# Patient Record
Sex: FEMALE | Race: ASIAN | Employment: OTHER | URBAN - METROPOLITAN AREA
[De-identification: names, ages, dates, MRNs, and addresses within clinical notes are randomized per-mention and may not be internally consistent; named-entity substitution may affect disease eponyms.]

---

## 2017-01-12 ENCOUNTER — TELEPHONE (OUTPATIENT)
Dept: INTERNAL MEDICINE CLINIC | Facility: CLINIC | Age: 72
End: 2017-01-12

## 2017-06-05 PROBLEM — J44.89 ASTHMA WITH COPD (CHRONIC OBSTRUCTIVE PULMONARY DISEASE) (HCC): Chronic | Status: ACTIVE | Noted: 2017-06-05

## 2017-06-05 PROBLEM — J44.9 ASTHMA WITH COPD (CHRONIC OBSTRUCTIVE PULMONARY DISEASE) (HCC): Chronic | Status: ACTIVE | Noted: 2017-06-05

## 2017-06-05 PROBLEM — J44.9 ASTHMA WITH COPD (CHRONIC OBSTRUCTIVE PULMONARY DISEASE): Chronic | Status: ACTIVE | Noted: 2017-06-05

## 2017-06-05 PROBLEM — J44.89 ASTHMA WITH COPD (CHRONIC OBSTRUCTIVE PULMONARY DISEASE): Chronic | Status: ACTIVE | Noted: 2017-06-05

## 2017-12-27 NOTE — TELEPHONE ENCOUNTER
Tried to call patient to see if she is truly Dr. Renan Vivas patient   This number is disconnected additional...

## 2018-05-10 ENCOUNTER — OFFICE VISIT (OUTPATIENT)
Dept: INTERNAL MEDICINE CLINIC | Facility: CLINIC | Age: 73
End: 2018-05-10

## 2018-05-10 ENCOUNTER — LAB ENCOUNTER (OUTPATIENT)
Dept: LAB | Age: 73
End: 2018-05-10
Attending: INTERNAL MEDICINE
Payer: MEDICARE

## 2018-05-10 VITALS
WEIGHT: 171.13 LBS | DIASTOLIC BLOOD PRESSURE: 80 MMHG | SYSTOLIC BLOOD PRESSURE: 138 MMHG | BODY MASS INDEX: 28.86 KG/M2 | OXYGEN SATURATION: 93 % | HEART RATE: 86 BPM | HEIGHT: 64.5 IN | TEMPERATURE: 99 F

## 2018-05-10 DIAGNOSIS — I51.89 DIASTOLIC DYSFUNCTION: ICD-10-CM

## 2018-05-10 DIAGNOSIS — I10 ESSENTIAL HYPERTENSION: ICD-10-CM

## 2018-05-10 DIAGNOSIS — I77.9 CAROTID ARTERY DISEASE, UNSPECIFIED LATERALITY (HCC): ICD-10-CM

## 2018-05-10 DIAGNOSIS — Z00.00 ANNUAL PHYSICAL EXAM: Primary | ICD-10-CM

## 2018-05-10 DIAGNOSIS — Z12.31 ENCOUNTER FOR SCREENING MAMMOGRAM FOR BREAST CANCER: ICD-10-CM

## 2018-05-10 DIAGNOSIS — N95.1 MENOPAUSAL STATE: ICD-10-CM

## 2018-05-10 DIAGNOSIS — Z23 NEED FOR VACCINATION: ICD-10-CM

## 2018-05-10 DIAGNOSIS — Z87.39 HISTORY OF GOUT: ICD-10-CM

## 2018-05-10 DIAGNOSIS — E78.00 PURE HYPERCHOLESTEROLEMIA: ICD-10-CM

## 2018-05-10 DIAGNOSIS — Z00.00 ANNUAL PHYSICAL EXAM: ICD-10-CM

## 2018-05-10 DIAGNOSIS — Z86.018 HISTORY OF PITUITARY ADENOMA: ICD-10-CM

## 2018-05-10 DIAGNOSIS — H25.9 SENILE CATARACT OF LEFT EYE, UNSPECIFIED AGE-RELATED CATARACT TYPE: ICD-10-CM

## 2018-05-10 DIAGNOSIS — Z12.11 COLON CANCER SCREENING: ICD-10-CM

## 2018-05-10 DIAGNOSIS — J45.30 MILD PERSISTENT ASTHMA WITHOUT COMPLICATION: ICD-10-CM

## 2018-05-10 DIAGNOSIS — K21.9 GASTROESOPHAGEAL REFLUX DISEASE, ESOPHAGITIS PRESENCE NOT SPECIFIED: ICD-10-CM

## 2018-05-10 PROCEDURE — 85025 COMPLETE CBC W/AUTO DIFF WBC: CPT

## 2018-05-10 PROCEDURE — 90670 PCV13 VACCINE IM: CPT | Performed by: INTERNAL MEDICINE

## 2018-05-10 PROCEDURE — G0009 ADMIN PNEUMOCOCCAL VACCINE: HCPCS | Performed by: INTERNAL MEDICINE

## 2018-05-10 PROCEDURE — 80061 LIPID PANEL: CPT

## 2018-05-10 PROCEDURE — 84443 ASSAY THYROID STIM HORMONE: CPT

## 2018-05-10 PROCEDURE — 84550 ASSAY OF BLOOD/URIC ACID: CPT

## 2018-05-10 PROCEDURE — 36415 COLL VENOUS BLD VENIPUNCTURE: CPT

## 2018-05-10 PROCEDURE — 96160 PT-FOCUSED HLTH RISK ASSMT: CPT | Performed by: INTERNAL MEDICINE

## 2018-05-10 PROCEDURE — 80053 COMPREHEN METABOLIC PANEL: CPT

## 2018-05-10 PROCEDURE — 82306 VITAMIN D 25 HYDROXY: CPT

## 2018-05-10 PROCEDURE — G0439 PPPS, SUBSEQ VISIT: HCPCS | Performed by: INTERNAL MEDICINE

## 2018-05-10 RX ORDER — MULTIVIT-MIN/IRON/FOLIC ACID/K 18-600-40
1 CAPSULE ORAL DAILY
COMMUNITY

## 2018-05-10 RX ORDER — MULTIVITAMIN
1 TABLET ORAL DAILY
COMMUNITY

## 2018-05-10 RX ORDER — LOSARTAN POTASSIUM 50 MG/1
1 TABLET ORAL
Status: ON HOLD | COMMUNITY
Start: 2018-03-16 | End: 2019-02-17

## 2018-05-10 RX ORDER — FLUTICASONE PROPIONATE AND SALMETEROL 50; 250 UG/1; UG/1
1 POWDER RESPIRATORY (INHALATION) 2 TIMES DAILY
COMMUNITY
Start: 2018-04-17 | End: 2019-03-15 | Stop reason: ALTCHOICE

## 2018-05-10 RX ORDER — CHLORAL HYDRATE 500 MG
1000 CAPSULE ORAL DAILY
Status: ON HOLD | COMMUNITY
End: 2018-10-19 | Stop reason: ALTCHOICE

## 2018-05-10 RX ORDER — AMLODIPINE BESYLATE 5 MG/1
1 TABLET ORAL
Status: ON HOLD | COMMUNITY
Start: 2018-04-12 | End: 2018-10-20

## 2018-05-10 NOTE — PROGRESS NOTES
HPI:   Fredy Obrien is a 68year old female who presents for a Medicare Subsequent Annual Wellness visit (Pt already had Initial Annual Wellness).       Her last annual assessment has been over 1 year: Annual Physical due on [birth year]14/1947         Fall/R Annual physical exam     Essential hypertension     Need for vaccination     Mild persistent asthma without complication     Colon cancer screening     Menopausal state     Age-related cataract of left eye     History of gout     Gastroesophageal reflux not drink alcohol or use drugs.      REVIEW OF SYSTEMS:   GENERAL: feels well otherwise  SKIN: denies any unusual skin lesions  EYES: denies blurred vision or double vision  HEENT: denies nasal congestion, sinus pain or ST  LUNGS: denies shortness of breath such as at a meeting or place of [employer]:  No   Many people I talk to seem to mumble (or don't speak clearly):  No People get annoyed because I misunderstand what they say:  No   I misunderstand what others are saying and make inappropriate responses:  No encounter diagnosis)  Plan: CBC WITH DIFFERENTIAL WITH PLATELET, COMP         METABOLIC PANEL (14), LIPID PANEL, ASSAY,         THYROID STIM HORMONE, VITAMIN D, 25-HYDROXY        Check labs. Pt needs to update her mammogram and her colonoscopy.  dexa scan a records and see what has been done before. She is not on urate lowering therapy either.  We will check her uric acid level.     (K21.9) Gastroesophageal reflux disease, esophagitis presence not specified  Plan: pt states has been treated with different PPIs Physical Exam only, or if medically necessary Electrocardiogram date       Colorectal Cancer Screening      Colonoscopy Screen every 10 years Colonoscopy,10 Years due on 02/14/1996 Update Health Maintenance if applicable    Flex Sigmoidoscopy Screen every history found This may be covered with your prescription benefits, but Medicare does not cover unless Medically needed    Zoster  Not covered by Medicare Part B No vaccine history found This may be covered with your pharmacy  prescription benefits      SPE

## 2018-05-14 ENCOUNTER — TELEPHONE (OUTPATIENT)
Dept: INTERNAL MEDICINE CLINIC | Facility: CLINIC | Age: 73
End: 2018-05-14

## 2018-05-14 DIAGNOSIS — R73.09 ELEVATED GLUCOSE: Primary | ICD-10-CM

## 2018-05-17 ENCOUNTER — TELEPHONE (OUTPATIENT)
Dept: INTERNAL MEDICINE CLINIC | Facility: CLINIC | Age: 73
End: 2018-05-17

## 2018-05-17 DIAGNOSIS — Z85.3 HISTORY OF CANCER OF LEFT BREAST: Primary | ICD-10-CM

## 2018-05-22 ENCOUNTER — HOSPITAL ENCOUNTER (OUTPATIENT)
Dept: GENERAL RADIOLOGY | Age: 73
Discharge: HOME | End: 2018-05-22
Attending: INTERNAL MEDICINE
Payer: MEDICARE

## 2018-05-22 ENCOUNTER — OFFICE VISIT (OUTPATIENT)
Dept: INTERNAL MEDICINE CLINIC | Facility: CLINIC | Age: 73
End: 2018-05-22

## 2018-05-22 VITALS
WEIGHT: 172 LBS | BODY MASS INDEX: 29.01 KG/M2 | HEIGHT: 64.5 IN | HEART RATE: 86 BPM | DIASTOLIC BLOOD PRESSURE: 73 MMHG | TEMPERATURE: 99 F | SYSTOLIC BLOOD PRESSURE: 147 MMHG | RESPIRATION RATE: 18 BRPM

## 2018-05-22 DIAGNOSIS — E78.00 HYPERCHOLESTEROLEMIA: ICD-10-CM

## 2018-05-22 DIAGNOSIS — R73.09 ELEVATED GLUCOSE: ICD-10-CM

## 2018-05-22 DIAGNOSIS — M1A.9XX1 TOPHI: Primary | ICD-10-CM

## 2018-05-22 DIAGNOSIS — M1A.9XX1 TOPHI: ICD-10-CM

## 2018-05-22 PROCEDURE — 73130 X-RAY EXAM OF HAND: CPT | Performed by: INTERNAL MEDICINE

## 2018-05-22 PROCEDURE — 99214 OFFICE O/P EST MOD 30 MIN: CPT | Performed by: INTERNAL MEDICINE

## 2018-05-22 PROCEDURE — 99212 OFFICE O/P EST SF 10 MIN: CPT | Performed by: INTERNAL MEDICINE

## 2018-05-23 ENCOUNTER — HOSPITAL ENCOUNTER (OUTPATIENT)
Dept: MAMMOGRAPHY | Facility: HOSPITAL | Age: 73
Discharge: HOME | End: 2018-05-23
Attending: INTERNAL MEDICINE
Payer: MEDICARE

## 2018-05-23 ENCOUNTER — TELEPHONE (OUTPATIENT)
Dept: INTERNAL MEDICINE CLINIC | Facility: CLINIC | Age: 73
End: 2018-05-23

## 2018-05-23 DIAGNOSIS — E78.00 HYPERCHOLESTEROLEMIA: Primary | ICD-10-CM

## 2018-05-23 DIAGNOSIS — Z12.31 ENCOUNTER FOR SCREENING MAMMOGRAM FOR BREAST CANCER: ICD-10-CM

## 2018-05-23 PROCEDURE — 77065 DX MAMMO INCL CAD UNI: CPT | Performed by: INTERNAL MEDICINE

## 2018-05-23 NOTE — TELEPHONE ENCOUNTER
Please note. Thank you.  Please respond to pool: LAURA IM ADO LPN/CMA    Pt calling back (Name and  verified) and states that she spoke with someone named [place]  at [facility] today and was told that the change of PCP name to Dr. Guicho Barraza will go i

## 2018-05-23 NOTE — TELEPHONE ENCOUNTER
pls call pt; she still under another PCP Dr Chrystal Schwartz so she needs to call her insurance to switch to me so we can do the referrals

## 2018-05-23 NOTE — PROGRESS NOTES
HPI:    Patient ID: Mike Mcdaniel is a 68year old female. Hyperlipidemia   This is a chronic problem. The current episode started more than 1 year ago. The problem is uncontrolled. Recent lipid tests were reviewed and are high.  Exacerbating disea Allergies:  Allopurinol             HIVES, SWELLING, SHORTNESS OF                            BREATH  Uloric [Febuxostat]     ITCHING  Statins                 MYALGIA    Comment:Pt had developed myalgia and myopathy  Ace Inhibitors          Coughing   P encounter.       Meds This Visit:  No prescriptions requested or ordered in this encounter    Imaging & Referrals:  CARD[facility] - INTERNAL       DY#[id number]

## 2018-05-31 ENCOUNTER — TELEPHONE (OUTPATIENT)
Dept: INTERNAL MEDICINE CLINIC | Facility: CLINIC | Age: 73
End: 2018-05-31

## 2018-05-31 DIAGNOSIS — R92.8 ABNORMAL MAMMOGRAM: Primary | ICD-10-CM

## 2018-05-31 DIAGNOSIS — M1A.9XX1 TOPHACEOUS GOUT: Primary | ICD-10-CM

## 2018-06-01 ENCOUNTER — NURSE TRIAGE (OUTPATIENT)
Dept: OTHER | Age: 73
End: 2018-06-01

## 2018-06-01 RX ORDER — METHYLPREDNISOLONE 4 MG/1
TABLET ORAL
Qty: 1 KIT | Refills: 0 | Status: SHIPPED | OUTPATIENT
Start: 2018-06-01 | End: 2018-09-05 | Stop reason: ALTCHOICE

## 2018-06-01 NOTE — TELEPHONE ENCOUNTER
Patient returned call and informed patient Rx was sent and to follow up with OV if symptoms persist or worsen. Verbalized understanding and agreement. No further questions or concerns at this time.

## 2018-06-01 NOTE — TELEPHONE ENCOUNTER
Action Requested: Summary for Provider     []  Critical Lab, Recommendations Needed  [x] Need Additional Advice  []   FYI    []   Need Orders  [] Need Medications Sent to Pharmacy  []  Other     SUMMARY: Patient is having pain in her arch on the right foot

## 2018-07-08 ENCOUNTER — TELEPHONE (OUTPATIENT)
Dept: INTERNAL MEDICINE CLINIC | Facility: CLINIC | Age: 73
End: 2018-07-08

## 2018-07-08 NOTE — TELEPHONE ENCOUNTER
pls call pt; I had advised her to see surgeon Dr Сергей Tran back in April after her mammogram was done and was recommended by radiologist to have surgical eval. I dont see any notes from Dr Сергей Tran so pt still has not seen him.  She needs to get this evaluat

## 2018-07-19 PROBLEM — Z92.3 HISTORY OF RADIATION THERAPY: Status: ACTIVE | Noted: 2018-07-19

## 2018-07-19 PROBLEM — R92.0 MICROCALCIFICATIONS OF THE BREAST: Status: ACTIVE | Noted: 2018-07-19

## 2018-07-25 ENCOUNTER — OFFICE VISIT (OUTPATIENT)
Dept: RHEUMATOLOGY | Facility: CLINIC | Age: 73
End: 2018-07-25
Payer: MEDICARE

## 2018-07-25 ENCOUNTER — NURSE TRIAGE (OUTPATIENT)
Dept: OTHER | Age: 73
End: 2018-07-25

## 2018-07-25 VITALS
HEIGHT: 64.5 IN | SYSTOLIC BLOOD PRESSURE: 138 MMHG | WEIGHT: 174 LBS | DIASTOLIC BLOOD PRESSURE: 76 MMHG | BODY MASS INDEX: 29.35 KG/M2 | HEART RATE: 89 BPM

## 2018-07-25 DIAGNOSIS — M1A.9XX1 CHRONIC TOPHACEOUS GOUT: Primary | ICD-10-CM

## 2018-07-25 PROCEDURE — 99212 OFFICE O/P EST SF 10 MIN: CPT | Performed by: INTERNAL MEDICINE

## 2018-07-25 PROCEDURE — 99204 OFFICE O/P NEW MOD 45 MIN: CPT | Performed by: INTERNAL MEDICINE

## 2018-07-25 RX ORDER — FEBUXOSTAT 40 MG/1
TABLET, FILM COATED ORAL
Qty: 30 TABLET | Refills: 2 | Status: SHIPPED | OUTPATIENT
Start: 2018-07-25 | End: 2018-08-01

## 2018-07-25 RX ORDER — COLCHICINE 0.6 MG/1
1 CAPSULE ORAL 2 TIMES DAILY
Qty: 60 CAPSULE | Refills: 5 | Status: SHIPPED | OUTPATIENT
Start: 2018-07-25 | End: 2018-08-24

## 2018-07-25 NOTE — TELEPHONE ENCOUNTER
Action Requested: Summary for Provider     []  Critical Lab, Recommendations Needed  [] Need Additional Advice  [x]   FYI    []   Need Orders  [] Need Medications Sent to Pharmacy  []  Other     SUMMARY: Pt with 2 weeks bilateral earache with intermittent

## 2018-07-25 NOTE — PROGRESS NOTES
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 Are not more swollen. They are not especially stiff. She can have fevers and chills with her gouty attacks. No anorexia or weight loss. No rash. Her hair is thinning.   She had a right catar along with it, so starting Uloric does not precipitate acute attacks. I will see her back in 1 month, after repeat uric acid, which needs to be less than 6.0. She was given Up-To-Date articles on gout and probenecid. 2.  Asthma, shortness of breath.

## 2018-07-26 ENCOUNTER — OFFICE VISIT (OUTPATIENT)
Dept: INTERNAL MEDICINE CLINIC | Facility: CLINIC | Age: 73
End: 2018-07-26
Payer: MEDICARE

## 2018-07-26 ENCOUNTER — NURSE TRIAGE (OUTPATIENT)
Dept: OTHER | Age: 73
End: 2018-07-26

## 2018-07-26 ENCOUNTER — TELEPHONE (OUTPATIENT)
Dept: RHEUMATOLOGY | Facility: CLINIC | Age: 73
End: 2018-07-26

## 2018-07-26 VITALS
SYSTOLIC BLOOD PRESSURE: 130 MMHG | HEIGHT: 64 IN | HEART RATE: 92 BPM | DIASTOLIC BLOOD PRESSURE: 76 MMHG | TEMPERATURE: 97 F | BODY MASS INDEX: 30.22 KG/M2 | WEIGHT: 177 LBS

## 2018-07-26 DIAGNOSIS — J45.30 MILD PERSISTENT ASTHMA WITHOUT COMPLICATION: ICD-10-CM

## 2018-07-26 DIAGNOSIS — H61.21 IMPACTED CERUMEN, RIGHT EAR: Primary | ICD-10-CM

## 2018-07-26 PROCEDURE — 99212 OFFICE O/P EST SF 10 MIN: CPT | Performed by: INTERNAL MEDICINE

## 2018-07-26 PROCEDURE — 99214 OFFICE O/P EST MOD 30 MIN: CPT | Performed by: INTERNAL MEDICINE

## 2018-07-26 RX ORDER — NEOMYCIN SULFATE, POLYMYXIN B SULFATE AND HYDROCORTISONE 10; 3.5; 1 MG/ML; MG/ML; [USP'U]/ML
4 SUSPENSION/ DROPS AURICULAR (OTIC) 4 TIMES DAILY
Qty: 1 BOTTLE | Refills: 0 | Status: ON HOLD | OUTPATIENT
Start: 2018-07-26 | End: 2018-10-19 | Stop reason: ALTCHOICE

## 2018-07-26 NOTE — TELEPHONE ENCOUNTER
Current Outpatient Prescriptions:   •  febuxostat (ULORIC) 40 MG Oral Tab, Take one daily. , Disp: 30 tablet, Rfl: 2  Drug change request:  \"Plan does not cover medication prescribed.  Per Rx benefit plan alternative medications include: ALLOPURINOL Pleas

## 2018-07-26 NOTE — TELEPHONE ENCOUNTER
Allopurinol is not acceptable. She had a severe diffuse rash from allopurinol. Please get prior-authorization for Uloric. Thanks.

## 2018-07-26 NOTE — TELEPHONE ENCOUNTER
Called and talked to pt; took uloric at 3pm and started having some reaction not feeling well, not able to relax and some difficulty of breathing. Also complained of pain on the right ear which we had flushed today due to impacted cerumen.    She states angela

## 2018-07-26 NOTE — PROGRESS NOTES
HPI:    Patient ID: Portillo Haley is a 68year old female. Ear Pain    There is pain in both ears. This is a new problem. The current episode started 1 to 4 weeks ago (2 weekse). The problem occurs constantly. The pain is mild.  Pertinent negatives Comment:Pt had developed myalgia and myopathy  Ace Inhibitors          Coughing  Latex                   ITCHING   PHYSICAL EXAM:   Physical Exam   Constitutional: She appears well-developed and well-nourished. No distress.    HENT:   Right Ear: External ea

## 2018-07-26 NOTE — TELEPHONE ENCOUNTER
Action Requested: Summary for Provider     []  Critical Lab, Recommendations Needed  [] Need Additional Advice  []   FYI    []   Need Orders  [] Need Medications Sent to Pharmacy  []  Other     SUMMARY: Pt requesting to speak with Dr Leela Hidalgo

## 2018-07-27 NOTE — TELEPHONE ENCOUNTER
I called  late this afternoon, but she did not answer. I advised her to not take the Uloric this weekend. I will give her a call back on Monday, July 30th. We will then decide what she will do.

## 2018-07-27 NOTE — TELEPHONE ENCOUNTER
PA requested from Luann Hope, authorization received confirmation #[id number], fax received and sent to scan. Spoke with pt and notified her. She informed me that she had samples of uloric at home and started taking them on Wednesday.   She has bee

## 2018-07-28 ENCOUNTER — HOSPITAL ENCOUNTER (EMERGENCY)
Facility: HOSPITAL | Age: 73
Discharge: HOME | End: 2018-07-28
Attending: EMERGENCY MEDICINE
Payer: MEDICARE

## 2018-07-28 ENCOUNTER — APPOINTMENT (OUTPATIENT)
Dept: GENERAL RADIOLOGY | Facility: HOSPITAL | Age: 73
End: 2018-07-28
Attending: EMERGENCY MEDICINE
Payer: MEDICARE

## 2018-07-28 VITALS
SYSTOLIC BLOOD PRESSURE: 156 MMHG | OXYGEN SATURATION: 97 % | RESPIRATION RATE: 20 BRPM | TEMPERATURE: 98 F | DIASTOLIC BLOOD PRESSURE: 80 MMHG | HEART RATE: 86 BPM

## 2018-07-28 DIAGNOSIS — T78.40XA ALLERGIC REACTION, INITIAL ENCOUNTER: Primary | ICD-10-CM

## 2018-07-28 PROCEDURE — 96374 THER/PROPH/DIAG INJ IV PUSH: CPT

## 2018-07-28 PROCEDURE — 70360 X-RAY EXAM OF NECK: CPT | Performed by: EMERGENCY MEDICINE

## 2018-07-28 PROCEDURE — 99284 EMERGENCY DEPT VISIT MOD MDM: CPT

## 2018-07-28 PROCEDURE — S0028 INJECTION, FAMOTIDINE, 20 MG: HCPCS | Performed by: EMERGENCY MEDICINE

## 2018-07-28 PROCEDURE — 93005 ELECTROCARDIOGRAM TRACING: CPT

## 2018-07-28 PROCEDURE — 93010 ELECTROCARDIOGRAM REPORT: CPT | Performed by: EMERGENCY MEDICINE

## 2018-07-28 PROCEDURE — 96375 TX/PRO/DX INJ NEW DRUG ADDON: CPT

## 2018-07-28 RX ORDER — METHYLPREDNISOLONE SODIUM SUCCINATE 40 MG/ML
40 INJECTION, POWDER, LYOPHILIZED, FOR SOLUTION INTRAMUSCULAR; INTRAVENOUS ONCE
Status: COMPLETED | OUTPATIENT
Start: 2018-07-28 | End: 2018-07-28

## 2018-07-28 RX ORDER — FAMOTIDINE 20 MG/1
20 TABLET ORAL 2 TIMES DAILY
Qty: 28 TABLET | Refills: 0 | Status: SHIPPED | OUTPATIENT
Start: 2018-07-28 | End: 2018-08-11

## 2018-07-28 RX ORDER — DIPHENHYDRAMINE HCL 25 MG
25 TABLET ORAL EVERY 6 HOURS PRN
Qty: 28 TABLET | Refills: 0 | Status: ON HOLD | OUTPATIENT
Start: 2018-07-28 | End: 2019-02-17

## 2018-07-28 RX ORDER — DIPHENHYDRAMINE HYDROCHLORIDE 50 MG/ML
25 INJECTION INTRAMUSCULAR; INTRAVENOUS ONCE
Status: COMPLETED | OUTPATIENT
Start: 2018-07-28 | End: 2018-07-28

## 2018-07-28 RX ORDER — FAMOTIDINE 10 MG/ML
20 INJECTION, SOLUTION INTRAVENOUS ONCE
Status: COMPLETED | OUTPATIENT
Start: 2018-07-28 | End: 2018-07-28

## 2018-07-28 RX ORDER — PREDNISONE 20 MG/1
40 TABLET ORAL DAILY
Qty: 8 TABLET | Refills: 0 | Status: SHIPPED | OUTPATIENT
Start: 2018-07-28 | End: 2018-08-01

## 2018-07-28 NOTE — ED PROVIDER NOTES
Patient Seen in: [facility] AND [facility] Emergency Department    History   Patient presents with:  Dyspnea BRUNO SOB (respiratory): +pain to below her throat    Stated Complaint: pain to throat     HPI    67 yo F with PMH HL, HTN, gout on losartan and with recent acids 1000 MG Oral Cap,  Take 1,000 mg by mouth daily.        Family History   Problem Relation Age of Onset   • Asthma Father    • Hypertension Father    • Heart Disorder Father    • Other Zollie Ports Mother      thyroid surgery   • Asthma Sister    • Asthma B Anxious. Nursing note and vitals reviewed. ED Course   Labs Reviewed - No data to display  EKG    Rate, intervals and axes as noted on EKG Report.   Rate: 86  Rhythm: Sinus Rhythm  Reading: NSR 86 without STT changes as interpreted by myself symptoms improved with meds. DC home with meds as noted and PCP followup.     Disposition and Plan     Clinical Impression:  Allergic reaction, initial encounter  (primary encounter diagnosis)    Disposition:  Discharge    Follow-up:  Keaton Stains,

## 2018-07-28 NOTE — ED INITIAL ASSESSMENT (HPI)
Pt c/o BRUNO+pain/tightness to below her throat, denies nausea/vomiting/fever, pt sts that she has allergic reaction to uloric and called her PMD and was told to stop medication and come to ER

## 2018-07-30 ENCOUNTER — TELEPHONE (OUTPATIENT)
Dept: OTHER | Age: 73
End: 2018-07-30

## 2018-07-30 RX ORDER — PROBENECID 500 MG/1
TABLET, FILM COATED ORAL
Qty: 60 TABLET | Refills: 2 | Status: ON HOLD | OUTPATIENT
Start: 2018-07-30 | End: 2018-10-19

## 2018-07-30 NOTE — TELEPHONE ENCOUNTER
Since she was having allergic reaction which was possibly affecting her airway, I would go along with ER doctor giving prednisone

## 2018-07-30 NOTE — TELEPHONE ENCOUNTER
I spoke with [facility]. She will be unable to tolerate Uloric. She took 2 doses and got very irritable, restless, could not breathe, and had discomfort in her throat. No itching. Will send a prescription in for probenecid 250 mg twice a day.   She will

## 2018-07-30 NOTE — TELEPHONE ENCOUNTER
Received call from patient's son who is on HIPAA, who reports patient was seen in the ER on 7/28/18 due to an allergic reaction and was prescribed prednisone.  Son reports patient is apprehensive in starting this medication and wants to check with Dr. Nancy Davenport

## 2018-08-01 ENCOUNTER — OFFICE VISIT (OUTPATIENT)
Dept: INTERNAL MEDICINE CLINIC | Facility: CLINIC | Age: 73
End: 2018-08-01
Payer: MEDICARE

## 2018-08-01 VITALS
WEIGHT: 175 LBS | HEIGHT: 64.5 IN | RESPIRATION RATE: 12 BRPM | BODY MASS INDEX: 29.51 KG/M2 | DIASTOLIC BLOOD PRESSURE: 80 MMHG | TEMPERATURE: 98 F | SYSTOLIC BLOOD PRESSURE: 134 MMHG | HEART RATE: 87 BPM

## 2018-08-01 DIAGNOSIS — T78.40XA ALLERGIC REACTION TO DRUG, INITIAL ENCOUNTER: Primary | ICD-10-CM

## 2018-08-01 PROBLEM — J44.9 ASTHMA WITH COPD (CHRONIC OBSTRUCTIVE PULMONARY DISEASE): Chronic | Status: ACTIVE | Noted: 2018-08-01

## 2018-08-01 PROBLEM — J44.9 ASTHMA WITH COPD (CHRONIC OBSTRUCTIVE PULMONARY DISEASE) (HCC): Chronic | Status: ACTIVE | Noted: 2018-08-01

## 2018-08-01 PROBLEM — J44.89 ASTHMA WITH COPD (CHRONIC OBSTRUCTIVE PULMONARY DISEASE): Chronic | Status: ACTIVE | Noted: 2018-08-01

## 2018-08-01 PROBLEM — J44.89 ASTHMA WITH COPD (CHRONIC OBSTRUCTIVE PULMONARY DISEASE) (HCC): Chronic | Status: ACTIVE | Noted: 2018-08-01

## 2018-08-01 PROCEDURE — 99212 OFFICE O/P EST SF 10 MIN: CPT | Performed by: INTERNAL MEDICINE

## 2018-08-01 PROCEDURE — 99213 OFFICE O/P EST LOW 20 MIN: CPT | Performed by: INTERNAL MEDICINE

## 2018-08-01 RX ORDER — COLCHICINE 0.6 MG/1
0.6 TABLET, FILM COATED ORAL DAILY PRN
Status: ON HOLD | COMMUNITY
Start: 2018-07-25 | End: 2019-02-17

## 2018-08-01 NOTE — PROGRESS NOTES
HPI:    Patient ID: Jose Eduardo Long is a 68year old female. Patient presents today for post ER ffup. I had sent pt to ER after she had an allergic reaction to uloric.  She has history of tophaceous gout and had to be started on urate lowering therap (MITIGARE) 0.6 MG Oral Cap Take 1 capsule by mouth 2 (two) times daily. Disp: 60 capsule Rfl: 5   methylPREDNISolone (MEDROL) 4 MG Oral Tablet Therapy Pack As directed. Disp: 1 kit Rfl: 0   omega-3 fatty acids 1000 MG Oral Cap Take 1,000 mg by mouth daily. ASSESSMENT/PLAN:   (T78.40XA) Allergic reaction to drug, initial encounter  (primary encounter diagnosis)  Plan: pt had been seen in ER and was started on prednisone but had been taking only 20mg daily instead of bid dose.  She is doing well so told to fi

## 2018-08-13 ENCOUNTER — HOSPITAL ENCOUNTER (OUTPATIENT)
Dept: RESPIRATORY THERAPY | Facility: HOSPITAL | Age: 73
Discharge: HOME | End: 2018-08-13
Attending: INTERNAL MEDICINE
Payer: MEDICARE

## 2018-08-13 DIAGNOSIS — J45.30 MILD PERSISTENT ASTHMA WITHOUT COMPLICATION: ICD-10-CM

## 2018-08-13 PROCEDURE — 94729 DIFFUSING CAPACITY: CPT | Performed by: INTERNAL MEDICINE

## 2018-08-13 PROCEDURE — 94726 PLETHYSMOGRAPHY LUNG VOLUMES: CPT | Performed by: INTERNAL MEDICINE

## 2018-08-13 PROCEDURE — 94060 EVALUATION OF WHEEZING: CPT | Performed by: INTERNAL MEDICINE

## 2018-08-21 NOTE — PROCEDURES
Pulmonary Function Test     Barney Osorio Patient Status:  Outpatient    1945 MRN [id number]   Date of Exam 18 PCP Eron Sheldon MD           Spirometry   FEV1:0.64 33%  FVC:1.32 55%  FEV1/FVC:0.48    Lung Volume   T.13 86%

## 2018-08-26 ENCOUNTER — TELEPHONE (OUTPATIENT)
Dept: INTERNAL MEDICINE CLINIC | Facility: CLINIC | Age: 73
End: 2018-08-26

## 2018-08-26 DIAGNOSIS — J45.50 SEVERE PERSISTENT ASTHMA WITHOUT COMPLICATION: Primary | ICD-10-CM

## 2018-09-05 ENCOUNTER — OFFICE VISIT (OUTPATIENT)
Dept: RHEUMATOLOGY | Facility: CLINIC | Age: 73
End: 2018-09-05

## 2018-09-05 VITALS
DIASTOLIC BLOOD PRESSURE: 73 MMHG | HEART RATE: 97 BPM | TEMPERATURE: 98 F | BODY MASS INDEX: 29.01 KG/M2 | WEIGHT: 172 LBS | RESPIRATION RATE: 19 BRPM | HEIGHT: 64.5 IN | SYSTOLIC BLOOD PRESSURE: 146 MMHG

## 2018-09-05 DIAGNOSIS — J44.9 ASTHMA WITH COPD (CHRONIC OBSTRUCTIVE PULMONARY DISEASE) (HCC): Chronic | ICD-10-CM

## 2018-09-05 DIAGNOSIS — M1A.9XX1 CHRONIC TOPHACEOUS GOUT: Primary | ICD-10-CM

## 2018-09-05 PROCEDURE — 99213 OFFICE O/P EST LOW 20 MIN: CPT | Performed by: INTERNAL MEDICINE

## 2018-09-05 NOTE — PROGRESS NOTES
HPI:    Patient ID: Jose Perez is a 68year old female. Danna Glover is a 81-year-old woman who I saw for Dr. Kitty Magana on July 25th, 2018, with worsening polyarticular tophaceous gout, with a uric acid of 8.7.   Previously, allopurinol has caused Monday and Friday  Disp:  Rfl:    Multiple Vitamin (MULTI-VITAMIN DAILY) Oral Tab Take 1 tablet by mouth daily. Disp:  Rfl:    Cholecalciferol (VITAMIN D) 2000 units Oral Cap Take 1 capsule by mouth daily.  Disp:  Rfl:    omega-3 fatty acids 1000 MG Oral Ca using her special drink for now. She will return in 6 months. Her uric acid level will be checked at that time. She will also work on exercise, diet, and weight loss. 2.  Asthma, shortness of breath.       3.  History of breast cancer bilaterally, wit

## 2018-09-11 ENCOUNTER — TELEPHONE (OUTPATIENT)
Dept: OTHER | Age: 73
End: 2018-09-11

## 2018-09-11 NOTE — TELEPHONE ENCOUNTER
Spoke with patient--requesting EL to place GI referral. Relayed to patient referral already in system. Relayed # below for patient to call for appt--patient verbalizes understanding and agreement. No further questions/concerns at this time.       Referral I

## 2018-10-11 ENCOUNTER — HOSPITAL ENCOUNTER (EMERGENCY)
Facility: HOSPITAL | Age: 73
Discharge: HOME | End: 2018-10-11
Attending: EMERGENCY MEDICINE
Payer: MEDICARE

## 2018-10-11 ENCOUNTER — APPOINTMENT (OUTPATIENT)
Dept: GENERAL RADIOLOGY | Facility: HOSPITAL | Age: 73
End: 2018-10-11
Attending: EMERGENCY MEDICINE
Payer: MEDICARE

## 2018-10-11 ENCOUNTER — APPOINTMENT (OUTPATIENT)
Dept: CT IMAGING | Facility: HOSPITAL | Age: 73
End: 2018-10-11
Attending: EMERGENCY MEDICINE
Payer: MEDICARE

## 2018-10-11 VITALS
OXYGEN SATURATION: 97 % | RESPIRATION RATE: 18 BRPM | WEIGHT: 172 LBS | SYSTOLIC BLOOD PRESSURE: 162 MMHG | BODY MASS INDEX: 29.37 KG/M2 | HEART RATE: 82 BPM | DIASTOLIC BLOOD PRESSURE: 80 MMHG | HEIGHT: 64 IN | TEMPERATURE: 99 F

## 2018-10-11 DIAGNOSIS — S22.49XA MULTIPLE RIB FRACTURES INVOLVING FOUR OR MORE RIBS: Primary | ICD-10-CM

## 2018-10-11 DIAGNOSIS — S00.03XA CONTUSION OF SCALP, INITIAL ENCOUNTER: ICD-10-CM

## 2018-10-11 PROCEDURE — 71101 X-RAY EXAM UNILAT RIBS/CHEST: CPT | Performed by: EMERGENCY MEDICINE

## 2018-10-11 PROCEDURE — 70450 CT HEAD/BRAIN W/O DYE: CPT | Performed by: EMERGENCY MEDICINE

## 2018-10-11 PROCEDURE — 99285 EMERGENCY DEPT VISIT HI MDM: CPT

## 2018-10-11 PROCEDURE — 96375 TX/PRO/DX INJ NEW DRUG ADDON: CPT

## 2018-10-11 PROCEDURE — 96374 THER/PROPH/DIAG INJ IV PUSH: CPT

## 2018-10-11 RX ORDER — ORPHENADRINE CITRATE 100 MG/1
100 TABLET, EXTENDED RELEASE ORAL 2 TIMES DAILY PRN
Qty: 20 TABLET | Refills: 0 | Status: SHIPPED | OUTPATIENT
Start: 2018-10-11 | End: 2018-10-20

## 2018-10-11 RX ORDER — ORPHENADRINE CITRATE 30 MG/ML
60 INJECTION INTRAMUSCULAR; INTRAVENOUS ONCE
Status: COMPLETED | OUTPATIENT
Start: 2018-10-11 | End: 2018-10-11

## 2018-10-11 RX ORDER — IBUPROFEN 600 MG/1
600 TABLET ORAL EVERY 6 HOURS PRN
Qty: 10 TABLET | Refills: 0 | Status: SHIPPED | OUTPATIENT
Start: 2018-10-11 | End: 2018-10-20

## 2018-10-11 RX ORDER — HYDROCODONE BITARTRATE AND ACETAMINOPHEN 5; 325 MG/1; MG/1
1-2 TABLET ORAL EVERY 6 HOURS PRN
Qty: 30 TABLET | Refills: 0 | Status: ON HOLD | OUTPATIENT
Start: 2018-10-11 | End: 2018-10-20

## 2018-10-11 RX ORDER — MORPHINE SULFATE 4 MG/ML
2 INJECTION, SOLUTION INTRAMUSCULAR; INTRAVENOUS ONCE
Status: COMPLETED | OUTPATIENT
Start: 2018-10-11 | End: 2018-10-11
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
## 2018-10-14 NOTE — ED PROVIDER NOTES
Patient Seen in: [facility] AND [facility] Emergency Department    History   Patient presents with:  Fall (musculoskeletal, neurologic)    Stated Complaint: fall    HPI    68year old female with h/o asthma, breast ca in remission, hyperlipidemia, and htn who pr Current:BP (!) 162/80   Pulse 82   Temp 98.8 °F (37.1 °C) (Oral)   Resp 18   Ht 162.6 cm (5' 4\")   Wt 78 kg   SpO2 97%   BMI 29.52 kg/m²         Physical Exam   Constitutional: She is oriented to person, place, and time.  She appears well-developed and wel CONCLUSION:  1. Negative for depressed calvarial fracture, coup/contrecoup intraparenchymal contusion, intracranial hemorrhage, or further evidence of acute intracranial process by noncontrast CT technique.   2. Senescent changes of parenchymal volume loss We recommend that you schedule follow up care with a primary care provider within the next three months to obtain basic health screening including reassessment of your blood pressure.     Medications Prescribed:  Discharge Medication List as of 10/11/2018

## 2018-10-15 ENCOUNTER — TELEPHONE (OUTPATIENT)
Dept: OTHER | Age: 73
End: 2018-10-15
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
## 2018-10-16 ENCOUNTER — DOCUMENTATION ONLY (OUTPATIENT)
Dept: FAMILY MEDICINE CLINIC | Facility: CLINIC | Age: 73
End: 2018-10-16

## 2018-10-16 ENCOUNTER — OFFICE VISIT (OUTPATIENT)
Dept: INTERNAL MEDICINE CLINIC | Facility: CLINIC | Age: 73
End: 2018-10-16
Payer: MEDICARE

## 2018-10-16 VITALS
BODY MASS INDEX: 30.02 KG/M2 | WEIGHT: 178 LBS | TEMPERATURE: 99 F | RESPIRATION RATE: 12 BRPM | HEIGHT: 64.5 IN | SYSTOLIC BLOOD PRESSURE: 144 MMHG | HEART RATE: 84 BPM | DIASTOLIC BLOOD PRESSURE: 78 MMHG

## 2018-10-16 DIAGNOSIS — S22.49XA MULTIPLE RIB FRACTURES INVOLVING FOUR OR MORE RIBS: Primary | ICD-10-CM
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
## 2018-10-16 PROCEDURE — 99214 OFFICE O/P EST MOD 30 MIN: CPT | Performed by: INTERNAL MEDICINE
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
## 2018-10-18 ENCOUNTER — APPOINTMENT (OUTPATIENT)
Dept: GENERAL RADIOLOGY | Facility: HOSPITAL | Age: 73
DRG: 202 | End: 2018-10-18
Attending: EMERGENCY MEDICINE
Payer: MEDICARE

## 2018-10-18 ENCOUNTER — NURSE TRIAGE (OUTPATIENT)
Dept: OTHER | Age: 73
End: 2018-10-18

## 2018-10-18 ENCOUNTER — TELEPHONE (OUTPATIENT)
Dept: INTERNAL MEDICINE CLINIC | Facility: CLINIC | Age: 73
End: 2018-10-18

## 2018-10-18 ENCOUNTER — HOSPITAL ENCOUNTER (OUTPATIENT)
Facility: HOSPITAL | Age: 73
Setting detail: OBSERVATION
Discharge: HOME | DRG: 202 | End: 2018-10-20
Attending: EMERGENCY MEDICINE | Admitting: HOSPITALIST
Payer: MEDICARE

## 2018-10-18 ENCOUNTER — TELEPHONE (OUTPATIENT)
Dept: OTHER | Age: 73
End: 2018-10-18

## 2018-10-18 DIAGNOSIS — R06.02 SHORTNESS OF BREATH: ICD-10-CM

## 2018-10-18 DIAGNOSIS — S22.42XD CLOSED FRACTURE OF MULTIPLE RIBS OF LEFT SIDE WITH ROUTINE HEALING, SUBSEQUENT ENCOUNTER: ICD-10-CM

## 2018-10-18 DIAGNOSIS — J90 PLEURAL EFFUSION: Primary | ICD-10-CM

## 2018-10-18 PROCEDURE — 99222 1ST HOSP IP/OBS MODERATE 55: CPT | Performed by: HOSPITALIST

## 2018-10-18 PROCEDURE — 71045 X-RAY EXAM CHEST 1 VIEW: CPT | Performed by: EMERGENCY MEDICINE

## 2018-10-18 RX ORDER — IPRATROPIUM BROMIDE AND ALBUTEROL SULFATE 2.5; .5 MG/3ML; MG/3ML
3 SOLUTION RESPIRATORY (INHALATION) ONCE
Status: COMPLETED | OUTPATIENT
Start: 2018-10-18 | End: 2018-10-18

## 2018-10-18 NOTE — TELEPHONE ENCOUNTER
Dr Virgil Luque    Patient calling and states that she is in severe pain due to her multiple rib fracture, LOV 10/16/18 with Dr Virgil Luque, c/o sob and cannot take care of herself, need to stop and  pauses before can complete her sentences, advised to go to

## 2018-10-18 NOTE — TELEPHONE ENCOUNTER
If she had reaction to norco then stop it. She can take ibuprofen instead  As to home health visits, I did a referral for it 2 days ago so pls ffup with managed care since pt has hmo.

## 2018-10-18 NOTE — TELEPHONE ENCOUNTER
[facility] calling [facility] patient advised needs hospice care and was ordered by Dr. Anitha Roa.     They are unable to go to patients home without an order. Please advise ASAP.

## 2018-10-18 NOTE — TELEPHONE ENCOUNTER
Patient stated she is not going to the ED. She was on her way but is coming back home. She stated every times she takes the Norco she cannot breath and becomes nauseated.     This morning she took the Norco and than vomited up the [address].  She now can br
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
## 2018-10-18 NOTE — TELEPHONE ENCOUNTER
Action Requested: Summary for Provider     []  Critical Lab, Recommendations Needed  [] Need Additional Advice  []   FYI    []   Need Orders  [] Need Medications Sent to Pharmacy  []  Other     SUMMARY: Patient sent to ER now. Hx of rib fx.   States bot

## 2018-10-19 PROBLEM — S22.42XD CLOSED FRACTURE OF MULTIPLE RIBS OF LEFT SIDE WITH ROUTINE HEALING, SUBSEQUENT ENCOUNTER: Status: ACTIVE | Noted: 2018-10-19

## 2018-10-19 PROBLEM — R06.02 SHORTNESS OF BREATH: Status: ACTIVE | Noted: 2018-10-19

## 2018-10-19 PROCEDURE — 99233 SBSQ HOSP IP/OBS HIGH 50: CPT | Performed by: HOSPITALIST

## 2018-10-19 PROCEDURE — 99222 1ST HOSP IP/OBS MODERATE 55: CPT | Performed by: INTERNAL MEDICINE

## 2018-10-19 RX ORDER — DIPHENOXYLATE HYDROCHLORIDE AND ATROPINE SULFATE 2.5; .025 MG/1; MG/1
1 TABLET ORAL DAILY
Status: DISCONTINUED | OUTPATIENT
Start: 2018-10-19 | End: 2018-10-20

## 2018-10-19 RX ORDER — ONDANSETRON 2 MG/ML
4 INJECTION INTRAMUSCULAR; INTRAVENOUS EVERY 6 HOURS PRN
Status: DISCONTINUED | OUTPATIENT
Start: 2018-10-19 | End: 2018-10-20

## 2018-10-19 RX ORDER — METHYLPREDNISOLONE SODIUM SUCCINATE 40 MG/ML
20 INJECTION, POWDER, LYOPHILIZED, FOR SOLUTION INTRAMUSCULAR; INTRAVENOUS ONCE
Status: COMPLETED | OUTPATIENT
Start: 2018-10-19 | End: 2018-10-19

## 2018-10-19 RX ORDER — LOSARTAN POTASSIUM 50 MG/1
50 TABLET ORAL DAILY
Status: DISCONTINUED | OUTPATIENT
Start: 2018-10-19 | End: 2018-10-20

## 2018-10-19 RX ORDER — IPRATROPIUM BROMIDE AND ALBUTEROL SULFATE 2.5; .5 MG/3ML; MG/3ML
3 SOLUTION RESPIRATORY (INHALATION)
Status: DISCONTINUED | OUTPATIENT
Start: 2018-10-19 | End: 2018-10-19

## 2018-10-19 RX ORDER — TRAMADOL HYDROCHLORIDE 50 MG/1
50 TABLET ORAL EVERY 6 HOURS PRN
Status: DISCONTINUED | OUTPATIENT
Start: 2018-10-19 | End: 2018-10-20

## 2018-10-19 RX ORDER — DIPHENHYDRAMINE HCL 25 MG
25 CAPSULE ORAL EVERY 6 HOURS PRN
Status: DISCONTINUED | OUTPATIENT
Start: 2018-10-19 | End: 2018-10-20

## 2018-10-19 RX ORDER — IPRATROPIUM BROMIDE AND ALBUTEROL SULFATE 2.5; .5 MG/3ML; MG/3ML
3 SOLUTION RESPIRATORY (INHALATION) EVERY 6 HOURS PRN
Status: DISCONTINUED | OUTPATIENT
Start: 2018-10-19 | End: 2018-10-20

## 2018-10-19 RX ORDER — INDOMETHACIN 25 MG/1
25 CAPSULE ORAL
Status: DISCONTINUED | OUTPATIENT
Start: 2018-10-19 | End: 2018-10-19

## 2018-10-19 RX ORDER — ACETAMINOPHEN 325 MG/1
650 TABLET ORAL EVERY 6 HOURS PRN
Status: DISCONTINUED | OUTPATIENT
Start: 2018-10-19 | End: 2018-10-20

## 2018-10-19 RX ORDER — IPRATROPIUM BROMIDE AND ALBUTEROL SULFATE 2.5; .5 MG/3ML; MG/3ML
3 SOLUTION RESPIRATORY (INHALATION) EVERY 6 HOURS PRN
Status: DISCONTINUED | OUTPATIENT
Start: 2018-10-19 | End: 2018-10-19

## 2018-10-19 RX ORDER — DOCUSATE SODIUM 100 MG/1
100 CAPSULE, LIQUID FILLED ORAL 2 TIMES DAILY
Status: DISCONTINUED | OUTPATIENT
Start: 2018-10-19 | End: 2018-10-20

## 2018-10-19 RX ORDER — ALBUTEROL SULFATE 90 UG/1
1 AEROSOL, METERED RESPIRATORY (INHALATION) EVERY 4 HOURS PRN
Status: DISCONTINUED | OUTPATIENT
Start: 2018-10-19 | End: 2018-10-20

## 2018-10-19 RX ORDER — LIDOCAINE 50 MG/G
1 PATCH TOPICAL EVERY 24 HOURS
Status: DISCONTINUED | OUTPATIENT
Start: 2018-10-19 | End: 2018-10-20

## 2018-10-19 RX ORDER — AMLODIPINE BESYLATE 10 MG/1
10 TABLET ORAL DAILY
Status: DISCONTINUED | OUTPATIENT
Start: 2018-10-19 | End: 2018-10-20

## 2018-10-19 RX ORDER — ALBUTEROL SULFATE 2.5 MG/3ML
2.5 SOLUTION RESPIRATORY (INHALATION) EVERY 6 HOURS PRN
Status: DISCONTINUED | OUTPATIENT
Start: 2018-10-19 | End: 2018-10-19

## 2018-10-19 RX ORDER — COLCHICINE 0.6 MG/1
0.6 TABLET ORAL 2 TIMES DAILY
Status: DISCONTINUED | OUTPATIENT
Start: 2018-10-19 | End: 2018-10-19

## 2018-10-19 NOTE — ED NOTES
Pt here post fall. Dx with previous rib fractures to left side. Pt states she did not have any previous allergy to hydrocodone however after taking the medication she noticed, difficulty breathing and swelling.  Pt asked if this was occuring prior to medica

## 2018-10-19 NOTE — H&P
[facility]    History & Physical    Renetta Balderrama Patient Status:  Emergency    1945 MRN [id number]   Location [address] Attending Trent Koehler MD   Hosp Day # 0 PCP Heather Gong MD     D (Other) Mother         thyroid surgery   • Asthma Sister    • Asthma Brother    • Other (Other) Brother         gout   • Breast Cancer Self 43        rt breast 69      reports that  has never smoked.  she has never used smokeless tobacco. She reports that s times daily as needed (muscle spasm). Do not drive while taking this medication, may cause dizziness. ibuprofen 600 MG Oral Tab   No No   Sig: Take 1 tablet (600 mg total) by mouth every 6 (six) hours as needed for Pain.    omega-3 fatty acids 1000 MG Ora Cooperative, appropriate mood & affect.       Laboratory Data:   Lab Results   Component Value Date    WBC 10.4 10/18/2018    HGB 14.2 10/18/2018    HCT 43.4 10/18/2018     10/18/2018    CREATSERUM 0.93 10/18/2018    BUN 18 10/18/2018     10/1

## 2018-10-19 NOTE — ED PROVIDER NOTES
Patient Seen in: [facility] AND [facility] Emergency Department    History   Patient presents with:  Swelling Edema (cardiovascular, metabolic)  Dyspnea BRUNO SOB (respiratory)  Pain (neurologic)    Stated Complaint: allergic reaction    HPI    80-year-old female Eyes: Negative for pain, discharge and redness. Respiratory: Positive for shortness of breath. Negative for cough and wheezing. Cardiovascular: Positive for leg swelling. Negative for chest pain.    Gastrointestinal: Negative for abdominal pain, tremaine tenderness. Neurological: She is alert and oriented to person, place, and time.    5/5 strength in b/l UEs and LEs, normal sensation in all extremities, normal finger to nose b/l, normal gait, no facial asymmetry, normal speech     Skin: Skin is warm and 91.1 80.0 - 100.0 fL    MCH 29.8 27.0 - 32.0 pg    MCHC 32.7 32.0 - 37.0 g/dl    RDW 14.5 11.0 - 15.0 %     140 - 400 K/UL    MPV 7.7 7.4 - 10.3 fL    Neutrophil % 77 %    Lymphocyte % 10 %    Monocyte % 10 %    Eosinophil % 3 %    Basophil % 0 % untreated hypertension. Education regarding lifestyle modifications and the need for appropriate follow-up with their PCP to have their blood pressure re-checked within 1 week was provided.      Medical Record Review: I personally reviewed available prior Admission  Date Reviewed: 10/17/2018          ICD-10-CM Noted POA    Pleural effusion J90 10/18/2018 Unknown

## 2018-10-19 NOTE — TELEPHONE ENCOUNTER
Portia from [facility] calling, did receive order for [facility] but it did not specify what services.  They also do not have caregivers, they would send a  to patient and assist with setting up the service through another agency, this is an out of

## 2018-10-19 NOTE — TELEPHONE ENCOUNTER
Currently admitted    Hospital Problem List         Pleural effusion      Shortness of breath      Closed fracture of multiple ribs of left side with routine healing, subsequent encounter

## 2018-10-19 NOTE — ED INITIAL ASSESSMENT (HPI)
The patient who was seen here last week with rib fractures returns with complaint of uncontrolled pain due to \"allergic reaction\" to hydrocodone that she feels is causing difficulty breathing, difficulty breathing and bilateral leg swelling.

## 2018-10-19 NOTE — CONSULTS
[facility] - [facility]    Report of Consultation    Kp Leonardo Patient Status:  Emergency    1945 MRN [id number]   Location [address] Attending Deneen Islas MD   Hosp Day # 0 PCP Jony Adhikari MD Relation Age of Onset   • Asthma Father    • Hypertension Father    • Heart Disorder Father    • Other (Other) Mother         thyroid surgery   • Asthma Sister    • Asthma Brother    • Other (Other) Brother         gout   • Breast Cancer Self 43        rt Results:     Laboratory Data:  Lab Results   Component Value Date    WBC 10.4 10/18/2018    HGB 14.2 10/18/2018    HCT 43.4 10/18/2018     10/18/2018    CREATSERUM 0.93 10/18/2018    BUN 18 10/18/2018     10/18/2018    K 4.3 10/18/2018

## 2018-10-19 NOTE — PROGRESS NOTES
[facility]OSP - [facility]    Progress Note    Mónica Good Patient Status:  Inpatient    1945 MRN [id number]   [facility] 5SW/SE Attending Mayank Almonte MD   Hosp Day # 0 PCP Micah Nunes MD       Subjective: 10/18/2018  ECG Report  Interpretation  -------------------------- Sinus Rhythm -Left atrial enlargement.   low voltage anterior leads ABNORMAL When compared with ECG of 07/28/2018 15:55:44 lower anterior voltage Electronically signed on 10/19/2018 at 11:02

## 2018-10-19 NOTE — PLAN OF CARE
Problem: Patient/Family Goals  Goal: Patient/Family Long Term Goal  Patient's Long Term Goal: return home    Interventions:  - Pain management  Follow MD orders  Consult Pulmonary  Monitor Breathing  - See additional Care Plan goals for specific interventi pain with activity and pre-medicate as appropriate  Outcome: Progressing      Problem: SAFETY ADULT - FALL  Goal: Free from fall injury  INTERVENTIONS:  - Assess pt frequently for physical needs  - Identify cognitive and physical deficits and behaviors dewayne

## 2018-10-19 NOTE — TELEPHONE ENCOUNTER
Pt was actually admitted to [facility] last night so social servivce may see her while in [facility]

## 2018-10-20 VITALS
DIASTOLIC BLOOD PRESSURE: 76 MMHG | HEART RATE: 94 BPM | WEIGHT: 180.56 LBS | RESPIRATION RATE: 20 BRPM | BODY MASS INDEX: 30.83 KG/M2 | HEIGHT: 64 IN | TEMPERATURE: 99 F | SYSTOLIC BLOOD PRESSURE: 146 MMHG | OXYGEN SATURATION: 94 %

## 2018-10-20 PROCEDURE — 99232 SBSQ HOSP IP/OBS MODERATE 35: CPT | Performed by: INTERNAL MEDICINE

## 2018-10-20 PROCEDURE — 99239 HOSP IP/OBS DSCHRG MGMT >30: CPT | Performed by: HOSPITALIST

## 2018-10-20 RX ORDER — PREDNISONE 10 MG/1
TABLET ORAL
Qty: 6 TABLET | Refills: 0 | Status: SHIPPED | OUTPATIENT
Start: 2018-10-20 | End: 2018-11-12 | Stop reason: ALTCHOICE

## 2018-10-20 RX ORDER — LIDOCAINE 50 MG/G
1 PATCH TOPICAL EVERY 24 HOURS
Qty: 30 PATCH | Refills: 0 | Status: SHIPPED | OUTPATIENT
Start: 2018-10-20 | End: 2018-12-05 | Stop reason: ALTCHOICE

## 2018-10-20 RX ORDER — AMLODIPINE BESYLATE 5 MG/1
10 TABLET ORAL DAILY
Qty: 60 TABLET | Refills: 0 | Status: ON HOLD | OUTPATIENT
Start: 2018-10-20 | End: 2019-02-19

## 2018-10-20 RX ORDER — IBUPROFEN 600 MG/1
600 TABLET ORAL EVERY 6 HOURS PRN
Qty: 10 TABLET | Refills: 0 | Status: SHIPPED | OUTPATIENT
Start: 2018-10-20 | End: 2018-10-20

## 2018-10-20 RX ORDER — ALBUTEROL SULFATE 90 UG/1
1-2 AEROSOL, METERED RESPIRATORY (INHALATION) EVERY 6 HOURS PRN
Qty: 1 INHALER | Refills: 0 | Status: SHIPPED | OUTPATIENT
Start: 2018-10-20 | End: 2019-04-24

## 2018-10-20 RX ORDER — ACETAMINOPHEN 325 MG/1
650 TABLET ORAL EVERY 6 HOURS PRN
Qty: 30 TABLET | Refills: 0 | Status: SHIPPED | OUTPATIENT
Start: 2018-10-20 | End: 2018-12-05 | Stop reason: ALTCHOICE

## 2018-10-20 RX ORDER — ORPHENADRINE CITRATE 100 MG/1
100 TABLET, EXTENDED RELEASE ORAL 2 TIMES DAILY PRN
Qty: 20 TABLET | Refills: 0 | Status: SHIPPED | OUTPATIENT
Start: 2018-10-20 | End: 2018-10-20

## 2018-10-20 NOTE — PLAN OF CARE
Problem: Patient/Family Goals  Goal: Patient/Family Long Term Goal  Patient's Long Term Goal: return home    Interventions:  - Pain management  Follow MD orders  Consult Pulmonary  Monitor Breathing  - See additional Care Plan goals for specific interventi new pain  - Anticipate increased pain with activity and pre-medicate as appropriate  Outcome: Adequate for Discharge      Problem: SAFETY ADULT - FALL  Goal: Free from fall injury  INTERVENTIONS:  - Assess pt frequently for physical needs  - Identify cogni

## 2018-10-20 NOTE — CM/SW NOTE
CARE MANAGEMENT    Anticipated Discharge Plan:  Pt anticipated to go home today.   Requesting caregiver resources, which were given,  Discussed with bedside RN.       10/20/18 1200   CM/SW Screening   Referral Source Nurse;   Information S

## 2018-10-20 NOTE — PROGRESS NOTES
[facility]    Progress Note      Assessment and Plan:   1.  Rib fractures with modest left pleural effusion–the patient is doing well clinically.   She will need follow-up of the pleural effusion and if further growth, she may ultimately req

## 2018-10-20 NOTE — DISCHARGE SUMMARY
[facility] - [facility]    Discharge Summary    Marilee Osorio Patient Status:  Inpatient    1945 MRN [id number]   Location [facility] 5SW/SE Attending No att. providers found   [facility] Day # 1 PCP Kita Mohamud MD     Date of area.  Abd:   +bs, soft, NT, ND  LE:   No c/c/e  Erythema and swelling of 1st MTP joint of right foot - improved  Neuro:   nonfocal          Reason for Admission:   Right great toe pain, SOB    History of Present Illness:      Ariana Osorio is a(n) 7 status:   Full        Consultations:   Pulmonary    Discharge Condition:  Good    Discharge Medications:      Discharge Medications      START taking these medications      Instructions Prescription details   acetaminophen 325 MG Tabs  Commonly known as: D 2000 units Caps      Take 1 capsule by mouth daily.    Refills:  0        STOP taking these medications    HYDROcodone-acetaminophen 5-325 MG Tabs  Commonly known as:  NORCO        ibuprofen 600 MG Tabs  Commonly known as:  MOTRIN        Orphenadrine Citr

## 2018-10-22 ENCOUNTER — TELEPHONE (OUTPATIENT)
Dept: INTERNAL MEDICINE CLINIC | Facility: CLINIC | Age: 73
End: 2018-10-22

## 2018-10-22 ENCOUNTER — PATIENT OUTREACH (OUTPATIENT)
Dept: CASE MANAGEMENT | Age: 73
End: 2018-10-22

## 2018-10-22 DIAGNOSIS — Z02.9 ENCOUNTERS FOR ADMINISTRATIVE PURPOSES: ICD-10-CM

## 2018-10-22 DIAGNOSIS — M1A.9XX1 CHRONIC TOPHACEOUS GOUT: ICD-10-CM

## 2018-10-22 PROCEDURE — 1111F DSCHRG MED/CURRENT MED MERGE: CPT

## 2018-10-22 NOTE — TELEPHONE ENCOUNTER
See prevoius note regarding residential  - [facility] already ordering for pt.

## 2018-10-22 NOTE — TELEPHONE ENCOUNTER
Monitor pulmonary status given muliple rib fractures, monitor her bp  Also.  She also needs help at home for her to be able to bath and toileting

## 2018-10-22 NOTE — TELEPHONE ENCOUNTER
Per pt she is waiting for the update [facility] due to she needs asap. [facility] advise.

## 2018-10-22 NOTE — TELEPHONE ENCOUNTER
[facility] called to inform that needs  service to help clean and give baths. HH is ordering social work services, OT and [facility] aid. Please advise.

## 2018-10-22 NOTE — TELEPHONE ENCOUNTER
[place] watt [facility] to clarify what diciplines needed for [facility] for this patient because patient was in observation at the hospital and nothing was ordered.      Please advise

## 2018-10-23 NOTE — PROGRESS NOTES
S/w patient for TCM. She requested a callback tomorrow stating that it was not a good time now. NC will call tomorrow.

## 2018-10-29 NOTE — PROGRESS NOTES
Initial Post Discharge Follow Up   Discharge Date: 10/20/18  Contact Date: 10/22/2018    Consent Verification:  Assessment Completed With: Patient  HIPAA Verified?   Yes    Discharge Dx:   Acute airway reactive disease  Possible reaction to norco   Recent Breath. Disp: 1 Inhaler Rfl: 0   AmLODIPine Besylate 5 MG Oral Tab Take 2 tablets (10 mg total) by mouth daily. Disp: 60 tablet Rfl: 0   lidocaine 5 % External Patch Place 1 patch onto the skin daily.  Disp: 30 patch Rfl: 0   predniSONE 10 MG Oral Tab Take next visit with your PCP?  (DME, meds, disease concerns, Etc): No     Follow up appointments:           PCP TCM/HFU appointment: scheduled at D/C within 7-14 days  no     NCM Reviewed/scheduled/rescheduled PCP TCM/HFU appointment with pt:  Yes, NCM schedul

## 2018-10-30 ENCOUNTER — TELEPHONE (OUTPATIENT)
Dept: INTERNAL MEDICINE CLINIC | Facility: CLINIC | Age: 73
End: 2018-10-30

## 2018-10-30 NOTE — TELEPHONE ENCOUNTER
Per Cassidy Vivas pt declined [employer] would like a call back with verbal orders to discharge pt from OT.

## 2018-11-01 ENCOUNTER — OFFICE VISIT (OUTPATIENT)
Dept: INTERNAL MEDICINE CLINIC | Facility: CLINIC | Age: 73
End: 2018-11-01
Payer: MEDICARE

## 2018-11-01 VITALS
BODY MASS INDEX: 28.67 KG/M2 | HEIGHT: 64.5 IN | DIASTOLIC BLOOD PRESSURE: 80 MMHG | SYSTOLIC BLOOD PRESSURE: 134 MMHG | WEIGHT: 170 LBS | HEART RATE: 91 BPM

## 2018-11-01 DIAGNOSIS — I10 ESSENTIAL HYPERTENSION: ICD-10-CM

## 2018-11-01 DIAGNOSIS — S22.49XA MULTIPLE RIB FRACTURES INVOLVING FOUR OR MORE RIBS: Primary | ICD-10-CM

## 2018-11-01 DIAGNOSIS — J45.50 ASTHMA IN ADULT, SEVERE PERSISTENT, UNCOMPLICATED: ICD-10-CM

## 2018-11-01 DIAGNOSIS — T78.40XD ALLERGIC REACTION TO DRUG, SUBSEQUENT ENCOUNTER: ICD-10-CM

## 2018-11-01 PROCEDURE — 99495 TRANSJ CARE MGMT MOD F2F 14D: CPT | Performed by: INTERNAL MEDICINE

## 2018-11-03 NOTE — PROGRESS NOTES
HPI:    Valdemar Jones is a 68year old female here today for hospital follow up.    She was discharged from Inpatient hospital, [facility]  to Home   Admission Date: 10/18/18   Discharge Date: 10/20/18  Hospital Discharge Diagnoses (since 10/4/ [hydrocodone-acetaminophen]; uloric [febuxostat]; statins; ace inhibitors; and latex.     Current Meds:    Current Outpatient Medications on File Prior to Visit:  Albuterol Sulfate  (90 Base) MCG/ACT Inhalation Aero Soln Inhale 1-2 puffs into the [facility] and sister; Breast Cancer (age of onset: 44) in her self; Heart Disorder in her father; Hypertension in her father; Other in her brother and mother. She  reports that  has never smoked.  she has never used smokeless tobacco. She reports that she does not intact    ASSESSMENT/ PLAN:   (S22.49XA) Multiple rib fractures involving four or more ribs  (primary encounter diagnosis)  Plan: PULMONARY - INTERNAL        Pain better. Continue with nsaids.  Also can use otc lidocaine patches.    (J45.50) Asthma in adult

## 2018-11-07 ENCOUNTER — TELEPHONE (OUTPATIENT)
Dept: PULMONOLOGY | Facility: CLINIC | Age: 73
End: 2018-11-07

## 2018-11-07 ENCOUNTER — TELEPHONE (OUTPATIENT)
Dept: INTERNAL MEDICINE CLINIC | Facility: CLINIC | Age: 73
End: 2018-11-07

## 2018-11-07 NOTE — TELEPHONE ENCOUNTER
Pt requesting to be seen sooner than next avail for consult re: Hospital Follow Up. Pls call. Thank you.

## 2018-11-07 NOTE — TELEPHONE ENCOUNTER
Spoke to patient appt scheduled for Nov 12th 2018 at 10:45. Pt aware of [facility], [address].

## 2018-11-09 ENCOUNTER — TELEPHONE (OUTPATIENT)
Dept: INTERNAL MEDICINE CLINIC | Facility: CLINIC | Age: 73
End: 2018-11-09

## 2018-11-12 ENCOUNTER — HOSPITAL ENCOUNTER (OUTPATIENT)
Dept: GENERAL RADIOLOGY | Facility: HOSPITAL | Age: 73
Discharge: HOME | End: 2018-11-12
Attending: INTERNAL MEDICINE
Payer: MEDICARE

## 2018-11-12 ENCOUNTER — OFFICE VISIT (OUTPATIENT)
Dept: PULMONOLOGY | Facility: CLINIC | Age: 73
End: 2018-11-12

## 2018-11-12 VITALS
RESPIRATION RATE: 16 BRPM | SYSTOLIC BLOOD PRESSURE: 154 MMHG | WEIGHT: 173 LBS | OXYGEN SATURATION: 93 % | DIASTOLIC BLOOD PRESSURE: 90 MMHG | HEART RATE: 94 BPM | BODY MASS INDEX: 29.53 KG/M2 | HEIGHT: 64 IN

## 2018-11-12 DIAGNOSIS — J45.30 MILD PERSISTENT ASTHMA WITHOUT COMPLICATION: ICD-10-CM

## 2018-11-12 DIAGNOSIS — S22.42XA CLOSED FRACTURE OF MULTIPLE RIBS OF LEFT SIDE, INITIAL ENCOUNTER: ICD-10-CM

## 2018-11-12 DIAGNOSIS — J45.30 MILD PERSISTENT ASTHMA WITHOUT COMPLICATION: Primary | ICD-10-CM

## 2018-11-12 PROCEDURE — 71046 X-RAY EXAM CHEST 2 VIEWS: CPT | Performed by: INTERNAL MEDICINE

## 2018-11-12 PROCEDURE — 1111F DSCHRG MED/CURRENT MED MERGE: CPT | Performed by: INTERNAL MEDICINE

## 2018-11-12 PROCEDURE — 99214 OFFICE O/P EST MOD 30 MIN: CPT | Performed by: INTERNAL MEDICINE

## 2018-11-12 NOTE — PROGRESS NOTES
HPI:    Patient ID: Migel Sandoval is a 68year old female. HPI    Review of Systems   Constitutional: Negative for fatigue, fever and unexpected weight change. Respiratory: Negative for cough and shortness of breath.     Cardiovascular: Negative Comment:Hair and saliva  Dust                    SHORTNESS OF BREATH  Norco [Hydrocodone-*    SWELLING, SHORTNESS OF BREATH  Uloric [Febuxostat]     ITCHING  Statins                 MYALGIA    Comment:Pt had developed myalgia and myopathy  Ace Inhibitors

## 2018-11-20 ENCOUNTER — APPOINTMENT (OUTPATIENT)
Dept: LAB | Age: 73
End: 2018-11-20
Attending: INTERNAL MEDICINE
Payer: MEDICARE

## 2018-11-20 ENCOUNTER — OFFICE VISIT (OUTPATIENT)
Dept: INTERNAL MEDICINE CLINIC | Facility: CLINIC | Age: 73
End: 2018-11-20
Payer: MEDICARE

## 2018-11-20 VITALS
DIASTOLIC BLOOD PRESSURE: 80 MMHG | TEMPERATURE: 98 F | HEIGHT: 64 IN | RESPIRATION RATE: 12 BRPM | WEIGHT: 169 LBS | HEART RATE: 91 BPM | BODY MASS INDEX: 28.85 KG/M2 | SYSTOLIC BLOOD PRESSURE: 126 MMHG

## 2018-11-20 DIAGNOSIS — I10 ESSENTIAL HYPERTENSION: Primary | ICD-10-CM

## 2018-11-20 DIAGNOSIS — M1A.9XX1 TOPHACEOUS GOUT: ICD-10-CM

## 2018-11-20 DIAGNOSIS — R92.8 ABNORMAL MAMMOGRAM: ICD-10-CM

## 2018-11-20 DIAGNOSIS — I65.23 BILATERAL CAROTID ARTERY STENOSIS: ICD-10-CM

## 2018-11-20 DIAGNOSIS — S22.42XD CLOSED FRACTURE OF MULTIPLE RIBS OF LEFT SIDE WITH ROUTINE HEALING, SUBSEQUENT ENCOUNTER: ICD-10-CM

## 2018-11-20 DIAGNOSIS — J45.30 MILD PERSISTENT ASTHMA WITHOUT COMPLICATION: ICD-10-CM

## 2018-11-20 DIAGNOSIS — E78.00 HYPERCHOLESTEROLEMIA: ICD-10-CM

## 2018-11-20 DIAGNOSIS — R73.03 PREDIABETES: ICD-10-CM

## 2018-11-20 DIAGNOSIS — R73.09 ELEVATED GLUCOSE: ICD-10-CM

## 2018-11-20 PROBLEM — H61.21 IMPACTED CERUMEN, RIGHT EAR: Status: RESOLVED | Noted: 2018-07-26 | Resolved: 2018-11-20

## 2018-11-20 PROCEDURE — 83036 HEMOGLOBIN GLYCOSYLATED A1C: CPT

## 2018-11-20 PROCEDURE — 84550 ASSAY OF BLOOD/URIC ACID: CPT

## 2018-11-20 PROCEDURE — G0463 HOSPITAL OUTPT CLINIC VISIT: HCPCS | Performed by: INTERNAL MEDICINE

## 2018-11-20 PROCEDURE — 80053 COMPREHEN METABOLIC PANEL: CPT

## 2018-11-20 PROCEDURE — 99214 OFFICE O/P EST MOD 30 MIN: CPT | Performed by: INTERNAL MEDICINE

## 2018-11-20 PROCEDURE — 80061 LIPID PANEL: CPT

## 2018-11-20 PROCEDURE — 36415 COLL VENOUS BLD VENIPUNCTURE: CPT

## 2018-11-20 PROCEDURE — 90653 IIV ADJUVANT VACCINE IM: CPT | Performed by: INTERNAL MEDICINE

## 2018-11-20 PROCEDURE — G0008 ADMIN INFLUENZA VIRUS VAC: HCPCS | Performed by: INTERNAL MEDICINE

## 2018-11-20 NOTE — PROGRESS NOTES
HPI:    Patient ID: Valdemar Jones is a 68year old female. Hypertension   This is a chronic problem. The current episode started more than 1 year ago. The problem is controlled.  Pertinent negatives include no chest pain, peripheral edema or shortn Cardiovascular: Negative for chest pain. Musculoskeletal: Positive for gout and joint swelling. Current Outpatient Medications:  acetaminophen 325 MG Oral Tab Take 2 tablets (650 mg total) by mouth every 6 (six) hours as needed.  Disp: 30 tab External ear normal.   Left Ear: External ear normal.   Nose: Nose normal.   Mouth/Throat: Oropharynx is clear and moist. No oropharyngeal exudate. Eyes: Conjunctivae are normal. Right eye exhibits no discharge. Left eye exhibits no discharge.  No scleral persistent asthma without complication  Plan: pt had seen Dr Adeline Martin for ffup. Continue with JORGE prn and on her advair inhaler.  Flu shot given today.     (R92.8) Abnormal mammogram  Plan: pt had seen DR Gamez few months ago and pt was supposed to get mr

## 2018-11-29 ENCOUNTER — TELEPHONE (OUTPATIENT)
Dept: OTHER | Age: 73
End: 2018-11-29

## 2018-11-29 RX ORDER — EZETIMIBE 10 MG/1
10 TABLET ORAL NIGHTLY
Qty: 90 TABLET | Refills: 0 | OUTPATIENT
Start: 2018-11-29

## 2018-11-29 NOTE — TELEPHONE ENCOUNTER
Notes recorded by Dani Harris RN on 11/28/2018 at 3:59 PM CST  Mailed certified result letter with Dr Trudi Morfin recommendations.  ------    Notes recorded by Samuel Day MD on 11/27/2018 at 1:57 PM CST  Send her letter  ------    Notes recorded

## 2018-12-03 ENCOUNTER — TELEPHONE (OUTPATIENT)
Dept: OTHER | Age: 73
End: 2018-12-03

## 2018-12-03 ENCOUNTER — TELEPHONE (OUTPATIENT)
Dept: INTERNAL MEDICINE CLINIC | Facility: CLINIC | Age: 73
End: 2018-12-03

## 2018-12-03 ENCOUNTER — TELEPHONE (OUTPATIENT)
Dept: PULMONOLOGY | Facility: CLINIC | Age: 73
End: 2018-12-03

## 2018-12-03 DIAGNOSIS — R06.02 SOB (SHORTNESS OF BREATH): Primary | ICD-10-CM

## 2018-12-03 DIAGNOSIS — E78.00 ELEVATED CHOLESTEROL: ICD-10-CM

## 2018-12-03 RX ORDER — EZETIMIBE 10 MG/1
10 TABLET ORAL NIGHTLY
Qty: 90 TABLET | Refills: 0 | Status: SHIPPED | OUTPATIENT
Start: 2018-12-03 | End: 2019-01-22

## 2018-12-03 NOTE — TELEPHONE ENCOUNTER
Pt is requesting a call from  To discuss pt not able to get a appt sooner with Dr Shayna Bowie.      Pt stts her asthma is really bad and she can't work

## 2018-12-03 NOTE — TELEPHONE ENCOUNTER
Pt calling to castro watt rn, indicates she needs to see Dr NAJERA, indicates his name is on the bottom of her function test results,  Pt is going back and forth not sure how to assist her further.  Pt informed she has upcoming appt w Dr. Jerrica Hall, pls call [phone]

## 2018-12-03 NOTE — TELEPHONE ENCOUNTER
I called pt back and she agreed to take zetia for her chol ; she asked about pulmonologist and was seeing Dr Aaliyah Varghese before. She tried to see if can make another apptment with other pulmonologist in the group. Pt states she was told by the clnic she cant.  I

## 2018-12-03 NOTE — TELEPHONE ENCOUNTER
pls assist pt in getting apptment to see Dr Mao Quintana since pt having issues with her asthma. She was given apptment not until 12/31/18; pt states she is really having difficulty with her asthma and would prefer to see him right away. ( I did also advise pt if symptoms worsen, dont hesitate to go to ER).

## 2018-12-03 NOTE — TELEPHONE ENCOUNTER
Charles Woods Mt RN with [facility] states pt is refusing HH. Verbalized she only seen pt once and calling to discharge pt from Home health.  Please advise

## 2018-12-03 NOTE — TELEPHONE ENCOUNTER
Pt c/o increase SOB with exertion (albuterol HFA helps), wheezing on and off, hoarse voice for 3 months. Pt is able to speak in clear complete sentences. Pt denies cough, chest pain, chest congestion/tightness, and fever. Pt states she is using her albuterol HFA and Advair. Pt states she has not been using her albuterol nebulizer and she will start to use as prescribed. Appt made for 12-5-18 with Dr. Cordell Mcpherson, then pt cancelled that appt and requested appt on 12/12/18. Appt made for 12/12/18 1:15. Pt notified of time date and place of appt. Pt informed if symptoms worsen or develops chest pain to go to ED/call 911. Pt verbalized understanding.

## 2018-12-03 NOTE — TELEPHONE ENCOUNTER
Pt demanding PCP call her directly . She has issues and will not discuss w/ RN. Pt very rude and demanding. Call her please.     Also states zetia sent to wrong pharmacy requesting location change- completed

## 2018-12-04 NOTE — TELEPHONE ENCOUNTER
Pt informed of Dr. Kathy Jaquez message/orders below. Pt states she will not get the CXR as she just had one completed last month. Pt states she does not want the exposure and does not want to pay for the CXR. Pt informed message will be sent to Dr. Pascale Samayoa.

## 2018-12-04 NOTE — PROGRESS NOTES
Verona Espinoza is a 71-year-old woman, patient of Dr. Daryle Draft, with worsening polyarticular tophaceous gout, with her most recent uric acid being 10.0. Previously, allopurinol has caused hives, shortness of breath, skin swelling.  On Uloric, she could not angela Effort normal and breath sounds normal.   Abdominal: Soft. Bowel sounds are normal. She exhibits no mass. There is no tenderness. There is no rebound and no guarding. Musculoskeletal: She exhibits no edema. She has tophaceous gout in her fingers.   Ther

## 2018-12-04 NOTE — TELEPHONE ENCOUNTER
Please order for the patient chest x-ray before I see her(  the same day or the day before)   Thanks

## 2018-12-05 ENCOUNTER — OFFICE VISIT (OUTPATIENT)
Dept: RHEUMATOLOGY | Facility: CLINIC | Age: 73
End: 2018-12-05

## 2018-12-05 VITALS
SYSTOLIC BLOOD PRESSURE: 139 MMHG | BODY MASS INDEX: 30.05 KG/M2 | WEIGHT: 176 LBS | DIASTOLIC BLOOD PRESSURE: 77 MMHG | HEART RATE: 93 BPM | HEIGHT: 64 IN

## 2018-12-05 DIAGNOSIS — M1A.9XX1 CHRONIC TOPHACEOUS GOUT: Primary | ICD-10-CM

## 2018-12-05 PROCEDURE — 99213 OFFICE O/P EST LOW 20 MIN: CPT | Performed by: INTERNAL MEDICINE

## 2018-12-05 PROCEDURE — G0463 HOSPITAL OUTPT CLINIC VISIT: HCPCS | Performed by: INTERNAL MEDICINE

## 2018-12-05 RX ORDER — PROBENECID 500 MG/1
TABLET, FILM COATED ORAL
Qty: 30 TABLET | Refills: 2 | Status: ON HOLD | OUTPATIENT
Start: 2018-12-05 | End: 2019-04-01

## 2018-12-05 NOTE — TELEPHONE ENCOUNTER
Called H. HLaura Desai's number x's 2; phone rings and then goes to a busy signal. Phone call to [facility]. S/W Nely Olmedo, supervisor.  Advised we are trying to contact nurse Tanner Hernandez to give her Dr. Terry Kevin order, ok to discharge patient form H.

## 2018-12-10 ENCOUNTER — NURSE TRIAGE (OUTPATIENT)
Dept: OTHER | Age: 73
End: 2018-12-10

## 2018-12-10 ENCOUNTER — OFFICE VISIT (OUTPATIENT)
Dept: INTERNAL MEDICINE CLINIC | Facility: CLINIC | Age: 73
End: 2018-12-10
Payer: MEDICARE

## 2018-12-10 VITALS
WEIGHT: 164 LBS | RESPIRATION RATE: 16 BRPM | HEIGHT: 64 IN | TEMPERATURE: 99 F | HEART RATE: 92 BPM | SYSTOLIC BLOOD PRESSURE: 134 MMHG | OXYGEN SATURATION: 92 % | BODY MASS INDEX: 28 KG/M2 | DIASTOLIC BLOOD PRESSURE: 80 MMHG

## 2018-12-10 DIAGNOSIS — J45.30 MILD PERSISTENT ASTHMA WITHOUT COMPLICATION: ICD-10-CM

## 2018-12-10 DIAGNOSIS — R04.0 EPISTAXIS: Primary | ICD-10-CM

## 2018-12-10 DIAGNOSIS — H61.21 IMPACTED CERUMEN, RIGHT EAR: ICD-10-CM

## 2018-12-10 PROCEDURE — 99214 OFFICE O/P EST MOD 30 MIN: CPT | Performed by: INTERNAL MEDICINE

## 2018-12-10 PROCEDURE — G0463 HOSPITAL OUTPT CLINIC VISIT: HCPCS | Performed by: INTERNAL MEDICINE

## 2018-12-10 NOTE — TELEPHONE ENCOUNTER
Action Requested: Summary for Provider     []  Critical Lab, Recommendations Needed  [] Need Additional Advice  []   FYI    []   Need Orders  [] Need Medications Sent to Pharmacy  []  Other     SUMMARY: appt scheduled to see PCP today  Onset 1 week ago of

## 2018-12-10 NOTE — PROGRESS NOTES
HPI:    Patient ID: Cody Julian is a 68year old female. Bleeding   This is a new problem. The current episode started 1 to 4 weeks ago (past 10 days). The problem occurs intermittently. The problem has been waxing and waning.  Pertinent negative SHORTNESS OF BREATH  Uloric [Febuxostat]     ITCHING  Statins                 MYALGIA    Comment:Pt had developed myalgia and myopathy  Ace Inhibitors          Coughing  Latex                   ITCHING   PHYSICAL EXAM:   Physical Exam   Constitutional: She

## 2018-12-12 ENCOUNTER — OFFICE VISIT (OUTPATIENT)
Dept: PULMONOLOGY | Facility: CLINIC | Age: 73
End: 2018-12-12

## 2018-12-12 VITALS
SYSTOLIC BLOOD PRESSURE: 161 MMHG | HEART RATE: 91 BPM | HEIGHT: 64 IN | WEIGHT: 175 LBS | OXYGEN SATURATION: 90 % | DIASTOLIC BLOOD PRESSURE: 85 MMHG | RESPIRATION RATE: 18 BRPM | BODY MASS INDEX: 29.88 KG/M2

## 2018-12-12 DIAGNOSIS — J44.1 CHRONIC OBSTRUCTIVE PULMONARY DISEASE WITH ACUTE EXACERBATION (HCC): Primary | ICD-10-CM

## 2018-12-12 DIAGNOSIS — R06.00 DYSPNEA, UNSPECIFIED TYPE: ICD-10-CM

## 2018-12-12 DIAGNOSIS — J45.51 SEVERE PERSISTENT ASTHMA WITH ACUTE EXACERBATION: ICD-10-CM

## 2018-12-12 PROCEDURE — 99214 OFFICE O/P EST MOD 30 MIN: CPT | Performed by: INTERNAL MEDICINE

## 2018-12-12 RX ORDER — BUDESONIDE AND FORMOTEROL FUMARATE DIHYDRATE 160; 4.5 UG/1; UG/1
2 AEROSOL RESPIRATORY (INHALATION) 2 TIMES DAILY
Qty: 1 INHALER | Refills: 12 | Status: ON HOLD | OUTPATIENT
Start: 2018-12-12 | End: 2019-02-17

## 2018-12-12 RX ORDER — MONTELUKAST SODIUM 10 MG/1
10 TABLET ORAL NIGHTLY
Qty: 30 TABLET | Refills: 5 | Status: SHIPPED | OUTPATIENT
Start: 2018-12-12 | End: 2019-01-08

## 2018-12-12 RX ORDER — PREDNISONE 10 MG/1
TABLET ORAL
Qty: 18 TABLET | Refills: 0 | Status: ON HOLD | OUTPATIENT
Start: 2018-12-12 | End: 2019-02-17

## 2018-12-12 NOTE — PROGRESS NOTES
HPI:    Patient ID: Kp Leonardo is a 68year old female.     HPI      Gradual worsening of her dyspnea in the last 4-6 weeks  She thinks the dry weather and change in order and also exposure to dog in her sister's house playing a significant role  W needed for Itching. Disp: 28 tablet Rfl: 0   ADVAIR DISKUS 250-50 MCG/DOSE Inhalation Aerosol Powder, Breath Activated Inhale 1 puff into the lungs 2 (two) times daily. Disp:  Rfl:    Losartan Potassium 50 MG Oral Tab Take 1 tablet by mouth.  Twice a week o bid   Neb prn   Albuterol prn   singulair 10 mg daily   Prednisone short course     Avoid triggers completely  / especially dogs and cats     2-recent fall in October/11/2018 with nondisplaced left sided rib fracture  Healed well with no residual effusion

## 2018-12-14 ENCOUNTER — OFFICE VISIT (OUTPATIENT)
Dept: OTOLARYNGOLOGY | Facility: CLINIC | Age: 73
End: 2018-12-14

## 2018-12-14 ENCOUNTER — HOSPITAL ENCOUNTER (OUTPATIENT)
Dept: MRI IMAGING | Facility: HOSPITAL | Age: 73
Discharge: HOME | End: 2018-12-14
Attending: SURGERY
Payer: MEDICARE

## 2018-12-14 VITALS
DIASTOLIC BLOOD PRESSURE: 83 MMHG | SYSTOLIC BLOOD PRESSURE: 169 MMHG | WEIGHT: 175 LBS | TEMPERATURE: 98 F | BODY MASS INDEX: 29.88 KG/M2 | HEIGHT: 64 IN

## 2018-12-14 DIAGNOSIS — H61.21 IMPACTED CERUMEN OF RIGHT EAR: Primary | ICD-10-CM

## 2018-12-14 DIAGNOSIS — R04.0 EPISTAXIS: ICD-10-CM

## 2018-12-14 DIAGNOSIS — R92.0 MICROCALCIFICATIONS OF THE BREAST: ICD-10-CM

## 2018-12-14 DIAGNOSIS — Z85.3 HX OF BREAST CANCER: ICD-10-CM

## 2018-12-14 DIAGNOSIS — Z92.3 HISTORY OF RADIATION THERAPY: ICD-10-CM

## 2018-12-14 PROCEDURE — 99203 OFFICE O/P NEW LOW 30 MIN: CPT | Performed by: OTOLARYNGOLOGY

## 2018-12-14 PROCEDURE — 30901 CONTROL OF NOSEBLEED: CPT | Performed by: OTOLARYNGOLOGY

## 2018-12-14 NOTE — PROGRESS NOTES
Higinio Deleon is a 68year old female. Patient presents with:  Epistaxis: nose bleed of both nostrils since december 1, 2018  Ear Wax: both ears    HPI:   She reports a feeling of fullness and blockage in her right ear.   Her hearing has been diminish mouth. Twice a week on Monday and Friday  Disp:  Rfl:    Multiple Vitamin (MULTI-VITAMIN DAILY) Oral Tab Take 1 tablet by mouth daily. Disp:  Rfl:    Cholecalciferol (VITAMIN D) 2000 units Oral Cap Take 1 capsule by mouth daily.  Disp:  Rfl:       Past Medi Submental. Submandibular. Anterior cervical. Posterior cervical. Supraclavicular.    Eyes Normal Conjunctiva - Right: Normal, Left: Normal. Pupil - Right: Normal, Left: Normal.    Ears Normal Inspection - Right: Normal, Left: Normal. Canal - Left: Normal. T

## 2018-12-19 ENCOUNTER — TELEPHONE (OUTPATIENT)
Dept: INTERNAL MEDICINE CLINIC | Facility: CLINIC | Age: 73
End: 2018-12-19

## 2018-12-19 NOTE — TELEPHONE ENCOUNTER
[facility]. Order # [id number] received and placed on Dr. Trevon Delong.  # 31 desk to complete

## 2018-12-26 ENCOUNTER — TELEPHONE (OUTPATIENT)
Dept: RHEUMATOLOGY | Facility: CLINIC | Age: 73
End: 2018-12-26

## 2018-12-26 NOTE — TELEPHONE ENCOUNTER
I called her, and we discussed her gout. She has had more attacks since starting probenecid, which is typical.  She will take Colcrys 0.6 mg once a day to control acute attacks. She will come in for repeat uric acid in early 2019.

## 2018-12-26 NOTE — TELEPHONE ENCOUNTER
Pt states that her bones are hurting. Per pt the pain started when she started to take the medication that the doctor prescribed to her. Patient is aware the office opens at 8:40 and is ok to wait.

## 2018-12-26 NOTE — TELEPHONE ENCOUNTER
Spoke with pt, c/o gout flare in both feet - right worse than left. Pt reports she has been taking probenecid since LOV 12/5; however, after 4 days of taking medication pt began experiencing intermittent gout-like pain.  Pt reports she took Colcrys a few da

## 2018-12-26 NOTE — TELEPHONE ENCOUNTER
I called Ariana and left a message. She was instructed to take Colcrys 0.6 mg twice daily for now, until her attack is gone.     She needs to repeat her uric acid and kidney function test as ordered, after January 5th of 2019, and to schedule a follow-up

## 2018-12-26 NOTE — TELEPHONE ENCOUNTER
Patient wants a call back from provider. She wants to know if its okay to take both Colcyrs and Probenecid? And she has a few other questions for the provider. I let her know he will give her a call back today in between seeing patients.

## 2018-12-29 ENCOUNTER — TELEPHONE (OUTPATIENT)
Dept: INTERNAL MEDICINE CLINIC | Facility: CLINIC | Age: 73
End: 2018-12-29

## 2018-12-29 NOTE — TELEPHONE ENCOUNTER
[facility] order # [id number] signed by Dr. Erasto Ayoub. Copy made and placed for scanning to chart. Original mailed back in self addressed, stamped envelope.

## 2019-01-01 ENCOUNTER — EXTERNAL RECORD (OUTPATIENT)
Dept: HEALTH INFORMATION MANAGEMENT | Facility: OTHER | Age: 74
End: 2019-01-01

## 2019-01-03 NOTE — TELEPHONE ENCOUNTER
Tianna Lopez from [facility] asking for a call back regarding order below and order from October 30.        # [phone]

## 2019-01-08 RX ORDER — MONTELUKAST SODIUM 10 MG/1
10 TABLET ORAL NIGHTLY
Qty: 30 TABLET | Refills: 5 | Status: SHIPPED | OUTPATIENT
Start: 2019-01-08 | End: 2019-07-19

## 2019-01-08 NOTE — TELEPHONE ENCOUNTER
Current Outpatient Medications:  Montelukast Sodium (SINGULAIR) 10 MG Oral Tab Take 1 tablet (10 mg total) by mouth nightly.  Disp: 30 tablet Rfl: 5     Refill 90 day

## 2019-01-08 NOTE — TELEPHONE ENCOUNTER
Last seen 12-12-18   Last refill 12-12-18   Routed to [address] [place] [address] for sign off.

## 2019-01-19 DIAGNOSIS — E78.00 ELEVATED CHOLESTEROL: ICD-10-CM

## 2019-01-19 RX ORDER — EZETIMIBE 10 MG/1
10 TABLET ORAL NIGHTLY
Qty: 90 TABLET | Refills: 0 | Status: CANCELLED | OUTPATIENT
Start: 2019-01-19

## 2019-01-19 NOTE — TELEPHONE ENCOUNTER
Will send patient mychart. Call pharmacy. ezetimibe 10 MG Oral Tab 90 tablet 0 12/3/2018     Sig - Route: Take 1 tablet (10 mg total) by mouth nightly.  - Oral    Sent to pharmacy as: Ezetimibe 10 MG Oral Tablet    E-Prescribing Status: Receipt confirmed

## 2019-01-22 ENCOUNTER — NURSE TRIAGE (OUTPATIENT)
Dept: OTHER | Age: 74
End: 2019-01-22

## 2019-01-22 DIAGNOSIS — E78.00 ELEVATED CHOLESTEROL: ICD-10-CM

## 2019-01-22 NOTE — TELEPHONE ENCOUNTER
Action Requested: Summary for Provider     []  Critical Lab, Recommendations Needed  [] Need Additional Advice  []   FYI    []   Need Orders  [] Need Medications Sent to Pharmacy  []  Other     SUMMARY: pt reports left middle finger pain since Sunday.   Pt

## 2019-01-25 RX ORDER — EZETIMIBE 10 MG/1
10 TABLET ORAL NIGHTLY
Qty: 90 TABLET | Refills: 0 | Status: SHIPPED | OUTPATIENT
Start: 2019-01-25 | End: 2019-05-23

## 2019-01-31 ENCOUNTER — TELEPHONE (OUTPATIENT)
Dept: INTERNAL MEDICINE CLINIC | Facility: CLINIC | Age: 74
End: 2019-01-31

## 2019-02-02 ENCOUNTER — NURSE TRIAGE (OUTPATIENT)
Dept: OTHER | Age: 74
End: 2019-02-02

## 2019-02-04 ENCOUNTER — PATIENT OUTREACH (OUTPATIENT)
Dept: CASE MANAGEMENT | Age: 74
End: 2019-02-04

## 2019-02-04 NOTE — PROGRESS NOTES
Outreached to patient in regards to scheduling Medicare Annual exam (AHA). Left message for patient to return my call at ext. [phone]. Patient is eligible at this time. Thank you.

## 2019-02-15 ENCOUNTER — TELEPHONE (OUTPATIENT)
Dept: PULMONOLOGY | Facility: CLINIC | Age: 74
End: 2019-02-15

## 2019-02-15 NOTE — TELEPHONE ENCOUNTER
[facility] # [id number] completed by Dr. Akiko Giles, faxed back on 2/11/19; fax confirmation received. Original placed for scanning to chart.

## 2019-02-17 ENCOUNTER — APPOINTMENT (OUTPATIENT)
Dept: GENERAL RADIOLOGY | Facility: HOSPITAL | Age: 74
DRG: 234 | End: 2019-02-17
Attending: EMERGENCY MEDICINE
Payer: MEDICARE

## 2019-02-17 ENCOUNTER — HOSPITAL ENCOUNTER (INPATIENT)
Facility: HOSPITAL | Age: 74
LOS: 15 days | Discharge: HOME HEALTH | DRG: 234 | End: 2019-03-04
Attending: EMERGENCY MEDICINE | Admitting: HOSPITALIST
Payer: MEDICARE

## 2019-02-17 DIAGNOSIS — A08.4 VIRAL GASTROENTERITIS: ICD-10-CM

## 2019-02-17 DIAGNOSIS — I25.118 ATHSCL HEART DISEASE OF NATIVE COR ART W OTH ANG PCTRS (HCC): ICD-10-CM

## 2019-02-17 DIAGNOSIS — J96.01 ACUTE RESPIRATORY FAILURE WITH HYPOXIA (HCC): ICD-10-CM

## 2019-02-17 DIAGNOSIS — R77.8 ELEVATED TROPONIN: ICD-10-CM

## 2019-02-17 DIAGNOSIS — J81.0 ACUTE PULMONARY EDEMA (HCC): Primary | ICD-10-CM

## 2019-02-17 PROBLEM — R79.89 ELEVATED TROPONIN: Status: ACTIVE | Noted: 2019-02-17

## 2019-02-17 PROCEDURE — 71045 X-RAY EXAM CHEST 1 VIEW: CPT | Performed by: EMERGENCY MEDICINE

## 2019-02-17 PROCEDURE — 99223 1ST HOSP IP/OBS HIGH 75: CPT | Performed by: HOSPITALIST

## 2019-02-17 RX ORDER — ALBUTEROL SULFATE 90 UG/1
1-2 AEROSOL, METERED RESPIRATORY (INHALATION) EVERY 6 HOURS PRN
Status: DISCONTINUED | OUTPATIENT
Start: 2019-02-17 | End: 2019-03-05

## 2019-02-17 RX ORDER — PROBENECID 500 MG/1
250 TABLET, FILM COATED ORAL DAILY
Status: DISCONTINUED | OUTPATIENT
Start: 2019-02-18 | End: 2019-03-05

## 2019-02-17 RX ORDER — ACETAMINOPHEN 325 MG/1
650 TABLET ORAL EVERY 6 HOURS PRN
Status: DISCONTINUED | OUTPATIENT
Start: 2019-02-17 | End: 2019-03-05

## 2019-02-17 RX ORDER — EZETIMIBE 10 MG/1
10 TABLET ORAL NIGHTLY
Status: DISCONTINUED | OUTPATIENT
Start: 2019-02-17 | End: 2019-02-28

## 2019-02-17 RX ORDER — HEPARIN SODIUM 5000 [USP'U]/ML
5000 INJECTION, SOLUTION INTRAVENOUS; SUBCUTANEOUS EVERY 12 HOURS SCHEDULED
Status: DISPENSED | OUTPATIENT
Start: 2019-02-17 | End: 2019-02-25

## 2019-02-17 RX ORDER — SODIUM CHLORIDE 0.9 % (FLUSH) 0.9 %
3 SYRINGE (ML) INJECTION AS NEEDED
Status: DISCONTINUED | OUTPATIENT
Start: 2019-02-17 | End: 2019-02-28

## 2019-02-17 RX ORDER — ONDANSETRON 2 MG/ML
4 INJECTION INTRAMUSCULAR; INTRAVENOUS ONCE
Status: COMPLETED | OUTPATIENT
Start: 2019-02-17 | End: 2019-02-17

## 2019-02-17 RX ORDER — ONDANSETRON 2 MG/ML
4 INJECTION INTRAMUSCULAR; INTRAVENOUS EVERY 6 HOURS PRN
Status: DISCONTINUED | OUTPATIENT
Start: 2019-02-17 | End: 2019-03-03

## 2019-02-17 RX ORDER — DOXEPIN HYDROCHLORIDE 50 MG/1
1 CAPSULE ORAL DAILY
Status: DISCONTINUED | OUTPATIENT
Start: 2019-02-18 | End: 2019-02-22

## 2019-02-17 RX ORDER — LOPERAMIDE HYDROCHLORIDE 2 MG/1
2 TABLET ORAL 4 TIMES DAILY PRN
Qty: 20 TABLET | Refills: 0 | Status: SHIPPED | OUTPATIENT
Start: 2019-02-17 | End: 2019-02-19

## 2019-02-17 RX ORDER — IPRATROPIUM BROMIDE AND ALBUTEROL SULFATE 2.5; .5 MG/3ML; MG/3ML
3 SOLUTION RESPIRATORY (INHALATION) EVERY 6 HOURS PRN
Status: DISCONTINUED | OUTPATIENT
Start: 2019-02-17 | End: 2019-03-01

## 2019-02-17 RX ORDER — FUROSEMIDE 10 MG/ML
20 INJECTION INTRAMUSCULAR; INTRAVENOUS ONCE
Status: COMPLETED | OUTPATIENT
Start: 2019-02-17 | End: 2019-02-17

## 2019-02-17 RX ORDER — METOCLOPRAMIDE HYDROCHLORIDE 5 MG/ML
10 INJECTION INTRAMUSCULAR; INTRAVENOUS EVERY 8 HOURS PRN
Status: DISCONTINUED | OUTPATIENT
Start: 2019-02-17 | End: 2019-02-26

## 2019-02-17 RX ORDER — 0.9 % SODIUM CHLORIDE 0.9 %
VIAL (ML) INJECTION
Status: COMPLETED
Start: 2019-02-17 | End: 2019-02-17

## 2019-02-17 RX ORDER — ONDANSETRON 2 MG/ML
4 INJECTION INTRAMUSCULAR; INTRAVENOUS EVERY 4 HOURS PRN
Status: DISCONTINUED | OUTPATIENT
Start: 2019-02-17 | End: 2019-03-03

## 2019-02-17 RX ORDER — MONTELUKAST SODIUM 10 MG/1
10 TABLET ORAL NIGHTLY
Status: DISCONTINUED | OUTPATIENT
Start: 2019-02-17 | End: 2019-02-28

## 2019-02-17 RX ORDER — ONDANSETRON 4 MG/1
4 TABLET, ORALLY DISINTEGRATING ORAL EVERY 4 HOURS PRN
Qty: 10 TABLET | Refills: 0 | Status: SHIPPED | OUTPATIENT
Start: 2019-02-17 | End: 2019-02-19

## 2019-02-17 NOTE — ED NOTES
Attempted to wean pt off supplemental oxygen- pt unable to tolerate, becomes hypoxic spo2 84-85 on room air. Pt's lungs coarse at bases. MD updated and at bedside for pt eval.  Pt back on 2 L supplemental oxygen, spo2 now 95%.

## 2019-02-17 NOTE — ED PROVIDER NOTES
Patient Seen in: [facility] AND [facility] Emergency Department    History   Patient presents with:  Nausea/Vomiting/Diarrhea (gastrointestinal)    Stated Complaint: N/V/D    HPI    71-year-old female with past medical history significant for asthma, breast canc BMI 28.49 kg/m²         Physical Exam    Physical Exam   Constitutional: AAOx3, tired appearing  Head: Normocephalic and atraumatic.    Ears: TM's clear b/l  Nose: Nose normal.   Mouth/Throat: dry MM, post OP clear with no exudates  Eyes: Conjunctivae and GOLD                MDM   Patient's workup is largely unremarkable. She is mildly dehydrated. She is feeling much better after IV fluids and Zofran. She has passed oral challenge and is comfortable with discharge home on Zofran.     1700 -upon getting re

## 2019-02-17 NOTE — ED NOTES
Patient O2 sats in 80s. Checked on patient denied feeling SOB. Patient placed on 2L at that time. Primary RN aware.

## 2019-02-17 NOTE — ED NOTES
Pt to ED today with a c/o n/v/d throughout the day. States feeling generally weak, body aches, and being unable to tolerate any po intake. Denies abd pain or fevers.

## 2019-02-17 NOTE — ED INITIAL ASSESSMENT (HPI)
Vomiting since this morning. Reports mid abd pain last night that has since resolved. Denies urinary symptoms.

## 2019-02-18 ENCOUNTER — APPOINTMENT (OUTPATIENT)
Dept: CV DIAGNOSTICS | Facility: HOSPITAL | Age: 74
DRG: 234 | End: 2019-02-18
Attending: HOSPITALIST
Payer: MEDICARE

## 2019-02-18 PROCEDURE — 93306 TTE W/DOPPLER COMPLETE: CPT | Performed by: HOSPITALIST

## 2019-02-18 PROCEDURE — 99233 SBSQ HOSP IP/OBS HIGH 50: CPT | Performed by: HOSPITALIST

## 2019-02-18 RX ORDER — POTASSIUM CHLORIDE 20 MEQ/1
40 TABLET, EXTENDED RELEASE ORAL ONCE
Status: COMPLETED | OUTPATIENT
Start: 2019-02-18 | End: 2019-02-18

## 2019-02-18 NOTE — PROGRESS NOTES
[facility]    Progress Note    Suzi Clark Patient Status:  Inpatient    1945 MRN [id number]   [facility] 3W/SW Attending Aliya Hagen MD   Hosp Day # 1 PCP Kita Mohamud MD       Subjective:   Heide Rich 0.52 02/17/2019    MG 2.0 02/18/2019    TROP 0.094 (HH) 02/18/2019       Xr Chest Ap Portable  (cpt=71045)    Result Date: 2/17/2019  CONCLUSION:  1. Cardiomegaly with mild pulmonary vascular congestion.   2. Nonspecific nodular opacities at the left lung,

## 2019-02-18 NOTE — CONSULTS
[facility] HOSP - [facility]    Cardiology Consultation    Renetta Balderrama Location: [facility] 3W/SW    1945 MRN [id number]   Consulting Date 2019 [facility] [id number]   Consulting Physician Nick Oconnor MD Attending Physician Ketty Medina [Hydrocodone-*    SWELLING, SHORTNESS OF BREATH  Uloric [Febuxostat]     ITCHING  Statins                 MYALGIA    Comment:Pt had developed myalgia and myopathy  Ace Inhibitors          Coughing  Latex                   ITCHING    Medications:    No curr distress   HEENT: Atraumatic. No scleral icterus. Mucous membranes are moist.  Oropharynx is clear. Neck:  JVP is 12 cmH2O. Carotids normal pulses without bruits. Lungs: Clear to auscultation bilaterally. Cardiac: RRR, nl S1/S2.   Abdomen: Soft, nont

## 2019-02-18 NOTE — CONSULTS
[facility] AND [facility]  [facility] Cardiology Consult Note    Reyna Bean Patient Status:  Inpatient    1945 MRN [id number]   [facility] 3W/SW Attending Charli Sherman MD   Hosp Day # 1 PCP Yenny Paula MD     69year old • COLONOSCOPY      2013   • HERNIA SURGERY     • LUMPECTOMY RIGHT  2014   • MASTECTOMY PARTIAL  1999   • RADIATION RIGHT  2014     Family History   Problem Relation Age of Onset   • Asthma Father    • Hypertension Father    • Heart Disorder Father    • O

## 2019-02-18 NOTE — PAYOR COMM NOTE
--------------  ADMISSION REVIEW     Mik Sykes [place] [facility]  Subscriber #:  [id number]  Authorization Number: [id number]    Admit date: 2/17/19  Admit time: 2030       Admitting Physician: Margy Narvaez MD  Attending Physician:  Margy Narvaez MD  Primary Never Smoker      Smokeless tobacco: Never Used    Alcohol use: No      Comment: rarely at weddings    Drug use: No      Review of Systems    Positive for stated complaint: N/V/D  Other systems are as noted in HPI. Constitutional and vital signs reviewed. URINALYSIS WITH CULTURE REFLEX   CBC WITH DIFFERENTIAL WITH PLATELET    Narrative: The following orders were created for panel order CBC WITH DIFFERENTIAL WITH PLATELET.   Procedure                               Abnormality         Status pressure. Medications Prescribed:  Current Discharge Medication List    START taking these medications    ondansetron 4 MG Oral Tablet Dispersible  Take 1 tablet (4 mg total) by mouth every 4 (four) hours as needed for Nausea.   Qty: 10 tablet Refills: 0 negative. Of note she saw Dr. Beena Miranda and he recommended a chest x-ray and a VQ scan for further workup of her intermittent shortness of breath.     History     Past Medical History:   Diagnosis Date   • Asthma    • Breast CA ([facility] [address] 1968    lt mastectomy   • (250mg) PO BID   AmLODIPine Besylate 5 MG Oral Tab Take 2 tablets (10 mg total) by mouth daily.  (Patient taking differently: Take 5 mg by mouth daily.  )   ADVAIR DISKUS 250-50 MCG/DOSE Inhalation Aerosol Powder, Breath Activated Inhale 1 puff into the [facility] 47.1 02/17/2019    .0 02/17/2019    CREATSERUM 1.04 (H) 02/17/2019    BUN 19 (H) 02/17/2019     02/17/2019    K 4.0 02/17/2019     02/17/2019    CO2 32.0 02/17/2019     (H) 02/17/2019    CA 9.9 02/17/2019    ALB 4.0 11/20/2018 on the clinical documentation in H+P. Based on patients current state of illness, I anticipate that, after discharge, patient will require TBD.     MEDICATIONS ADMINISTERED IN LAST 1 DAY:  ezetimibe (ZETIA) tab 10 mg     Date Action Dose Route User    2/1 135/70, pulse 100, temperature 99 °F (37.2 °C), temperature source Oral, resp. rate 20, height 5' 4\" (1.626 m), weight 170 lb 4.8 oz (77.2 kg), SpO2 96 %. /2LNC   R.A. O2 SAT 84%    GENERAL:  The patient appeared to be in no distress and was comfortable. sq        O2 Flow Rate (L/min) [employer]           02/18/19 0826 — — 20 135/70 96 % — Nasal cannula 1 L/min [facility]   02/18/19 0442 — — — — — 170 lb 4.8 oz — — KT   02/18/19 0442 99 °F (37.2 °C) 100 20 140/76 95 % — None (Room air) — MM   02/17/19 2120 — — — — 95 % — turgor. Neurologic: Alert and oriented x 3. No dysarthria, facial droop, or gross motor deficits.   Psychiatric: Coooperative, calm.     Laboratory Data:     ECG: sinus tach 107 bpm, PRWP           Lab Results   Component Value Date     WBC 6.3 02/18/2019 changes        Assessment and Plan:    · Type II NSTEMI- Likely stable CAD and demand ischemia from volume overload. She does have coronary risk factors, stress test prior to discharge.     · SOB- multifactorial but resolved.   Does not appear volume overl

## 2019-02-18 NOTE — H&P
[facility]    History & Physical    Phil Kwan Patient Status:  Observation    1945 MRN [id number]   [facility] 3W/SW Attending Karley Macario MD   Hosp Day # 0 PCP Christie Wood MD     Date:   of Onset   • Asthma Father    • Hypertension Father    • Heart Disorder Father    • Other (Other) Mother         thyroid surgery   • Asthma Sister    • Asthma Brother    • Other (Other) Brother         gout   • Breast Cancer Self 43        rt breast 77 source Oral, resp. rate 18, height 5' 4\" (1.626 m), weight 172 lb 11.2 oz (78.3 kg), SpO2 98 %. GENERAL:  The patient appeared to be in no distress. Lying in bed, comfortably.   SKIN:  Warm and hydrated  PSYCHIATRIC: Calm and cooperative   HEENT:  Hea on 2/17/2019 at 17:34          Ekg 12-lead    Result Date: 2/17/2019  ECG Report  Interpretation  -------------------------- Sinus Tachycardia -Poor R-wave progression -nonspecific -consider old anterior infarct.  - Nonspecific T-abnormality.  ABNORMAL When

## 2019-02-19 PROCEDURE — 99233 SBSQ HOSP IP/OBS HIGH 50: CPT | Performed by: HOSPITALIST

## 2019-02-19 RX ORDER — AMLODIPINE BESYLATE 5 MG/1
5 TABLET ORAL DAILY
Status: ON HOLD | COMMUNITY
Start: 2019-02-19 | End: 2019-03-11

## 2019-02-19 RX ORDER — METOPROLOL TARTRATE 100 MG/1
100 TABLET ORAL ONCE AS NEEDED
Status: ACTIVE | OUTPATIENT
Start: 2019-02-19 | End: 2019-02-19

## 2019-02-19 RX ORDER — COLCHICINE 0.6 MG/1
0.6 TABLET ORAL AS NEEDED
COMMUNITY
End: 2019-06-12

## 2019-02-19 RX ORDER — METOPROLOL TARTRATE 5 MG/5ML
5 INJECTION INTRAVENOUS SEE ADMIN INSTRUCTIONS
Status: DISCONTINUED | OUTPATIENT
Start: 2019-02-19 | End: 2019-02-26 | Stop reason: HOSPADM

## 2019-02-19 RX ORDER — ALPRAZOLAM 0.25 MG/1
0.25 TABLET ORAL ONCE AS NEEDED
Status: COMPLETED | OUTPATIENT
Start: 2019-02-20 | End: 2019-02-20

## 2019-02-19 RX ORDER — DILTIAZEM HYDROCHLORIDE 5 MG/ML
10 INJECTION INTRAVENOUS SEE ADMIN INSTRUCTIONS
Status: DISCONTINUED | OUTPATIENT
Start: 2019-02-19 | End: 2019-02-26 | Stop reason: HOSPADM

## 2019-02-19 RX ORDER — METOPROLOL TARTRATE 100 MG/1
100 TABLET ORAL ONCE
Status: COMPLETED | OUTPATIENT
Start: 2019-02-20 | End: 2019-02-20

## 2019-02-19 RX ORDER — NITROGLYCERIN 0.4 MG/1
0.4 TABLET SUBLINGUAL ONCE
Status: COMPLETED | OUTPATIENT
Start: 2019-02-19 | End: 2019-02-20

## 2019-02-19 RX ORDER — METOPROLOL TARTRATE 50 MG/1
50 TABLET, FILM COATED ORAL ONCE
Status: COMPLETED | OUTPATIENT
Start: 2019-02-20 | End: 2019-02-20

## 2019-02-19 RX ORDER — LOPERAMIDE HYDROCHLORIDE 2 MG/1
2 TABLET ORAL 4 TIMES DAILY PRN
Qty: 20 TABLET | Refills: 0 | Status: SHIPPED | OUTPATIENT
Start: 2019-02-19 | End: 2019-03-21

## 2019-02-19 RX ORDER — METOPROLOL TARTRATE 50 MG/1
50 TABLET, FILM COATED ORAL ONCE
Status: COMPLETED | OUTPATIENT
Start: 2019-02-19 | End: 2019-02-19

## 2019-02-19 RX ORDER — METOPROLOL TARTRATE 50 MG/1
50 TABLET, FILM COATED ORAL ONCE AS NEEDED
Status: COMPLETED | OUTPATIENT
Start: 2019-02-20 | End: 2019-02-20

## 2019-02-19 RX ORDER — COLCHICINE 0.6 MG/1
0.6 TABLET ORAL 3 TIMES DAILY PRN
Status: DISCONTINUED | OUTPATIENT
Start: 2019-02-19 | End: 2019-03-05

## 2019-02-19 RX ORDER — METOPROLOL TARTRATE 100 MG/1
100 TABLET ORAL ONCE AS NEEDED
Status: DISCONTINUED | OUTPATIENT
Start: 2019-02-19 | End: 2019-02-26 | Stop reason: HOSPADM

## 2019-02-19 RX ORDER — METOPROLOL TARTRATE 50 MG/1
50 TABLET, FILM COATED ORAL ONCE AS NEEDED
Status: ACTIVE | OUTPATIENT
Start: 2019-02-19 | End: 2019-02-19

## 2019-02-19 RX ORDER — METOPROLOL TARTRATE 100 MG/1
100 TABLET ORAL ONCE
Status: COMPLETED | OUTPATIENT
Start: 2019-02-19 | End: 2019-02-19

## 2019-02-19 RX ORDER — ONDANSETRON 4 MG/1
4 TABLET, ORALLY DISINTEGRATING ORAL EVERY 4 HOURS PRN
Qty: 10 TABLET | Refills: 0 | Status: SHIPPED | OUTPATIENT
Start: 2019-02-19 | End: 2019-02-26

## 2019-02-19 RX ORDER — METOPROLOL TARTRATE 50 MG/1
50 TABLET, FILM COATED ORAL ONCE AS NEEDED
Status: DISCONTINUED | OUTPATIENT
Start: 2019-02-19 | End: 2019-02-26 | Stop reason: HOSPADM

## 2019-02-19 NOTE — PROGRESS NOTES
[facility] AND [facility]  [facility] Cardiology Progress Note    Manual Baljeet Patient Status:  Inpatient    1945 MRN [id number]   [facility] 3W/SW Attending Cornelia Carranza MD   Hosp Day # 2 PCP Martha Lynn MD     69year old • Asthma Brother    • Other (Other) Brother         gout   • Breast Cancer Self 43        rt breast 69      reports that  has never smoked. she has never used smokeless tobacco. She reports that she does not drink alcohol or use drugs.     Objective:   Te

## 2019-02-19 NOTE — DISCHARGE SUMMARY
[facility] - [facility]    Discharge Summary    Wrights Search Comprado Patient Status:  Inpatient    1945 MRN [id number]   Location [facility] 3W/SW Attending Leatha Aviles MD   Hosp Day # 2 PCP Cee Motta MD     Date of Admis for vaccination     Mild persistent asthma without complication     Colon cancer screening     Menopausal state     Age-related cataract of left eye     History of gout     Gastroesophageal reflux disease     History of pituitary adenoma     Chronic tophac note she saw Dr. Ally Botello and he recommended a chest x-ray and a VQ scan for further workup of her intermittent shortness of breath.         Hospital Course:     Acute pulmonary edema ([address]  -in setting of ivf   -cardiomegaly seen on cxr  -plan iv lasix  -ha Instructions Prescription details   ADVAIR DISKUS 250-50 MCG/DOSE Aepb  Generic drug:  fluticasone-salmeterol      Inhale 1 puff into the lungs 2 (two) times daily.    Refills:  0     Albuterol Sulfate  (90 Base) MCG/ACT Aers      Inhale 1-2 puffs in

## 2019-02-19 NOTE — PLAN OF CARE
Patient does not want to do CTA today. She is concerned about the medications & low HR. Raj PAYAN notified. Per Raj PAYAN, Meeta Villasenor, Raj Miranda to discharge home, pt should f/u at the office w/ Dr. Endy Duong and will discuss testing.

## 2019-02-19 NOTE — PLAN OF CARE
Problem: Patient/Family Goals  Goal: Patient/Family Long Term Goal  Patient's Long Term Goal: To not have anymore breathing problems    Interventions:  - Administer medications as prescribed  - See additional Care Plan goals for specific interventions   Ou

## 2019-02-19 NOTE — PROGRESS NOTES
[facility]    Progress Note    Reyna Bean Patient Status:  Inpatient    1945 MRN [id number]   [facility] 3W/SW Attending Charli Sherman MD   Hosp Day # 2 PCP Yenny Paula MD       Subjective:   Traci Carrel Oral Daily   • Heparin Sodium (Porcine)  5,000 Units Subcutaneous 2 times per day           Results:     Lab Results   Component Value Date    WBC 5.0 02/19/2019    HGB 14.1 02/19/2019    HCT 45.5 02/19/2019    .0 02/19/2019    CREATSERUM 1.11 (H) 0 ventricular relaxation (grade 1     diastolic dysfunction). 2. Mitral valve: Mildly calcified annulus. Normal thickened     leaflets. Mild to moderate regurgitation. 3. Left atrium: The atrium was mildly dilated.   4. Pericardium, extracardiac: A trivial

## 2019-02-20 ENCOUNTER — APPOINTMENT (OUTPATIENT)
Dept: CT IMAGING | Facility: HOSPITAL | Age: 74
DRG: 234 | End: 2019-02-20
Attending: INTERNAL MEDICINE
Payer: MEDICARE

## 2019-02-20 PROCEDURE — 75574 CT ANGIO HRT W/3D IMAGE: CPT | Performed by: INTERNAL MEDICINE

## 2019-02-20 PROCEDURE — 99233 SBSQ HOSP IP/OBS HIGH 50: CPT | Performed by: HOSPITALIST

## 2019-02-20 RX ORDER — SODIUM CHLORIDE 9 MG/ML
INJECTION, SOLUTION INTRAVENOUS CONTINUOUS
Status: DISCONTINUED | OUTPATIENT
Start: 2019-02-20 | End: 2019-02-27

## 2019-02-20 RX ORDER — ASPIRIN 81 MG/1
324 TABLET, CHEWABLE ORAL ONCE
Status: COMPLETED | OUTPATIENT
Start: 2019-02-20 | End: 2019-02-21

## 2019-02-20 RX ORDER — DILTIAZEM HYDROCHLORIDE 5 MG/ML
INJECTION INTRAVENOUS
Status: COMPLETED
Start: 2019-02-20 | End: 2019-02-20

## 2019-02-20 RX ORDER — CHLORHEXIDINE GLUCONATE 4 G/100ML
30 SOLUTION TOPICAL
Status: COMPLETED | OUTPATIENT
Start: 2019-02-21 | End: 2019-02-21

## 2019-02-20 RX ORDER — METOPROLOL TARTRATE 5 MG/5ML
INJECTION INTRAVENOUS
Status: COMPLETED
Start: 2019-02-20 | End: 2019-02-20

## 2019-02-20 NOTE — IMAGING NOTE
TO RAD HOLDING AT 1225  HX TAKEN PT CONSENTED ON THE FLOOR /70 HR 65 SAT.  93 %    # 20 GAUGE STARTED ON THE FLOOR   GFR = > 62   CREATINE = 0.92    CTA ORDERED BY  Dr. Martel Handgagandeep  WAS PT GIVEN CTA  PREMEDS YES     HR 65  /67  METOPROLOL 5 MILLIGRAM

## 2019-02-20 NOTE — PROGRESS NOTES
[facility] - [facility]    Cardiology Progress Note    Higinio Deleon Patient Status:  Inpatient    1945 MRN [id number]   [facility] 3W/SW Attending Nidia Murrieta MD   Hosp Day # 3 PCP MD Verona Rajan Admin Instructions    Or   • metoprolol Tartrate  5 mg Intravenous See Admin Instructions   • nitroGLYCERIN  0.5 inch Topical TID   • Fluticasone Furoate-Vilanterol  1 puff Inhalation Daily   • ezetimibe  10 mg Oral Nightly   • Montelukast Sodium  10 mg Or

## 2019-02-21 ENCOUNTER — APPOINTMENT (OUTPATIENT)
Dept: INTERVENTIONAL RADIOLOGY/VASCULAR | Facility: HOSPITAL | Age: 74
DRG: 234 | End: 2019-02-21
Attending: NURSE PRACTITIONER
Payer: MEDICARE

## 2019-02-21 PROCEDURE — 4A023N7 MEASUREMENT OF CARDIAC SAMPLING AND PRESSURE, LEFT HEART, PERCUTANEOUS APPROACH: ICD-10-PCS | Performed by: INTERNAL MEDICINE

## 2019-02-21 PROCEDURE — 99233 SBSQ HOSP IP/OBS HIGH 50: CPT | Performed by: HOSPITALIST

## 2019-02-21 PROCEDURE — B2151ZZ FLUOROSCOPY OF LEFT HEART USING LOW OSMOLAR CONTRAST: ICD-10-PCS | Performed by: INTERNAL MEDICINE

## 2019-02-21 PROCEDURE — B2111ZZ FLUOROSCOPY OF MULTIPLE CORONARY ARTERIES USING LOW OSMOLAR CONTRAST: ICD-10-PCS | Performed by: INTERNAL MEDICINE

## 2019-02-21 RX ORDER — MIDAZOLAM HYDROCHLORIDE 1 MG/ML
INJECTION INTRAMUSCULAR; INTRAVENOUS
Status: COMPLETED
Start: 2019-02-21 | End: 2019-02-21

## 2019-02-21 RX ORDER — VERAPAMIL HYDROCHLORIDE 2.5 MG/ML
INJECTION, SOLUTION INTRAVENOUS
Status: COMPLETED
Start: 2019-02-21 | End: 2019-02-21

## 2019-02-21 RX ORDER — HYDRALAZINE HYDROCHLORIDE 25 MG/1
25 TABLET, FILM COATED ORAL EVERY 8 HOURS PRN
Status: DISCONTINUED | OUTPATIENT
Start: 2019-02-21 | End: 2019-03-05

## 2019-02-21 RX ORDER — NITROGLYCERIN 20 MG/100ML
INJECTION INTRAVENOUS
Status: COMPLETED
Start: 2019-02-21 | End: 2019-02-21

## 2019-02-21 RX ORDER — LIDOCAINE HYDROCHLORIDE 20 MG/ML
INJECTION, SOLUTION EPIDURAL; INFILTRATION; INTRACAUDAL; PERINEURAL
Status: COMPLETED
Start: 2019-02-21 | End: 2019-02-21

## 2019-02-21 RX ORDER — HEPARIN SODIUM 1000 [USP'U]/ML
INJECTION, SOLUTION INTRAVENOUS; SUBCUTANEOUS
Status: COMPLETED
Start: 2019-02-21 | End: 2019-02-21

## 2019-02-21 RX ORDER — ALPRAZOLAM 0.25 MG/1
0.25 TABLET ORAL NIGHTLY PRN
Status: DISCONTINUED | OUTPATIENT
Start: 2019-02-21 | End: 2019-03-05

## 2019-02-21 RX ORDER — SODIUM CHLORIDE 9 MG/ML
INJECTION, SOLUTION INTRAVENOUS CONTINUOUS
Status: ACTIVE | OUTPATIENT
Start: 2019-02-21 | End: 2019-02-21

## 2019-02-21 NOTE — PLAN OF CARE
CARDIOVASCULAR - ADULT    • Maintains optimal cardiac output and hemodynamic stability Not Progressing          CARDIOVASCULAR - ADULT    • Absence of cardiac arrhythmias or at baseline Progressing        GASTROINTESTINAL - ADULT    • Minimal or absence of

## 2019-02-21 NOTE — PLAN OF CARE
Inpatient Throughput Communication:    Called inpatient RN Ivan Hoffman [phone] and notified of scheduled procedure Left Heart Cath on 2/21/2019. Verified that appropriate consent is signed: Yes  Appropriate Consent Signed:  Yes  Access Site Hair Clipped and s

## 2019-02-21 NOTE — CONSULTS
[facility]  Report of Consultation    Jose Eduardo Pae Patient Status:  Inpatient    1945 MRN [id number]   Location [facility] 3W/SW Attending Rick Quinn MD   [facility] Day # 4 PCP Radha Caceres MD     Date of Admission:  2 Comment:Hair and saliva  Dust                    SHORTNESS OF BREATH  Norco [Hydrocodone-*    SWELLING, SHORTNESS OF BREATH  Smoke                   SHORTNESS OF BREATH  Uloric [Febuxostat]     ITCHING, SHORTNESS OF BREATH  Statins                 MYALGIA per day  •  acetaminophen (TYLENOL) tab 650 mg, 650 mg, Oral, Q6H PRN  •  ondansetron HCl (ZOFRAN) injection 4 mg, 4 mg, Intravenous, Q6H PRN  •  Metoclopramide HCl (REGLAN) injection 10 mg, 10 mg, Intravenous, Q8H PRN  •  ipratropium-albuterol (DUONEB) ne Elevated troponin        A/P    69 yrs old female with severe multi-vessel CAD. She  of the right and disease in the LAD and circ. LAD lesion is 90% stenosis. She is pre-diabetic and has COPD. She is very functional and will benefit from a CABG.     Jenniffer

## 2019-02-21 NOTE — PROGRESS NOTES
[facility]    Progress Note    Jose Eduardo Pae Patient Status:  Inpatient    1945 MRN [id number]   [facility] 3W/SW Attending Magdalena Medina MD   Hosp Day # 3 PCP Radha Caceres MD       Subjective:   Redd Tee 0.92 02/20/2019    BUN 17 02/20/2019     02/20/2019    K 4.1 02/20/2019     02/20/2019    CO2 31.0 02/20/2019     (H) 02/20/2019    CA 10.1 02/20/2019    ALB 4.0 11/20/2018    ALKPHO 91 11/20/2018    BILT 0.7 11/20/2018    TP 7.5 11/20/2

## 2019-02-21 NOTE — PROGRESS NOTES
Procedure hand off report given to [facility]. Pt's vital signs are stable. Procedural access site is dry and intact with no signs and symptoms of bleeding and hematoma. TR Band in place   Dr. Gary Gupta spoke with patient/family post procedure.      2/21 12

## 2019-02-21 NOTE — PROGRESS NOTES
[facility]    Progress Note    Fredy Micah Patient Status:  Inpatient    1945 MRN [id number]   [facility] 3W/SW Attending Ailyn Abreu MD   Hosp Day # 4 PCP Chrystal Davey MD       Subjective:   Charity Herrera 02/21/2019    CA 10.2 (H) 02/21/2019    ALB 4.0 11/20/2018    ALKPHO 91 11/20/2018    BILT 0.7 11/20/2018    TP 7.5 11/20/2018    AST 27 11/20/2018    ALT 25 11/20/2018    PTT 29.1 02/20/2019    INR 1.0 02/20/2019    TSH 0.783 02/18/2019    DDIMER 0.52 02/

## 2019-02-21 NOTE — BRIEF PROCEDURE NOTE
[facility]    Brief Cardiac Cath Note  Manual Baljeet Patient Status:  Inpatient    1945 MRN [id number]   Location [facility] Attending Arzella Saint, MD   Hosp Day # 4 PCP Martha Lynn MD

## 2019-02-22 ENCOUNTER — APPOINTMENT (OUTPATIENT)
Dept: ULTRASOUND IMAGING | Facility: HOSPITAL | Age: 74
DRG: 234 | End: 2019-02-22
Attending: NURSE PRACTITIONER
Payer: MEDICARE

## 2019-02-22 ENCOUNTER — APPOINTMENT (OUTPATIENT)
Dept: GENERAL RADIOLOGY | Facility: HOSPITAL | Age: 74
DRG: 234 | End: 2019-02-22
Attending: NURSE PRACTITIONER
Payer: MEDICARE

## 2019-02-22 ENCOUNTER — TELEPHONE (OUTPATIENT)
Dept: OTHER | Age: 74
End: 2019-02-22

## 2019-02-22 ENCOUNTER — APPOINTMENT (OUTPATIENT)
Dept: CT IMAGING | Facility: HOSPITAL | Age: 74
DRG: 234 | End: 2019-02-22
Attending: CLINICAL NURSE SPECIALIST
Payer: MEDICARE

## 2019-02-22 PROCEDURE — 99233 SBSQ HOSP IP/OBS HIGH 50: CPT | Performed by: HOSPITALIST

## 2019-02-22 PROCEDURE — 71046 X-RAY EXAM CHEST 2 VIEWS: CPT | Performed by: NURSE PRACTITIONER

## 2019-02-22 PROCEDURE — 70498 CT ANGIOGRAPHY NECK: CPT | Performed by: CLINICAL NURSE SPECIALIST

## 2019-02-22 PROCEDURE — 93880 EXTRACRANIAL BILAT STUDY: CPT | Performed by: NURSE PRACTITIONER

## 2019-02-22 RX ORDER — AMLODIPINE BESYLATE 5 MG/1
5 TABLET ORAL DAILY
Status: DISCONTINUED | OUTPATIENT
Start: 2019-02-22 | End: 2019-02-23

## 2019-02-22 NOTE — PAYOR COMM NOTE
--------------  CONTINUED STAY REVIEW    [facility]  Subscriber #:  [id number]  Authorization Number: [id number]    Admit date: 2/17/19  Admit time: 2030    Admitting Physician:   Attending Physician:  Ly Fritz MD  Primary Care Physician: lb 8 oz (76 kg), SpO2 96 %.     A/P     69 yrs old female with severe multi-vessel CAD. She  of the right and disease in the LAD and circ. LAD lesion is 90% stenosis. She is pre-diabetic and has COPD. She is very functional and will benefit from a CABG.

## 2019-02-22 NOTE — PROGRESS NOTES
Misc. Note    CV Surgery consulted for CABG; tentatively planned for 2/26 which patient is agreeable to surgery. Pt. Also noted to have 80% right carotid stenosis on CTA. Pt. Currently has no complaints and is asymptomatic.  She refuses to take Aspirin, whi

## 2019-02-22 NOTE — TELEPHONE ENCOUNTER
Pt calling to let Dr. Chato Norman know she has been in the hospital for 5 days. Pt had forgotten about her appointment today.  Pt was scheduled for Medicare Annual at 2pm today with Dr. Chato Norman.

## 2019-02-22 NOTE — PROGRESS NOTES
[facility]    Progress Note    Valdemar Jones Patient Status:  Inpatient    1945 MRN [id number]   [facility] 3W/ Attending Adalid Nguyen MD   Hosp Day # 5 PCP Melvin Doran MD       Subjective:   Alondra Arshad metoprolol Tartrate  5 mg Intravenous See Admin Instructions    Or   • dilTIAZem HCl  10 mg Intravenous See Admin Instructions    Or   • metoprolol Tartrate  5 mg Intravenous See Admin Instructions   • nitroGLYCERIN  0.5 inch Topical TID   • Fluticasone Fu 139  139   --    K  4.1  4.5   --   4.5   CL  104  104  104   --    CO2  31.0  28.0  30.0   --      Lab Results   Component Value Date    INR 1.0 02/20/2019       Culture:  No results found for this visit on 02/17/19.     Imaging/EKG:   Xr Chest Pa + Lat C angiogram - positive for significant CAD -patient had cardiac cath on 2/21/2019 which showed significant stenosis - recs for surgical revascularization and would be amenable to PCI     Multivessel coronary artery disease  -Cardiothoracic surgery has evalua

## 2019-02-22 NOTE — PLAN OF CARE
Problem: Patient/Family Goals  Goal: Patient/Family Long Term Goal  Patient's Long Term Goal: To not have anymore breathing problems    Interventions:  - Administer medications as prescribed  - See additional Care Plan goals for specific interventions    O vomiting  INTERVENTIONS:  - Maintain adequate hydration with IV or PO as ordered and tolerated  - Nasogastric tube to low intermittent suction as ordered  - Evaluate effectiveness of ordered antiemetic medications  - Provide nonpharmacologic comfort measur trends  - Monitor for bleeding, hypotension and signs of decreased cardiac output  - Evaluate effectiveness of vasoactive medications to optimize hemodynamic stability  - Monitor arterial and/or venous puncture sites for bleeding and/or hematoma  - Assess

## 2019-02-23 PROCEDURE — 99233 SBSQ HOSP IP/OBS HIGH 50: CPT | Performed by: HOSPITALIST

## 2019-02-23 RX ORDER — AMLODIPINE BESYLATE 10 MG/1
10 TABLET ORAL DAILY
Status: DISCONTINUED | OUTPATIENT
Start: 2019-02-24 | End: 2019-02-25

## 2019-02-23 RX ORDER — AMLODIPINE BESYLATE 5 MG/1
5 TABLET ORAL ONCE
Status: COMPLETED | OUTPATIENT
Start: 2019-02-23 | End: 2019-02-23

## 2019-02-23 NOTE — PROGRESS NOTES
[facility]    Progress Note    Rajan Red Patient Status:  Inpatient    1945 MRN [id number]   [facility] 3W/SW Attending Alexander Lr MD   Hosp Day # 6 PCP Brianna Robles MD        Subjective: ALKPHO 103 02/22/2019    BILT 0.4 02/22/2019    TP 7.5 02/22/2019    AST 29 02/22/2019    ALT 29 02/22/2019    PTT 29.1 02/20/2019    INR 1.0 02/20/2019    TSH 0.783 02/18/2019    DDIMER 0.52 02/17/2019    MG 2.3 02/20/2019    TROP 0.094 () 02/18/2019

## 2019-02-23 NOTE — PROGRESS NOTES
[facility]    Progress Note    Teo Ortiz Patient Status:  Inpatient    1945 MRN [id number]   [facility] 3W/SW Attending Demi Boothe MD   Hosp Day # 6 PCP James Sanabria MD       Subjective:   Jerardo Stiles Intravenous See Admin Instructions    Or   • metoprolol Tartrate  5 mg Intravenous See Admin Instructions   • nitroGLYCERIN  0.5 inch Topical TID   • Fluticasone Furoate-Vilanterol  1 puff Inhalation Daily   • ezetimibe  10 mg Oral Nightly   • Montelukast 104   --    CO2  31.0  28.0  30.0   --    ALKPHO   --    --    --   103   AST   --    --    --   29   ALT   --    --    --   29   BILT   --    --    --   0.4   TP   --    --    --   7.5     Lab Results   Component Value Date    INR 1.0 02/20/2019       Cul normal EF, hypokinesis of basal-inferior wall     Elevated Troponin/Type II NSTEMI  -echo as above  - CT angiogram - positive for significant CAD -patient had cardiac cath on 2/21/2019 which showed significant stenosis - recs for surgical revascularization

## 2019-02-24 PROCEDURE — 99233 SBSQ HOSP IP/OBS HIGH 50: CPT | Performed by: HOSPITALIST

## 2019-02-24 RX ORDER — ASPIRIN 81 MG/1
81 TABLET, CHEWABLE ORAL DAILY
Status: DISCONTINUED | OUTPATIENT
Start: 2019-02-25 | End: 2019-02-25

## 2019-02-24 NOTE — PROGRESS NOTES
[facility]    Progress Note    Portillo Haley Patient Status:  Inpatient    1945 MRN [id number]   [facility] 3W/SW Attending Wilton Perez MD   Hosp Day # 7 PCP Kenith Landau, MD        Subjective: BILT 0.4 02/22/2019    TP 7.5 02/22/2019    AST 29 02/22/2019    ALT 29 02/22/2019    PTT 29.1 02/20/2019    INR 1.0 02/20/2019    TSH 0.783 02/18/2019    DDIMER 0.52 02/17/2019    MG 2.3 02/20/2019    TROP 0.094 (HH) 02/18/2019       Cta Carotid Arteries

## 2019-02-24 NOTE — PROGRESS NOTES
[facility]    Progress Note    Suzi Clark Patient Status:  Inpatient    1945 MRN [id number]   Location [facility] 3W/SW Attending Maria Del Rosario Dowling MD   Hosp Day # 7 PCP Kita Mohamud MD        Subjective: CABG     Hx. Of Gout  -cont meds     Prophylaxis  -hep sq     Dispo:   Plan for surgery on 02/26.    Appreciate recs  Will continue to monitor.         Greater than 35 minutes spent, >50% spent counseling re: treatment plan and workup          Results:

## 2019-02-25 ENCOUNTER — APPOINTMENT (OUTPATIENT)
Dept: GENERAL RADIOLOGY | Facility: HOSPITAL | Age: 74
DRG: 234 | End: 2019-02-25
Attending: CLINICAL NURSE SPECIALIST
Payer: MEDICARE

## 2019-02-25 ENCOUNTER — ANESTHESIA EVENT (OUTPATIENT)
Dept: SURGERY | Facility: HOSPITAL | Age: 74
DRG: 234 | End: 2019-02-25
Payer: MEDICARE

## 2019-02-25 PROCEDURE — 94060 EVALUATION OF WHEEZING: CPT | Performed by: INTERNAL MEDICINE

## 2019-02-25 PROCEDURE — 94729 DIFFUSING CAPACITY: CPT | Performed by: INTERNAL MEDICINE

## 2019-02-25 PROCEDURE — 99233 SBSQ HOSP IP/OBS HIGH 50: CPT | Performed by: HOSPITALIST

## 2019-02-25 PROCEDURE — 71046 X-RAY EXAM CHEST 2 VIEWS: CPT | Performed by: CLINICAL NURSE SPECIALIST

## 2019-02-25 PROCEDURE — 94726 PLETHYSMOGRAPHY LUNG VOLUMES: CPT | Performed by: INTERNAL MEDICINE

## 2019-02-25 RX ORDER — TEMAZEPAM 15 MG/1
15 CAPSULE ORAL NIGHTLY PRN
Status: DISCONTINUED | OUTPATIENT
Start: 2019-02-25 | End: 2019-02-26

## 2019-02-25 RX ORDER — ASCORBIC ACID 500 MG
1000 TABLET ORAL ONCE
Status: COMPLETED | OUTPATIENT
Start: 2019-02-25 | End: 2019-02-25

## 2019-02-25 RX ORDER — FAMOTIDINE 20 MG/1
20 TABLET ORAL ONCE
Status: DISCONTINUED | OUTPATIENT
Start: 2019-02-25 | End: 2019-02-25

## 2019-02-25 RX ORDER — SODIUM CHLORIDE 9 MG/ML
83 INJECTION, SOLUTION INTRAVENOUS CONTINUOUS
Status: DISCONTINUED | OUTPATIENT
Start: 2019-02-26 | End: 2019-02-27

## 2019-02-25 RX ORDER — SODIUM CHLORIDE 9 MG/ML
83 INJECTION, SOLUTION INTRAVENOUS CONTINUOUS
Status: DISCONTINUED | OUTPATIENT
Start: 2019-02-26 | End: 2019-02-25

## 2019-02-25 RX ORDER — METOCLOPRAMIDE 10 MG/1
10 TABLET ORAL ONCE
Status: COMPLETED | OUTPATIENT
Start: 2019-02-26 | End: 2019-02-26

## 2019-02-25 RX ORDER — ALBUTEROL SULFATE 2.5 MG/3ML
SOLUTION RESPIRATORY (INHALATION)
Status: COMPLETED
Start: 2019-02-25 | End: 2019-02-25

## 2019-02-25 RX ORDER — CEFAZOLIN SODIUM/WATER 2 G/20 ML
2 SYRINGE (ML) INTRAVENOUS
Status: DISPENSED | OUTPATIENT
Start: 2019-02-26 | End: 2019-02-26

## 2019-02-25 RX ORDER — METOCLOPRAMIDE 10 MG/1
10 TABLET ORAL ONCE
Status: DISCONTINUED | OUTPATIENT
Start: 2019-02-25 | End: 2019-02-25

## 2019-02-25 RX ORDER — FAMOTIDINE 20 MG/1
20 TABLET ORAL ONCE
Status: COMPLETED | OUTPATIENT
Start: 2019-02-26 | End: 2019-02-26

## 2019-02-25 NOTE — PROGRESS NOTES
[facility] - [facility]    Cardiology Progress Note    Wu Marcusfrancisca Patient Status:  Inpatient    1945 MRN [id number]   [facility] 3W/SW Attending Jeremiah Miranda MD   Hosp Day # 8 PCP Marcela Garvin MD     [phone] Sodium (Porcine)  5,000 Units Subcutaneous 2 times per day       Exam  Gen: No acute distress, alert and oriented x3  Pulm: Lungs clear  Neck: No JVD  CV: S1 and S2 regular rate and rhythm, no S3, No Rub, No Murmur  Abd: Abdomen soft, nontender, nondistend

## 2019-02-25 NOTE — PROGRESS NOTES
Patient was seen for follow-up consultation today and to discuss the indication techniques risks and alternatives for coronary bypass grafting. Family members they are included her sister who is a retired CV nurse as well as her son.   The patient has jovana

## 2019-02-25 NOTE — ANESTHESIA PREPROCEDURE EVALUATION
Anesthesia PreOp Note    HPI:     Stefany Bustillo is a 69year old female who presents for preoperative consultation requested by: Yuni Jack MD    Date of Surgery: 2/26/2019    Procedure(s): HEART CORONARY ARTERY BYPASS GRAFT  Indication:  At cataract of left eye         Date Noted: 05/10/2018      History of gout         Date Noted: 05/10/2018      Gastroesophageal reflux disease         Date Noted: 05/10/2018      History of pituitary adenoma         Date Noted: 05/10/2018      Hypercholester 108 (90 Base) MCG/ACT Inhalation Aero Soln Inhale 1-2 puffs into the lungs every 6 (six) hours as needed for Wheezing or Shortness of Breath.  Disp: 1 Inhaler Rfl: 0 More than a month at Unknown time   [DISCONTINUED] AmLODIPine Besylate 5 MG Oral Tab Take 2 Furoate-Vilanterol (BREO ELLIPTA) 200-25 MCG/INH inhaler 1 puff 1 puff Inhalation Daily Fay Johnson MD 1 puff at 02/20/19 1117   Albuterol Sulfate  (90 Base) MCG/ACT inhaler 1-2 puff 1-2 puff Inhalation Q6H PRN Fay Johnson MD    ezetimibe SHORTNESS OF BREATH  Statins                 MYALGIA    Comment:Pt had developed myalgia and myopathy  Ace Inhibitors          Coughing  Latex                   ITCHING    Family History   Problem Relation Age of Onset   • Asthma Father    • Hypertension F labs reviewed.   Lab Results   Component Value Date    WBC 6.9 02/25/2019    RBC 4.80 02/25/2019    HGB 13.9 02/25/2019    HCT 44.6 02/25/2019    MCV 92.9 02/25/2019    MCH 29.0 02/25/2019    MCHC 31.2 02/25/2019    RDW 13.7 02/25/2019    .0 02/25/20 leaflets. Mild to moderate regurgitation. 3. Left atrium: The atrium was mildly dilated. 4. Pericardium, extracardiac: A trivial pericardial effusion was identified along the left ventricular free wall. Carotid doppers, 2/22/19:  1.  Atherosclerotic Risks Discussed With:  Patient  Use of Blood Products Discussed With:  Patient  Blood Product Use Consented    Provider Attestation (if preop done by other):  NPO checked. Allergies checked. Agree with assessment. All question answered.       I have info

## 2019-02-25 NOTE — PROCEDURES
[address] [facility] 1945 MRN [id number]   Height 5' 4\" (162.6 cm) Age 69year old   Weight 166 lb 9.6 oz (75.6 kg) Sex Female         Spirometry:     FEV1 0.65 L which is 34 % of predicted

## 2019-02-25 NOTE — PROGRESS NOTES
[facility]    Progress Note    Dieter Elliott Patient Status:  Inpatient    1945 MRN [id number]   [facility] 3W/ Attending Sowmya Fletcher MD   Hosp Day # 8 PCP Eliot Flores MD       Subjective:   Angeles Barcenas ON 2/26/2019] Metoclopramide HCl  10 mg Oral Once   • [START ON 2/26/2019] aminocaproic acid (AMICAR) bolus from bag  5 g Intravenous Once    And   • [START ON 2/26/2019] aminocaproic acid infusion  1 g/hr Intravenous Once   • mupirocin  1 Application [employer] CREATSERUM  1.04*  0.95   --   0.97   GFRAA  62  69   --   67   GFRNAA  53*  60   --   58*   CA  10.2*  9.9   --   9.4   ALB   --    --   3.3*   --    NA  139  139   --   140   K  4.5   --   4.5  4.5   CL  104  104   --   107   CO2  28.0  30.0   --   29.

## 2019-02-25 NOTE — PROGRESS NOTES
Misc. Note    Pt. Scheduled for CABG with Dr. Octavio Leal on 2/26. Written and verbal info provided to patient, her son, sister and sister-in-law regarding beatrice op expectations. Also reviewed STS risk score. Pt. Is currently Asymptomatic; has no complaints.  Pl

## 2019-02-26 ENCOUNTER — APPOINTMENT (OUTPATIENT)
Dept: GENERAL RADIOLOGY | Facility: HOSPITAL | Age: 74
DRG: 234 | End: 2019-02-26
Attending: CLINICAL NURSE SPECIALIST
Payer: MEDICARE

## 2019-02-26 ENCOUNTER — ANESTHESIA (OUTPATIENT)
Dept: SURGERY | Facility: HOSPITAL | Age: 74
DRG: 234 | End: 2019-02-26
Payer: MEDICARE

## 2019-02-26 PROCEDURE — 71045 X-RAY EXAM CHEST 1 VIEW: CPT | Performed by: CLINICAL NURSE SPECIALIST

## 2019-02-26 PROCEDURE — P9045 ALBUMIN (HUMAN), 5%, 250 ML: HCPCS | Performed by: ANESTHESIOLOGY

## 2019-02-26 PROCEDURE — 99233 SBSQ HOSP IP/OBS HIGH 50: CPT | Performed by: HOSPITALIST

## 2019-02-26 PROCEDURE — 99223 1ST HOSP IP/OBS HIGH 75: CPT | Performed by: INTERNAL MEDICINE

## 2019-02-26 PROCEDURE — 93312 ECHO TRANSESOPHAGEAL: CPT | Performed by: ANESTHESIOLOGY

## 2019-02-26 PROCEDURE — 5A1221Z PERFORMANCE OF CARDIAC OUTPUT, CONTINUOUS: ICD-10-PCS | Performed by: THORACIC SURGERY (CARDIOTHORACIC VASCULAR SURGERY)

## 2019-02-26 PROCEDURE — 021209W BYPASS CORONARY ARTERY, THREE ARTERIES FROM AORTA WITH AUTOLOGOUS VENOUS TISSUE, OPEN APPROACH: ICD-10-PCS | Performed by: THORACIC SURGERY (CARDIOTHORACIC VASCULAR SURGERY)

## 2019-02-26 PROCEDURE — 06BQ4ZZ EXCISION OF LEFT SAPHENOUS VEIN, PERCUTANEOUS ENDOSCOPIC APPROACH: ICD-10-PCS | Performed by: THORACIC SURGERY (CARDIOTHORACIC VASCULAR SURGERY)

## 2019-02-26 PROCEDURE — 99222 1ST HOSP IP/OBS MODERATE 55: CPT | Performed by: INTERNAL MEDICINE

## 2019-02-26 PROCEDURE — B246ZZ4 ULTRASONOGRAPHY OF RIGHT AND LEFT HEART, TRANSESOPHAGEAL: ICD-10-PCS | Performed by: THORACIC SURGERY (CARDIOTHORACIC VASCULAR SURGERY)

## 2019-02-26 RX ORDER — MORPHINE SULFATE 4 MG/ML
8 INJECTION, SOLUTION INTRAMUSCULAR; INTRAVENOUS EVERY 2 HOUR PRN
Status: DISCONTINUED | OUTPATIENT
Start: 2019-02-26 | End: 2019-03-05

## 2019-02-26 RX ORDER — ENOXAPARIN SODIUM 100 MG/ML
40 INJECTION SUBCUTANEOUS DAILY
Status: DISCONTINUED | OUTPATIENT
Start: 2019-02-27 | End: 2019-03-05

## 2019-02-26 RX ORDER — ASPIRIN 81 MG/1
81 TABLET ORAL DAILY
Status: DISCONTINUED | OUTPATIENT
Start: 2019-02-27 | End: 2019-03-05

## 2019-02-26 RX ORDER — METOCLOPRAMIDE HYDROCHLORIDE 5 MG/ML
5 INJECTION INTRAMUSCULAR; INTRAVENOUS EVERY 8 HOURS PRN
Status: DISCONTINUED | OUTPATIENT
Start: 2019-02-26 | End: 2019-02-27

## 2019-02-26 RX ORDER — ASCORBIC ACID 500 MG
500 TABLET ORAL 3 TIMES DAILY
Status: DISCONTINUED | OUTPATIENT
Start: 2019-02-27 | End: 2019-03-05

## 2019-02-26 RX ORDER — NEOSTIGMINE METHYLSULFATE 0.5 MG/ML
3 INJECTION INTRAVENOUS ONCE
Status: COMPLETED | OUTPATIENT
Start: 2019-02-26 | End: 2019-02-26

## 2019-02-26 RX ORDER — FAMOTIDINE 10 MG/ML
20 INJECTION, SOLUTION INTRAVENOUS 2 TIMES DAILY
Status: DISCONTINUED | OUTPATIENT
Start: 2019-02-26 | End: 2019-02-28

## 2019-02-26 RX ORDER — METOCLOPRAMIDE HYDROCHLORIDE 5 MG/ML
10 INJECTION INTRAMUSCULAR; INTRAVENOUS EVERY 6 HOURS
Status: DISCONTINUED | OUTPATIENT
Start: 2019-02-26 | End: 2019-02-26

## 2019-02-26 RX ORDER — SENNA AND DOCUSATE SODIUM 50; 8.6 MG/1; MG/1
2 TABLET, FILM COATED ORAL 2 TIMES DAILY
Status: DISCONTINUED | OUTPATIENT
Start: 2019-02-27 | End: 2019-03-05

## 2019-02-26 RX ORDER — SODIUM CHLORIDE 9 MG/ML
INJECTION, SOLUTION INTRAVENOUS CONTINUOUS
Status: DISCONTINUED | OUTPATIENT
Start: 2019-02-26 | End: 2019-03-05

## 2019-02-26 RX ORDER — MORPHINE SULFATE 2 MG/ML
2 INJECTION, SOLUTION INTRAMUSCULAR; INTRAVENOUS EVERY 2 HOUR PRN
Status: DISCONTINUED | OUTPATIENT
Start: 2019-02-26 | End: 2019-03-05

## 2019-02-26 RX ORDER — MIDAZOLAM HYDROCHLORIDE 1 MG/ML
INJECTION INTRAMUSCULAR; INTRAVENOUS AS NEEDED
Status: DISCONTINUED | OUTPATIENT
Start: 2019-02-26 | End: 2019-02-26 | Stop reason: SURG

## 2019-02-26 RX ORDER — DEXTROSE, SODIUM CHLORIDE, SODIUM LACTATE, POTASSIUM CHLORIDE, AND CALCIUM CHLORIDE 5; .6; .31; .03; .02 G/100ML; G/100ML; G/100ML; G/100ML; G/100ML
INJECTION, SOLUTION INTRAVENOUS CONTINUOUS
Status: DISCONTINUED | OUTPATIENT
Start: 2019-02-26 | End: 2019-03-03

## 2019-02-26 RX ORDER — CHLORHEXIDINE GLUCONATE 0.12 MG/ML
15 RINSE ORAL 2 TIMES DAILY
Status: DISCONTINUED | OUTPATIENT
Start: 2019-02-26 | End: 2019-03-05

## 2019-02-26 RX ORDER — FAMOTIDINE 20 MG/1
20 TABLET ORAL 2 TIMES DAILY
Status: DISCONTINUED | OUTPATIENT
Start: 2019-02-26 | End: 2019-03-05

## 2019-02-26 RX ORDER — VECURONIUM BROMIDE 1 MG/ML
INJECTION, POWDER, LYOPHILIZED, FOR SOLUTION INTRAVENOUS AS NEEDED
Status: DISCONTINUED | OUTPATIENT
Start: 2019-02-26 | End: 2019-02-26 | Stop reason: SURG

## 2019-02-26 RX ORDER — POTASSIUM CHLORIDE 14.9 MG/ML
20 INJECTION INTRAVENOUS AS NEEDED
Status: DISCONTINUED | OUTPATIENT
Start: 2019-02-26 | End: 2019-03-05

## 2019-02-26 RX ORDER — ALBUMIN, HUMAN INJ 5% 5 %
SOLUTION INTRAVENOUS CONTINUOUS PRN
Status: DISCONTINUED | OUTPATIENT
Start: 2019-02-26 | End: 2019-02-26 | Stop reason: SURG

## 2019-02-26 RX ORDER — DOBUTAMINE HYDROCHLORIDE 100 MG/100ML
INJECTION INTRAVENOUS CONTINUOUS PRN
Status: DISCONTINUED | OUTPATIENT
Start: 2019-02-26 | End: 2019-02-26 | Stop reason: SURG

## 2019-02-26 RX ORDER — IPRATROPIUM BROMIDE AND ALBUTEROL SULFATE 2.5; .5 MG/3ML; MG/3ML
3 SOLUTION RESPIRATORY (INHALATION) EVERY 6 HOURS PRN
Status: DISCONTINUED | OUTPATIENT
Start: 2019-02-26 | End: 2019-03-05

## 2019-02-26 RX ORDER — MIDAZOLAM HYDROCHLORIDE 1 MG/ML
2 INJECTION INTRAMUSCULAR; INTRAVENOUS EVERY 5 MIN PRN
Status: DISCONTINUED | OUTPATIENT
Start: 2019-02-26 | End: 2019-03-05

## 2019-02-26 RX ORDER — DOXEPIN HYDROCHLORIDE 50 MG/1
1 CAPSULE ORAL DAILY
Status: DISCONTINUED | OUTPATIENT
Start: 2019-02-27 | End: 2019-03-05

## 2019-02-26 RX ORDER — MORPHINE SULFATE 4 MG/ML
4 INJECTION, SOLUTION INTRAMUSCULAR; INTRAVENOUS EVERY 2 HOUR PRN
Status: DISCONTINUED | OUTPATIENT
Start: 2019-02-26 | End: 2019-03-05

## 2019-02-26 RX ORDER — MILRINONE LACTATE 0.2 MG/ML
0.38 INJECTION, SOLUTION INTRAVENOUS AS NEEDED
Status: DISCONTINUED | OUTPATIENT
Start: 2019-02-26 | End: 2019-02-28

## 2019-02-26 RX ORDER — CEFAZOLIN SODIUM/WATER 2 G/20 ML
2 SYRINGE (ML) INTRAVENOUS EVERY 12 HOURS
Status: DISCONTINUED | OUTPATIENT
Start: 2019-02-27 | End: 2019-02-27

## 2019-02-26 RX ORDER — POTASSIUM CHLORIDE 29.8 MG/ML
40 INJECTION INTRAVENOUS AS NEEDED
Status: DISCONTINUED | OUTPATIENT
Start: 2019-02-26 | End: 2019-03-05

## 2019-02-26 RX ORDER — POLYETHYLENE GLYCOL 3350 17 G/17G
17 POWDER, FOR SOLUTION ORAL DAILY PRN
Status: DISCONTINUED | OUTPATIENT
Start: 2019-02-26 | End: 2019-03-05

## 2019-02-26 RX ORDER — CEFAZOLIN SODIUM/WATER 2 G/20 ML
SYRINGE (ML) INTRAVENOUS AS NEEDED
Status: DISCONTINUED | OUTPATIENT
Start: 2019-02-26 | End: 2019-02-26 | Stop reason: SURG

## 2019-02-26 RX ORDER — SODIUM CHLORIDE 0.9 % (FLUSH) 0.9 %
10 SYRINGE (ML) INJECTION AS NEEDED
Status: DISCONTINUED | OUTPATIENT
Start: 2019-02-26 | End: 2019-03-05

## 2019-02-26 RX ORDER — SODIUM CHLORIDE 9 MG/ML
INJECTION, SOLUTION INTRAVENOUS CONTINUOUS PRN
Status: DISCONTINUED | OUTPATIENT
Start: 2019-02-26 | End: 2019-02-26 | Stop reason: SURG

## 2019-02-26 RX ORDER — LIDOCAINE HYDROCHLORIDE 40 MG/ML
SOLUTION TOPICAL AS NEEDED
Status: DISCONTINUED | OUTPATIENT
Start: 2019-02-26 | End: 2019-02-26 | Stop reason: SURG

## 2019-02-26 RX ORDER — AMIODARONE HYDROCHLORIDE 200 MG/1
200 TABLET ORAL
Status: DISCONTINUED | OUTPATIENT
Start: 2019-02-26 | End: 2019-03-05

## 2019-02-26 RX ORDER — METOCLOPRAMIDE HYDROCHLORIDE 5 MG/ML
5 INJECTION INTRAMUSCULAR; INTRAVENOUS EVERY 6 HOURS
Status: DISCONTINUED | OUTPATIENT
Start: 2019-02-27 | End: 2019-02-27

## 2019-02-26 RX ORDER — DOCUSATE SODIUM 100 MG/1
100 CAPSULE, LIQUID FILLED ORAL 2 TIMES DAILY
Status: DISCONTINUED | OUTPATIENT
Start: 2019-02-26 | End: 2019-03-05

## 2019-02-26 RX ORDER — HEPARIN SODIUM 1000 [USP'U]/ML
INJECTION, SOLUTION INTRAVENOUS; SUBCUTANEOUS AS NEEDED
Status: DISCONTINUED | OUTPATIENT
Start: 2019-02-26 | End: 2019-02-26 | Stop reason: SURG

## 2019-02-26 RX ORDER — CALCIUM CHLORIDE 100 MG/ML
INJECTION INTRAVENOUS; INTRAVENTRICULAR AS NEEDED
Status: DISCONTINUED | OUTPATIENT
Start: 2019-02-26 | End: 2019-02-26 | Stop reason: SURG

## 2019-02-26 RX ORDER — TEMAZEPAM 15 MG/1
15 CAPSULE ORAL NIGHTLY PRN
Status: DISCONTINUED | OUTPATIENT
Start: 2019-02-26 | End: 2019-03-05

## 2019-02-26 RX ORDER — PROTAMINE SULFATE 10 MG/ML
INJECTION, SOLUTION INTRAVENOUS AS NEEDED
Status: DISCONTINUED | OUTPATIENT
Start: 2019-02-26 | End: 2019-02-26 | Stop reason: SURG

## 2019-02-26 RX ORDER — MAGNESIUM SULFATE 1 G/100ML
1 INJECTION INTRAVENOUS AS NEEDED
Status: DISCONTINUED | OUTPATIENT
Start: 2019-02-26 | End: 2019-03-05

## 2019-02-26 RX ORDER — ALBUMIN, HUMAN INJ 5% 5 %
250 SOLUTION INTRAVENOUS ONCE AS NEEDED
Status: COMPLETED | OUTPATIENT
Start: 2019-02-26 | End: 2019-02-27

## 2019-02-26 RX ORDER — DOBUTAMINE HYDROCHLORIDE 200 MG/100ML
INJECTION INTRAVENOUS CONTINUOUS PRN
Status: DISCONTINUED | OUTPATIENT
Start: 2019-02-26 | End: 2019-03-05

## 2019-02-26 RX ORDER — METOPROLOL TARTRATE 5 MG/5ML
INJECTION INTRAVENOUS AS NEEDED
Status: DISCONTINUED | OUTPATIENT
Start: 2019-02-26 | End: 2019-02-26 | Stop reason: SURG

## 2019-02-26 RX ORDER — CHLORHEXIDINE GLUCONATE 0.12 MG/ML
15 RINSE ORAL
Status: DISCONTINUED | OUTPATIENT
Start: 2019-02-26 | End: 2019-02-27

## 2019-02-26 RX ORDER — NITROGLYCERIN 20 MG/100ML
INJECTION INTRAVENOUS CONTINUOUS PRN
Status: DISCONTINUED | OUTPATIENT
Start: 2019-02-26 | End: 2019-02-28

## 2019-02-26 RX ORDER — GLYCOPYRROLATE 0.2 MG/ML
0.6 INJECTION, SOLUTION INTRAMUSCULAR; INTRAVENOUS ONCE
Status: COMPLETED | OUTPATIENT
Start: 2019-02-26 | End: 2019-02-26

## 2019-02-26 RX ORDER — SODIUM PHOSPHATE, DIBASIC AND SODIUM PHOSPHATE, MONOBASIC 7; 19 G/133ML; G/133ML
1 ENEMA RECTAL ONCE AS NEEDED
Status: DISCONTINUED | OUTPATIENT
Start: 2019-02-26 | End: 2019-03-05

## 2019-02-26 RX ORDER — TRAMADOL HYDROCHLORIDE 50 MG/1
50 TABLET ORAL EVERY 6 HOURS PRN
Status: DISCONTINUED | OUTPATIENT
Start: 2019-02-26 | End: 2019-03-05

## 2019-02-26 RX ORDER — BISACODYL 10 MG
10 SUPPOSITORY, RECTAL RECTAL
Status: DISCONTINUED | OUTPATIENT
Start: 2019-02-26 | End: 2019-03-05

## 2019-02-26 RX ORDER — CLOPIDOGREL BISULFATE 75 MG/1
75 TABLET ORAL DAILY
Status: DISCONTINUED | OUTPATIENT
Start: 2019-02-27 | End: 2019-03-05

## 2019-02-26 RX ORDER — ONDANSETRON 2 MG/ML
4 INJECTION INTRAMUSCULAR; INTRAVENOUS EVERY 6 HOURS PRN
Status: DISCONTINUED | OUTPATIENT
Start: 2019-02-26 | End: 2019-03-05

## 2019-02-26 RX ORDER — HYDRALAZINE HYDROCHLORIDE 20 MG/ML
INJECTION INTRAMUSCULAR; INTRAVENOUS AS NEEDED
Status: DISCONTINUED | OUTPATIENT
Start: 2019-02-26 | End: 2019-02-26 | Stop reason: SURG

## 2019-02-26 RX ORDER — DEXAMETHASONE SODIUM PHOSPHATE 4 MG/ML
VIAL (ML) INJECTION AS NEEDED
Status: DISCONTINUED | OUTPATIENT
Start: 2019-02-26 | End: 2019-02-26 | Stop reason: SURG

## 2019-02-26 RX ORDER — ONDANSETRON 2 MG/ML
INJECTION INTRAMUSCULAR; INTRAVENOUS AS NEEDED
Status: DISCONTINUED | OUTPATIENT
Start: 2019-02-26 | End: 2019-02-26 | Stop reason: SURG

## 2019-02-26 RX ORDER — EPHEDRINE SULFATE 50 MG/ML
INJECTION, SOLUTION INTRAVENOUS AS NEEDED
Status: DISCONTINUED | OUTPATIENT
Start: 2019-02-26 | End: 2019-02-26 | Stop reason: SURG

## 2019-02-26 RX ORDER — MAGNESIUM SULFATE HEPTAHYDRATE 40 MG/ML
2 INJECTION, SOLUTION INTRAVENOUS AS NEEDED
Status: DISCONTINUED | OUTPATIENT
Start: 2019-02-26 | End: 2019-03-05

## 2019-02-26 RX ORDER — PHENYLEPHRINE HCL 10 MG/ML
VIAL (ML) INJECTION AS NEEDED
Status: DISCONTINUED | OUTPATIENT
Start: 2019-02-26 | End: 2019-02-26 | Stop reason: SURG

## 2019-02-26 RX ADMIN — HEPARIN SODIUM 3000 UNITS: 1000 INJECTION, SOLUTION INTRAVENOUS; SUBCUTANEOUS at 15:20:00

## 2019-02-26 RX ADMIN — MIDAZOLAM HYDROCHLORIDE 2 MG: 1 INJECTION INTRAMUSCULAR; INTRAVENOUS at 16:15:00

## 2019-02-26 RX ADMIN — DEXAMETHASONE SODIUM PHOSPHATE 4 MG: 4 MG/ML VIAL (ML) INJECTION at 15:10:00

## 2019-02-26 RX ADMIN — VECURONIUM BROMIDE 2 MG: 1 INJECTION, POWDER, LYOPHILIZED, FOR SOLUTION INTRAVENOUS at 17:10:00

## 2019-02-26 RX ADMIN — PROTAMINE SULFATE 50 MG: 10 INJECTION, SOLUTION INTRAVENOUS at 17:55:00

## 2019-02-26 RX ADMIN — EPHEDRINE SULFATE 5 MG: 50 INJECTION, SOLUTION INTRAVENOUS at 14:10:00

## 2019-02-26 RX ADMIN — MIDAZOLAM HYDROCHLORIDE 2 MG: 1 INJECTION INTRAMUSCULAR; INTRAVENOUS at 13:32:00

## 2019-02-26 RX ADMIN — SODIUM CHLORIDE: 9 INJECTION, SOLUTION INTRAVENOUS at 13:29:00

## 2019-02-26 RX ADMIN — HYDRALAZINE HYDROCHLORIDE 10 MG: 20 INJECTION INTRAMUSCULAR; INTRAVENOUS at 15:02:00

## 2019-02-26 RX ADMIN — VECURONIUM BROMIDE 2 MG: 1 INJECTION, POWDER, LYOPHILIZED, FOR SOLUTION INTRAVENOUS at 16:10:00

## 2019-02-26 RX ADMIN — PHENYLEPHRINE HCL 100 MCG: 10 MG/ML VIAL (ML) INJECTION at 15:55:00

## 2019-02-26 RX ADMIN — ALBUMIN, HUMAN INJ 5%: 5 SOLUTION INTRAVENOUS at 18:00:00

## 2019-02-26 RX ADMIN — Medication 1 MG: at 15:42:00

## 2019-02-26 RX ADMIN — HYDRALAZINE HYDROCHLORIDE 10 MG: 20 INJECTION INTRAMUSCULAR; INTRAVENOUS at 14:55:00

## 2019-02-26 RX ADMIN — PROTAMINE SULFATE 400 MG: 10 INJECTION, SOLUTION INTRAVENOUS at 17:45:00

## 2019-02-26 RX ADMIN — VECURONIUM BROMIDE 2 MG: 1 INJECTION, POWDER, LYOPHILIZED, FOR SOLUTION INTRAVENOUS at 18:15:00

## 2019-02-26 RX ADMIN — PROTAMINE SULFATE 50 MG: 10 INJECTION, SOLUTION INTRAVENOUS at 19:05:00

## 2019-02-26 RX ADMIN — DOBUTAMINE HYDROCHLORIDE 5 MCG/KG/MIN: 100 INJECTION INTRAVENOUS at 17:30:00

## 2019-02-26 RX ADMIN — METOPROLOL TARTRATE 5 MG: 5 INJECTION INTRAVENOUS at 14:51:00

## 2019-02-26 RX ADMIN — CALCIUM CHLORIDE 1 G: 100 INJECTION INTRAVENOUS; INTRAVENTRICULAR at 18:15:00

## 2019-02-26 RX ADMIN — PROTAMINE SULFATE 50 MG: 10 INJECTION, SOLUTION INTRAVENOUS at 18:05:00

## 2019-02-26 RX ADMIN — VECURONIUM BROMIDE 7 MG: 1 INJECTION, POWDER, LYOPHILIZED, FOR SOLUTION INTRAVENOUS at 13:32:00

## 2019-02-26 RX ADMIN — VECURONIUM BROMIDE 3 MG: 1 INJECTION, POWDER, LYOPHILIZED, FOR SOLUTION INTRAVENOUS at 15:05:00

## 2019-02-26 RX ADMIN — MIDAZOLAM HYDROCHLORIDE 2 MG: 1 INJECTION INTRAMUSCULAR; INTRAVENOUS at 18:55:00

## 2019-02-26 RX ADMIN — PHENYLEPHRINE HCL 100 MCG: 10 MG/ML VIAL (ML) INJECTION at 14:10:00

## 2019-02-26 RX ADMIN — HEPARIN SODIUM 30000 UNITS: 1000 INJECTION, SOLUTION INTRAVENOUS; SUBCUTANEOUS at 15:50:00

## 2019-02-26 RX ADMIN — CEFAZOLIN SODIUM/WATER 2 G: 2 G/20 ML SYRINGE (ML) INTRAVENOUS at 14:00:00

## 2019-02-26 RX ADMIN — LIDOCAINE HYDROCHLORIDE 4 ML: 40 SOLUTION TOPICAL at 13:32:00

## 2019-02-26 RX ADMIN — ONDANSETRON 4 MG: 2 INJECTION INTRAMUSCULAR; INTRAVENOUS at 15:11:00

## 2019-02-26 NOTE — ANESTHESIA PROCEDURE NOTES
Central Line  Performed by: Honey Harmon MD  Authorized by: Honey Harmon MD     Procedure Start:  2/26/2019 1:45 PM  Procedure End:  2/26/2019 1:59 PM  Site Identification: real time ultrasound guided and surface landmarks    Patient Location:  OR  [place]

## 2019-02-26 NOTE — ANESTHESIA PROCEDURE NOTES
Arterial Line  Performed by: Socorro Dickerson MD  Authorized by: Socorro Dickerson MD     Procedure Start: 2/26/2019 1:35 PM  Procedure End: 2/26/2019 1:40 PM  Site Identification: surface landmarks    Patient Location:  OR  Indication: continuous blood press

## 2019-02-26 NOTE — PROGRESS NOTES
Misc. Note    Was informed by OR nurse that patient has warm auto-antibodies per Blood Bank. Discussed with Blood Bank Sera Alvarez and Dr. Armand pierre in Blood Bank. Per Kevin Betancur, patient reacted to all cells tested with her own plasma.  Pt. Scheduled fo

## 2019-02-26 NOTE — CM/SW NOTE
Met with patient, son Scooby Keys, sister Cathalene Essex aunt Luis Lowery for assessment. Patient lives alone in an apartment, elevator building. Patient states she is independent with ADLs/IADLs & drives. She denies h/o SNF or HH. Discussed Advanced Directives.  Pa

## 2019-02-26 NOTE — PLAN OF CARE
Problem: Patient/Family Goals  Goal: Patient/Family Long Term Goal  Patient's Long Term Goal: To not have anymore breathing problems    Interventions:  - Administer medications as prescribed  - See additional Care Plan goals for specific interventions    O Manage/alleviate anxiety  - Monitor for signs/symptoms of CO2 retention   Outcome: Progressing

## 2019-02-26 NOTE — PROGRESS NOTES
[facility]OSP - [facility]    Cardiology Progress Note    Cody Julian Patient Status:  Inpatient    1945 MRN [id number]   [facility] 3W/SW Attending Matthew Lizarraga MD   Hosp Day # 9 PCP Cee Motta MD     [phone] Instructions   • nitroGLYCERIN  0.5 inch Topical TID   • Fluticasone Furoate-Vilanterol  1 puff Inhalation Daily   • ezetimibe  10 mg Oral Nightly   • Montelukast Sodium  10 mg Oral Nightly   • probenecid  250 mg Oral Daily       Exam  Gen: No acute distre

## 2019-02-26 NOTE — PLAN OF CARE
Problem: Patient/Family Goals  Goal: Patient/Family Long Term Goal  Patient's Long Term Goal: To not have anymore breathing problems    Interventions:  - Administer medications as prescribed  - See additional Care Plan goals for specific interventions    O retention   Outcome: Progressing  Pt on RA, denies SOB, resp easy and regular.  CPM    Problem: GASTROINTESTINAL - ADULT  Goal: Minimal or absence of nausea and vomiting  INTERVENTIONS:  - Maintain adequate hydration with IV or PO as ordered and tolerated fluid and nutrition restrictions as appropriate   Outcome: Progressing      Problem: CARDIOVASCULAR - ADULT  Goal: Maintains optimal cardiac output and hemodynamic stability  INTERVENTIONS:  - Monitor vital signs, rhythm, and trends  - Monitor for bleeding

## 2019-02-26 NOTE — PROGRESS NOTES
[facility]    Progress Note    Fausto Hutchinson Patient Status:  Inpatient    1945 MRN [id number]   [facility] 3W/SW Attending Rigoberto Johnson MD   Hosp Day # 9 PCP Harriet Sánchez MD       Subjective:   Thong Harden metoprolol Tartrate  5 mg Intravenous See Admin Instructions    Or   • dilTIAZem HCl  10 mg Intravenous See Admin Instructions    Or   • metoprolol Tartrate  5 mg Intravenous See Admin Instructions    Or   • dilTIAZem HCl  10 mg Intravenous See Admin Instr Pa + Lat Chest (cpt=71046)    Result Date: 2/25/2019  CONCLUSION:  1.  No acute disease in the chest.    Dictated by (CST): Claudeen Moss, MD on 2/25/2019 at 15:49     Approved by (CST): Claudeen Moss, MD on 2/25/2019 at 15:52              Assessment and Pl

## 2019-02-26 NOTE — PLAN OF CARE
Problem: RESPIRATORY - ADULT  Goal: Achieves optimal ventilation and oxygenation  INTERVENTIONS:  - Assess for changes in respiratory status  - Assess for changes in mentation and behavior  - Position to facilitate oxygenation and minimize respiratory effo percentage of each meal consumed  - Identify factors contributing to decreased intake, treat as appropriate  - Assist with meals as needed  - Monitor I&O, WT and lab values  - Obtain nutritional consult as needed  - Optimize oral hygiene and moisture  - En electrolytes and administer replacement therapy as ordered  Outcome: Progressing  Monitor shows NSR.

## 2019-02-26 NOTE — ANESTHESIA PROCEDURE NOTES
Procedure Performed: LIZA      Start Time:  2/26/2019 2:00 PM       End Time:      Preanesthesia Checklist:  Patient identified, IV assessed, risks and benefits discussed, monitors and equipment assessed, procedure being performed at surgeon's request and a

## 2019-02-27 ENCOUNTER — APPOINTMENT (OUTPATIENT)
Dept: GENERAL RADIOLOGY | Facility: HOSPITAL | Age: 74
DRG: 234 | End: 2019-02-27
Attending: CLINICAL NURSE SPECIALIST
Payer: MEDICARE

## 2019-02-27 PROCEDURE — 99232 SBSQ HOSP IP/OBS MODERATE 35: CPT | Performed by: INTERNAL MEDICINE

## 2019-02-27 PROCEDURE — 71045 X-RAY EXAM CHEST 1 VIEW: CPT | Performed by: CLINICAL NURSE SPECIALIST

## 2019-02-27 RX ORDER — ALBUMIN, HUMAN INJ 5% 5 %
250 SOLUTION INTRAVENOUS ONCE
Status: COMPLETED | OUTPATIENT
Start: 2019-02-27 | End: 2019-02-27

## 2019-02-27 RX ORDER — ALBUMIN, HUMAN INJ 5% 5 %
SOLUTION INTRAVENOUS
Status: COMPLETED
Start: 2019-02-27 | End: 2019-02-27

## 2019-02-27 RX ORDER — METOCLOPRAMIDE HYDROCHLORIDE 5 MG/ML
5 INJECTION INTRAMUSCULAR; INTRAVENOUS EVERY 6 HOURS
Status: DISCONTINUED | OUTPATIENT
Start: 2019-02-27 | End: 2019-03-01

## 2019-02-27 RX ORDER — MELATONIN
325
Status: DISCONTINUED | OUTPATIENT
Start: 2019-02-27 | End: 2019-03-05

## 2019-02-27 RX ORDER — CEFAZOLIN SODIUM/WATER 2 G/20 ML
2 SYRINGE (ML) INTRAVENOUS EVERY 8 HOURS
Status: COMPLETED | OUTPATIENT
Start: 2019-02-27 | End: 2019-02-28

## 2019-02-27 NOTE — CONSULTS
Pt seen and examined. Full consult dictated. Coronary artery disease been set up for coronary artery bypass grafting. She does not have any anemia at this time however has multiple autoantibodies on preoperative testing.   Unclear if able to obtain cross

## 2019-02-27 NOTE — PROGRESS NOTES
[facility] AND [facility]  [facility] Cardiology Progress Note    Jonatan Fuentes Patient Status:  Inpatient    1945 MRN [id number]   [facility] 2W/SW Attending Micaela Hamm MD   [facility] Day # 10 PCP Eron Sheldon MD     74 year ol Problem Relation Age of Onset   • Asthma Father    • Hypertension Father    • Heart Disorder Father    • Other (Other) Mother         thyroid surgery   • Asthma Sister    • Asthma Brother    • Other (Other) Brother         gout   • Breast Cancer Self 44

## 2019-02-27 NOTE — PROGRESS NOTES
[facility]    Hematology/Oncology   Progress Note    Fredy Micah Patient Status:  Inpatient    1945 MRN [id number]   [facility] 2W/SW Attending Elias Davis MD   [facility] Day # 10 PCP Chrystal Davey, [facility] Date: 2/26/2019  CONCLUSION: Post CABG. Satisfactory position of lines and tubes. Small left basal pleural effusion. Nodular opacity in the peripheral aspect of the left mid lung zone which most likely represents atelectasis.    Dictated by (CST): Dom

## 2019-02-27 NOTE — ANESTHESIA POSTPROCEDURE EVALUATION
Patient: Ifeanyi Mccloud    Procedure Summary     Date:  02/26/19 Room / Location:  [facility] OR 18 / [facility] OR    Anesthesia Start:  3435 Anesthesia Stop:  1940    Procedure:  HEART CORONARY ARTERY BYPASS GRAFT (N/A ) Diagnosis:       Athscl heart di

## 2019-02-27 NOTE — CARDIAC REHAB
Cardiac Rehab Phase I    Activity:   Chair: Yes   Ambulation: To chair   Assistive Device: No   Distance: 10 feet   Assistance needed: Moderate   Patient tolerated activity: Fair   Shower Date:  Tolerated Shower Activity .     Education:  Handouts provided

## 2019-02-27 NOTE — PLAN OF CARE
PAIN - ADULT    • Verbalizes/displays adequate comfort level or patient's stated pain goal Progressing    Instructed on pain management, Pharmacist called earlier to check on patients ability to tolerate Morphine since Norco allergy noted on chart.  Upon ex

## 2019-02-27 NOTE — PROGRESS NOTES
[facility]     Progress Note        Recardo Salaadea Patient Status:  Inpatient    1945 MRN [id number]   [facility] 2W/SW Attending Richmond Walton MD   Hosp Day # 10 PCP Peggy Herbert MD       Subjective: mcg/kg/min (Dosing Weight) Intravenous Continuous PRN   metoprolol Tartrate (LOPRESSOR) tab 25 mg 25 mg Oral 2x Daily(Beta Blocker)   milrinone (PRIMACOR) 20mg in D5W 100 mL premix infusion 0.375 mcg/kg/min Intravenous PRN   docusate sodium (COLACE) cap 10 0.9% 100 mL infusion 1-28 Units/hr Intravenous Continuous   amiodarone HCl (PACERONE) tab 200 mg 200 mg Oral TID CC   Amiodarone HCl (CORDARONE) 450 mg in dextrose 5 % 250 mL infusion 0.5 mg/min Intravenous Continuous   fentaNYL citrate (SUBLIMAZE) 0.05 MG chloride 10 mL/hr at 02/27/19 0700   • DOBUTamine Stopped (02/27/19 0905)   • Nitroglycerin in D5W     • norepinephrine Stopped (02/27/19 0811)   • nitroprusside (NIPRIDE) 50 mg in D5W infusion     • Dextrose in Lactated Ringers 10 mL/hr at 02/27/19 0700 left mid lung zone which most likely represents atelectasis.    Dictated by (CST): Carlito Cifuentes MD on 2/26/2019 at 20:25     Approved by (CST): Carlito Cifuentes MD on 2/26/2019 at 20:27            Ekg 12-lead    Result Date: 2/27/2019  ECG Report  Interpretatio

## 2019-02-27 NOTE — OPERATIVE REPORT
[facility] OPERATING ROOM  Operative Note     Leighton Coad Location: OR   [facility] [id number] MRN [id number]   Admission Date 2/17/2019 Operation Date 2/26/2019   Attending Physician Amarjit Tamez MD Operating Physician Carey Lange MD clipped and tied and prepared for use. The vein size was good and quality was good. Median sternotomy was performed and the left internal mammary was taken down from the chest wall.   It was of very small size and thickened and inflammatory in quality wit ventricular pacing wire was placed. Substernal chest tubes were placed with the tip of the right tube in the right pleural space and a right angle tube in the left pleural space. The pleuropericardial tissues were approximated over the heart and aorta.

## 2019-02-27 NOTE — PLAN OF CARE
CARDIOVASCULAR - ADULT    • Maintains optimal cardiac output and hemodynamic stability Progressing    • Absence of cardiac arrhythmias or at baseline Progressing    Received from OR around 1930, arouseable and following commands around 2200.  ELISA, mitali eq

## 2019-02-27 NOTE — CONSULTS
[facility]    PATIENT'S NAME: Christofer RUSSELL [address] 87 PHYSICIAN: Mima Barragan MD   CONSULTING PHYSICIAN: Mayur Ortiz.  MD Katya   PATIENT ACCOUNT#:   [id number]    LOCATION:  [address] [place] [address] #:   [id number]       DATE OF BIRTH: ECOG performance status 0. Temperature is 36.2, pulse 82, respiratory rate 19, blood pressure 148/63, pulse ox 98%. Weight 75.7 kg. HEENT:  Moist mucous membranes. Oropharynx clear. NECK:  Supple. HEART:  Good distal pulses. Regular rate and rhythm.

## 2019-02-27 NOTE — PROGRESS NOTES
[facility]    Progress Note    Jose Eduardo Pae Patient Status:  Inpatient    1945 MRN [id number]   [facility] 2W/SW Attending Rick Quinn MD   Hosp Day # 10 PCP Radha Caceres MD        Subjective:   T aspirin  81 mg Oral Daily   • ceFAZolin  2 g Intravenous Q12H   • Senna-Docusate Sodium  2 tablet Oral BID   • Amiodarone HCl  200 mg Oral TID CC   • [MAR Hold] metoprolol tartrate  25 mg Oral Daily   • [MAR Hold] cholecalciferol  2,000 Units Oral Daily 02/27/2019     (H) 02/27/2019    CO2 28.0 02/27/2019     (H) 02/27/2019    CA 8.6 02/27/2019    ALB 3.3 (L) 02/22/2019    ALKPHO 103 02/22/2019    BILT 0.4 02/22/2019    TP 7.5 02/22/2019    AST 29 02/22/2019    ALT 29 02/22/2019    PTT 41.2 ( diabetic  Progressive ambulation as tolerated, will have PT/OT see and evaluate  Expected post op volume overload - adequate diuresis at this time, will continue to monitor need for possible lasix  Discharge planning - Patient lives at home with supportive

## 2019-02-27 NOTE — CONSULTS
[facility]    Report of Consultation    Jennifer Hess Patient Status:  Inpatient    1945 MRN [id number]   [facility] 2W/SW Attending Carli Contreras MD   [facility] Day # 9 PCP Yfn Martinez MD     Date of Ad Facility-Administered Medications:  Normal Saline Flush 0.9 % injection 10 mL 10 mL Intravenous PRN   0.9%  NaCl infusion  Intravenous Continuous   temazepam (RESTORIL) cap 15 mg 15 mg Oral Nightly PRN   Albumin Human (ALBUMINAR) 5 % solution 250 mL 250 mL solution 15 mL 15 mL Mouth/Throat BID   dextrose 5 % /lactated ringers infusion  Intravenous Continuous   [START ON 2/27/2019] Enoxaparin Sodium (LOVENOX) 40 MG/0.4ML injection 40 mg 40 mg Subcutaneous Daily   morphINE sulfate (PF) 2 MG/ML injection 2 mg 2 Intravenous Continuous   Insulin Regular Human (NOVOLIN R) 100 Units in sodium chloride 0.9% 100 mL infusion 1-40 Units/hr Intravenous Continuous   [MAR Hold] metoprolol Tartrate (LOPRESSOR) tab 25 mg 25 mg Oral Daily   sodium bicarbonate 150 mEq in dextro Activated Inhale 1 puff into the lungs 2 (two) times daily. Multiple Vitamin (MULTI-VITAMIN DAILY) Oral Tab Take 1 tablet by mouth daily. Cholecalciferol (VITAMIN D) 2000 units Oral Cap Take 1 capsule by mouth daily.    Albuterol Sulfate  (90 Bas 02/26/2019    ALB 3.3 (L) 02/22/2019    ALKPHO 103 02/22/2019    TP 7.5 02/22/2019    AST 29 02/22/2019    ALT 29 02/22/2019    PTT 41.2 (H) 02/26/2019    INR 2.1 (H) 02/26/2019    PTP 22.9 (H) 02/26/2019    TSH 0.783 02/18/2019    DDIMER 0.52 02/17/2019

## 2019-02-28 ENCOUNTER — APPOINTMENT (OUTPATIENT)
Dept: PICC SERVICES | Facility: HOSPITAL | Age: 74
DRG: 234 | End: 2019-02-28
Attending: NURSE PRACTITIONER
Payer: MEDICARE

## 2019-02-28 PROCEDURE — 02H633Z INSERTION OF INFUSION DEVICE INTO RIGHT ATRIUM, PERCUTANEOUS APPROACH: ICD-10-PCS | Performed by: HOSPITALIST

## 2019-02-28 PROCEDURE — 99232 SBSQ HOSP IP/OBS MODERATE 35: CPT | Performed by: INTERNAL MEDICINE

## 2019-02-28 PROCEDURE — 99233 SBSQ HOSP IP/OBS HIGH 50: CPT | Performed by: HOSPITALIST

## 2019-02-28 RX ORDER — LIDOCAINE HYDROCHLORIDE 10 MG/ML
0.5 INJECTION, SOLUTION INFILTRATION; PERINEURAL ONCE AS NEEDED
Status: ACTIVE | OUTPATIENT
Start: 2019-02-28 | End: 2019-02-28

## 2019-02-28 RX ORDER — ACETAMINOPHEN 325 MG/1
650 TABLET ORAL EVERY 6 HOURS PRN
Status: DISCONTINUED | OUTPATIENT
Start: 2019-02-28 | End: 2019-03-05

## 2019-02-28 RX ORDER — EZETIMIBE 10 MG/1
10 TABLET ORAL NIGHTLY
Status: DISCONTINUED | OUTPATIENT
Start: 2019-02-28 | End: 2019-03-05

## 2019-02-28 RX ORDER — IPRATROPIUM BROMIDE AND ALBUTEROL SULFATE 2.5; .5 MG/3ML; MG/3ML
3 SOLUTION RESPIRATORY (INHALATION)
Status: DISCONTINUED | OUTPATIENT
Start: 2019-02-28 | End: 2019-03-01

## 2019-02-28 RX ORDER — MONTELUKAST SODIUM 10 MG/1
10 TABLET ORAL NIGHTLY
Status: DISCONTINUED | OUTPATIENT
Start: 2019-02-28 | End: 2019-03-05

## 2019-02-28 RX ORDER — FLUTICASONE PROPIONATE AND SALMETEROL 250; 50 UG/1; UG/1
1 POWDER RESPIRATORY (INHALATION) 2 TIMES DAILY
Status: DISCONTINUED | OUTPATIENT
Start: 2019-03-01 | End: 2019-03-05

## 2019-02-28 RX ORDER — LIDOCAINE HYDROCHLORIDE 10 MG/ML
INJECTION, SOLUTION EPIDURAL; INFILTRATION; INTRACAUDAL; PERINEURAL
Status: DISPENSED
Start: 2019-02-28 | End: 2019-03-01

## 2019-02-28 RX ORDER — SODIUM CHLORIDE 0.9 % (FLUSH) 0.9 %
10 SYRINGE (ML) INJECTION AS NEEDED
Status: DISCONTINUED | OUTPATIENT
Start: 2019-02-28 | End: 2019-03-05

## 2019-02-28 NOTE — PHYSICAL THERAPY NOTE
PHYSICAL THERAPY EVALUATION - INPATIENT     Room Number: 230/230-A  Evaluation Date: 2/28/2019  Type of Evaluation: Initial   Physician Order: PT Eval and Treat    Presenting Problem: (CABG)  Reason for Therapy: Mobility Dysfunction and Discharge Planning [facility])    • Carotid stenosis    • Chronic tophaceous gout    • Diastolic dysfunction without heart failure    • Esophageal reflux    • Essential hypertension    • Gout    • Hyperlipidemia    • Obesity        Past Surgical History  Past Surgical Histo room?: None   -   Climbing 3-5 steps with a railing?: A Little     AM-PAC Score:  Raw Score: 22   Approx Degree of Impairment: 20.91%   Standardized Score (AM-PAC Scale): 53.28   CMS Modifier (G-Code): AUGUSTO    FUNCTIONAL ABILITY STATUS  Gait Assessment   Yecenia Iqbal

## 2019-02-28 NOTE — PROGRESS NOTES
[facility]     Progress Note        Delroy Favorite Patient Status:  Inpatient    1945 MRN [id number]   [facility] 2W/SW Attending Kelsi Duggan MD   Hosp Day # 11 PCP Melissa Shoemaker MD       Subjective: (MIRALAX) powder packet 17 g 17 g Oral Daily PRN   magnesium hydroxide (MILK OF MAGNESIA) 400 MG/5ML suspension 30 mL 30 mL Oral Daily PRN   bisacodyl (DULCOLAX) rectal suppository 10 mg 10 mg Rectal Daily PRN   FLEET ENEMA (FLEET) 7-19 GM/118ML enema 133 PRN   sodium bicarbonate injection 50 mEq 50 mEq Intravenous PRN   ipratropium-albuterol (DUONEB) nebulizer solution 3 mL 3 mL Nebulization Q6H PRN   sodium bicarbonate 150 mEq in dextrose 5 % 1,000 mL infusion 0.5 mL/kg/hr Intravenous Continuous   [MAR Ho on 2/27/2019 at 7:16          Xr Chest Ap Portable  (cpt=71045)    Result Date: 2/26/2019  CONCLUSION: Post CABG. Satisfactory position of lines and tubes. Small left basal pleural effusion.   Nodular opacity in the peripheral aspect of the left mid lung disease on PFTs.   Continue nebulizer treatment  -DVT prophylaxis: SCDs        Liza Iyer, DO  Pulmonary [address] [place] [address]

## 2019-02-28 NOTE — CM/SW NOTE
Care Coordination Note    Progression of Care: Hospital Day # 11  Saw pt at bedside to assess her progress and to explain the BPCI/Medicare program. Patient agreed with phone f/u for 3 months from [address] [place] [address] after discharge from [facility].  Patient was enrol

## 2019-02-28 NOTE — PLAN OF CARE
CARDIOVASCULAR - ADULT    • Maintains optimal cardiac output and hemodynamic stability Progressing    • Absence of cardiac arrhythmias or at baseline Progressing    BP is normotensive, monitor NSR, IV Amiodarone completed, oral medication will start in a. m

## 2019-02-28 NOTE — PLAN OF CARE
CARDIOVASCULAR - ADULT    • Maintains optimal cardiac output and hemodynamic stability Progressing    • Absence of cardiac arrhythmias or at baseline Progressing    Transitioned off gtts and swan/a-line removed.  BP and HR WNL; NSR Remains on Amio gtt per p

## 2019-02-28 NOTE — PROGRESS NOTES
[facility] HOSP - [facility]    Progress Note    Ifeanyi Mccloud Patient Status:  Inpatient    1945 MRN [id number]   [facility] 2W/SW Attending Nhi Lindsay MD   Hosp Day # 11 PCP Crissy Abreu MD       Subjective:   Edison Umanzor • [MAR Hold] cholecalciferol  2,000 Units Oral Daily   • [MAR Hold] probenecid  250 mg Oral Daily           Results:     Lab Results   Component Value Date    WBC 17.4 (H) 02/27/2019    HGB 10.2 (L) 02/27/2019    HCT 32.4 (L) 02/27/2019    .0 (L) Interpretation  -------------------------- Sinus Tachycardia -Short LA syndrome Angela = 108 right bundle branch block.  -Nonspecific ST depression + Nonspecific T-abnormality -Nondiagnostic.  ABNORMAL When compared with ECG of 02/17/2019 17:17:46 No significa

## 2019-02-28 NOTE — PROGRESS NOTES
[facility]    Progress Note    Lacey Eric Patient Status:  Inpatient    1945 MRN [id number]   [facility] 2W/SW Attending Katherine Ang MD   Hosp Day # 11 PCP Oj Hernandez MD       Subjective:   Lakesha Warren (02/27/19 3601)   • nitroprusside (NIPRIDE) 50 mg in D5W infusion     • Dextrose in Lactated Ringers 10 mL/hr at 02/27/19 0700   • insulin regular 1.5 Units/hr (02/28/19 0600)   • amiodarone 0.5 mg/min (02/27/19 2200)   • sodium bicarbonate 150 mEq/ D5W 85 she looked down then looked up was seeing purple flowers in both eyes; denied double vision; was able to see peripherally both eyes; tongue midline; smile symmetrical hand grasp equal and strong lower legs strong and equal strength.   CV surgeon saw patient 02/26/2019 19:40:27 right bundle is no longer present Electronically signed on 02/27/2019 at 11:20 by Kaley Romero MD    Ekg 12-lead    Result Date: 2/26/2019  ECG Report  Interpretation  -------------------------- Sinus Tachycardia -Short LA syndrome P

## 2019-02-28 NOTE — PROGRESS NOTES
Vascular Access Note  Inserted by Peyman Aguiar RN  Vascular Access Screening:   Allergies to Lidocaine: no  Allergies to Latex: no per pt but listed in pt allergy  Presence of Pacemaker/Defibrillator: No  Mastectomy with Lymph Node Dissection: yes, left side  A

## 2019-02-28 NOTE — PLAN OF CARE
CARDIOVASCULAR - ADULT    • Maintains optimal cardiac output and hemodynamic stability Progressing    • Absence of cardiac arrhythmias or at baseline Progressing        GASTROINTESTINAL - ADULT    • Maintains adequate nutritional intake (undernourished) Pr

## 2019-02-28 NOTE — DIABETES ED
[facility]   Diabetes Education Note      Neva Allen Patient Status Inpatient   1945  MRN [id number]  Location  [facility] 2W/SW  Attending Srikanth Pittman [facility] Days # 11  PCP  MD Wai Laboy

## 2019-02-28 NOTE — PROGRESS NOTES
[facility] AND [facility]  MHS/AMG Cardiology Progress Note    Mónica Good Patient Status:  Inpatient    1945 MRN [id number]   [facility] 2W/SW Attending Peter Fraser MD   [address] Day # 6 PCP Micah Nunes MD     74 year ol 2014     Family History   Problem Relation Age of Onset   • Asthma Father    • Hypertension Father    • Heart Disorder Father    • Other (Other) Mother         thyroid surgery   • Asthma Sister    • Asthma Brother    • Other (Other) Brother         gout

## 2019-02-28 NOTE — PAYOR COMM NOTE
--------------  CONTINUED STAY REVIEW    [place] [facility]  Subscriber #:  [id number]  Authorization Number: [id number]    Admit date: 2/17/19  Admit time: 2030    Admitting Physician: Kelsi Duggan MD  Attending Physician:  MD Tolu Hurtado (PLAVIX) tab 75 mg     Date Action Dose Route User    2/28/2019 0849 Given 75 mg Oral David Flores RN      docusate sodium (COLACE) cap 100 mg     Date Action Dose Route User    2/28/2019 0809 Given 100 mg Oral David Flores RN    2/27/2019 2110 Given 100 2/27/2019 1730 Restarted 1.5 Units/hr Intravenous Alivia Glover RN    2/27/2019 1630 Restarted 2 Units/hr Intravenous Alivia Glover RN    2/27/2019 1500 Rate/Dose Change 4 Units/hr Intravenous Alivia Glover RN      ipratropium-albuterol ( Dose Route User    2/28/2019 0809 Given 500 mg Oral Bennie Iqbal RN    2/27/2019 2110 Given 500 mg Oral Gretchen Contreras RN    2/27/2019 1610 Given 500 mg Oral Severo Boon, RN        2/27 CV SURGERY NOTE  Hosp Day # 6 PCP Tod Scherer MD 9849)   • Nitroglycerin in D5W     • norepinephrine Stopped (02/27/19 0811)   • nitroprusside (NIPRIDE) 50 mg in D5W infusion     • Dextrose in Lactated Ringers 10 mL/hr at 02/27/19 0700   • insulin regular 1.5 Units/hr (02/28/19 0600)   • amiodarone 0.5 m directed     Results:            Lab Results   Component Value Date     WBC 17.4 (H) 02/27/2019     HGB 10.2 (L) 02/27/2019     HCT 32.4 (L) 02/27/2019     .0 (L) 02/27/2019     CREATSERUM 1.06 (H) 02/27/2019     BUN 20 (H) 02/27/2019      02/ agreed with phone f/u for 3 months from [address] [place] [address] after discharge from [facility]. Patient was enrolled under  . BPCI/Medicare Letter and Brochure provided.  Pt is sitting up in a chair, on room air, c/o feeling constipated, but otherwise ok, was give

## 2019-02-28 NOTE — PROGRESS NOTES
POD 2.  Doing well. Sitting up in chair. No intraop recall. No other anesthesia followup needed at this time.

## 2019-02-28 NOTE — PROGRESS NOTES
Assumed care of patient at this time. Report received from Nixon Paul [employer]. Family at bedside, supportive. Patient denies pain, vital signs stable. Medications, labs, and plan of care reviewed.

## 2019-02-28 NOTE — CARDIAC REHAB
Cardiac Rehab Phase I    Activity:   Chair: Resting comfortably up in chair   Ambulation: walked earlier this am with PT        Education:  Handouts provided and reviewed: Heart Surgery Binder. Patient instructed on: Cough and Deep Breathe.  Teaching init

## 2019-03-01 PROCEDURE — 99232 SBSQ HOSP IP/OBS MODERATE 35: CPT | Performed by: INTERNAL MEDICINE

## 2019-03-01 PROCEDURE — 99233 SBSQ HOSP IP/OBS HIGH 50: CPT | Performed by: HOSPITALIST

## 2019-03-01 RX ORDER — PSEUDOEPHEDRINE HCL 30 MG
100 TABLET ORAL 2 TIMES DAILY
Refills: 0 | Status: SHIPPED | COMMUNITY
Start: 2019-03-01 | End: 2019-03-02

## 2019-03-01 RX ORDER — MELATONIN
325
Refills: 0 | Status: SHIPPED | COMMUNITY
Start: 2019-03-02 | End: 2019-03-02

## 2019-03-01 RX ORDER — SENNA AND DOCUSATE SODIUM 50; 8.6 MG/1; MG/1
2 TABLET, FILM COATED ORAL 2 TIMES DAILY
Refills: 0 | Status: SHIPPED | COMMUNITY
Start: 2019-03-01 | End: 2019-06-25

## 2019-03-01 RX ORDER — POLYETHYLENE GLYCOL 3350 17 G/17G
17 POWDER, FOR SOLUTION ORAL DAILY PRN
Refills: 0 | Status: SHIPPED | COMMUNITY
Start: 2019-03-01 | End: 2019-06-25

## 2019-03-01 RX ORDER — FUROSEMIDE 10 MG/ML
20 INJECTION INTRAMUSCULAR; INTRAVENOUS ONCE
Status: COMPLETED | OUTPATIENT
Start: 2019-03-01 | End: 2019-03-01

## 2019-03-01 RX ORDER — ASCORBIC ACID 500 MG
500 TABLET ORAL 3 TIMES DAILY
Refills: 0 | Status: SHIPPED | COMMUNITY
Start: 2019-03-01 | End: 2019-03-02

## 2019-03-01 RX ORDER — IPRATROPIUM BROMIDE AND ALBUTEROL SULFATE 2.5; .5 MG/3ML; MG/3ML
3 SOLUTION RESPIRATORY (INHALATION)
Status: DISCONTINUED | OUTPATIENT
Start: 2019-03-01 | End: 2019-03-03

## 2019-03-01 RX ORDER — POTASSIUM CHLORIDE 20 MEQ/1
20 TABLET, EXTENDED RELEASE ORAL ONCE
Status: COMPLETED | OUTPATIENT
Start: 2019-03-01 | End: 2019-03-01

## 2019-03-01 NOTE — PROGRESS NOTES
[facility]     Progress Note        Yoan Ramos Patient Status:  Inpatient    1945 MRN [id number]   [facility] 2W/SW Attending Akbar Jacobo MD   Hosp Day # 12 PCP Mike Gonzalez MD       Subjective: docusate sodium (COLACE) cap 100 mg 100 mg Oral BID   PEG 3350 (MIRALAX) powder packet 17 g 17 g Oral Daily PRN   magnesium hydroxide (MILK OF MAGNESIA) 400 MG/5ML suspension 30 mL 30 mL Oral Daily PRN   bisacodyl (DULCOLAX) rectal suppository 10 mg 10 m Midazolam HCl (VERSED) 2 MG/2ML injection 2 mg 2 mg Intravenous Q5 Min PRN   sodium bicarbonate injection 50 mEq 50 mEq Intravenous PRN   ipratropium-albuterol (DUONEB) nebulizer solution 3 mL 3 mL Nebulization Q6H PRN   sodium bicarbonate 150 mEq in dex asthma/COPD  4. Carotid stenosis  5. Gout  6. Leukocytosis  7. Thrombocytopenia            Plan   -Patient status post CABG x3 on 2/26/2019.   Underwent cardiac cath during current hospitalization with evidence of significant coronary artery disease see

## 2019-03-01 NOTE — PROGRESS NOTES
[facility] AND [facility]  MHS/AMG Cardiology Progress Note    Mi Carr Patient Status:  Inpatient    1945 MRN [id number]   [facility] 2W/SW Attending Tana Vickers MD   Hosp Day # 15 PCP Courtney Willson MD     74 year ol RIGHT  2014     Family History   Problem Relation Age of Onset   • Asthma Father    • Hypertension Father    • Heart Disorder Father    • Other (Other) Mother         thyroid surgery   • Asthma Sister    • Asthma Brother    • Other (Other) Brother

## 2019-03-01 NOTE — CARDIAC REHAB
Cardiac Rehab Phase I    Activity:   Chair: Resting comfortably up in chair. PT will be walking patient soon. Education:  Handouts provided and reviewed: Heart Surgery Binder. Patient instructed on: DORA / Anita Adhikari.  Needs to work more on IS [place]

## 2019-03-01 NOTE — TELEPHONE ENCOUNTER
F/u with me 2-4 weeks after  discharge from the hospital
Noted
OK noted Pt is currently an inpatient in the hospital. Will call after discharge to give instructions and help her book appt. Postponed encounter until 2/22/19. Check inpatient status at that time and if discharged call to book pt.
Patient is still an inpatient. Postponed until next week. Will check status at that time.
Pt is overdue for lung VQ scan ordered by Dr. Ally Frazier at [address] [place] [address] in December 2018. Originally ordered to rule out PE due to worsening dyspnea over the previous months. Called the patient to see how she is doing with symptoms.  LMTCB
Pt states she is still in the hospital and does not know when she will be discharged. Pt informed she needs to f/u 2-4 weeks after discharge and to call this office back to schedule appt once she is discharged. Pt verbalized understanding.   Pt states she
Tried calling the patient again. She answered. However she states she is currently an inpatient in the hospital. Reviewed ED to admission note. Pt is in hosp for acute pulmonary edema and acute respiratory failure.      Dr. Beena Miranda, would you like pt to comp
No pertinent family history in first degree relatives

## 2019-03-01 NOTE — OCCUPATIONAL THERAPY NOTE
OCCUPATIONAL THERAPY TREATMENT NOTE - INPATIENT    Room Number: 144/145-P    Presenting Problem: (CABG x3)     Problem List  Principal Problem:    Acute pulmonary edema (HCC)  Active Problems:    Viral gastroenteritis    Acute respiratory failure with hypo training;Equipment eval/education    SUBJECTIVE  \"Thank you so much for your help\"     OBJECTIVE  Precautions: Sternal          PAIN ASSESSMENT  Rating: (reports pain with movement)   O2 SATURATIONS   Room Air   95%          ACTIVITIES OF DAILY LIVING AS MOT/R  [address] [place] [address]  [id number]

## 2019-03-01 NOTE — PLAN OF CARE
[[POD#2]]    -Ambulating with contact guard/setup assist in room and hallway.     -Cordis, chest tube, and Walsh removed. -PICC line inserted.      -Pain managed with 650mg PO Tylenol, patient refuses narcotics because she states they make her hair fall

## 2019-03-01 NOTE — PLAN OF CARE
Problem: RESPIRATORY - ADULT  Goal: Achieves optimal ventilation and oxygenation  INTERVENTIONS:  - Assess for changes in respiratory status  - Assess for changes in mentation and behavior  - Position to facilitate oxygenation and minimize respiratory effo active bowel sounds. (+)belching (+) passing gas.    Goal: Maintains adequate nutritional intake (undernourished)  INTERVENTIONS:  - Monitor percentage of each meal consumed  - Identify factors contributing to decreased intake, treat as appropriate  - Arben ordered  - Initiate emergency measures for life threatening arrhythmias  - Monitor electrolytes and administer replacement therapy as ordered  Outcome: Progressing  Monitor shows SR    Problem: PAIN - ADULT  Goal: Verbalizes/displays adequate comfort level

## 2019-03-01 NOTE — PHYSICAL THERAPY NOTE
PHYSICAL THERAPY TREATMENT NOTE - INPATIENT     Room Number: 997/983-H       Presenting Problem: (CABG)    Problem List  Principal Problem:    Acute pulmonary edema (HCC)  Active Problems:    Viral gastroenteritis    Acute respiratory failure with hypoxia PAIN ASSESSMENT   Ratin          BALANCE                                                                                                                     Static Sitting: Good  Dynamic Sitting: Good           Static Standing: Fair +  Dynamic Rosa Face assistance level: independent   Goal #3   Current Status 60 feet min assist with RW    Goal #4 Verbalize and demo 6/6 sternal precautions   Goal #4   Current Status Not met   Goal #5    Goal #5   Current Status    Goal #6    Goal #6  Current Status

## 2019-03-01 NOTE — PROGRESS NOTES
[facility]    Progress Note    Dieter Elliott Patient Status:  Inpatient    1945 MRN [id number]   [facility] 2W/SW Attending Jovon Mckeon MD   Hosp Day # 12 PCP Eliot Flores MD       Subjective:   Yadira Rubin 02/27/19 0700   • DOBUTamine Stopped (02/27/19 0905)   • Dextrose in Lactated Ringers 10 mL/hr at 02/27/19 0700   • amiodarone 0.5 mg/min (02/27/19 2200)   • sodium bicarbonate 150 mEq/ D5W 850 mL infusion 0.489 mL/kg/hr (02/27/19 0700)       General appea 103 02/22/2019    BILT 0.4 02/22/2019    TP 7.5 02/22/2019    AST 29 02/22/2019    ALT 29 02/22/2019    PTT 41.2 (H) 02/26/2019    INR 2.1 (H) 02/26/2019    TSH 0.791 02/27/2019    DDIMER 0.52 02/17/2019    MG 2.5 02/27/2019    PHOS 2.8 02/25/2019    TROP

## 2019-03-01 NOTE — PROGRESS NOTES
[facility] HOSP - [facility]    Hematology/Oncology   Progress Note    Jonatan Fuentes Patient Status:  Inpatient    1945 MRN [id number]   [facility] 2W/SW Attending Micaela Hamm MD   [facility] Day # 11 PCP Armand Armendariz of hemolysis at this time, as she was not anemic.  --mild post op anemia, thrombocytopenia  --will monitor. If drop in hgb check hapto,ldh    Please call with questions.      Eran Blanc MD

## 2019-03-01 NOTE — PROGRESS NOTES
[facility]    Progress Note    Fausto Hutchinson Patient Status:  Inpatient    1945 MRN [id number]   [facility] 2W/SW Attending Ronold Castleman, MD   Hosp Day # 12 PCP Harriet Sánchez MD       Subjective:   Williams Gross 2,000 Units Oral Daily   • [MAR Hold] probenecid  250 mg Oral Daily           Results:     Lab Results   Component Value Date    WBC 15.0 (H) 03/01/2019    HGB 7.9 (L) 03/01/2019    HCT 25.8 (L) 03/01/2019    .0 (L) 03/01/2019    CREATSERUM 1.06 (H)

## 2019-03-01 NOTE — DIABETES ED
[facility]   Diabetes Education Note      Radha Richardson Patient Status Inpatient   1945  MRN [id number]  Location  [facility] 2W/SW  Attending Izabela Purdy, [address] [place] [address],[address] Days # 12  PCP  Tod Scherer MD    Inell Fear

## 2019-03-02 ENCOUNTER — APPOINTMENT (OUTPATIENT)
Dept: GENERAL RADIOLOGY | Facility: HOSPITAL | Age: 74
DRG: 234 | End: 2019-03-02
Attending: NURSE PRACTITIONER
Payer: MEDICARE

## 2019-03-02 PROCEDURE — 99233 SBSQ HOSP IP/OBS HIGH 50: CPT | Performed by: INTERNAL MEDICINE

## 2019-03-02 PROCEDURE — 99233 SBSQ HOSP IP/OBS HIGH 50: CPT | Performed by: HOSPITALIST

## 2019-03-02 PROCEDURE — 71046 X-RAY EXAM CHEST 2 VIEWS: CPT | Performed by: NURSE PRACTITIONER

## 2019-03-02 RX ORDER — PSEUDOEPHEDRINE HCL 30 MG
100 TABLET ORAL 2 TIMES DAILY
Qty: 60 CAPSULE | Refills: 0 | Status: SHIPPED | OUTPATIENT
Start: 2019-03-02 | End: 2019-05-02

## 2019-03-02 RX ORDER — CLOPIDOGREL BISULFATE 75 MG/1
75 TABLET ORAL DAILY
Qty: 90 TABLET | Refills: 0 | Status: SHIPPED | OUTPATIENT
Start: 2019-03-03 | End: 2019-08-28

## 2019-03-02 RX ORDER — ASCORBIC ACID 500 MG
500 TABLET ORAL 3 TIMES DAILY
Qty: 90 TABLET | Refills: 0 | Status: SHIPPED | OUTPATIENT
Start: 2019-03-02 | End: 2019-03-25 | Stop reason: ALTCHOICE

## 2019-03-02 RX ORDER — ASPIRIN 81 MG/1
81 TABLET ORAL DAILY
Qty: 90 TABLET | Refills: 0 | Status: SHIPPED | OUTPATIENT
Start: 2019-03-03 | End: 2019-08-28

## 2019-03-02 RX ORDER — TRAMADOL HYDROCHLORIDE 50 MG/1
50 TABLET ORAL EVERY 6 HOURS PRN
Qty: 30 TABLET | Refills: 0 | Status: ON HOLD | OUTPATIENT
Start: 2019-03-02 | End: 2019-04-01

## 2019-03-02 RX ORDER — POTASSIUM CHLORIDE 20 MEQ/1
20 TABLET, EXTENDED RELEASE ORAL ONCE
Status: COMPLETED | OUTPATIENT
Start: 2019-03-02 | End: 2019-03-02

## 2019-03-02 RX ORDER — FAMOTIDINE 20 MG/1
20 TABLET ORAL 2 TIMES DAILY
Qty: 60 TABLET | Refills: 0 | Status: SHIPPED | OUTPATIENT
Start: 2019-03-02 | End: 2019-03-25

## 2019-03-02 RX ORDER — DOXEPIN HYDROCHLORIDE 50 MG/1
1 CAPSULE ORAL DAILY
Qty: 30 TABLET | Refills: 0 | Status: ON HOLD | OUTPATIENT
Start: 2019-03-03 | End: 2019-03-11

## 2019-03-02 RX ORDER — ENOXAPARIN SODIUM 100 MG/ML
40 INJECTION SUBCUTANEOUS DAILY
Qty: 7 SYRINGE | Refills: 0 | Status: SHIPPED | OUTPATIENT
Start: 2019-03-03 | End: 2019-03-04

## 2019-03-02 RX ORDER — MELATONIN
325
Qty: 90 TABLET | Refills: 0 | Status: SHIPPED | OUTPATIENT
Start: 2019-03-02 | End: 2019-04-18

## 2019-03-02 NOTE — PROGRESS NOTES
[facility]    Progress Note    Phil Kwan Patient Status:  Inpatient    1945 MRN [id number]   [facility] 2W/SW Attending Karley Macario MD   Hosp Day # 13 PCP Christie Wood MD       Subjective:   Rigoberto Kee • probenecid  250 mg Oral Daily         Results:     Lab Results   Component Value Date    WBC 13.1 (H) 03/02/2019    HGB 7.8 (L) 03/02/2019    HCT 24.0 (L) 03/02/2019    .0 03/02/2019    CREATSERUM 0.89 03/02/2019    BUN 25 (H) 03/02/2019    NA 1

## 2019-03-02 NOTE — PROGRESS NOTES
[facility]    Progress Note    Renetta Balderrama Patient Status:  Inpatient    1945 MRN [id number]   [facility] 2W/SW Attending Meryle Blood, MD   [facility] Day # 15 PCP Heather Gong MD       Subjective:   Diogo Aquino Sitting up in chair smiling alert and cooperative  Neurologic: Alert and oriented X 3, Neuros intact  Psychiatric: calm  Prevena vac in place C/D/I; Left leg incision dressing C/D/I  Pulmonary:  clear to diminished  Cardiovascular: S1, S2 regular rate and 02/18/2019       Xr Chest Pa + Lat Chest (cpt=71046)    Result Date: 3/2/2019  CONCLUSION:  1. Mild cardiomegaly. Tortuous atherosclerotic aorta.  2. Postoperative parenchymal abnormality and/or basilar atelectatic changes with small bilateral effusions, g

## 2019-03-02 NOTE — CM/SW NOTE
JOHN PAUL spoke with RN who states the pt may be stable for dc to snf today if bed is available. Referral sent yesterday 3/1 to [facility]f. JOHN PAUL spoke with snf liaison who confirmed insurance was NOT obtained prior to the weekend.  Insurance auth request

## 2019-03-02 NOTE — TRANSITION NOTE
For Cardiology Office:    Reason for Consultation:  Pulmonary edema, cardiomegaly per CXR     History of Present Illness:  Tong Young is a 69year old who had nausea and repeated emesis, came to the ED and rec'd IV fluids.   Then, she became hypoxi distally     2. Significant proximal circumflex stenosis and left anterior descending artery near the first diagonal branch takeoff     3.  Calcified mitral annulus     See the Radiologist report for over-read of non-vascular structures.       Right caroti 40 MG/0.4ML Soln  Commonly known as:  LOVENOX  Start taking on:  3/3/2019      Inject 0.4 mL (40 mg total) into the skin daily.    Quantity:  7 Syringe  Refills:  0     famoTIDine 20 MG Tabs  Commonly known as:  PEPCID      Take 1 tablet (20 mg total) by mo fluticasone-salmeterol      Inhale 1 puff into the lungs 2 (two) times daily. Refills:  0     Albuterol Sulfate  (90 Base) MCG/ACT Aers      Inhale 1-2 puffs into the lungs every 6 (six) hours as needed for Wheezing or Shortness of Breath.    Katheren Bio

## 2019-03-02 NOTE — PROGRESS NOTES
[facility]    Hematology/Oncology   Progress Note    Dieter Elliott Patient Status:  Inpatient    1945 MRN [id number]   [facility] 2W/SW Attending Sara Pace MD   [facility] Day # 12 PCP Alberto Martinez 02/26/2019. Her preoperative testing included a positive autoantibody screen. Of note, however, the patient does not have any evidence of hemolysis at this time, as she was not anemic. --post op anemia, thrombocytopenia.   hapto and ldh normal.  No hemol

## 2019-03-02 NOTE — PHYSICAL THERAPY NOTE
PHYSICAL THERAPY TREATMENT NOTE - INPATIENT     Room Number: 243/667-S       Presenting Problem: (CABG)    Problem List  Principal Problem:    Acute pulmonary edema (HCC)  Active Problems:    Viral gastroenteritis    Acute respiratory failure with hypoxia Static Sitting: Good  Dynamic Sitting: Good           Static Standing: Fair +  Dynamic Standing: Fair    ACTIVITY TOLERANCE   Room air  HR 86-93 bpm  RR 22-26   SpO2 n walker - rolling at assistance level: independent   Goal #3   Current Status 10 feet, 10 feet with RW   Goal #4 Verbalize and demo 6/6 sternal precautions   Goal #4   Current Status Not assessed; reviewed today    Goal #5  Pt will negotiate one flight of s

## 2019-03-02 NOTE — PLAN OF CARE
Problem: Patient Centered Care  Goal: Patient preferences are identified and integrated in the patient's plan of care  Interventions:  - What would you like us to know as we care for you? I am Buddhist. I would like a  to visit me.   - Provide timel appropriate  - Advance diet as tolerated, if ordered  - Obtain nutritional consult as needed  - Evaluate fluid balance   Outcome: Progressing  Poor appetite, tolerating cardiac/ada diet. BM overnight. Denies any nausea or vomiting.     Problem: CARDIOVASCU range  INTERVENTIONS:  - Monitor Blood Glucose as ordered  - Assess for signs and symptoms of hyperglycemia and hypoglycemia  - Administer ordered medications to maintain glucose within target range  - Assess barriers to adequate nutritional intake and ini

## 2019-03-02 NOTE — PHYSICAL THERAPY NOTE
Attempted to see pt this AM. Per RN, pt recently had pacer removed and will have to wait for PT at this time. Will f/u with pt later today.

## 2019-03-02 NOTE — PROGRESS NOTES
[facility] - [facility]    Progress Note    Manual Baljeet Patient Status:  Inpatient    1945 MRN [id number]   Location [facility] 2W/SW Attending Bob Alex MD   Hosp Day # 15 PCP Martha Lynn MD        Subjective:     C ALB 3.3 (L) 02/22/2019    ALKPHO 103 02/22/2019    BILT 0.4 02/22/2019    TP 7.5 02/22/2019    AST 29 02/22/2019    ALT 29 02/22/2019    PTT 41.2 (H) 02/26/2019    INR 2.1 (H) 02/26/2019    TSH 0.791 02/27/2019    DDIMER 0.52 02/17/2019    MG 2.5 02/27/201

## 2019-03-02 NOTE — PROGRESS NOTES
[facility]OSP - [facility]    Cardiology Progress Note    Suzi Clark Patient Status:  Inpatient    1945 MRN [id number]   [facility] 2W/SW Attending Adebayo Whitt MD   [facility] Day # 15 PCP Kita Mohamud MD     2019 WBC   --   7.4  20.9*  17.4*  15.0*  13.1*   HGB   --   13.5  11.4*  10.2*  7.9*  7.8*   MCV   --   94.7  92.8  91.8  94.2  91.6   PLT   --   255.0  116.0*  112.0*  134.0*  186.0   INR  1.0   --   2.1*   --    --    --        Recent Labs   Lab  02/25/19 fluticasone-salmeterol (ADVAIR DISKUS) 250-50 MCG/DOSE inhaler 1 puff- [facility]'s own supply  1 puff Inhalation BID   Normal Saline Flush 0.9 % injection 10 mL 10 mL Intravenous PRN   ferrous sulfate EC tab 325 mg 325 mg Oral Daily with breakfast   Normal mg Intravenous Q2H PRN   Clopidogrel Bisulfate (PLAVIX) tab 75 mg 75 mg Oral Daily   aspirin EC tab 81 mg 81 mg Oral Daily   Senna-Docusate Sodium (SENOKOT S) 8.6-50 MG tab 2 tablet 2 tablet Oral BID   traMADol HCl (ULTRAM) tab 50 mg 50 mg Oral Q6H PRN   a History of pituitary adenoma     Chronic tophaceous gout     Elevated glucose     History of radiation therapy     Microcalcifications of the breast     Impacted cerumen, right ear     Asthma with COPD (chronic obstructive pulmonary disease) ([facility] [address]     Al

## 2019-03-03 PROCEDURE — 99233 SBSQ HOSP IP/OBS HIGH 50: CPT | Performed by: HOSPITALIST

## 2019-03-03 PROCEDURE — 99233 SBSQ HOSP IP/OBS HIGH 50: CPT | Performed by: INTERNAL MEDICINE

## 2019-03-03 RX ORDER — IPRATROPIUM BROMIDE AND ALBUTEROL SULFATE 2.5; .5 MG/3ML; MG/3ML
3 SOLUTION RESPIRATORY (INHALATION)
Status: DISCONTINUED | OUTPATIENT
Start: 2019-03-03 | End: 2019-03-04

## 2019-03-03 NOTE — OCCUPATIONAL THERAPY NOTE
OCCUPATIONAL THERAPY TREATMENT NOTE - INPATIENT        Room Number: 770/905-J    Presenting Problem: (CABG x3)    Problem List  Principal Problem:    Acute pulmonary edema (HCC)  Active Problems:    Viral gastroenteritis    Acute respiratory failure with h SATURATIONS  92% room air    ACTIVITIES OF DAILY LIVING ASSESSMENT  AM-PAC ‘6-Clicks’ Inpatient Daily Activity Short Form  How much help from another person does the patient currently need…  -   Putting on and taking off regular lower body clothing?: A Lit

## 2019-03-03 NOTE — PLAN OF CARE
CARDIOVASCULAR - ADULT    • Absence of cardiac arrhythmias or at baseline Adequate for Discharge    Monitor NSR rate controlled, no ectopy noted.     Diabetes/Glucose Control    • Glucose maintained within prescribed range Adequate for Discharge    Accuchec

## 2019-03-03 NOTE — PROGRESS NOTES
[facility]    Progress Note    Jose Perez Patient Status:  Inpatient    1945 MRN [id number]   Location [facility] 2W/SW Attending German Olmos MD   Hosp Day # 15 PCP Luisito Cristina MD        Subjective:     C BILT 0.4 02/22/2019    TP 7.5 02/22/2019    AST 29 02/22/2019    ALT 29 02/22/2019    PTT 41.2 (H) 02/26/2019    INR 2.1 (H) 02/26/2019    TSH 0.791 02/27/2019    DDIMER 0.52 02/17/2019    MG 2.5 02/27/2019    PHOS 2.8 02/25/2019    TROP 0.094 (HH) 02/18/2

## 2019-03-03 NOTE — PHYSICAL THERAPY NOTE
PHYSICAL THERAPY TREATMENT NOTE - INPATIENT     Room Number: 369/585-Y       Presenting Problem: (CABG)    Problem List  Principal Problem:    Acute pulmonary edema (HCC)  Active Problems:    Viral gastroenteritis    Acute respiratory failure with hypoxia Standing: Fair -  Dynamic Standing: Poor +    ACTIVITY TOLERANCE                         O2 WALK     SPO2 Ambulation on Room Air: 92            AM-PAC '6-Clicks' INPATIENT SHORT FORM - BASIC MOBILITY  How much difficulty does the patient currently have. .. #4   Current Status Reviewed during functional mobility    Goal #5  Pt will negotiate one flight of stairs with 1HR and SBA   Goal #5   Current Status NT    Goal #6     Goal #6  Current Status

## 2019-03-03 NOTE — CM/SW NOTE
SW spoke with PT and OT earlier this am to request visit today. SW obtained those notes and sent by ISR via allscripts to the snf to continue insurance auth tomorrow when they re-open.     [address] [facility] [phone]    Leighann Weeks, MSW, LSW x

## 2019-03-03 NOTE — PROGRESS NOTES
[facility]    Progress Note    Renetta Balderrama Patient Status:  Inpatient    1945 MRN [id number]   [facility] 2W/SW Attending Rigoberto Cordoba MD   Hosp Day # 14 PCP Heather Gong MD       Subjective:   Raghu Mohr Component Value Date    WBC 13.1 (H) 03/02/2019    HGB 7.8 (L) 03/02/2019    HCT 24.0 (L) 03/02/2019    .0 03/02/2019    CREATSERUM 0.89 03/02/2019    BUN 25 (H) 03/02/2019     03/02/2019    K 3.9 03/02/2019     03/02/2019    CO2 28.0

## 2019-03-03 NOTE — PROGRESS NOTES
[facility]    Progress Note    Wu Velazquezfrancisca Patient Status:  Inpatient    1945 MRN [id number]   [facility] 2W/SW Attending Georgina Kerns MD   [facility] Day # 15 PCP Marcela Garvin MD       Subjective:    Cosme clear throughout  Cardiovascular: S1, S2 regular rate and rhythm;  No rub  Abdominal: soft,obese  non-tender;+ bowel sounds; + BM  Extremities: extremities normal, atraumatic, lower leg puffy trace edema non pitting edema  Pedal Pulses: 2+ and symmetric effusions, greater on the left. Chest tubes and [place] catheter have been removed. 3. Right-sided PICC line has been placed with the tip in the atrial caval junction.   No pneumothorax    Dictated by (CST): Marlene Nuñez MD on 3/02/2019 at 7:58

## 2019-03-04 VITALS
HEIGHT: 64 IN | HEART RATE: 84 BPM | WEIGHT: 176.13 LBS | OXYGEN SATURATION: 94 % | BODY MASS INDEX: 30.07 KG/M2 | RESPIRATION RATE: 21 BRPM | TEMPERATURE: 98 F | SYSTOLIC BLOOD PRESSURE: 150 MMHG | DIASTOLIC BLOOD PRESSURE: 72 MMHG

## 2019-03-04 PROCEDURE — 99232 SBSQ HOSP IP/OBS MODERATE 35: CPT | Performed by: INTERNAL MEDICINE

## 2019-03-04 PROCEDURE — 99233 SBSQ HOSP IP/OBS HIGH 50: CPT | Performed by: HOSPITALIST

## 2019-03-04 RX ORDER — AMIODARONE HYDROCHLORIDE 200 MG/1
200 TABLET ORAL
Qty: 30 TABLET | Refills: 0 | Status: SHIPPED | OUTPATIENT
Start: 2019-03-04 | End: 2019-03-15

## 2019-03-04 RX ORDER — IPRATROPIUM BROMIDE AND ALBUTEROL SULFATE 2.5; .5 MG/3ML; MG/3ML
3 SOLUTION RESPIRATORY (INHALATION)
Status: DISCONTINUED | OUTPATIENT
Start: 2019-03-05 | End: 2019-03-05

## 2019-03-04 RX ORDER — IPRATROPIUM BROMIDE AND ALBUTEROL SULFATE 2.5; .5 MG/3ML; MG/3ML
3 SOLUTION RESPIRATORY (INHALATION)
Refills: 0 | Status: SHIPPED | COMMUNITY
Start: 2019-03-04 | End: 2019-03-04

## 2019-03-04 NOTE — PHYSICAL THERAPY NOTE
PHYSICAL THERAPY TREATMENT NOTE - INPATIENT     Room Number: 008/907-C       Presenting Problem: (CABG)    Problem List  Principal Problem:    Acute pulmonary edema (HCC)  Active Problems:    Viral gastroenteritis    Acute respiratory failure with hypoxia +  Dynamic Standing: Fair +    ACTIVITY TOLERANCE                         O2 WALK                  AM-PAC '6-Clicks' INPATIENT SHORT FORM - BASIC MOBILITY  How much difficulty does the patient currently have. ..  -   Turning over in bed (including adjusting mobility    Goal #5  Pt will negotiate one flight of stairs with 1HR and SBA   Goal #5   Current Status NT    Goal #6     Goal #6  Current Status

## 2019-03-04 NOTE — PROGRESS NOTES
[facility] HOSP - [facility]    Progress Note    Higinio Deleon Patient Status:  Inpatient    1945 MRN [id number]   [facility] 2W/ Attending Binta Florez MD   Hosp Day # 15 PCP Reema Krause MD        Subjective: 02/22/2019    AST 29 02/22/2019    ALT 29 02/22/2019    PTT 41.2 (H) 02/26/2019    INR 2.1 (H) 02/26/2019    TSH 0.791 02/27/2019    DDIMER 0.52 02/17/2019    MG 2.5 02/27/2019    PHOS 2.8 02/25/2019    TROP 0.094 (HH) 02/18/2019                        Ahm

## 2019-03-04 NOTE — PROGRESS NOTES
[facility]    Progress Note    Higinio Deleon Patient Status:  Inpatient    1945 MRN [id number]   [facility] 2W/SW Attending Binta Florez MD   Hosp Day # 15 PCP Reema Krause MD        Subjective:   T (02/27/19 2549)   • amiodarone 0.5 mg/min (02/27/19 2200)     Physical exam    General appearance: Sitting up in chair smiling alert and cooperative  Neurologic: Alert and oriented X 3, Neuros intact  Psychiatric: calm  Prevena vac in place C/D/I; Left leg Barbara Smith  3/4/2019

## 2019-03-04 NOTE — CM/SW NOTE
Care Management/BPCI:    Met with patient at bedside to explain the BPCI/Medicare program. Patient agreed with phone f/u for 3 months from [address] [place] [address] after discharge from [facility]. Patient was enrolled under DRG  236.  BPCI/Medicare Letter and [place]

## 2019-03-04 NOTE — PROGRESS NOTES
[facility]    Progress Note    Radha Richardson Patient Status:  Inpatient    1945 MRN [id number]   [facility] 2W/SW Attending Izabela Purdy MD   Hosp Day # 15 PCP Tod Scherer MD       Subjective:   Linda Townsend Results:     Lab Results   Component Value Date    WBC 8.1 03/04/2019    HGB 7.4 (L) 03/04/2019    HCT 23.0 (L) 03/04/2019    .0 03/04/2019    CREATSERUM 0.97 03/04/2019    BUN 18 03/04/2019     03/04/2019    K 4.2 03/04/2019

## 2019-03-04 NOTE — PROGRESS NOTES
[facility]    Hematology/Oncology   Progress Note    Dieter Elliott Patient Status:  Inpatient    1945 MRN [id number]   [facility] 2W/SW Attending Sara Pace MD   [facility] Day # 15 PCP Alexis Martinez autoantibody screen. Of note, however, the patient does not have any evidence of hemolysis at this time, as she was not anemic. --post op anemia, thrombocytopenia. hapto and ldh normal.  No hemolysis. Will check iron sat.        Please call with ques

## 2019-03-04 NOTE — PROGRESS NOTES
[facility]    Cardiology Progress Note    Teo Ortiz Patient Status:  Inpatient    1945 MRN [id number]   Location [facility] 2W/SW Attending Mirella Ortiz MD   [facility] Day # 13 PCP MD Carrie Christopher 03/02/19   0532  03/04/19   0430   WBC   --    < >  20.9*  17.4*  15.0*  13.1*  8.1   HGB   --    < >  11.4*  10.2*  7.9*  7.8*  7.4*   MCV   --    < >  92.8  91.8  94.2  91.6  92.7   PLT   --    < >  116.0*  112.0*  134.0*  186.0  246.0   INR  1.0   -- own supply  1 puff Inhalation BID   Normal Saline Flush 0.9 % injection 10 mL 10 mL Intravenous PRN   ferrous sulfate EC tab 325 mg 325 mg Oral Daily with breakfast   Normal Saline Flush 0.9 % injection 10 mL 10 mL Intravenous PRN   0.9%  NaCl infusion  In 50 mg Oral Q6H PRN   amiodarone HCl (PACERONE) tab 200 mg 200 mg Oral TID CC   Amiodarone HCl (CORDARONE) 450 mg in dextrose 5 % 250 mL infusion 0.5 mg/min Intravenous Continuous   Midazolam HCl (VERSED) 2 MG/2ML injection 2 mg 2 mg Intravenous Q5 Min PRN

## 2019-03-04 NOTE — PLAN OF CARE
CARDIOVASCULAR - ADULT    • Maintains optimal cardiac output and hemodynamic stability Adequate for Discharge    • Absence of cardiac arrhythmias or at baseline Adequate for Discharge    BP is normotensive, monitor reflects NSR at controlled rate.  Wt is in

## 2019-03-04 NOTE — DISCHARGE SUMMARY
[facility] [facility] - [facility]    Discharge Summary    Danny Medellin Jo Patient Status:  Inpatient    1945 MRN [id number]   [facility] 2W/SW Attending Charli Sherman MD   Hosp Day # 15 PCP Yenny Paula MD     Date of Admi Carotid artery disease ([facility] [address]     Annual physical exam     Essential hypertension     Need for vaccination     Mild persistent asthma without complication     Colon cancer screening     Menopausal state     Age-related cataract of left eye     History of Dandre Haley is a(n) 69year old female. She has a past medical history of asthma breast cancer carotid stenosis gout she presents with emesis x6 episodes.   She went out to a buffet last night and this morning awoke and had 6 episodes of emesis 3 e Take 1 tablet (75 mg total) by mouth daily. docusate sodium 100 MG Caps  Commonly known as:  COLACE  Take 100 mg by mouth 2 (two) times daily.      famoTIDine 20 MG Tabs  Commonly known as:  PEPCID  Take 1 tablet (20 mg total) by mouth 2 (two) times max * This list has 4 medication(s) that are the same as other medications prescribed for you. Read the directions carefully, and ask your doctor or other care provider to review them with you.             CONTINUE taking these medications    ADVAIR DISKUS 250

## 2019-03-04 NOTE — PROGRESS NOTES
Misc. Note    After review, pt.'s insurance declined SNF. Discussed with  and we discussed with patient. Son just arrived at bedside and it is being discussed with him as well. Will arrange for [facility] visits including PT/OT and RN visits.  All questions

## 2019-03-04 NOTE — CARDIAC REHAB
Cardiac Rehab Phase I    Activity:   Chair: Yes   Ambulation: Yes   Assistive Device: Walker   Distance: 100 feet   Assistance needed: Minimal   Patient tolerated activity: Well. Shower Date: 3/4/19 Tolerated Shower Activity Well.     Education:  Handouts

## 2019-03-04 NOTE — CM/SW NOTE
Pt is medically cleared for discharge as of 3/2/19. Pt is s/p CABG-doing well, POD #6, medically cleared for discharge 3/2/19, awaiting insurance authorization since Fri 3/1/19.   Discussed with Joseph James from [facility], this morning and CM team,  still

## 2019-03-04 NOTE — PLAN OF CARE
O2 sat dropped to 78%, patient asleep sitting in bedside chair, arouses easily, color pale, sat increased to 93% when awake, color improved. Patient experienced the same type of incident Zen morning at nearly the same time, 0200.  Please see monitor stri

## 2019-03-05 ENCOUNTER — TELEPHONE (OUTPATIENT)
Dept: INTERNAL MEDICINE CLINIC | Facility: CLINIC | Age: 74
End: 2019-03-05

## 2019-03-05 ENCOUNTER — TELEPHONE (OUTPATIENT)
Dept: CARDIAC SURGERY | Facility: HOSPITAL | Age: 74
End: 2019-03-05

## 2019-03-05 ENCOUNTER — TELEPHONE (OUTPATIENT)
Dept: CARDIOLOGY | Age: 74
End: 2019-03-05

## 2019-03-05 ENCOUNTER — PATIENT OUTREACH (OUTPATIENT)
Dept: CASE MANAGEMENT | Age: 74
End: 2019-03-05

## 2019-03-05 DIAGNOSIS — Z02.9 ENCOUNTERS FOR ADMINISTRATIVE PURPOSE: ICD-10-CM

## 2019-03-05 RX ORDER — AMIODARONE HYDROCHLORIDE 200 MG/1
200 TABLET ORAL
Qty: 60 TABLET | Refills: 0 | Status: SHIPPED | OUTPATIENT
Start: 2019-03-05 | End: 2019-03-06 | Stop reason: SDUPTHER

## 2019-03-05 NOTE — PROGRESS NOTES
S/w patient and her daughter in law Geovanna. Patient was not interested in answering questions. She acted angry saying that her family is only there now to feed her but then they have to go to work eventually.  It was difficult to get the patient to answer an

## 2019-03-05 NOTE — CM/SW NOTE
Patient discharged to home last night after Peer to Peer review for SNF placement was denied. Omayra Alex made several referrals- [facility], [address]. [facility] is not in network. Based off insurance search, [facility] in network.  L/m for Keren Woodall

## 2019-03-05 NOTE — PROGRESS NOTES
Patient discharged with thorough instructions regarding appointments, medications, side effects and instructions regarding post op care and restrictions. Questions from family addressed. PICC line removed without difficulty and without complications.  RN ex

## 2019-03-05 NOTE — TELEPHONE ENCOUNTER
Janel Rinne with [address] calling to report patient was discharged home yesterday from hospital, [facility] services ordered for skilled nursing, PT, OT, wanting to confirm if Dr Brianne Vu will be the covering provider for patient, before they begin care.  Please A

## 2019-03-06 ENCOUNTER — TELEPHONE (OUTPATIENT)
Dept: CARDIOLOGY | Age: 74
End: 2019-03-06

## 2019-03-06 RX ORDER — AMIODARONE HYDROCHLORIDE 200 MG/1
200 TABLET ORAL
Qty: 60 TABLET | Refills: 0 | Status: SHIPPED | OUTPATIENT
Start: 2019-03-06 | End: 2019-03-26

## 2019-03-06 NOTE — TELEPHONE ENCOUNTER
Left vm with Victorina Li from [facility] advising of message per Dr Nic Scherer, call back if any other questions.

## 2019-03-07 ENCOUNTER — APPOINTMENT (OUTPATIENT)
Dept: CV DIAGNOSTICS | Facility: HOSPITAL | Age: 74
DRG: 177 | End: 2019-03-07
Attending: INTERNAL MEDICINE
Payer: MEDICARE

## 2019-03-07 ENCOUNTER — HOSPITAL ENCOUNTER (INPATIENT)
Facility: HOSPITAL | Age: 74
LOS: 4 days | Discharge: NURSING, REHAB OR LONG-TERM CARE | DRG: 177 | End: 2019-03-11
Attending: EMERGENCY MEDICINE | Admitting: HOSPITALIST
Payer: MEDICARE

## 2019-03-07 ENCOUNTER — APPOINTMENT (OUTPATIENT)
Dept: ULTRASOUND IMAGING | Facility: HOSPITAL | Age: 74
DRG: 177 | End: 2019-03-07
Attending: INTERNAL MEDICINE
Payer: MEDICARE

## 2019-03-07 ENCOUNTER — APPOINTMENT (OUTPATIENT)
Dept: CARDIOLOGY | Age: 74
End: 2019-03-07

## 2019-03-07 ENCOUNTER — TELEPHONE (OUTPATIENT)
Dept: CARDIOLOGY | Age: 74
End: 2019-03-07

## 2019-03-07 ENCOUNTER — APPOINTMENT (OUTPATIENT)
Dept: GENERAL RADIOLOGY | Facility: HOSPITAL | Age: 74
DRG: 177 | End: 2019-03-07
Attending: EMERGENCY MEDICINE
Payer: MEDICARE

## 2019-03-07 ENCOUNTER — APPOINTMENT (OUTPATIENT)
Dept: CT IMAGING | Facility: HOSPITAL | Age: 74
DRG: 177 | End: 2019-03-07
Attending: EMERGENCY MEDICINE
Payer: MEDICARE

## 2019-03-07 ENCOUNTER — APPOINTMENT (OUTPATIENT)
Dept: GENERAL RADIOLOGY | Facility: HOSPITAL | Age: 74
DRG: 177 | End: 2019-03-07
Attending: INTERNAL MEDICINE
Payer: MEDICARE

## 2019-03-07 DIAGNOSIS — R06.00 DYSPNEA, UNSPECIFIED TYPE: ICD-10-CM

## 2019-03-07 DIAGNOSIS — J45.41 MODERATE PERSISTENT ASTHMA WITH EXACERBATION: Primary | ICD-10-CM

## 2019-03-07 DIAGNOSIS — J90 PLEURAL EFFUSION: ICD-10-CM

## 2019-03-07 PROBLEM — J45.901 ACUTE ASTHMA EXACERBATION: Status: ACTIVE | Noted: 2019-03-07

## 2019-03-07 PROBLEM — J45.901 ACUTE ASTHMA EXACERBATION (HCC): Status: ACTIVE | Noted: 2019-03-07

## 2019-03-07 PROCEDURE — 32555 ASPIRATE PLEURA W/ IMAGING: CPT | Performed by: INTERNAL MEDICINE

## 2019-03-07 PROCEDURE — 99223 1ST HOSP IP/OBS HIGH 75: CPT | Performed by: HOSPITALIST

## 2019-03-07 PROCEDURE — 71045 X-RAY EXAM CHEST 1 VIEW: CPT | Performed by: EMERGENCY MEDICINE

## 2019-03-07 PROCEDURE — 0W9B3ZZ DRAINAGE OF LEFT PLEURAL CAVITY, PERCUTANEOUS APPROACH: ICD-10-PCS | Performed by: INTERNAL MEDICINE

## 2019-03-07 PROCEDURE — 99223 1ST HOSP IP/OBS HIGH 75: CPT | Performed by: INTERNAL MEDICINE

## 2019-03-07 PROCEDURE — 93308 TTE F-UP OR LMTD: CPT | Performed by: INTERNAL MEDICINE

## 2019-03-07 PROCEDURE — 71045 X-RAY EXAM CHEST 1 VIEW: CPT | Performed by: INTERNAL MEDICINE

## 2019-03-07 PROCEDURE — 71260 CT THORAX DX C+: CPT | Performed by: EMERGENCY MEDICINE

## 2019-03-07 RX ORDER — AMIODARONE HYDROCHLORIDE 200 MG/1
200 TABLET ORAL
Status: DISCONTINUED | OUTPATIENT
Start: 2019-03-07 | End: 2019-03-11

## 2019-03-07 RX ORDER — FUROSEMIDE 10 MG/ML
40 INJECTION INTRAMUSCULAR; INTRAVENOUS
Status: DISCONTINUED | OUTPATIENT
Start: 2019-03-07 | End: 2019-03-08

## 2019-03-07 RX ORDER — 0.9 % SODIUM CHLORIDE 0.9 %
VIAL (ML) INJECTION
Status: COMPLETED
Start: 2019-03-07 | End: 2019-03-07

## 2019-03-07 RX ORDER — ACETAMINOPHEN 325 MG/1
650 TABLET ORAL EVERY 6 HOURS PRN
Status: DISCONTINUED | OUTPATIENT
Start: 2019-03-07 | End: 2019-03-11

## 2019-03-07 RX ORDER — IPRATROPIUM BROMIDE AND ALBUTEROL SULFATE 2.5; .5 MG/3ML; MG/3ML
3 SOLUTION RESPIRATORY (INHALATION) EVERY 6 HOURS
Status: DISCONTINUED | OUTPATIENT
Start: 2019-03-07 | End: 2019-03-07

## 2019-03-07 RX ORDER — ASPIRIN 81 MG/1
81 TABLET ORAL DAILY
Status: DISCONTINUED | OUTPATIENT
Start: 2019-03-07 | End: 2019-03-11

## 2019-03-07 RX ORDER — HEPARIN SODIUM 5000 [USP'U]/ML
5000 INJECTION, SOLUTION INTRAVENOUS; SUBCUTANEOUS EVERY 12 HOURS SCHEDULED
Status: DISCONTINUED | OUTPATIENT
Start: 2019-03-07 | End: 2019-03-11

## 2019-03-07 RX ORDER — CLOPIDOGREL BISULFATE 75 MG/1
75 TABLET ORAL DAILY
Status: DISCONTINUED | OUTPATIENT
Start: 2019-03-07 | End: 2019-03-11

## 2019-03-07 RX ORDER — MONTELUKAST SODIUM 10 MG/1
10 TABLET ORAL NIGHTLY
Status: DISCONTINUED | OUTPATIENT
Start: 2019-03-07 | End: 2019-03-11

## 2019-03-07 RX ORDER — EZETIMIBE 10 MG/1
10 TABLET ORAL NIGHTLY
Status: DISCONTINUED | OUTPATIENT
Start: 2019-03-07 | End: 2019-03-11

## 2019-03-07 RX ORDER — GUAIFENESIN 100 MG/5ML
200 SOLUTION ORAL EVERY 4 HOURS PRN
Status: DISCONTINUED | OUTPATIENT
Start: 2019-03-07 | End: 2019-03-11

## 2019-03-07 RX ORDER — ONDANSETRON 2 MG/ML
4 INJECTION INTRAMUSCULAR; INTRAVENOUS EVERY 6 HOURS PRN
Status: DISCONTINUED | OUTPATIENT
Start: 2019-03-07 | End: 2019-03-11

## 2019-03-07 RX ORDER — FUROSEMIDE 10 MG/ML
20 INJECTION INTRAMUSCULAR; INTRAVENOUS
Status: DISCONTINUED | OUTPATIENT
Start: 2019-03-07 | End: 2019-03-07

## 2019-03-07 RX ORDER — ALBUTEROL SULFATE 2.5 MG/3ML
2.5 SOLUTION RESPIRATORY (INHALATION) ONCE
Status: COMPLETED | OUTPATIENT
Start: 2019-03-07 | End: 2019-03-07

## 2019-03-07 RX ORDER — AMLODIPINE BESYLATE 5 MG/1
5 TABLET ORAL DAILY
Status: DISCONTINUED | OUTPATIENT
Start: 2019-03-07 | End: 2019-03-10

## 2019-03-07 RX ORDER — IPRATROPIUM BROMIDE AND ALBUTEROL SULFATE 2.5; .5 MG/3ML; MG/3ML
3 SOLUTION RESPIRATORY (INHALATION) ONCE
Status: COMPLETED | OUTPATIENT
Start: 2019-03-07 | End: 2019-03-07

## 2019-03-07 RX ORDER — IPRATROPIUM BROMIDE AND ALBUTEROL SULFATE 2.5; .5 MG/3ML; MG/3ML
3 SOLUTION RESPIRATORY (INHALATION) EVERY 6 HOURS PRN
Status: DISCONTINUED | OUTPATIENT
Start: 2019-03-07 | End: 2019-03-11

## 2019-03-07 RX ORDER — SODIUM CHLORIDE 9 MG/ML
INJECTION, SOLUTION INTRAVENOUS
Status: COMPLETED
Start: 2019-03-07 | End: 2019-03-07

## 2019-03-07 RX ORDER — SODIUM CHLORIDE 0.9 % (FLUSH) 0.9 %
3 SYRINGE (ML) INJECTION AS NEEDED
Status: DISCONTINUED | OUTPATIENT
Start: 2019-03-07 | End: 2019-03-11

## 2019-03-07 RX ORDER — IPRATROPIUM BROMIDE AND ALBUTEROL SULFATE 2.5; .5 MG/3ML; MG/3ML
3 SOLUTION RESPIRATORY (INHALATION)
Status: DISCONTINUED | OUTPATIENT
Start: 2019-03-08 | End: 2019-03-11

## 2019-03-07 RX ORDER — COLCHICINE 0.6 MG/1
0.6 TABLET ORAL AS NEEDED
Status: DISCONTINUED | OUTPATIENT
Start: 2019-03-08 | End: 2019-03-11

## 2019-03-07 RX ORDER — METHYLPREDNISOLONE SODIUM SUCCINATE 40 MG/ML
40 INJECTION, POWDER, LYOPHILIZED, FOR SOLUTION INTRAMUSCULAR; INTRAVENOUS EVERY 6 HOURS
Status: DISCONTINUED | OUTPATIENT
Start: 2019-03-07 | End: 2019-03-09

## 2019-03-07 RX ORDER — DILTIAZEM HYDROCHLORIDE 60 MG/1
60 TABLET, FILM COATED ORAL AS NEEDED
Status: DISCONTINUED | OUTPATIENT
Start: 2019-03-07 | End: 2019-03-11

## 2019-03-07 RX ORDER — ZOLPIDEM TARTRATE 5 MG/1
5 TABLET ORAL NIGHTLY PRN
Status: DISCONTINUED | OUTPATIENT
Start: 2019-03-07 | End: 2019-03-11

## 2019-03-07 NOTE — H&P
[facility]    PATIENT'S NAME: Leila George   ATTENDING PHYSICIAN: Stuart Lozano MD   PATIENT ACCOUNT#:   [id number]    LOCATION:  [place] [address]  MEDICAL RECORD #:   [id number]       YOB: 1945  ADMISSION DATE:       03 itching. FAMILY HISTORY:  Positive for asthma, hypertension, and coronary artery disease. SOCIAL HISTORY:  No tobacco, alcohol, or drug use. Lives with her family, usually independent for basic activities of daily living.      REVIEW OF SYSTEMS: versus pneumonia. 4.   Gout attack, right hand, third finger. 5.   Hypertension. PLAN:  Patient will be admitted to telemetry floor. IV Lasix. IV vancomycin and Zosyn. Solu-Medrol nebulizer treatment.   Pulmonary consult and Cardiology consult were

## 2019-03-07 NOTE — ED NOTES
Orders for admission, patient is aware of plan and ready to go upstairs.  Any questions, please call ED RN Sukhwinder Dutton  at extension [phone]

## 2019-03-07 NOTE — PROCEDURES
[facility] HOSP - [facility]  Procedure Note  19    Avelina Osorio Patient Status:  Emergency    1945 MRN [id number]   Location [address] Attending Philly Park MD   Hosp Day # 0 PCP Melvin Doran MD

## 2019-03-07 NOTE — ED INITIAL ASSESSMENT (HPI)
Pt arrives via medics with a c/o sob, breathing labored, on 2L oxygen NC. Pt s/p cabg last week. States she has been feeling sob since post-op but worsened today. Denies cp.

## 2019-03-07 NOTE — CONSULTS
[facility]    Consult Note    Date:  3/7/2019  Date of Admission:  3/7/2019      Chief Complaint:   Wu Robison is a(n) 69year old female with dyspnea.     HPI:   The patient has a long-standing history of asthma and she is followed Other (Other) Brother         gout   • Breast Cancer Self 43        rt breast 77     Social History: Single, 1 child, no tobacco, occasional alcohol, optometry technician  Social History    Tobacco Use      Smoking status: Never Smoker      Smokeless tobac abnormality    Results:     Lab Results   Component Value Date    WBC 13.4 03/07/2019    HGB 9.0 03/07/2019    HCT 29.3 03/07/2019    .0 03/07/2019    CREATSERUM 1.08 03/07/2019    BUN 16 03/07/2019     03/07/2019    K 4.1 03/07/2019

## 2019-03-07 NOTE — CONSULTS
[facility]  Cardiology Consultation    Marilee Kruger Jo Patient Status:  Emergency    1945 MRN [id number]   Location [address] Attending Oxana Horton MD   Hosp Day # 0 PCP Kita Mohamud MD     [facility] alcohol or use drugs.     Allergies:    Allopurinol             HIVES, SWELLING, SHORTNESS OF                            BREATH  Dog Epithelium          SHORTNESS OF BREATH    Comment:Hair and saliva  Dust                    SHORTNESS OF BREATH  [facility] 03/07/2019     03/07/2019    CO2 30.0 03/07/2019     03/07/2019    CA 10.0 03/07/2019    TROP 0.056 03/07/2019       Imaging:  EKG, blood test and chest x-ray reviewed  Impression:  Left pleural effusion, moderate  Heart failure with preserved

## 2019-03-07 NOTE — ED PROVIDER NOTES
Patient Seen in: [facility] AND [facility] Emergency Department    History   Patient presents with:  Chest Pain Angina (cardiovascular)    Stated Complaint: sob    HPI    This patient presents to the emergency department with difficulty breathing.   She has previ tablet (500 mg total) by mouth 3 (three) times daily. aspirin 81 MG Oral Tab EC,  Take 1 tablet (81 mg total) by mouth daily. Clopidogrel Bisulfate (PLAVIX) 75 MG Oral Tab,  Take 1 tablet (75 mg total) by mouth daily.    famoTIDine 20 MG Oral Tab,  Take Tobacco Use      Smoking status: Never Smoker      Smokeless tobacco: Never Used    Alcohol use: No      Comment: rarely at weddings    Drug use: No      Review of Systems  Constitutional: no fever  Cardiovascular: no chest pain  Respiratory: shortness of respiratory rate. We will give DuoNeb do blood tests chest x-ray. She will likely require readmission. Patient after breathing treatment did seem to be doing a lot better. Still having some tightness but a bit opened up.   X-ray did show left effusion RBC 3.03 (*)     HGB 9.0 (*)     HCT 29.3 (*)     MCHC 30.7 (*)     RDW-SD 54.5 (*)     RDW 16.5 (*)     .0 (*)     Neutrophil Absolute Prelim 10.26 (*)     Neutrophil Absolute 10.26 (*)     Monocyte Absolute 1.33 (*)     All other components within

## 2019-03-08 ENCOUNTER — APPOINTMENT (OUTPATIENT)
Dept: GENERAL RADIOLOGY | Facility: HOSPITAL | Age: 74
DRG: 177 | End: 2019-03-08
Attending: HOSPITALIST
Payer: MEDICARE

## 2019-03-08 ENCOUNTER — TELEPHONE (OUTPATIENT)
Dept: INTERNAL MEDICINE CLINIC | Facility: CLINIC | Age: 74
End: 2019-03-08

## 2019-03-08 ENCOUNTER — APPOINTMENT (OUTPATIENT)
Dept: CV DIAGNOSTICS | Facility: HOSPITAL | Age: 74
DRG: 177 | End: 2019-03-08
Attending: HOSPITALIST
Payer: MEDICARE

## 2019-03-08 ENCOUNTER — APPOINTMENT (OUTPATIENT)
Dept: INTERVENTIONAL RADIOLOGY/VASCULAR | Facility: HOSPITAL | Age: 74
DRG: 177 | End: 2019-03-08
Attending: HOSPITALIST
Payer: MEDICARE

## 2019-03-08 PROCEDURE — 99233 SBSQ HOSP IP/OBS HIGH 50: CPT | Performed by: HOSPITALIST

## 2019-03-08 PROCEDURE — 99233 SBSQ HOSP IP/OBS HIGH 50: CPT | Performed by: INTERNAL MEDICINE

## 2019-03-08 PROCEDURE — 71045 X-RAY EXAM CHEST 1 VIEW: CPT | Performed by: HOSPITALIST

## 2019-03-08 PROCEDURE — 0W9B3ZZ DRAINAGE OF LEFT PLEURAL CAVITY, PERCUTANEOUS APPROACH: ICD-10-PCS | Performed by: RADIOLOGY

## 2019-03-08 RX ORDER — MIDAZOLAM HYDROCHLORIDE 1 MG/ML
INJECTION INTRAMUSCULAR; INTRAVENOUS
Status: COMPLETED
Start: 2019-03-08 | End: 2019-03-08

## 2019-03-08 RX ORDER — LIDOCAINE HYDROCHLORIDE 20 MG/ML
INJECTION, SOLUTION EPIDURAL; INFILTRATION; INTRACAUDAL; PERINEURAL
Status: COMPLETED
Start: 2019-03-08 | End: 2019-03-08

## 2019-03-08 RX ORDER — SODIUM CHLORIDE 9 MG/ML
INJECTION, SOLUTION INTRAVENOUS
Status: COMPLETED
Start: 2019-03-08 | End: 2019-03-08

## 2019-03-08 RX ORDER — FUROSEMIDE 40 MG/1
40 TABLET ORAL
Status: DISCONTINUED | OUTPATIENT
Start: 2019-03-08 | End: 2019-03-09

## 2019-03-08 NOTE — PROGRESS NOTES
Tele tech called just as RN about to start cardizem GTT that patient had converted to NSR. GTT not started at this time.

## 2019-03-08 NOTE — TELEPHONE ENCOUNTER
[employer] calling to see if received a form about Pt having a bone density test, if not please order the test for Pt

## 2019-03-08 NOTE — PROGRESS NOTES
[facility]    Progress Note    Dieter Elliott Patient Status:  Inpatient    1945 MRN [id number]   [facility] 3W/SW Attending Sara Pace MD   Hosp Day # 1 PCP Eliot Flores MD        Subjective: bronchodilator therapy  ICS/LABA inhaler  singulair   O2 therapy if needed   Attempt IR to tap left pleural effusion since unable to be tapped bedside  2D echocardiogram cardiology to follow    Check pro calcitonin and ESR  Patient definitely will need to slightly worse. Minimal blunting of the right costophrenic angle. Correlate clinically and follow-up studies are advised.     Dictated by (CST): Leighann Savage MD on 3/07/2019 at 15:06     Approved by (CST): Leighann Savage MD on 3/07/2019 at 15:08

## 2019-03-08 NOTE — CM/SW NOTE
SW met with the pt/family at bedside to discuss discharge planning. Pt stated that she is expecting to go to rehab at [facility] when she is medically stable for discharge. SW explained that PT/OT recommendations are needed.  JOHN PAUL sent tentative referral to [facility]/

## 2019-03-08 NOTE — CM/SW NOTE
Received MDO for assessment for home health. Patient well known to SW from previous admissions, patient had a CABG 2/26. Patient lives alone in an apartment, elevator building. Patient is fairly independent with ADLs/IADLs.  Patient has completed POA forms-

## 2019-03-08 NOTE — PROGRESS NOTES
[facility] dosing service    Initial Pharmacokinetic Consult for Vancomycin Dosing     Mónica Good is a 69year old female admitted on 3/7 who is being treated for pneumonia. Pharmacy has been asked to dose Vancomycin by Dr. Jackson Gomez.     She is followed by 1.25 gm Q 24 hours  based upon, adjusted weight, renal function, and pharmacokinetics. 2.  Pharmacy ordered Vancomycin trough level(s) prior to 4th dose. Goal trough level 15-20 ug/mL.     3.  Pharmacy will need BUN/Scr daily while on Vanco

## 2019-03-08 NOTE — PROGRESS NOTES
[facility]  Cardiology Progress Note    Jose Eduardo Pae Patient Status:  Inpatient    1945 MRN [id number]   [facility] 3W/SW Attending Rick Quinn MD   Hosp Day # 1 PCP Radha Caceres MD       Subjective: Rachael Sample Recent Labs   Lab  03/02/19   0532  03/04/19   0430  03/07/19   1340  03/08/19   0551  03/08/19   1001   WBC  13.1*  8.1  13.4*  10.1   --    HGB  7.8*  7.4*  9.0*  8.7*   --    MCV  91.6  92.7  96.7  98.0   --    PLT  186.0  246.0  469.0*  455.0* times per day   acetaminophen (TYLENOL) tab 650 mg 650 mg Oral Q6H PRN   ondansetron HCl (ZOFRAN) injection 4 mg 4 mg Intravenous Q6H PRN   Piperacillin Sod-Tazobactam So (ZOSYN) 3.375 g in dextrose 5 % 100 mL ADD-vantage 3.375 g Intravenous Q8H   amiodaro

## 2019-03-08 NOTE — PROCEDURES
[facility] [facility] - [facility]  Brief IR Post-Procedure Note    Fredy Obrien Patient Status:  Inpatient    1945 MRN [id number]   Location [facility] Attending Elias Davis MD   [facility] Day # 1 PCP Chrystal Davey

## 2019-03-08 NOTE — CM/SW NOTE
Patient is a BPCI readmission previously enrolled on 03/4//2019 under  with 85 days left in the BPCI episode which will end on 06/01/2019  / to remain available for support and/or discharge planning.          GotaCopy R

## 2019-03-08 NOTE — CM/SW NOTE
Patient is a BPCI readmission previously enrolled on 03/4//2019 under  with 85 days left in the BPCI episode which will end on 06/01/2019  Readmission from [facility] , Patient to return to [facility] upon discharge.

## 2019-03-08 NOTE — PAYOR COMM NOTE
PT ON O2 2LNC  03/08/19 0735 — — — — 89 % Abnormal  — Nasal cannula 2 L/min       ADMISSION REVIEW     Pablo Callahan MA [facility]  Subscriber #:  [id number]  Authorization Number: [id number]    Admit date: 3/7/19  Admit time: 3782       Admitting Physician: Maria Del Rosario Alegre • MASTECTOMY PARTIAL  1999   • RADIATION RIGHT  2014       Medications :   amiodarone HCl 200 MG Oral Tab,  Take 1 tablet (200 mg total) by mouth 3 (three) times daily with meals.  DC Amio after 30 days   metFORMIN HCl 500 MG Oral Tab,  Take 1 tablet (500 the lungs every 6 (six) hours as needed for Wheezing or Shortness of Breath. ADVAIR DISKUS 250-50 MCG/DOSE Inhalation Aerosol Powder, Breath Activated,  Inhale 1 puff into the lungs 2 (two) times daily.    Multiple Vitamin (MULTI-VITAMIN DAILY) Oral Tab, peripheral perfusion.   She does have pitting edema to lower extremities which she states is quite a bit worse than previous  Respiratory:    Lungs with diminished breath sounds throughout with some expiratory wheezes  Abdomen:  Soft, non-tender, non-disten 1.08 (*)     GFR, Non- 51 (*)     GFR, -American 59 (*)     All other components within normal limits   TROPONIN I - Abnormal; Notable for the following components:    Troponin 0.054 (*)     All other components within normal limits reassessment of your blood pressure. Clinical Impression:  Moderate persistent asthma with exacerbation  (primary encounter diagnosis)  Pleural effusion  Dyspnea, unspecified type    Disposition:  Admit    Follow-up:  No follow-up provider specified. PAST MEDICAL HISTORY:  Coronary artery disease status post coronary artery bypass graft surgery February 2019, left ventricular diastolic dysfunction, right-sided internal carotid artery stenosis 80%, hypertension, asthma, hyperlipidemia, gout, obesity severe air restriction, increased respiratory effort and accessory muscle use. CHEST:  Sternal wound with well-healed process. No dehiscence or discharge. No erythema. HEART:  Regular rate, rhythm. S1 and S2 auscultated. No murmur.   ABDOMEN:  Soft, Laureen      furosemide (LASIX) injection 40 mg     Date Action Dose Route User    3/8/2019 1042 Given 40 mg Intravenous Momo Biggs    3/7/2019 2121 Given 20 mg Intravenous Tam Flores RN      furosemide (LASIX) injection 20 mg     Date Action Blease Clipper Bag 3.375 g Intravenous Melina Wilkes RN    3/7/2019 2120 New Bag 3.375 g Intravenous Melina Wilkes RN      sodium chloride 0.9% infusion     Date Action Dose Route User    3/7/2019 2120 New Bag 250 mL (none) Melina Wilkes RN      sodium chloride 0.9% i base status post coronary artery bypass grafting with probable component of effusion, possible diaphragm dysfunction and atelectasis. The patient is currently getting a CT scan of the chest to screen for pulmonary embolism.   This also will give me an idea PE        Recommendations:  Treatment arthrocentesis as per pulmonary, attempted thoracentesis at bedside unsuccessful  IV diuresis  Limited echo to rule out pericardial effusion    3/8 HOSPITALIST PROGRESS NOTE  Hosp Day # 1 PCP Tod Scherer MD at 16:39     Approved by (CST): Irwin Garibay MD on 3/07/2019 at 16:42              Plan:      Acute on chronic respiratory failure  - 2/2 pleural effusion  - seen by pulmonary  - s/p thora but unable to get much fluid out  - IR consulted and thora with t gallop. No murmur heard. Pulmonary/Chest:   Diminished bilaterally / fair air exchange   Absent in the left base   No wheezes or rhonchi    Abdominal: Bowel sounds are normal.   Neurological: She is oriented to person, place, and time.  No sensory defic

## 2019-03-08 NOTE — PROGRESS NOTES
[facility]    Progress Note    Cody Julian Patient Status:  Inpatient    1945 MRN [id number]   [facility] 3W/SW Attending Matthew Lizarraga MD   Hosp Day # 1 PCP Cee Motta MD       SUBJECTIVE: 3/07/2019 at 16:42          Xr Chest Ap Portable  (cpt=71045)    Result Date: 3/7/2019  CONCLUSION:  1. Persistent left basal consolidation and pleural effusion about the same. No pneumothorax. Mild blunting right costophrenic angle.     Dictated by (CST) injection 4 mg 4 mg Intravenous Q6H PRN   Piperacillin Sod-Tazobactam So (ZOSYN) 3.375 g in dextrose 5 % 100 mL ADD-vantage 3.375 g Intravenous Q8H   amiodarone HCl (PACERONE) tab 200 mg 200 mg Oral TID CC   amLODIPine Besylate (NORVASC) tab 5 mg 5 mg Oral ([address]     Elevated troponin     Autoantibody titer positive     Coronary artery disease     Anemia     Thrombocytopenia (HCC)     Moderate persistent asthma with exacerbation     Dyspnea, unspecified type     Acute asthma exacerbation      Plan:     Acute

## 2019-03-08 NOTE — PROGRESS NOTES
Pt transferred back to the unit. Pt is generally stable post Thoracentesis. Report given to [facility] .

## 2019-03-08 NOTE — PLAN OF CARE
Patient: Dandre Haley  MRN: [id number]  : 1945  Allergies:   Allopurinol             HIVES, SWELLING, SHORTNESS OF                            BREATH  Dog Epithelium          SHORTNESS OF BREATH    Comment:Hair and saliva  Dust

## 2019-03-09 PROCEDURE — 99232 SBSQ HOSP IP/OBS MODERATE 35: CPT | Performed by: INTERNAL MEDICINE

## 2019-03-09 PROCEDURE — 99233 SBSQ HOSP IP/OBS HIGH 50: CPT | Performed by: HOSPITALIST

## 2019-03-09 RX ORDER — FUROSEMIDE 40 MG/1
40 TABLET ORAL DAILY
Status: DISCONTINUED | OUTPATIENT
Start: 2019-03-10 | End: 2019-03-11

## 2019-03-09 RX ORDER — FUROSEMIDE 40 MG/1
40 TABLET ORAL ONCE
Status: COMPLETED | OUTPATIENT
Start: 2019-03-09 | End: 2019-03-09

## 2019-03-09 NOTE — PROGRESS NOTES
[facility]    Progress Note      Assessment and Plan:   1.   Acute on chronic respiratory failure–the patient certainly has obstructive ventilatory deficit clinically; however, she now has opacification of her left lung base status post janice Results:     Lab Results   Component Value Date    WBC 15.2 03/09/2019    HGB 7.8 03/09/2019    HCT 26.1 03/09/2019    .0 03/09/2019    CREATSERUM 1.32 03/09/2019    BUN 22 03/09/2019     03/09/2019    K 4.1 03/09/2019     03/09/2019

## 2019-03-09 NOTE — PROGRESS NOTES
[facility] HOSP - [facility]    Progress Note    Higinio Deleon Patient Status:  Inpatient    1945 MRN [id number]   [facility] 3W/SW Attending Nidia Murrieta MD   Hosp Day # 2 PCP Reema Krause MD       SUBJECTIVE: (cpt=71045)    Result Date: 3/8/2019  CONCLUSION:  1. No significant change in the appearance of left retrocardiac opacification. 2. No evidence of pneumothorax.     Dictated by (CST): Megan Millan MD on 3/08/2019 at 13:33     Approved by (CST): nebulizer solution 3 mL 3 mL Nebulization Q6H PRN   guaiFENesin (ROBITUSSIN) 100 MG/5ML solution 200 mg 200 mg Oral Q4H PRN   Normal Saline Flush 0.9 % injection 3 mL 3 mL Intravenous PRN   Heparin Sodium (Porcine) 5000 UNIT/ML injection 5,000 Units 5,000 Impacted cerumen, right ear     Asthma with COPD (chronic obstructive pulmonary disease) (HCC)     Allergic drug reaction     Pleural effusion     Shortness of breath     Closed fracture of multiple ribs of left side with routine healing, subsequent encoun

## 2019-03-09 NOTE — PROGRESS NOTES
[facility] HOSP - [facility]    Progress Note    Ifeanyi Mccloud Patient Status:  Inpatient    1945 MRN [id number]   [facility] 3W/SW Attending Archana Doherty MD   Hosp Day # 2 PCP Crissy Abreu MD        Subjective: (H) 03/09/2019    CA 9.2 03/09/2019    ALB 3.3 (L) 02/22/2019    ALKPHO 103 02/22/2019    BILT 0.4 02/22/2019    TP 7.5 02/22/2019    AST 29 02/22/2019    ALT 29 02/22/2019    PTT 41.2 (H) 02/26/2019    INR 1.12 03/08/2019    TSH 0.791 02/27/2019    DDIMER Em    Result Date: 3/7/2019  CONCLUSION:  1. There is no pulmonary embolism or aortic dissection. Status post sternotomy and coronary bypass. Moderate pericardial effusion at the cardiac base. Moderate to large left pleural effusion.   Linear subsegmenta

## 2019-03-09 NOTE — PLAN OF CARE
Problem: Patient/Family Goals  Goal: Patient/Family Long Term Goal  Patient's Long Term Goal: to go home feeling better    Interventions:  - take medications as directed  - complete testing as ordered  - IV ABX and solumedrol  - See additional Care Plan go Encourage broncho-pulmonary hygiene including cough, deep breathe, Incentive Spirometry  - Assess the need for suctioning and perform as needed  - Assess and instruct to report SOB or any respiratory difficulty  - Respiratory Therapy support as indicated

## 2019-03-10 PROCEDURE — 99233 SBSQ HOSP IP/OBS HIGH 50: CPT | Performed by: HOSPITALIST

## 2019-03-10 PROCEDURE — 99232 SBSQ HOSP IP/OBS MODERATE 35: CPT | Performed by: INTERNAL MEDICINE

## 2019-03-10 RX ORDER — AMLODIPINE BESYLATE 2.5 MG/1
2.5 TABLET ORAL DAILY
Status: DISCONTINUED | OUTPATIENT
Start: 2019-03-11 | End: 2019-03-11

## 2019-03-10 RX ORDER — POTASSIUM CHLORIDE 20 MEQ/1
40 TABLET, EXTENDED RELEASE ORAL EVERY 4 HOURS
Status: COMPLETED | OUTPATIENT
Start: 2019-03-10 | End: 2019-03-10

## 2019-03-10 NOTE — PROGRESS NOTES
[facility] - [facility]     Progress Note        Jonatan Fuentes Patient Status:  Inpatient    1945 MRN [id number]   [facility] 3W/SW Attending Micaela Hamm MD   Hosp Day # 3 PCP Eron Sheldon MD       Subjective: Montelukast Sodium (SINGULAIR) tab 10 mg 10 mg Oral Nightly   diltiazem 100mg/100ml in NaCl (CARDIZEM) 1 mg/mL premix/add-vantage 2.5-20 mg/hr Intravenous Continuous   dilTIAZem HCl (CARDIZEM) tab 60 mg 60 mg Oral PRN       Continuous Infusions:   • dilt

## 2019-03-10 NOTE — PHYSICAL THERAPY NOTE
PHYSICAL THERAPY EVALUATION - INPATIENT     Room Number: 149/058-L  Evaluation Date: 3/10/2019  Type of Evaluation: Initial   Physician Order: PT Eval and Treat    Presenting Problem: persistent asthma with exacerbation  Reason for Therapy: Mobility Dysfu impairment may benefit from rehab facility. Patient will benefit from continued IP PT services to address these deficits in preparation for discharge.     DISCHARGE RECOMMENDATIONS  PT Discharge Recommendations: Sub-acute rehabilitation    PLAN  PT Treat Restriction: None                PAIN ASSESSMENT  Ratin          COGNITION  · Overall Cognitive Status:  WFL - within functional limits    RANGE OF MOTION AND STRENGTH ASSESSMENT    Lower extremity ROM is within functional limits BLE WNL    Lower extre demonstrate transfers Sit to/from Stand at assistance level: modified independent with walker - rolling     Goal #2  Current Status    Goal #3 Patient is able to ambulate 100 feet with assist device: walker - rolling at assistance level: modified independe

## 2019-03-10 NOTE — PROGRESS NOTES
[facility]    Progress Note    Olga Gage Patient Status:  Inpatient    1945 MRN [id number]   [facility] 3W/SW Attending Nima Puckett MD   Hosp Day # 3 PCP Byron Weir MD        Subjective: 03/10/2019    CO2 34.0 (H) 03/10/2019     (H) 03/10/2019    CA 9.1 03/10/2019    ALB 3.3 (L) 02/22/2019    ALKPHO 103 02/22/2019    BILT 0.4 02/22/2019    TP 7.5 02/22/2019    AST 29 02/22/2019    ALT 29 02/22/2019    PTT 41.2 (H) 02/26/2019    INR

## 2019-03-10 NOTE — OCCUPATIONAL THERAPY NOTE
OCCUPATIONAL THERAPY EVALUATION - INPATIENT     Room Number: 531/299-Y  Evaluation Date: 3/10/2019  Type of Evaluation: Initial  Presenting Problem: (SOB, asthma )    Physician Order: IP Consult to Occupational Therapy  Reason for Therapy: ADL/IADL Dysfunc here at [facility]. Notified RN of patient status and plan. DISCHARGE RECOMMENDATIONS  OT Discharge Recommendations: Sub-acute rehabilitation       PLAN  OT Treatment Plan: Balance activities; Energy conservation/work simplification techniques;ADL trainin SATURATIONS   Room Air  87% briefly after mobility training and 94% throughout rest of session             COGNITION  Overall Cognitive Status:  WFL - within functional limits      RANGE OF MOTION   Upper extremity ROM is within functional limits     STREN will complete LE dressing with SBA and AE prn  Comment:    Patient will recall  sternal precautions  Comment:          Goals  on: 3/24  Frequency: 3x a week    9 [facility]/R  [address] [place] [address]  [phone]

## 2019-03-10 NOTE — PROGRESS NOTES
[facility]    Progress Note    Manual Baljeet Patient Status:  Inpatient    1945 MRN [id number]   [facility] 3W/SW Attending Arzella Saint, MD   Hosp Day # 3 PCP Martha Lynn MD       SUBJECTIVE: 3/8/2019  CONCLUSION:  1. No significant change in the appearance of left retrocardiac opacification. 2. No evidence of pneumothorax.     Dictated by (CST): Celia Lino MD on 3/08/2019 at 13:33     Approved by (CST): Jelani Kellogg MD o guaiFENesin (ROBITUSSIN) 100 MG/5ML solution 200 mg 200 mg Oral Q4H PRN   Normal Saline Flush 0.9 % injection 3 mL 3 mL Intravenous PRN   Heparin Sodium (Porcine) 5000 UNIT/ML injection 5,000 Units 5,000 Units Subcutaneous 2 times per day   acetaminophen Epistaxis     Acute pulmonary edema (HCC)     Viral gastroenteritis     Acute respiratory failure with hypoxia (HCC)     Elevated troponin     Autoantibody titer positive     Coronary artery disease     Anemia     Thrombocytopenia (HCC)     Moderate persis

## 2019-03-11 VITALS
HEIGHT: 64 IN | WEIGHT: 169.63 LBS | SYSTOLIC BLOOD PRESSURE: 118 MMHG | RESPIRATION RATE: 20 BRPM | OXYGEN SATURATION: 96 % | BODY MASS INDEX: 28.96 KG/M2 | HEART RATE: 82 BPM | DIASTOLIC BLOOD PRESSURE: 48 MMHG | TEMPERATURE: 98 F

## 2019-03-11 PROCEDURE — 99232 SBSQ HOSP IP/OBS MODERATE 35: CPT | Performed by: INTERNAL MEDICINE

## 2019-03-11 PROCEDURE — 99233 SBSQ HOSP IP/OBS HIGH 50: CPT | Performed by: HOSPITALIST

## 2019-03-11 RX ORDER — AMLODIPINE BESYLATE 2.5 MG/1
2.5 TABLET ORAL DAILY
Qty: 30 TABLET | Refills: 0 | Status: SHIPPED | OUTPATIENT
Start: 2019-03-12 | End: 2019-06-03

## 2019-03-11 RX ORDER — FUROSEMIDE 40 MG/1
40 TABLET ORAL DAILY
Qty: 30 TABLET | Refills: 0 | Status: ON HOLD | OUTPATIENT
Start: 2019-03-12 | End: 2019-04-01

## 2019-03-11 NOTE — CARDIAC REHAB
CARDIAC REHAB HEART FAILURE EDUCATION    Handouts provided and reviewed: CHF Booklet. Activity: Chair for all meals:Reviewed       Ambulation: Reviewed       Tolerated Activity:          Disease Process: Disease process reviewed.     Reviewed the follow

## 2019-03-11 NOTE — CM/SW NOTE
Karolyn Fleming from [facility] informed SW they have received insurance authorization approval and will be ready for pt today at 6pm. SW updated RN/Tameka who stated that pt's family will transport pt to [facility] at [address] notified of discharge.      RN to call repor

## 2019-03-11 NOTE — PROGRESS NOTES
[facility] AND [facility]  [facility] Cardiology Progress Note    Irajdonna Eric Patient Status:  Inpatient    1945 MRN [id number]   Location [facility] 3W/SW Attending Damon Agudelo MD   Psychiatric Day # 4 PCP Oj Hernandez MD      Acute on c LUMPECTOMY RIGHT  2014   • MASTECTOMY PARTIAL  1999   • RADIATION RIGHT  2014     Family History   Problem Relation Age of Onset   • Asthma Father    • Hypertension Father    • Heart Disorder Father    • Other (Other) Mother         thyroid surgery   • Ast

## 2019-03-11 NOTE — PROGRESS NOTES
Pulmonary Rehab handout given to patient. Discussed features of Pulm Rehab, and answered questions. Instructed patient to discuss rehab options with Doctor after discharge, and obtain an order if appropriate.

## 2019-03-11 NOTE — PROGRESS NOTES
[facility] HOSP - [facility]    Progress Note    Jose Perez Patient Status:  Inpatient    1945 MRN [id number]   [facility] 3W/SW Attending Julio Butcher MD   Hosp Day # 4 PCP Luisito Cristina MD       SUBJECTIVE: (NORVASC) tab 2.5 mg 2.5 mg Oral Daily   furosemide (LASIX) tab 40 mg 40 mg Oral Daily   Fluticasone Furoate-Vilanterol (BREO ELLIPTA) 200-25 MCG/INH inhaler 1 puff 1 puff Inhalation Daily   ipratropium-albuterol (DUONEB) nebulizer solution 3 mL 3 mL Nebul (chronic obstructive pulmonary disease) (HCC)     Allergic drug reaction     Pleural effusion     Shortness of breath     Closed fracture of multiple ribs of left side with routine healing, subsequent encounter     Epistaxis     Acute pulmonary edema ([address]

## 2019-03-11 NOTE — PROGRESS NOTES
[facility] HOSP - [facility]    Progress Note    Higinio Deleon Patient Status:  Inpatient    1945 MRN [id number]   [facility] 3W/ Attending Nidia Murrieta MD   Hosp Day # 4 PCP Reema Krause MD        Subjective: ALB 3.3 (L) 02/22/2019    ALKPHO 103 02/22/2019    BILT 0.4 02/22/2019    TP 7.5 02/22/2019    AST 29 02/22/2019    ALT 29 02/22/2019    PTT 41.2 (H) 02/26/2019    INR 1.12 03/08/2019    TSH 0.791 02/27/2019    DDIMER 0.52 02/17/2019    ESRML 57 (H) 03/

## 2019-03-11 NOTE — CDS QUERY
Pneumonia Specificity  CLINICAL DOCUMENTATION CLARIFICATION FORM    Dear Doctor:  Clinical information (provided below) indicates pneumonia.  For accurate ICD-10-CM code assignment to reflect severity of illness and risk of mortality,   PLEASE (X) ALL May Kenroyuder

## 2019-03-11 NOTE — RESPIRATORY THERAPY NOTE
Patient refused nebulizer therapy. [facility] has educated the patient on the purpose of and need for this therapy as well as potential negative outcomes associated with deferring treatment.  Despite education, patient maintains refusal.

## 2019-03-11 NOTE — OCCUPATIONAL THERAPY NOTE
OCCUPATIONAL THERAPY TREATMENT NOTE - INPATIENT        Room Number: 574/270-O           Presenting Problem: (SOB, asthma )    Problem List  Principal Problem:     Moderate persistent asthma with exacerbation  Active Problems:    Pleural effusion    Dyspnea, Bearing Restriction: None                PAIN ASSESSMENT  Ratin           ACTIVITY TOLERANCE  Pulse: 77        BP: 142/62  BP Location: Right arm  BP Method: Automatic  Patient Position: Sitting    O2 SATURATIONS  SPO2 on Room Air at Rest: 91     SPO2 Progressing          Goals  on: 3/24  Frequency: 3x a week    Omar Cruz, OTR/L   5 [facility]

## 2019-03-11 NOTE — TELEPHONE ENCOUNTER
Dr. Milton Mcnally insurance is recommending bone density test, looks like an order was generated 6/2/18 but wasn't done, should she still have this done.

## 2019-03-11 NOTE — TELEPHONE ENCOUNTER
Pt just had bypass so this bone density test that they are calling for can wait til pt recovers from her surgery

## 2019-03-11 NOTE — CM/SW NOTE
Tanya Reyes from [facility] stated they have clinical accepted pt, but are currently awaiting on insurance authorization. SW sent clinical updates to [facility]. Per RN, pt is medically stable to discharge - pending SNF placement.      [facility] [employer] authorization sti

## 2019-03-12 ENCOUNTER — INITIAL APN SNF VISIT (OUTPATIENT)
Dept: INTERNAL MEDICINE CLINIC | Facility: SKILLED NURSING FACILITY | Age: 74
End: 2019-03-12

## 2019-03-12 VITALS
OXYGEN SATURATION: 98 % | DIASTOLIC BLOOD PRESSURE: 58 MMHG | SYSTOLIC BLOOD PRESSURE: 138 MMHG | TEMPERATURE: 98 F | HEART RATE: 99 BPM | RESPIRATION RATE: 18 BRPM

## 2019-03-12 DIAGNOSIS — K21.9 GASTROESOPHAGEAL REFLUX DISEASE, ESOPHAGITIS PRESENCE NOT SPECIFIED: ICD-10-CM

## 2019-03-12 DIAGNOSIS — J81.0 ACUTE PULMONARY EDEMA (HCC): ICD-10-CM

## 2019-03-12 DIAGNOSIS — I65.23 BILATERAL CAROTID ARTERY STENOSIS: ICD-10-CM

## 2019-03-12 DIAGNOSIS — Z87.39 HISTORY OF GOUT: ICD-10-CM

## 2019-03-12 DIAGNOSIS — J44.9 ASTHMA WITH COPD (CHRONIC OBSTRUCTIVE PULMONARY DISEASE) (HCC): Chronic | ICD-10-CM

## 2019-03-12 DIAGNOSIS — J90 PLEURAL EFFUSION: ICD-10-CM

## 2019-03-12 DIAGNOSIS — J45.901 MILD ASTHMA WITH ACUTE EXACERBATION, UNSPECIFIED WHETHER PERSISTENT: ICD-10-CM

## 2019-03-12 DIAGNOSIS — J96.01 ACUTE RESPIRATORY FAILURE WITH HYPOXIA (HCC): ICD-10-CM

## 2019-03-12 DIAGNOSIS — R06.00 DYSPNEA, UNSPECIFIED TYPE: ICD-10-CM

## 2019-03-12 PROCEDURE — 1123F ACP DISCUSS/DSCN MKR DOCD: CPT | Performed by: NURSE PRACTITIONER

## 2019-03-12 PROCEDURE — 99310 SBSQ NF CARE HIGH MDM 45: CPT | Performed by: NURSE PRACTITIONER

## 2019-03-12 NOTE — PROGRESS NOTES
Jose Eduardo Tavarez  : 1945  Age 69year old  female patient is admitted to [place] [address] for rehabilitation and strengthening. Chief complaint: Asthma exacerbation, possible fluid overload.      HPI  Admitted to [facility] for 74-year-o breast 76     Social History    Tobacco Use      Smoking status: Never Smoker      Smokeless tobacco: Never Used    Alcohol use: No      Comment: rarely at weddings    Drug use: No      ALLERGIES:    Allopurinol             HIVES, SWELLING, SHORTNESS OF (75 mg total) by mouth daily. Disp: 90 tablet Rfl: 0   famoTIDine 20 MG Oral Tab Take 1 tablet (20 mg total) by mouth 2 (two) times daily.  Disp: 60 tablet Rfl: 0   traMADol HCl 50 MG Oral Tab Take 1 tablet (50 mg total) by mouth every 6 (six) hours as need EXAM:  GENERAL HEALTH: well developed, well nourished, in no apparent distress  LINES, TUBES, DRAINS:  none  SKIN: no rashes, no suspicious lesions  WOUND: chest wound, open to air, clean dry intact  EYES: PERRLA, EOMI, sclera anicteric, conjunctiva normal ezetimibe  - monitor    Hypertension  - continue medications  - monitor    Generalized weakness  - PT/OT    Discussed advanced care planning with patient and she would like to remain a full code at this time    This is a 35 minute visit and greater than 50

## 2019-03-13 ENCOUNTER — SNF VISIT (OUTPATIENT)
Dept: INTERNAL MEDICINE CLINIC | Facility: SKILLED NURSING FACILITY | Age: 74
End: 2019-03-13

## 2019-03-13 ENCOUNTER — TELEPHONE (OUTPATIENT)
Dept: MEDSURG UNIT | Facility: HOSPITAL | Age: 74
End: 2019-03-13

## 2019-03-13 VITALS
OXYGEN SATURATION: 92 % | DIASTOLIC BLOOD PRESSURE: 60 MMHG | RESPIRATION RATE: 18 BRPM | WEIGHT: 168.38 LBS | BODY MASS INDEX: 29 KG/M2 | HEART RATE: 77 BPM | TEMPERATURE: 98 F | SYSTOLIC BLOOD PRESSURE: 125 MMHG

## 2019-03-13 DIAGNOSIS — J81.0 ACUTE PULMONARY EDEMA (HCC): ICD-10-CM

## 2019-03-13 DIAGNOSIS — J45.901 MILD ASTHMA WITH ACUTE EXACERBATION, UNSPECIFIED WHETHER PERSISTENT: ICD-10-CM

## 2019-03-13 DIAGNOSIS — J96.01 ACUTE RESPIRATORY FAILURE WITH HYPOXIA (HCC): ICD-10-CM

## 2019-03-13 PROCEDURE — 99308 SBSQ NF CARE LOW MDM 20: CPT | Performed by: NURSE PRACTITIONER

## 2019-03-13 RX ORDER — AMIODARONE HYDROCHLORIDE 200 MG/1
TABLET ORAL
Qty: 90 TABLET | Refills: 0 | Status: SHIPPED | OUTPATIENT
Start: 2019-03-13 | End: 2019-04-24 | Stop reason: SDUPTHER

## 2019-03-13 NOTE — PROGRESS NOTES
Renetta Balderrama  : 1945  Age 69year old  female patient is admitted to [place] [address] for rehabilitation and strengthening       Chief complaint: Asthma exacerbation, possible fluid overload.      HPI   Admitted to [facility], who had co vomiting or diarrhea, deneis chest pain, denies change in bowel or bladder function    PHYSICAL EXAM:  GENERAL HEALTH: well developed, well nourished, in no apparent distress  LINES, TUBES, DRAINS:  none  SKIN: no rashes, no suspicious lesions  WOUND: ches monitor     Hyperlipidema  - continue ezetimibe  - monitor     Hypertension  - continue medications  - monitor     Generalized weakness  - PT/OT    This is a 15 minute visit and greater than 50% of the time was spent counseling the patient and/or coordinat

## 2019-03-14 PROCEDURE — 99306 1ST NF CARE HIGH MDM 50: CPT | Performed by: INTERNAL MEDICINE

## 2019-03-14 PROCEDURE — 99305 1ST NF CARE MODERATE MDM 35: CPT | Performed by: INTERNAL MEDICINE

## 2019-03-14 NOTE — PROGRESS NOTES
[address] Patient Status:  Outpatient    1945 MRN [id number]   Location MD Dr. Zita Abebe is a 69year old female who presents to c 36.0 (H) 03/11/2019 05:58 AM     (H) 03/11/2019 05:58 AM    CA 9.4 03/11/2019 05:58 AM    ALB 3.3 (L) 02/22/2019 09:46 AM    ALKPHO 103 02/22/2019 09:46 AM    BILT 0.4 02/22/2019 09:46 AM    TP 7.5 02/22/2019 09:46 AM    AST 29 02/22/2019 09:46 AM lesion: There is a 100% stenosis. RCA posterolateral extension:  Moderate collateral flow from the  LAD. CXR 3/8/19  CONCLUSION:   1. No significant change in the appearance of left retrocardiac opacification. 2. No evidence of pneumothorax.    Dictate medications    Wear compression stockings and elevate legs while sitting    Use your incentive spirometer 4 sets of 10 daily and use the Acapella/pickle/hippo breathing devices 4 sets of 10 daily    Return to [address] [place] [address] as needed or directed

## 2019-03-15 ENCOUNTER — OFFICE VISIT (OUTPATIENT)
Dept: CARDIOLOGY CLINIC | Facility: HOSPITAL | Age: 74
End: 2019-03-15
Attending: NURSE PRACTITIONER
Payer: MEDICARE

## 2019-03-15 ENCOUNTER — SNF VISIT (OUTPATIENT)
Dept: INTERNAL MEDICINE CLINIC | Facility: SKILLED NURSING FACILITY | Age: 74
End: 2019-03-15

## 2019-03-15 VITALS
TEMPERATURE: 98 F | BODY MASS INDEX: 29 KG/M2 | RESPIRATION RATE: 18 BRPM | HEART RATE: 80 BPM | WEIGHT: 169.69 LBS | SYSTOLIC BLOOD PRESSURE: 135 MMHG | DIASTOLIC BLOOD PRESSURE: 63 MMHG

## 2019-03-15 VITALS
WEIGHT: 169.81 LBS | HEART RATE: 82 BPM | DIASTOLIC BLOOD PRESSURE: 50 MMHG | OXYGEN SATURATION: 91 % | SYSTOLIC BLOOD PRESSURE: 127 MMHG | BODY MASS INDEX: 29 KG/M2

## 2019-03-15 DIAGNOSIS — J45.901 MILD ASTHMA WITH ACUTE EXACERBATION, UNSPECIFIED WHETHER PERSISTENT: ICD-10-CM

## 2019-03-15 DIAGNOSIS — I25.10 CORONARY ARTERY DISEASE, ANGINA PRESENCE UNSPECIFIED, UNSPECIFIED VESSEL OR LESION TYPE, UNSPECIFIED WHETHER NATIVE OR TRANSPLANTED HEART: ICD-10-CM

## 2019-03-15 DIAGNOSIS — J81.0 ACUTE PULMONARY EDEMA (HCC): ICD-10-CM

## 2019-03-15 DIAGNOSIS — J96.01 ACUTE RESPIRATORY FAILURE WITH HYPOXIA (HCC): ICD-10-CM

## 2019-03-15 DIAGNOSIS — I65.23 BILATERAL CAROTID ARTERY STENOSIS: ICD-10-CM

## 2019-03-15 DIAGNOSIS — I10 ESSENTIAL HYPERTENSION: ICD-10-CM

## 2019-03-15 DIAGNOSIS — I51.89 DIASTOLIC DYSFUNCTION: Primary | ICD-10-CM

## 2019-03-15 DIAGNOSIS — K21.9 GASTROESOPHAGEAL REFLUX DISEASE, ESOPHAGITIS PRESENCE NOT SPECIFIED: ICD-10-CM

## 2019-03-15 DIAGNOSIS — I50.9 HEART FAILURE, UNSPECIFIED (HCC): ICD-10-CM

## 2019-03-15 DIAGNOSIS — Z95.1 S/P CABG X 3: ICD-10-CM

## 2019-03-15 DIAGNOSIS — R06.00 DYSPNEA, UNSPECIFIED TYPE: ICD-10-CM

## 2019-03-15 PROCEDURE — 99309 SBSQ NF CARE MODERATE MDM 30: CPT | Performed by: NURSE PRACTITIONER

## 2019-03-15 PROCEDURE — 36415 COLL VENOUS BLD VENIPUNCTURE: CPT | Performed by: NURSE PRACTITIONER

## 2019-03-15 PROCEDURE — 99213 OFFICE O/P EST LOW 20 MIN: CPT | Performed by: NURSE PRACTITIONER

## 2019-03-15 RX ORDER — AMIODARONE HYDROCHLORIDE 200 MG/1
200 TABLET ORAL 2 TIMES DAILY
Qty: 30 TABLET | Refills: 0 | COMMUNITY
Start: 2019-03-15 | End: 2019-05-02

## 2019-03-15 NOTE — PATIENT INSTRUCTIONS
Assessment:   Overall feeling ok. Major complaint around acid reflux and palpitations at night after taking one of her medications.  Lungs clear, RRR, trace LE edema bilaterally    Continue current medications    Wear compression stockings and elevate legs

## 2019-03-15 NOTE — PROGRESS NOTES
Yoan Ramos  : 1945  Age 69year old  female patient is admitted to [place] [address] for rehabilitation and strengthening       Chief complaint: Asthma exacerbation, possible fluid overload.     HPI   Admitted to [facility] AND [facility], who had cor EXAM:  GENERAL HEALTH: well developed, well nourished, in no apparent distress  LINES, TUBES, DRAINS:  none  SKIN: no rashes, no suspicious lesions  WOUND: chest wound, open to air, clean dry intact  EYES: PERRLA, EOMI, sclera anicteric, conjunctiva normal medications  - monitor     Generalized weakness  - PT/OT    This is a 25 minute visit and greater than 50% of the time was spent counseling the patient and/or coordinating care.       ORA Nolan  03/15/19

## 2019-03-18 PROCEDURE — 99309 SBSQ NF CARE MODERATE MDM 30: CPT | Performed by: INTERNAL MEDICINE

## 2019-03-19 ENCOUNTER — SNF VISIT (OUTPATIENT)
Dept: INTERNAL MEDICINE CLINIC | Facility: SKILLED NURSING FACILITY | Age: 74
End: 2019-03-19

## 2019-03-19 VITALS
RESPIRATION RATE: 18 BRPM | OXYGEN SATURATION: 96 % | HEART RATE: 73 BPM | TEMPERATURE: 98 F | BODY MASS INDEX: 29 KG/M2 | WEIGHT: 167.81 LBS | SYSTOLIC BLOOD PRESSURE: 127 MMHG | DIASTOLIC BLOOD PRESSURE: 60 MMHG

## 2019-03-19 DIAGNOSIS — J96.01 ACUTE RESPIRATORY FAILURE WITH HYPOXIA (HCC): ICD-10-CM

## 2019-03-19 DIAGNOSIS — J45.901 MILD ASTHMA WITH ACUTE EXACERBATION, UNSPECIFIED WHETHER PERSISTENT: ICD-10-CM

## 2019-03-19 DIAGNOSIS — J81.0 ACUTE PULMONARY EDEMA (HCC): ICD-10-CM

## 2019-03-19 DIAGNOSIS — K21.9 GASTROESOPHAGEAL REFLUX DISEASE, ESOPHAGITIS PRESENCE NOT SPECIFIED: ICD-10-CM

## 2019-03-19 PROCEDURE — 99309 SBSQ NF CARE MODERATE MDM 30: CPT | Performed by: NURSE PRACTITIONER

## 2019-03-19 NOTE — PROGRESS NOTES
Rajan Red  : 1945  Age 69year old  female patient is admitted to [place] [address] for rehabilitation and strengthening       Chief complaint: Asthma exacerbation, possible fluid overload.         HPI   Admitted to [facility] AND [facility], who had home, and it was added to BID after surgery but she isn't getting any relief    PHYSICAL EXAM:  GENERAL HEALTH: well developed, well nourished, in no apparent distress  LINES, TUBES, DRAINS:  none  SKIN: no rashes, no suspicious lesions  WOUND: chest wound medications - amlodipine, metoprolol and amiodarone  - Continue Plavix and aspirin     Gout  - continue colchicine  - monitor     Hyperlipidema  - continue ezetimibe  - monitor     Hypertension  - continue medications  - monitor     Generalized weakness  -

## 2019-03-20 ENCOUNTER — TELEPHONE (OUTPATIENT)
Dept: PULMONOLOGY | Facility: CLINIC | Age: 74
End: 2019-03-20

## 2019-03-20 DIAGNOSIS — J90 PLEURAL EFFUSION, LEFT: Primary | ICD-10-CM

## 2019-03-20 PROCEDURE — 99309 SBSQ NF CARE MODERATE MDM 30: CPT | Performed by: INTERNAL MEDICINE

## 2019-03-20 NOTE — TELEPHONE ENCOUNTER
Telephone rx w/ readback rcvd from Dr. Kanu Sam for Left Thoracentesis for left pleural effusion to be done by IR.

## 2019-03-20 NOTE — TELEPHONE ENCOUNTER
Informed Bonny Dakins of Dr. Carissa Arreguin orders & per Dr. Jose Bhatia he spoke w/ Jesse Hardin.  Explained rx entered under US Thoracentesis Guided Left w/ comments stating Left Thoracentesis to be done by Interventional Radiology per her request. Alejandro Byrnest her to let me know

## 2019-03-21 ENCOUNTER — APPOINTMENT (OUTPATIENT)
Dept: CARDIOLOGY | Age: 74
End: 2019-03-21

## 2019-03-21 ENCOUNTER — SNF VISIT (OUTPATIENT)
Dept: INTERNAL MEDICINE CLINIC | Facility: SKILLED NURSING FACILITY | Age: 74
End: 2019-03-21

## 2019-03-21 VITALS
TEMPERATURE: 98 F | RESPIRATION RATE: 18 BRPM | HEART RATE: 69 BPM | SYSTOLIC BLOOD PRESSURE: 138 MMHG | BODY MASS INDEX: 29 KG/M2 | WEIGHT: 166.81 LBS | DIASTOLIC BLOOD PRESSURE: 63 MMHG

## 2019-03-21 DIAGNOSIS — J90 PLEURAL EFFUSION: ICD-10-CM

## 2019-03-21 DIAGNOSIS — J45.901 MILD ASTHMA WITH ACUTE EXACERBATION, UNSPECIFIED WHETHER PERSISTENT: ICD-10-CM

## 2019-03-21 DIAGNOSIS — K21.9 GASTROESOPHAGEAL REFLUX DISEASE, ESOPHAGITIS PRESENCE NOT SPECIFIED: ICD-10-CM

## 2019-03-21 DIAGNOSIS — J81.0 ACUTE PULMONARY EDEMA (HCC): ICD-10-CM

## 2019-03-21 DIAGNOSIS — Z87.39 HISTORY OF GOUT: ICD-10-CM

## 2019-03-21 DIAGNOSIS — I25.10 CORONARY ARTERY DISEASE, ANGINA PRESENCE UNSPECIFIED, UNSPECIFIED VESSEL OR LESION TYPE, UNSPECIFIED WHETHER NATIVE OR TRANSPLANTED HEART: ICD-10-CM

## 2019-03-21 PROCEDURE — 99309 SBSQ NF CARE MODERATE MDM 30: CPT | Performed by: NURSE PRACTITIONER

## 2019-03-21 PROCEDURE — 99308 SBSQ NF CARE LOW MDM 20: CPT | Performed by: INTERNAL MEDICINE

## 2019-03-21 NOTE — PROGRESS NOTES
Suzi Clark  : 1945  Age 69year old  female patient is admitted to [place] [address] for rehabilitation and strengthening     Chief complaint: Asthma exacerbation, possible fluid overload.     HPI     Admitted to [facility], who had cor or diarrhea. She discussed how this is the most medications she has ever taken and she doesn't want to take them anymore. She states they dont make her feel well. No acute events overnight.      Discussed with patient the importance of taking her medicati in [facility]  - patient scheduled for another thoracentesis Wed 03/27  - hold plavix for 5 days and aspirin for 3  - on room air, stats 97%  - continue inhalers - breo 200/25 daily  - Follow up labs  - Chest Xray ordered     Acute Asthma Exacerbation  - see Jaimie Tejeda

## 2019-03-22 ENCOUNTER — TELEPHONE (OUTPATIENT)
Dept: PULMONOLOGY | Facility: CLINIC | Age: 74
End: 2019-03-22

## 2019-03-22 NOTE — TELEPHONE ENCOUNTER
Pt requests RN tell MD that she did not sleep last night & has wheezing in her lungs. She c/o chest congestion. Pt denies dyspnea, fever, cough, or any other sx.  She also requests Dr. Jerardo Freed talk to her Cardiologist & check her medications since she has ha

## 2019-03-22 NOTE — TELEPHONE ENCOUNTER
Pt calling to castro watt rn, pt indicates she is still in the [facility], pls call at:[phone],thanks.

## 2019-03-22 NOTE — TELEPHONE ENCOUNTER
I already spoke to pulmonary nurse at [facility]   Pt not compliant with her inhaler   Pt needs to use Advair 500/50 bid   singulair 10 mg daily   Albuterol neb every 6 hrs as needed

## 2019-03-24 ENCOUNTER — TELEPHONE (OUTPATIENT)
Dept: INTERNAL MEDICINE CLINIC | Facility: CLINIC | Age: 74
End: 2019-03-24

## 2019-03-24 NOTE — TELEPHONE ENCOUNTER
Pt paged me and was havng gout flare up. Pt however is in SNF so I told her she needs to talk to nurses and then they can call the attending MD. I told her I cant order any treatment right now since she is in SNF.  She told me she already talked to nurse pr

## 2019-03-25 ENCOUNTER — SNF VISIT (OUTPATIENT)
Dept: INTERNAL MEDICINE CLINIC | Facility: SKILLED NURSING FACILITY | Age: 74
End: 2019-03-25

## 2019-03-25 ENCOUNTER — TELEPHONE (OUTPATIENT)
Dept: PULMONOLOGY | Facility: CLINIC | Age: 74
End: 2019-03-25

## 2019-03-25 VITALS
SYSTOLIC BLOOD PRESSURE: 129 MMHG | DIASTOLIC BLOOD PRESSURE: 31 MMHG | BODY MASS INDEX: 28 KG/M2 | HEART RATE: 98 BPM | TEMPERATURE: 98 F | WEIGHT: 162 LBS | RESPIRATION RATE: 18 BRPM

## 2019-03-25 DIAGNOSIS — J90 PLEURAL EFFUSION: ICD-10-CM

## 2019-03-25 DIAGNOSIS — J96.01 ACUTE RESPIRATORY FAILURE WITH HYPOXIA (HCC): ICD-10-CM

## 2019-03-25 DIAGNOSIS — J45.30 MILD PERSISTENT ASTHMA WITHOUT COMPLICATION: ICD-10-CM

## 2019-03-25 DIAGNOSIS — I65.23 BILATERAL CAROTID ARTERY STENOSIS: ICD-10-CM

## 2019-03-25 DIAGNOSIS — K21.9 GASTROESOPHAGEAL REFLUX DISEASE, ESOPHAGITIS PRESENCE NOT SPECIFIED: ICD-10-CM

## 2019-03-25 DIAGNOSIS — I10 ESSENTIAL HYPERTENSION: ICD-10-CM

## 2019-03-25 DIAGNOSIS — J45.901 MILD ASTHMA WITH ACUTE EXACERBATION, UNSPECIFIED WHETHER PERSISTENT: ICD-10-CM

## 2019-03-25 PROCEDURE — 99309 SBSQ NF CARE MODERATE MDM 30: CPT | Performed by: NURSE PRACTITIONER

## 2019-03-25 RX ORDER — HYDRALAZINE HYDROCHLORIDE 25 MG/1
25 TABLET, FILM COATED ORAL 2 TIMES DAILY
COMMUNITY
End: 2019-04-08

## 2019-03-25 RX ORDER — ONDANSETRON 4 MG/1
4 TABLET, FILM COATED ORAL EVERY 8 HOURS PRN
COMMUNITY
End: 2019-06-25

## 2019-03-25 RX ORDER — PANTOPRAZOLE SODIUM 20 MG/1
20 TABLET, DELAYED RELEASE ORAL
COMMUNITY
End: 2019-04-08

## 2019-03-25 RX ORDER — ALBUTEROL SULFATE 2.5 MG/3ML
SOLUTION RESPIRATORY (INHALATION) 2 TIMES DAILY
Status: ON HOLD | COMMUNITY
End: 2019-04-01

## 2019-03-25 RX ORDER — MELATONIN
3 NIGHTLY PRN
COMMUNITY
End: 2019-06-25

## 2019-03-25 RX ORDER — PREDNISONE 20 MG/1
20 TABLET ORAL DAILY
Status: ON HOLD | COMMUNITY
End: 2019-04-01

## 2019-03-25 NOTE — TELEPHONE ENCOUNTER
Managed Care would only get a referral for the consult/ OV is needed. No referral is needed. Dr. Dr. Santiago Shane office would obtain authorization for the procedure if needed.      Thank you,  [facility]

## 2019-03-25 NOTE — TELEPHONE ENCOUNTER
Message left informing [address] [place] [address] completes PA's for this office and they can be reached at [phone].

## 2019-03-25 NOTE — PROGRESS NOTES
Mike Mcdaniel  : 1945  Age 69year old  female patient is admitted to [place] [address] for rehabilitation and strengthening       Chief complaint: Asthma exacerbation, possible fluid overload    HPI   Admitted to [facility] AND [facility], who had coron breath of chest pain. Denies changes in bowel or bladder function.        PHYSICAL EXAM:  GENERAL HEALTH: well developed, well nourished, in no apparent distress  LINES, TUBES, DRAINS:  none  SKIN: no rashes, no suspicious lesions  WOUND: chest incision, op Asthma Exacerbation  - see above  - pulmonary to follow in rehab  - continue inhalers  - Nebs BID     Hx of CAD  - s/p Coronary artery bypass graft surgery on 02/2019  - Echo done in [facility], no change  - cardiology to follow in rehab  - continue lasix  - camille

## 2019-03-25 NOTE — TELEPHONE ENCOUNTER
Rich/prior auth calling for procedure karen 3/27/19, asking if procedure requires auth and asking for procedure code. [facility] call at:[phone],thanks.

## 2019-03-26 ENCOUNTER — SNF VISIT (OUTPATIENT)
Dept: INTERNAL MEDICINE CLINIC | Facility: SKILLED NURSING FACILITY | Age: 74
End: 2019-03-26

## 2019-03-26 VITALS — TEMPERATURE: 98 F | WEIGHT: 162 LBS | RESPIRATION RATE: 18 BRPM | BODY MASS INDEX: 28 KG/M2 | HEART RATE: 85 BPM

## 2019-03-26 DIAGNOSIS — K21.9 GASTROESOPHAGEAL REFLUX DISEASE, ESOPHAGITIS PRESENCE NOT SPECIFIED: ICD-10-CM

## 2019-03-26 DIAGNOSIS — I10 ESSENTIAL HYPERTENSION: ICD-10-CM

## 2019-03-26 DIAGNOSIS — R06.00 DYSPNEA, UNSPECIFIED TYPE: ICD-10-CM

## 2019-03-26 DIAGNOSIS — J44.9 ASTHMA WITH COPD (CHRONIC OBSTRUCTIVE PULMONARY DISEASE) (HCC): Chronic | ICD-10-CM

## 2019-03-26 DIAGNOSIS — J45.901 MILD ASTHMA WITH ACUTE EXACERBATION, UNSPECIFIED WHETHER PERSISTENT: ICD-10-CM

## 2019-03-26 DIAGNOSIS — J90 PLEURAL EFFUSION: ICD-10-CM

## 2019-03-26 DIAGNOSIS — I65.23 BILATERAL CAROTID ARTERY STENOSIS: ICD-10-CM

## 2019-03-26 DIAGNOSIS — Z87.39 HISTORY OF GOUT: ICD-10-CM

## 2019-03-26 PROCEDURE — 99308 SBSQ NF CARE LOW MDM 20: CPT | Performed by: NURSE PRACTITIONER

## 2019-03-26 NOTE — TELEPHONE ENCOUNTER
Called and LDM on Rich's voice mail with [facility]'s answer below. Left phone number for [facility] care if he has further questions.

## 2019-03-26 NOTE — PROGRESS NOTES
Alicia Amaral  : 1945  Age 69year old  female patient is admitted to [place] [address] for rehabilitation and strengthening       Chief complaint: Asthma exacerbation, possible fluid overload      HPI   Admitted to [facility],who had janice feel back to normal. She states she has no complaints today.  No acute events overnight  Denies shortness of breath, chest pain, nausea, vomiting or diarrhea    PHYSICAL EXAM:  GENERAL HEALTH: well developed, well nourished, in no apparent distress  LINES, continue inhalers  - Nebs BID     Hx of CAD  - s/p Coronary artery bypass graft surgery on 02/2019  - Echo done in [address] [place] [address], no change  - cardiology to follow in rehab  - continue lasix  - monitor I and Os  - Monitor daily weights  - Pt to follow up with heart

## 2019-03-27 ENCOUNTER — APPOINTMENT (OUTPATIENT)
Dept: GENERAL RADIOLOGY | Facility: HOSPITAL | Age: 74
DRG: 187 | End: 2019-03-27
Attending: EMERGENCY MEDICINE
Payer: MEDICARE

## 2019-03-27 ENCOUNTER — HOSPITAL ENCOUNTER (OUTPATIENT)
Dept: INTERVENTIONAL RADIOLOGY/VASCULAR | Facility: HOSPITAL | Age: 74
Discharge: HOME | DRG: 187 | End: 2019-03-27
Attending: INTERNAL MEDICINE | Admitting: INTERNAL MEDICINE
Payer: MEDICARE

## 2019-03-27 ENCOUNTER — TELEPHONE (OUTPATIENT)
Dept: PULMONOLOGY | Facility: CLINIC | Age: 74
End: 2019-03-27

## 2019-03-27 ENCOUNTER — HOSPITAL ENCOUNTER (INPATIENT)
Facility: HOSPITAL | Age: 74
LOS: 11 days | Discharge: HOME HEALTH | DRG: 187 | End: 2019-04-07
Attending: EMERGENCY MEDICINE | Admitting: HOSPITALIST
Payer: MEDICARE

## 2019-03-27 ENCOUNTER — APPOINTMENT (OUTPATIENT)
Dept: GENERAL RADIOLOGY | Facility: HOSPITAL | Age: 74
DRG: 187 | End: 2019-03-27
Attending: RADIOLOGY
Payer: MEDICARE

## 2019-03-27 VITALS
WEIGHT: 162 LBS | BODY MASS INDEX: 28 KG/M2 | DIASTOLIC BLOOD PRESSURE: 72 MMHG | RESPIRATION RATE: 23 BRPM | HEART RATE: 87 BPM | SYSTOLIC BLOOD PRESSURE: 135 MMHG | OXYGEN SATURATION: 93 %

## 2019-03-27 DIAGNOSIS — J90 PLEURAL EFFUSION: Primary | ICD-10-CM

## 2019-03-27 DIAGNOSIS — J90 PLEURAL EFFUSION, LEFT: ICD-10-CM

## 2019-03-27 PROCEDURE — 82945 GLUCOSE OTHER FLUID: CPT | Performed by: INTERNAL MEDICINE

## 2019-03-27 PROCEDURE — 83615 LACTATE (LD) (LDH) ENZYME: CPT | Performed by: INTERNAL MEDICINE

## 2019-03-27 PROCEDURE — 71045 X-RAY EXAM CHEST 1 VIEW: CPT | Performed by: EMERGENCY MEDICINE

## 2019-03-27 PROCEDURE — 89051 BODY FLUID CELL COUNT: CPT | Performed by: INTERNAL MEDICINE

## 2019-03-27 PROCEDURE — 87116 MYCOBACTERIA CULTURE: CPT | Performed by: INTERNAL MEDICINE

## 2019-03-27 PROCEDURE — 87206 SMEAR FLUORESCENT/ACID STAI: CPT | Performed by: INTERNAL MEDICINE

## 2019-03-27 PROCEDURE — 85014 HEMATOCRIT: CPT | Performed by: INTERNAL MEDICINE

## 2019-03-27 PROCEDURE — 99223 1ST HOSP IP/OBS HIGH 75: CPT | Performed by: HOSPITALIST

## 2019-03-27 PROCEDURE — 32555 ASPIRATE PLEURA W/ IMAGING: CPT

## 2019-03-27 PROCEDURE — 87205 SMEAR GRAM STAIN: CPT | Performed by: INTERNAL MEDICINE

## 2019-03-27 PROCEDURE — 71045 X-RAY EXAM CHEST 1 VIEW: CPT | Performed by: RADIOLOGY

## 2019-03-27 PROCEDURE — 87102 FUNGUS ISOLATION CULTURE: CPT | Performed by: INTERNAL MEDICINE

## 2019-03-27 PROCEDURE — 82962 GLUCOSE BLOOD TEST: CPT

## 2019-03-27 PROCEDURE — 88112 CYTOPATH CELL ENHANCE TECH: CPT | Performed by: INTERNAL MEDICINE

## 2019-03-27 PROCEDURE — 87070 CULTURE OTHR SPECIMN AEROBIC: CPT | Performed by: INTERNAL MEDICINE

## 2019-03-27 PROCEDURE — 99222 1ST HOSP IP/OBS MODERATE 55: CPT | Performed by: INTERNAL MEDICINE

## 2019-03-27 PROCEDURE — 0W9B30Z DRAINAGE OF LEFT PLEURAL CAVITY WITH DRAINAGE DEVICE, PERCUTANEOUS APPROACH: ICD-10-PCS | Performed by: RADIOLOGY

## 2019-03-27 PROCEDURE — 84157 ASSAY OF PROTEIN OTHER: CPT | Performed by: INTERNAL MEDICINE

## 2019-03-27 PROCEDURE — 89050 BODY FLUID CELL COUNT: CPT | Performed by: INTERNAL MEDICINE

## 2019-03-27 RX ORDER — MORPHINE SULFATE 4 MG/ML
4 INJECTION, SOLUTION INTRAMUSCULAR; INTRAVENOUS EVERY 2 HOUR PRN
Status: DISCONTINUED | OUTPATIENT
Start: 2019-03-27 | End: 2019-04-07

## 2019-03-27 RX ORDER — MORPHINE SULFATE 2 MG/ML
1 INJECTION, SOLUTION INTRAMUSCULAR; INTRAVENOUS EVERY 2 HOUR PRN
Status: DISCONTINUED | OUTPATIENT
Start: 2019-03-27 | End: 2019-04-07

## 2019-03-27 RX ORDER — DEXTROSE MONOHYDRATE 25 G/50ML
50 INJECTION, SOLUTION INTRAVENOUS AS NEEDED
Status: DISCONTINUED | OUTPATIENT
Start: 2019-03-27 | End: 2019-04-07

## 2019-03-27 RX ORDER — ONDANSETRON 2 MG/ML
4 INJECTION INTRAMUSCULAR; INTRAVENOUS EVERY 6 HOURS PRN
Status: DISCONTINUED | OUTPATIENT
Start: 2019-03-27 | End: 2019-04-07

## 2019-03-27 RX ORDER — ACETAMINOPHEN 325 MG/1
650 TABLET ORAL EVERY 6 HOURS PRN
Status: DISCONTINUED | OUTPATIENT
Start: 2019-03-27 | End: 2019-04-07

## 2019-03-27 RX ORDER — SODIUM CHLORIDE 0.9 % (FLUSH) 0.9 %
3 SYRINGE (ML) INJECTION AS NEEDED
Status: DISCONTINUED | OUTPATIENT
Start: 2019-03-27 | End: 2019-04-07

## 2019-03-27 RX ORDER — MORPHINE SULFATE 2 MG/ML
2 INJECTION, SOLUTION INTRAMUSCULAR; INTRAVENOUS EVERY 2 HOUR PRN
Status: DISCONTINUED | OUTPATIENT
Start: 2019-03-27 | End: 2019-04-07

## 2019-03-27 NOTE — TELEPHONE ENCOUNTER
DANEYL Lilly. Patient going to ER for severe, constant 9/10 chest pain and SOB post thoracentesis this morning. Spoke with Gabino López. She had a thoracentesis this morning.  She states the procedure went well but she is experiencing a lot of pain and sh

## 2019-03-27 NOTE — PROGRESS NOTES
Bedside thoracentisis completed. No sedation given. 360 ml removed. Fluid sent for labs. Patient tolerated procedure well. Report given to [facility].

## 2019-03-27 NOTE — ED INITIAL ASSESSMENT (HPI)
Patient brought in by EMS for c/o SOB s/p thoracentesis this morning for pleural effusions. Recent open heart surgery.

## 2019-03-27 NOTE — PROCEDURES
[facility] HOSP - [facility]  Procedure Note    Wu Robison Patient Status:  Outpatient in a Bed    1945 MRN [id number]   Location [facility] Attending Marcela Dan MD   Hosp Day # 0 PCP Marcela Garvin,

## 2019-03-27 NOTE — PROGRESS NOTES
Pt is able to sit up and ambulate without difficulty. Procedural access site is dry and intact with no signs and symptoms of bleeding and hematoma. Instructions provided. Pt/family verbalized understanding.  Dr. Yecenia Willis and Esperanza Chung spoke to pt and family

## 2019-03-28 ENCOUNTER — APPOINTMENT (OUTPATIENT)
Dept: ULTRASOUND IMAGING | Facility: HOSPITAL | Age: 74
DRG: 187 | End: 2019-03-28
Attending: INTERNAL MEDICINE
Payer: MEDICARE

## 2019-03-28 ENCOUNTER — EXTERNAL FACILITY (OUTPATIENT)
Dept: INTERNAL MEDICINE CLINIC | Facility: CLINIC | Age: 74
End: 2019-03-28

## 2019-03-28 ENCOUNTER — APPOINTMENT (OUTPATIENT)
Dept: GENERAL RADIOLOGY | Facility: HOSPITAL | Age: 74
DRG: 187 | End: 2019-03-28
Attending: HOSPITALIST
Payer: MEDICARE

## 2019-03-28 ENCOUNTER — APPOINTMENT (OUTPATIENT)
Dept: CT IMAGING | Facility: HOSPITAL | Age: 74
DRG: 187 | End: 2019-03-28
Attending: NURSE PRACTITIONER
Payer: MEDICARE

## 2019-03-28 DIAGNOSIS — I10 ESSENTIAL HYPERTENSION: ICD-10-CM

## 2019-03-28 DIAGNOSIS — Z95.1 S/P CABG X 3: ICD-10-CM

## 2019-03-28 DIAGNOSIS — J44.9 ASTHMA WITH COPD (CHRONIC OBSTRUCTIVE PULMONARY DISEASE) (HCC): Chronic | ICD-10-CM

## 2019-03-28 DIAGNOSIS — J96.01 ACUTE RESPIRATORY FAILURE WITH HYPOXIA (HCC): ICD-10-CM

## 2019-03-28 DIAGNOSIS — J45.901 MILD ASTHMA WITH ACUTE EXACERBATION, UNSPECIFIED WHETHER PERSISTENT: ICD-10-CM

## 2019-03-28 DIAGNOSIS — S22.42XD CLOSED FRACTURE OF MULTIPLE RIBS OF LEFT SIDE WITH ROUTINE HEALING, SUBSEQUENT ENCOUNTER: ICD-10-CM

## 2019-03-28 DIAGNOSIS — R06.02 SHORTNESS OF BREATH: ICD-10-CM

## 2019-03-28 DIAGNOSIS — I51.89 DIASTOLIC DYSFUNCTION: ICD-10-CM

## 2019-03-28 DIAGNOSIS — I25.10 CORONARY ARTERY DISEASE, ANGINA PRESENCE UNSPECIFIED, UNSPECIFIED VESSEL OR LESION TYPE, UNSPECIFIED WHETHER NATIVE OR TRANSPLANTED HEART: ICD-10-CM

## 2019-03-28 DIAGNOSIS — J90 PLEURAL EFFUSION: ICD-10-CM

## 2019-03-28 PROCEDURE — 99233 SBSQ HOSP IP/OBS HIGH 50: CPT | Performed by: HOSPITALIST

## 2019-03-28 PROCEDURE — 71250 CT THORAX DX C-: CPT | Performed by: NURSE PRACTITIONER

## 2019-03-28 PROCEDURE — 99232 SBSQ HOSP IP/OBS MODERATE 35: CPT | Performed by: INTERNAL MEDICINE

## 2019-03-28 PROCEDURE — 93970 EXTREMITY STUDY: CPT | Performed by: INTERNAL MEDICINE

## 2019-03-28 PROCEDURE — 71045 X-RAY EXAM CHEST 1 VIEW: CPT | Performed by: HOSPITALIST

## 2019-03-28 RX ORDER — CEFAZOLIN SODIUM/WATER 2 G/20 ML
2 SYRINGE (ML) INTRAVENOUS ONCE
Status: COMPLETED | OUTPATIENT
Start: 2019-03-29 | End: 2019-03-29

## 2019-03-28 RX ORDER — HEPARIN SODIUM AND DEXTROSE 10000; 5 [USP'U]/100ML; G/100ML
INJECTION INTRAVENOUS CONTINUOUS
Status: DISPENSED | OUTPATIENT
Start: 2019-03-28 | End: 2019-03-30

## 2019-03-28 RX ORDER — PROBENECID 500 MG/1
250 TABLET, FILM COATED ORAL DAILY
Status: DISCONTINUED | OUTPATIENT
Start: 2019-03-28 | End: 2019-04-07

## 2019-03-28 RX ORDER — MONTELUKAST SODIUM 10 MG/1
10 TABLET ORAL NIGHTLY
Status: DISCONTINUED | OUTPATIENT
Start: 2019-03-28 | End: 2019-04-07

## 2019-03-28 RX ORDER — HEPARIN SODIUM AND DEXTROSE 10000; 5 [USP'U]/100ML; G/100ML
18 INJECTION INTRAVENOUS ONCE
Status: COMPLETED | OUTPATIENT
Start: 2019-03-28 | End: 2019-03-28

## 2019-03-28 RX ORDER — AMLODIPINE BESYLATE 2.5 MG/1
2.5 TABLET ORAL DAILY
Status: DISCONTINUED | OUTPATIENT
Start: 2019-03-29 | End: 2019-04-07

## 2019-03-28 RX ORDER — METOPROLOL SUCCINATE 25 MG/1
25 TABLET, EXTENDED RELEASE ORAL
Status: DISCONTINUED | OUTPATIENT
Start: 2019-03-29 | End: 2019-04-07

## 2019-03-28 RX ORDER — EZETIMIBE 10 MG/1
10 TABLET ORAL NIGHTLY
Status: DISCONTINUED | OUTPATIENT
Start: 2019-03-28 | End: 2019-04-07

## 2019-03-28 RX ORDER — TRAMADOL HYDROCHLORIDE 50 MG/1
50 TABLET ORAL EVERY 6 HOURS PRN
Status: DISCONTINUED | OUTPATIENT
Start: 2019-03-28 | End: 2019-04-07

## 2019-03-28 RX ORDER — HEPARIN SODIUM 1000 [USP'U]/ML
30 INJECTION, SOLUTION INTRAVENOUS; SUBCUTANEOUS ONCE
Status: DISCONTINUED | OUTPATIENT
Start: 2019-03-28 | End: 2019-03-28

## 2019-03-28 RX ORDER — HEPARIN SODIUM 1000 [USP'U]/ML
80 INJECTION, SOLUTION INTRAVENOUS; SUBCUTANEOUS ONCE
Status: COMPLETED | OUTPATIENT
Start: 2019-03-28 | End: 2019-03-28

## 2019-03-28 RX ORDER — ALBUTEROL SULFATE 90 UG/1
1-2 AEROSOL, METERED RESPIRATORY (INHALATION) EVERY 6 HOURS PRN
Status: DISCONTINUED | OUTPATIENT
Start: 2019-03-28 | End: 2019-04-07

## 2019-03-28 RX ORDER — ALBUTEROL SULFATE 2.5 MG/3ML
2.5 SOLUTION RESPIRATORY (INHALATION) 2 TIMES DAILY
Status: DISCONTINUED | OUTPATIENT
Start: 2019-03-28 | End: 2019-03-28

## 2019-03-28 RX ORDER — ALBUTEROL SULFATE 2.5 MG/3ML
2.5 SOLUTION RESPIRATORY (INHALATION) EVERY 4 HOURS PRN
Status: DISCONTINUED | OUTPATIENT
Start: 2019-03-28 | End: 2019-04-07

## 2019-03-28 RX ORDER — HYDRALAZINE HYDROCHLORIDE 25 MG/1
25 TABLET, FILM COATED ORAL 2 TIMES DAILY
Status: DISCONTINUED | OUTPATIENT
Start: 2019-03-28 | End: 2019-04-07

## 2019-03-28 RX ORDER — PREDNISONE 20 MG/1
20 TABLET ORAL DAILY
Status: DISCONTINUED | OUTPATIENT
Start: 2019-03-28 | End: 2019-04-01

## 2019-03-28 RX ORDER — MAGNESIUM OXIDE 400 MG (241.3 MG MAGNESIUM) TABLET
3 TABLET NIGHTLY
Status: DISCONTINUED | OUTPATIENT
Start: 2019-03-28 | End: 2019-03-31

## 2019-03-28 RX ORDER — AMIODARONE HYDROCHLORIDE 200 MG/1
200 TABLET ORAL 2 TIMES DAILY
Status: DISCONTINUED | OUTPATIENT
Start: 2019-03-28 | End: 2019-04-07

## 2019-03-28 RX ORDER — MAGNESIUM HYDROXIDE/ALUMINUM HYDROXICE/SIMETHICONE 120; 1200; 1200 MG/30ML; MG/30ML; MG/30ML
30 SUSPENSION ORAL ONCE
Status: COMPLETED | OUTPATIENT
Start: 2019-03-28 | End: 2019-03-28

## 2019-03-28 RX ORDER — SENNA AND DOCUSATE SODIUM 50; 8.6 MG/1; MG/1
2 TABLET, FILM COATED ORAL 2 TIMES DAILY
Status: DISCONTINUED | OUTPATIENT
Start: 2019-03-28 | End: 2019-04-07

## 2019-03-28 RX ORDER — MELATONIN
325
Status: DISCONTINUED | OUTPATIENT
Start: 2019-03-29 | End: 2019-04-07

## 2019-03-28 RX ORDER — DIPHENOXYLATE HYDROCHLORIDE AND ATROPINE SULFATE 2.5; .025 MG/1; MG/1
1 TABLET ORAL DAILY
Status: DISCONTINUED | OUTPATIENT
Start: 2019-03-29 | End: 2019-04-07

## 2019-03-28 NOTE — CONSULTS
[facility]    Report of Consultation    Evangelista Feldman Patient Status:  Inpatient    1945 MRN [id number]   Location [facility] 3W/SW Attending Trevon Sanchez MD   Hosp Day # 1 PCP Peggy Herbert MD     Date of Father    • Hypertension Father    • Heart Disorder Father    • Other (Other) Mother         thyroid surgery   • Asthma Sister    • Asthma Brother    • Other (Other) Brother         gout   • Breast Cancer Self 43        rt breast 77       Social History  S (200 mg total) by mouth 2 (two) times daily. DC Amio after 30 days   amLODIPine Besylate 2.5 MG Oral Tab Take 1 tablet (2.5 mg total) by mouth daily. furosemide 40 MG Oral Tab Take 1 tablet (40 mg total) by mouth daily.    ferrous sulfate 325 (65 FE) MG O Take 1 tablet (50 mg total) by mouth every 6 (six) hours as needed. PEG 3350 Oral Powd Pack Take 17 g by mouth daily as needed. Senna-Docusate Sodium 8.6-50 MG Oral Tab Take 2 tablets by mouth 2 (two) times daily.        Allergies    Allopurinol sclera are nonicteric. Hearing appears adequate in both ears. Neck is soft supple with adequate range of motion no appreciable adenopathy mass or JVD. Carotids are 2+ without bruits.   Lungs have diminished breath sounds left side with no obvious rub  Ca associated airspace disease since 3/7/2019. There are now ill-defined densities within the left pleural effusion suspicious for hematoma/hemothorax. Post-traumatic, infectious, and neoplastic etiologies are all differential considerations.   No residual ri Lani Tellez MD on 3/27/2019 at 11:01               Impression:   Recurrent left pleural effusion          Recommendations:  Her CT shows mostly low attenuation fluid with possibly some hemothorax or clots as well.   The majority of the fluid should be

## 2019-03-28 NOTE — PROGRESS NOTES
[facility]    Progress Note    Jennifer Rosejayleen Patient Status:  Inpatient    1945 MRN [id number]   [facility] 3W/SW Attending Mckayla Horta MD   Hosp Day # 1 PCP Yfn Martinez MD       Subjective:   T Albuterol Sulfate HFA, albuterol sulfate, Normal Saline Flush, acetaminophen, ondansetron HCl, morphINE sulfate **OR** morphINE sulfate **OR** morphINE sulfate, dextrose, Glucose-Vitamin C, glucose    Results:     Lab Results   Component Value Date    WB moderate left pleural effusion and associated airspace disease since 3/7/2019. There are now ill-defined densities within the left pleural effusion suspicious for hematoma/hemothorax.  Post-traumatic, infectious, and neoplastic etiologies are all different at 10:59     Approved by (CST): Lauren Dubose MD on 3/27/2019 at 11:01          Ekg 12-lead    Result Date: 3/27/2019  ECG Report  Interpretation  -------------------------- Sinus Rhythm Poor R Wave Progression ABNORMAL When compared with ECG of 03/07/

## 2019-03-28 NOTE — PAYOR COMM NOTE
--------------  ADMISSION REVIEW     Carson Villasenor MA [facility]  Subscriber #:  [id number]  Authorization Number: [id number]    Admit date: 3/27/19  Admit time: 2106       Admitting Physician: Zulema Farrar MD  Attending Physician:  MD Qian Darby • MASTECTOMY PARTIAL  1999   • RADIATION RIGHT  2014       Family history reviewed and is not pertinent to presenting problem. Social History    Tobacco Use      Smoking status: Never Smoker      Smokeless tobacco: Never Used    Alcohol use:  No      C Nursing note and vitals reviewed.             ED Course     Labs Reviewed   BASIC METABOLIC PANEL (8) - Abnormal; Notable for the following components:       Result Value    Glucose 193 (*)     BUN 28 (*)     Creatinine 1.52 (*)     Calculated Osmolality 3/08/2019, 12:44. [facility], XR CHEST AP PORTABLE (CPT=71045), 3/27/2019,     8:41. INDICATIONS: Increased shortness of breath today. Right side thoracentesis     today. TECHNIQUE:   Single view.            FINDINGS: and interpreted all ED vitals.         MDM   Differential Diagnosis/ Diagnostic Considerations: Pneumothorax, pleural effusion, hemothorax, malignancy, heart failure    Limitations of history:   able to obtain history from patient  Factors limiting our abil on further intervention until discussed with cardiothoracic surgery regarding possible Pleurx catheter. He will speak with carediothoracic surgery. Discussed with and admitted to Dr. Laura Zaragoza.     Impression:  After review and interpretation of the above 03/27/2019    HISTORY AND PHYSICAL EXAMINATION    CHIEF COMPLAINT:  Pleuritic chest pain and fast reaccumulation of left pleural effusion after pleurocentesis this morning.     HISTORY OF PRESENT ILLNESS:  The patient is a 60-year-old [employer] female with r febuxostat. Statins cause myalgia. ACE inhibitors, cough. Latex causes itching. FAMILY HISTORY:  Positive for asthma, hypertension, and coronary artery disease. SOCIAL HISTORY:  No tobacco, alcohol, or drug use. Lives with her family.   Currently artery disease. 4.   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  5.   Hyperglycemia and possible underlying diabetes mellitus type 2. The patient will be admitted to general medical floor. Pulmonary consult, Dr. Richardson Young, was notified.   She will probably ne room for these concerns and chest x-ray redemonstrated accumulation of the pleural fluid. The patient was a lifetime non-smoker but did have a history of asthma. Assessment/Plan:   1.   Dyspnea with promptly recurring pleural effusions status post 2 tho pressure 147/64, pulse 97, temperature 98.1 °F (36.7 °C), temperature source Oral, resp. rate 22, height 5' 4\" (1.626 m), weight 162 lb (73.5 kg), SpO2 90 %. Intake/Output:                Last 3 shifts: I/O last 3 completed shifts:   In: 20 [P.O.:20]  Out Date: 3/28/2019  CONCLUSION:  1. No significant change in the appearance of moderate left pleural effusion.     Dictated by (CST): Polly Hernandez MD on 3/28/2019 at 7:22     Approved by (CST): Polly Hernandez MD on 3/28/2019 at 7:23 anesthesia.     3/28 PULMONARY NOTE     Assessment and Plan:   1.  Dyspnea with promptly recurring pleural effusions status post 2 thoracenteses–the etiology is uncertain.  The patient does not apparently have a trapped lung as there was fairly good reinfla 03/28/2019     CREATSERUM 1.28 03/28/2019     BUN 24 03/28/2019      03/28/2019     K 4.3 03/28/2019      03/28/2019     CO2 34.0 03/28/2019      03/28/2019     CA 10.7 03/28/2019     PTT 27.9 03/28/2019     INR 1.02 03/28/2019     TROP

## 2019-03-28 NOTE — SPIRITUAL CARE NOTE
Attempted to visit when PT was in procedure.  will check back tomorrow. If ready for visit sooner, please call [phone]. Thank you!  Raquel Dominguez

## 2019-03-28 NOTE — ED PROVIDER NOTES
Patient Seen in: [facility] 3w/sw    History   Patient presents with:  Dyspnea BRUNO SOB (respiratory)    Stated Complaint: SOB    HPI    55-year-old female presents for evaluation of shortness of breath.   Patient had IR directed thoracentesis earli Device None (Room air)       Current:/70   Pulse 80   Resp 18   Wt 73.5 kg   SpO2 93%   BMI 27.81 kg/m²         Physical Exam   Constitutional: She is oriented to person, place, and time. She appears well-developed and well-nourished. No distress. 7.82 (*)     Lymphocyte Absolute 0.87 (*)     Monocyte Absolute 1.29 (*)     All other components within normal limits   TROPONIN I - Normal   CBC WITH DIFFERENTIAL WITH PLATELET    Narrative:      The following orders were created for panel order CBC WITH pleural effusion, which appears increased in     size since 0841 hr exam.  Alvena Hane left basilar airspace disease. No large     right pleural effusion. No pneumothorax. BONES: Median sternotomy wires are demonstrated.  Mild degenerative changes     of the Essential hypertension; Need for vaccination; Mild persistent asthma without complication; Colon cancer screening; Menopausal state; Age-related cataract of left eye; History of gout; Gastroesophageal reflux disease;  History of pituitary adenoma; Chronic t explained the need for further follow-up evaluation and treatment.       Condition upon disposition: Stable    Admission disposition: 3/27/2019  7:52 PM                 Disposition and Plan     Clinical Impression:  Pleural effusion  (primary encounter diag

## 2019-03-28 NOTE — CONSULTS
[facility] HOSP - [facility]    Consult Note    Date:  3/27/2019  Date of Admission:  3/27/2019      Chief Complaint:   Jose Eduardo Tavarez is a(n) 69year old female with dyspnea and chest pain with promptly recurrent pleural effusion.     HPI:   The patient 2013     Family History   Problem Relation Age of Onset   • Asthma Father    • Hypertension Father    • Heart Disorder Father    • Other (Other) Mother         thyroid surgery   • Asthma Sister    • Asthma Brother    • Other (Other) Brother         gout without gross abnormality    Results:     Lab Results   Component Value Date    WBC 10.6 03/27/2019    HGB 10.0 03/27/2019    HCT 33.3 03/27/2019    .0 03/27/2019    CREATSERUM 1.52 03/27/2019    BUN 28 03/27/2019     03/27/2019    K 4.1 03/27

## 2019-03-28 NOTE — PROGRESS NOTES
[facility]    Progress Note      Assessment and Plan:   1. Dyspnea with promptly recurring pleural effusions status post 2 thoracenteses–the etiology is uncertain.   The patient does not apparently have a trapped lung as there was fairly go 03/28/2019    CREATSERUM 1.28 03/28/2019    BUN 24 03/28/2019     03/28/2019    K 4.3 03/28/2019     03/28/2019    CO2 34.0 03/28/2019     03/28/2019    CA 10.7 03/28/2019    PTT 27.9 03/28/2019    INR 1.02 03/28/2019    TROP <0.045 03/2

## 2019-03-28 NOTE — H&P
[facility]    PATIENT'S NAME: Cony Diehl   ATTENDING PHYSICIAN: Tre Moore MD   PATIENT ACCOUNT#:   [id number]    LOCATION:  [address] [place] [address] #:   [id number]       YOB: 1945  ADMISSION DATE:       03/27/ carotid endarterectomy. MEDICATIONS:  Please see medication reconciliation list.    ALLERGIES:  Listed allopurinol. Also, she is allergic to dog hair, saliva, dust.  Norco causes shortness of breath. She is sensitive to smoke.   She is allergic to febu intact. ASSESSMENT AND PLAN:    1. Fast reaccumulation of left pleural effusion after pleurocentesis. Rule out hemothorax. 2.   Recent recurrent left pleural effusion, rule out pericardiotomy syndrome and inflammatory reaction.   3.   History of janice

## 2019-03-29 ENCOUNTER — ANESTHESIA EVENT (OUTPATIENT)
Dept: SURGERY | Facility: HOSPITAL | Age: 74
DRG: 187 | End: 2019-03-29
Payer: MEDICARE

## 2019-03-29 ENCOUNTER — APPOINTMENT (OUTPATIENT)
Dept: GENERAL RADIOLOGY | Facility: HOSPITAL | Age: 74
DRG: 187 | End: 2019-03-29
Attending: CLINICAL NURSE SPECIALIST
Payer: MEDICARE

## 2019-03-29 ENCOUNTER — ANESTHESIA (OUTPATIENT)
Dept: SURGERY | Facility: HOSPITAL | Age: 74
DRG: 187 | End: 2019-03-29
Payer: MEDICARE

## 2019-03-29 ENCOUNTER — APPOINTMENT (OUTPATIENT)
Dept: GENERAL RADIOLOGY | Facility: HOSPITAL | Age: 74
DRG: 187 | End: 2019-03-29
Attending: THORACIC SURGERY (CARDIOTHORACIC VASCULAR SURGERY)
Payer: MEDICARE

## 2019-03-29 PROCEDURE — 0W9B30Z DRAINAGE OF LEFT PLEURAL CAVITY WITH DRAINAGE DEVICE, PERCUTANEOUS APPROACH: ICD-10-PCS | Performed by: THORACIC SURGERY (CARDIOTHORACIC VASCULAR SURGERY)

## 2019-03-29 PROCEDURE — 99233 SBSQ HOSP IP/OBS HIGH 50: CPT | Performed by: HOSPITALIST

## 2019-03-29 PROCEDURE — 99233 SBSQ HOSP IP/OBS HIGH 50: CPT | Performed by: INTERNAL MEDICINE

## 2019-03-29 PROCEDURE — 71045 X-RAY EXAM CHEST 1 VIEW: CPT | Performed by: CLINICAL NURSE SPECIALIST

## 2019-03-29 PROCEDURE — 77001 FLUOROGUIDE FOR VEIN DEVICE: CPT | Performed by: THORACIC SURGERY (CARDIOTHORACIC VASCULAR SURGERY)

## 2019-03-29 RX ORDER — ASPIRIN 81 MG/1
81 TABLET ORAL DAILY
Status: DISCONTINUED | OUTPATIENT
Start: 2019-03-30 | End: 2019-04-07

## 2019-03-29 RX ORDER — MIDAZOLAM HYDROCHLORIDE 1 MG/ML
INJECTION INTRAMUSCULAR; INTRAVENOUS AS NEEDED
Status: DISCONTINUED | OUTPATIENT
Start: 2019-03-29 | End: 2019-03-29 | Stop reason: SURG

## 2019-03-29 RX ORDER — HYDROCODONE BITARTRATE AND ACETAMINOPHEN 5; 325 MG/1; MG/1
2 TABLET ORAL AS NEEDED
Status: DISCONTINUED | OUTPATIENT
Start: 2019-03-29 | End: 2019-03-29 | Stop reason: HOSPADM

## 2019-03-29 RX ORDER — LIDOCAINE HYDROCHLORIDE 10 MG/ML
INJECTION, SOLUTION EPIDURAL; INFILTRATION; INTRACAUDAL; PERINEURAL AS NEEDED
Status: DISCONTINUED | OUTPATIENT
Start: 2019-03-29 | End: 2019-03-29

## 2019-03-29 RX ORDER — POTASSIUM CHLORIDE 20 MEQ/1
40 TABLET, EXTENDED RELEASE ORAL ONCE
Status: COMPLETED | OUTPATIENT
Start: 2019-03-29 | End: 2019-03-29

## 2019-03-29 RX ORDER — POTASSIUM CHLORIDE 20 MEQ/1
40 TABLET, EXTENDED RELEASE ORAL ONCE
Status: DISCONTINUED | OUTPATIENT
Start: 2019-03-29 | End: 2019-03-29

## 2019-03-29 RX ORDER — ONDANSETRON 2 MG/ML
4 INJECTION INTRAMUSCULAR; INTRAVENOUS ONCE AS NEEDED
Status: DISCONTINUED | OUTPATIENT
Start: 2019-03-29 | End: 2019-03-29 | Stop reason: HOSPADM

## 2019-03-29 RX ORDER — SODIUM CHLORIDE, SODIUM LACTATE, POTASSIUM CHLORIDE, CALCIUM CHLORIDE 600; 310; 30; 20 MG/100ML; MG/100ML; MG/100ML; MG/100ML
INJECTION, SOLUTION INTRAVENOUS CONTINUOUS
Status: DISCONTINUED | OUTPATIENT
Start: 2019-03-29 | End: 2019-03-29 | Stop reason: HOSPADM

## 2019-03-29 RX ORDER — CLOPIDOGREL BISULFATE 75 MG/1
75 TABLET ORAL DAILY
Status: DISCONTINUED | OUTPATIENT
Start: 2019-03-30 | End: 2019-03-31

## 2019-03-29 RX ORDER — MORPHINE SULFATE 10 MG/ML
6 INJECTION, SOLUTION INTRAMUSCULAR; INTRAVENOUS EVERY 10 MIN PRN
Status: DISCONTINUED | OUTPATIENT
Start: 2019-03-29 | End: 2019-03-29 | Stop reason: HOSPADM

## 2019-03-29 RX ORDER — MORPHINE SULFATE 4 MG/ML
4 INJECTION, SOLUTION INTRAMUSCULAR; INTRAVENOUS EVERY 10 MIN PRN
Status: DISCONTINUED | OUTPATIENT
Start: 2019-03-29 | End: 2019-03-29 | Stop reason: HOSPADM

## 2019-03-29 RX ORDER — METOPROLOL TARTRATE 5 MG/5ML
2.5 INJECTION INTRAVENOUS ONCE
Status: DISCONTINUED | OUTPATIENT
Start: 2019-03-29 | End: 2019-03-29 | Stop reason: HOSPADM

## 2019-03-29 RX ORDER — MORPHINE SULFATE 2 MG/ML
2 INJECTION, SOLUTION INTRAMUSCULAR; INTRAVENOUS EVERY 10 MIN PRN
Status: DISCONTINUED | OUTPATIENT
Start: 2019-03-29 | End: 2019-03-29 | Stop reason: HOSPADM

## 2019-03-29 RX ORDER — HALOPERIDOL 5 MG/ML
0.25 INJECTION INTRAMUSCULAR ONCE AS NEEDED
Status: DISCONTINUED | OUTPATIENT
Start: 2019-03-29 | End: 2019-03-29 | Stop reason: HOSPADM

## 2019-03-29 RX ORDER — CEFAZOLIN SODIUM/WATER 2 G/20 ML
SYRINGE (ML) INTRAVENOUS AS NEEDED
Status: DISCONTINUED | OUTPATIENT
Start: 2019-03-29 | End: 2019-03-29 | Stop reason: SURG

## 2019-03-29 RX ORDER — DEXTROSE MONOHYDRATE 25 G/50ML
50 INJECTION, SOLUTION INTRAVENOUS
Status: DISCONTINUED | OUTPATIENT
Start: 2019-03-29 | End: 2019-03-29 | Stop reason: HOSPADM

## 2019-03-29 RX ORDER — NALOXONE HYDROCHLORIDE 0.4 MG/ML
80 INJECTION, SOLUTION INTRAMUSCULAR; INTRAVENOUS; SUBCUTANEOUS AS NEEDED
Status: DISCONTINUED | OUTPATIENT
Start: 2019-03-29 | End: 2019-03-29 | Stop reason: HOSPADM

## 2019-03-29 RX ORDER — HYDROCODONE BITARTRATE AND ACETAMINOPHEN 5; 325 MG/1; MG/1
1 TABLET ORAL AS NEEDED
Status: DISCONTINUED | OUTPATIENT
Start: 2019-03-29 | End: 2019-03-29 | Stop reason: HOSPADM

## 2019-03-29 RX ADMIN — MIDAZOLAM HYDROCHLORIDE 2 MG: 1 INJECTION INTRAMUSCULAR; INTRAVENOUS at 07:15:00

## 2019-03-29 RX ADMIN — CEFAZOLIN SODIUM/WATER 2 G: 2 G/20 ML SYRINGE (ML) INTRAVENOUS at 07:25:00

## 2019-03-29 NOTE — PLAN OF CARE
Problem: Patient Centered Care  Goal: Patient preferences are identified and integrated in the patient's plan of care  Interventions:  - What would you like us to know as we care for you?   - Provide timely, complete, and accurate information to patient/fa Modify environment to reduce risk of injury  - Provide assistive devices as appropriate  - Consider OT/PT consult to assist with strengthening/mobility  - Encourage toileting schedule  Outcome: Progressing      Problem: DISCHARGE PLANNING  Goal: Discharge

## 2019-03-29 NOTE — PROGRESS NOTES
[facility]    Progress Note    Dieter Elliott Patient Status:  Inpatient    1945 MRN [id number]   [facility] 3W/SW Attending Emma Galdamez MD   Hosp Day # 2 PCP Eliot Flores MD        Subjective: TP 7.5 02/22/2019    AST 29 02/22/2019    ALT 29 02/22/2019    PTT 32.8 03/29/2019    INR 1.02 03/28/2019    TSH 0.791 02/27/2019    DDIMER 0.52 02/17/2019    ESRML 18 03/28/2019    MG 2.5 02/27/2019    PHOS 2.8 02/25/2019    TROP <0.045 03/27/2019       X (cpt=71045)    Result Date: 3/29/2019  CONCLUSION:  1. Catheter drainage on the left since March 28, 2019. 2. Catheter tip in left apex.  3. Residual opacification of the left lung base from residual atelectasis and pleural change but significant improvemen

## 2019-03-29 NOTE — CM/SW NOTE
[facility]x [facility]. Val met with the pt at bedside to discuss discharge planning. Pt lives at [address] [facility].  Pt lives alone, her son lives in [address] [place] [address] and she has a brother and sister who also live in the

## 2019-03-29 NOTE — PLAN OF CARE
Problem: Patient Centered Care  Goal: Patient preferences are identified and integrated in the patient's plan of care  Interventions:  - What would you like us to know as we care for you? I live alone, but I have a very supportive family.  Update me and my fever/infection during anticipated neutropenic period  INTERVENTIONS  - Monitor WBC  - Administer growth factors as ordered  - Implement neutropenic guidelines  Outcome: Progressing      Problem: SAFETY ADULT - FALL  Goal: Free from fall injury  INTERVENTI

## 2019-03-29 NOTE — SPIRITUAL CARE NOTE
Pt was sitting on her chair, son and sister at bedside. Provided emotional support to pt and family, patient appears to be coping adequately.  ML

## 2019-03-29 NOTE — ANESTHESIA POSTPROCEDURE EVALUATION
Patient: Jose Eduardo Pae    Procedure Summary     Date:  03/29/19 Room / Location:  [address] [place] [address] MAIN OR 09 / [address] [place] [address] MAIN OR    Anesthesia Start:  0314 Anesthesia Stop:      Procedure:  PLEURX CATH INSERTION (Left ) Diagnosis:       Pleural effusion      (Pleural

## 2019-03-29 NOTE — PROGRESS NOTES
[facility]    Progress Note    Mike Cook Patient Status:  Inpatient    1945 MRN [id number]   [facility] 3W/SW Attending John Patel MD   Hosp Day # 2 PCP Thuy Cross MD       Subjective:   T Fluticasone Furoate-Vilanterol  1 puff Inhalation Daily   • hydrALAzine HCl  25 mg Oral BID   • melatonin  3 mg Oral Nightly   • Montelukast Sodium  10 mg Oral Nightly   • THERA/BETA-CAROTENE  1 tablet Oral Daily   • predniSONE  20 mg Oral Daily   • akira Lab Results   Component Value Date    INR 1.02 03/28/2019    INR 1.12 03/08/2019    INR 2.1 (H) 02/26/2019       Culture:  No results found for this visit on 03/27/19.     Imaging/EKG:   Xr Chest Ap/pa (1 View) (cpt=71045)    Result Date: 3/28/2019  CON the left since March 28, 2019. 2. Catheter tip in left apex. 3. Residual opacification of the left lung base from residual atelectasis and pleural change but significant improvement from prior study. 4. Stable cardiomegaly. 5. Postop changes.  6. Atheroscle cardiology    Diastolic CHF, compensated  -no acute issues    Other stable medical issues:    R ICA stenosis s/p R CEA  HTN  HL  Gout  Obesity  R sided breast Ca    VTE P: Heparin          Greater than 35 minutes spent, >50% spent counseling re: treatment

## 2019-03-29 NOTE — DIETARY NOTE
ADULT NUTRITION INITIAL ASSESSMENT    Pt is at moderate nutrition risk. Pt does not meet malnutrition criteria.       RECOMMENDATIONS TO MD:  See Nutrition Intervention     NUTRITION DIAGNOSIS/PROBLEM:  Inadequate oral intake related to physiological cause Date   • Asthma    • Breast CA [facility]) 1968    lt mastectomy   • Breast CA ([facility] [address] 2014    lumpectomy   • Cancer [facility])    • Carotid stenosis    • Chronic tophaceous gout    • COPD (chronic obstructive pulmonary disease) (HCC)    • Coronary atherosclerosis    • • melatonin  3 mg Oral Nightly   • Montelukast Sodium  10 mg Oral Nightly   • THERA/BETA-CAROTENE  1 tablet Oral Daily   • predniSONE  20 mg Oral Daily   • probenecid  250 mg Oral Daily   • Senna-Docusate Sodium  2 tablet Oral BID   • Metoprolol Succinat

## 2019-03-29 NOTE — ANESTHESIA PREPROCEDURE EVALUATION
Anesthesia PreOp Note    HPI:     Jennifer Hess is a 69year old female who presents for preoperative consultation requested by: Johnny Alcantar MD    Date of Surgery: 3/27/2019 - 3/29/2019    Procedure(s):  PLEURX CATH INSERTION  Indication: Ple hypertension         Date Noted: 05/10/2018      Need for vaccination         Date Noted: 05/10/2018      Mild persistent asthma without complication         Date Noted: 05/10/2018      Colon cancer screening         Date Noted: 05/10/2018      Menopausal Soln Take by nebulization 2 (two) times daily. Disp:  Rfl:  Past Week at Unknown time   Acetaminophen (TYLENOL) 325 MG Oral Cap Take 650 mg by mouth as needed.    Disp: 120 capsule Rfl: 0 3/27/2019 at Unknown time   amiodarone HCl 200 MG Oral Tab Take 1 tab Wheezing or Shortness of Breath. Disp: 1 Inhaler Rfl: 0 3/27/2019 at Unknown time   Multiple Vitamin (MULTI-VITAMIN DAILY) Oral Tab Take 1 tablet by mouth daily.  Disp:  Rfl:  3/27/2019 at Unknown time   Cholecalciferol (VITAMIN D) 2000 units Oral Cap Take (NOVOLOG) 100 UNIT/ML flexpen 1-7 Units 1-7 Units Subcutaneous TID CC Marixa Tucker MD    amiodarone HCl (PACERONE) tab 200 mg 200 mg Oral BID Marixa Tucker  mg at 03/28/19 2040   amLODIPine Besylate (NORVASC) tab 2.5 mg 2.5 mg Oral Daily Z MD    Or        morphINE sulfate (PF) 2 MG/ML injection 2 mg 2 mg Intravenous Q2H PRN Chai Shore MD 2 mg at 03/27/19 2301   Or        morphINE sulfate (PF) 4 MG/ML injection 4 mg 4 mg Intravenous Q2H PRN Chai Shore MD    dextrose 50 % injection Comment: rarely at weddings      Drug use: No      Sexual activity: Not on file    Lifestyle      Physical activity:        Days per week: Not on file        Minutes per session: Not on file      Stress: Not on file    Relationships      Social connect Oral   SpO2: 91% 91% 94% 94%   Weight:       Height:            Anesthesia ROS/Med Hx and Physical Exam     Patient summary reviewed and Nursing notes reviewed    Airway   Mallampati: I  Dental - normal exam     Pulmonary - negative ROS and normal exam   C

## 2019-03-29 NOTE — OPERATIVE REPORT
[facility] OPERATING ROOM  Operative Note     Manual Delong Location: OR   CSN [id number] MRN [id number]   Admission Date 3/27/2019 Operation Date 3/29/2019   Attending Physician Charmayne Cranker, MD Operating Physician Charlie Alegria MD then with C-arm guidance the guidewire was placed into the left pleural space followed by dilators and the introducer and the Pleurx drain was tunneled from the lower incision into the introducer and into the pleural space.'s positioning was documented by

## 2019-03-30 ENCOUNTER — APPOINTMENT (OUTPATIENT)
Dept: GENERAL RADIOLOGY | Facility: HOSPITAL | Age: 74
DRG: 187 | End: 2019-03-30
Attending: CLINICAL NURSE SPECIALIST
Payer: MEDICARE

## 2019-03-30 PROCEDURE — 99232 SBSQ HOSP IP/OBS MODERATE 35: CPT | Performed by: INTERNAL MEDICINE

## 2019-03-30 PROCEDURE — 71045 X-RAY EXAM CHEST 1 VIEW: CPT | Performed by: CLINICAL NURSE SPECIALIST

## 2019-03-30 PROCEDURE — 99233 SBSQ HOSP IP/OBS HIGH 50: CPT | Performed by: HOSPITALIST

## 2019-03-30 NOTE — PROGRESS NOTES
[facility]    Progress Note    Jonatan Fuentes Patient Status:  Inpatient    1945 MRN [id number]   [facility] 3W/SW Attending Monse Mercado MD   Hosp Day # 3 PCP Eron Sheldon MD       Subjective:   T mg Oral Daily   • ezetimibe  10 mg Oral Nightly   • ferrous sulfate  325 mg Oral Daily with breakfast   • Fluticasone Furoate-Vilanterol  1 puff Inhalation Daily   • hydrALAzine HCl  25 mg Oral BID   • melatonin  3 mg Oral Nightly   • Montelukast Sodium  1 39*  48*  48*   --    GFRNAA  34*  42*  42*   --    CA  9.8  10.7*  9.9   --    NA  140  140  142   --    K  4.1  4.3  3.7  4.8   CL  101  102  104   --    CO2  32.0  34.0*  32.0   --      Lab Results   Component Value Date    INR 1.02 03/28/2019    INR 1 findings from March 29, 2019. 2. Left chest catheter in place. 3. Residual pleural and parenchymal disease at the left base. 4. Stable heart size. Dictated by (CST): Ajit Brunner MD on 3/30/2019 at 6:46     Approved by (CST):  Ajit Brunner MD o

## 2019-03-30 NOTE — PHYSICAL THERAPY NOTE
Attempted eval, chart reviewed. Pt s/p CABG in Feb. 2019 followed by thoracentesis in March 8 due to pleural effusion, transferred to SNF and discharged home 2 days ago.  Pt admitted w/ re-acummulation LT pleural effusion s/p LT plurex drain placement under

## 2019-03-30 NOTE — PROGRESS NOTES
[facility]    [facility] Note    Olga Gage Patient Status:  Inpatient    1945 MRN [id number]   [facility] 3W/SW Attending Jovany Boston MD   Hosp Day # 3 PCP Byron Weir MD     Subjective:  Pt S2  Abdomen: Soft, NT/ND, BS+x4, +flatus, +BM   Extremities: Warm, dry, Left 2+ LE edema/Right 1+ LE edema  Pulses: 1+ bilat DP  Skin: sternotomy incision ELINOR=CDI- healing well.  Left leg incision ELINOR=CDI- healing well  Neurological:  AAOx3, MAEW    Assessm

## 2019-03-30 NOTE — PLAN OF CARE
Problem: Patient Centered Care  Goal: Patient preferences are identified and integrated in the patient's plan of care  Interventions:  - What would you like us to know as we care for you?  I live at home, I was at [facility] rehab after open heart surgery in [place]  Goal: Free from fall injury  INTERVENTIONS:  - Assess pt frequently for physical needs  - Identify cognitive and physical deficits and behaviors that affect risk of falls.   - [place] fall precautions as indicated by assessment.  - Educate pt/family oriented. Heparin drip running. PTT at 9pm is at therapeutic level [see lab results on epic]. Daily PTT draws. Patient on RA and currently not complaining of shortness of breath compared to last night. pleurex catheter dressing intact.  No drainage or activ

## 2019-03-30 NOTE — PROGRESS NOTES
[facility]    Progress Note      Assessment and Plan:   1. Dyspnea with promptly recurring pleural effusions status post 2 thoracenteses– the patient is doing well status post Pleurx catheter insertion.   She has some discomfort with evacua associated airspace disease since 3/7/2019. There are now ill-defined densities within the left pleural effusion suspicious for hematoma/hemothorax. Post-traumatic, infectious, and neoplastic   etiologies are all differential considerations.      No residu

## 2019-03-30 NOTE — OCCUPATIONAL THERAPY NOTE
Attempted to see Pt for OT eval, nurse Selby authorized Pt participation, however, Pt was on heparin drip d/t DVT. Pt had not been on heparin for 24H (began 3pm on 3-29) and INR not taken to determine if Pt is in therapeutic range.   Will recheck Pt for 3-

## 2019-03-31 PROCEDURE — 99233 SBSQ HOSP IP/OBS HIGH 50: CPT | Performed by: HOSPITALIST

## 2019-03-31 PROCEDURE — 90792 PSYCH DIAG EVAL W/MED SRVCS: CPT | Performed by: OTHER

## 2019-03-31 PROCEDURE — 99232 SBSQ HOSP IP/OBS MODERATE 35: CPT | Performed by: INTERNAL MEDICINE

## 2019-03-31 RX ORDER — MAGNESIUM OXIDE 400 MG (241.3 MG MAGNESIUM) TABLET
3 TABLET NIGHTLY PRN
Status: DISCONTINUED | OUTPATIENT
Start: 2019-03-31 | End: 2019-04-07

## 2019-03-31 RX ORDER — CLOPIDOGREL BISULFATE 75 MG/1
75 TABLET ORAL DAILY
Status: DISCONTINUED | OUTPATIENT
Start: 2019-04-01 | End: 2019-04-07

## 2019-03-31 RX ORDER — PANTOPRAZOLE SODIUM 40 MG/1
40 TABLET, DELAYED RELEASE ORAL
Status: DISCONTINUED | OUTPATIENT
Start: 2019-03-31 | End: 2019-04-07

## 2019-03-31 NOTE — PROGRESS NOTES
[facility]    Progress Note    Boneta Heading Patient Status:  Inpatient    1945 MRN [id number]   [facility] 3W/SW Attending Mima Mark MD   Hosp Day # 4 PCP Teo Caba MD       Subjective:   T range of motion in all the extremities.    EXTREMITIES: There was swelling L>R calf      Current Scheduled Inpatient Meds:     • Pantoprazole Sodium  40 mg Oral QAM AC   • [START ON 4/1/2019] Clopidogrel Bisulfate  75 mg Oral Daily   • aspirin EC  81 mg Cynthia Rodríguez MCHC  30.0*  29.6*  29.5*   RDW  16.6*  16.0*  16.0*   NEPRELIM  7.82*  6.33  5.17   WBC  10.6  9.3  7.7   PLT  385.0  314.0  280.0     Recent Labs   Lab  03/27/19   1844  03/28/19   0541  03/29/19   0606  03/30/19   0539   GLU  193*  132*  135*   -- CEA  HTN  HL  Gout  Obesity  R sided breast Ca    VTE P: Heparin          Greater than 35 minutes spent, >50% spent counseling re: treatment plan and workup  Guilherme Lakhani MD      **Certification      PHYSICIAN Certification of Need for Inpatient Hospi

## 2019-03-31 NOTE — PHYSICAL THERAPY NOTE
PHYSICAL THERAPY EVALUATION - INPATIENT     Room Number: 331/331-A  Evaluation Date: 3/31/2019  Type of Evaluation: Initial   Physician Order: PT Eval and Treat    Presenting Problem: pleural effusion  Reason for Therapy: Mobility Dysfunction and Discharg assist for recovery as well as decreased activity tolerance, patient would benefit from rehab. Patient will benefit from continued IP PT services to address these deficits in preparation for discharge.     DISCHARGE RECOMMENDATIONS  PT Discharge Recommen SUBJECTIVE  \"I was doing fine\"    PHYSICAL THERAPY EXAMINATION     OBJECTIVE  Precautions: None  Fall Risk: Standard fall risk    WEIGHT BEARING RESTRICTION  Weight Bearing Restriction: None                PAIN ASSESSMENT  Ratin          COGNITIO aware    CURRENT GOALS    Goals to be met by: 4/15/19  Patient Goal Patient's self-stated goal is: to go home   Goal #1 Patient is able to demonstrate supine - sit EOB @ level: independent     Goal #1   Current Status    Goal #2 Patient is able to [place]

## 2019-03-31 NOTE — PROGRESS NOTES
Pt started on Eliquis for calf DVT. Will stop Plavix and continue baby ASA. As she is on prednisone as well she should be monitored closely for GI bleeding and kept on some antacid med. Continue PleurX routine care at home. Discharge planning.

## 2019-03-31 NOTE — CM/SW NOTE
Received MDO for d/c planning- patient ok w/ [facility]. PT/OT recommending subacute rehab. Spoke with patient who confirms she would like a referral to [facility] with BHARATH Radford sent clinicals via [employer].  DON reques

## 2019-03-31 NOTE — PROGRESS NOTES
[facility]    Progress Note      Assessment and Plan:   1. Dyspnea with promptly recurring pleural effusions status post 2 thoracenteses– the patient is doing well status post Pleurx catheter insertion.   She has some discomfort with evacua CT scan of the chest–Slight worsening of moderate left pleural effusion and associated airspace disease since 3/7/2019. There are now ill-defined densities within the left pleural effusion suspicious for hematoma/hemothorax.  Post-traumatic, infectious

## 2019-03-31 NOTE — PLAN OF CARE
Problem: Patient Centered Care  Goal: Patient preferences are identified and integrated in the patient's plan of care  Interventions:  - What would you like us to know as we care for you?  I live at home, I was at [facility] rehab after open heart surgery in [place]      Problem: RISK FOR INFECTION - ADULT  Goal: Absence of fever/infection during anticipated neutropenic period  INTERVENTIONS  - Monitor WBC  - Administer growth factors as ordered  - Implement neutropenic guidelines  Outcome: Progressing hypoglycemia  - Administer ordered medications to maintain glucose within target range  - Assess barriers to adequate nutritional intake and initiate nutrition consult as needed  - Instruct patient on self management of diabetes  Outcome: Progressing

## 2019-03-31 NOTE — OCCUPATIONAL THERAPY NOTE
OCCUPATIONAL THERAPY EVALUATION - INPATIENT     Room Number: 331/331-A  Evaluation Date: 3/31/2019  Type of Evaluation: Initial  Presenting Problem: DVT in calf    Physician Order: IP Consult to Occupational Therapy  Reason for Therapy: ADL/IADL Dysfunctio PLAN  OT Treatment Plan: Energy conservation/work simplification techniques;ADL training;IADL training; Endurance training;Patient/Family education;Patient/Family training       OCCUPATIONAL THERAPY MEDICAL/SOCIAL HISTORY     Problem List  Principal Pro O2 SATURATIONS     SPO2 Ambulation on Room Air: 89          COGNITION  Overall Cognitive Status:  WFL - within functional limits        Behavioral/Emotional/Social: pleasant cooperative    RANGE OF MOTION   Upper extremity ROM is within functiona Comment:    Patient will complete item retrieval with mod I  Comment:          Goals  on: 2019  Frequency: 3x/wk

## 2019-04-01 PROCEDURE — 99232 SBSQ HOSP IP/OBS MODERATE 35: CPT | Performed by: INTERNAL MEDICINE

## 2019-04-01 PROCEDURE — 99233 SBSQ HOSP IP/OBS HIGH 50: CPT | Performed by: HOSPITALIST

## 2019-04-01 RX ORDER — PROBENECID 500 MG/1
250 TABLET, FILM COATED ORAL DAILY
Refills: 0 | Status: SHIPPED | COMMUNITY
Start: 2019-04-02 | End: 2019-06-25

## 2019-04-01 RX ORDER — ALBUTEROL SULFATE 2.5 MG/3ML
2.5 SOLUTION RESPIRATORY (INHALATION) EVERY 4 HOURS PRN
Refills: 0 | Status: SHIPPED | COMMUNITY
Start: 2019-04-01 | End: 2019-04-18

## 2019-04-01 RX ORDER — HEPARIN SODIUM 5000 [USP'U]/ML
5000 INJECTION, SOLUTION INTRAVENOUS; SUBCUTANEOUS EVERY 12 HOURS SCHEDULED
Status: DISCONTINUED | OUTPATIENT
Start: 2019-04-01 | End: 2019-04-07

## 2019-04-01 RX ORDER — METOPROLOL SUCCINATE 25 MG/1
25 TABLET, EXTENDED RELEASE ORAL
Refills: 0 | Status: SHIPPED | COMMUNITY
Start: 2019-04-02 | End: 2019-04-08

## 2019-04-01 NOTE — PROGRESS NOTES
[facility]    Progress Note    Stefany Bustillo Patient Status:  Inpatient    1945 MRN [id number]   [facility] 3W/SW Attending Alissa Sanchez MD   Hosp Day # 5 PCP Elan Chaudhari MD        Subjective: 03/29/2019    CO2 32.0 03/29/2019     (H) 03/29/2019    CA 9.9 03/29/2019    ALB 3.3 (L) 02/22/2019    ALKPHO 103 02/22/2019    BILT 0.4 02/22/2019    TP 7.5 02/22/2019    AST 29 02/22/2019    ALT 29 02/22/2019    PTT 78.9 (H) 03/30/2019    INR 1.02

## 2019-04-01 NOTE — PROGRESS NOTES
[place] [facility]    Progress Note    Manual Baljeet Patient Status:  Inpatient    1945 MRN [id number]   [facility] 3W/SW Attending Charmayne Cranker, MD   Hosp Day # 5 PCP Martha Lynn MD       Subjective:   T Daily   • aspirin EC  81 mg Oral Daily   • amiodarone HCl  200 mg Oral BID   • amLODIPine Besylate  2.5 mg Oral Daily   • ezetimibe  10 mg Oral Nightly   • ferrous sulfate  325 mg Oral Daily with breakfast   • Fluticasone Furoate-Vilanterol  1 puff Inhalat CREATSERUM  1.52*  1.28*  1.27*   --    GFRAA  39*  48*  48*   --    GFRNAA  34*  42*  42*   --    CA  9.8  10.7*  9.9   --    NA  140  140  142   --    K  4.1  4.3  3.7  4.8   CL  101  102  104   --    CO2  32.0  34.0*  32.0   --      Lab Results   Com

## 2019-04-01 NOTE — CM/SW NOTE
JOHN PAUL followed up with Rossy Barragan the [facility] [phone] re the status of the referral. Insurance auth. Is still pending. Social work/case management to remain available for support and/or discharge planning.     Chen Francis, [id number] Megha Alarcon

## 2019-04-01 NOTE — CONSULTS
[facility]    Report of Consultation    Neva Allen Patient Status:  Inpatient    1945 MRN [id number]   [facility] 3W/SW Attending Brianda Contreras MD   Hosp Day # 4 PCP Marco Owens MD     Date o aspect she did fair the symptom to the medication not acknowledging with his her own anxiety and depression which is not uncommon after bypass surgery.     The patient reported that many years ago she went to through divorce and she had some panic attack an • LUMPECTOMY RIGHT  2014   • MASTECTOMY PARTIAL  1999   • RADIATION RIGHT  2014       Family History  Family History   Problem Relation Age of Onset   • Asthma Father    • Hypertension Father    • Heart Disorder Father    • Other (Other) Mother         t 3 mL 3 mL Intravenous PRN   acetaminophen (TYLENOL) tab 650 mg 650 mg Oral Q6H PRN   ondansetron HCl (ZOFRAN) injection 4 mg 4 mg Intravenous Q6H PRN   morphINE sulfate (PF) 2 MG/ML injection 1 mg 1 mg Intravenous Q2H PRN   Or      morphINE sulfate (PF) 2 BID (Patient taking differently: 250 mg daily. Take 1/2 tablet (250mg) PO BID )   Albuterol Sulfate  (90 Base) MCG/ACT Inhalation Aero Soln Inhale 1-2 puffs into the lungs every 6 (six) hours as needed for Wheezing or Shortness of Breath.    Multiple 03/29/2019    .0 03/29/2019    CREATSERUM 1.27 (H) 03/29/2019    BUN 25 (H) 03/29/2019     03/29/2019    K 4.8 03/30/2019     03/29/2019    CO2 32.0 03/29/2019     (H) 03/29/2019    CA 9.9 03/29/2019    ALB 3.3 (L) 02/22/2019    A Jann Hunter MD on 3/29/2019 at 8:39     Approved by (CST):  Jann Hunter MD on 3/29/2019 at 8:44          Xr Chest Ap Portable  (cpt=71045)    Result Date: 3/30/2019  CONCLUSION:  1. Stable findings from March 29, 2019. 2. Left chest catheter in symptom and severity.   The patient with history of some anxiety and panic attack in the past who had CABG surgery February 2019 and patient has been exhibiting more symptom of depression and anxiety with difficulty dealing with the stress and accepting her

## 2019-04-02 PROCEDURE — 99232 SBSQ HOSP IP/OBS MODERATE 35: CPT | Performed by: INTERNAL MEDICINE

## 2019-04-02 PROCEDURE — 99233 SBSQ HOSP IP/OBS HIGH 50: CPT | Performed by: HOSPITALIST

## 2019-04-02 RX ORDER — DIPHENHYDRAMINE HYDROCHLORIDE 50 MG/ML
25 INJECTION INTRAMUSCULAR; INTRAVENOUS EVERY 6 HOURS PRN
Status: DISCONTINUED | OUTPATIENT
Start: 2019-04-02 | End: 2019-04-07

## 2019-04-02 NOTE — PROGRESS NOTES
[facility]    Progress Note    Renetta Balderrama Patient Status:  Inpatient    1945 MRN [id number]   [facility] 3W/SW Attending Tricia Pablo MD   Hosp Day # 6 PCP Heather Gong MD       Subjective:   T 200 mg Oral BID   • amLODIPine Besylate  2.5 mg Oral Daily   • ezetimibe  10 mg Oral Nightly   • ferrous sulfate  325 mg Oral Daily with breakfast   • Fluticasone Furoate-Vilanterol  1 puff Inhalation Daily   • hydrALAzine HCl  25 mg Oral BID   • [place] --    GFRAA  39*  48*  48*   --    GFRNAA  34*  42*  42*   --    CA  9.8  10.7*  9.9   --    NA  140  140  142   --    K  4.1  4.3  3.7  4.8   CL  101  102  104   --    CO2  32.0  34.0*  32.0   --      Lab Results   Component Value Date    INR 1.02 03/28/2

## 2019-04-02 NOTE — OCCUPATIONAL THERAPY NOTE
OCCUPATIONAL THERAPY TREATMENT NOTE - INPATIENT        Room Number: 331/331-A           Presenting Problem: DVT in calf    Problem List  Principal Problem:    Pleural effusion  Active Problems:    Major depressive disorder, single episode, moderate (HCC) Recommendations: Sub-acute rehabilitation        PLAN  OT Treatment Plan: Energy conservation/work simplification techniques;ADL training;IADL training; Endurance training;Patient/Family education;Patient/Family training    SUBJECTIVE  Agreeable to activity mod I   Comment: progress CGA for dynamic standing ~1 min    Patient will complete item retrieval with mod I  Comment: progress             Goals  on: 2019  Frequency: 3x/wk

## 2019-04-02 NOTE — CM/SW NOTE
JOHN PAUL spoke with Sivakumar Han the [facility] [phone] who stated that they are still working on obtaining insurance auth. JOHN PAUL sent PT/OT notes via [employer]. Social work/case management to remain available for support and/or discharge planning.     Olvin Cosme

## 2019-04-02 NOTE — PHYSICAL THERAPY NOTE
PHYSICAL THERAPY TREATMENT NOTE - INPATIENT     Room Number: 331/331-A       Presenting Problem: pleural effusion    Problem List  Principal Problem:    Pleural effusion  Active Problems:    Major depressive disorder, single episode, moderate (HCC)    Gene None    WEIGHT BEARING RESTRICTION  Weight Bearing Restriction: None                PAIN ASSESSMENT   Ratin          BALANCE                                                                                                                     Static Sitt device: none at assistance level: independent   Goal #3   Current Status 90ft with rolling walker CGA and then without rolling walker    Goal #4     Goal #4   Current Status     Goal #5 Patient to demonstrate independence with home activity/exercise instru

## 2019-04-02 NOTE — PROGRESS NOTES
[facility]    Progress Note    Yoan Ramos Patient Status:  Inpatient    1945 MRN [id number]   [facility] 3W/SW Attending Rafal Richardson MD   Hosp Day # 6 PCP Mike Gonzalez MD        Subjective: 02/22/2019    PTT 78.9 (H) 03/30/2019    INR 1.02 03/28/2019    TSH 0.791 02/27/2019    DDIMER 0.52 02/17/2019    ESRML 18 03/28/2019    MG 2.5 02/27/2019    PHOS 2.8 02/25/2019    TROP <0.045 03/27/2019                        Frances Breen MD  4/2/2019

## 2019-04-02 NOTE — PLAN OF CARE
Diabetes/Glucose Control    • Glucose maintained within prescribed range Adequate for Discharge        PAIN - ADULT    • Verbalizes/displays adequate comfort level or patient's stated pain goal Adequate for Discharge        Patient Centered Care    • Jenniffere

## 2019-04-03 ENCOUNTER — APPOINTMENT (OUTPATIENT)
Dept: HEMATOLOGY/ONCOLOGY | Facility: HOSPITAL | Age: 74
End: 2019-04-03
Attending: INTERNAL MEDICINE
Payer: MEDICARE

## 2019-04-03 ENCOUNTER — APPOINTMENT (OUTPATIENT)
Dept: GENERAL RADIOLOGY | Facility: HOSPITAL | Age: 74
DRG: 187 | End: 2019-04-03
Attending: HOSPITALIST
Payer: MEDICARE

## 2019-04-03 PROCEDURE — 99233 SBSQ HOSP IP/OBS HIGH 50: CPT | Performed by: HOSPITALIST

## 2019-04-03 PROCEDURE — 99232 SBSQ HOSP IP/OBS MODERATE 35: CPT | Performed by: INTERNAL MEDICINE

## 2019-04-03 PROCEDURE — 99233 SBSQ HOSP IP/OBS HIGH 50: CPT | Performed by: OTHER

## 2019-04-03 PROCEDURE — 71045 X-RAY EXAM CHEST 1 VIEW: CPT | Performed by: HOSPITALIST

## 2019-04-03 RX ORDER — OLANZAPINE 2.5 MG/1
2.5 TABLET ORAL NIGHTLY
Status: DISCONTINUED | OUTPATIENT
Start: 2019-04-03 | End: 2019-04-07

## 2019-04-03 RX ORDER — PREDNISONE 20 MG/1
20 TABLET ORAL
Status: DISCONTINUED | OUTPATIENT
Start: 2019-04-03 | End: 2019-04-05

## 2019-04-03 NOTE — PROGRESS NOTES
Rajan Red is a 69year old [place]  female with history of CAD status post CABG February 2019, history of breast cancer, asthma, hypertension and gout who presented to the hospital with a chest pain and pleural effusion.    The patient se change our direction in meantime patient educated that it is not fair for her to ignore her anxiety and dismiss the seriousness of it.     Patient agreed to take medication and was hopeful otherwise I question if she can comply on the treatment knowing her 2.5 mg 2.5 mg Oral Nightly   diphenhydrAMINE HCl (BENADRYL) injection 25 mg 25 mg Intravenous Q6H PRN   Heparin Sodium (Porcine) 5000 UNIT/ML injection 5,000 Units 5,000 Units Subcutaneous 2 times per day   Pantoprazole Sodium (PROTONIX) EC tab 40 mg 40 mg glucose (DEX4) oral liquid 15 g 15 g Oral Q15 Min PRN       Allergies:    Allopurinol             HIVES, SWELLING, SHORTNESS OF                            BREATH  Dog Epithelium          SHORTNESS OF BREATH    Comment:Hair and saliva  Dust circumstantial thought the process. Patient admitted that she has been feeling down and anxious with excessive worry. Patient exhibited appropriate affect congruent with the mood. The patient deny any homicidal or suicidal ideation.   The patient denied CBC With Differential With Platelet      Basic Metabolic Panel (8)      Type and screen Once      Prepare RBC Once      ABORH (Blood Type) Once      Antibody Screen Once      Direct Antibody Test Once      Meds This Visit:  Requested Prescriptions      No

## 2019-04-03 NOTE — PROGRESS NOTES
[facility]    Progress Note    Jose Perez Patient Status:  Inpatient    1945 MRN [id number]   Location [facility] 3W/SW Attending Bo Ogden MD   Hosp Day # 7 PCP Luisito Cristina MD       Subjective:   T Heparin Sodium (Porcine)  5,000 Units Subcutaneous 2 times per day   • Pantoprazole Sodium  40 mg Oral QAM AC   • Clopidogrel Bisulfate  75 mg Oral Daily   • aspirin EC  81 mg Oral Daily   • amiodarone HCl  200 mg Oral BID   • amLODIPine Besylate  2.5 mg O 314.0  280.0  363.0     Recent Labs   Lab  03/28/19   0541  03/29/19   0606  03/30/19   0539  04/03/19   0601   GLU  132*  135*   --   133*   BUN  24*  25*   --   15   CREATSERUM  1.28*  1.27*   --   1.10*   GFRAA  48*  48*   --   58*   GFRNAA  42*  42* spent counseling re: treatment plan and workup  Norm Murray.  Ruthie Cade, 04/03/19

## 2019-04-03 NOTE — CM/SW NOTE
JOHN PAUL spoke with Joseph James the [facility] [phone] who stated that they have not yet received insurance authorization. JOHN PAUL explained that the pt is medically stable for discharge.  Social work/case management to remain available for support and/or discha

## 2019-04-03 NOTE — PLAN OF CARE
Problem: Patient Centered Care  Goal: Patient preferences are identified and integrated in the patient's plan of care  Interventions:  - What would you like us to know as we care for you?  I live at home, I was at [facility] rehab after open heart surgery in or patient reports new pain  - Anticipate increased pain with activity and pre-medicate as appropriate  Outcome: Progressing  C/o l/axilla pain radiating to l/upper back, pt states \"I felt something tear/pop\", this RN assessed pleurx dressing and site wh coordinating discharge planning if the patient needs post-hospital services based on physician/LIP order or complex needs related to functional status, cognitive ability or social support system  Outcome: Progressing  When insurance authorization clears.

## 2019-04-03 NOTE — PROGRESS NOTES
[facility]    Progress Note    Lacey Reyes Patient Status:  Inpatient    1945 MRN [id number]   Location [facility] 3W/SW Attending Jannet Baptiste MD   Hosp Day # 7 PCP Oj Hernandez MD        Subjective: 04/03/2019    CA 10.2 (H) 04/03/2019    ALB 3.3 (L) 02/22/2019    ALKPHO 103 02/22/2019    BILT 0.4 02/22/2019    TP 7.5 02/22/2019    AST 29 02/22/2019    ALT 29 02/22/2019    PTT 78.9 (H) 03/30/2019    INR 1.02 03/28/2019    TSH 0.791 02/27/2019    DDIME

## 2019-04-04 ENCOUNTER — TELEPHONE (OUTPATIENT)
Dept: ADMINISTRATIVE | Age: 74
End: 2019-04-04

## 2019-04-04 PROCEDURE — 99233 SBSQ HOSP IP/OBS HIGH 50: CPT | Performed by: OTHER

## 2019-04-04 PROCEDURE — 99232 SBSQ HOSP IP/OBS MODERATE 35: CPT | Performed by: HOSPITALIST

## 2019-04-04 PROCEDURE — 99232 SBSQ HOSP IP/OBS MODERATE 35: CPT | Performed by: INTERNAL MEDICINE

## 2019-04-04 NOTE — CM/SW NOTE
JOHN PAUL informed by Juvenal Crisostomo the [employer] [phone] that the insurance Eric Bob has been denied. JOHN PAUL spoke with Dr. Gabriela Syed and he is agreeable with completing a peer to peer.  JOHN PAUL informed him to call [phone] and to choose option 5 to speak directly to

## 2019-04-04 NOTE — PLAN OF CARE
Problem: Patient Centered Care  Goal: Patient preferences are identified and integrated in the patient's plan of care  Interventions:  - What would you like us to know as we care for you?  I live at home, I was at [facility] rehab after open heart surgery in Monitor WBC  - Administer growth factors as ordered  - Implement neutropenic guidelines  Outcome: Progressing  Afebrile, will cont to monitor WBCs and temps.     Problem: SAFETY ADULT - FALL  Goal: Free from fall injury  INTERVENTIONS:  - Assess pt frequent respiratory effort  - Oxygen supplementation based on oxygen saturation or ABGs  - Provide Smoking Cessation handout, if applicable  - Encourage broncho-pulmonary hygiene including cough, deep breathe, Incentive Spirometry  - Assess the need for suctioning

## 2019-04-04 NOTE — PLAN OF CARE
Problem: Patient Centered Care  Goal: Patient preferences are identified and integrated in the patient's plan of care  Interventions:  - What would you like us to know as we care for you?  I live at home, I was at [facility] rehab after open heart surgery in Progressing      Problem: RISK FOR INFECTION - ADULT  Goal: Absence of fever/infection during anticipated neutropenic period  INTERVENTIONS  - Monitor WBC  - Administer growth factors as ordered  - Implement neutropenic guidelines  Outcome: Progressing hypoglycemia  - Administer ordered medications to maintain glucose within target range  - Assess barriers to adequate nutritional intake and initiate nutrition consult as needed  - Instruct patient on self management of diabetes  Outcome: Progressing

## 2019-04-04 NOTE — PROGRESS NOTES
[facility]    Progress Note    An Zuluaga Patient Status:  Inpatient    1945 MRN [id number]   Location [facility] 3W/SW Attending Bianca Deleon MD   Hosp Day # 8 PCP Portillo Cohn MD       Subjective:   T (Porcine)  5,000 Units Subcutaneous 2 times per day   • Pantoprazole Sodium  40 mg Oral QAM AC   • Clopidogrel Bisulfate  75 mg Oral Daily   • aspirin EC  81 mg Oral Daily   • amiodarone HCl  200 mg Oral BID   • amLODIPine Besylate  2.5 mg Oral Daily   • e GLU  135*   --   133*   BUN  25*   --   15   CREATSERUM  1.27*   --   1.10*   GFRAA  48*   --   58*   GFRNAA  42*   --   50*   CA  9.9   --   10.2*   NA  142   --   140   K  3.7  4.8  4.3   CL  104   --   105   CO2  32.0   --   31.0     Lab Results   Com

## 2019-04-04 NOTE — PROGRESS NOTES
[facility]    Progress Note      Assessment and Plan:   1. Dyspnea with promptly recurring pleural effusions status post 2 thoracenteses– the patient is doing well status post Pleurx catheter insertion. She no longer has chest pains.   Her abnormality     Results:        CT scan of the chest–Slight worsening of moderate left pleural effusion and associated airspace disease since 3/7/2019.   There are now ill-defined densities within the left pleural effusion suspicious for hematoma/hemothorax

## 2019-04-04 NOTE — PLAN OF CARE
Problem: Patient Centered Care  Goal: Patient preferences are identified and integrated in the patient's plan of care  Interventions:  - What would you like us to know as we care for you?  I live at home, I was at [facility] rehab after open heart surgery in Progressing      Problem: RISK FOR INFECTION - ADULT  Goal: Absence of fever/infection during anticipated neutropenic period  INTERVENTIONS  - Monitor WBC  - Administer growth factors as ordered  - Implement neutropenic guidelines  Outcome: Progressing hypoglycemia  - Administer ordered medications to maintain glucose within target range  - Assess barriers to adequate nutritional intake and initiate nutrition consult as needed  - Instruct patient on self management of diabetes  Outcome: Progressing

## 2019-04-05 PROCEDURE — 99232 SBSQ HOSP IP/OBS MODERATE 35: CPT | Performed by: HOSPITALIST

## 2019-04-05 PROCEDURE — 99232 SBSQ HOSP IP/OBS MODERATE 35: CPT | Performed by: INTERNAL MEDICINE

## 2019-04-05 RX ORDER — PREDNISONE 10 MG/1
10 TABLET ORAL
Status: DISCONTINUED | OUTPATIENT
Start: 2019-04-06 | End: 2019-04-07

## 2019-04-05 NOTE — TELEPHONE ENCOUNTER
FMLA form for son for Dr. Jayce Laws received in Forms dept+ FCR+ Signed release. Logged for processing.  NK

## 2019-04-05 NOTE — PROGRESS NOTES
[facility]    Progress Note    Valdemar Jones Patient Status:  Inpatient    1945 MRN [id number]   [facility] 3W/SW Attending Cuba Weber MD   Hosp Day # 9 PCP Melvin Doran MD        Subjective: (H) 04/03/2019    ALB 3.3 (L) 02/22/2019    ALKPHO 103 02/22/2019    BILT 0.4 02/22/2019    TP 7.5 02/22/2019    AST 29 02/22/2019    ALT 29 02/22/2019    PTT 78.9 (H) 03/30/2019    INR 1.02 03/28/2019    TSH 0.791 02/27/2019    DDIMER 0.52 02/17/2019    E

## 2019-04-05 NOTE — PROGRESS NOTES
Jose Perez is a 69year old [place]  female with history of CAD status post CABG February 2019, history of breast cancer, asthma, hypertension and gout who presented to the hospital with a chest pain and pleural effusion.    The patient se GRAFT N/A 2/26/2019    Performed by Paco Fierro MD at [address] [place] [address] MAIN OR   • HERNIA SURGERY     • LUMPECTOMY RIGHT  2014   • MASTECTOMY PARTIAL  1999   • [address] CATH INSERTION Left 3/29/2019    Performed by Paco Fierro MD at [address] [place] [address] OR   • RADIAT Montelukast Sodium (SINGULAIR) tab 10 mg 10 mg Oral Nightly   THERA/BETA-CAROTENE TABS 1 tablet 1 tablet Oral Daily   probenecid (BENEMID) tab 250 mg 250 mg Oral Daily   Senna-Docusate Sodium (SENOKOT S) 8.6-50 MG tab 2 tablet 2 tablet Oral BID   traMADo 02/22/2019    AST 29 02/22/2019    ALT 29 02/22/2019    PTT 78.9 (H) 03/30/2019    INR 1.02 03/28/2019    PTP 13.2 03/28/2019    TSH 0.791 02/27/2019    DDIMER 0.52 02/17/2019    ESRML 18 03/28/2019    MG 2.5 02/27/2019    PHOS 2.8 02/25/2019    TROP <0.04 insight orientation and restructuring the negative thought. 3.  Continue Zyprexa 2.5 mg nightly. 4.  Encourage assertiveness and expressing her emotion.   5.  Follow-up during this admission      Orders This Visit:  Orders Placed This Encounter      DALILA MAZA

## 2019-04-05 NOTE — CM/SW NOTE
JOHN PAUL informed by  that the insurance auth claim has been closed. JOHN PAUL spoke with Juvenal Crisostomo the [facility] [phone] who stated that they can re-submit for auth. JOHN PAUL sent clinical updates via ISR/Naomie to [place]/Daren.      JOHN PAUL met with the pt at [facility]

## 2019-04-05 NOTE — DIETARY NOTE
INPATIENT NUTRITION NOTE:    Pt will be d/c'd to [facility] pending insurance authorization. Pt's appetite has improved, taking an ave of 98% of recorded meals during the past 5 days per RN notes  She is no longer at nutritional risk.  Nutrition Care will co

## 2019-04-05 NOTE — PROGRESS NOTES
[facility]    Progress Note    Boneta Heading Patient Status:  Inpatient    1945 MRN [id number]   [facility] 3W/SW Attending Adriano Dao MD   Hosp Day # 9 PCP Teo Caba MD       Subjective:   T Oral Daily with breakfast   • OLANZapine  2.5 mg Oral Nightly   • Heparin Sodium (Porcine)  5,000 Units Subcutaneous 2 times per day   • Pantoprazole Sodium  40 mg Oral QAM AC   • Clopidogrel Bisulfate  75 mg Oral Daily   • aspirin EC  81 mg Oral Daily   • --   15   CREATSERUM   --   1.10*   GFRAA   --   58*   GFRNAA   --   50*   CA   --   10.2*   NA   --   140   K  4.8  4.3   CL   --   105   CO2   --   31.0     Lab Results   Component Value Date    INR 1.02 03/28/2019    INR 1.12 03/08/2019    INR 2.1 (H)

## 2019-04-06 PROCEDURE — 99232 SBSQ HOSP IP/OBS MODERATE 35: CPT | Performed by: INTERNAL MEDICINE

## 2019-04-06 PROCEDURE — 99232 SBSQ HOSP IP/OBS MODERATE 35: CPT | Performed by: HOSPITALIST

## 2019-04-06 NOTE — PHYSICAL THERAPY NOTE
PHYSICAL THERAPY TREATMENT NOTE - INPATIENT     Room Number: 331/331-A       Presenting Problem: pleural effusion    Problem List  Principal Problem:    Pleural effusion  Active Problems:    Major depressive disorder, single episode, moderate (HCC)    Gene (including adjusting bedclothes, sheets and blankets)?: None   -   Sitting down on and standing up from a chair with arms (e.g., wheelchair, bedside commode, etc.): A Little   -   Moving from lying on back to sitting on the side of the bed?: None   How muc

## 2019-04-06 NOTE — PLAN OF CARE
Problem: Patient/Family Goals  Goal: Patient/Family Long Term Goal  Description  Patient's Long Term Goal: to be healthy    Interventions:  - take medications as prescribed  - follow-up with health care providers on discharge  - See additional Care Plan Instruct pt to call for assistance with activity based on assessment  - Modify environment to reduce risk of injury  - Provide assistive devices as appropriate  - Consider OT/PT consult to assist with strengthening/mobility  - Encourage toileting schedule oxygen saturation or ABGs  - Provide Smoking Cessation handout, if applicable  - Encourage broncho-pulmonary hygiene including cough, deep breathe, Incentive Spirometry  - Assess the need for suctioning and perform as needed  - Assess and instruct to repor

## 2019-04-06 NOTE — PROGRESS NOTES
[facility]    Progress Note    Jose Eduardo Pae Patient Status:  Inpatient    1945 MRN [id number]   Location [facility] 3W/SW Attending Cyndi Clark MD   Hosp Day # 10 PCP Radha Caceres MD       Subjective: 5,000 Units Subcutaneous 2 times per day   • Pantoprazole Sodium  40 mg Oral QAM AC   • Clopidogrel Bisulfate  75 mg Oral Daily   • aspirin EC  81 mg Oral Daily   • amiodarone HCl  200 mg Oral BID   • amLODIPine Besylate  2.5 mg Oral Daily   • ezetimibe  1 GFRAA 58* 52*   GFRNAA 50* 45*   CA 10.2* 9.8    144   K 4.3 4.6    108*   CO2 31.0 32.0     Lab Results   Component Value Date    INR 1.02 03/28/2019    INR 1.12 03/08/2019    INR 2.1 (H) 02/26/2019       Culture:  No results found for this

## 2019-04-06 NOTE — PROGRESS NOTES
[facility]    Progress Note    Recardo Salaadea Patient Status:  Inpatient    1945 MRN [id number]   Location [facility] 3W/SW Attending Gallo Campbell MD   Hosp Day # 10 PCP Peggy Herbert MD        Subjective 04/06/2019     (H) 04/06/2019    CO2 32.0 04/06/2019     (H) 04/06/2019    CA 9.8 04/06/2019    ALB 3.3 (L) 02/22/2019    ALKPHO 103 02/22/2019    BILT 0.4 02/22/2019    TP 7.5 02/22/2019    AST 29 02/22/2019    ALT 29 02/22/2019    PTT 78.9 (

## 2019-04-06 NOTE — DISCHARGE SUMMARY
[facility]    Discharge Summary    Rivera Osorio Patient Status:  Inpatient    1945 MRN [id number]   Location [facility] 3W/SW Attending No att. providers found   [facility] Day # 4 PCP Reynaldo Bang MD     Date of A Major depressive disorder, single episode, moderate (HCC)     Generalized anxiety disorder        Physical Exam:     Gen: No acute distress, alert and oriented x3  Pulm: Lungs clear, normal respiratory effort  CV: Heart with regular rate and rhythm  Abd: A 2-3  - renal function has been stable with diuresis - will monitor     Recent CABG, history of CAD  - cv surgery notified of admission  - cont current meds, monitor vitals and adjust as needed      Discharge Condition: Stable    Discharge Medications: needed. Refills:  0     docusate sodium 100 MG Caps  Commonly known as:  COLACE      Take 100 mg by mouth 2 (two) times daily.    Quantity:  60 capsule  Refills:  0     ezetimibe 10 MG Tabs  Commonly known as:  ZETIA      Take 1 tablet (10 mg total) by mo

## 2019-04-07 VITALS
SYSTOLIC BLOOD PRESSURE: 122 MMHG | DIASTOLIC BLOOD PRESSURE: 54 MMHG | HEART RATE: 83 BPM | HEIGHT: 64 IN | RESPIRATION RATE: 16 BRPM | BODY MASS INDEX: 27.45 KG/M2 | WEIGHT: 160.81 LBS | TEMPERATURE: 98 F | OXYGEN SATURATION: 93 %

## 2019-04-07 PROCEDURE — 99239 HOSP IP/OBS DSCHRG MGMT >30: CPT | Performed by: HOSPITALIST

## 2019-04-07 PROCEDURE — 99232 SBSQ HOSP IP/OBS MODERATE 35: CPT | Performed by: INTERNAL MEDICINE

## 2019-04-07 RX ORDER — OLANZAPINE 2.5 MG/1
2.5 TABLET ORAL NIGHTLY
Qty: 30 TABLET | Refills: 0 | Status: SHIPPED | OUTPATIENT
Start: 2019-04-07 | End: 2019-04-26 | Stop reason: ALTCHOICE

## 2019-04-07 RX ORDER — PREDNISONE 1 MG/1
5 TABLET ORAL
Status: DISCONTINUED | OUTPATIENT
Start: 2019-04-08 | End: 2019-04-07

## 2019-04-07 RX ORDER — PREDNISONE 1 MG/1
5 TABLET ORAL
Qty: 3 TABLET | Refills: 0 | Status: SHIPPED | OUTPATIENT
Start: 2019-04-08 | End: 2019-05-02

## 2019-04-07 NOTE — PROGRESS NOTES
[facility]    Progress Note    Neva Allen Patient Status:  Inpatient    1945 MRN [id number]   Location [facility] 3W/SW Attending Birgit Thao MD   Hosp Day # 11 PCP Marco Owens MD        Subjective 04/07/2019    CA 10.7 (H) 04/07/2019    ALB 3.3 (L) 02/22/2019    ALKPHO 103 02/22/2019    BILT 0.4 02/22/2019    TP 7.5 02/22/2019    AST 29 02/22/2019    ALT 29 02/22/2019    PTT 78.9 (H) 03/30/2019    INR 1.02 03/28/2019    TSH 0.791 02/27/2019    DDIME

## 2019-04-07 NOTE — PROGRESS NOTES
Pt A/O x , RA, denies pain and shortness of breath. Pt to be discharged home today with home health as ordered.  Discharge instructions, medications, prescriptions, home health, Pluerex cath draining, home health, and follow up appointments reviewed with pt

## 2019-04-07 NOTE — CM/SW NOTE
Resume orders sent to [facility] AND [facility] confirmed for 4.7. 19. [facility] nurse would not be coming daily to drain Pleurex catheter. [facility] will provide several kits for the [facility] and [facility] AND [facility] will order more.         Manpreet Mckeon LMSW., [id number]

## 2019-04-07 NOTE — PLAN OF CARE
Problem: Patient Centered Care  Goal: Patient preferences are identified and integrated in the patient's plan of care  Description  Interventions:  - What would you like us to know as we care for you?  I live at home, I was at [facility] rehab after open h appropriate and evaluate response  - Consider cultural and social influences on pain and pain management  - Manage/alleviate anxiety  - Utilize distraction and/or relaxation techniques  - Monitor for opioid side effects  - Notify MD/LIP if interventions un Identify discharge learning needs (meds, wound care, etc)  - Arrange for interpreters to assist at discharge as needed  - Consider post-discharge preferences of patient/family/discharge partner  - Complete POLST form as appropriate  - Assess patient's abil signs/symptoms of CO2 retention  4/7/2019 0938 by Fortino Corley, RN  Outcome: Progressing  4/7/2019 0937 by Fortino Corley, RN  Outcome: Progressing    Pt on RA, walking frequently, encouraged to change position often when sitting/laying

## 2019-04-07 NOTE — DISCHARGE SUMMARY
[facility]    Discharge Summary    Kayliflymu Osorio Patient Status:  Inpatient    1945 MRN [id number]   Location [facility] 3W/SW Attending Sera Cruz MD   Hosp Day # 11 PCP Melissa Shoemaker MD     Date of A Generalized anxiety disorder      Reason for Admission: Dyspnea    Physical Exam:    04/07/19  0900   BP: 122/54   Pulse: 83   Resp: 16   Temp: 97.6 °F (36.4 °C)   GENERAL:  The patient appeared to be in no distress and was comfortable.   SKIN:  Warm and hy daily  -suspect some component of her dyspnea is anxiety related      Anxiety / MDD  IMPROVED  -did not react well to xanax at rehab  -Psych was consulted  -possible underlying depression  -cont zyprexa     Asthma   -stable  -albuterol     CAD s/p CABG  -c changed:    · medication strength  · how much to take  · when to take this  · additional instructions      Take 1 tablet (5 mg total) by mouth daily with breakfast.   Quantity:  3 tablet  Refills:  0     probenecid 500 MG Tabs  Commonly known as:  BERTA by mouth nightly as needed. Refills:  0     metFORMIN HCl 500 MG Tabs  Commonly known as:  GLUCOPHAGE      Take 1 tablet (500 mg total) by mouth 2 (two) times daily with meals.    Quantity:  60 tablet  Refills:  0     Montelukast Sodium 10 MG Tabs  Common In 2 weeks    [address] Ilene Rivera [id number] [phone]       I answered all the patient's questions spending >30 minutes with patient in well over half time in face-to-face discussion of further evaluation and therapy. Kenneth Luna.  Si

## 2019-04-08 ENCOUNTER — PATIENT OUTREACH (OUTPATIENT)
Dept: CASE MANAGEMENT | Age: 74
End: 2019-04-08

## 2019-04-08 ENCOUNTER — TELEPHONE (OUTPATIENT)
Dept: INTERNAL MEDICINE CLINIC | Facility: CLINIC | Age: 74
End: 2019-04-08

## 2019-04-08 DIAGNOSIS — Z02.9 ENCOUNTERS FOR UNSPECIFIED ADMINISTRATIVE PURPOSE: ICD-10-CM

## 2019-04-08 DIAGNOSIS — J90 PLEURAL EFFUSION: ICD-10-CM

## 2019-04-08 PROCEDURE — 1111F DSCHRG MED/CURRENT MED MERGE: CPT

## 2019-04-08 RX ORDER — PANTOPRAZOLE SODIUM 20 MG/1
20 TABLET, DELAYED RELEASE ORAL
Qty: 60 TABLET | Refills: 0 | Status: SHIPPED | OUTPATIENT
Start: 2019-04-08 | End: 2019-04-26

## 2019-04-08 RX ORDER — HYDRALAZINE HYDROCHLORIDE 25 MG/1
25 TABLET, FILM COATED ORAL 2 TIMES DAILY
Qty: 60 TABLET | Refills: 0 | Status: SHIPPED | OUTPATIENT
Start: 2019-04-08 | End: 2019-04-26

## 2019-04-08 RX ORDER — METOPROLOL SUCCINATE 25 MG/1
25 TABLET, EXTENDED RELEASE ORAL
Qty: 30 TABLET | Refills: 0 | Status: SHIPPED | OUTPATIENT
Start: 2019-04-08 | End: 2019-04-26

## 2019-04-08 NOTE — PROGRESS NOTES
Ignacio called back stating [facility] is going to see the patient. An RN should be calling the pt today to set up a time. Nothing further is needed at this time.

## 2019-04-08 NOTE — TELEPHONE ENCOUNTER
Advised patient of Dr. Steven evangelista. Patient verbalized understanding and had no further questions.

## 2019-04-08 NOTE — PROGRESS NOTES
Have not heard back from [place], [place] yet. LM for Yahaira Murray ([phone]), another SW to see if she is available to discuss Ginger Parham concerns. Will await a returned phone call.

## 2019-04-08 NOTE — PAYOR COMM NOTE
--------------  DISCHARGE REVIEW    Sivan Stanley [facility]  Subscriber #:  [id number]  Authorization Number: [id number]    Admit date: 3/27/19  Admit time:  2106  Discharge Date: 4/7/2019  4:49 PM         Date of Admission: 3/27/2019 Disposition: Home or Self

## 2019-04-08 NOTE — PROGRESS NOTES
Anali Hill called back and stated she did receive the VM and that she has been working on resolving this concern. She stated a new [place] [address] agency will be set up for the pt and that she will call back with an update when she has one.      Called the pt to provide

## 2019-04-08 NOTE — PROGRESS NOTES
Pt called back, informed her a new [address] should be contacting her today or tomorrow. Also informed the pt her medications are at the pharmacy now, pt stated she is aware and plans to pick them up soon.

## 2019-04-08 NOTE — TELEPHONE ENCOUNTER
Patient was in patient and states that she was started on Pantoprazole, hydralazine, metoprolol while hospitalized but states they never sent the scripts to her pharmacy upon discharge.  She requests Dr. Aylin Meyer order her 30 day supply's for all three me

## 2019-04-08 NOTE — TELEPHONE ENCOUNTER
Pt called in requesting to speak with ECL directly in regards new prescriptions prescribed after her hospital discharge.   Please advise     Pantoprazole, hydralazine, metoprolol

## 2019-04-08 NOTE — PROGRESS NOTES
Initial Post Discharge Follow Up   Discharge Date: 4/7/19  Contact Date: 4/8/2019    Consent Verification:  Assessment Completed With: Patient  HIPAA Verified?   Yes    Discharge Dx:     Recurrent L pleural effusion   pleurex cather placement  cad s/p CA Medications:  OLANZapine 2.5 MG Oral Tab Take 1 tablet (2.5 mg total) by mouth nightly.  Disp: 30 tablet Rfl: 0   predniSONE 5 MG Oral Tab Take 1 tablet (5 mg total) by mouth daily with breakfast. Disp: 3 tablet Rfl: 0   albuterol sulfate (2.5 MG/3ML) 0.083 Clopidogrel Bisulfate (PLAVIX) 75 MG Oral Tab Take 1 tablet (75 mg total) by mouth daily. Disp: 90 tablet Rfl: 0   PEG 3350 Oral Powd Pack Take 17 g by mouth daily as needed.  Disp:  Rfl: 0   Senna-Docusate Sodium 8.6-50 MG Oral Tab Take 2 tablets by mout ordered at D/C? No- Pt has a drain in place. Services ordered at D/C? Yes   What services:   TBF by initial RN if needed.     Have you scheduled these services?    no    DX: specifics:       Needs post D/C:   Now that you are home, are there any needs [facility], [place] [address]  [address] [facility] [place] [address]  [phone] [facility], [facility], [address], [address]  [address] , [place] [id number]

## 2019-04-08 NOTE — TELEPHONE ENCOUNTER
Spoke with pt and informed to hold the Metformin per EL's advise for now, but to continue to monitor her blood sugar levels.  Changed pt's OV from 4/22 to 4/18 with Dr. Anitha Roa.

## 2019-04-08 NOTE — TELEPHONE ENCOUNTER
Pt was contacted for TCM. Pt currently has her appt scheduled on 4/22/19. Offered a sooner appt as TCM/HFU is recommended by 4/14/19 as pt is a high risk for readmission however pt declined stating she has set up transportation for 4/22.  Please follow up w

## 2019-04-08 NOTE — TELEPHONE ENCOUNTER
I can accomodate her but our problem is the transportation as per pt. As to her metformin, since her a1c was normal at 5.5, would hold it for now to avoid risk of hypoglycemia and  Have pt monitor glucose.  Call if glucose starts going up and will then pati

## 2019-04-08 NOTE — CM/SW NOTE
SW received a call from physician's  stating that the patient received a call from [facility] and that they are refusing to take her back due to her now being high risk patient.     Referral sent to [facility],they accepted her  and will

## 2019-04-09 ENCOUNTER — TELEPHONE (OUTPATIENT)
Dept: INTERNAL MEDICINE CLINIC | Facility: CLINIC | Age: 74
End: 2019-04-09

## 2019-04-09 NOTE — TELEPHONE ENCOUNTER
Spoke with pt she asked for son Britt Keith to be called. Spoke with Britt Keith informed Dr. Paloma Tran completed placard form and it is ready for  at our . He voiced understanding.

## 2019-04-10 ENCOUNTER — TELEPHONE (OUTPATIENT)
Dept: PULMONOLOGY | Facility: CLINIC | Age: 74
End: 2019-04-10

## 2019-04-10 NOTE — TELEPHONE ENCOUNTER
Dr. Stephanie Walton,    [id number] [address] for son - he is requesting 1-5 days per month (1/2 days) as needed just to take pt to-from appts. Please sign off on form if you approve:  -Highlight the patient and hit \"Chart\" button.   -In Chart Review, w/in the Encounter tab

## 2019-04-10 NOTE — TELEPHONE ENCOUNTER
Karie Monte from [facility] calling for pt to advise during admission, pts plural drain had less than 10 ML of output,thanks.

## 2019-04-12 NOTE — TELEPHONE ENCOUNTER
Pt notified of Dr. Macedo Guess orders & below. She stts she has appt w/ Dr. Gloria Alvarez on 4/24. Instructed her to keep appt. She voiced understanding.

## 2019-04-16 ENCOUNTER — TELEPHONE (OUTPATIENT)
Dept: PULMONOLOGY | Facility: CLINIC | Age: 74
End: 2019-04-16

## 2019-04-16 NOTE — TELEPHONE ENCOUNTER
Fidelinarn from [facility] calling to advise pleural drained weekly only output was about 1/2 CC/dry, pls call at:[phone],thanks.

## 2019-04-16 NOTE — TELEPHONE ENCOUNTER
Danny Weiner informed of Dr. Spears Cable message/orders below. Danny Weiner verbalized understanding.

## 2019-04-18 ENCOUNTER — OFFICE VISIT (OUTPATIENT)
Dept: INTERNAL MEDICINE CLINIC | Facility: CLINIC | Age: 74
End: 2019-04-18
Payer: MEDICARE

## 2019-04-18 ENCOUNTER — TELEPHONE (OUTPATIENT)
Dept: INTERNAL MEDICINE CLINIC | Facility: CLINIC | Age: 74
End: 2019-04-18

## 2019-04-18 VITALS
DIASTOLIC BLOOD PRESSURE: 75 MMHG | SYSTOLIC BLOOD PRESSURE: 135 MMHG | HEART RATE: 87 BPM | WEIGHT: 160 LBS | TEMPERATURE: 99 F | BODY MASS INDEX: 27 KG/M2 | OXYGEN SATURATION: 84 %

## 2019-04-18 DIAGNOSIS — J90 PLEURAL EFFUSION: ICD-10-CM

## 2019-04-18 DIAGNOSIS — J45.30 MILD PERSISTENT ASTHMA WITHOUT COMPLICATION: ICD-10-CM

## 2019-04-18 DIAGNOSIS — I25.10 CORONARY ARTERY DISEASE INVOLVING NATIVE CORONARY ARTERY OF NATIVE HEART WITHOUT ANGINA PECTORIS: ICD-10-CM

## 2019-04-18 DIAGNOSIS — M1A.9XX1 CHRONIC TOPHACEOUS GOUT: ICD-10-CM

## 2019-04-18 DIAGNOSIS — J45.901 CHRONIC OBSTRUCTIVE ASTHMA WITH ACUTE EXACERBATION (HCC): Primary | ICD-10-CM

## 2019-04-18 DIAGNOSIS — J44.1 CHRONIC OBSTRUCTIVE ASTHMA WITH ACUTE EXACERBATION (HCC): Primary | ICD-10-CM

## 2019-04-18 DIAGNOSIS — E78.00 HYPERCHOLESTEROLEMIA: ICD-10-CM

## 2019-04-18 DIAGNOSIS — I10 ESSENTIAL HYPERTENSION: ICD-10-CM

## 2019-04-18 DIAGNOSIS — Z95.1 S/P CABG X 3: Primary | ICD-10-CM

## 2019-04-18 PROCEDURE — G0463 HOSPITAL OUTPT CLINIC VISIT: HCPCS | Performed by: INTERNAL MEDICINE

## 2019-04-18 PROCEDURE — 99070 SPECIAL SUPPLIES PHYS/QHP: CPT | Performed by: INTERNAL MEDICINE

## 2019-04-18 PROCEDURE — 1111F DSCHRG MED/CURRENT MED MERGE: CPT | Performed by: INTERNAL MEDICINE

## 2019-04-18 PROCEDURE — 99214 OFFICE O/P EST MOD 30 MIN: CPT | Performed by: INTERNAL MEDICINE

## 2019-04-18 RX ORDER — LANCETS 33 GAUGE
EACH MISCELLANEOUS
Refills: 0 | COMMUNITY
Start: 2019-03-05 | End: 2021-12-23 | Stop reason: ALTCHOICE

## 2019-04-18 RX ORDER — CALCIUM CITRATE/VITAMIN D3 200MG-6.25
TABLET ORAL
Refills: 0 | COMMUNITY
Start: 2019-03-05 | End: 2021-02-02

## 2019-04-18 RX ORDER — ALBUTEROL SULFATE 2.5 MG/3ML
2.5 SOLUTION RESPIRATORY (INHALATION) EVERY 4 HOURS PRN
Qty: 1 BOX | Refills: 1 | Status: SHIPPED | OUTPATIENT
Start: 2019-04-18 | End: 2021-09-22

## 2019-04-18 NOTE — TELEPHONE ENCOUNTER
Dr Demetrius Rico, please see pending rx  Patient calling to request rx for albuterol solution for her home nebulizer , tubing and mouthpiece

## 2019-04-19 ENCOUNTER — HOSPITAL ENCOUNTER (INPATIENT)
Facility: HOSPITAL | Age: 74
LOS: 2 days | Discharge: HOME HEALTH | DRG: 187 | End: 2019-04-21
Attending: EMERGENCY MEDICINE | Admitting: HOSPITALIST
Payer: MEDICARE

## 2019-04-19 ENCOUNTER — APPOINTMENT (OUTPATIENT)
Dept: GENERAL RADIOLOGY | Facility: HOSPITAL | Age: 74
DRG: 187 | End: 2019-04-19
Attending: EMERGENCY MEDICINE
Payer: MEDICARE

## 2019-04-19 ENCOUNTER — NURSE TRIAGE (OUTPATIENT)
Dept: INTERNAL MEDICINE CLINIC | Facility: CLINIC | Age: 74
End: 2019-04-19

## 2019-04-19 ENCOUNTER — APPOINTMENT (OUTPATIENT)
Dept: CT IMAGING | Facility: HOSPITAL | Age: 74
DRG: 187 | End: 2019-04-19
Attending: EMERGENCY MEDICINE
Payer: MEDICARE

## 2019-04-19 DIAGNOSIS — J90 PLEURAL EFFUSION ON LEFT: Primary | ICD-10-CM

## 2019-04-19 PROBLEM — R06.00 DYSPNEA ON EXERTION: Status: ACTIVE | Noted: 2019-03-07

## 2019-04-19 PROBLEM — R06.09 DYSPNEA ON EXERTION: Status: ACTIVE | Noted: 2019-03-07

## 2019-04-19 PROCEDURE — 71260 CT THORAX DX C+: CPT | Performed by: EMERGENCY MEDICINE

## 2019-04-19 PROCEDURE — 71045 X-RAY EXAM CHEST 1 VIEW: CPT | Performed by: EMERGENCY MEDICINE

## 2019-04-19 PROCEDURE — 99222 1ST HOSP IP/OBS MODERATE 55: CPT | Performed by: INTERNAL MEDICINE

## 2019-04-19 PROCEDURE — 99223 1ST HOSP IP/OBS HIGH 75: CPT | Performed by: HOSPITALIST

## 2019-04-19 RX ORDER — AMLODIPINE BESYLATE 2.5 MG/1
2.5 TABLET ORAL DAILY
Status: DISCONTINUED | OUTPATIENT
Start: 2019-04-19 | End: 2019-04-21

## 2019-04-19 RX ORDER — METOPROLOL SUCCINATE 25 MG/1
25 TABLET, EXTENDED RELEASE ORAL
Status: DISCONTINUED | OUTPATIENT
Start: 2019-04-20 | End: 2019-04-21

## 2019-04-19 RX ORDER — MONTELUKAST SODIUM 10 MG/1
10 TABLET ORAL NIGHTLY
Status: DISCONTINUED | OUTPATIENT
Start: 2019-04-19 | End: 2019-04-21

## 2019-04-19 RX ORDER — PANTOPRAZOLE SODIUM 20 MG/1
20 TABLET, DELAYED RELEASE ORAL
Status: DISCONTINUED | OUTPATIENT
Start: 2019-04-20 | End: 2019-04-21

## 2019-04-19 RX ORDER — SODIUM CHLORIDE 0.9 % (FLUSH) 0.9 %
3 SYRINGE (ML) INJECTION AS NEEDED
Status: DISCONTINUED | OUTPATIENT
Start: 2019-04-19 | End: 2019-04-21

## 2019-04-19 RX ORDER — POLYETHYLENE GLYCOL 3350 17 G/17G
17 POWDER, FOR SOLUTION ORAL DAILY PRN
Status: DISCONTINUED | OUTPATIENT
Start: 2019-04-19 | End: 2019-04-21

## 2019-04-19 RX ORDER — DOCUSATE SODIUM 100 MG/1
100 CAPSULE, LIQUID FILLED ORAL 2 TIMES DAILY
Status: DISCONTINUED | OUTPATIENT
Start: 2019-04-19 | End: 2019-04-21

## 2019-04-19 RX ORDER — METOCLOPRAMIDE HYDROCHLORIDE 5 MG/ML
10 INJECTION INTRAMUSCULAR; INTRAVENOUS EVERY 8 HOURS PRN
Status: DISCONTINUED | OUTPATIENT
Start: 2019-04-19 | End: 2019-04-19

## 2019-04-19 RX ORDER — CLOPIDOGREL BISULFATE 75 MG/1
75 TABLET ORAL DAILY
Status: DISCONTINUED | OUTPATIENT
Start: 2019-04-19 | End: 2019-04-21

## 2019-04-19 RX ORDER — METOCLOPRAMIDE HYDROCHLORIDE 5 MG/ML
5 INJECTION INTRAMUSCULAR; INTRAVENOUS EVERY 8 HOURS PRN
Status: DISCONTINUED | OUTPATIENT
Start: 2019-04-19 | End: 2019-04-21

## 2019-04-19 RX ORDER — FUROSEMIDE 10 MG/ML
20 INJECTION INTRAMUSCULAR; INTRAVENOUS ONCE
Status: COMPLETED | OUTPATIENT
Start: 2019-04-19 | End: 2019-04-19

## 2019-04-19 RX ORDER — ASPIRIN 81 MG/1
81 TABLET ORAL DAILY
Status: DISCONTINUED | OUTPATIENT
Start: 2019-04-19 | End: 2019-04-21

## 2019-04-19 RX ORDER — OLANZAPINE 2.5 MG/1
2.5 TABLET ORAL NIGHTLY
Status: DISCONTINUED | OUTPATIENT
Start: 2019-04-19 | End: 2019-04-20

## 2019-04-19 RX ORDER — SODIUM PHOSPHATE, DIBASIC AND SODIUM PHOSPHATE, MONOBASIC 7; 19 G/133ML; G/133ML
1 ENEMA RECTAL ONCE AS NEEDED
Status: DISCONTINUED | OUTPATIENT
Start: 2019-04-19 | End: 2019-04-21

## 2019-04-19 RX ORDER — HYDRALAZINE HYDROCHLORIDE 25 MG/1
25 TABLET, FILM COATED ORAL 2 TIMES DAILY
Status: DISCONTINUED | OUTPATIENT
Start: 2019-04-19 | End: 2019-04-21

## 2019-04-19 RX ORDER — BISACODYL 10 MG
10 SUPPOSITORY, RECTAL RECTAL
Status: DISCONTINUED | OUTPATIENT
Start: 2019-04-19 | End: 2019-04-21

## 2019-04-19 RX ORDER — IPRATROPIUM BROMIDE AND ALBUTEROL SULFATE 2.5; .5 MG/3ML; MG/3ML
3 SOLUTION RESPIRATORY (INHALATION) ONCE
Status: COMPLETED | OUTPATIENT
Start: 2019-04-19 | End: 2019-04-19

## 2019-04-19 RX ORDER — ALBUTEROL SULFATE 2.5 MG/3ML
2.5 SOLUTION RESPIRATORY (INHALATION) EVERY 4 HOURS PRN
Status: DISCONTINUED | OUTPATIENT
Start: 2019-04-19 | End: 2019-04-21

## 2019-04-19 RX ORDER — HEPARIN SODIUM 5000 [USP'U]/ML
5000 INJECTION, SOLUTION INTRAVENOUS; SUBCUTANEOUS EVERY 12 HOURS SCHEDULED
Status: DISCONTINUED | OUTPATIENT
Start: 2019-04-19 | End: 2019-04-21

## 2019-04-19 RX ORDER — ONDANSETRON 2 MG/ML
4 INJECTION INTRAMUSCULAR; INTRAVENOUS EVERY 6 HOURS PRN
Status: DISCONTINUED | OUTPATIENT
Start: 2019-04-19 | End: 2019-04-21

## 2019-04-19 RX ORDER — DEXTROSE MONOHYDRATE 25 G/50ML
50 INJECTION, SOLUTION INTRAVENOUS AS NEEDED
Status: DISCONTINUED | OUTPATIENT
Start: 2019-04-19 | End: 2019-04-21

## 2019-04-19 NOTE — ED INITIAL ASSESSMENT (HPI)
Patient here with c/o increasing sob, had open heart surgery in February and pulmonary effusions since. Patient has pluerex catheter on left side currently.

## 2019-04-19 NOTE — PROGRESS NOTES
[facility]  Progress Note    INTERVENTIONAL RADIOLOGY BRIEF PROGRESS NOTE    Called by ED regarding shortness of breath in this patient, concern for Pleurx malfunction.     CT reviewed; the loculated portion of this effusion is in the major

## 2019-04-19 NOTE — ED PROVIDER NOTES
Patient Seen in: [facility] Emergency Department    History   Patient presents with:  Dyspnea BRUNO SOB (respiratory)    Stated Complaint: sob    HPI    Patient is a 16-year-old female who underwent bypass surgery February 28 she states her postoper reviewed. All other systems reviewed and negative except as noted above.     Physical Exam     ED Triage Vitals [04/19/19 1522]   BP (!) 173/99   Pulse 86   Resp 22   Temp 98.5 °F (36.9 °C)   Temp src Oral   SpO2 93 %   O2 Device None (Room air) components within normal limits   TROPONIN I - Abnormal; Notable for the following components:    Troponin 0.094 (*)     All other components within normal limits   PRO BETA NATRIURETIC PEPTIDE - Abnormal; Notable for the following components:    Pro-Beta Pain/pe (iv Only) Em    Result Date: 4/19/2019  CONCLUSION:  1. No evidence of pulmonary embolism. Pulmonary arterial hypertension 2. Central lobular emphysema. Biapical scarring.   Left-sided compressive atelectatic changes involving the left lower lobe

## 2019-04-19 NOTE — TELEPHONE ENCOUNTER
Action Requested: Summary for Provider     []  Critical Lab, Recommendations Needed  [] Need Additional Advice  [x]   FYI    []   Need Orders  [] Need Medications Sent to Pharmacy  []  Other     SUMMARY: Per protocol advised :  698 called, this RN

## 2019-04-20 PROBLEM — Z12.11 COLON CANCER SCREENING: Status: RESOLVED | Noted: 2018-05-10 | Resolved: 2019-04-20

## 2019-04-20 PROBLEM — T78.40XA ALLERGIC DRUG REACTION: Status: RESOLVED | Noted: 2018-08-01 | Resolved: 2019-04-20

## 2019-04-20 PROCEDURE — 99233 SBSQ HOSP IP/OBS HIGH 50: CPT | Performed by: INTERNAL MEDICINE

## 2019-04-20 PROCEDURE — 99233 SBSQ HOSP IP/OBS HIGH 50: CPT | Performed by: HOSPITALIST

## 2019-04-20 RX ORDER — IPRATROPIUM BROMIDE AND ALBUTEROL SULFATE 2.5; .5 MG/3ML; MG/3ML
3 SOLUTION RESPIRATORY (INHALATION) 3 TIMES DAILY
Status: DISCONTINUED | OUTPATIENT
Start: 2019-04-20 | End: 2019-04-21

## 2019-04-20 NOTE — PROGRESS NOTES
[facility]    Progress Note    Neva Allen Patient Status:  Inpatient    1945 MRN [id number]   Location [facility] 3W/SW Attending Renae Vincent MD   Hosp Day # 1 PCP Marco Owens MD       Subjective:     Anderson Buerger 2. Central lobular emphysema. Biapical scarring. Left-sided compressive atelectatic changes involving the left lower lobe and lingular segment at the left base.   Loculated left-sided effusion with interval decrease in apical and posterior component of th cancer  Carotid stenosis  Hypertension  Gout  Dyslipidemia  Major depressive disorder     dvt proph:   heparin    Code status:   Full    >35 minutes spent     Vinh Andrew MD  4/20/2019

## 2019-04-20 NOTE — H&P
[facility]  HISTORY AND PHYSICAL       Mary Ann Breaker Comprado Patient Status:  Inpatient    1945  69year old [facility] [id number]   Location 337/337-A Attending Michael Bolanos MD     PCP Luisito Cristina MD     ASSESSMENT/PLAN    Dyspnea Essentially unchanged since discharge a few weeks ago. She was at physical therapy and was prompted to come in because it was noted that she was hypoxic. Again, symptoms have not changed in the last month. She denies chest pain.   She feels that her sh directed  Refills: 0    Glucose Blood (TRUE METRIX BLOOD GLUCOSE TEST) In Vitro Strip  TEST BLOOD SUGAR 4 TIMES A DAY  Refills: 0    [employer] LANCETS MICRO THIN 33G Does not apply Misc  TEST BLOOD SUGAR 4 TIMES A DAY  Refills: 0    albuterol sulfate (2.5 MG/3ML 60 tablet Refills: 0    docusate sodium 100 MG Oral Cap  Take 100 mg by mouth 2 (two) times daily. Qty: 60 capsule Refills: 0    aspirin 81 MG Oral Tab EC  Take 1 tablet (81 mg total) by mouth daily.   Qty: 90 tablet Refills: 0    Clopidogrel Bisulfate (PL 42        rt breast 68       REVIEW OF SYSTEMS  Gen: Neg for chills, fever, diaphoresis and fatigue. No weight loss or weight gain. Endo: No heat or cold intolerance. No polyuria. HEENT: Neg for trouble swallowing, visual disturbance.   no hearing jenkins 7.7       Greater than 75 minutes spent on this admission. Most the time spent interviewing and examining the patient. We discussed plan for Lasix, pulmonology consultation. We discussed the possible multifactorial nature of her dyspnea on exertion.   We

## 2019-04-20 NOTE — PLAN OF CARE
Problem: Patient Centered Care  Goal: Patient preferences are identified and integrated in the patient's plan of care  Description  Interventions:  - What would you like us to know as we care for you?  I am independent if I can be   - Provide timely, comp if indicated  - Evaluate effectiveness of antiarrhythmic and heart rate control medications as ordered  - Initiate emergency measures for life threatening arrhythmias  - Monitor electrolytes and administer replacement therapy as ordered  Outcome: Progressi

## 2019-04-20 NOTE — RESPIRATORY THERAPY NOTE
Patient refused OXYGEN  therapy. [facility] has educated the patient on the purpose of and need for this therapy as well as potential negative outcomes associated with deferring treatment.  Despite education, patient maintains refusal. N/C O2 AT 2LPM AT [facility]

## 2019-04-20 NOTE — PLAN OF CARE
Problem: Patient/Family Goals  Goal: Patient/Family Long Term Goal  Description  Patient's Long Term Goal: go back home and not be back so often    Interventions:  - take medications as directed  - complete testing as ordered  - See additional Care Plan Smoking Cessation handout, if applicable  - Encourage broncho-pulmonary hygiene including cough, deep breathe, Incentive Spirometry  - Assess the need for suctioning and perform as needed  - Assess and instruct to report SOB or any respiratory difficulty

## 2019-04-20 NOTE — CONSULTS
Pulmonary/Critical Care Consultation Note    HPI:   Radha Richardson is a 69year old female with Patient presents with:  Dyspnea BRUNO SOB (respiratory)    Tod Scherer MD    Pt is a 70 yo with h/o obstructive lung disease, BRCA, CAD, HTN, HL, an ipratropium-albuterol (DUONEB) nebulizer solution 3 mL 3 mL Nebulization Once   [COMPLETED] iopamidol (ISOVUE-M) 76 % injection 100 mL 100 mL Intravenous ONCE PRN   albuterol sulfate (VENTOLIN) (2.5 MG/3ML) 0.083% nebulizer solution 2.5 mg 2.5 mg Nebulizat HIVES, SWELLING, SHORTNESS OF                            BREATH  Dog Epithelium          SHORTNESS OF BREATH    Comment:Hair and saliva  Dust                    SHORTNESS OF BREATH  Norco [Hydrocodone-*    SWELLING, SHORTNESS OF BREATH  Smoke ALB 3.0 04/19/2019    ALKPHO 118 04/19/2019    BILT 0.2 04/19/2019    TP 7.7 04/19/2019    AST 23 04/19/2019    ALT 36 04/19/2019    DDIMER 3.17 04/19/2019    TROP 0.094 04/19/2019         CT chest Reviewed by me  Loculated L side effusion  Drain in pati

## 2019-04-20 NOTE — PROGRESS NOTES
[facility] Note:  Renal Dose Adjustment for Metoclopramide (REGLAN)    Ariana Q Comprado has been prescribed Metoclopramide (REGLAN) 10 mg every 8 hours as needed for n/v.    Estimated Creatinine Clearance: 33 mL/min (A) (based on SCr of 1.31 mg/dL (H)

## 2019-04-20 NOTE — PROGRESS NOTES
Pulmonary/Critical Care Follow Up Note    HPI:   Renetta Balderrama is a 69year old female with Patient presents with:  Dyspnea BRUNO SOB (respiratory)      PCP Heather Gong MD  Admission Attending Kaushal Corrigan 15 Day #1    C/o SOB and Rectal, Once PRN  •  dextrose 50 % injection 50 mL, 50 mL, Intravenous, PRN  •  Glucose-Vitamin C (DEX-4) 4-6 GM-MG chewable tab 4 tablet, 4 tablet, Oral, Q15 Min PRN  •  glucose (DEX4) oral liquid 15 g, 15 g, Oral, Q15 Min PRN  •  Heparin Sodium (Porcine) including  obstructive lung disease, radiographic emphysema in life long non smoker, pleural effusion        and now loculated, atelectasis, ?  CHF  There is no current evidence of PNA or PE  No reason to suspect empyema at this time  IR eval pt for proper

## 2019-04-21 VITALS
DIASTOLIC BLOOD PRESSURE: 68 MMHG | RESPIRATION RATE: 19 BRPM | SYSTOLIC BLOOD PRESSURE: 133 MMHG | BODY MASS INDEX: 28 KG/M2 | OXYGEN SATURATION: 94 % | HEART RATE: 75 BPM | WEIGHT: 163.13 LBS | TEMPERATURE: 98 F

## 2019-04-21 PROCEDURE — 99239 HOSP IP/OBS DSCHRG MGMT >30: CPT | Performed by: HOSPITALIST

## 2019-04-21 PROCEDURE — 99232 SBSQ HOSP IP/OBS MODERATE 35: CPT | Performed by: INTERNAL MEDICINE

## 2019-04-21 NOTE — PROGRESS NOTES
HPI:    Patient ID: Migel Sandoval is a 69year old female. Patient presents today for posthospital ffup. She was admitted at [address] [place] [address] few weeks ago and had chest pains, eventually was found to have CAD and underwent CABG.  After her recovery from her CA distension and no mass. There is no tenderness. There is no rebound and no guarding. Musculoskeletal: She exhibits no edema. Lymphadenopathy:     She has no cervical adenopathy. Neurological: She is alert. Skin: No rash noted.  She is not diaphoreti

## 2019-04-21 NOTE — PROGRESS NOTES
Pt A/O x , RA, denies pain and shortness of breath. Pt to be discharged home today with home health as ordered. Discharge instructions, medications, prescriptions, home health, Oxygen use, and follow up appointments reviewed with pt.  Pt verbalized Farideh

## 2019-04-21 NOTE — DISCHARGE SUMMARY
[facility]    Discharge Summary    Randi Osorio Patient Status:  Inpatient    1945 MRN [id number]   Location [facility] 3W/SW Attending Melissa Zapien MD   Hosp Day # 2 PCP Peggy Herbert MD     Date of Admis Generalized anxiety disorder     Pleural effusion on left      Physical Exam:      /68 (BP Location: Right arm)   Pulse 75   Temp 98.1 °F (36.7 °C) (Oral)   Resp 19   Wt 163 lb 1.6 oz (74 kg)   SpO2 94%   BMI 28.00 kg/m²     Gen:  NAD.   A and O x  3 radiology input - the pleurex is in right place (howver not draining) and no need for intervention.   - CT chest negative for PE  - lasix prn  - given nebs  - given breo  - home oxygen  - outpt Davalle f/u, pulm f/u, cardiology f/u  - pleurex attempted to b tablet (75 mg total) by mouth daily. Quantity:  90 tablet  Refills:  0     COLCRYS 0.6 MG Tabs  Generic drug:  colchicine      Take 0.6 mg by mouth as needed.    Refills:  0     CVS LANCETS MICRO THIN 33G Misc      TEST BLOOD SUGAR 4 TIMES A DAY   Refills Quantity:  60 tablet  Refills:  0     PEG 3350 Pack  Commonly known as:  MIRALAX      Take 17 g by mouth daily as needed.    Refills:  0     predniSONE 5 MG Tabs  Commonly known as:  DELTASONE      Take 1 tablet (5 mg total) by mouth daily with breakfast. Low Risk. TCM Follow-Up Recommendation:  LACE > 58: High Risk of readmission after discharge from the hospital.      >35 minutes spent preparing this discharge.     Raffi Nogueira  4/21/2019  12:12 PM

## 2019-04-21 NOTE — CONSULTS
[facility] AND [facility]  Cardiology Consultation    Kelsey Osorio Patient Status:  Inpatient    1945 MRN [id number]   Location [facility] 3W/SW Attending Moe Colvin, [address] [facility]  Day # 2 PCP Alvin Stevens MD     Reason for Consultat Self 42        rt breast 68      reports that she has never smoked. She has never used smokeless tobacco. She reports that she does not drink alcohol or use drugs.     Allergies:    Allopurinol             HIVES, SWELLING, SHORTNESS OF Rectal, Once PRN  •  dextrose 50 % injection 50 mL, 50 mL, Intravenous, PRN  •  Glucose-Vitamin C (DEX-4) 4-6 GM-MG chewable tab 4 tablet, 4 tablet, Oral, Q15 Min PRN  •  glucose (DEX4) oral liquid 15 g, 15 g, Oral, Q15 Min PRN  •  Heparin Sodium (Porcine) 124 04/21/2019    CA 9.2 04/21/2019    PGLU 146 04/20/2019       Imaging:  Chest x-ray, CAT scan reviewed  Impression:  Left pleural effusion chronic  History of CAD status post CABG      Recommendations:  Continue current cardiac medications  Okay to disc

## 2019-04-21 NOTE — PROGRESS NOTES
Oxygen saturation resting on RA- 85%  Oxygen saturation ambulating on RA- 75%   Oxygen saturation resting on 2L- 96%  Oxygen saturation ambulating on 2L- 94%

## 2019-04-21 NOTE — PHYSICAL THERAPY NOTE
PHYSICAL THERAPY EVALUATION - INPATIENT     Room Number: 337/337-A  Evaluation Date: 4/21/2019  Type of Evaluation: Initial   Physician Order: PT Eval and Treat    Presenting Problem: admitted for hypoxia  Reason for Therapy: Mobility Dysfunction and Disc preparation for discharge. DISCHARGE RECOMMENDATIONS  PT Discharge Recommendations: Sub-acute rehabilitation    PLAN  PT Treatment Plan: Bed mobility; Body mechanics; Endurance; Energy conservation;Patient education;Gait training;Range of motion;Strengthen Regularly Uses: Reading glasses    Prior Level of Saunders:   Pt reported, she was modified I in bed mobility, transfers and a household ambulator with the use of the RW. A friend in the apartment building does the grocery for her.     SUBJECTIVE  \"I Minimum assistance  Distance (ft): 50  Assistive Device: Rolling walker  Pattern: (dec jaylen, dec heel strike, wide WILLY)  Stoop/Curb Assistance: Not tested  Comment : drowsy, limiting her mobility    Bed Mobility:  Mod A x 2    Transfers: mod A with RW

## 2019-04-21 NOTE — H&P (VIEW-ONLY)
[facility] AND CLINICS  Cardiology Consultation    Manaandrew Tiarrafallon Jo Patient Status:  Inpatient    1945 MRN [id number]   Location [facility] 3W/SW Attending Thom Hook, [address] [facility] [address] Day # 2 PCP Jennifer Valencia MD     Reason for Consultat Self 42        rt breast 68      reports that she has never smoked. She has never used smokeless tobacco. She reports that she does not drink alcohol or use drugs.     Allergies:    Allopurinol             HIVES, SWELLING, SHORTNESS OF Rectal, Once PRN  •  dextrose 50 % injection 50 mL, 50 mL, Intravenous, PRN  •  Glucose-Vitamin C (DEX-4) 4-6 GM-MG chewable tab 4 tablet, 4 tablet, Oral, Q15 Min PRN  •  glucose (DEX4) oral liquid 15 g, 15 g, Oral, Q15 Min PRN  •  Heparin Sodium (Porcine) 124 04/21/2019    CA 9.2 04/21/2019    PGLU 146 04/20/2019       Imaging:  Chest x-ray, CAT scan reviewed  Impression:  Left pleural effusion chronic  History of CAD status post CABG      Recommendations:  Continue current cardiac medications  Okay to disc

## 2019-04-21 NOTE — CM/SW NOTE
Addend 132p:     [facility] orders entered for [facility]. Ref to E for new O2. Order signed and O2 sats entered and faxed over to E.  Delivery pending for today.     ----------------------------------------------------------------------------  1001a: Per chart tim

## 2019-04-22 ENCOUNTER — PATIENT OUTREACH (OUTPATIENT)
Dept: CASE MANAGEMENT | Age: 74
End: 2019-04-22

## 2019-04-22 ENCOUNTER — TELEPHONE (OUTPATIENT)
Dept: OTHER | Age: 74
End: 2019-04-22

## 2019-04-22 DIAGNOSIS — Z02.9 ENCOUNTERS FOR ADMINISTRATIVE PURPOSE: ICD-10-CM

## 2019-04-22 NOTE — TELEPHONE ENCOUNTER
Patient called back requesting appt for Thursday April 25th for ER f/u. Appt made for 4/25 at 4:15 PM. Patient verbalized understanding of appt time and day.

## 2019-04-22 NOTE — PROGRESS NOTES
Initial Post Discharge Follow Up   Discharge Date: 4/21/19  Contact Date: 4/22/2019    Consent Verification:  Assessment Completed With: Patient  HIPAA Verified? Yes    Discharge Dx:   Dyspnea on exertion.   Loculated left pleural effusion  Emphysema  Re Metoprolol Succinate ER 25 MG Oral Tablet 24 Hr Take 1 tablet (25 mg total) by mouth Daily Beta Blocker.  (Patient taking differently: Take 25 mg by mouth Daily Beta Blocker.  ) Disp: 30 tablet Rfl: 0   hydrALAzine HCl 25 MG Oral Tab Take 1 tablet (25 mg Rfl: 0   Senna-Docusate Sodium 8.6-50 MG Oral Tab Take 2 tablets by mouth 2 (two) times daily. Disp:  Rfl: 0   colchicine (COLCRYS) 0.6 MG Oral Tab Take 0.6 mg by mouth as needed.  Disp:  Rfl:    ezetimibe 10 MG Oral Tab Take 1 tablet (10 mg total) by mouth [facility])        May 13, 2019 11:00 AM CDT Follow Up Visit with MD Alex Hall [address] [facility])        Jun 05, 2019 10:00 AM CDT Established Patient with Toribio Singh MD discussed. Any changes or updates to medications and or orders sent to PCP.

## 2019-04-22 NOTE — PROGRESS NOTES
Pulmonary/Critical Care Follow Up Note    HPI:   Renetta Balderrama is a 69year old female with Patient presents with:  Dyspnea BRUNO SOB (respiratory)      PCP Heather Gong MD  Admission Attending Kaushal Corrigan 15 Day #2    Still c/o S HGB 8.5 04/21/2019    HCT 29.4 04/21/2019    .0 04/21/2019    CREATSERUM 1.15 04/21/2019    BUN 15 04/21/2019     04/21/2019    K 4.0 04/21/2019    CL 99 04/21/2019    CO2 33.0 04/21/2019     04/21/2019    CA 9.2 04/21/2019           AS

## 2019-04-22 NOTE — TELEPHONE ENCOUNTER
FYI: Patient calling to schedule 1 week ED F/U with Dr. Kathie Almodovar for Wednesday 4/24/19.  Dr. Kathie Almodovar with multiple appt times available however patient stating she would prefer a 4pm. Advised pt that Dr. Kathie Almodovar only has office hours that day until

## 2019-04-23 NOTE — PAYOR COMM NOTE
REQUESTING 2 INPT DAYS    ADMISSION REVIEW     Katelin Tierney MA O  Subscriber #:  [id number]  Authorization Number: [id number]    Admit date: 4/19/19  Admit time: [address]: Satnam Portillo MD  Attending Physician:  No att. providers fo 2013   • HEART CORONARY ARTERY BYPASS GRAFT N/A 2/26/2019    Performed by Esperanza Cardenas MD at [address] [place] [address] OR   • HERNIA SURGERY     • LUMPECTOMY RIGHT  2014   • MASTECTOMY PARTIAL  1999   • [address] CATH INSERTION Left 3/29/2019    Performed by Jhonathan Bhatia deficit. No motor os sensory defecits noted Coordination normal.   Skin: Skin is warm and dry. Psychiatric: Normal mood and affect. Behavior is normal. Judgment and thought content normal.   Nursing note and vitals reviewed.           ED Course     Labs R results for these tests on the individual orders. EKG    Rate, intervals and axes as noted on EKG Report.   Rate: 87  Rhythm: Sinus Rhythm  Reading: Normal sinus rhythm nonspecific T changes              Xr Chest Ap Portable  (cpt=71045)    Result Date: Prescribed:  Current Discharge Medication List              Signed by Roopa Ordaz MD on 4/19/2019  5:05 PM            H&P - H&P Note      H&P signed by Benji Martinez MD at 4/19/2019  7:32 PM     Author:  Benji Martinez MD Service:  Hospitalist Author Type: code  Disposition: Admit as inpatient    DATE OF ADMISSION: 04/19/19    CHIEF COMPLAINT: Shortness of breath with exertion    HISTORY OF PRESENT ILLNESS  This is a 69year oldfemale with recent CABG here with complaints of shortness of breath.   Only with e Demetri Jorge MD at [facility] OR   • RADIATION RIGHT  2014       ALLERGIES   Allopurinol; Dog Epithelium; Dust; Norco [Hydrocodone-Acetaminophen]; Smoke; Uloric [Febuxostat]; Statins; Xanax [Alprazolam];  Ace Inhibitors    MEDICATIONS  Current Discharge Medicat days  Qty: 30 tablet Refills: 0    amLODIPine Besylate 2.5 MG Oral Tab  Take 1 tablet (2.5 mg total) by mouth daily.   Qty: 30 tablet Refills: 0    Fluticasone Furoate-Vilanterol 200-25 MCG/INH Inhalation Aerosol Powder, Breath Activated  Inhale 1 puff into Never Used    Substance and Sexual Activity      Alcohol use: No        Comment: rarely at weddings      Drug use: No      FAMILY HISTORY  Family History   Problem Relation Age of Onset   • Asthma Father    • Hypertension Father    • Heart Disorder Father affect.  Behavior and judgment normal.     Data:  Recent Labs   Lab 04/19/19  1528   RBC 3.68*   HGB 10.4*   HCT 36.2   MCV 98.4   MCH 28.3   MCHC 28.7*   RDW 15.6*   NEPRELIM 5.40   WBC 8.7   .0     Recent Labs   Lab 04/19/19  1528   GLU 87   BUN 15 including  obstructive lung disease, radiographic emphysema in life long non smoker, pleural       effusion and now loculated, atelectasis, ?  CHF  There is no current evidence of PNA or PE  No reason to suspect empyema at this time  IR eval pt for proper c 1740 ml   Net -1260 ml      nad  Lung dec bs bases L>R  cv reg   abd soft +bs  Ext trace edema        LABS:           Lab Results   Component Value Date     WBC 7.4 04/20/2019     HGB 9.9 04/20/2019     HCT 34.8 04/20/2019     .0 04/20/2019     CREA rate 18, weight 160 lb 6.4 oz (72.8 kg), SpO2 95 %.     Gen:   NAD. A and O x 3  CV:   RRR, no m/g/r  Pulm:   Crackles and diminished breath sounds over left side, faint scattered wheeze in rest of lungs.   Abd:   +bs, soft, NT, ND  LE:   Trace edema  Neur apparent distress. No respiratory or constitutional distress. HEENT: Normocephalic, anicteric sclera, neck supple. Neck: No JVD, carotids 2+, no bruits. Cardiac: Regular rate and rhythm.  S1, S2 normal. No murmur, pericardial rub, S3.  Lungs: Decreased b Inpatient    1945 MRN [id number]   Location [facility] 3W/SW Attending Faisal France MD   [facility] Day # 2 PCP Byron Weir MD     Date of Admission: 2019 Disposition: [address] [facility]     Date of Discharge:      Location: Right arm)   Pulse 75   Temp 98.1 °F (36.7 °C) (Oral)   Resp 19   Wt 163 lb 1.6 oz (74 kg)   SpO2 94%   BMI 28.00 kg/m²      Gen:  NAD. A and O x  3  CV:   RRR.   No m/g/r  Pulm:   CTA bilat  Abd:   +bs, soft, NT, ND  LE:  No c/c/e  Neuro:  nonfo intervention. - CT chest negative for PE  - lasix prn  - given nebs  - given breo  - home oxygen  - outpt Davalle f/u, pulm f/u, cardiology f/u  - pleurex attempted to be drained but only drained 1-2 ml which is the norm for the pt's pleurex now.     Consi Tabs  Generic drug:  colchicine      Take 0.6 mg by mouth as needed.    Refills:  0     CVS LANCETS MICRO THIN 33G Misc      TEST BLOOD SUGAR 4 TIMES A DAY   Refills:  0     docusate sodium 100 MG Caps  Commonly known as:  COLACE      Take 100 mg by mouth 2 17 g by mouth daily as needed.    Refills:  0     predniSONE 5 MG Tabs  Commonly known as:  DELTASONE      Take 1 tablet (5 mg total) by mouth daily with breakfast.   Quantity:  3 tablet  Refills:  0     probenecid 500 MG Tabs  Commonly known as:  BENEMID discharge from the hospital.      >35 minutes spent preparing this discharge. Raffi Nogueira  4/21/2019  12:12 PM     Electronically signed by Phil Toribio MD on 4/21/2019 12:19 PM         SW NOTE  [facility]. Ref to E for new O2. LAMA                Nebs                O2     DVT px  SCDs  SQ hep

## 2019-04-24 ENCOUNTER — OFFICE VISIT (OUTPATIENT)
Dept: PULMONOLOGY | Facility: CLINIC | Age: 74
End: 2019-04-24
Payer: MEDICARE

## 2019-04-24 ENCOUNTER — OFFICE VISIT (OUTPATIENT)
Dept: CARDIOLOGY | Age: 74
End: 2019-04-24

## 2019-04-24 VITALS
BODY MASS INDEX: 29.02 KG/M2 | WEIGHT: 170 LBS | HEIGHT: 64 IN | OXYGEN SATURATION: 98 % | DIASTOLIC BLOOD PRESSURE: 60 MMHG | HEART RATE: 68 BPM | SYSTOLIC BLOOD PRESSURE: 120 MMHG

## 2019-04-24 VITALS
WEIGHT: 168 LBS | BODY MASS INDEX: 28.68 KG/M2 | RESPIRATION RATE: 18 BRPM | OXYGEN SATURATION: 96 % | SYSTOLIC BLOOD PRESSURE: 149 MMHG | HEIGHT: 64 IN | DIASTOLIC BLOOD PRESSURE: 81 MMHG | HEART RATE: 82 BPM

## 2019-04-24 DIAGNOSIS — J44.9 CHRONIC OBSTRUCTIVE PULMONARY DISEASE, UNSPECIFIED COPD TYPE (HCC): Primary | ICD-10-CM

## 2019-04-24 DIAGNOSIS — I10 ESSENTIAL HYPERTENSION: ICD-10-CM

## 2019-04-24 DIAGNOSIS — J90 PLEURAL EFFUSION: ICD-10-CM

## 2019-04-24 DIAGNOSIS — J96.11 CHRONIC RESPIRATORY FAILURE WITH HYPOXIA (HCC): ICD-10-CM

## 2019-04-24 DIAGNOSIS — E78.00 HYPERCHOLESTEROLEMIA: ICD-10-CM

## 2019-04-24 DIAGNOSIS — I25.10 ATHEROSCLEROSIS OF NATIVE CORONARY ARTERY OF NATIVE HEART WITHOUT ANGINA PECTORIS: ICD-10-CM

## 2019-04-24 DIAGNOSIS — Z95.1 HX OF CABG: Primary | ICD-10-CM

## 2019-04-24 PROBLEM — J44.89 ASTHMA WITH COPD (CHRONIC OBSTRUCTIVE PULMONARY DISEASE): Status: ACTIVE | Noted: 2018-08-01

## 2019-04-24 PROCEDURE — 3078F DIAST BP <80 MM HG: CPT | Performed by: NURSE PRACTITIONER

## 2019-04-24 PROCEDURE — 3074F SYST BP LT 130 MM HG: CPT | Performed by: NURSE PRACTITIONER

## 2019-04-24 PROCEDURE — 1111F DSCHRG MED/CURRENT MED MERGE: CPT | Performed by: INTERNAL MEDICINE

## 2019-04-24 PROCEDURE — 99215 OFFICE O/P EST HI 40 MIN: CPT | Performed by: INTERNAL MEDICINE

## 2019-04-24 PROCEDURE — G0463 HOSPITAL OUTPT CLINIC VISIT: HCPCS | Performed by: INTERNAL MEDICINE

## 2019-04-24 PROCEDURE — 99215 OFFICE O/P EST HI 40 MIN: CPT | Performed by: NURSE PRACTITIONER

## 2019-04-24 RX ORDER — PSEUDOEPHEDRINE HCL 30 MG
100 TABLET ORAL 2 TIMES DAILY
COMMUNITY
Start: 2019-03-02 | End: 2020-07-13

## 2019-04-24 RX ORDER — POLYETHYLENE GLYCOL 3350 17 G/17G
17 POWDER, FOR SOLUTION ORAL DAILY
COMMUNITY
Start: 2019-03-01 | End: 2019-12-12

## 2019-04-24 RX ORDER — MONTELUKAST SODIUM 10 MG/1
10 TABLET ORAL DAILY
Refills: 0 | COMMUNITY
Start: 2019-03-11

## 2019-04-24 RX ORDER — FUROSEMIDE 20 MG/1
20 TABLET ORAL DAILY
Qty: 30 TABLET | Refills: 1 | Status: SHIPPED | OUTPATIENT
Start: 2019-04-24 | End: 2019-05-24

## 2019-04-24 RX ORDER — PANTOPRAZOLE SODIUM 20 MG/1
20 TABLET, DELAYED RELEASE ORAL 2 TIMES DAILY
COMMUNITY
Start: 2019-04-08 | End: 2019-12-12

## 2019-04-24 RX ORDER — HYDRALAZINE HYDROCHLORIDE 25 MG/1
25 TABLET, FILM COATED ORAL 2 TIMES DAILY
COMMUNITY
Start: 2019-04-08

## 2019-04-24 RX ORDER — ALBUTEROL SULFATE 2.5 MG/3ML
2.5 SOLUTION RESPIRATORY (INHALATION) 2 TIMES DAILY
COMMUNITY
Start: 2019-04-18

## 2019-04-24 RX ORDER — ONDANSETRON 4 MG/1
4 TABLET, FILM COATED ORAL PRN
COMMUNITY
End: 2019-12-12

## 2019-04-24 RX ORDER — CLOPIDOGREL BISULFATE 75 MG/1
75 TABLET ORAL DAILY
COMMUNITY
Start: 2019-03-03 | End: 2019-12-12

## 2019-04-24 RX ORDER — COLCHICINE 0.6 MG/1
0.6 TABLET ORAL DAILY PRN
COMMUNITY

## 2019-04-24 RX ORDER — PROBENECID 500 MG/1
250 TABLET, FILM COATED ORAL DAILY
COMMUNITY
Start: 2019-04-02 | End: 2019-12-12

## 2019-04-24 RX ORDER — EZETIMIBE 10 MG/1
10 TABLET ORAL DAILY
COMMUNITY
Start: 2019-01-25 | End: 2020-07-13

## 2019-04-24 RX ORDER — MULTIVIT-MIN/IRON/FOLIC ACID/K 18-600-40
1 CAPSULE ORAL DAILY
COMMUNITY

## 2019-04-24 RX ORDER — MULTIVITAMIN
1 TABLET ORAL DAILY
COMMUNITY

## 2019-04-24 RX ORDER — ASPIRIN 325 MG
81 TABLET ORAL
COMMUNITY

## 2019-04-24 RX ORDER — ALBUTEROL SULFATE 90 UG/1
1-2 AEROSOL, METERED RESPIRATORY (INHALATION) EVERY 6 HOURS PRN
Qty: 1 INHALER | Refills: 0 | Status: SHIPPED | OUTPATIENT
Start: 2019-04-24 | End: 2019-06-17

## 2019-04-24 RX ORDER — AMLODIPINE BESYLATE 10 MG/1
2.5 TABLET ORAL
COMMUNITY
End: 2020-07-13

## 2019-04-24 NOTE — PROGRESS NOTES
HPI:    Patient ID: Radha Richardson is a 69year old female.     HPI    Patient anxious about taking the Pleurx catheter out with no drainage in the last 2 weeks at all  Claims less dyspnea but still with dyspnea on exertion  No cough or sputum no fever differently: Take 25 mg by mouth Daily Beta Blocker.  ) Disp: 30 tablet Rfl: 0   hydrALAzine HCl 25 MG Oral Tab Take 1 tablet (25 mg total) by mouth 2 (two) times daily.  Disp: 60 tablet Rfl: 0   OLANZapine 2.5 MG Oral Tab Take 1 tablet (2.5 mg total) by mo daily. Disp:  Rfl:    Glucose Blood (TRUE METRIX BLOOD GLUCOSE TEST) In Vitro Strip TEST BLOOD SUGAR 4 TIMES A DAY Disp:  Rfl: 0   predniSONE 5 MG Oral Tab Take 1 tablet (5 mg total) by mouth daily with breakfast. Disp: 3 tablet Rfl: 0   amiodarone HCl 200 the last few weeks  Chest CTA rule out PE few days ago showed no PE  Showed small lobulated residual fluid in the left lung which is not getting drained by the Pleurx    I already discussed the case with thoracic surgery  I am okay to DC left pleural lior

## 2019-04-25 ENCOUNTER — OFFICE VISIT (OUTPATIENT)
Dept: INTERNAL MEDICINE CLINIC | Facility: CLINIC | Age: 74
End: 2019-04-25
Payer: MEDICARE

## 2019-04-25 VITALS
HEIGHT: 64 IN | OXYGEN SATURATION: 99 % | RESPIRATION RATE: 20 BRPM | HEART RATE: 73 BPM | BODY MASS INDEX: 28.17 KG/M2 | TEMPERATURE: 98 F | DIASTOLIC BLOOD PRESSURE: 73 MMHG | WEIGHT: 165 LBS | SYSTOLIC BLOOD PRESSURE: 131 MMHG

## 2019-04-25 DIAGNOSIS — I10 ESSENTIAL HYPERTENSION: ICD-10-CM

## 2019-04-25 DIAGNOSIS — Z95.1 S/P CABG X 3: Primary | ICD-10-CM

## 2019-04-25 DIAGNOSIS — E78.00 HYPERCHOLESTEROLEMIA: ICD-10-CM

## 2019-04-25 DIAGNOSIS — R06.00 DYSPNEA ON EXERTION: ICD-10-CM

## 2019-04-25 DIAGNOSIS — I25.10 CORONARY ARTERY DISEASE INVOLVING NATIVE CORONARY ARTERY OF NATIVE HEART WITHOUT ANGINA PECTORIS: ICD-10-CM

## 2019-04-25 DIAGNOSIS — D64.9 ANEMIA, UNSPECIFIED TYPE: ICD-10-CM

## 2019-04-25 DIAGNOSIS — K21.9 GASTROESOPHAGEAL REFLUX DISEASE, ESOPHAGITIS PRESENCE NOT SPECIFIED: ICD-10-CM

## 2019-04-25 DIAGNOSIS — J44.9 ASTHMA WITH COPD (CHRONIC OBSTRUCTIVE PULMONARY DISEASE) (HCC): Chronic | ICD-10-CM

## 2019-04-25 DIAGNOSIS — M1A.9XX1 CHRONIC TOPHACEOUS GOUT: ICD-10-CM

## 2019-04-25 DIAGNOSIS — J90 PLEURAL EFFUSION ON LEFT: ICD-10-CM

## 2019-04-25 PROCEDURE — 99495 TRANSJ CARE MGMT MOD F2F 14D: CPT | Performed by: INTERNAL MEDICINE

## 2019-04-25 PROCEDURE — 1111F DSCHRG MED/CURRENT MED MERGE: CPT | Performed by: INTERNAL MEDICINE

## 2019-04-26 RX ORDER — PANTOPRAZOLE SODIUM 20 MG/1
20 TABLET, DELAYED RELEASE ORAL
Qty: 180 TABLET | Refills: 1 | Status: SHIPPED | OUTPATIENT
Start: 2019-04-26 | End: 2019-08-28

## 2019-04-26 RX ORDER — METOPROLOL SUCCINATE 25 MG/1
25 TABLET, EXTENDED RELEASE ORAL
Qty: 90 TABLET | Refills: 1 | Status: SHIPPED | OUTPATIENT
Start: 2019-04-26 | End: 2019-10-28

## 2019-04-26 RX ORDER — HYDRALAZINE HYDROCHLORIDE 25 MG/1
TABLET, FILM COATED ORAL
Qty: 180 TABLET | Refills: 1 | Status: SHIPPED | OUTPATIENT
Start: 2019-04-26 | End: 2019-10-25

## 2019-04-27 ENCOUNTER — TELEPHONE (OUTPATIENT)
Dept: INTERNAL MEDICINE CLINIC | Facility: CLINIC | Age: 74
End: 2019-04-27

## 2019-04-27 PROBLEM — R04.0 EPISTAXIS: Status: RESOLVED | Noted: 2018-12-10 | Resolved: 2019-04-27

## 2019-04-27 PROBLEM — Z00.00 ANNUAL PHYSICAL EXAM: Status: RESOLVED | Noted: 2018-05-10 | Resolved: 2019-04-27

## 2019-04-27 PROBLEM — Z87.39 HISTORY OF GOUT: Status: RESOLVED | Noted: 2018-05-10 | Resolved: 2019-04-27

## 2019-04-27 PROBLEM — R73.09 ELEVATED GLUCOSE: Status: RESOLVED | Noted: 2018-05-22 | Resolved: 2019-04-27

## 2019-04-27 PROBLEM — Z92.3 HISTORY OF RADIATION THERAPY: Status: RESOLVED | Noted: 2018-07-19 | Resolved: 2019-04-27

## 2019-04-27 PROBLEM — H61.21 IMPACTED CERUMEN, RIGHT EAR: Status: RESOLVED | Noted: 2018-07-26 | Resolved: 2019-04-27

## 2019-04-27 PROBLEM — J96.01 ACUTE RESPIRATORY FAILURE WITH HYPOXIA (HCC): Status: RESOLVED | Noted: 2019-02-17 | Resolved: 2019-04-27

## 2019-04-27 PROBLEM — J45.41 MODERATE PERSISTENT ASTHMA WITH EXACERBATION: Status: RESOLVED | Noted: 2019-03-07 | Resolved: 2019-04-27

## 2019-04-27 PROBLEM — J45.30 MILD PERSISTENT ASTHMA WITHOUT COMPLICATION: Status: RESOLVED | Noted: 2018-05-10 | Resolved: 2019-04-27

## 2019-04-27 PROBLEM — H25.9 AGE-RELATED CATARACT OF LEFT EYE: Status: RESOLVED | Noted: 2018-05-10 | Resolved: 2019-04-27

## 2019-04-27 PROBLEM — J45.30 MILD PERSISTENT ASTHMA WITHOUT COMPLICATION (HCC): Status: RESOLVED | Noted: 2018-05-10 | Resolved: 2019-04-27

## 2019-04-27 PROBLEM — Z23 NEED FOR VACCINATION: Status: RESOLVED | Noted: 2018-05-10 | Resolved: 2019-04-27

## 2019-04-27 PROBLEM — R06.02 SHORTNESS OF BREATH: Status: RESOLVED | Noted: 2018-10-19 | Resolved: 2019-04-27

## 2019-04-27 PROBLEM — J45.901 ACUTE ASTHMA EXACERBATION (HCC): Status: RESOLVED | Noted: 2019-03-07 | Resolved: 2019-04-27

## 2019-04-27 PROBLEM — J45.41 MODERATE PERSISTENT ASTHMA WITH EXACERBATION (HCC): Status: RESOLVED | Noted: 2019-03-07 | Resolved: 2019-04-27

## 2019-04-27 PROBLEM — A08.4 VIRAL GASTROENTERITIS: Status: RESOLVED | Noted: 2019-02-17 | Resolved: 2019-04-27

## 2019-04-27 PROBLEM — J90 PLEURAL EFFUSION: Status: RESOLVED | Noted: 2018-10-18 | Resolved: 2019-04-27

## 2019-04-27 PROBLEM — Z86.018 HISTORY OF PITUITARY ADENOMA: Status: RESOLVED | Noted: 2018-05-10 | Resolved: 2019-04-27

## 2019-04-27 PROBLEM — N95.1 MENOPAUSAL STATE: Status: RESOLVED | Noted: 2018-05-10 | Resolved: 2019-04-27

## 2019-04-27 PROBLEM — R92.0 MICROCALCIFICATIONS OF THE BREAST: Status: RESOLVED | Noted: 2018-07-19 | Resolved: 2019-04-27

## 2019-04-27 PROBLEM — R79.89 ELEVATED TROPONIN: Status: RESOLVED | Noted: 2019-02-17 | Resolved: 2019-04-27

## 2019-04-27 PROBLEM — R77.8 ELEVATED TROPONIN: Status: RESOLVED | Noted: 2019-02-17 | Resolved: 2019-04-27

## 2019-04-27 PROBLEM — S22.42XD CLOSED FRACTURE OF MULTIPLE RIBS OF LEFT SIDE WITH ROUTINE HEALING, SUBSEQUENT ENCOUNTER: Status: RESOLVED | Noted: 2018-10-19 | Resolved: 2019-04-27

## 2019-04-27 PROBLEM — J45.901 ACUTE ASTHMA EXACERBATION: Status: RESOLVED | Noted: 2019-03-07 | Resolved: 2019-04-27

## 2019-04-27 PROBLEM — J81.0 ACUTE PULMONARY EDEMA (HCC): Status: RESOLVED | Noted: 2019-02-17 | Resolved: 2019-04-27

## 2019-04-27 NOTE — TELEPHONE ENCOUNTER
[facility] will like to let the Dr know that service were restarted.        Please Luis Armando Thao

## 2019-04-28 NOTE — PROGRESS NOTES
HPI:    Jennifer Hess is a 69year old female here today for hospital follow up.    She was discharged from Inpatient hospital, [facility]  to Home   Admission Date: 4/19/19   Discharge Date: 4/21/19  Hospital Discharge Diagnoses (since 3/28/20 Current Meds:    Current Outpatient Medications on File Prior to Visit:  Albuterol Sulfate  (90 Base) MCG/ACT Inhalation Aero Soln Inhale 1-2 puffs into the lungs every 6 (six) hours as needed for Wheezing or Shortness of Breath.    Blood Glucose M Ondansetron HCl (ZOFRAN) 4 mg tablet Take 4 mg by mouth every 8 (eight) hours as needed for Nausea. metFORMIN HCl 500 MG Oral Tab Take 1 tablet (500 mg total) by mouth 2 (two) times daily with meals.  (Patient taking differently: Take 500 mg by mouth 2 drugs.     ROS:   GENERAL: weight stable, fatigue, no sweating  SKIN: denies any unusual skin lesions  EYES: denies blurred vision or double vision  HEENT: denies nasal congestion, sinus pain or ST  LUNGS: + shortness of breath with exertion  CARDIOVASCULA therapy.     (D64.9) Anemia, unspecified type  Plan: pt on iron supplement.    (K21.9) Gastroesophageal reflux disease, esophagitis presence not specified  Plan: continue PPI    (R06.09) Dyspnea on exertion  Plan: multifactorial but predomantly due to her

## 2019-04-29 ENCOUNTER — HOSPITAL ENCOUNTER (OUTPATIENT)
Facility: HOSPITAL | Age: 74
Setting detail: HOSPITAL OUTPATIENT SURGERY
Discharge: HOME | End: 2019-04-29
Attending: THORACIC SURGERY (CARDIOTHORACIC VASCULAR SURGERY) | Admitting: THORACIC SURGERY (CARDIOTHORACIC VASCULAR SURGERY)
Payer: MEDICARE

## 2019-04-29 VITALS
SYSTOLIC BLOOD PRESSURE: 148 MMHG | RESPIRATION RATE: 16 BRPM | HEART RATE: 80 BPM | DIASTOLIC BLOOD PRESSURE: 73 MMHG | OXYGEN SATURATION: 98 %

## 2019-04-29 DIAGNOSIS — J90 PLEURAL EFFUSION: ICD-10-CM

## 2019-04-29 PROCEDURE — 0WPB30Z REMOVAL OF DRAINAGE DEVICE FROM LEFT PLEURAL CAVITY, PERCUTANEOUS APPROACH: ICD-10-PCS | Performed by: THORACIC SURGERY (CARDIOTHORACIC VASCULAR SURGERY)

## 2019-04-29 RX ORDER — LIDOCAINE HYDROCHLORIDE 10 MG/ML
INJECTION, SOLUTION EPIDURAL; INFILTRATION; INTRACAUDAL; PERINEURAL AS NEEDED
Status: DISCONTINUED | OUTPATIENT
Start: 2019-04-29 | End: 2019-04-29

## 2019-04-29 NOTE — OPERATIVE REPORT
[facility] OPERATING ROOM  Operative Note     Jose Eduardo Pae Location: OR   CSN [id number] MRN [id number]   Admission Date 4/29/2019 Operation Date 4/29/2019   Attending Physician Toribio Singh MD Operating Physician Ana Rosa Marvin MD

## 2019-04-29 NOTE — INTERVAL H&P NOTE
Patient had Pleurx catheter placed for recurrent left pleural effusion. This is pretty much now resolved and drainage has been minimal to none from the catheter for the last few weeks.   She understands the indication and risk for removal of the catheter a

## 2019-05-01 ENCOUNTER — TELEPHONE (OUTPATIENT)
Dept: INTERNAL MEDICINE CLINIC | Facility: CLINIC | Age: 74
End: 2019-05-01

## 2019-05-01 DIAGNOSIS — R60.0 LEG EDEMA: Primary | ICD-10-CM

## 2019-05-01 NOTE — TELEPHONE ENCOUNTER
Dr Love Hogan, please advise on patient's multiple issues:    1. Patient scheduled to see you for 6-month follow-up 5/8/19 at 10:15 am Chance. She asked if you wanted her to have any lab tests before the appointment? 2.  The stockings she has for both h

## 2019-05-02 ENCOUNTER — TELEPHONE (OUTPATIENT)
Dept: OTHER | Age: 74
End: 2019-05-02

## 2019-05-02 ENCOUNTER — TELEPHONE (OUTPATIENT)
Dept: INTERNAL MEDICINE CLINIC | Facility: CLINIC | Age: 74
End: 2019-05-02

## 2019-05-02 NOTE — TELEPHONE ENCOUNTER
As to her question about driving, she needs to clear that with her cardiologist.  As to question of when she can stop oxygen, needs to clear this with her pulmonologist Dr Varun Mohr to reorder her chiquis hose 21mmhg knee high bilateral  dont think she

## 2019-05-02 NOTE — TELEPHONE ENCOUNTER
Called pt and informed of Dr. Lolis Estrada response. Pt understood. Pt asked for most recent lab results to be mailed to her home address on file. Printed 1 months work of labs and mailed to patient.

## 2019-05-02 NOTE — TELEPHONE ENCOUNTER
Oumou Escobar, would like to inform Dr. Ashley Ruiz that the pt will be seen by OT on the week of 5-6-19.

## 2019-05-02 NOTE — TELEPHONE ENCOUNTER
May take her meds today as scheduled. She needs to make sure she doesn't double up again; may  Need to use pill box to help her or have son take care of her meds instead.

## 2019-05-02 NOTE — TELEPHONE ENCOUNTER
Pt is asking for Ducosate Sodium refill, states she needs to take this as she is taking iron supplements. Pt states meds were prescribed by her cardiologist after her surgery. Pt states she is taking Ferrous sulfate 325 mg daily.      Ducosate Sodium last p

## 2019-05-02 NOTE — TELEPHONE ENCOUNTER
Dr. Stephanie Walton. Patient state she doubled up on 13 medications yesterday night. Patient denied any symptoms. Denied sob, no dizziness, no abnormal reaction. Patient states she feels fine.    Today /75    I went over all medications she takes burton

## 2019-05-03 RX ORDER — FERROUS SULFATE 325(65) MG
325 TABLET ORAL
Qty: 90 TABLET | Refills: 0 | Status: SHIPPED | OUTPATIENT
Start: 2019-05-03 | End: 2019-06-25

## 2019-05-03 RX ORDER — PSEUDOEPHEDRINE HCL 30 MG
100 TABLET ORAL 2 TIMES DAILY
Qty: 180 CAPSULE | Refills: 0 | Status: SHIPPED | OUTPATIENT
Start: 2019-05-03 | End: 2019-06-25

## 2019-05-07 ENCOUNTER — TELEPHONE (OUTPATIENT)
Dept: INTERNAL MEDICINE CLINIC | Facility: CLINIC | Age: 74
End: 2019-05-07

## 2019-05-07 NOTE — TELEPHONE ENCOUNTER
[facility] mail order pharmacy, clarified with pharmacist, script below is for Docusate Sodium 100mg, as ordered below per Dr Alondra Simon. They will send out order, no other questions at this time.     docusate sodium 100 MG Oral Cap 180 capsule 0 5/3/2019

## 2019-05-07 NOTE — TELEPHONE ENCOUNTER
F[place] from [facility] [place], message states we are unable to fill the current rx for DSS 100mg capsule, we received an rx and the drug name is unclear, please clarify

## 2019-05-07 NOTE — TELEPHONE ENCOUNTER
Received oxygen form from [facility], she is requesting smaller tanks. Form placed on Dr. Zara Gilford desk to review. They are asking for pulse volume setting.

## 2019-05-08 ENCOUNTER — TELEPHONE (OUTPATIENT)
Dept: INTERNAL MEDICINE CLINIC | Facility: CLINIC | Age: 74
End: 2019-05-08

## 2019-05-09 ENCOUNTER — CARDPULM VISIT (OUTPATIENT)
Dept: CARDIAC REHAB | Facility: HOSPITAL | Age: 74
End: 2019-05-09
Attending: INTERNAL MEDICINE
Payer: MEDICARE

## 2019-05-10 ENCOUNTER — TELEPHONE (OUTPATIENT)
Dept: OTHER | Age: 74
End: 2019-05-10

## 2019-05-10 NOTE — TELEPHONE ENCOUNTER
Odin ANNA from [employer] stated patient missed home health this past weekend due to it being Mother's Day weekend. He states that he anticipates discharge of the patient from home health next week.  He also stated patient has been approved for cardiac reha

## 2019-05-13 ENCOUNTER — TELEPHONE (OUTPATIENT)
Dept: PULMONOLOGY | Facility: CLINIC | Age: 74
End: 2019-05-13

## 2019-05-13 ENCOUNTER — DOCUMENTATION (OUTPATIENT)
Dept: CARDIOLOGY | Age: 74
End: 2019-05-13

## 2019-05-13 ENCOUNTER — CARDPULM VISIT (OUTPATIENT)
Dept: CARDIAC REHAB | Facility: HOSPITAL | Age: 74
End: 2019-05-13
Attending: INTERNAL MEDICINE
Payer: MEDICARE

## 2019-05-13 ENCOUNTER — TELEPHONE (OUTPATIENT)
Dept: INTERNAL MEDICINE CLINIC | Facility: CLINIC | Age: 74
End: 2019-05-13

## 2019-05-13 ENCOUNTER — TELEPHONE (OUTPATIENT)
Dept: CARDIOLOGY | Age: 74
End: 2019-05-13

## 2019-05-13 DIAGNOSIS — Z95.1 POSTSURGICAL AORTOCORONARY BYPASS STATUS: ICD-10-CM

## 2019-05-13 DIAGNOSIS — J44.9 CHRONIC OBSTRUCTIVE PULMONARY DISEASE, UNSPECIFIED COPD TYPE (HCC): Primary | ICD-10-CM

## 2019-05-13 PROCEDURE — 93798 PHYS/QHP OP CAR RHAB W/ECG: CPT

## 2019-05-14 ENCOUNTER — TELEPHONE (OUTPATIENT)
Dept: OTHER | Age: 74
End: 2019-05-14

## 2019-05-14 NOTE — TELEPHONE ENCOUNTER
Order signed by Dr. Love Hogan placed for mailing in supplied, pre-addressed and stamped envelope.  Placed in out going mail binl

## 2019-05-14 NOTE — TELEPHONE ENCOUNTER
She needs to get this clearance to drive from her cardiologist as I had said before. She can come and see me for her ffup/medicare physical anytime this month.

## 2019-05-14 NOTE — TELEPHONE ENCOUNTER
Patient advised of recommendation below per Dr Matt Allen, patient agreed with plan, no other questions at this time.

## 2019-05-14 NOTE — TELEPHONE ENCOUNTER
1.  pt wants to know if she can drive. She stated that she had heart surgery on 3/1/2019 and has not been driving but wants to know when can she drive. Pls advise  2.  she also wants to know when should she f/u with you for her 6 month check up.  P

## 2019-05-14 NOTE — TELEPHONE ENCOUNTER
pt wants to know if you have ordered her to have 2 portable oxygen. Please advise if you can order this for pt. I did noticed that she also called Miguelito Mo office yesterday to ask about this.  Do you want to order or do you want  office to or

## 2019-05-14 NOTE — TELEPHONE ENCOUNTER
Patient advised of notes per Dr Dimitrios Lewis, agreed with plan, will call back to schedule follow up with Dr Dimitrios Lewis.

## 2019-05-15 ENCOUNTER — CARDPULM VISIT (OUTPATIENT)
Dept: CARDIAC REHAB | Facility: HOSPITAL | Age: 74
End: 2019-05-15
Attending: INTERNAL MEDICINE
Payer: MEDICARE

## 2019-05-15 PROCEDURE — 93798 PHYS/QHP OP CAR RHAB W/ECG: CPT

## 2019-05-16 NOTE — TELEPHONE ENCOUNTER
Pt called to follow up on O2 tank request, while on phone pt's son called her and informed her Dr. Haim Montano has already ordered the oxygen and nothing further needed at this time.

## 2019-05-16 NOTE — TELEPHONE ENCOUNTER
Pt requesting order for portable oxygen concentrator. Pt states she received oxygen a few weeks ago in the hospital, currently uses O2 2L NC, and needs portable oxygen. Dr. Beena Miranda, okay to order portable oxygen concentrator setting 2-3 NC?

## 2019-05-17 ENCOUNTER — CARDPULM VISIT (OUTPATIENT)
Dept: CARDIAC REHAB | Facility: HOSPITAL | Age: 74
End: 2019-05-17
Attending: INTERNAL MEDICINE
Payer: MEDICARE

## 2019-05-17 PROCEDURE — 93798 PHYS/QHP OP CAR RHAB W/ECG: CPT

## 2019-05-17 NOTE — TELEPHONE ENCOUNTER
Order faxed to [facility] [phone]. Fax confirmation received. Pt informed of this. Pt verbalized understanding.

## 2019-05-18 ENCOUNTER — NURSE TRIAGE (OUTPATIENT)
Dept: OTHER | Age: 74
End: 2019-05-18

## 2019-05-18 NOTE — TELEPHONE ENCOUNTER
Action Requested: Summary for Provider     []  Critical Lab, Recommendations Needed  [] Need Additional Advice  []   FYI    []   Need Orders  [] Need Medications Sent to Pharmacy  []  Other     SUMMARY: appt 5/22/19.     Reason for call: Ear Pain (on/off fo

## 2019-05-20 ENCOUNTER — CARDPULM VISIT (OUTPATIENT)
Dept: CARDIAC REHAB | Facility: HOSPITAL | Age: 74
End: 2019-05-20
Attending: INTERNAL MEDICINE
Payer: MEDICARE

## 2019-05-20 ENCOUNTER — TELEPHONE (OUTPATIENT)
Dept: INTERNAL MEDICINE CLINIC | Facility: CLINIC | Age: 74
End: 2019-05-20

## 2019-05-20 PROCEDURE — 93798 PHYS/QHP OP CAR RHAB W/ECG: CPT

## 2019-05-20 NOTE — TELEPHONE ENCOUNTER
Son calling again wanting to know about his message from earlier today. Inform pt we are waiting for  to reply.  please see message below. Thanks

## 2019-05-20 NOTE — TELEPHONE ENCOUNTER
I had already answered this before that cardiologist will be the one to clear pt for driving. She is  A recent post CABG patient.

## 2019-05-21 ENCOUNTER — OFFICE VISIT (OUTPATIENT)
Dept: INTERNAL MEDICINE CLINIC | Facility: CLINIC | Age: 74
End: 2019-05-21
Payer: MEDICARE

## 2019-05-21 ENCOUNTER — TELEPHONE (OUTPATIENT)
Dept: INTERNAL MEDICINE CLINIC | Facility: CLINIC | Age: 74
End: 2019-05-21

## 2019-05-21 VITALS
DIASTOLIC BLOOD PRESSURE: 74 MMHG | WEIGHT: 161.81 LBS | HEART RATE: 82 BPM | TEMPERATURE: 99 F | SYSTOLIC BLOOD PRESSURE: 120 MMHG | BODY MASS INDEX: 28 KG/M2

## 2019-05-21 DIAGNOSIS — Z91.81 AT RISK FOR FALLS: ICD-10-CM

## 2019-05-21 DIAGNOSIS — J44.9 ASTHMA WITH COPD (CHRONIC OBSTRUCTIVE PULMONARY DISEASE) (HCC): ICD-10-CM

## 2019-05-21 DIAGNOSIS — E78.5 HYPERLIPIDEMIA ASSOCIATED WITH TYPE 2 DIABETES MELLITUS (HCC): ICD-10-CM

## 2019-05-21 DIAGNOSIS — N18.30 STAGE 3 CHRONIC KIDNEY DISEASE (HCC): ICD-10-CM

## 2019-05-21 DIAGNOSIS — Z95.1 S/P CABG X 3: ICD-10-CM

## 2019-05-21 DIAGNOSIS — I25.10 CORONARY ARTERY DISEASE INVOLVING NATIVE CORONARY ARTERY OF NATIVE HEART WITHOUT ANGINA PECTORIS: ICD-10-CM

## 2019-05-21 DIAGNOSIS — M1A.9XX1 CHRONIC TOPHACEOUS GOUT: ICD-10-CM

## 2019-05-21 DIAGNOSIS — I12.9 HYPERTENSION ASSOCIATED WITH CHRONIC KIDNEY DISEASE DUE TO TYPE 2 DIABETES MELLITUS (HCC): ICD-10-CM

## 2019-05-21 DIAGNOSIS — N18.30 CONTROLLED TYPE 2 DIABETES MELLITUS WITH STAGE 3 CHRONIC KIDNEY DISEASE, WITHOUT LONG-TERM CURRENT USE OF INSULIN (HCC): ICD-10-CM

## 2019-05-21 DIAGNOSIS — K21.9 GASTROESOPHAGEAL REFLUX DISEASE, ESOPHAGITIS PRESENCE NOT SPECIFIED: ICD-10-CM

## 2019-05-21 DIAGNOSIS — H25.9 SENILE CATARACT, UNSPECIFIED AGE-RELATED CATARACT TYPE, UNSPECIFIED LATERALITY: ICD-10-CM

## 2019-05-21 DIAGNOSIS — E11.22 HYPERTENSION ASSOCIATED WITH CHRONIC KIDNEY DISEASE DUE TO TYPE 2 DIABETES MELLITUS (HCC): ICD-10-CM

## 2019-05-21 DIAGNOSIS — R09.81 CHRONIC NASAL CONGESTION: ICD-10-CM

## 2019-05-21 DIAGNOSIS — Z00.00 MEDICARE ANNUAL WELLNESS VISIT, SUBSEQUENT: Primary | ICD-10-CM

## 2019-05-21 DIAGNOSIS — E11.69 HYPERLIPIDEMIA ASSOCIATED WITH TYPE 2 DIABETES MELLITUS (HCC): ICD-10-CM

## 2019-05-21 DIAGNOSIS — H91.90 HEARING DIFFICULTY, UNSPECIFIED LATERALITY: ICD-10-CM

## 2019-05-21 DIAGNOSIS — F32.A MILD DEPRESSION: ICD-10-CM

## 2019-05-21 DIAGNOSIS — E11.22 CONTROLLED TYPE 2 DIABETES MELLITUS WITH STAGE 3 CHRONIC KIDNEY DISEASE, WITHOUT LONG-TERM CURRENT USE OF INSULIN (HCC): ICD-10-CM

## 2019-05-21 DIAGNOSIS — I65.23 BILATERAL CAROTID ARTERY STENOSIS: Primary | ICD-10-CM

## 2019-05-21 PROCEDURE — G0439 PPPS, SUBSEQ VISIT: HCPCS | Performed by: INTERNAL MEDICINE

## 2019-05-21 PROCEDURE — 99397 PER PM REEVAL EST PAT 65+ YR: CPT | Performed by: INTERNAL MEDICINE

## 2019-05-21 PROCEDURE — 96160 PT-FOCUSED HLTH RISK ASSMT: CPT | Performed by: INTERNAL MEDICINE

## 2019-05-21 NOTE — TELEPHONE ENCOUNTER
Dr. Akiko Giles, Pt has an appointment to see Dr. Mima Mckeon on 6/5/2019. Please sign referral if you agree.  Thank you, Rosie, [facility]

## 2019-05-22 ENCOUNTER — TELEPHONE (OUTPATIENT)
Dept: INTERNAL MEDICINE CLINIC | Facility: CLINIC | Age: 74
End: 2019-05-22

## 2019-05-22 ENCOUNTER — LAB ENCOUNTER (OUTPATIENT)
Dept: LAB | Age: 74
End: 2019-05-22
Attending: INTERNAL MEDICINE
Payer: MEDICARE

## 2019-05-22 ENCOUNTER — CARDPULM VISIT (OUTPATIENT)
Dept: CARDIAC REHAB | Facility: HOSPITAL | Age: 74
End: 2019-05-22
Attending: INTERNAL MEDICINE
Payer: MEDICARE

## 2019-05-22 DIAGNOSIS — Z87.39 HISTORY OF GOUT: ICD-10-CM

## 2019-05-22 DIAGNOSIS — R73.03 PREDIABETES: ICD-10-CM

## 2019-05-22 DIAGNOSIS — D50.9 IRON DEFICIENCY ANEMIA, UNSPECIFIED IRON DEFICIENCY ANEMIA TYPE: ICD-10-CM

## 2019-05-22 DIAGNOSIS — D50.9 IRON DEFICIENCY ANEMIA, UNSPECIFIED IRON DEFICIENCY ANEMIA TYPE: Primary | ICD-10-CM

## 2019-05-22 DIAGNOSIS — I10 ESSENTIAL HYPERTENSION: ICD-10-CM

## 2019-05-22 DIAGNOSIS — M1A.9XX1 CHRONIC TOPHACEOUS GOUT: ICD-10-CM

## 2019-05-22 DIAGNOSIS — D64.9 ANEMIA: ICD-10-CM

## 2019-05-22 PROCEDURE — 85025 COMPLETE CBC W/AUTO DIFF WBC: CPT

## 2019-05-22 PROCEDURE — 84550 ASSAY OF BLOOD/URIC ACID: CPT

## 2019-05-22 PROCEDURE — 93798 PHYS/QHP OP CAR RHAB W/ECG: CPT

## 2019-05-22 PROCEDURE — 80053 COMPREHEN METABOLIC PANEL: CPT

## 2019-05-22 PROCEDURE — 83036 HEMOGLOBIN GLYCOSYLATED A1C: CPT

## 2019-05-22 PROCEDURE — 36415 COLL VENOUS BLD VENIPUNCTURE: CPT

## 2019-05-23 ENCOUNTER — TELEPHONE (OUTPATIENT)
Dept: CARDIOLOGY | Age: 74
End: 2019-05-23

## 2019-05-23 ENCOUNTER — TELEPHONE (OUTPATIENT)
Dept: INTERNAL MEDICINE CLINIC | Facility: CLINIC | Age: 74
End: 2019-05-23

## 2019-05-23 DIAGNOSIS — E78.00 ELEVATED CHOLESTEROL: ICD-10-CM

## 2019-05-23 PROBLEM — R06.09 DYSPNEA ON EXERTION: Status: RESOLVED | Noted: 2019-03-07 | Resolved: 2019-05-23

## 2019-05-23 PROBLEM — J90 PLEURAL EFFUSION ON LEFT: Status: RESOLVED | Noted: 2019-04-19 | Resolved: 2019-05-23

## 2019-05-23 PROBLEM — R06.00 DYSPNEA ON EXERTION: Status: RESOLVED | Noted: 2019-03-07 | Resolved: 2019-05-23

## 2019-05-23 RX ORDER — EZETIMIBE 10 MG/1
10 TABLET ORAL NIGHTLY
Qty: 90 TABLET | Refills: 1 | Status: SHIPPED | OUTPATIENT
Start: 2019-05-23 | End: 2019-12-10

## 2019-05-23 NOTE — TELEPHONE ENCOUNTER
Hi managed care,    I had put in referral for ENT DR Esquivel and optha Dr Raj Helm as requested by patient since had been seeing these specialists before he had switch to us as as her PCP.  I dont know if these specialist are network providers so pls check and

## 2019-05-23 NOTE — TELEPHONE ENCOUNTER
Pt stts refill on Rx ezetimibe 10MG. [facility] mail order. Please advise       Current Outpatient Medications:                    ezetimibe 10 MG Oral Tab Take 1 tablet (10 mg total) by mouth nightly.  Disp: 90 tablet Rfl: 0

## 2019-05-24 ENCOUNTER — CARDPULM VISIT (OUTPATIENT)
Dept: CARDIAC REHAB | Facility: HOSPITAL | Age: 74
End: 2019-05-24
Attending: INTERNAL MEDICINE
Payer: MEDICARE

## 2019-05-24 PROCEDURE — 93798 PHYS/QHP OP CAR RHAB W/ECG: CPT

## 2019-05-27 PROBLEM — I12.9 HYPERTENSION ASSOCIATED WITH CHRONIC KIDNEY DISEASE DUE TO TYPE 2 DIABETES MELLITUS  (HCC): Status: ACTIVE | Noted: 2019-05-27

## 2019-05-27 PROBLEM — E11.69 HYPERLIPIDEMIA ASSOCIATED WITH TYPE 2 DIABETES MELLITUS: Status: ACTIVE | Noted: 2019-05-27

## 2019-05-27 PROBLEM — E11.22 HYPERTENSION ASSOCIATED WITH CHRONIC KIDNEY DISEASE DUE TO TYPE 2 DIABETES MELLITUS (HCC): Status: ACTIVE | Noted: 2019-05-27

## 2019-05-27 PROBLEM — I12.9 HYPERTENSION ASSOCIATED WITH CHRONIC KIDNEY DISEASE DUE TO TYPE 2 DIABETES MELLITUS (HCC): Status: ACTIVE | Noted: 2019-05-27

## 2019-05-27 PROBLEM — F32.A MILD DEPRESSION: Status: ACTIVE | Noted: 2019-05-27

## 2019-05-27 PROBLEM — Z91.81 AT RISK FOR FALLS: Status: ACTIVE | Noted: 2019-05-27

## 2019-05-27 PROBLEM — Z00.00 MEDICARE ANNUAL WELLNESS VISIT, SUBSEQUENT: Status: ACTIVE | Noted: 2018-05-10

## 2019-05-27 PROBLEM — E11.69 HYPERLIPIDEMIA ASSOCIATED WITH TYPE 2 DIABETES MELLITUS (HCC): Status: ACTIVE | Noted: 2019-05-27

## 2019-05-27 PROBLEM — E11.22 HYPERTENSION ASSOCIATED WITH CHRONIC KIDNEY DISEASE DUE TO TYPE 2 DIABETES MELLITUS  (HCC): Status: ACTIVE | Noted: 2019-05-27

## 2019-05-27 PROBLEM — E78.5 HYPERLIPIDEMIA ASSOCIATED WITH TYPE 2 DIABETES MELLITUS: Status: ACTIVE | Noted: 2019-05-27

## 2019-05-27 PROBLEM — R09.81 CHRONIC NASAL CONGESTION: Status: ACTIVE | Noted: 2019-05-27

## 2019-05-27 PROBLEM — E11.22 CONTROLLED TYPE 2 DIABETES MELLITUS WITH STAGE 3 CHRONIC KIDNEY DISEASE, WITHOUT LONG-TERM CURRENT USE OF INSULIN (HCC): Status: ACTIVE | Noted: 2019-05-27

## 2019-05-27 PROBLEM — E11.22 HYPERTENSION ASSOCIATED WITH CHRONIC KIDNEY DISEASE DUE TO TYPE 2 DIABETES MELLITUS: Status: ACTIVE | Noted: 2019-05-27

## 2019-05-27 PROBLEM — H91.90 HEARING DIFFICULTY: Status: ACTIVE | Noted: 2019-05-27

## 2019-05-27 PROBLEM — E78.5 HYPERLIPIDEMIA ASSOCIATED WITH TYPE 2 DIABETES MELLITUS  (HCC): Status: ACTIVE | Noted: 2019-05-27

## 2019-05-27 PROBLEM — N18.30 STAGE 3 CHRONIC KIDNEY DISEASE (HCC): Status: ACTIVE | Noted: 2019-05-27

## 2019-05-27 PROBLEM — H25.9 AGE-RELATED CATARACT: Status: ACTIVE | Noted: 2018-05-10

## 2019-05-27 PROBLEM — N18.30 CONTROLLED TYPE 2 DIABETES MELLITUS WITH STAGE 3 CHRONIC KIDNEY DISEASE, WITHOUT LONG-TERM CURRENT USE OF INSULIN (HCC): Status: ACTIVE | Noted: 2019-05-27

## 2019-05-27 PROBLEM — I12.9 HYPERTENSION ASSOCIATED WITH CHRONIC KIDNEY DISEASE DUE TO TYPE 2 DIABETES MELLITUS: Status: ACTIVE | Noted: 2019-05-27

## 2019-05-27 PROBLEM — E11.69 HYPERLIPIDEMIA ASSOCIATED WITH TYPE 2 DIABETES MELLITUS  (HCC): Status: ACTIVE | Noted: 2019-05-27

## 2019-05-27 PROBLEM — E78.5 HYPERLIPIDEMIA ASSOCIATED WITH TYPE 2 DIABETES MELLITUS (HCC): Status: ACTIVE | Noted: 2019-05-27

## 2019-05-27 NOTE — PROGRESS NOTES
HPI:   Phil Kwan is a 69year old female who presents for a Medicare Subsequent Annual Wellness visit (Pt already had Initial Annual Wellness).       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    HYDRALAZINE HCL 25 MG Oral Tab TAKE 1 TABLET BY MOUTH TWICE A DAY   METOPROLOL SUCCINATE ER 25 MG Oral Tablet 24 Hr TAKE 1 TABLET (25 MG TOTAL) BY MOUTH DAILY BETA B subsequent encounter (10/19/2018), COPD (chronic obstructive pulmonary disease) ([facility] [address] Coronary atherosclerosis, Diastolic dysfunction (4/2/5160), Diastolic dysfunction without heart failure, Esophageal reflux, Essential hypertension, Generalized anxiety d following:    Height as of 4/25/19: 5' 4\" (1.626 m). Weight as of this encounter: 161 lb 12.8 oz (73.4 kg).     Medicare Hearing Assessment  (Required for AWV/SWV)    Hearing Screening    Screening Method:  Questionnaire  I have a problem hearing over t JVD   Back:   Symmetric, no curvature, ROM normal, no CVA tenderness   Lungs:   Clear to auscultation bilaterally, respirations unlabored   Heart:  Regular rate and rhythm, S1 and S2 normal, no murmur, rub, or gallop   Abdomen:   Soft, non-tender, bowel so managed care.    (R09.81) Chronic nasal congestion  Plan: ENT - INTERNAL        Pt request to see previous ENT specialist in another med group.  I told her we will check with managed care.    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  She doesn't want to take additional med for depression and doesn't feel she needs to.     (H91.90) Hearing difficulty, unspecified lateral flowsheet data found. Fecal Occult Blood Annually No results found for: FOB No flowsheet data found. Glaucoma Screening      Ophthalmology Visit Annually: Diabetics, FHx Glaucoma, AA>50, > 65 No flowsheet data found.     Bone Density Screenin COPD      Spirometry Testing Annually Spirometry date: 02/25/2019 No flowsheet data found.             Template: CHELY NOELLE MEDICARE ANNUAL ASSESSMENT FEMALE [37588]

## 2019-05-29 ENCOUNTER — APPOINTMENT (OUTPATIENT)
Dept: CARDIAC REHAB | Facility: HOSPITAL | Age: 74
End: 2019-05-29
Attending: INTERNAL MEDICINE
Payer: MEDICARE

## 2019-05-29 RX ORDER — LANOLIN ALCOHOL/MO/W.PET/CERES
3 CREAM (GRAM) TOPICAL DAILY
COMMUNITY
End: 2019-12-12

## 2019-05-29 RX ORDER — FLUTICASONE FUROATE AND VILANTEROL 200; 25 UG/1; UG/1
1 POWDER RESPIRATORY (INHALATION) 2 TIMES DAILY
COMMUNITY
Start: 2019-03-12 | End: 2020-07-13

## 2019-05-30 ENCOUNTER — APPOINTMENT (OUTPATIENT)
Dept: CARDIOLOGY | Age: 74
End: 2019-05-30

## 2019-05-30 ENCOUNTER — TELEPHONE (OUTPATIENT)
Dept: CARDIOLOGY | Age: 74
End: 2019-05-30

## 2019-05-31 ENCOUNTER — APPOINTMENT (OUTPATIENT)
Dept: CARDIAC REHAB | Facility: HOSPITAL | Age: 74
End: 2019-05-31
Attending: INTERNAL MEDICINE
Payer: MEDICARE

## 2019-05-31 ENCOUNTER — OFFICE VISIT (OUTPATIENT)
Dept: PULMONOLOGY | Facility: CLINIC | Age: 74
End: 2019-05-31
Payer: MEDICARE

## 2019-05-31 VITALS
WEIGHT: 162 LBS | HEIGHT: 64 IN | OXYGEN SATURATION: 91 % | HEART RATE: 83 BPM | BODY MASS INDEX: 27.66 KG/M2 | SYSTOLIC BLOOD PRESSURE: 147 MMHG | DIASTOLIC BLOOD PRESSURE: 75 MMHG

## 2019-05-31 DIAGNOSIS — J44.9 CHRONIC OBSTRUCTIVE PULMONARY DISEASE, UNSPECIFIED COPD TYPE (HCC): Primary | ICD-10-CM

## 2019-05-31 DIAGNOSIS — J45.50 SEVERE PERSISTENT ASTHMA WITHOUT COMPLICATION: ICD-10-CM

## 2019-05-31 PROCEDURE — 93798 PHYS/QHP OP CAR RHAB W/ECG: CPT

## 2019-05-31 PROCEDURE — G0463 HOSPITAL OUTPT CLINIC VISIT: HCPCS | Performed by: INTERNAL MEDICINE

## 2019-05-31 PROCEDURE — 99214 OFFICE O/P EST MOD 30 MIN: CPT | Performed by: INTERNAL MEDICINE

## 2019-05-31 NOTE — PROGRESS NOTES
HPI:    Patient ID: Delroy Favorite is a 69year old female.     HPI    Doing well   Occasional episode of dyspnea   Better with O2 at night and prn   No cough or sputum   No f/c   No edema   No n/v/d  Better exercise tolerance   Review of Systems   Con of Breath. Disp: 1 Box Rfl: 1   probenecid 500 MG Oral Tab Take 0.5 tablets (250 mg total) by mouth daily. Disp:  Rfl: 0   Ondansetron HCl (ZOFRAN) 4 mg tablet Take 4 mg by mouth every 8 (eight) hours as needed for Nausea.  Disp:  Rfl:    melatonin 3 MG Ora [Hydrocodone-*    SWELLING, SHORTNESS OF BREATH  Smoke                   SHORTNESS OF BREATH  Uloric [Febuxostat]     ITCHING, SHORTNESS OF BREATH  Adhesive Tape (Radha*    RASH  Statins                 MYALGIA    Comment:Pt had developed myalgia and myopat

## 2019-06-03 ENCOUNTER — APPOINTMENT (OUTPATIENT)
Dept: CARDIAC REHAB | Facility: HOSPITAL | Age: 74
End: 2019-06-03
Attending: INTERNAL MEDICINE
Payer: MEDICARE

## 2019-06-03 PROCEDURE — 93798 PHYS/QHP OP CAR RHAB W/ECG: CPT

## 2019-06-03 RX ORDER — AMLODIPINE BESYLATE 2.5 MG/1
2.5 TABLET ORAL NIGHTLY
Qty: 30 TABLET | Refills: 1 | Status: SHIPPED | OUTPATIENT
Start: 2019-06-03 | End: 2019-06-12

## 2019-06-03 NOTE — TELEPHONE ENCOUNTER
Current Outpatient Medications:   ••  •  amLODIPine Besylate 2.5 MG Oral Tab, Take 1 tablet (2.5 mg total) by mouth daily.  (Patient taking differently: Take 2.5 mg by mouth nightly.  ), Disp: 30 tablet, Rfl: 0  •

## 2019-06-05 ENCOUNTER — APPOINTMENT (OUTPATIENT)
Dept: CARDIAC REHAB | Facility: HOSPITAL | Age: 74
End: 2019-06-05
Attending: INTERNAL MEDICINE
Payer: MEDICARE

## 2019-06-05 ENCOUNTER — TELEPHONE (OUTPATIENT)
Dept: PULMONOLOGY | Facility: CLINIC | Age: 74
End: 2019-06-05

## 2019-06-05 PROCEDURE — 93798 PHYS/QHP OP CAR RHAB W/ECG: CPT

## 2019-06-05 NOTE — H&P (VIEW-ONLY)
[facility] [place]    Report of Consultation    Rajan Red     1945 MRN [id number]   Referring Provider No referring provider defined for this encounter.  PCP Brianna Robles MD     Date of Consult:  19  Reason for Cons Diastolic dysfunction 0/2/5677   • Diastolic dysfunction without heart failure    • Esophageal reflux    • Essential hypertension    • Generalized anxiety disorder    • Gout    • High blood pressure    • High cholesterol    • History of pituitary adenoma 5 PANTOPRAZOLE SODIUM 20 MG Oral Tab EC TAKE 1 TABLET (20 MG TOTAL) BY MOUTH 2 (TWO) TIMES DAILY BEFORE MEALS.  Disp: 180 tablet Rfl: 1   HYDRALAZINE HCL 25 MG Oral Tab TAKE 1 TABLET BY MOUTH TWICE A DAY Disp: 180 tablet Rfl: 1   METOPROLOL SUCCINATE ER 25 (75 mg total) by mouth daily. Disp: 90 tablet Rfl: 0   PEG 3350 Oral Powd Pack Take 17 g by mouth daily as needed. Disp:  Rfl: 0   Senna-Docusate Sodium 8.6-50 MG Oral Tab Take 2 tablets by mouth 2 (two) times daily. Disp:  Rfl: 0   colchicine (COLCRYS) 0. normal strength and tone. Normal symmetric reflexes.  Normal coordination and gait    Results:     Laboratory Data:  Lab Results   Component Value Date    WBC 6.3 05/22/2019    HGB 11.9 (L) 05/22/2019    HCT 39.5 05/22/2019    .0 05/22/2019    CREATS 1. Atherosclerotic disease at the right carotid bifurcation, which results in focal approximate 80% stenosis of the proximal cervical right internal carotid artery. 2. Left carotid bifurcation atherosclerosis.   No hemodynamically significant stenosis in ICA/bifurcation  2. Left carotid bifurcation/proximal ICA plaque without hemodynamic significance. Assessment & Plan:   Patient with severe right carotid stenosis status post left carotid endarterectomy.   The stenosis in the right has progressed and is

## 2019-06-06 ENCOUNTER — OFFICE VISIT (OUTPATIENT)
Dept: CARDIOLOGY | Age: 74
End: 2019-06-06

## 2019-06-06 VITALS
SYSTOLIC BLOOD PRESSURE: 130 MMHG | OXYGEN SATURATION: 95 % | HEIGHT: 64 IN | WEIGHT: 167 LBS | DIASTOLIC BLOOD PRESSURE: 60 MMHG | HEART RATE: 92 BPM | BODY MASS INDEX: 28.51 KG/M2

## 2019-06-06 DIAGNOSIS — I10 ESSENTIAL HYPERTENSION: ICD-10-CM

## 2019-06-06 DIAGNOSIS — J90 PLEURAL EFFUSION: Primary | ICD-10-CM

## 2019-06-06 DIAGNOSIS — Z95.1 HX OF CABG: ICD-10-CM

## 2019-06-06 DIAGNOSIS — L85.1 ACQUIRED KERATOSIS PALMARIS ET PLANTARIS: ICD-10-CM

## 2019-06-06 DIAGNOSIS — E78.00 HYPERCHOLESTEROLEMIA: ICD-10-CM

## 2019-06-06 DIAGNOSIS — J44.89 ASTHMA WITH COPD (CHRONIC OBSTRUCTIVE PULMONARY DISEASE): ICD-10-CM

## 2019-06-06 DIAGNOSIS — R06.02 SOB (SHORTNESS OF BREATH): ICD-10-CM

## 2019-06-06 DIAGNOSIS — E22.9 HYPERFUNCTION OF PITUITARY GLAND (CMD): ICD-10-CM

## 2019-06-06 DIAGNOSIS — I65.21 STENOSIS OF RIGHT CAROTID ARTERY: ICD-10-CM

## 2019-06-06 PROCEDURE — 3075F SYST BP GE 130 - 139MM HG: CPT | Performed by: INTERNAL MEDICINE

## 2019-06-06 PROCEDURE — 99204 OFFICE O/P NEW MOD 45 MIN: CPT | Performed by: INTERNAL MEDICINE

## 2019-06-06 PROCEDURE — 3078F DIAST BP <80 MM HG: CPT | Performed by: INTERNAL MEDICINE

## 2019-06-06 RX ORDER — FERROUS SULFATE 325(65) MG
325 TABLET ORAL DAILY
COMMUNITY
Start: 2019-05-03 | End: 2019-12-12

## 2019-06-06 RX ORDER — METOPROLOL SUCCINATE 25 MG/1
25 TABLET, EXTENDED RELEASE ORAL DAILY
COMMUNITY
Start: 2019-04-26

## 2019-06-07 ENCOUNTER — APPOINTMENT (OUTPATIENT)
Dept: CARDIAC REHAB | Facility: HOSPITAL | Age: 74
End: 2019-06-07
Attending: INTERNAL MEDICINE
Payer: MEDICARE

## 2019-06-07 ENCOUNTER — TELEPHONE (OUTPATIENT)
Dept: INTERNAL MEDICINE CLINIC | Facility: CLINIC | Age: 74
End: 2019-06-07

## 2019-06-07 DIAGNOSIS — I10 ESSENTIAL HYPERTENSION: ICD-10-CM

## 2019-06-07 DIAGNOSIS — E78.00 HYPERCHOLESTEROLEMIA: Primary | ICD-10-CM

## 2019-06-07 DIAGNOSIS — Z95.1 S/P CABG (CORONARY ARTERY BYPASS GRAFT): ICD-10-CM

## 2019-06-07 NOTE — TELEPHONE ENCOUNTER
Marlo Rhodes from [address] [place] [address] Specialists faxed a referral request for Cardiac Rehab for 36 visits. Faxed request to Dr. Esther Reed office as well. Please see referral request and sign off if approved.       Thanks    Roderick

## 2019-06-10 ENCOUNTER — APPOINTMENT (OUTPATIENT)
Dept: CARDIAC REHAB | Facility: HOSPITAL | Age: 74
End: 2019-06-10
Attending: INTERNAL MEDICINE
Payer: MEDICARE

## 2019-06-10 ENCOUNTER — LAB ENCOUNTER (OUTPATIENT)
Dept: LAB | Age: 74
End: 2019-06-10
Attending: PEDIATRICS
Payer: MEDICARE

## 2019-06-10 DIAGNOSIS — J44.9 COPD (CHRONIC OBSTRUCTIVE PULMONARY DISEASE) (HCC): ICD-10-CM

## 2019-06-10 DIAGNOSIS — L85.1 ACQUIRED KERATODERMA PALMARIS ET PLANTARIS: Primary | ICD-10-CM

## 2019-06-10 LAB
T4 FREE SERPL-MCNC: 1.3 NG/DL
THYROID STIMULATING HORMONE: 1.81

## 2019-06-10 PROCEDURE — 84443 ASSAY THYROID STIM HORMONE: CPT

## 2019-06-10 PROCEDURE — 84439 ASSAY OF FREE THYROXINE: CPT

## 2019-06-10 PROCEDURE — 36415 COLL VENOUS BLD VENIPUNCTURE: CPT

## 2019-06-11 ENCOUNTER — TELEPHONE (OUTPATIENT)
Dept: CARDIOLOGY | Age: 74
End: 2019-06-11

## 2019-06-11 ENCOUNTER — TELEPHONE (OUTPATIENT)
Dept: INTERNAL MEDICINE CLINIC | Facility: CLINIC | Age: 74
End: 2019-06-11

## 2019-06-11 ENCOUNTER — CLINICAL ABSTRACT (OUTPATIENT)
Dept: CARDIOLOGY | Age: 74
End: 2019-06-11

## 2019-06-11 NOTE — TELEPHONE ENCOUNTER
[employer] mail delivery called in stating that Pt was requesting medication below in 5MG dose. Please advise.        Current Outpatient Medications:   •  amLODIPine Besylate 2.5 MG Oral Tab, Take 1 tablet (2.5 mg total) by mouth nightly., Disp: 30 tablet,

## 2019-06-12 ENCOUNTER — APPOINTMENT (OUTPATIENT)
Dept: CARDIAC REHAB | Facility: HOSPITAL | Age: 74
End: 2019-06-12
Attending: INTERNAL MEDICINE
Payer: MEDICARE

## 2019-06-12 PROCEDURE — 93798 PHYS/QHP OP CAR RHAB W/ECG: CPT

## 2019-06-12 RX ORDER — AMLODIPINE BESYLATE 2.5 MG/1
2.5 TABLET ORAL NIGHTLY
Qty: 90 TABLET | Refills: 1 | Status: SHIPPED | OUTPATIENT
Start: 2019-06-12 | End: 2019-12-10

## 2019-06-12 RX ORDER — COLCHICINE 0.6 MG/1
0.6 TABLET ORAL AS NEEDED
Qty: 30 TABLET | Refills: 0 | Status: SHIPPED | OUTPATIENT
Start: 2019-06-12 | End: 2019-07-05

## 2019-06-12 NOTE — TELEPHONE ENCOUNTER
Phoned patient and scheduled an appointment on July 2 at [facility]

## 2019-06-12 NOTE — TELEPHONE ENCOUNTER
Pt req a refill for meds below and states out of meds and req to have meds refilled at [facility] in [facility] in chart. •  colchicine (COLCRYS) 0.6 MG Oral Tab, Take 0.6 mg by mouth as needed. , Disp: , Rfl:

## 2019-06-12 NOTE — TELEPHONE ENCOUNTER
LOV: 12/5/218  Last refilled? ?  Future Appointments   Date Time Provider Raj Quintanilla   6/12/2019  2:45 PM [facility] CARD PHASE 2 [facility] CP REHAB EM [facility]   6/14/2019  2:45 PM [facility] CARD PHASE 2 [facility] CP REHAB EM [facility]   6/17/2019  1:00 PM Marlene Both, Latest Ref Rng & Units 5/22/2019   WBC      4.0 - 11.0 x10(3) uL 6.3   RBC      3.80 - 5.30 x10(6)uL 4.23   Hemoglobin      12.0 - 16.0 g/dL 11.9 (L)   Hematocrit      35.0 - 48.0 % 39.5   MCV      80.0 - 100.0 fL 93.4   MCH      26.0 - 34.0 pg 28.1   MCHC acid of 10.0. She definitely cannot take allopurinol, because she had hives, shortness of breath, skin swelling. Uloric took her to the emergency room with throat swelling and shortness of breath. She now agrees to try probenecid, 250mg twice daily.   She

## 2019-06-12 NOTE — TELEPHONE ENCOUNTER
Spoke with patient who stated she need 90 day long term supply sent to [facility] and she takes the 2.5mg amlodipine dose not the 5mg dose. New script sent to [facility].

## 2019-06-12 NOTE — TELEPHONE ENCOUNTER
Hypertensive Medications  Protocol Criteria:  · Appointment scheduled in the past 6 months or in the next 3 months  · BMP or CMP in the past 12 months  · Creatinine result < 2  Recent Outpatient Visits            1 week ago Stenosis of right carotid paz [facility] CABG x3 2/19/19    In 3 weeks [facility] CARD PHASE 2 [address] Cardiopulmonary Rehab [facility] CABG x3 2/19/19    In 3 weeks [facility] CARD PHASE 2 [address] Cardiopulmonary Rehab [place] CABG x3 2/19/19    In PHASE 2 [address] [address]  Cardiopulmonary Rehab [place] CABG x3 2/19/19        Lab Results   Component Value Date     (H) 05/22/2019    BUN 19 (H) 05/22/2019    CREATSERUM 1.07 (H) 05/22/2019    BUNCREA 17.8 05/22/2019    GFRNAA 52 (L) 05/2

## 2019-06-13 ENCOUNTER — TELEPHONE (OUTPATIENT)
Dept: CARDIOLOGY | Age: 74
End: 2019-06-13

## 2019-06-14 ENCOUNTER — CARDPULM VISIT (OUTPATIENT)
Dept: CARDIAC REHAB | Facility: HOSPITAL | Age: 74
End: 2019-06-14
Attending: INTERNAL MEDICINE
Payer: MEDICARE

## 2019-06-14 PROCEDURE — 93798 PHYS/QHP OP CAR RHAB W/ECG: CPT

## 2019-06-17 ENCOUNTER — APPOINTMENT (OUTPATIENT)
Dept: CARDIAC REHAB | Facility: HOSPITAL | Age: 74
End: 2019-06-17
Attending: INTERNAL MEDICINE
Payer: MEDICARE

## 2019-06-19 ENCOUNTER — APPOINTMENT (OUTPATIENT)
Dept: CARDIAC REHAB | Facility: HOSPITAL | Age: 74
End: 2019-06-19
Attending: INTERNAL MEDICINE
Payer: MEDICARE

## 2019-06-19 PROCEDURE — 93798 PHYS/QHP OP CAR RHAB W/ECG: CPT

## 2019-06-19 RX ORDER — ALBUTEROL SULFATE 90 UG/1
1-2 AEROSOL, METERED RESPIRATORY (INHALATION) EVERY 6 HOURS PRN
Qty: 18 INHALER | Refills: 0 | Status: SHIPPED | OUTPATIENT
Start: 2019-06-19 | End: 2019-08-28

## 2019-06-21 ENCOUNTER — APPOINTMENT (OUTPATIENT)
Dept: CARDIAC REHAB | Facility: HOSPITAL | Age: 74
End: 2019-06-21
Attending: INTERNAL MEDICINE
Payer: MEDICARE

## 2019-06-24 ENCOUNTER — HOSPITAL ENCOUNTER (OUTPATIENT)
Dept: CV DIAGNOSTICS | Age: 74
Discharge: HOME | End: 2019-06-24
Attending: INTERNAL MEDICINE
Payer: MEDICARE

## 2019-06-24 ENCOUNTER — CARDPULM VISIT (OUTPATIENT)
Dept: CARDIAC REHAB | Facility: HOSPITAL | Age: 74
End: 2019-06-24
Attending: INTERNAL MEDICINE
Payer: MEDICARE

## 2019-06-24 DIAGNOSIS — R06.02 SOB (SHORTNESS OF BREATH): ICD-10-CM

## 2019-06-24 PROCEDURE — 93306 TTE W/DOPPLER COMPLETE: CPT | Performed by: INTERNAL MEDICINE

## 2019-06-24 PROCEDURE — 93798 PHYS/QHP OP CAR RHAB W/ECG: CPT

## 2019-06-26 ENCOUNTER — APPOINTMENT (OUTPATIENT)
Dept: CARDIAC REHAB | Facility: HOSPITAL | Age: 74
End: 2019-06-26
Attending: INTERNAL MEDICINE
Payer: MEDICARE

## 2019-06-26 ENCOUNTER — HOSPITAL ENCOUNTER (OUTPATIENT)
Dept: GENERAL RADIOLOGY | Age: 74
Discharge: HOME | DRG: 038 | End: 2019-06-26
Attending: THORACIC SURGERY (CARDIOTHORACIC VASCULAR SURGERY)
Payer: MEDICARE

## 2019-06-26 ENCOUNTER — LAB ENCOUNTER (OUTPATIENT)
Dept: LAB | Age: 74
DRG: 038 | End: 2019-06-26
Attending: THORACIC SURGERY (CARDIOTHORACIC VASCULAR SURGERY)
Payer: MEDICARE

## 2019-06-26 DIAGNOSIS — Z01.818 PREOP TESTING: ICD-10-CM

## 2019-06-26 PROCEDURE — 86904 BLOOD TYPING PATIENT SERUM: CPT

## 2019-06-26 PROCEDURE — 86850 RBC ANTIBODY SCREEN: CPT

## 2019-06-26 PROCEDURE — 36415 COLL VENOUS BLD VENIPUNCTURE: CPT

## 2019-06-26 PROCEDURE — 81003 URINALYSIS AUTO W/O SCOPE: CPT

## 2019-06-26 PROCEDURE — 71046 X-RAY EXAM CHEST 2 VIEWS: CPT | Performed by: THORACIC SURGERY (CARDIOTHORACIC VASCULAR SURGERY)

## 2019-06-26 PROCEDURE — 86901 BLOOD TYPING SEROLOGIC RH(D): CPT

## 2019-06-26 PROCEDURE — 86900 BLOOD TYPING SEROLOGIC ABO: CPT

## 2019-06-26 PROCEDURE — 86870 RBC ANTIBODY IDENTIFICATION: CPT

## 2019-06-26 PROCEDURE — 87641 MR-STAPH DNA AMP PROBE: CPT

## 2019-06-26 PROCEDURE — 86077 PHYS BLOOD BANK SERV XMATCH: CPT

## 2019-06-26 PROCEDURE — 86880 COOMBS TEST DIRECT: CPT

## 2019-06-27 ENCOUNTER — TELEPHONE (OUTPATIENT)
Dept: INTERNAL MEDICINE CLINIC | Facility: CLINIC | Age: 74
End: 2019-06-27

## 2019-06-27 ENCOUNTER — TELEPHONE (OUTPATIENT)
Dept: PULMONOLOGY | Facility: CLINIC | Age: 74
End: 2019-06-27

## 2019-06-27 ENCOUNTER — TELEPHONE (OUTPATIENT)
Dept: OTHER | Age: 74
End: 2019-06-27

## 2019-06-27 NOTE — TELEPHONE ENCOUNTER
Patient contacted, patient has ps02  readings between 90 and 96 during the day, she noted a drop to 88 once today after taking a shower and it returned to 91 in only a few minutes.  She noted a drop to 86 during the night which returned to 91 when she got u

## 2019-06-27 NOTE — TELEPHONE ENCOUNTER
Pt inquiring what is a normal range for pulse and oxygen. Pt has a pulse oximetry and states her pulse goes down to 86/up to 94 occasionally.  Please call [phone]

## 2019-06-27 NOTE — PAT NURSING NOTE
Pt. With positive antibodies and per Severo Baldy in blood bank , 2 units of PRBC's to be ordered per protocol.   Order entered

## 2019-06-27 NOTE — TELEPHONE ENCOUNTER
Per Brenden Mays she spoke to Dr. Adriana Estevez nurse already (see TE 6/27 for PCP) & rcvd info she needed.  Pt stts her O2 sat is 90-96% w/o O2 during day & goes down to 88% w/o O2 when sleeping & showering, but takes deep breath & w/in seconds O2 sat goes up t

## 2019-06-27 NOTE — TELEPHONE ENCOUNTER
Patient called wanting to know what is a normal pulse oximetry reading. Per patient, Dr. Tod Ochoa told her to purchase a pulse oximetry machine and patient was confused about using the machine.    Attempted to transfer patient to Dr. Clay Villafuerte office but she

## 2019-06-27 NOTE — PAT NURSING NOTE
Iris at Dr. Quinton Barnard officed notified pt. Has positive antibody result and 2 units PRBC\"s ordered per protocol from blood bank and abnormal CXR. States she will notify Dr. Hesham Fernandes and Lukas GHOSH.

## 2019-06-28 ENCOUNTER — ANESTHESIA EVENT (OUTPATIENT)
Dept: SURGERY | Facility: HOSPITAL | Age: 74
DRG: 038 | End: 2019-06-28
Payer: MEDICARE

## 2019-06-28 ENCOUNTER — HOSPITAL ENCOUNTER (INPATIENT)
Facility: HOSPITAL | Age: 74
LOS: 2 days | Discharge: HOME HEALTH | DRG: 038 | End: 2019-06-30
Attending: THORACIC SURGERY (CARDIOTHORACIC VASCULAR SURGERY) | Admitting: THORACIC SURGERY (CARDIOTHORACIC VASCULAR SURGERY)
Payer: MEDICARE

## 2019-06-28 ENCOUNTER — APPOINTMENT (OUTPATIENT)
Dept: CARDIAC REHAB | Facility: HOSPITAL | Age: 74
End: 2019-06-28
Attending: INTERNAL MEDICINE
Payer: MEDICARE

## 2019-06-28 ENCOUNTER — ANESTHESIA (OUTPATIENT)
Dept: SURGERY | Facility: HOSPITAL | Age: 74
DRG: 038 | End: 2019-06-28
Payer: MEDICARE

## 2019-06-28 DIAGNOSIS — Z01.818 PREOP TESTING: Primary | ICD-10-CM

## 2019-06-28 DIAGNOSIS — I65.21 STENOSIS OF RIGHT CAROTID ARTERY: ICD-10-CM

## 2019-06-28 LAB
GLUCOSE BLDC GLUCOMTR-MCNC: 115 MG/DL (ref 70–99)
GLUCOSE BLDC GLUCOMTR-MCNC: 130 MG/DL (ref 70–99)
GLUCOSE BLDC GLUCOMTR-MCNC: 164 MG/DL (ref 70–99)
ISTAT ACTIVATED CLOTTING TIME: 136 SECONDS (ref 125–137)
ISTAT ACTIVATED CLOTTING TIME: 290 SECONDS (ref 125–137)

## 2019-06-28 PROCEDURE — 03CK0ZZ EXTIRPATION OF MATTER FROM RIGHT INTERNAL CAROTID ARTERY, OPEN APPROACH: ICD-10-PCS | Performed by: THORACIC SURGERY (CARDIOTHORACIC VASCULAR SURGERY)

## 2019-06-28 PROCEDURE — 99233 SBSQ HOSP IP/OBS HIGH 50: CPT | Performed by: HOSPITALIST

## 2019-06-28 PROCEDURE — 03UK0KZ SUPPLEMENT RIGHT INTERNAL CAROTID ARTERY WITH NONAUTOLOGOUS TISSUE SUBSTITUTE, OPEN APPROACH: ICD-10-PCS | Performed by: THORACIC SURGERY (CARDIOTHORACIC VASCULAR SURGERY)

## 2019-06-28 DEVICE — PATCH CV 8X.8CM VSGRD GLBL BVN: Type: IMPLANTABLE DEVICE | Status: FUNCTIONAL

## 2019-06-28 RX ORDER — ACETAMINOPHEN 500 MG
1000 TABLET ORAL ONCE
Status: COMPLETED | OUTPATIENT
Start: 2019-06-28 | End: 2019-06-28

## 2019-06-28 RX ORDER — METOPROLOL SUCCINATE 25 MG/1
25 TABLET, EXTENDED RELEASE ORAL
Status: DISCONTINUED | OUTPATIENT
Start: 2019-06-29 | End: 2019-06-30

## 2019-06-28 RX ORDER — ASPIRIN 81 MG/1
81 TABLET ORAL DAILY
Status: DISCONTINUED | OUTPATIENT
Start: 2019-06-29 | End: 2019-06-28

## 2019-06-28 RX ORDER — PROTAMINE SULFATE 10 MG/ML
INJECTION, SOLUTION INTRAVENOUS AS NEEDED
Status: DISCONTINUED | OUTPATIENT
Start: 2019-06-28 | End: 2019-06-28 | Stop reason: SURG

## 2019-06-28 RX ORDER — BUPIVACAINE HYDROCHLORIDE AND EPINEPHRINE 2.5; 5 MG/ML; UG/ML
INJECTION, SOLUTION INFILTRATION; PERINEURAL AS NEEDED
Status: DISCONTINUED | OUTPATIENT
Start: 2019-06-28 | End: 2019-06-28 | Stop reason: HOSPADM

## 2019-06-28 RX ORDER — PHENYLEPHRINE HCL 10 MG/ML
VIAL (ML) INJECTION AS NEEDED
Status: DISCONTINUED | OUTPATIENT
Start: 2019-06-28 | End: 2019-06-28 | Stop reason: SURG

## 2019-06-28 RX ORDER — MORPHINE SULFATE 4 MG/ML
4 INJECTION, SOLUTION INTRAMUSCULAR; INTRAVENOUS EVERY 2 HOUR PRN
Status: DISCONTINUED | OUTPATIENT
Start: 2019-06-28 | End: 2019-06-30

## 2019-06-28 RX ORDER — SODIUM CHLORIDE 0.9 % (FLUSH) 0.9 %
10 SYRINGE (ML) INJECTION AS NEEDED
Status: DISCONTINUED | OUTPATIENT
Start: 2019-06-28 | End: 2019-06-30

## 2019-06-28 RX ORDER — ACETAMINOPHEN AND CODEINE PHOSPHATE 300; 30 MG/1; MG/1
2 TABLET ORAL EVERY 4 HOURS PRN
Status: DISCONTINUED | OUTPATIENT
Start: 2019-06-28 | End: 2019-06-30

## 2019-06-28 RX ORDER — PANTOPRAZOLE SODIUM 20 MG/1
20 TABLET, DELAYED RELEASE ORAL
Status: DISCONTINUED | OUTPATIENT
Start: 2019-06-28 | End: 2019-06-30

## 2019-06-28 RX ORDER — HYDRALAZINE HYDROCHLORIDE 25 MG/1
25 TABLET, FILM COATED ORAL 2 TIMES DAILY
Status: DISCONTINUED | OUTPATIENT
Start: 2019-06-28 | End: 2019-06-30

## 2019-06-28 RX ORDER — SODIUM CHLORIDE, SODIUM LACTATE, POTASSIUM CHLORIDE, CALCIUM CHLORIDE 600; 310; 30; 20 MG/100ML; MG/100ML; MG/100ML; MG/100ML
INJECTION, SOLUTION INTRAVENOUS CONTINUOUS
Status: DISCONTINUED | OUTPATIENT
Start: 2019-06-28 | End: 2019-06-30

## 2019-06-28 RX ORDER — ONDANSETRON 2 MG/ML
4 INJECTION INTRAMUSCULAR; INTRAVENOUS EVERY 6 HOURS PRN
Status: DISCONTINUED | OUTPATIENT
Start: 2019-06-28 | End: 2019-06-30

## 2019-06-28 RX ORDER — CEFAZOLIN SODIUM/WATER 2 G/20 ML
2 SYRINGE (ML) INTRAVENOUS ONCE
Status: COMPLETED | OUTPATIENT
Start: 2019-06-28 | End: 2019-06-28

## 2019-06-28 RX ORDER — CEFAZOLIN SODIUM/WATER 2 G/20 ML
2 SYRINGE (ML) INTRAVENOUS EVERY 8 HOURS
Status: COMPLETED | OUTPATIENT
Start: 2019-06-28 | End: 2019-06-29

## 2019-06-28 RX ORDER — ACETAMINOPHEN AND CODEINE PHOSPHATE 300; 30 MG/1; MG/1
1 TABLET ORAL EVERY 4 HOURS PRN
Status: DISCONTINUED | OUTPATIENT
Start: 2019-06-28 | End: 2019-06-30

## 2019-06-28 RX ORDER — ROCURONIUM BROMIDE 10 MG/ML
INJECTION, SOLUTION INTRAVENOUS AS NEEDED
Status: DISCONTINUED | OUTPATIENT
Start: 2019-06-28 | End: 2019-06-28 | Stop reason: SURG

## 2019-06-28 RX ORDER — METOCLOPRAMIDE 10 MG/1
10 TABLET ORAL ONCE
Status: DISCONTINUED | OUTPATIENT
Start: 2019-06-28 | End: 2019-06-28 | Stop reason: HOSPADM

## 2019-06-28 RX ORDER — AMLODIPINE BESYLATE 2.5 MG/1
2.5 TABLET ORAL NIGHTLY
Status: DISCONTINUED | OUTPATIENT
Start: 2019-06-28 | End: 2019-06-30

## 2019-06-28 RX ORDER — DOXEPIN HYDROCHLORIDE 50 MG/1
1 CAPSULE ORAL DAILY
Status: DISCONTINUED | OUTPATIENT
Start: 2019-06-28 | End: 2019-06-30

## 2019-06-28 RX ORDER — ASPIRIN 300 MG
300 SUPPOSITORY, RECTAL RECTAL DAILY
Status: DISCONTINUED | OUTPATIENT
Start: 2019-06-28 | End: 2019-06-29

## 2019-06-28 RX ORDER — DOCUSATE SODIUM 100 MG/1
100 CAPSULE, LIQUID FILLED ORAL 2 TIMES DAILY
Status: DISCONTINUED | OUTPATIENT
Start: 2019-06-29 | End: 2019-06-30

## 2019-06-28 RX ORDER — HEPARIN SODIUM 1000 [USP'U]/ML
INJECTION, SOLUTION INTRAVENOUS; SUBCUTANEOUS AS NEEDED
Status: DISCONTINUED | OUTPATIENT
Start: 2019-06-28 | End: 2019-06-28 | Stop reason: SURG

## 2019-06-28 RX ORDER — FAMOTIDINE 20 MG/1
20 TABLET ORAL ONCE
Status: DISCONTINUED | OUTPATIENT
Start: 2019-06-28 | End: 2019-06-28 | Stop reason: HOSPADM

## 2019-06-28 RX ORDER — EZETIMIBE 10 MG/1
10 TABLET ORAL NIGHTLY
Status: DISCONTINUED | OUTPATIENT
Start: 2019-06-28 | End: 2019-06-30

## 2019-06-28 RX ORDER — GLYCOPYRROLATE 0.2 MG/ML
INJECTION INTRAMUSCULAR; INTRAVENOUS AS NEEDED
Status: DISCONTINUED | OUTPATIENT
Start: 2019-06-28 | End: 2019-06-28 | Stop reason: SURG

## 2019-06-28 RX ORDER — DEXTROSE, SODIUM CHLORIDE, AND POTASSIUM CHLORIDE 5; .45; .15 G/100ML; G/100ML; G/100ML
INJECTION INTRAVENOUS CONTINUOUS
Status: DISCONTINUED | OUTPATIENT
Start: 2019-06-28 | End: 2019-06-30

## 2019-06-28 RX ORDER — MORPHINE SULFATE 2 MG/ML
1 INJECTION, SOLUTION INTRAMUSCULAR; INTRAVENOUS EVERY 2 HOUR PRN
Status: DISCONTINUED | OUTPATIENT
Start: 2019-06-28 | End: 2019-06-30

## 2019-06-28 RX ORDER — ENOXAPARIN SODIUM 100 MG/ML
40 INJECTION SUBCUTANEOUS DAILY
Status: DISCONTINUED | OUTPATIENT
Start: 2019-06-29 | End: 2019-06-30

## 2019-06-28 RX ORDER — ACETAMINOPHEN 325 MG/1
650 TABLET ORAL EVERY 4 HOURS PRN
Status: DISCONTINUED | OUTPATIENT
Start: 2019-06-28 | End: 2019-06-30

## 2019-06-28 RX ORDER — NEOSTIGMINE METHYLSULFATE 0.5 MG/ML
INJECTION INTRAVENOUS AS NEEDED
Status: DISCONTINUED | OUTPATIENT
Start: 2019-06-28 | End: 2019-06-28 | Stop reason: SURG

## 2019-06-28 RX ORDER — CLOPIDOGREL BISULFATE 75 MG/1
75 TABLET ORAL DAILY
Status: DISCONTINUED | OUTPATIENT
Start: 2019-06-29 | End: 2019-06-30

## 2019-06-28 RX ORDER — MORPHINE SULFATE 2 MG/ML
2 INJECTION, SOLUTION INTRAMUSCULAR; INTRAVENOUS EVERY 2 HOUR PRN
Status: DISCONTINUED | OUTPATIENT
Start: 2019-06-28 | End: 2019-06-30

## 2019-06-28 RX ORDER — DEXAMETHASONE SODIUM PHOSPHATE 4 MG/ML
VIAL (ML) INJECTION AS NEEDED
Status: DISCONTINUED | OUTPATIENT
Start: 2019-06-28 | End: 2019-06-28 | Stop reason: SURG

## 2019-06-28 RX ORDER — NITROGLYCERIN 20 MG/100ML
INJECTION INTRAVENOUS CONTINUOUS PRN
Status: DISCONTINUED | OUTPATIENT
Start: 2019-06-28 | End: 2019-06-30

## 2019-06-28 RX ORDER — ONDANSETRON 2 MG/ML
INJECTION INTRAMUSCULAR; INTRAVENOUS AS NEEDED
Status: DISCONTINUED | OUTPATIENT
Start: 2019-06-28 | End: 2019-06-28 | Stop reason: SURG

## 2019-06-28 RX ORDER — LIDOCAINE HYDROCHLORIDE 10 MG/ML
INJECTION, SOLUTION EPIDURAL; INFILTRATION; INTRACAUDAL; PERINEURAL AS NEEDED
Status: DISCONTINUED | OUTPATIENT
Start: 2019-06-28 | End: 2019-06-28 | Stop reason: SURG

## 2019-06-28 RX ORDER — MONTELUKAST SODIUM 10 MG/1
10 TABLET ORAL NIGHTLY
Status: DISCONTINUED | OUTPATIENT
Start: 2019-06-28 | End: 2019-06-30

## 2019-06-28 RX ORDER — METOPROLOL TARTRATE 5 MG/5ML
2.5 INJECTION INTRAVENOUS
Status: DISCONTINUED | OUTPATIENT
Start: 2019-06-28 | End: 2019-06-30

## 2019-06-28 RX ADMIN — PHENYLEPHRINE HCL 100 MCG: 10 MG/ML VIAL (ML) INJECTION at 09:17:00

## 2019-06-28 RX ADMIN — NEOSTIGMINE METHYLSULFATE 3 MG: 0.5 INJECTION INTRAVENOUS at 10:26:00

## 2019-06-28 RX ADMIN — ROCURONIUM BROMIDE 30 MG: 10 INJECTION, SOLUTION INTRAVENOUS at 07:40:00

## 2019-06-28 RX ADMIN — SODIUM CHLORIDE, SODIUM LACTATE, POTASSIUM CHLORIDE, CALCIUM CHLORIDE: 600; 310; 30; 20 INJECTION, SOLUTION INTRAVENOUS at 09:01:00

## 2019-06-28 RX ADMIN — GLYCOPYRROLATE 0.4 MG: 0.2 INJECTION INTRAMUSCULAR; INTRAVENOUS at 10:26:00

## 2019-06-28 RX ADMIN — PHENYLEPHRINE HCL 100 MCG: 10 MG/ML VIAL (ML) INJECTION at 10:33:00

## 2019-06-28 RX ADMIN — PHENYLEPHRINE HCL 100 MCG: 10 MG/ML VIAL (ML) INJECTION at 09:05:00

## 2019-06-28 RX ADMIN — ROCURONIUM BROMIDE 10 MG: 10 INJECTION, SOLUTION INTRAVENOUS at 08:33:00

## 2019-06-28 RX ADMIN — PROTAMINE SULFATE 50 MG: 10 INJECTION, SOLUTION INTRAVENOUS at 09:32:00

## 2019-06-28 RX ADMIN — ONDANSETRON 4 MG: 2 INJECTION INTRAMUSCULAR; INTRAVENOUS at 09:24:00

## 2019-06-28 RX ADMIN — HEPARIN SODIUM 9000 UNITS: 1000 INJECTION, SOLUTION INTRAVENOUS; SUBCUTANEOUS at 08:35:00

## 2019-06-28 RX ADMIN — PHENYLEPHRINE HCL 100 MCG: 10 MG/ML VIAL (ML) INJECTION at 07:46:00

## 2019-06-28 RX ADMIN — LIDOCAINE HYDROCHLORIDE 50 MG: 10 INJECTION, SOLUTION EPIDURAL; INFILTRATION; INTRACAUDAL; PERINEURAL at 07:40:00

## 2019-06-28 RX ADMIN — CEFAZOLIN SODIUM/WATER 2 G: 2 G/20 ML SYRINGE (ML) INTRAVENOUS at 08:05:00

## 2019-06-28 RX ADMIN — DEXAMETHASONE SODIUM PHOSPHATE 4 MG: 4 MG/ML VIAL (ML) INJECTION at 08:11:00

## 2019-06-28 RX ADMIN — ROCURONIUM BROMIDE 10 MG: 10 INJECTION, SOLUTION INTRAVENOUS at 07:55:00

## 2019-06-28 NOTE — PROGRESS NOTES
At 1630 shortly after returning to bed from the bathroom, pt complained of sudden increase in severity of headache (2/10 --> 6/10), nausea, and blurred vision while pushing cefazolin slow IV push. Denied SOB, hives, itching, chest pain.  No changes in BP, H

## 2019-06-28 NOTE — INTERVAL H&P NOTE
I reviewed the indications, technique, and risks of right carotid with the patient and her son this morning. They understand and agree to proceed. No new physical findings.

## 2019-06-28 NOTE — PROGRESS NOTES
Received pt to room 234 from PACU around 1200 complaining of dizziness and nausea stating \"they gave me too much anesthesia. \" Neurologically intact, PERRL, VSS, arterial line in place with good waveform, zeroed and calibrated.  Son notified of patient arr

## 2019-06-28 NOTE — OPERATIVE REPORT
CV SURGERY OPERATIVE NOTE    DATE 6/28/2019    Pre op Diagnosis: Right carotid stenosis  Post op Diagnosis: SAME    Procedure: Right carotid Endarterectomy with Pericardial Patch Reconstruction  Cerebral Oximetry Monitoring  Completion Doppler Exam    Surg cut off transversely on the proximal end. An eversion endarterectomy was performed on the external branch and a smooth feathering end was removed from the internal under direct vision.  Visualizing with microscopic loops all particulate debris was removed u

## 2019-06-28 NOTE — ANESTHESIA PROCEDURE NOTES
Airway  Date/Time: 6/28/2019 7:50 AM  Urgency: elective    Airway not difficult    General Information and Staff    Patient location during procedure: OR  Anesthesiologist: Richy Browning MD  Resident/CRNA: Wei Amor, CITLALI  Performed: CRNA

## 2019-06-28 NOTE — ANESTHESIA POSTPROCEDURE EVALUATION
Patient: Neva Allen    Procedure Summary     Date:  06/28/19 Room / Location:  [address] [place] [address] MAIN OR 18 / [address] [place] [address] MAIN OR    Anesthesia Start:  0296 Anesthesia Stop:  1108    Procedure:  CAROTID ENDARTERECTOMY (Right ) Diagnosis:       Stenosis of right carotid

## 2019-06-28 NOTE — ANESTHESIA PREPROCEDURE EVALUATION
Anesthesia PreOp Note    HPI:     Olga Gage is a 69year old female who presents for preoperative consultation requested by: Blas De Luna MD    Date of Surgery: 6/28/2019    Procedure(s):  CAROTID ENDARTERECTOMY  Indication: [facility]    lt mastectomy   • Breast CA ([facility] [address] 2014    lumpectomy, right   • Carotid artery disease ([facility] 12/5/2016   • Carotid stenosis    • Closed fracture of multiple ribs of left side with routine healing, subsequent encounter 10/19/2018   • Disp: 180 tablet Rfl: 1 Past Week at Unknown time   HYDRALAZINE HCL 25 MG Oral Tab TAKE 1 TABLET BY MOUTH TWICE A DAY Disp: 180 tablet Rfl: 1 6/28/2019 at 0500   METOPROLOL SUCCINATE ER 25 MG Oral Tablet 24 Hr TAKE 1 TABLET (25 MG TOTAL) BY MOUTH DAILY BET Tartrate (LOPRESSOR) tab 25 mg 25 mg Oral Once PRN Aleks Orozco MD    famoTIDine (PEPCID) tab 20 mg 20 mg Oral Once Aleks Orozco MD    Metoclopramide HCl (REGLAN) tab 10 mg 10 mg Oral Once Aleks Orozco MD    ceFAZolin sodium (ANCEF/KEF file    Lifestyle      Physical activity:        Days per week: Not on file        Minutes per session: Not on file      Stress: Not on file    Relationships      Social connections:        Talks on phone: Not on file        Gets together: Not on file Anesthesia Evaluation     Patient summary reviewed    Airway   Mallampati: II  TM distance: >3 FB  Neck ROM: full  Dental    (+) upper dentures    Pulmonary - normal exam   (+) COPD (on o2 at home ), asthma,   Cardiovascular - normal exam  (+) hyper

## 2019-06-28 NOTE — ANESTHESIA PROCEDURE NOTES
Arterial Line  Performed by: German Cook, CRNA  Authorized by: Mera Stuart MD     Procedure Start:  6/28/2019 7:44 AM  Procedure End:  6/28/2019 7:49 AM  Site Identification: surface landmarks    Patient Location:  OR  Indication: continuous

## 2019-06-29 DIAGNOSIS — E78.00 ELEVATED CHOLESTEROL: ICD-10-CM

## 2019-06-29 LAB
ANION GAP SERPL CALC-SCNC: 4 MMOL/L (ref 0–18)
BUN BLD-MCNC: 25 MG/DL (ref 7–18)
BUN/CREAT SERPL: 21.7 (ref 10–20)
CALCIUM BLD-MCNC: 9.2 MG/DL (ref 8.5–10.1)
CHLORIDE SERPL-SCNC: 107 MMOL/L (ref 98–112)
CO2 SERPL-SCNC: 31 MMOL/L (ref 21–32)
CREAT BLD-MCNC: 1.15 MG/DL (ref 0.55–1.02)
GLUCOSE BLD-MCNC: 137 MG/DL (ref 70–99)
OSMOLALITY SERPL CALC.SUM OF ELEC: 301 MOSM/KG (ref 275–295)
POTASSIUM SERPL-SCNC: 4.6 MMOL/L (ref 3.5–5.1)
SODIUM SERPL-SCNC: 142 MMOL/L (ref 136–145)

## 2019-06-29 PROCEDURE — 99232 SBSQ HOSP IP/OBS MODERATE 35: CPT | Performed by: HOSPITALIST

## 2019-06-29 RX ORDER — ASPIRIN 81 MG/1
81 TABLET ORAL DAILY
Status: DISCONTINUED | OUTPATIENT
Start: 2019-06-29 | End: 2019-06-30

## 2019-06-29 NOTE — SLP NOTE
ADULT SWALLOWING EVALUATION    ASSESSMENT    ASSESSMENT/OVERALL IMPRESSION:  Orders rec'd, chart reviewed. Pt reportedly with odynophagia on R side of pharyngeal region following R carotid procedure.  Pt verbalized consent for BSSE and participated with eas History  Past Medical History:   Diagnosis Date   • Age-related cataract of left eye 5/10/2018   • Asthma    • Breast CA [facility]) 1999    lt mastectomy   • [facility] 2014    lumpectomy, right   • Carotid artery disease ([facility] 12/5/2016   • Carotid stenos Phase of Swallow: Impaired  Laryngeal Elevation Timing: Appears intact  Laryngeal Elevation Strength: Appears impaired  Laryngeal Elevation Coordination: Appears intact  (Please note: Silent aspiration cannot be evaluated clinically.  Videofluoroscopic Swal

## 2019-06-29 NOTE — PROGRESS NOTES
[facility]    Progress Note    Higinio Deleon Patient Status:  Inpatient    1945 MRN [id number]   [facility] 2W/SW Attending Roosvelt Habermann, MD   Hosp Day # 1 PCP Reema Krause MD       Subjective:   T bilaterally  Chest wall: no tenderness  Cardiovascular: S1, S2 regular rate and rythm  Abdominal: soft, non-tender;+ bowel sounds; 0 BM  Extremities: extremities normal, atraumatic, no  edema        Assessment and Plan:    S/P Right carotid Endarterectomy

## 2019-06-29 NOTE — PROGRESS NOTES
[facility]    Progress Note    Mike Cook Patient Status:  Inpatient    1945 MRN [id number]   Location [facility] 2W/SW Attending Tammy Cowan MD   Hosp Day # 1 PCP Thuy Cross MD       Subjective:     Néstor Troncoso

## 2019-06-29 NOTE — PLAN OF CARE
Pt AOx4 neurologically intact, though still complains of slight numbness and swelling in her extremities. MINGO drain removed today. Pain controlled with tylenol and ice packs.  Up ambulating in hallway and in chair, developed nausea and dizziness during walk for pressure ulcer development  - Assess and document skin integrity  - Assess and document dressing/incision, wound bed, drain sites and surrounding tissue  - Implement wound care per orders  - Initiate isolation precautions as appropriate  - Initiate Pre zofran PRN, tylenol PRN, ice packs  - increase activity  - See additional Care Plan goals for specific interventions   Outcome: Progressing

## 2019-06-29 NOTE — TELEPHONE ENCOUNTER
Med was rx'd by hospital staff, pls advise, thanks in advance.        Cholesterol Medications  Protocol Criteria:  · Appointment scheduled in the past 12 months or in the next 3 months  · ALT & LDL on file in the past 12 months  · ALT result < 80  · LDL res PHASE 2 [id number] [address] Cardiopulmonary Rehab [facility] CABG x3 2/19/19    In 3 weeks [facility] CARD PHASE 2 [address] [address] Cardiopulmonary Rehab [place] CABG x3 2/19/19    In 3 weeks [facility] CARD PHASE 2 [address]

## 2019-06-29 NOTE — PLAN OF CARE
VSS. NSR. Afebrile. Ice pack to incision. Tylenol given for pain. Neuro checks q4hr, neuro intact. Drain output q2hr. Dressing CDI. Up to bathroom x1 assist and walker. Pt calls appropriately. Tolerated clear liquids. Will continue to monitor. Progressing     Problem: NEUROLOGICAL - ADULT  Goal: Achieves stable or improved neurological status  Description  INTERVENTIONS  - Assess for and report changes in neurological status  - Initiate measures to prevent increased intracranial pressure  - Main

## 2019-06-29 NOTE — PROGRESS NOTES
[facility]    Progress Note    Mi Carr Patient Status:  Inpatient    1945 MRN [id number]   [facility] 2W/SW Attending Tony Loja MD   Hosp Day # 0 PCP Courtney Willson MD       Subjective:   T ceFAZolin  2 g Intravenous Q8H   • [START ON 6/29/2019] Clopidogrel Bisulfate  75 mg Oral Daily   • amLODIPine Besylate  2.5 mg Oral Nightly   • cholecalciferol  2,000 Units Oral Daily   • ezetimibe  10 mg Oral Nightly   • hydrALAzine HCl  25 mg Oral BID 03/04/2019    TRANSFERRIN 159 (L) 03/04/2019    TIBCP 237 (L) 03/04/2019    SAT 11 (L) 03/04/2019       A1c:  Lab Results   Component Value Date    A1C 5.7 (H) 05/22/2019    A1C 5.5 03/27/2019    A1C 6.5 (H) 03/07/2019       B12 / TSH  No results found for

## 2019-06-30 VITALS
SYSTOLIC BLOOD PRESSURE: 155 MMHG | HEART RATE: 82 BPM | OXYGEN SATURATION: 98 % | TEMPERATURE: 98 F | HEIGHT: 64 IN | RESPIRATION RATE: 25 BRPM | DIASTOLIC BLOOD PRESSURE: 62 MMHG | BODY MASS INDEX: 27.83 KG/M2 | WEIGHT: 163 LBS

## 2019-06-30 PROCEDURE — 99239 HOSP IP/OBS DSCHRG MGMT >30: CPT | Performed by: HOSPITALIST

## 2019-06-30 RX ORDER — EZETIMIBE 10 MG/1
TABLET ORAL
Qty: 90 TABLET | Refills: 1 | Status: SHIPPED | OUTPATIENT
Start: 2019-06-30 | End: 2019-08-28

## 2019-06-30 NOTE — CM/SW NOTE
6/30: JOHN PAUL received information patient is discharging home today and will require Ginger Parham- RN services. JOHN PAUL L/M for [facility] patient will d/c today. Orders are in, f2f complete. SW to provide assistance if any other discharge needs arise.      Tyree Hidalgo,

## 2019-06-30 NOTE — PLAN OF CARE
Problem: CARDIOVASCULAR - ADULT  Goal: Maintains optimal cardiac output and hemodynamic stability  Description  INTERVENTIONS:  - Monitor vital signs, rhythm, and trends  - Monitor for bleeding, hypotension and signs of decreased cardiac output  - Evalua non-pharmacological measures as appropriate and evaluate response  - Consider cultural and social influences on pain and pain management  - Manage/alleviate anxiety  - Utilize distraction and/or relaxation techniques  - Monitor for opioid side effects  - N

## 2019-06-30 NOTE — SLP NOTE
SPEECH DAILY NOTE - INPATIENT    ASSESSMENT & PLAN   ASSESSMENT      Pt alert, afebrile and on room air. Overall, Pt c/o \"discomfort\" when swallowing; however, unable to provide specific description.  Pt seen for swallow analysis per BSE recommendations ( Family education to be continued/completed as available. Diet Recommendations - Solids: solid  Diet Recommendations - Liquid:  thin   Compensatory Strategies Recommended: Slow rate; Alternate consistencies;Small bites and sips;Multiple swall liquids/solids, Upright 90 degrees with minimal assistance 100 % of the time across 2 sessions. Pt seen upright in chair self-feeding oral trials. Swallowing precautions/strategies discussed and demonstrated.  Minimal to mild cues required for multiple s

## 2019-06-30 NOTE — DISCHARGE SUMMARY
[facility]    Discharge Summary    Melita Jones Jo Patient Status:  Inpatient    1945 MRN [id number]   Location [facility] 2W/SW Attending Jenny Reed MD   Hosp Day # 2 PCP Martha Lynn MD     Date of Admis artery  Pt admitted after right carotid endarterectomy with pericardial patch reconstruction. Pt tolerated procedure well. Pain controlled. Lovenox for dvt proph.   Discharged to home with [facility] in good condition.      Anxiety / MDD  - did not react well t MCG/INH Aepb  Commonly known as:  BREO ELLIPTA      Inhale 1 puff into the lungs daily.    Quantity:  1 each  Refills:  0     hydrALAzine HCl 25 MG Tabs  Commonly known as:  APRESOLINE      TAKE 1 TABLET BY MOUTH TWICE A DAY   Quantity:  180 tablet  Refills

## 2019-06-30 NOTE — TRANSITION NOTE
Remains alert and oriented x4; neuro assessment unchanged; up to br x3 without difficulty during noc, this am refuses I.S.  or getting up in chair. Rationale for/benefits of  walking, up in chair re-explained, pt states she is \"too weak\".

## 2019-06-30 NOTE — HOME CARE LIAISON
Received referral from [facility] earlier today. Patient already discharged, called patient to discuss [facility]. Patient is agreeable to home health. All questions addressed and answered.

## 2019-06-30 NOTE — PLAN OF CARE
Problem: Patient Centered Care  Goal: Patient preferences are identified and integrated in the patient's plan of care  Description  Interventions:  - What would you like us to know as we care for you?  \"I had surgery on my heart and left carotid and neve status  Description  INTERVENTIONS  - Assess for and report changes in neurological status  - Initiate measures to prevent increased intracranial pressure  - Maintain blood pressure and fluid volume within ordered parameters to optimize cerebral perfusion is scheduled for next week.

## 2019-07-01 ENCOUNTER — TELEPHONE (OUTPATIENT)
Dept: CARDIOLOGY | Age: 74
End: 2019-07-01

## 2019-07-01 ENCOUNTER — PATIENT OUTREACH (OUTPATIENT)
Dept: CASE MANAGEMENT | Age: 74
End: 2019-07-01

## 2019-07-01 ENCOUNTER — APPOINTMENT (OUTPATIENT)
Dept: CARDIAC REHAB | Facility: HOSPITAL | Age: 74
End: 2019-07-01
Attending: INTERNAL MEDICINE
Payer: MEDICARE

## 2019-07-01 ENCOUNTER — TELEPHONE (OUTPATIENT)
Dept: RHEUMATOLOGY | Facility: CLINIC | Age: 74
End: 2019-07-01

## 2019-07-01 NOTE — TELEPHONE ENCOUNTER
Pt req to speak with RN about being in hosp 2-3 mo and having several surgeries, CSS transferred RN [facility]

## 2019-07-02 LAB — BLOOD TYPE BARCODE: 5100

## 2019-07-03 ENCOUNTER — APPOINTMENT (OUTPATIENT)
Dept: CARDIAC REHAB | Facility: HOSPITAL | Age: 74
End: 2019-07-03
Attending: INTERNAL MEDICINE
Payer: MEDICARE

## 2019-07-05 ENCOUNTER — TELEPHONE (OUTPATIENT)
Dept: OTHER | Age: 74
End: 2019-07-05

## 2019-07-05 ENCOUNTER — APPOINTMENT (OUTPATIENT)
Dept: CARDIAC REHAB | Facility: HOSPITAL | Age: 74
End: 2019-07-05
Attending: INTERNAL MEDICINE
Payer: MEDICARE

## 2019-07-05 NOTE — TELEPHONE ENCOUNTER
Pharmacist calling to request an order from patients PCP to place her on a statin.      Please advise

## 2019-07-06 RX ORDER — AMLODIPINE BESYLATE 2.5 MG/1
2.5 TABLET ORAL NIGHTLY
Qty: 90 TABLET | Refills: 1 | Status: SHIPPED | OUTPATIENT
Start: 2019-07-06 | End: 2019-08-28

## 2019-07-08 ENCOUNTER — APPOINTMENT (OUTPATIENT)
Dept: CARDIAC REHAB | Facility: HOSPITAL | Age: 74
End: 2019-07-08
Attending: INTERNAL MEDICINE
Payer: MEDICARE

## 2019-07-08 RX ORDER — COLCHICINE 0.6 MG/1
TABLET, FILM COATED ORAL
Qty: 30 TABLET | Refills: 0 | Status: SHIPPED | OUTPATIENT
Start: 2019-07-08 | End: 2019-07-23

## 2019-07-08 NOTE — TELEPHONE ENCOUNTER
LOV: 12/5/18  Last Refilled:#30, 0rfs 6/12/19  Labs: Future Appointments   Date Time Provider Raj Quintanilla   7/10/2019 12:45 PM Barbara Jenkins CSA [facility] CSA   7/24/2019  3:45 PM Srinivas Vo MD [facility]       Please advise.

## 2019-07-10 ENCOUNTER — APPOINTMENT (OUTPATIENT)
Dept: CARDIAC REHAB | Facility: HOSPITAL | Age: 74
End: 2019-07-10
Attending: INTERNAL MEDICINE
Payer: MEDICARE

## 2019-07-12 ENCOUNTER — APPOINTMENT (OUTPATIENT)
Dept: CARDIAC REHAB | Facility: HOSPITAL | Age: 74
End: 2019-07-12
Attending: INTERNAL MEDICINE
Payer: MEDICARE

## 2019-07-12 PROCEDURE — 93798 PHYS/QHP OP CAR RHAB W/ECG: CPT

## 2019-07-15 ENCOUNTER — APPOINTMENT (OUTPATIENT)
Dept: CARDIAC REHAB | Facility: HOSPITAL | Age: 74
End: 2019-07-15
Attending: INTERNAL MEDICINE
Payer: MEDICARE

## 2019-07-15 PROCEDURE — 93798 PHYS/QHP OP CAR RHAB W/ECG: CPT

## 2019-07-15 NOTE — PROGRESS NOTES
Several attempts made to reach the patient with no return call. It is now past the 14 day timeframe Closing encounter.

## 2019-07-17 ENCOUNTER — APPOINTMENT (OUTPATIENT)
Dept: CARDIAC REHAB | Facility: HOSPITAL | Age: 74
End: 2019-07-17
Attending: INTERNAL MEDICINE
Payer: MEDICARE

## 2019-07-17 PROCEDURE — 93798 PHYS/QHP OP CAR RHAB W/ECG: CPT

## 2019-07-19 ENCOUNTER — APPOINTMENT (OUTPATIENT)
Dept: CARDIAC REHAB | Facility: HOSPITAL | Age: 74
End: 2019-07-19
Attending: INTERNAL MEDICINE
Payer: MEDICARE

## 2019-07-19 RX ORDER — MONTELUKAST SODIUM 10 MG/1
TABLET ORAL
Qty: 90 TABLET | Refills: 5 | Status: SHIPPED | OUTPATIENT
Start: 2019-07-19 | End: 2020-08-25

## 2019-07-22 ENCOUNTER — TELEPHONE (OUTPATIENT)
Dept: INTERNAL MEDICINE CLINIC | Facility: CLINIC | Age: 74
End: 2019-07-22

## 2019-07-22 ENCOUNTER — APPOINTMENT (OUTPATIENT)
Dept: CARDIAC REHAB | Facility: HOSPITAL | Age: 74
End: 2019-07-22
Attending: INTERNAL MEDICINE
Payer: MEDICARE

## 2019-07-22 PROCEDURE — 93798 PHYS/QHP OP CAR RHAB W/ECG: CPT

## 2019-07-23 ENCOUNTER — OFFICE VISIT (OUTPATIENT)
Dept: INTERNAL MEDICINE CLINIC | Facility: CLINIC | Age: 74
End: 2019-07-23
Payer: MEDICARE

## 2019-07-23 VITALS
BODY MASS INDEX: 27.83 KG/M2 | WEIGHT: 165 LBS | TEMPERATURE: 98 F | HEART RATE: 83 BPM | RESPIRATION RATE: 12 BRPM | DIASTOLIC BLOOD PRESSURE: 61 MMHG | HEIGHT: 64.5 IN | SYSTOLIC BLOOD PRESSURE: 106 MMHG

## 2019-07-23 DIAGNOSIS — H43.393 FLOATER, VITREOUS, BILATERAL: ICD-10-CM

## 2019-07-23 DIAGNOSIS — I25.10 CORONARY ARTERY DISEASE INVOLVING NATIVE CORONARY ARTERY OF NATIVE HEART WITHOUT ANGINA PECTORIS: ICD-10-CM

## 2019-07-23 DIAGNOSIS — M1A.9XX1 CHRONIC TOPHACEOUS GOUT: ICD-10-CM

## 2019-07-23 DIAGNOSIS — Z98.890 S/P CAROTID ENDARTERECTOMY: Primary | ICD-10-CM

## 2019-07-23 DIAGNOSIS — I10 ESSENTIAL HYPERTENSION: ICD-10-CM

## 2019-07-23 DIAGNOSIS — Z95.1 S/P CABG (CORONARY ARTERY BYPASS GRAFT): ICD-10-CM

## 2019-07-23 DIAGNOSIS — N18.30 STAGE 3 CHRONIC KIDNEY DISEASE (HCC): ICD-10-CM

## 2019-07-23 DIAGNOSIS — E78.00 HYPERCHOLESTEROLEMIA: ICD-10-CM

## 2019-07-23 DIAGNOSIS — G89.29 CHRONIC NONINTRACTABLE HEADACHE, UNSPECIFIED HEADACHE TYPE: ICD-10-CM

## 2019-07-23 DIAGNOSIS — R51.9 CHRONIC NONINTRACTABLE HEADACHE, UNSPECIFIED HEADACHE TYPE: ICD-10-CM

## 2019-07-23 PROCEDURE — G0009 ADMIN PNEUMOCOCCAL VACCINE: HCPCS | Performed by: INTERNAL MEDICINE

## 2019-07-23 PROCEDURE — 99214 OFFICE O/P EST MOD 30 MIN: CPT | Performed by: INTERNAL MEDICINE

## 2019-07-23 PROCEDURE — 90732 PPSV23 VACC 2 YRS+ SUBQ/IM: CPT | Performed by: INTERNAL MEDICINE

## 2019-07-23 PROCEDURE — 1111F DSCHRG MED/CURRENT MED MERGE: CPT | Performed by: INTERNAL MEDICINE

## 2019-07-23 RX ORDER — ASPIRIN 325 MG
325 TABLET ORAL DAILY
COMMUNITY

## 2019-07-23 RX ORDER — COLCHICINE 0.6 MG/1
TABLET ORAL
Qty: 90 TABLET | Refills: 0 | Status: SHIPPED | OUTPATIENT
Start: 2019-07-23 | End: 2020-01-07

## 2019-07-23 NOTE — TELEPHONE ENCOUNTER
Fax received requesting 90 day supply.        Current Outpatient Medications:  COLCRYS 0.6 MG Oral Tab TAKE 1 TABLET BY MOUTH EVERY DAY AS NEEDED Disp: 30 tablet Rfl: 0

## 2019-07-23 NOTE — TELEPHONE ENCOUNTER
Order # [id number] signed by Louella Klinefelter, faxed back and confirmed sent. Original placed for scanning to chart.

## 2019-07-24 ENCOUNTER — APPOINTMENT (OUTPATIENT)
Dept: CARDIAC REHAB | Facility: HOSPITAL | Age: 74
End: 2019-07-24
Attending: INTERNAL MEDICINE
Payer: MEDICARE

## 2019-07-24 ENCOUNTER — OFFICE VISIT (OUTPATIENT)
Dept: PULMONOLOGY | Facility: CLINIC | Age: 74
End: 2019-07-24
Payer: MEDICARE

## 2019-07-24 VITALS
HEART RATE: 82 BPM | DIASTOLIC BLOOD PRESSURE: 79 MMHG | BODY MASS INDEX: 28.34 KG/M2 | WEIGHT: 166 LBS | RESPIRATION RATE: 18 BRPM | HEIGHT: 64 IN | SYSTOLIC BLOOD PRESSURE: 152 MMHG | OXYGEN SATURATION: 94 %

## 2019-07-24 DIAGNOSIS — J90 PLEURAL EFFUSION: ICD-10-CM

## 2019-07-24 DIAGNOSIS — J44.9 CHRONIC OBSTRUCTIVE PULMONARY DISEASE, UNSPECIFIED COPD TYPE (HCC): Primary | ICD-10-CM

## 2019-07-24 PROBLEM — Z95.1 S/P CABG (CORONARY ARTERY BYPASS GRAFT): Status: ACTIVE | Noted: 2019-03-15

## 2019-07-24 PROBLEM — Z98.890 S/P CAROTID ENDARTERECTOMY: Status: ACTIVE | Noted: 2019-07-24

## 2019-07-24 PROCEDURE — 93798 PHYS/QHP OP CAR RHAB W/ECG: CPT

## 2019-07-24 PROCEDURE — 99214 OFFICE O/P EST MOD 30 MIN: CPT | Performed by: INTERNAL MEDICINE

## 2019-07-24 RX ORDER — FERROUS SULFATE 325(65) MG
325 TABLET ORAL
COMMUNITY
Start: 2019-05-03 | End: 2019-08-28

## 2019-07-24 NOTE — PROGRESS NOTES
HPI:    Patient ID: Jose Eduardo Tavarez is a 69year old female. Patient presents today for posthospital ffup. She had elective right carotid endarectomy done at [address] [place] [address] about 2 weeks ago.  Pt was found to have 80% stenosis of right carotid artery at the chioma Blood Glucose Monitoring Suppl (TRUE METRIX METER) w/Device Does not apply Kit Use as directed Disp:  Rfl: 0   Glucose Blood (TRUE METRIX BLOOD GLUCOSE TEST) In Vitro Strip TEST BLOOD SUGAR 4 TIMES A DAY Disp:  Rfl: 0   CVS LANCETS MICRO THIN 33G Does no [Alprazolam]      RESTLESSNESS  Ace Inhibitors          Coughing   PHYSICAL EXAM:   Physical Exam   Constitutional: She appears well-developed. No distress.    obese   HENT:   Right Ear: External ear normal.   Left Ear: External ear normal.   Nose: Nose nor put on probenecid before but had been discontinued while she was in hospital before given her CKD.     (N18.3) Stage 3 chronic kidney disease ([facility] [address]  Plan: had been stable.  Continued avoidance of nsaids.     (R51) Chronic nonintractable headache, unspecifie

## 2019-07-24 NOTE — PROGRESS NOTES
HPI:    Patient ID: Rajan Red is a 69year old female.     HPI  Doing well overall , still using O2 during sleep   No cough or sputum   better exercise tolerance   No f/c   No cp   No edema   No snoring or apnea  Lost some weight     Review of Sys directed Disp:  Rfl: 0   Glucose Blood (TRUE METRIX BLOOD GLUCOSE TEST) In Vitro Strip TEST BLOOD SUGAR 4 TIMES A DAY Disp:  Rfl: 0   CVS LANCETS MICRO THIN 33G Does not apply Misc TEST BLOOD SUGAR 4 TIMES A DAY Disp:  Rfl: 0   albuterol sulfate (2.5 MG/3M heard.  Pulmonary/Chest: No stridor. She has no wheezes. She has no rales. Diminished   Fair air exchange to both lungs and both basis    Abdominal: Bowel sounds are normal.   Musculoskeletal: She exhibits no edema or tenderness.    Lymphadenopathy:     S

## 2019-07-26 ENCOUNTER — APPOINTMENT (OUTPATIENT)
Dept: CARDIAC REHAB | Facility: HOSPITAL | Age: 74
End: 2019-07-26
Attending: INTERNAL MEDICINE
Payer: MEDICARE

## 2019-07-26 ENCOUNTER — OFFICE VISIT (OUTPATIENT)
Dept: INTERNAL MEDICINE CLINIC | Facility: CLINIC | Age: 74
End: 2019-07-26
Payer: MEDICARE

## 2019-07-26 ENCOUNTER — TELEPHONE (OUTPATIENT)
Dept: INTERNAL MEDICINE CLINIC | Facility: CLINIC | Age: 74
End: 2019-07-26

## 2019-07-26 VITALS
BODY MASS INDEX: 27.66 KG/M2 | WEIGHT: 164 LBS | RESPIRATION RATE: 12 BRPM | TEMPERATURE: 98 F | SYSTOLIC BLOOD PRESSURE: 108 MMHG | HEIGHT: 64.5 IN | DIASTOLIC BLOOD PRESSURE: 66 MMHG | HEART RATE: 86 BPM

## 2019-07-26 DIAGNOSIS — R92.0 MAMMOGRAPHIC MICROCALCIFICATION: ICD-10-CM

## 2019-07-26 DIAGNOSIS — Z85.3 PERSONAL HISTORY OF MALIGNANT NEOPLASM OF BREAST: ICD-10-CM

## 2019-07-26 DIAGNOSIS — R58 ECCHYMOSIS: Primary | ICD-10-CM

## 2019-07-26 DIAGNOSIS — Z92.3 STATUS POST RADIATION THERAPY: ICD-10-CM

## 2019-07-26 DIAGNOSIS — R92.8 ABNORMAL MAMMOGRAM: ICD-10-CM

## 2019-07-26 PROCEDURE — 93798 PHYS/QHP OP CAR RHAB W/ECG: CPT

## 2019-07-26 PROCEDURE — 99213 OFFICE O/P EST LOW 20 MIN: CPT | Performed by: INTERNAL MEDICINE

## 2019-07-26 NOTE — TELEPHONE ENCOUNTER
BOBBY again    Spoke with son Reinaldo Seen (INDIO verified) in attempt to reach patient--he tried to reach her on his phone--he states she using her phone right now--unable to reach her.

## 2019-07-26 NOTE — TELEPHONE ENCOUNTER
Contacted EL--can see patient today at 10:15 a.m.--no other openings for him today. TCB for EL message above.

## 2019-07-26 NOTE — PROGRESS NOTES
HPI:    Patient ID: Jose Eduardo Tavarez is a 69year old female. Patient presents today with complaint of having new bruising of the right breast. She had been having bruising though since she had been on plavix/asa.  She got very concerned about the rig PANTOPRAZOLE SODIUM 20 MG Oral Tab EC TAKE 1 TABLET (20 MG TOTAL) BY MOUTH 2 (TWO) TIMES DAILY BEFORE MEALS.  Disp: 180 tablet Rfl: 1   HYDRALAZINE HCL 25 MG Oral Tab TAKE 1 TABLET BY MOUTH TWICE A DAY Disp: 180 tablet Rfl: 1   METOPROLOL SUCCINATE ER 25 MG Hydrocodone             SWELLING, SHORTNESS OF BREATH  Smoke                   SHORTNESS OF BREATH  Uloric [Febuxostat]     ITCHING, SHORTNESS OF BREATH  Adhesive Tape (Radha*    RASH  Statins                 MYALGIA    Comment:Pt had developed myalgia and (R92.8) Abnormal mammogram  Plan: MRI BREAST (W+WO) W/CAD BILAT (CPT=77049)        See above.     (R92.0) Mammographic microcalcification  Plan: MRI BREAST (W+WO) W/CAD BILAT (CPT=77049)        See above.     (Z92.3) Status post radiation therapy  Plan: MR

## 2019-07-26 NOTE — TELEPHONE ENCOUNTER
Spoke with patient ( verified) and reports she noticed black bruise to right breast along lumpectomy scar this morning while showering. \"I'm so scared because I have had a left mastectomy and a right lumpectomy--and I had open heart surgery.  I was o

## 2019-07-29 ENCOUNTER — APPOINTMENT (OUTPATIENT)
Dept: CARDIAC REHAB | Facility: HOSPITAL | Age: 74
End: 2019-07-29
Attending: INTERNAL MEDICINE
Payer: MEDICARE

## 2019-07-29 PROCEDURE — 93798 PHYS/QHP OP CAR RHAB W/ECG: CPT

## 2019-07-31 ENCOUNTER — APPOINTMENT (OUTPATIENT)
Dept: CARDIAC REHAB | Facility: HOSPITAL | Age: 74
End: 2019-07-31
Attending: INTERNAL MEDICINE
Payer: MEDICARE

## 2019-07-31 PROCEDURE — 93798 PHYS/QHP OP CAR RHAB W/ECG: CPT

## 2019-08-02 ENCOUNTER — CARDPULM VISIT (OUTPATIENT)
Dept: CARDIAC REHAB | Facility: HOSPITAL | Age: 74
End: 2019-08-02
Attending: INTERNAL MEDICINE
Payer: MEDICARE

## 2019-08-02 PROCEDURE — 93798 PHYS/QHP OP CAR RHAB W/ECG: CPT

## 2019-08-05 ENCOUNTER — APPOINTMENT (OUTPATIENT)
Dept: CARDIAC REHAB | Facility: HOSPITAL | Age: 74
End: 2019-08-05
Attending: INTERNAL MEDICINE
Payer: MEDICARE

## 2019-08-05 PROCEDURE — 93798 PHYS/QHP OP CAR RHAB W/ECG: CPT

## 2019-08-06 RX ORDER — FLUTICASONE PROPIONATE AND SALMETEROL 250; 50 UG/1; UG/1
1 POWDER RESPIRATORY (INHALATION) EVERY 12 HOURS SCHEDULED
Qty: 1 EACH | Refills: 3 | Status: SHIPPED | OUTPATIENT
Start: 2019-08-06 | End: 2020-02-25

## 2019-08-06 NOTE — TELEPHONE ENCOUNTER
Spoke with pt. Has been using Advair diskus. Copay is expensive and she will reach her donut hole prior to the end of the year. Requesting generic components be sent to pharmacy. Pended for signature. AOR please advise.

## 2019-08-07 ENCOUNTER — APPOINTMENT (OUTPATIENT)
Dept: CARDIAC REHAB | Facility: HOSPITAL | Age: 74
End: 2019-08-07
Attending: INTERNAL MEDICINE
Payer: MEDICARE

## 2019-08-07 PROCEDURE — 93798 PHYS/QHP OP CAR RHAB W/ECG: CPT

## 2019-08-09 ENCOUNTER — CARDPULM VISIT (OUTPATIENT)
Dept: CARDIAC REHAB | Facility: HOSPITAL | Age: 74
End: 2019-08-09
Attending: INTERNAL MEDICINE
Payer: MEDICARE

## 2019-08-09 PROCEDURE — 93798 PHYS/QHP OP CAR RHAB W/ECG: CPT

## 2019-08-12 ENCOUNTER — APPOINTMENT (OUTPATIENT)
Dept: CARDIAC REHAB | Facility: HOSPITAL | Age: 74
End: 2019-08-12
Attending: INTERNAL MEDICINE
Payer: MEDICARE

## 2019-08-12 PROCEDURE — 93798 PHYS/QHP OP CAR RHAB W/ECG: CPT

## 2019-08-14 ENCOUNTER — CARDPULM VISIT (OUTPATIENT)
Dept: CARDIAC REHAB | Facility: HOSPITAL | Age: 74
End: 2019-08-14
Attending: INTERNAL MEDICINE
Payer: MEDICARE

## 2019-08-14 PROCEDURE — 93798 PHYS/QHP OP CAR RHAB W/ECG: CPT

## 2019-08-16 ENCOUNTER — CARDPULM VISIT (OUTPATIENT)
Dept: CARDIAC REHAB | Facility: HOSPITAL | Age: 74
End: 2019-08-16
Attending: INTERNAL MEDICINE
Payer: MEDICARE

## 2019-08-16 PROCEDURE — 93798 PHYS/QHP OP CAR RHAB W/ECG: CPT

## 2019-08-19 ENCOUNTER — CARDPULM VISIT (OUTPATIENT)
Dept: CARDIAC REHAB | Facility: HOSPITAL | Age: 74
End: 2019-08-19
Attending: INTERNAL MEDICINE
Payer: MEDICARE

## 2019-08-19 PROCEDURE — 93798 PHYS/QHP OP CAR RHAB W/ECG: CPT

## 2019-08-21 ENCOUNTER — APPOINTMENT (OUTPATIENT)
Dept: CARDIAC REHAB | Facility: HOSPITAL | Age: 74
End: 2019-08-21
Attending: INTERNAL MEDICINE
Payer: MEDICARE

## 2019-08-21 PROCEDURE — 93798 PHYS/QHP OP CAR RHAB W/ECG: CPT

## 2019-08-23 ENCOUNTER — CARDPULM VISIT (OUTPATIENT)
Dept: CARDIAC REHAB | Facility: HOSPITAL | Age: 74
End: 2019-08-23
Attending: INTERNAL MEDICINE
Payer: MEDICARE

## 2019-08-23 PROCEDURE — 93798 PHYS/QHP OP CAR RHAB W/ECG: CPT

## 2019-08-26 ENCOUNTER — CARDPULM VISIT (OUTPATIENT)
Dept: CARDIAC REHAB | Facility: HOSPITAL | Age: 74
End: 2019-08-26
Attending: INTERNAL MEDICINE
Payer: MEDICARE

## 2019-08-26 PROCEDURE — 93798 PHYS/QHP OP CAR RHAB W/ECG: CPT

## 2019-08-28 ENCOUNTER — OFFICE VISIT (OUTPATIENT)
Dept: NEUROLOGY | Facility: CLINIC | Age: 74
End: 2019-08-28
Payer: MEDICARE

## 2019-08-28 ENCOUNTER — CARDPULM VISIT (OUTPATIENT)
Dept: CARDIAC REHAB | Facility: HOSPITAL | Age: 74
End: 2019-08-28
Attending: INTERNAL MEDICINE
Payer: MEDICARE

## 2019-08-28 VITALS
RESPIRATION RATE: 20 BRPM | HEIGHT: 64.5 IN | WEIGHT: 164 LBS | BODY MASS INDEX: 27.66 KG/M2 | DIASTOLIC BLOOD PRESSURE: 80 MMHG | HEART RATE: 80 BPM | SYSTOLIC BLOOD PRESSURE: 140 MMHG

## 2019-08-28 DIAGNOSIS — M79.2 NEURALGIA: Primary | ICD-10-CM

## 2019-08-28 PROCEDURE — 93798 PHYS/QHP OP CAR RHAB W/ECG: CPT

## 2019-08-28 PROCEDURE — 99203 OFFICE O/P NEW LOW 30 MIN: CPT | Performed by: OTHER

## 2019-08-28 NOTE — PROGRESS NOTES
Neurology Initial Visit     Referred By: Dr. Rossy Truong    Chief Complaint: Patient presents with: Other: Patient here for complain of intermittent left side of head face and ear March after her open heart surgery.   Does state she has some headaches on cholesterol    • History of pituitary adenoma 5/10/2018   • Hyperlipidemia    • Major depressive disorder, single episode, moderate ([facility] [address]    • Obesity        Past Surgical History:   Procedure Laterality Date   • CABG     • CAROTID ENDARTERECTOMY     • CAR SUCCINATE ER 25 MG Oral Tablet 24 Hr, TAKE 1 TABLET (25 MG TOTAL) BY MOUTH DAILY BETA BLOCKER., Disp: 90 tablet, Rfl: 1  •  Blood Glucose Monitoring Suppl (TRUE METRIX METER) w/Device Does not apply Kit, Use as directed, Disp: , Rfl: 0  •  Glucose Blood (T glutition  Neck- No masses or adenopathy  Cv: pulses were palpable and normal, no cyanosis or edema     Neurological:     Mental Status- Alert and oriented x3.   Normal attention span and concentration  Thought process intact  Memory intact- recent and kimberly 06/29/2019    K 4.6 06/29/2019     06/29/2019    CO2 31.0 06/29/2019      I have reviewed labs. Assessment   1. 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 It seems to be improving.   It can be stated with certainty of medicati

## 2019-08-30 ENCOUNTER — CARDPULM VISIT (OUTPATIENT)
Dept: CARDIAC REHAB | Facility: HOSPITAL | Age: 74
End: 2019-08-30
Attending: INTERNAL MEDICINE
Payer: MEDICARE

## 2019-08-30 PROCEDURE — 93798 PHYS/QHP OP CAR RHAB W/ECG: CPT

## 2019-09-04 ENCOUNTER — CARDPULM VISIT (OUTPATIENT)
Dept: CARDIAC REHAB | Facility: HOSPITAL | Age: 74
End: 2019-09-04
Attending: INTERNAL MEDICINE
Payer: MEDICARE

## 2019-09-04 PROCEDURE — 93798 PHYS/QHP OP CAR RHAB W/ECG: CPT

## 2019-09-09 ENCOUNTER — CARDPULM VISIT (OUTPATIENT)
Dept: CARDIAC REHAB | Facility: HOSPITAL | Age: 74
End: 2019-09-09
Attending: INTERNAL MEDICINE
Payer: MEDICARE

## 2019-09-09 PROCEDURE — 93798 PHYS/QHP OP CAR RHAB W/ECG: CPT

## 2019-09-11 ENCOUNTER — APPOINTMENT (OUTPATIENT)
Dept: CARDIAC REHAB | Facility: HOSPITAL | Age: 74
End: 2019-09-11
Attending: INTERNAL MEDICINE
Payer: MEDICARE

## 2019-09-13 ENCOUNTER — APPOINTMENT (OUTPATIENT)
Dept: CARDIAC REHAB | Facility: HOSPITAL | Age: 74
End: 2019-09-13
Attending: INTERNAL MEDICINE
Payer: MEDICARE

## 2019-09-16 ENCOUNTER — APPOINTMENT (OUTPATIENT)
Dept: CARDIAC REHAB | Facility: HOSPITAL | Age: 74
End: 2019-09-16
Attending: INTERNAL MEDICINE
Payer: MEDICARE

## 2019-09-18 ENCOUNTER — APPOINTMENT (OUTPATIENT)
Dept: CARDIAC REHAB | Facility: HOSPITAL | Age: 74
End: 2019-09-18
Attending: INTERNAL MEDICINE
Payer: MEDICARE

## 2019-09-20 ENCOUNTER — APPOINTMENT (OUTPATIENT)
Dept: CARDIAC REHAB | Facility: HOSPITAL | Age: 74
End: 2019-09-20
Attending: INTERNAL MEDICINE
Payer: MEDICARE

## 2019-10-07 ENCOUNTER — TELEPHONE (OUTPATIENT)
Dept: OTHER | Age: 74
End: 2019-10-07

## 2019-10-07 DIAGNOSIS — R92.8 ABNORMAL MAMMOGRAM: Primary | ICD-10-CM

## 2019-10-07 NOTE — TELEPHONE ENCOUNTER
Patient states had appointment scheduled for MRI of Breasts  And was called test was cancelled. Patient states will not have mammogram done. Will only go for MRI. Left message for mammography to call back.    And for central scheduling to call back why

## 2019-10-08 ENCOUNTER — TELEPHONE (OUTPATIENT)
Dept: INTERNAL MEDICINE CLINIC | Facility: CLINIC | Age: 74
End: 2019-10-08

## 2019-10-08 NOTE — TELEPHONE ENCOUNTER
LMTCB-transfer to triage    Spoke with scheduling and mammogram department.   They will not schedule an MRI of breast without having mammogram.  Patient was reluctant to have mammogram.  Will review with her again MRI of breasts cannot be done without mammo

## 2019-10-08 NOTE — TELEPHONE ENCOUNTER
I think waht charissa can do is  Have pt ffup with breast surgeon again. We can refer her to Dr Octaviano Proctor for her abnormal mammogram before.

## 2019-10-08 NOTE — TELEPHONE ENCOUNTER
Patient states she cannot do a mammogram due to pain from lumpectomy on right breast in the past and states \"the last time I had a mammogram my breasts were all black and blue. Please advise.

## 2019-10-08 NOTE — TELEPHONE ENCOUNTER
Spoke with patient ( verified) and relayed Dr. Marquita Schuler  message below--patient verbalizes understanding and agreement. Patient needs to reset her MyChart.  LM with Dr. Alyson Perez name and contact information on patient's # per her request, as she i

## 2019-10-09 NOTE — TELEPHONE ENCOUNTER
Pt states she did not get message with information for Dr. Sofia Shone. Advised pt information will be left on her VM as requested. Information left on pt's VM as requested.

## 2019-10-26 RX ORDER — HYDRALAZINE HYDROCHLORIDE 25 MG/1
TABLET, FILM COATED ORAL
Qty: 180 TABLET | Refills: 1 | Status: SHIPPED | OUTPATIENT
Start: 2019-10-26 | End: 2020-08-25

## 2019-10-27 NOTE — TELEPHONE ENCOUNTER
Refill passed per [facility], [facility] protocol.     Hypertensive Medications  Protocol Criteria:  · Appointment scheduled in the past 6 months or in the next 3 months  · BMP or CMP in the past 12 months  · Creatinine result < 2  Recent Outpatient Visits

## 2019-10-29 RX ORDER — METOPROLOL SUCCINATE 25 MG/1
25 TABLET, EXTENDED RELEASE ORAL
Qty: 90 TABLET | Refills: 1 | Status: SHIPPED | OUTPATIENT
Start: 2019-10-29 | End: 2020-04-27

## 2019-10-30 NOTE — TELEPHONE ENCOUNTER
Refill passed per [facility], [facility] protocol.   Hypertensive Medications  Protocol Criteria:  · Appointment scheduled in the past 6 months or in the next 3 months  · BMP or CMP in the past 12 months  · Creatinine result < 2  Recent Outpatient Visits

## 2019-10-31 RX ORDER — METOPROLOL SUCCINATE 25 MG/1
TABLET, EXTENDED RELEASE ORAL
Qty: 90 TABLET | Refills: 0 | OUTPATIENT
Start: 2019-10-31

## 2019-11-11 ENCOUNTER — TELEPHONE (OUTPATIENT)
Dept: INTERNAL MEDICINE CLINIC | Facility: CLINIC | Age: 74
End: 2019-11-11

## 2019-11-11 NOTE — TELEPHONE ENCOUNTER
Patient states that the hydralazine medication makes her really weak. Patient states that she is due for another refill, but, would like to speak with the nurse first to see if the doctor still wants her to continue to take it.

## 2019-11-11 NOTE — TELEPHONE ENCOUNTER
Advised patient of Dr. Jeronimo Davidson  note. Patient verbalized understanding and had no further questions.

## 2019-11-11 NOTE — TELEPHONE ENCOUNTER
Patient reports currently taking Metoprolol daily and Hydralazine 25mg twice daily. Reports symptoms of having low energy, feeling more tired in the mornings, sometimes will have headache in the evening also.  Is checking BP daily, denied BP to be high, is

## 2019-11-14 RX ORDER — HYDRALAZINE HYDROCHLORIDE 25 MG/1
12.5 TABLET, FILM COATED ORAL 2 TIMES DAILY
Qty: 90 TABLET | Refills: 1 | Status: SHIPPED | OUTPATIENT
Start: 2019-11-14 | End: 2019-11-19

## 2019-11-14 NOTE — TELEPHONE ENCOUNTER
Called pt regarding this medication. We received a request from [address] [place] [address]  requesting refill but noticed refill was sent to [facility] pharmacy. This was done in [facility] on 10/25/19. Pt is now out of her medication and is upset.  I sent the rx to mail order Ranjit Massey

## 2019-11-19 RX ORDER — HYDRALAZINE HYDROCHLORIDE 25 MG/1
12.5 TABLET, FILM COATED ORAL 2 TIMES DAILY
Qty: 7 TABLET | Refills: 0 | Status: SHIPPED | OUTPATIENT
Start: 2019-11-19 | End: 2020-07-02

## 2019-11-19 NOTE — TELEPHONE ENCOUNTER
Call transferred by CSS: Robina calling from [place] [address] states pt needs short supply sent to [facility] in [facility] of Hydralaze 25 mg.  Due to message below from [employer], pt was conferenced into the call and pt agrees to have 1 week supply sent to

## 2019-11-25 ENCOUNTER — TELEPHONE (OUTPATIENT)
Dept: SURGERY | Facility: CLINIC | Age: 74
End: 2019-11-25

## 2019-11-25 NOTE — TELEPHONE ENCOUNTER
LVM for patient to ask about her outside records, of mammogram and reports. I let her know we need them ahead of time for her appt next week. Asked her to call the office back with an update regarding her records.

## 2019-12-02 ENCOUNTER — TELEPHONE (OUTPATIENT)
Dept: SURGERY | Facility: CLINIC | Age: 74
End: 2019-12-02

## 2019-12-02 NOTE — TELEPHONE ENCOUNTER
FARRUKHM regarding her outside records of mammogram, and disk. I let her know we have not received them, and need them for her appt this Wednesday. Asked her to call back with an update.

## 2019-12-05 ENCOUNTER — TELEPHONE (OUTPATIENT)
Dept: INTERNAL MEDICINE CLINIC | Facility: CLINIC | Age: 74
End: 2019-12-05

## 2019-12-05 DIAGNOSIS — Z95.1 S/P CABG (CORONARY ARTERY BYPASS GRAFT): Primary | ICD-10-CM

## 2019-12-05 NOTE — TELEPHONE ENCOUNTER
Patient calling and she have a appointment December 12 with cardiologist Dr. Miryam Sarmiento she need a referral      Please advise

## 2019-12-06 NOTE — TELEPHONE ENCOUNTER
Dr. Akiko Giles, patient is requesting a referral for Cardiologist Dr. Carlene Castillo. Patient is scheduled for 12/12/2019. Referral has been pended, please advise.    Please reply to pool: EM TRIAGE SUPPORT

## 2019-12-10 DIAGNOSIS — E78.00 ELEVATED CHOLESTEROL: ICD-10-CM

## 2019-12-11 DIAGNOSIS — R06.02 SOB (SHORTNESS OF BREATH): ICD-10-CM

## 2019-12-11 RX ORDER — AMLODIPINE BESYLATE 2.5 MG/1
2.5 TABLET ORAL NIGHTLY
Qty: 90 TABLET | Refills: 0 | Status: SHIPPED | OUTPATIENT
Start: 2019-12-11 | End: 2020-03-02

## 2019-12-11 RX ORDER — EZETIMIBE 10 MG/1
TABLET ORAL
Qty: 90 TABLET | Refills: 1 | Status: SHIPPED | OUTPATIENT
Start: 2019-12-11 | End: 2021-04-22

## 2019-12-11 NOTE — TELEPHONE ENCOUNTER
Refill passed per [facility], [facility] protocol.   Cholesterol Medications  Protocol Criteria:  · Appointment scheduled in the past 12 months or in the next 3 months  · ALT & LDL on file in the past 12 months  · ALT result < 80  · LDL result <130   Recent Outpat

## 2019-12-11 NOTE — TELEPHONE ENCOUNTER
Please review; protocol failed.      Requested Prescriptions     Pending Prescriptions Disp Refills   • AMLODIPINE BESYLATE 2.5 MG Oral Tab [Pharmacy Med Name: AMLODIPINE BESYLATE 2.5 MG Tablet] 90 tablet 0     Sig: TAKE 1 TABLET (2.5 MG TOTAL) BY MOUTH NIG

## 2019-12-12 ENCOUNTER — OFFICE VISIT (OUTPATIENT)
Dept: CARDIOLOGY | Age: 74
End: 2019-12-12

## 2019-12-12 VITALS
HEIGHT: 64 IN | BODY MASS INDEX: 28.68 KG/M2 | HEART RATE: 79 BPM | DIASTOLIC BLOOD PRESSURE: 60 MMHG | OXYGEN SATURATION: 94 % | SYSTOLIC BLOOD PRESSURE: 110 MMHG | WEIGHT: 168 LBS

## 2019-12-12 DIAGNOSIS — J90 PLEURAL EFFUSION: Primary | ICD-10-CM

## 2019-12-12 DIAGNOSIS — E78.00 HYPERCHOLESTEROLEMIA: ICD-10-CM

## 2019-12-12 DIAGNOSIS — I10 ESSENTIAL HYPERTENSION: ICD-10-CM

## 2019-12-12 DIAGNOSIS — I25.10 ATHEROSCLEROSIS OF NATIVE CORONARY ARTERY OF NATIVE HEART WITHOUT ANGINA PECTORIS: ICD-10-CM

## 2019-12-12 PROCEDURE — 99214 OFFICE O/P EST MOD 30 MIN: CPT | Performed by: INTERNAL MEDICINE

## 2019-12-12 PROCEDURE — 3074F SYST BP LT 130 MM HG: CPT | Performed by: INTERNAL MEDICINE

## 2019-12-12 PROCEDURE — 3078F DIAST BP <80 MM HG: CPT | Performed by: INTERNAL MEDICINE

## 2019-12-12 ASSESSMENT — PATIENT HEALTH QUESTIONNAIRE - PHQ9
SUM OF ALL RESPONSES TO PHQ9 QUESTIONS 1 AND 2: 0
1. LITTLE INTEREST OR PLEASURE IN DOING THINGS: NOT AT ALL
SUM OF ALL RESPONSES TO PHQ9 QUESTIONS 1 AND 2: 0
2. FEELING DOWN, DEPRESSED OR HOPELESS: NOT AT ALL

## 2019-12-17 ENCOUNTER — TELEPHONE (OUTPATIENT)
Dept: INTERNAL MEDICINE CLINIC | Facility: CLINIC | Age: 74
End: 2019-12-17

## 2019-12-18 ENCOUNTER — OFFICE VISIT (OUTPATIENT)
Dept: PULMONOLOGY | Facility: CLINIC | Age: 74
End: 2019-12-18
Payer: MEDICARE

## 2019-12-18 ENCOUNTER — TELEPHONE (OUTPATIENT)
Dept: PULMONOLOGY | Facility: CLINIC | Age: 74
End: 2019-12-18

## 2019-12-18 VITALS
SYSTOLIC BLOOD PRESSURE: 152 MMHG | DIASTOLIC BLOOD PRESSURE: 74 MMHG | HEART RATE: 81 BPM | BODY MASS INDEX: 27.49 KG/M2 | OXYGEN SATURATION: 95 % | WEIGHT: 163 LBS | HEIGHT: 64.5 IN

## 2019-12-18 DIAGNOSIS — J44.9 CHRONIC OBSTRUCTIVE PULMONARY DISEASE, UNSPECIFIED COPD TYPE (HCC): Primary | ICD-10-CM

## 2019-12-18 DIAGNOSIS — J45.909 UNCOMPLICATED ASTHMA, UNSPECIFIED ASTHMA SEVERITY, UNSPECIFIED WHETHER PERSISTENT: ICD-10-CM

## 2019-12-18 PROCEDURE — 99214 OFFICE O/P EST MOD 30 MIN: CPT | Performed by: INTERNAL MEDICINE

## 2019-12-18 NOTE — PROGRESS NOTES
HPI:    Patient ID: Suzi Clark is a 69year old female.     HPI  Overall doing very well and much better and not using oxygen with O2 sat above 95 with ambulation    Better exercise tolerance , no sob at rest and minimal with exesion  No cough or s Vitamin (MULTI-VITAMIN DAILY) Oral Tab Take 1 tablet by mouth daily. • Cholecalciferol (VITAMIN D) 2000 units Oral Cap Take 1 capsule by mouth daily.      • HYDRALAZINE HCL 25 MG Oral Tab TAKE 1 TABLET BY MOUTH TWICE A DAY (Patient not taking: Reported Albuterol prn    recommend flu shot , she wants to take it at her primary Md office    Already had pneumonia shot        2 s/p  CABG  Complicated by left pleural effusion required several drainage and then Pleurx catheter  Now resolved / better   pleurax c

## 2019-12-18 NOTE — TELEPHONE ENCOUNTER
DME order for discontinue home oxygen placed and faxed to [address] [place] [address]  Fax : [phone]. Fax confirmation received.

## 2020-01-07 ENCOUNTER — OFFICE VISIT (OUTPATIENT)
Dept: INTERNAL MEDICINE CLINIC | Facility: CLINIC | Age: 75
End: 2020-01-07
Payer: MEDICARE

## 2020-01-07 ENCOUNTER — APPOINTMENT (OUTPATIENT)
Dept: LAB | Age: 75
End: 2020-01-07
Attending: INTERNAL MEDICINE
Payer: MEDICARE

## 2020-01-07 VITALS
SYSTOLIC BLOOD PRESSURE: 114 MMHG | DIASTOLIC BLOOD PRESSURE: 66 MMHG | BODY MASS INDEX: 27.83 KG/M2 | RESPIRATION RATE: 18 BRPM | HEIGHT: 64.5 IN | WEIGHT: 165 LBS | OXYGEN SATURATION: 95 % | HEART RATE: 79 BPM | TEMPERATURE: 99 F

## 2020-01-07 DIAGNOSIS — E78.5 HYPERLIPIDEMIA ASSOCIATED WITH TYPE 2 DIABETES MELLITUS (HCC): ICD-10-CM

## 2020-01-07 DIAGNOSIS — E11.69 HYPERLIPIDEMIA ASSOCIATED WITH TYPE 2 DIABETES MELLITUS (HCC): ICD-10-CM

## 2020-01-07 DIAGNOSIS — Z98.890 S/P CAROTID ENDARTERECTOMY: ICD-10-CM

## 2020-01-07 DIAGNOSIS — M1A.9XX1 CHRONIC TOPHACEOUS GOUT: ICD-10-CM

## 2020-01-07 DIAGNOSIS — N18.30 CONTROLLED TYPE 2 DIABETES MELLITUS WITH STAGE 3 CHRONIC KIDNEY DISEASE, WITHOUT LONG-TERM CURRENT USE OF INSULIN (HCC): ICD-10-CM

## 2020-01-07 DIAGNOSIS — Z95.1 S/P CABG (CORONARY ARTERY BYPASS GRAFT): ICD-10-CM

## 2020-01-07 DIAGNOSIS — I10 ESSENTIAL HYPERTENSION: ICD-10-CM

## 2020-01-07 DIAGNOSIS — E11.22 CONTROLLED TYPE 2 DIABETES MELLITUS WITH STAGE 3 CHRONIC KIDNEY DISEASE, WITHOUT LONG-TERM CURRENT USE OF INSULIN (HCC): ICD-10-CM

## 2020-01-07 DIAGNOSIS — E11.22 CONTROLLED TYPE 2 DIABETES MELLITUS WITH STAGE 3 CHRONIC KIDNEY DISEASE, WITHOUT LONG-TERM CURRENT USE OF INSULIN (HCC): Primary | ICD-10-CM

## 2020-01-07 DIAGNOSIS — Z85.3 PERSONAL HISTORY OF MALIGNANT NEOPLASM OF BREAST: ICD-10-CM

## 2020-01-07 DIAGNOSIS — J44.9 ASTHMA WITH COPD (CHRONIC OBSTRUCTIVE PULMONARY DISEASE) (HCC): Chronic | ICD-10-CM

## 2020-01-07 DIAGNOSIS — N18.30 CONTROLLED TYPE 2 DIABETES MELLITUS WITH STAGE 3 CHRONIC KIDNEY DISEASE, WITHOUT LONG-TERM CURRENT USE OF INSULIN (HCC): Primary | ICD-10-CM

## 2020-01-07 DIAGNOSIS — H91.92 HEARING LOSS OF LEFT EAR, UNSPECIFIED HEARING LOSS TYPE: ICD-10-CM

## 2020-01-07 PROBLEM — Z01.818 PREOP TESTING: Status: RESOLVED | Noted: 2019-06-28 | Resolved: 2020-01-07

## 2020-01-07 LAB
ALBUMIN SERPL-MCNC: 3.8 G/DL
ALBUMIN SERPL-MCNC: 3.8 G/DL (ref 3.4–5)
ALBUMIN/GLOB SERPL: 1 {RATIO}
ALBUMIN/GLOB SERPL: 1 {RATIO} (ref 1–2)
ALP LIVER SERPL-CCNC: 81 U/L (ref 55–142)
ALP SERPL-CCNC: 81 U/L
ALT SERPL-CCNC: 27 U/L
ALT SERPL-CCNC: 27 U/L (ref 13–56)
ANION GAP SERPL CALC-SCNC: 3 MMOL/L (ref 0–18)
ANION GAP SERPL CALC-SCNC: 31 MMOL/L
AST SERPL-CCNC: 18 U/L
AST SERPL-CCNC: 18 U/L (ref 15–37)
BILIRUB SERPL-MCNC: 0.3 MG/DL
BILIRUB SERPL-MCNC: 0.3 MG/DL (ref 0.1–2)
BILIRUB UR QL: NEGATIVE
BUN BLD-MCNC: 21 MG/DL (ref 7–18)
BUN SERPL-MCNC: 3 MG/DL
BUN/CREAT SERPL: 19.3
BUN/CREAT SERPL: 19.3 (ref 10–20)
CALCIUM BLD-MCNC: 10.1 MG/DL (ref 8.5–10.1)
CALCIUM SERPL-MCNC: 10.1 MG/DL
CHLORIDE SERPL-SCNC: 104 MMOL/L
CHLORIDE SERPL-SCNC: 104 MMOL/L (ref 98–112)
CHOLEST SERPL-MCNC: 221 MG/DL
CHOLEST SMN-MCNC: 221 MG/DL (ref ?–200)
CLARITY UR: CLEAR
CO2 SERPL-SCNC: 104 MMOL/L
CO2 SERPL-SCNC: 31 MMOL/L (ref 21–32)
COLOR UR: YELLOW
CREAT BLD-MCNC: 1.09 MG/DL (ref 0.55–1.02)
CREAT SERPL-MCNC: 21 MG/DL
EST. AVERAGE GLUCOSE BLD GHB EST-MCNC: 134 MG/DL
EST. AVERAGE GLUCOSE BLD GHB EST-MCNC: 134 MG/DL (ref 68–126)
GLOBULIN PLAS-MCNC: 3.7 G/DL (ref 2.8–4.4)
GLOBULIN SER-MCNC: 3.7 G/DL
GLUCOSE BLD-MCNC: 110 MG/DL (ref 70–99)
GLUCOSE SERPL-MCNC: 110 MG/DL
GLUCOSE UR-MCNC: NEGATIVE MG/DL
HBA1C MFR BLD HPLC: 6.3 % (ref ?–5.7)
HBA1C MFR BLD: 6.3 %
HDLC SERPL-MCNC: 44 MG/DL
HDLC SERPL-MCNC: 44 MG/DL (ref 40–59)
HGB UR QL STRIP.AUTO: NEGATIVE
KETONES UR-MCNC: NEGATIVE MG/DL
LDLC SERPL CALC-MCNC: 129 MG/DL
LDLC SERPL CALC-MCNC: 129 MG/DL (ref ?–100)
M PROTEIN MFR SERPL ELPH: 7.5 G/DL (ref 6.4–8.2)
NITRITE UR QL STRIP.AUTO: NEGATIVE
NONHDLC SERPL-MCNC: 177 MG/DL
NONHDLC SERPL-MCNC: 177 MG/DL (ref ?–130)
OSMOLALITY SERPL CALC.SUM OF ELEC: 290 MOSM/KG (ref 275–295)
PATIENT FASTING Y/N/NP: YES
PATIENT FASTING Y/N/NP: YES
PH UR: 6 [PH] (ref 5–8)
POTASSIUM SERPL-SCNC: 4.4 MMOL/L
POTASSIUM SERPL-SCNC: 4.4 MMOL/L (ref 3.5–5.1)
PROT SERPL-MCNC: 7.5 G/DL
PROT UR-MCNC: NEGATIVE MG/DL
RBC #/AREA URNS AUTO: <1 /HPF
SODIUM SERPL-SCNC: 138 MMOL/L
SODIUM SERPL-SCNC: 138 MMOL/L (ref 136–145)
SP GR UR STRIP: 1.01 (ref 1–1.03)
TRIGL SERPL-MCNC: 242 MG/DL
TRIGL SERPL-MCNC: 242 MG/DL (ref 30–149)
URATE SERPL-MCNC: 9.7 MG/DL (ref 2.6–6)
UROBILINOGEN UR STRIP-ACNC: <2
VLDLC SERPL CALC-MCNC: 48 MG/DL
VLDLC SERPL CALC-MCNC: 48 MG/DL (ref 0–30)
WBC #/AREA URNS AUTO: 3 /HPF

## 2020-01-07 PROCEDURE — 36415 COLL VENOUS BLD VENIPUNCTURE: CPT

## 2020-01-07 PROCEDURE — 80053 COMPREHEN METABOLIC PANEL: CPT

## 2020-01-07 PROCEDURE — 80061 LIPID PANEL: CPT

## 2020-01-07 PROCEDURE — 81001 URINALYSIS AUTO W/SCOPE: CPT

## 2020-01-07 PROCEDURE — 83036 HEMOGLOBIN GLYCOSYLATED A1C: CPT

## 2020-01-07 PROCEDURE — 90662 IIV NO PRSV INCREASED AG IM: CPT | Performed by: INTERNAL MEDICINE

## 2020-01-07 PROCEDURE — 84550 ASSAY OF BLOOD/URIC ACID: CPT

## 2020-01-07 PROCEDURE — 99214 OFFICE O/P EST MOD 30 MIN: CPT | Performed by: INTERNAL MEDICINE

## 2020-01-07 PROCEDURE — G0008 ADMIN INFLUENZA VIRUS VAC: HCPCS | Performed by: INTERNAL MEDICINE

## 2020-01-07 RX ORDER — COLCHICINE 0.6 MG/1
TABLET ORAL
Qty: 90 TABLET | Refills: 0 | Status: SHIPPED | OUTPATIENT
Start: 2020-01-07 | End: 2020-04-07

## 2020-01-07 NOTE — PROGRESS NOTES
HPI:    Patient ID: Renetta Balderrama is a 69year old female. Diabetes   She presents for her follow-up diabetic visit. She has type 2 diabetes mellitus. Her disease course has been stable. There are no hypoglycemic associated symptoms.  There are no post-menopausal.   Hypertension   This is a chronic problem. The current episode started more than 1 year ago. The problem has been gradually improving since onset. The problem is controlled.  Pertinent negatives include no chest pain, peripheral edema or s • Glucose Blood (TRUE METRIX BLOOD GLUCOSE TEST) In Vitro Strip TEST BLOOD SUGAR 4 TIMES A DAY  0   • CVS LANCETS MICRO THIN 33G Does not apply Misc TEST BLOOD SUGAR 4 TIMES A DAY  0   • Multiple Vitamin (MULTI-VITAMIN DAILY) Oral Tab Take 1 tablet by mo Right eye exhibits no discharge. Left eye exhibits no discharge. Neck: Normal range of motion. Neck supple. No JVD present. Cardiovascular: Normal rate, regular rhythm, normal heart sounds and intact distal pulses. No murmur heard.   Pulmonary/Chest: which she had developed some complications. She states she is now feeeling back to normal self. She will conitnue antiplatelet therapy, beta blocker, and lwo chol diet.     (Z98.890) S/P carotid endarterectomy  Plan: pt doing well.  She is on antiplaltelet

## 2020-01-07 NOTE — TELEPHONE ENCOUNTER
LOV:12/15/18   Last Refilled:#90, 0rfs 7/23/19    Future Appointments   Date Time Provider Raj Quintanilla   1/22/2020 11:30 AM Micky Arce MD [facility]       Please advise.

## 2020-01-16 ENCOUNTER — TELEPHONE (OUTPATIENT)
Dept: SURGERY | Facility: CLINIC | Age: 75
End: 2020-01-16

## 2020-01-17 ENCOUNTER — TELEPHONE (OUTPATIENT)
Dept: SURGERY | Facility: CLINIC | Age: 75
End: 2020-01-17

## 2020-01-17 NOTE — TELEPHONE ENCOUNTER
Patient called in regarding upcoming appointment scheduled with Dr. King Ford on 1/22/20. I advised that all of her records including imaging is needed prior to the appointment.  I advised patient they could be dropped off or she could try and see if Eve

## 2020-01-20 ENCOUNTER — TELEPHONE (OUTPATIENT)
Dept: SURGERY | Facility: CLINIC | Age: 75
End: 2020-01-20

## 2020-01-20 NOTE — TELEPHONE ENCOUNTER
Pt left a msg on RN line stating her records were at [facility] and it is to far for her to drive to get them. States we can get them faxed.   I called and spoke with her to get more information, she has recent imaging done at [facility] an

## 2020-01-21 ENCOUNTER — TELEPHONE (OUTPATIENT)
Dept: SURGERY | Facility: CLINIC | Age: 75
End: 2020-01-21

## 2020-01-21 NOTE — TELEPHONE ENCOUNTER
LVM for patient to clarify about her medical records. The last breast imaging we have access to see is her right breast May 2018. If she has had more recent imaging at another facility, we need to reschedule her appointment.    Also let her know we don'

## 2020-01-22 ENCOUNTER — OFFICE VISIT (OUTPATIENT)
Dept: SURGERY | Facility: CLINIC | Age: 75
End: 2020-01-22
Payer: MEDICARE

## 2020-01-22 ENCOUNTER — TELEPHONE (OUTPATIENT)
Dept: INTERNAL MEDICINE CLINIC | Facility: CLINIC | Age: 75
End: 2020-01-22

## 2020-01-22 VITALS — BODY MASS INDEX: 27.83 KG/M2 | WEIGHT: 165 LBS | HEIGHT: 64.5 IN

## 2020-01-22 DIAGNOSIS — T85.43XA RUPTURED SILICONE BREAST IMPLANT, INITIAL ENCOUNTER: ICD-10-CM

## 2020-01-22 DIAGNOSIS — Z85.3 HISTORY OF RIGHT BREAST CANCER: ICD-10-CM

## 2020-01-22 DIAGNOSIS — R92.1 BREAST CALCIFICATION, RIGHT: Primary | ICD-10-CM

## 2020-01-22 PROCEDURE — 99205 OFFICE O/P NEW HI 60 MIN: CPT | Performed by: SURGERY

## 2020-01-23 ENCOUNTER — HOSPITAL ENCOUNTER (OUTPATIENT)
Dept: MAMMOGRAPHY | Facility: HOSPITAL | Age: 75
Discharge: HOME | End: 2020-01-23
Attending: SURGERY
Payer: MEDICARE

## 2020-01-23 DIAGNOSIS — R92.1 BREAST CALCIFICATION, RIGHT: ICD-10-CM

## 2020-01-23 DIAGNOSIS — Z85.3 HISTORY OF RIGHT BREAST CANCER: ICD-10-CM

## 2020-01-23 PROCEDURE — 77065 DX MAMMO INCL CAD UNI: CPT | Performed by: SURGERY

## 2020-01-23 PROCEDURE — 77061 BREAST TOMOSYNTHESIS UNI: CPT | Performed by: SURGERY

## 2020-01-24 NOTE — TELEPHONE ENCOUNTER
Phone call returned by patient. Patient states she need help from home health related to, \" weakness\". Patient states her, \" three surgeries have worn\" her out. States she is too fatigued to vacuum.  States she has spoken with her insurance who states t

## 2020-01-27 ENCOUNTER — TELEPHONE (OUTPATIENT)
Dept: SURGERY | Facility: CLINIC | Age: 75
End: 2020-01-27

## 2020-01-31 ENCOUNTER — TELEPHONE (OUTPATIENT)
Dept: OTHER | Age: 75
End: 2020-01-31

## 2020-01-31 NOTE — TELEPHONE ENCOUNTER
Patient called complaining of muscle weakness, especially on her neck. At times, she also described experiencing dizziness and her muscles jerking. States she use to take Lipitor but has switched to Zetia.  She believes that Colcrys or her Probenecid, presc

## 2020-02-03 ENCOUNTER — TELEPHONE (OUTPATIENT)
Dept: INTERNAL MEDICINE CLINIC | Facility: CLINIC | Age: 75
End: 2020-02-03

## 2020-02-03 NOTE — TELEPHONE ENCOUNTER
Patient calling and states she is having siding effects from medication hard time breathing, no feeling in feet, weakning of the muscle       Please advise   [phone]

## 2020-02-03 NOTE — TELEPHONE ENCOUNTER
Please see pt's message below. Pt reports she started taking probenecid 250 mg daily about 1 and 1/2 weeks ago. She reports increased muscle weakness, numbness in the bottom of her feet, and shortness of breath (having to use her inhaler more).  She reports

## 2020-02-03 NOTE — TELEPHONE ENCOUNTER
Spoke with patient and informed her Dr Link Huizar has not reviewed her message yet. We will call back with his recommendations. Pt verbalized understanding.

## 2020-02-03 NOTE — TELEPHONE ENCOUNTER
Pt contacted and offered appointments per provider's message below. Pt accepted appointment for noon on 2/5 at [facility].

## 2020-02-04 ENCOUNTER — TELEPHONE (OUTPATIENT)
Dept: INTERNAL MEDICINE CLINIC | Facility: CLINIC | Age: 75
End: 2020-02-04

## 2020-02-04 NOTE — PROGRESS NOTES
Blake Rivera is a 69-year-old woman, patient of Dr. Stephanie Walton, with worsening polyarticular tophaceous gout, with her most recent uric acid being 9.7. Previously, allopurinol had caused a definite rash, shortness of breath, and skin swelling.  On Uloric, she Coughing  Latex                   ITCHING     PHYSICAL EXAM:   Blood pressure 152/77, pulse 83, height 5' 4.5\" (1.638 m), weight 169 lb (76.7 kg). Constitutional: She is oriented to person, place, and time. She appears well-developed.    HENT:   Head: Nor

## 2020-02-04 NOTE — TELEPHONE ENCOUNTER
Spoke with patient ( verified)--reports inability to feel bottoms of feet (also see today's encounter for Dr. Migel Chacon for Dr. Kathia Gutiérrez advice.     Patient asking if she should wait until appt tomorrow with Dr. Taylor Padilla to discuss restarting

## 2020-02-04 NOTE — TELEPHONE ENCOUNTER
Spoke with patient, (  verified ) informed of   Dr. Kitty Magana message below    Appt booked for 2020 at 9:30am

## 2020-02-05 ENCOUNTER — OFFICE VISIT (OUTPATIENT)
Dept: RHEUMATOLOGY | Facility: CLINIC | Age: 75
End: 2020-02-05
Payer: MEDICARE

## 2020-02-05 VITALS
SYSTOLIC BLOOD PRESSURE: 152 MMHG | WEIGHT: 169 LBS | DIASTOLIC BLOOD PRESSURE: 77 MMHG | BODY MASS INDEX: 28.5 KG/M2 | HEART RATE: 83 BPM | HEIGHT: 64.5 IN

## 2020-02-05 DIAGNOSIS — N18.30 STAGE 3 CHRONIC KIDNEY DISEASE (HCC): ICD-10-CM

## 2020-02-05 DIAGNOSIS — M1A.9XX1 CHRONIC TOPHACEOUS GOUT: Primary | ICD-10-CM

## 2020-02-05 PROCEDURE — 99213 OFFICE O/P EST LOW 20 MIN: CPT | Performed by: INTERNAL MEDICINE

## 2020-02-05 RX ORDER — PREDNISONE 1 MG/1
TABLET ORAL
Qty: 42 TABLET | Refills: 1 | Status: SHIPPED | OUTPATIENT
Start: 2020-02-05 | End: 2020-06-12

## 2020-02-05 RX ORDER — FEBUXOSTAT 40 MG/1
TABLET, FILM COATED ORAL
Qty: 30 TABLET | Refills: 2 | Status: SHIPPED | OUTPATIENT
Start: 2020-02-05 | End: 2020-02-12

## 2020-02-11 ENCOUNTER — TELEPHONE (OUTPATIENT)
Dept: RHEUMATOLOGY | Facility: CLINIC | Age: 75
End: 2020-02-11

## 2020-02-11 NOTE — TELEPHONE ENCOUNTER
Pt contacted and advised the prescribed dose of Uloric is the lowest dose the provider can send prescribe. LOV 2/5/2020    ASSESSMENT/PLAN:      1. Worsening polyarticular tophaceous gout, with a uric acid of 9.7.   She definitely cannot take allopurin

## 2020-02-11 NOTE — TELEPHONE ENCOUNTER
[facility] contacted and Uloric (febuxostat) 40 mg needs PA by insurance, phone [phone]#[phone].   PA started on the phone call ZV#[phone]

## 2020-02-12 ENCOUNTER — OFFICE VISIT (OUTPATIENT)
Dept: INTERNAL MEDICINE CLINIC | Facility: CLINIC | Age: 75
End: 2020-02-12
Payer: MEDICARE

## 2020-02-12 DIAGNOSIS — Z86.018 HISTORY OF PITUITARY ADENOMA: ICD-10-CM

## 2020-02-12 DIAGNOSIS — M62.81 MUSCLE WEAKNESS (GENERALIZED): Primary | ICD-10-CM

## 2020-02-12 DIAGNOSIS — M1A.9XX1 CHRONIC TOPHACEOUS GOUT: ICD-10-CM

## 2020-02-12 PROCEDURE — 99214 OFFICE O/P EST MOD 30 MIN: CPT | Performed by: INTERNAL MEDICINE

## 2020-02-12 RX ORDER — FEBUXOSTAT 40 MG/1
TABLET, FILM COATED ORAL
Qty: 30 TABLET | Refills: 2 | Status: SHIPPED | OUTPATIENT
Start: 2020-02-12 | End: 2020-03-18

## 2020-02-12 NOTE — TELEPHONE ENCOUNTER
Spoke with pt and she contact her insurance. She stated febuxostat 40 mg tablets was approved by her insurance. She is going to get the medication cheaper at [facility]. Script resent to [facility] for pt as requested.

## 2020-02-12 NOTE — PROGRESS NOTES
HPI:    Patient ID: Jonatan Fuentes is a 77year old female. Myalgias   This is a new (pt compplains of having muscle weakness and soreness) problem. The current episode started in the past 7 days. The problem occurs constantly.  The problem has been Inhalation Aerosol Powder, Breath Activated Inhale 1 puff into the lungs every 12 (twelve) hours. 1 each 3   • aspirin 325 MG Oral Tab Take 325 mg by mouth daily.      • MONTELUKAST SODIUM 10 MG Oral Tab TAKE 1 TABLET EVERY NIGHT 90 tablet 5   • Blood Gluco normal.   Nose: Nose normal.   Mouth/Throat: Oropharynx is clear and moist. No oropharyngeal exudate. Eyes: Conjunctivae are normal. Right eye exhibits no discharge. Left eye exhibits no discharge. No scleral icterus. Neck: Normal range of motion.  Neck Creatinine) (E)      Aldolase      Sed Rate, Smoothren (Automated) [E]      C-Reactive Protein [E]      Meds This Visit:  Requested Prescriptions      No prescriptions requested or ordered in this encounter       Imaging & Referrals:  None       #[phone]

## 2020-02-12 NOTE — TELEPHONE ENCOUNTER
PA approved for Uloric 40 mg tablet until 12/31/2020. Pharmacy contacted and aware. [facility] stated medication would cost pt $100 for 1 month supply.

## 2020-02-14 ENCOUNTER — TELEPHONE (OUTPATIENT)
Dept: INTERNAL MEDICINE CLINIC | Facility: CLINIC | Age: 75
End: 2020-02-14

## 2020-02-14 NOTE — TELEPHONE ENCOUNTER
Patient calling requesting that Dr. Stephanie Walton call her as soon as possible. Per patient, she is taking colchicine, uloric, and prednisone for gout and it is not helping.    Per patient , the gout is not going into her bilateral feet and she can barely wa

## 2020-02-15 VITALS
BODY MASS INDEX: 27.16 KG/M2 | HEART RATE: 78 BPM | RESPIRATION RATE: 12 BRPM | DIASTOLIC BLOOD PRESSURE: 74 MMHG | HEIGHT: 65 IN | SYSTOLIC BLOOD PRESSURE: 130 MMHG | TEMPERATURE: 98 F | WEIGHT: 163 LBS

## 2020-02-15 NOTE — TELEPHONE ENCOUNTER
Informed patient of Dr. Khris Patel advice as per below, but patient frustrated and complaining about all the medications she's on and that \"nothing is helping, I'm getting worse. \"    Does not understand why her Uric Acid level has gone up and is \"cons

## 2020-02-15 NOTE — TELEPHONE ENCOUNTER
Dr Ronen Dowling is currently managing her gout and has just seen pt so would redirect her to Dr Felton Loja for acute gout flare.

## 2020-02-25 RX ORDER — FLUTICASONE PROPIONATE AND SALMETEROL 250; 50 UG/1; UG/1
POWDER RESPIRATORY (INHALATION)
Qty: 1 EACH | Refills: 1 | Status: SHIPPED | OUTPATIENT
Start: 2020-02-25 | End: 2020-04-23

## 2020-03-02 RX ORDER — AMLODIPINE BESYLATE 2.5 MG/1
TABLET ORAL
Qty: 90 TABLET | Refills: 1 | Status: SHIPPED | OUTPATIENT
Start: 2020-03-02 | End: 2020-08-25

## 2020-03-02 NOTE — TELEPHONE ENCOUNTER
Refill passed per [facility] protocol.     Hypertensive Medications  Protocol Criteria:  · Appointment scheduled in the past 6 months or in the next 3 months  · BMP or CMP in the past 12 months  · Creatinine result < 2  Recent Outpatient Visits

## 2020-03-14 NOTE — PROGRESS NOTES
Estefani Guthrie is a 67-year-old woman, patient of Dr. Danis Lazcano, with worsening polyarticular tophaceous gout, with her most recent uric acid being 9.7. Previously, allopurinol had caused a definite rash, shortness of breath, and skin swelling.  On her first tr 5' 4.5\" (1.638 m), weight 169 lb (76.7 kg). Constitutional: She is oriented to person, place, and time. She appears well-developed. HENT:   Head: Normocephalic.    Eyes: Conjunctivae are normal.   Cardiovascular: Normal rate, regular rhythm and normal h

## 2020-03-18 ENCOUNTER — APPOINTMENT (OUTPATIENT)
Dept: LAB | Age: 75
End: 2020-03-18
Attending: INTERNAL MEDICINE
Payer: MEDICARE

## 2020-03-18 ENCOUNTER — OFFICE VISIT (OUTPATIENT)
Dept: RHEUMATOLOGY | Facility: CLINIC | Age: 75
End: 2020-03-18
Payer: MEDICARE

## 2020-03-18 VITALS
HEART RATE: 80 BPM | DIASTOLIC BLOOD PRESSURE: 86 MMHG | BODY MASS INDEX: 28.5 KG/M2 | WEIGHT: 169 LBS | HEIGHT: 64.5 IN | SYSTOLIC BLOOD PRESSURE: 131 MMHG

## 2020-03-18 DIAGNOSIS — M1A.9XX1 CHRONIC TOPHACEOUS GOUT: ICD-10-CM

## 2020-03-18 DIAGNOSIS — M1A.9XX1 CHRONIC TOPHACEOUS GOUT: Primary | ICD-10-CM

## 2020-03-18 DIAGNOSIS — N18.30 STAGE 3 CHRONIC KIDNEY DISEASE (HCC): ICD-10-CM

## 2020-03-18 LAB
CREAT BLD-MCNC: 1.18 MG/DL (ref 0.55–1.02)
URATE SERPL-MCNC: 7.8 MG/DL (ref 2.6–6)

## 2020-03-18 PROCEDURE — 84550 ASSAY OF BLOOD/URIC ACID: CPT

## 2020-03-18 PROCEDURE — 99213 OFFICE O/P EST LOW 20 MIN: CPT | Performed by: INTERNAL MEDICINE

## 2020-03-18 PROCEDURE — 82565 ASSAY OF CREATININE: CPT

## 2020-03-18 PROCEDURE — 36415 COLL VENOUS BLD VENIPUNCTURE: CPT

## 2020-03-18 RX ORDER — METHYLPREDNISOLONE 4 MG/1
TABLET ORAL
Qty: 1 PACKAGE | Refills: 1 | Status: SHIPPED | OUTPATIENT
Start: 2020-03-18 | End: 2020-07-20 | Stop reason: ALTCHOICE

## 2020-03-18 RX ORDER — FEBUXOSTAT 40 MG/1
TABLET, FILM COATED ORAL
Qty: 30 TABLET | Refills: 11 | Status: SHIPPED | OUTPATIENT
Start: 2020-03-18 | End: 2020-03-21

## 2020-03-21 DIAGNOSIS — M1A.39X1 CHRONIC GOUT DUE TO RENAL IMPAIRMENT OF MULTIPLE SITES WITH TOPHUS: Primary | ICD-10-CM

## 2020-03-21 DIAGNOSIS — N28.9 RENAL INSUFFICIENCY: ICD-10-CM

## 2020-03-21 NOTE — TELEPHONE ENCOUNTER
Please let her know that her uric acid has dropped from 9.7 to 7.8, on Uloric 40mg daily. It needs to be less than 6.0 on Uloric. She therefore needs to increase her Uloric to 80 mg a day. I pended the prescription. Her kidney function is stable.

## 2020-03-22 ENCOUNTER — HOSPITAL ENCOUNTER (OUTPATIENT)
Age: 75
Discharge: HOME | End: 2020-03-22
Attending: EMERGENCY MEDICINE
Payer: MEDICARE

## 2020-03-22 VITALS
DIASTOLIC BLOOD PRESSURE: 68 MMHG | WEIGHT: 164 LBS | RESPIRATION RATE: 20 BRPM | HEART RATE: 86 BPM | HEIGHT: 64 IN | BODY MASS INDEX: 28 KG/M2 | TEMPERATURE: 99 F | SYSTOLIC BLOOD PRESSURE: 147 MMHG | OXYGEN SATURATION: 95 %

## 2020-03-22 DIAGNOSIS — H10.31 ACUTE CONJUNCTIVITIS OF RIGHT EYE, UNSPECIFIED ACUTE CONJUNCTIVITIS TYPE: Primary | ICD-10-CM

## 2020-03-22 PROCEDURE — 99214 OFFICE O/P EST MOD 30 MIN: CPT

## 2020-03-22 PROCEDURE — 99213 OFFICE O/P EST LOW 20 MIN: CPT

## 2020-03-22 RX ORDER — POLYMYXIN B SULFATE AND TRIMETHOPRIM 1; 10000 MG/ML; [USP'U]/ML
1 SOLUTION OPHTHALMIC EVERY 4 HOURS
Qty: 10 ML | Refills: 0 | Status: SHIPPED | OUTPATIENT
Start: 2020-03-22 | End: 2020-03-27

## 2020-03-22 NOTE — ED PROVIDER NOTES
Patient Seen in: [facility] [place]      History   No chief complaint on file. Stated Complaint: Eye Problem    HPI  Presents with redness to her right eye noted yesterday worse today.   Increased tearing today and slight alfredo • COLONOSCOPY      2013   • COLONOSCOPY  2013   • HEART CORONARY ARTERY BYPASS GRAFT N/A 2/26/2019    Performed by Nga Lerner MD at [address] [facility] [address]   • HERNIA SURGERY     • LUMPECTOMY RIGHT  2014   • MASTECTOMY LEFT  1999   • [address] CATH INSERTION conjunctiva is injected (Minimal). No chemosis or exudate. Neck:      Musculoskeletal: Normal range of motion and neck supple. Pulmonary:      Effort: Pulmonary effort is normal.   Lymphadenopathy:      Cervical: No cervical adenopathy.    Skin:     Gene

## 2020-03-23 ENCOUNTER — TELEPHONE (OUTPATIENT)
Dept: RHEUMATOLOGY | Facility: CLINIC | Age: 75
End: 2020-03-23

## 2020-03-23 ENCOUNTER — TELEPHONE (OUTPATIENT)
Dept: INTERNAL MEDICINE CLINIC | Facility: CLINIC | Age: 75
End: 2020-03-23

## 2020-03-23 DIAGNOSIS — H10.9 CONJUNCTIVITIS, UNSPECIFIED CONJUNCTIVITIS TYPE, UNSPECIFIED LATERALITY: Primary | ICD-10-CM

## 2020-03-23 RX ORDER — FEBUXOSTAT 80 MG/1
TABLET, FILM COATED ORAL
Qty: 30 TABLET | Refills: 5 | OUTPATIENT
Start: 2020-03-23

## 2020-03-23 NOTE — TELEPHONE ENCOUNTER
Patient is requesting a referral for Dr. Lizbet Alan (Ophthalmology/ [place]) due to conjunctivitis. Patient states she saw Dr. Lizbet Alan today 03/23/2020 regarding issue.      # [phone]    Patient will call back with fax#

## 2020-03-23 NOTE — TELEPHONE ENCOUNTER
Patient is calling back with the Fax number for Dr. Herber Bauer at [address] [place] [address] #: [phone]

## 2020-03-23 NOTE — TELEPHONE ENCOUNTER
Called and spoke with patient , name verified. Informed per Dr. Estrellita Martinez above recommendations and patient stated she was admitted to ED for having high pressure of her eye (conjunctivits) caused  by the medication she is taking.  Her ophthalmologist to

## 2020-03-23 NOTE — TELEPHONE ENCOUNTER
Dr. Nataliia Parson, patient is requesting a referral for Dr. Kerri Luis. Referral has been pended, please advise.      Please reply to pool:  MANAGED CARE - MAIN

## 2020-03-23 NOTE — TELEPHONE ENCOUNTER
I spoke to [facility]. Glaucoma has never been reported with Uloric, so I do not think it is causing her preglaucoma. She is concerned about possible cardiovascular side effects, so will stay on 40 mg a day.

## 2020-03-26 NOTE — PROGRESS NOTES
Breast Surgery New Patient Consultation    This is the first visit for this 77year old woman, referred by Dr. Ashley Ruiz, who presents for evaluation of right breast concerns.     History of Present Illness:   Ms. Jose Eduardo Tavarez is a 77year old woman • Hyperlipidemia    • Major depressive disorder, single episode, moderate ([facility])    • Obesity        Past Surgical History:   Procedure Laterality Date   • CABG     • CAROTID ENDARTERECTOMY     • CAROTID ENDARTERECTOMY Right 6/28/2019    Performed by Sudarshan Father    • Hypertension Father    • Heart Disorder Father    • Other (Other) Mother         thyroid surgery   • Asthma Sister    • Asthma Brother    • Other (Other) Brother         gout   • Breast Cancer Self 43        rt breast 77       She is not of [place], blood in the urine or vaginal/penile discharge. Skin:    The patient denies rash, itching, skin lesions, dry skin, change in skin color or change in moles.      Hematologic/Lymphatic:  The patient denies easily bruising or bleeding or persistent parenchyma is mildly nodular. There are no dominant masses in the breast. The axillary tail is normal.  Left breast:   Surgically absent status post mastectomy with no palpable chest wall masses, skin changes or axillary masses. Abdomen:   The abdomen is

## 2020-03-27 ENCOUNTER — TELEPHONE (OUTPATIENT)
Dept: INTERNAL MEDICINE CLINIC | Facility: CLINIC | Age: 75
End: 2020-03-27

## 2020-03-27 NOTE — TELEPHONE ENCOUNTER
Patient stated Dr. Sam Teresa needs to okay her oxygen order with [facility] because they keep denying Dr. Max Sands (pulmonary) order. Oxygen needs to be send by Dr. Ji Bruner.    Please advise and call patient. Thank you.

## 2020-03-27 NOTE — TELEPHONE ENCOUNTER
Dr Kp Neri had discontinued her home oxygen since Dec 2019 so I cant order it. She will need to be tested for her pulse ox and show  that her level is <90% at RA before her insurance will cover it. That is the insurance guideline.  Have pt see me on Monday for office visit and we can check her O2 sat in the office

## 2020-03-28 NOTE — TELEPHONE ENCOUNTER
Spoke with pt stts no she does not want to oxygen. She had called because her insurance is charging her $84.00 for her oxygen, even though she was told by insurance that everything was going to be cover and there was no co-pay. Pt stts her insurance told her Dr. Braulio Carr has to approve the oxygen pt had in the past so she wont get charged. Pt stts she is not sure why they told her that since Dr. Braulio Carr did not order it was Dr. Freya Landry. Pt gave me # to [facility]. I informed pt the insurance company is closed today but we will call on Monday to see exactly what they need and redirect to appropriate ordering MD. Pt voiced understood.

## 2020-03-31 ENCOUNTER — TELEPHONE (OUTPATIENT)
Dept: RHEUMATOLOGY | Facility: CLINIC | Age: 75
End: 2020-03-31

## 2020-03-31 NOTE — TELEPHONE ENCOUNTER
Patient called in, I informed her that they are working on her claim and that they were unable to find a HME claim and asked for it to be resubmitted

## 2020-03-31 NOTE — TELEPHONE ENCOUNTER
Called [employer] advantage [facility] ( Reference # [id number] ) he searched to see if there was a claim from [facility] for Portable oxygen concentrator and did not find any, so zach asked that supplier re submit claim. LMTCB to inform pt of this.

## 2020-04-07 RX ORDER — COLCHICINE 0.6 MG/1
TABLET ORAL
Qty: 90 TABLET | Refills: 0 | Status: SHIPPED | OUTPATIENT
Start: 2020-04-07 | End: 2020-10-26

## 2020-04-07 NOTE — TELEPHONE ENCOUNTER
PA has been approved for a formulary exception but Medicare will not approve a tier exception (co-pay reduction) for a non-formulary drug, even if it's approved as a formulary exception.  Febuxostat is not listed on your 2020 Drug Guide (formulary drug list

## 2020-04-07 NOTE — TELEPHONE ENCOUNTER
LOV: 3/18/20  Last Refilled:#90, 0rfs  1/7/20    Future Appointments   Date Time Provider Raj Quintanilla   7/21/2020 11:00 AM William Marie MD [facility]       Please advise.

## 2020-04-23 RX ORDER — FLUTICASONE PROPIONATE AND SALMETEROL 250; 50 UG/1; UG/1
1 POWDER RESPIRATORY (INHALATION) EVERY 12 HOURS
Qty: 1 EACH | Refills: 3 | Status: SHIPPED | OUTPATIENT
Start: 2020-04-23 | End: 2021-01-05

## 2020-04-23 NOTE — TELEPHONE ENCOUNTER
• WIXELA INHUB 250-50 MCG/DOSE Inhalation Aerosol Powder, Breath Activated INHALE 1 PUFF BY MOUTH EVERY 12 HOURS 1 each 1

## 2020-04-27 RX ORDER — METOPROLOL SUCCINATE 25 MG/1
25 TABLET, EXTENDED RELEASE ORAL
Qty: 90 TABLET | Refills: 1 | OUTPATIENT
Start: 2020-04-27

## 2020-04-27 RX ORDER — METOPROLOL SUCCINATE 25 MG/1
TABLET, EXTENDED RELEASE ORAL
Qty: 90 TABLET | Refills: 1 | Status: SHIPPED | OUTPATIENT
Start: 2020-04-27 | End: 2020-08-25

## 2020-05-15 ENCOUNTER — NURSE TRIAGE (OUTPATIENT)
Dept: INTERNAL MEDICINE CLINIC | Facility: CLINIC | Age: 75
End: 2020-05-15

## 2020-05-15 NOTE — TELEPHONE ENCOUNTER
Action Requested: Summary for Provider     []  Critical Lab, Recommendations Needed  [] Need Additional Advice  []   FYI    []   Need Orders  [] Need Medications Sent to Pharmacy  []  Other     SUMMARY: Rajat Spears pt stated that she had open heart s

## 2020-05-16 ENCOUNTER — APPOINTMENT (OUTPATIENT)
Dept: LAB | Age: 75
End: 2020-05-16
Attending: INTERNAL MEDICINE
Payer: MEDICARE

## 2020-05-16 ENCOUNTER — OFFICE VISIT (OUTPATIENT)
Dept: INTERNAL MEDICINE CLINIC | Facility: CLINIC | Age: 75
End: 2020-05-16
Payer: MEDICARE

## 2020-05-16 VITALS
HEIGHT: 65 IN | SYSTOLIC BLOOD PRESSURE: 130 MMHG | DIASTOLIC BLOOD PRESSURE: 80 MMHG | RESPIRATION RATE: 12 BRPM | TEMPERATURE: 98 F | BODY MASS INDEX: 28.16 KG/M2 | WEIGHT: 169 LBS | HEART RATE: 89 BPM | OXYGEN SATURATION: 95 %

## 2020-05-16 DIAGNOSIS — I50.32 CHRONIC DIASTOLIC CONGESTIVE HEART FAILURE (HCC): ICD-10-CM

## 2020-05-16 DIAGNOSIS — M62.81 MUSCLE WEAKNESS (GENERALIZED): ICD-10-CM

## 2020-05-16 DIAGNOSIS — M1A.9XX1 CHRONIC TOPHACEOUS GOUT: ICD-10-CM

## 2020-05-16 DIAGNOSIS — N28.9 RENAL INSUFFICIENCY: ICD-10-CM

## 2020-05-16 DIAGNOSIS — J44.9 ASTHMA WITH COPD (CHRONIC OBSTRUCTIVE PULMONARY DISEASE) (HCC): Primary | ICD-10-CM

## 2020-05-16 DIAGNOSIS — M1A.39X1 CHRONIC GOUT DUE TO RENAL IMPAIRMENT OF MULTIPLE SITES WITH TOPHUS: ICD-10-CM

## 2020-05-16 DIAGNOSIS — Z86.018 HISTORY OF PITUITARY ADENOMA: ICD-10-CM

## 2020-05-16 PROCEDURE — 85652 RBC SED RATE AUTOMATED: CPT

## 2020-05-16 PROCEDURE — 82085 ASSAY OF ALDOLASE: CPT

## 2020-05-16 PROCEDURE — 82533 TOTAL CORTISOL: CPT

## 2020-05-16 PROCEDURE — 82550 ASSAY OF CK (CPK): CPT

## 2020-05-16 PROCEDURE — 82565 ASSAY OF CREATININE: CPT

## 2020-05-16 PROCEDURE — 86140 C-REACTIVE PROTEIN: CPT

## 2020-05-16 PROCEDURE — 82024 ASSAY OF ACTH: CPT

## 2020-05-16 PROCEDURE — 99214 OFFICE O/P EST MOD 30 MIN: CPT | Performed by: INTERNAL MEDICINE

## 2020-05-16 PROCEDURE — 36415 COLL VENOUS BLD VENIPUNCTURE: CPT

## 2020-05-16 PROCEDURE — 84550 ASSAY OF BLOOD/URIC ACID: CPT

## 2020-05-16 NOTE — PROGRESS NOTES
HPI:    Patient ID: Jose Eduardo Tavarez is a 77year old female. Shortness Of Breath   This is a chronic problem. The current episode started more than 1 year ago. The problem occurs constantly. The problem has been waxing and waning.  Pertinent negative aspirin 325 MG Oral Tab Take 325 mg by mouth daily.      • MONTELUKAST SODIUM 10 MG Oral Tab TAKE 1 TABLET EVERY NIGHT 90 tablet 5   • Blood Glucose Monitoring Suppl (TRUE METRIX METER) w/Device Does not apply Kit Use as directed  0   • Glucose Blood (TRUE myopathy  Xanax [Alprazolam]      RESTLESSNESS  Ace Inhibitors          Coughing   PHYSICAL EXAM:   Physical Exam   Constitutional: She is oriented to person, place, and time. She appears well-developed and well-nourished. She is cooperative.   Non-toxic ap claims her meds like uloric contributing to her SOB. She also had asked assistance with regards of home care such as cleaning her room and laundry at her [facility].  She states she has difficulty of doing these activities since she gets so SOB and fat

## 2020-05-17 ENCOUNTER — TELEPHONE (OUTPATIENT)
Dept: INTERNAL MEDICINE CLINIC | Facility: CLINIC | Age: 75
End: 2020-05-17

## 2020-05-17 DIAGNOSIS — I25.10 CORONARY ARTERY DISEASE INVOLVING NATIVE CORONARY ARTERY OF NATIVE HEART WITHOUT ANGINA PECTORIS: ICD-10-CM

## 2020-05-17 DIAGNOSIS — J44.9 COPD, SEVERE (HCC): Primary | ICD-10-CM

## 2020-05-17 PROBLEM — I50.32 CHRONIC DIASTOLIC CONGESTIVE HEART FAILURE (HCC): Status: ACTIVE | Noted: 2020-05-17

## 2020-05-17 NOTE — TELEPHONE ENCOUNTER
HI managed care,     Pt was asking for order and referral for her health insurance to help pt get help with room cleaning and her laundry since she has difficulty doing so due to her severe CopD.    I told her will check managed care if this is something he

## 2020-05-18 NOTE — TELEPHONE ENCOUNTER
Pls notify pt; managed care said for pt need to contact  Her insurance regarding  Home health care (would recommend residential home health) to see they will be covering her request for help cleaning her room and doing her laundry since she isnt able to do

## 2020-05-18 NOTE — TELEPHONE ENCOUNTER
Hi Dr. Hiram Pastro,    Please write an order for patient to have possible Home Health services. Manage care does not obtain PA for Home health referrals. I believe [facility] is within patient's network.  Patient will need to contact [facility] for additional informati

## 2020-05-19 ENCOUNTER — TELEPHONE (OUTPATIENT)
Dept: RHEUMATOLOGY | Facility: CLINIC | Age: 75
End: 2020-05-19

## 2020-05-19 ENCOUNTER — TELEPHONE (OUTPATIENT)
Dept: INTERNAL MEDICINE CLINIC | Facility: CLINIC | Age: 75
End: 2020-05-19

## 2020-05-19 NOTE — TELEPHONE ENCOUNTER
S/W Eve at [facility] who states patient's insurance does not cover house cleaning or laundry service. Patient states she understands. Patient asking if they cover [facility] services.  Advised patient she will need to call [facility] for more info

## 2020-05-19 NOTE — TELEPHONE ENCOUNTER
Please see message below and advise. Pt did not review My Chart message sent yesterday per provider.

## 2020-05-19 NOTE — TELEPHONE ENCOUNTER
She is convinced Uloric is her problem. She will stop Uloric, and hopefully she will be feeling better.

## 2020-05-19 NOTE — TELEPHONE ENCOUNTER
Patient states medication, febuxostat (ULORIC) 40 MG Oral Tab is causing her side effects such as shortness of breath.  Patient states she is using her inhaler much more often and was advised by her PCP Dr. Cloud Mention to contact Dr. Salas Gloss office stephane

## 2020-05-22 ENCOUNTER — TELEPHONE (OUTPATIENT)
Dept: PULMONOLOGY | Facility: CLINIC | Age: 75
End: 2020-05-22

## 2020-05-22 NOTE — TELEPHONE ENCOUNTER
Patient requesting to speak with nurse for her shortness of breath. Will try nurse line, if not please call at:[phone],thanks.

## 2020-05-22 NOTE — TELEPHONE ENCOUNTER
Spoke with patient, patient she has been short of breath for 1 month, think is due to gout medication. Patient states she stopped gout medication yesterday and shortness of breath is a little better. Pt using Wixela BID and Ventolin (is helping).  Pt able t

## 2020-05-22 NOTE — TELEPHONE ENCOUNTER
Patient states she was on oxygen before and referral was never sent to [employer], states insurance will not pay for oxygen. Pt requesting referral to be sent to [facility]. [facility]- Are you able to assist with this?

## 2020-05-22 NOTE — TELEPHONE ENCOUNTER
Pt requesting to have referral sent to [facility] stating the date Dr. Fernando Breen started pt on Oxygen through [facility].

## 2020-05-26 ENCOUNTER — TELEPHONE (OUTPATIENT)
Dept: INTERNAL MEDICINE CLINIC | Facility: CLINIC | Age: 75
End: 2020-05-26

## 2020-05-26 DIAGNOSIS — L03.031 INFECTION OF NAIL BED OF TOE OF RIGHT FOOT: Primary | ICD-10-CM

## 2020-05-26 NOTE — TELEPHONE ENCOUNTER
pls check with managed care if this podiatrist is network provider since she has [facility].    Also will need to check with managed care if she can get retroreferral

## 2020-05-26 NOTE — TELEPHONE ENCOUNTER
Spoke with Lazarus Luster in the Billing Dept at [facility] regarding message below. Lazarus Luster states he will contact patient regarding billing issue (order #[id number]).

## 2020-05-26 NOTE — TELEPHONE ENCOUNTER
The vendor is responsible for obtaining the prior auth through [facility].

## 2020-05-26 NOTE — TELEPHONE ENCOUNTER
Referral pended     please advise, pt had service done for her toes in her community living environment, but [facility] denied pay because she didn't;t have a referral. Pt asking for a referral and also future referral to see the podiatrist who co

## 2020-05-27 NOTE — TELEPHONE ENCOUNTER
Manage Care is unsure if provider is within patient's network. Referral will need to be processed through [facility] in order to determine if provider is within patient's network.  Please sign off on referral.    Thank you, Mónica Luna Specialist

## 2020-05-29 ENCOUNTER — TELEPHONE (OUTPATIENT)
Dept: INTERNAL MEDICINE CLINIC | Facility: CLINIC | Age: 75
End: 2020-05-29

## 2020-05-29 ENCOUNTER — TELEPHONE (OUTPATIENT)
Dept: CARDIOLOGY | Age: 75
End: 2020-05-29

## 2020-05-29 NOTE — TELEPHONE ENCOUNTER
Paged as on call re: pt feels dizzy and needs a call back. Not feeling well. She is very dizzy. Started this morning. Moving head causes dizziness. Feels \"drunk\" while walking. Improved at rest/sitting. No nasal congestion/rhinitis, no ear pressure.  No

## 2020-06-01 SDOH — HEALTH STABILITY: MENTAL HEALTH: HOW OFTEN DO YOU HAVE A DRINK CONTAINING ALCOHOL?: NEVER

## 2020-06-02 ENCOUNTER — TELEPHONE (OUTPATIENT)
Dept: CARDIOLOGY | Age: 75
End: 2020-06-02

## 2020-06-04 ENCOUNTER — APPOINTMENT (OUTPATIENT)
Dept: CARDIOLOGY | Age: 75
End: 2020-06-04

## 2020-06-08 ENCOUNTER — TELEPHONE (OUTPATIENT)
Dept: INTERNAL MEDICINE CLINIC | Facility: CLINIC | Age: 75
End: 2020-06-08

## 2020-06-08 DIAGNOSIS — E11.69 HYPERLIPIDEMIA ASSOCIATED WITH TYPE 2 DIABETES MELLITUS (HCC): Primary | ICD-10-CM

## 2020-06-08 DIAGNOSIS — E11.22 CONTROLLED TYPE 2 DIABETES MELLITUS WITH STAGE 3 CHRONIC KIDNEY DISEASE, WITHOUT LONG-TERM CURRENT USE OF INSULIN (HCC): ICD-10-CM

## 2020-06-08 DIAGNOSIS — E78.5 HYPERLIPIDEMIA ASSOCIATED WITH TYPE 2 DIABETES MELLITUS (HCC): Primary | ICD-10-CM

## 2020-06-08 DIAGNOSIS — N18.30 CONTROLLED TYPE 2 DIABETES MELLITUS WITH STAGE 3 CHRONIC KIDNEY DISEASE, WITHOUT LONG-TERM CURRENT USE OF INSULIN (HCC): ICD-10-CM

## 2020-06-08 NOTE — TELEPHONE ENCOUNTER
Patient has an upcoming appointment with Cardiology at the end of June. Patient asking for \"mid-year\" blood work to be re-ordered. States she needs blood work done every 6 months per [facility].     Encouraged to call Cardiology, who will be seeing her, to

## 2020-06-08 NOTE — TELEPHONE ENCOUNTER
[address] is not within patient's network. Please re-direct patient to [address] [place] [address] or patient will need to contact [employer] for providers within the network. Thank you, Danielle Lieberman Specialist    Managed Care.

## 2020-06-09 NOTE — TELEPHONE ENCOUNTER
S/W patient and related 's message that Dr. Tu Walton is not covered by her insurance. She can see Dr. Crystal Valdez who is in our system and covered by her insurance. Patient agrees with this. Spelling, address and phone number given to patient.  Patient a

## 2020-06-09 NOTE — TELEPHONE ENCOUNTER
Notify pt that Dr Stew Gutiérrez podiatrist is not within our network. She has to see network podiatrist instead.  We can refer to Dr Jaime Lizarraga

## 2020-06-09 NOTE — TELEPHONE ENCOUNTER
S/W patient and related Dr. Anant Ponce message that Dr. Jordan Quigley is not covered by her insurance;but, Dr. Keke Ding is. Gave patient spelling, address and phone number for Dr. Neteu Phan. Advised the referral in in the system and lab orders are ready.  Reviewed lab

## 2020-06-12 RX ORDER — PREDNISONE 1 MG/1
TABLET ORAL
Qty: 42 TABLET | Refills: 0 | Status: SHIPPED | OUTPATIENT
Start: 2020-06-12 | End: 2020-07-20 | Stop reason: ALTCHOICE

## 2020-06-12 NOTE — TELEPHONE ENCOUNTER
Patient is calling for status of her medication refill request. Per pt she is out of medication. Patient can be reached at [phone].

## 2020-06-12 NOTE — TELEPHONE ENCOUNTER
Requested Prescriptions     Pending Prescriptions Disp Refills   • predniSONE 5 MG Oral Tab [Pharmacy Med Name: PREDNISONE 5 MG TABLET] 42 tablet 0     Sig: TAKE 6 NOW, 6 EVERY MORNING X 1 DAY, 5 TABS EVERY MORNING X 2 DAYS, 4 TABS EVERY MORNING X 2 DAYS,

## 2020-06-22 ENCOUNTER — APPOINTMENT (OUTPATIENT)
Dept: LAB | Age: 75
End: 2020-06-22
Attending: INTERNAL MEDICINE
Payer: MEDICARE

## 2020-06-22 DIAGNOSIS — E11.69 HYPERLIPIDEMIA ASSOCIATED WITH TYPE 2 DIABETES MELLITUS (HCC): ICD-10-CM

## 2020-06-22 DIAGNOSIS — E11.22 CONTROLLED TYPE 2 DIABETES MELLITUS WITH STAGE 3 CHRONIC KIDNEY DISEASE, WITHOUT LONG-TERM CURRENT USE OF INSULIN (HCC): ICD-10-CM

## 2020-06-22 DIAGNOSIS — N18.30 CONTROLLED TYPE 2 DIABETES MELLITUS WITH STAGE 3 CHRONIC KIDNEY DISEASE, WITHOUT LONG-TERM CURRENT USE OF INSULIN (HCC): ICD-10-CM

## 2020-06-22 DIAGNOSIS — E78.5 HYPERLIPIDEMIA ASSOCIATED WITH TYPE 2 DIABETES MELLITUS (HCC): ICD-10-CM

## 2020-06-22 PROCEDURE — 84550 ASSAY OF BLOOD/URIC ACID: CPT | Performed by: INTERNAL MEDICINE

## 2020-06-22 PROCEDURE — 80053 COMPREHEN METABOLIC PANEL: CPT

## 2020-06-22 PROCEDURE — 83036 HEMOGLOBIN GLYCOSYLATED A1C: CPT

## 2020-06-22 PROCEDURE — 36415 COLL VENOUS BLD VENIPUNCTURE: CPT

## 2020-06-22 PROCEDURE — 80061 LIPID PANEL: CPT

## 2020-06-24 ENCOUNTER — TELEPHONE (OUTPATIENT)
Dept: INTERNAL MEDICINE CLINIC | Facility: CLINIC | Age: 75
End: 2020-06-24

## 2020-06-24 DIAGNOSIS — M10.9 GOUT, UNSPECIFIED CAUSE, UNSPECIFIED CHRONICITY, UNSPECIFIED SITE: Primary | ICD-10-CM

## 2020-06-24 NOTE — TELEPHONE ENCOUNTER
Patient calling for test results and she asked about her uric acid. Advised that the uric acid was not ordered. Patient was very upset. Per patient \"that's the only test I really cared about and needed to know! \" I called the lab to see if can be added on

## 2020-07-01 ENCOUNTER — TELEPHONE (OUTPATIENT)
Dept: INTERNAL MEDICINE CLINIC | Facility: CLINIC | Age: 75
End: 2020-07-01

## 2020-07-01 NOTE — TELEPHONE ENCOUNTER
Spoke with patient ( verified) and relayed Dr. Fransisca Love message below and Dr. Immanuel Comer contact information per her request--patient verbalizes understanding and agreement and will call Dr. Antwan Talley for further recommendations.  No further questions/concern

## 2020-07-02 RX ORDER — HYDRALAZINE HYDROCHLORIDE 25 MG/1
TABLET, FILM COATED ORAL
Qty: 90 TABLET | Refills: 0 | Status: SHIPPED | OUTPATIENT
Start: 2020-07-02 | End: 2020-07-20

## 2020-07-09 RX ORDER — PREDNISONE 5 MG/1
TABLET ORAL
COMMUNITY
Start: 2020-06-12

## 2020-07-13 ENCOUNTER — OFFICE VISIT (OUTPATIENT)
Dept: CARDIOLOGY | Age: 75
End: 2020-07-13

## 2020-07-13 VITALS
SYSTOLIC BLOOD PRESSURE: 138 MMHG | BODY MASS INDEX: 29.02 KG/M2 | WEIGHT: 170 LBS | OXYGEN SATURATION: 95 % | HEIGHT: 64 IN | HEART RATE: 77 BPM | DIASTOLIC BLOOD PRESSURE: 68 MMHG

## 2020-07-13 DIAGNOSIS — I10 ESSENTIAL HYPERTENSION: ICD-10-CM

## 2020-07-13 DIAGNOSIS — E78.00 HYPERCHOLESTEROLEMIA: ICD-10-CM

## 2020-07-13 DIAGNOSIS — I25.10 ATHEROSCLEROSIS OF NATIVE CORONARY ARTERY OF NATIVE HEART WITHOUT ANGINA PECTORIS: Primary | ICD-10-CM

## 2020-07-13 DIAGNOSIS — Z95.1 HX OF CABG: ICD-10-CM

## 2020-07-13 PROCEDURE — 3078F DIAST BP <80 MM HG: CPT | Performed by: INTERNAL MEDICINE

## 2020-07-13 PROCEDURE — 99214 OFFICE O/P EST MOD 30 MIN: CPT | Performed by: INTERNAL MEDICINE

## 2020-07-13 PROCEDURE — 3075F SYST BP GE 130 - 139MM HG: CPT | Performed by: INTERNAL MEDICINE

## 2020-07-13 RX ORDER — AMLODIPINE BESYLATE 2.5 MG/1
1 TABLET ORAL DAILY
COMMUNITY
Start: 2020-07-01

## 2020-07-13 SDOH — HEALTH STABILITY: MENTAL HEALTH: HOW OFTEN DO YOU HAVE A DRINK CONTAINING ALCOHOL?: NEVER

## 2020-07-13 ASSESSMENT — PATIENT HEALTH QUESTIONNAIRE - PHQ9
CLINICAL INTERPRETATION OF PHQ2 SCORE: NO FURTHER SCREENING NEEDED
CLINICAL INTERPRETATION OF PHQ9 SCORE: NO FURTHER SCREENING NEEDED
SUM OF ALL RESPONSES TO PHQ9 QUESTIONS 1 AND 2: 0
SUM OF ALL RESPONSES TO PHQ9 QUESTIONS 1 AND 2: 0
1. LITTLE INTEREST OR PLEASURE IN DOING THINGS: NOT AT ALL
2. FEELING DOWN, DEPRESSED OR HOPELESS: NOT AT ALL

## 2020-07-14 ENCOUNTER — HOSPITAL ENCOUNTER (EMERGENCY)
Facility: HOSPITAL | Age: 75
Discharge: HOME | End: 2020-07-14
Attending: EMERGENCY MEDICINE
Payer: MEDICARE

## 2020-07-14 ENCOUNTER — APPOINTMENT (OUTPATIENT)
Dept: CT IMAGING | Facility: HOSPITAL | Age: 75
End: 2020-07-14
Attending: EMERGENCY MEDICINE
Payer: MEDICARE

## 2020-07-14 ENCOUNTER — NURSE TRIAGE (OUTPATIENT)
Dept: INTERNAL MEDICINE CLINIC | Facility: CLINIC | Age: 75
End: 2020-07-14

## 2020-07-14 ENCOUNTER — APPOINTMENT (OUTPATIENT)
Dept: MRI IMAGING | Facility: HOSPITAL | Age: 75
End: 2020-07-14
Attending: EMERGENCY MEDICINE
Payer: MEDICARE

## 2020-07-14 VITALS
DIASTOLIC BLOOD PRESSURE: 65 MMHG | SYSTOLIC BLOOD PRESSURE: 155 MMHG | TEMPERATURE: 98 F | OXYGEN SATURATION: 92 % | HEART RATE: 82 BPM | RESPIRATION RATE: 18 BRPM

## 2020-07-14 DIAGNOSIS — M54.2 CERVICALGIA: Primary | ICD-10-CM

## 2020-07-14 PROCEDURE — 70450 CT HEAD/BRAIN W/O DYE: CPT | Performed by: EMERGENCY MEDICINE

## 2020-07-14 PROCEDURE — 99284 EMERGENCY DEPT VISIT MOD MDM: CPT

## 2020-07-14 PROCEDURE — 72141 MRI NECK SPINE W/O DYE: CPT | Performed by: EMERGENCY MEDICINE

## 2020-07-14 RX ORDER — HYDRALAZINE HYDROCHLORIDE 25 MG/1
25 TABLET, FILM COATED ORAL ONCE
Status: COMPLETED | OUTPATIENT
Start: 2020-07-14 | End: 2020-07-14

## 2020-07-14 RX ORDER — LIDOCAINE 50 MG/G
1 PATCH TOPICAL EVERY 24 HOURS
Qty: 6 PATCH | Refills: 0 | Status: SHIPPED | OUTPATIENT
Start: 2020-07-14 | End: 2020-07-20

## 2020-07-14 RX ORDER — METHYLPREDNISOLONE 4 MG/1
TABLET ORAL
Qty: 1 PACKAGE | Refills: 0 | Status: SHIPPED | OUTPATIENT
Start: 2020-07-14 | End: 2020-07-20 | Stop reason: ALTCHOICE

## 2020-07-14 RX ORDER — HYDRALAZINE HYDROCHLORIDE 25 MG/1
25 TABLET, FILM COATED ORAL EVERY 8 HOURS SCHEDULED
Status: DISCONTINUED | OUTPATIENT
Start: 2020-07-14 | End: 2020-07-14

## 2020-07-14 RX ORDER — DIAZEPAM 5 MG/1
5 TABLET ORAL ONCE
Status: COMPLETED | OUTPATIENT
Start: 2020-07-14 | End: 2020-07-14

## 2020-07-14 RX ORDER — DIAZEPAM 5 MG/1
5 TABLET ORAL NIGHTLY PRN
Qty: 6 TABLET | Refills: 0 | Status: SHIPPED | OUTPATIENT
Start: 2020-07-14 | End: 2020-08-17

## 2020-07-14 RX ORDER — AMLODIPINE BESYLATE 2.5 MG/1
2.5 TABLET ORAL ONCE
Status: COMPLETED | OUTPATIENT
Start: 2020-07-14 | End: 2020-07-14

## 2020-07-14 NOTE — ED INITIAL ASSESSMENT (HPI)
Posterior headache with radiation of pain into neck without n/v/f. No recent fall/injury. Denies visual changes.

## 2020-07-14 NOTE — TELEPHONE ENCOUNTER
Action Requested: Summary for Provider     []  Critical Lab, Recommendations Needed  [] Need Additional Advice  [x]   FYI    []   Need Orders  [] Need Medications Sent to Pharmacy  []  Other     SUMMARY:   Spoke with pt,  verified, pt was seen yesterday

## 2020-07-14 NOTE — ED PROVIDER NOTES
Patient Seen in: [facility]      History   Patient presents with:  Headache  Neck Pain    Stated Complaint: headache    HPI    26-year-old female seeing her cardiologist and with a clean bill of health with medication adjustme Soo Hurst MD at [address] [place] [address] [place]   • CATARACT     • COLONOSCOPY      2013   • COLONOSCOPY  2013   • HEART CORONARY ARTERY BYPASS GRAFT N/A 2/26/2019    Performed by Soo Hurst MD at [address] [place] [address] [place]   • HERNIA SURGERY     • LUMPECTOMY RIGHT  2014 to person, place, and time. Patient ambulatory in the room. No focal deficits. Skin: Skin is warm and dry. Psychiatric: Normal mood and affect. Behavior is normal.   Nursing note and vitals reviewed.     Differential diagnosis includes cervicalgia, ce ischemic change in the periventricular white matter bilaterally. 3. Stable small calcification along the right tentorium which may be a stable meningioma or dystrophic calcification.  4. Stable chronic flattening of the left occiput more likely developmenta External Patch  Place 1 patch onto the skin daily for 6 days. Qty: 6 patch Refills: 0    !! - Potential duplicate medications found. Please discuss with provider.

## 2020-07-15 ENCOUNTER — TELEPHONE (OUTPATIENT)
Dept: INTERNAL MEDICINE CLINIC | Facility: CLINIC | Age: 75
End: 2020-07-15

## 2020-07-15 NOTE — TELEPHONE ENCOUNTER
Spoke with pt,  verified  Pt informed of  MD recommendation, pt stated understanding. Pt still waiting for physiatrist office to call her back regarding appt. Pt also still need to get her meds prescribed by ER Doc from the pharm.       DANYEL      Future

## 2020-07-15 NOTE — ED PROVIDER NOTES
Signout taken with disposition pending MRI results - same nonacute as below, will discharge as previously anticipated with meds as noted and provide physiatry/neurosurgery referrals for followup.  Patient/family comfortable with plan of care in neurological

## 2020-07-15 NOTE — TELEPHONE ENCOUNTER
Yes would take meds prescribed in ER given findings on her MRI and should see neurosurgeon and physiatrist as recommended also in ER.

## 2020-07-15 NOTE — TELEPHONE ENCOUNTER
Pt states she was seen in the ER yesterday for neck pain. Pt states pain is worse today, rates pain 10 today. Pt feels pain is related to side effects from taking OTC Coq10 for cholesterol, pt had taken med for two days, last taken two days ago.  Pt states

## 2020-07-20 ENCOUNTER — OFFICE VISIT (OUTPATIENT)
Dept: NEUROLOGY | Facility: CLINIC | Age: 75
End: 2020-07-20
Payer: MEDICARE

## 2020-07-20 VITALS — WEIGHT: 165 LBS | BODY MASS INDEX: 27.83 KG/M2 | HEIGHT: 64.5 IN

## 2020-07-20 DIAGNOSIS — N18.30 CONTROLLED TYPE 2 DIABETES MELLITUS WITH STAGE 3 CHRONIC KIDNEY DISEASE, WITHOUT LONG-TERM CURRENT USE OF INSULIN (HCC): ICD-10-CM

## 2020-07-20 DIAGNOSIS — E11.22 CONTROLLED TYPE 2 DIABETES MELLITUS WITH STAGE 3 CHRONIC KIDNEY DISEASE, WITHOUT LONG-TERM CURRENT USE OF INSULIN (HCC): ICD-10-CM

## 2020-07-20 DIAGNOSIS — M48.02 CERVICAL STENOSIS OF SPINAL CANAL: Primary | ICD-10-CM

## 2020-07-20 DIAGNOSIS — K21.9 GASTROESOPHAGEAL REFLUX DISEASE, ESOPHAGITIS PRESENCE NOT SPECIFIED: ICD-10-CM

## 2020-07-20 DIAGNOSIS — I25.10 CORONARY ARTERY DISEASE INVOLVING NATIVE CORONARY ARTERY OF NATIVE HEART WITHOUT ANGINA PECTORIS: ICD-10-CM

## 2020-07-20 DIAGNOSIS — M79.18 MYOFASCIAL PAIN: ICD-10-CM

## 2020-07-20 PROCEDURE — 99204 OFFICE O/P NEW MOD 45 MIN: CPT | Performed by: PHYSICAL MEDICINE & REHABILITATION

## 2020-07-20 PROCEDURE — 3008F BODY MASS INDEX DOCD: CPT | Performed by: PHYSICAL MEDICINE & REHABILITATION

## 2020-07-20 RX ORDER — CHLORAL HYDRATE 500 MG
1000 CAPSULE ORAL DAILY
COMMUNITY

## 2020-07-20 NOTE — PROGRESS NOTES
[address]  Progress Note    CHIEF COMPLAINT:  Patient presents with:  Neck Pain: Patient presents to follow up on ED discharge.  States that she  took CoQ 10 for two days and had severe neck pain, is doing h Procedure Laterality Date   • CABG     • CAROTID ENDARTERECTOMY     • CAROTID ENDARTERECTOMY Right 6/28/2019    Performed by Jude Rodriguez MD at [facility] [place]   • CATARACT     • COLONOSCOPY      2013   • COLONOSCOPY  2013   • HEART CORONARY ARTERY BY Blood (TRUE METRIX BLOOD GLUCOSE TEST) In Vitro Strip TEST BLOOD SUGAR 4 TIMES A DAY  0   • albuterol sulfate (2.5 MG/3ML) 0.083% Inhalation Nebu Soln Take 3 mL (2.5 mg total) by nebulization every 4 (four) hours as needed for Wheezing or Shortness of [facility] denies  Weight Gain: denies  Weight Loss: denies   Cardiovascular  Chest Pain: denies  Irregular Heartbeat: denies   Respiratory  Painful Breathing: denies  Wheezing: denies   Gastrointestinal  Bowel Incontinence: denies  Heartburn: denies  Abdominal Pain: Completely normal  Spurling's sign: neg    Data    Radiology Imaging:  I personally reviewed a cervical MRI from July 14, 2020 showing severe central canal stenosis at C4-5 right worse than left.   This is due to what appears to be a calcified disc osteophy Instructions were provided as documented in AVS summary. The patient was in agreement with the assessment and plan. All questions were answered. There were no barriers to learning.         Jenifer Jiménez MD  Physical Medicine and Rehabilitation/Sports [facility]

## 2020-07-21 ENCOUNTER — TELEPHONE (OUTPATIENT)
Dept: PHYSICAL THERAPY | Age: 75
End: 2020-07-21

## 2020-07-21 ENCOUNTER — OFFICE VISIT (OUTPATIENT)
Dept: INTERNAL MEDICINE CLINIC | Facility: CLINIC | Age: 75
End: 2020-07-21
Payer: MEDICARE

## 2020-07-21 VITALS
RESPIRATION RATE: 12 BRPM | WEIGHT: 168 LBS | SYSTOLIC BLOOD PRESSURE: 126 MMHG | HEIGHT: 64.5 IN | TEMPERATURE: 99 F | DIASTOLIC BLOOD PRESSURE: 68 MMHG | BODY MASS INDEX: 28.33 KG/M2 | HEART RATE: 76 BPM

## 2020-07-21 DIAGNOSIS — H91.93 BILATERAL HEARING LOSS, UNSPECIFIED HEARING LOSS TYPE: ICD-10-CM

## 2020-07-21 DIAGNOSIS — E11.69 HYPERLIPIDEMIA ASSOCIATED WITH TYPE 2 DIABETES MELLITUS (HCC): ICD-10-CM

## 2020-07-21 DIAGNOSIS — I15.2 HYPERTENSION ASSOCIATED WITH TYPE 2 DIABETES MELLITUS (HCC): ICD-10-CM

## 2020-07-21 DIAGNOSIS — Z95.1 S/P CABG (CORONARY ARTERY BYPASS GRAFT): ICD-10-CM

## 2020-07-21 DIAGNOSIS — E78.5 HYPERLIPIDEMIA ASSOCIATED WITH TYPE 2 DIABETES MELLITUS (HCC): ICD-10-CM

## 2020-07-21 DIAGNOSIS — E11.22 CONTROLLED TYPE 2 DIABETES MELLITUS WITH STAGE 3 CHRONIC KIDNEY DISEASE, WITHOUT LONG-TERM CURRENT USE OF INSULIN (HCC): ICD-10-CM

## 2020-07-21 DIAGNOSIS — Z12.11 COLON CANCER SCREENING: ICD-10-CM

## 2020-07-21 DIAGNOSIS — J44.9 ASTHMA WITH COPD (CHRONIC OBSTRUCTIVE PULMONARY DISEASE) (HCC): ICD-10-CM

## 2020-07-21 DIAGNOSIS — E11.22 HYPERTENSION ASSOCIATED WITH CHRONIC KIDNEY DISEASE DUE TO TYPE 2 DIABETES MELLITUS (HCC): ICD-10-CM

## 2020-07-21 DIAGNOSIS — I50.32 CHRONIC DIASTOLIC CONGESTIVE HEART FAILURE (HCC): ICD-10-CM

## 2020-07-21 DIAGNOSIS — Z91.81 AT RISK FOR FALLS: ICD-10-CM

## 2020-07-21 DIAGNOSIS — Z00.00 MEDICARE ANNUAL WELLNESS VISIT, SUBSEQUENT: Primary | ICD-10-CM

## 2020-07-21 DIAGNOSIS — I25.10 CORONARY ARTERY DISEASE INVOLVING NATIVE CORONARY ARTERY OF NATIVE HEART WITHOUT ANGINA PECTORIS: ICD-10-CM

## 2020-07-21 DIAGNOSIS — Z98.890 S/P CAROTID ENDARTERECTOMY: ICD-10-CM

## 2020-07-21 DIAGNOSIS — Z78.0 MENOPAUSE: ICD-10-CM

## 2020-07-21 DIAGNOSIS — M48.02 CERVICAL STENOSIS OF SPINAL CANAL: ICD-10-CM

## 2020-07-21 DIAGNOSIS — E11.59 HYPERTENSION ASSOCIATED WITH TYPE 2 DIABETES MELLITUS (HCC): ICD-10-CM

## 2020-07-21 DIAGNOSIS — I12.9 HYPERTENSION ASSOCIATED WITH CHRONIC KIDNEY DISEASE DUE TO TYPE 2 DIABETES MELLITUS (HCC): ICD-10-CM

## 2020-07-21 DIAGNOSIS — N18.30 CONTROLLED TYPE 2 DIABETES MELLITUS WITH STAGE 3 CHRONIC KIDNEY DISEASE, WITHOUT LONG-TERM CURRENT USE OF INSULIN (HCC): ICD-10-CM

## 2020-07-21 DIAGNOSIS — M1A.9XX1 CHRONIC TOPHACEOUS GOUT: ICD-10-CM

## 2020-07-21 PROBLEM — I10 ESSENTIAL HYPERTENSION: Status: RESOLVED | Noted: 2018-05-10 | Resolved: 2020-07-21

## 2020-07-21 PROBLEM — H25.9 AGE-RELATED CATARACT: Status: RESOLVED | Noted: 2018-05-10 | Resolved: 2020-07-21

## 2020-07-21 PROBLEM — H91.90 HEARING DIFFICULTY: Status: RESOLVED | Noted: 2019-05-27 | Resolved: 2020-07-21

## 2020-07-21 PROBLEM — K21.9 GASTROESOPHAGEAL REFLUX DISEASE: Status: RESOLVED | Noted: 2018-05-10 | Resolved: 2020-07-21

## 2020-07-21 PROBLEM — F32.A MILD DEPRESSION: Status: RESOLVED | Noted: 2019-05-27 | Resolved: 2020-07-21

## 2020-07-21 PROBLEM — M79.18 MYOFASCIAL PAIN: Status: RESOLVED | Noted: 2020-07-20 | Resolved: 2020-07-21

## 2020-07-21 PROCEDURE — 99397 PER PM REEVAL EST PAT 65+ YR: CPT | Performed by: INTERNAL MEDICINE

## 2020-07-21 PROCEDURE — 96160 PT-FOCUSED HLTH RISK ASSMT: CPT | Performed by: INTERNAL MEDICINE

## 2020-07-21 PROCEDURE — G0439 PPPS, SUBSEQ VISIT: HCPCS | Performed by: INTERNAL MEDICINE

## 2020-07-21 PROCEDURE — 3078F DIAST BP <80 MM HG: CPT | Performed by: INTERNAL MEDICINE

## 2020-07-21 PROCEDURE — 3008F BODY MASS INDEX DOCD: CPT | Performed by: INTERNAL MEDICINE

## 2020-07-21 PROCEDURE — 3074F SYST BP LT 130 MM HG: CPT | Performed by: INTERNAL MEDICINE

## 2020-07-21 NOTE — PROGRESS NOTES
HPI:   Jose Eduardo Long is a 77year old female who presents for a MA (Medicare Advantage) Supervisit (Once per calendar year).       Her last annual assessment has been over 1 year: Annual Physical due on 05/21/2020         Fall/Risk Assessment   She ha COPD (chronic obstructive pulmonary disease) (HCC)     Coronary artery disease     S/P CABG (coronary artery bypass graft)     At risk for falls     Stage 3 chronic kidney disease (HCC)     Controlled type 2 diabetes mellitus with stage 3 chronic kidney di MG Oral Tab, TAKE 1 TABLET BY MOUTH TWICE A DAY  aspirin 325 MG Oral Tab, Take 325 mg by mouth daily.   MONTELUKAST SODIUM 10 MG Oral Tab, TAKE 1 TABLET EVERY NIGHT  Blood Glucose Monitoring Suppl (TRUE METRIX METER) w/Device Does not apply Kit, Use as dire her self; Heart Disorder in her father; Hypertension in her father; Other in her brother and mother. SOCIAL HISTORY:   She  reports that she has never smoked. She has never used smokeless tobacco. She reports that she does not drink alcohol or use drugs. the speech of women and children:  No I have trouble understanding the speaker in a large room such as at a meeting or place of [employer]:  Sometimes   Many people I talk to seem to mumble (or don't speak clearly):  No People get annoyed because I misunderst Vaccination History     Immunization History   Administered Date(s) Administered   • FLU VACC High Dose 65 YRS & Older PRSV Free (44776) 01/07/2020   • FLUAD High Dose 72 yr and older (17455) 11/20/2018   • Pneumococcal (Prevnar 13) 05/10/2018   • Pn (J44.9) Asthma with COPD (chronic obstructive pulmonary disease) ([facility] [address]  Plan: she states her breathing now much better, she had been exercising at home stationary bike for at least 30 min daily and no problem . Continue with inhalers.      (I50.32)  SERVICES  INDICATIONS AND SCHEDULE Internal Lab or Procedure External Lab or Procedure   Diabetes Screening      HbgA1C   Annually Lab Results   Component Value Date    A1C 6.5 (H) 06/22/2020    No flowsheet data found.     Fasting Blood Sugar (FSB)Annually after your 65th birthday    Hepatitis B for Moderate/High Risk No vaccine history found Medium/high risk factors:   End-stage renal disease   Hemophiliacs who received Factor VIII or IX concentrates   Clients of institutions for the mentally retarded   Per

## 2020-07-23 RX ORDER — CHLORAL HYDRATE 500 MG
1000 CAPSULE ORAL DAILY
COMMUNITY

## 2020-07-23 RX ORDER — DIAZEPAM 5 MG/1
5 TABLET ORAL DAILY PRN
COMMUNITY
Start: 2020-07-14 | End: 2020-07-27

## 2020-07-24 ENCOUNTER — HOSPITAL ENCOUNTER (OUTPATIENT)
Dept: CV DIAGNOSTICS | Facility: HOSPITAL | Age: 75
Discharge: HOME | End: 2020-07-24
Attending: INTERNAL MEDICINE
Payer: MEDICARE

## 2020-07-24 DIAGNOSIS — Z95.1 HX OF CABG: ICD-10-CM

## 2020-07-24 PROCEDURE — 93306 TTE W/DOPPLER COMPLETE: CPT | Performed by: INTERNAL MEDICINE

## 2020-07-27 ENCOUNTER — TELEPHONE (OUTPATIENT)
Dept: CARDIOLOGY | Age: 75
End: 2020-07-27

## 2020-07-27 ENCOUNTER — OFFICE VISIT (OUTPATIENT)
Dept: CARDIOLOGY | Age: 75
End: 2020-07-27

## 2020-07-27 ENCOUNTER — MED REC SCAN ONLY (OUTPATIENT)
Dept: NEUROLOGY | Facility: CLINIC | Age: 75
End: 2020-07-27

## 2020-07-27 VITALS
HEIGHT: 64 IN | DIASTOLIC BLOOD PRESSURE: 80 MMHG | WEIGHT: 169 LBS | HEART RATE: 73 BPM | SYSTOLIC BLOOD PRESSURE: 120 MMHG | BODY MASS INDEX: 28.85 KG/M2 | OXYGEN SATURATION: 95 %

## 2020-07-27 DIAGNOSIS — Z95.1 HX OF CABG: Primary | ICD-10-CM

## 2020-07-27 DIAGNOSIS — I10 ESSENTIAL HYPERTENSION: ICD-10-CM

## 2020-07-27 PROCEDURE — 99213 OFFICE O/P EST LOW 20 MIN: CPT | Performed by: NURSE PRACTITIONER

## 2020-07-27 PROCEDURE — 3074F SYST BP LT 130 MM HG: CPT | Performed by: NURSE PRACTITIONER

## 2020-07-27 PROCEDURE — 3079F DIAST BP 80-89 MM HG: CPT | Performed by: NURSE PRACTITIONER

## 2020-07-27 SDOH — HEALTH STABILITY: MENTAL HEALTH: HOW OFTEN DO YOU HAVE A DRINK CONTAINING ALCOHOL?: NEVER

## 2020-07-27 ASSESSMENT — PATIENT HEALTH QUESTIONNAIRE - PHQ9
2. FEELING DOWN, DEPRESSED OR HOPELESS: NOT AT ALL
CLINICAL INTERPRETATION OF PHQ9 SCORE: NO FURTHER SCREENING NEEDED
CLINICAL INTERPRETATION OF PHQ2 SCORE: NO FURTHER SCREENING NEEDED
SUM OF ALL RESPONSES TO PHQ9 QUESTIONS 1 AND 2: 0
SUM OF ALL RESPONSES TO PHQ9 QUESTIONS 1 AND 2: 0
1. LITTLE INTEREST OR PLEASURE IN DOING THINGS: NOT AT ALL

## 2020-07-30 ENCOUNTER — TELEPHONE (OUTPATIENT)
Dept: OTOLARYNGOLOGY | Facility: CLINIC | Age: 75
End: 2020-07-30

## 2020-07-30 NOTE — TELEPHONE ENCOUNTER
Per pt is really upset that son is getting called for appts. Advised pt there is a lupis stating to call Kye to confirm any appts. Pt is yelling and wants him removed, only wanting her to get called.  Please advise

## 2020-07-30 NOTE — TELEPHONE ENCOUNTER
Rn spoke to patient and discussed concerns and want the note under FYI about son to be called for schedule and confirmation removed.

## 2020-07-31 ENCOUNTER — OFFICE VISIT (OUTPATIENT)
Dept: OTOLARYNGOLOGY | Facility: CLINIC | Age: 75
End: 2020-07-31
Payer: MEDICARE

## 2020-07-31 VITALS
HEART RATE: 80 BPM | WEIGHT: 168 LBS | DIASTOLIC BLOOD PRESSURE: 80 MMHG | HEIGHT: 64.5 IN | SYSTOLIC BLOOD PRESSURE: 130 MMHG | RESPIRATION RATE: 18 BRPM | TEMPERATURE: 98 F | BODY MASS INDEX: 28.33 KG/M2

## 2020-07-31 DIAGNOSIS — H61.23 BILATERAL IMPACTED CERUMEN: Primary | ICD-10-CM

## 2020-07-31 DIAGNOSIS — H92.03 OTALGIA OF BOTH EARS: ICD-10-CM

## 2020-07-31 PROCEDURE — 3079F DIAST BP 80-89 MM HG: CPT | Performed by: OTOLARYNGOLOGY

## 2020-07-31 PROCEDURE — 3075F SYST BP GE 130 - 139MM HG: CPT | Performed by: OTOLARYNGOLOGY

## 2020-07-31 PROCEDURE — 99213 OFFICE O/P EST LOW 20 MIN: CPT | Performed by: OTOLARYNGOLOGY

## 2020-07-31 PROCEDURE — 3008F BODY MASS INDEX DOCD: CPT | Performed by: OTOLARYNGOLOGY

## 2020-08-02 NOTE — PROGRESS NOTES
Suzi Clark is a 77year old female. Patient presents with:  Hearing Loss: c/o of bilateral ear pain. states Left is worse than R ear. x1 month and some hearing loss x 1 month    HPI:   She has been experiencing a pressure and fullness in her ears. Tab Take 1 tablet by mouth daily. • Cholecalciferol (VITAMIN D) 2000 units Oral Cap Take 1 capsule by mouth daily.         Past Medical History:   Diagnosis Date   • Age-related cataract of left eye 5/10/2018   • Anxiety    • Asthma    • Atherosclerosis Normal, Turbinates - Normal   Neurological Normal Memory - Normal. Cranial nerves - Cranial nerves II through XII grossly intact.    Neck Exam Normal Inspection - Normal. Palpation - Normal. Parotid gland - Normal. Thyroid gland - Normal.   Psychiatric Norm

## 2020-08-17 ENCOUNTER — OFFICE VISIT (OUTPATIENT)
Dept: PODIATRY CLINIC | Facility: CLINIC | Age: 75
End: 2020-08-17
Payer: MEDICARE

## 2020-08-17 DIAGNOSIS — L60.3 NAIL DYSTROPHY: Primary | ICD-10-CM

## 2020-08-17 PROCEDURE — 99202 OFFICE O/P NEW SF 15 MIN: CPT | Performed by: PODIATRIST

## 2020-08-17 NOTE — PROGRESS NOTES
HPI:    Patient ID: Lacey Reyes is a 77year old female. This pleasant 61-year-old female presents to the office today and asked that I cut her toenails. She has some minor discomforts but admits to not being able to reach or care for them.   She DAILY) Oral Tab Take 1 tablet by mouth daily. • Cholecalciferol (VITAMIN D) 2000 units Oral Cap Take 1 capsule by mouth daily.        Allergies:  Allopurinol             HIVES, SWELLING, SHORTNESS OF                            BREATH  Dog Epithelium

## 2020-08-19 ENCOUNTER — APPOINTMENT (OUTPATIENT)
Dept: PHYSICAL THERAPY | Age: 75
End: 2020-08-19
Attending: PHYSICAL MEDICINE & REHABILITATION
Payer: MEDICARE

## 2020-08-24 ENCOUNTER — APPOINTMENT (OUTPATIENT)
Dept: PHYSICAL THERAPY | Age: 75
End: 2020-08-24
Attending: PHYSICAL MEDICINE & REHABILITATION
Payer: MEDICARE

## 2020-08-24 ENCOUNTER — TELEPHONE (OUTPATIENT)
Dept: PHYSICAL THERAPY | Age: 75
End: 2020-08-24

## 2020-08-25 RX ORDER — AMLODIPINE BESYLATE 2.5 MG/1
TABLET ORAL
Qty: 90 TABLET | Refills: 1 | Status: SHIPPED | OUTPATIENT
Start: 2020-08-25 | End: 2021-02-16

## 2020-08-25 RX ORDER — HYDRALAZINE HYDROCHLORIDE 25 MG/1
TABLET, FILM COATED ORAL
Qty: 90 TABLET | Refills: 0 | Status: SHIPPED | OUTPATIENT
Start: 2020-08-25 | End: 2020-12-17

## 2020-08-25 RX ORDER — METOPROLOL SUCCINATE 25 MG/1
TABLET, EXTENDED RELEASE ORAL
Qty: 90 TABLET | Refills: 1 | Status: SHIPPED | OUTPATIENT
Start: 2020-08-25 | End: 2021-02-16

## 2020-08-25 RX ORDER — MONTELUKAST SODIUM 10 MG/1
TABLET ORAL
Qty: 90 TABLET | Refills: 5 | Status: SHIPPED | OUTPATIENT
Start: 2020-08-25 | End: 2021-10-06

## 2020-08-26 ENCOUNTER — OFFICE VISIT (OUTPATIENT)
Dept: PHYSICAL THERAPY | Age: 75
End: 2020-08-26
Attending: PHYSICAL MEDICINE & REHABILITATION
Payer: MEDICARE

## 2020-08-26 DIAGNOSIS — M48.02 CERVICAL STENOSIS OF SPINAL CANAL: ICD-10-CM

## 2020-08-26 DIAGNOSIS — M79.18 MYOFASCIAL PAIN: ICD-10-CM

## 2020-08-26 PROCEDURE — 97110 THERAPEUTIC EXERCISES: CPT | Performed by: PHYSICAL THERAPIST

## 2020-08-26 PROCEDURE — 97161 PT EVAL LOW COMPLEX 20 MIN: CPT | Performed by: PHYSICAL THERAPIST

## 2020-08-26 NOTE — PROGRESS NOTES
P.T. EVALUATION:   Referring Physician: Dr. Roby Martínez  Diagnosis: Cervical stenosis of spinal canal (M48.02)  Myofascial pain (M79.18)    Date of Onset: 7/20/2020 Date of Service: 8/26/2020     PATIENT SUMMARY   Patient verbalized consent for Physical Therapy include gout on B hands. ASSESSMENT:   Referred to PT due to complaints of neck pain. Patient presents with the following limitations: decreased neck mobility, difficulty turning her head due to neck pain.  Shannen Callejas would benefit from skilled [facility] day period and as patient's schedule allows.  Treatment will include: Modalities prn, manual therapy, therapeutic exercises, therapeutic activity, and instructions on a home program.     Education or treatment limitation: None    Rehab Potential:stella Ayoub

## 2020-08-31 ENCOUNTER — OFFICE VISIT (OUTPATIENT)
Dept: PHYSICAL THERAPY | Age: 75
End: 2020-08-31
Attending: PHYSICAL MEDICINE & REHABILITATION
Payer: MEDICARE

## 2020-08-31 PROCEDURE — 97140 MANUAL THERAPY 1/> REGIONS: CPT | Performed by: PHYSICAL THERAPIST

## 2020-08-31 PROCEDURE — 97110 THERAPEUTIC EXERCISES: CPT | Performed by: PHYSICAL THERAPIST

## 2020-08-31 NOTE — PROGRESS NOTES
Diagnosis: Cervical stenosis of spinal canal (M48.02)  Myofascial pain (M79.18)          Next MD visit: none scheduled  Fall Risk: standard         Precautions: n/a          Medication Changes since last visit?: No      Subjective: States her neck is fee

## 2020-09-02 ENCOUNTER — OFFICE VISIT (OUTPATIENT)
Dept: PHYSICAL THERAPY | Age: 75
End: 2020-09-02
Attending: PHYSICAL MEDICINE & REHABILITATION
Payer: MEDICARE

## 2020-09-02 PROCEDURE — 97140 MANUAL THERAPY 1/> REGIONS: CPT | Performed by: PHYSICAL THERAPIST

## 2020-09-02 PROCEDURE — 97110 THERAPEUTIC EXERCISES: CPT | Performed by: PHYSICAL THERAPIST

## 2020-09-02 NOTE — PROGRESS NOTES
Diagnosis: Cervical stenosis of spinal canal (M48.02)  Myofascial pain (M79.18)          Next MD visit: none scheduled  Fall Risk: standard         Precautions: n/a          Medication Changes since last visit?: No      Subjective: States her neck is fee Treatment: 40 min  Total Treatment Time: 40 min

## 2020-09-08 ENCOUNTER — OFFICE VISIT (OUTPATIENT)
Dept: PHYSICAL THERAPY | Age: 75
End: 2020-09-08
Attending: PHYSICAL MEDICINE & REHABILITATION
Payer: MEDICARE

## 2020-09-08 PROCEDURE — 97110 THERAPEUTIC EXERCISES: CPT | Performed by: PHYSICAL THERAPIST

## 2020-09-08 PROCEDURE — 97140 MANUAL THERAPY 1/> REGIONS: CPT | Performed by: PHYSICAL THERAPIST

## 2020-09-08 NOTE — PROGRESS NOTES
Diagnosis: Cervical stenosis of spinal canal (M48.02)  Myofascial pain (M79.18)          Next MD visit: none scheduled  Fall Risk: standard         Precautions: n/a          Medication Changes since last visit?: No      Subjective: States her neck moves therapeutic exercises in standing. Reports significant relief from pain after exercises. Patient and PT goals in progress. Goals     • Therapy Goals      1.  Patient will be independent with her home exercises, its progression, and management of residual

## 2020-09-10 ENCOUNTER — OFFICE VISIT (OUTPATIENT)
Dept: PHYSICAL THERAPY | Age: 75
End: 2020-09-10
Attending: PHYSICAL MEDICINE & REHABILITATION
Payer: MEDICARE

## 2020-09-10 PROCEDURE — 97110 THERAPEUTIC EXERCISES: CPT | Performed by: PHYSICAL THERAPIST

## 2020-09-10 PROCEDURE — 97140 MANUAL THERAPY 1/> REGIONS: CPT | Performed by: PHYSICAL THERAPIST

## 2020-09-10 NOTE — PROGRESS NOTES
Diagnosis: Cervical stenosis of spinal canal (M48.02)  Myofascial pain (M79.18)          Next MD visit: none scheduled  Fall Risk: standard         Precautions: n/a          Medication Changes since last visit?: No      Subjective: States her neck is fee x15min  - supine neck mobilization into rotation, lateral flexion x15min  - supine neck mobilization into rotation, lateral flexion                       Assessment: Demonstrates better tolerance to therapeutic exercises today.  Pain is significantly less a

## 2020-09-14 ENCOUNTER — OFFICE VISIT (OUTPATIENT)
Dept: PHYSICAL THERAPY | Age: 75
End: 2020-09-14
Attending: PHYSICAL MEDICINE & REHABILITATION
Payer: MEDICARE

## 2020-09-14 PROCEDURE — 97110 THERAPEUTIC EXERCISES: CPT | Performed by: PHYSICAL THERAPIST

## 2020-09-14 PROCEDURE — 97140 MANUAL THERAPY 1/> REGIONS: CPT | Performed by: PHYSICAL THERAPIST

## 2020-09-14 NOTE — PROGRESS NOTES
Diagnosis: Cervical stenosis of spinal canal (M48.02)  Myofascial pain (M79.18)          Next MD visit: none scheduled  Fall Risk: standard         Precautions: n/a          Medication Changes since last visit?: No      Subjective: Complains of neck pain PT   x15min  - supine neck mobilization into rotation, lateral flexion x15min  - supine neck mobilization into rotation, lateral flexion   x15min  - supine neck mobilization into rotation, lateral flexion                       Assessment: Demonstrates bett

## 2020-09-16 ENCOUNTER — APPOINTMENT (OUTPATIENT)
Dept: PHYSICAL THERAPY | Age: 75
End: 2020-09-16
Attending: PHYSICAL MEDICINE & REHABILITATION
Payer: MEDICARE

## 2020-09-16 ENCOUNTER — TELEPHONE (OUTPATIENT)
Dept: PHYSICAL THERAPY | Facility: HOSPITAL | Age: 75
End: 2020-09-16

## 2020-09-21 ENCOUNTER — OFFICE VISIT (OUTPATIENT)
Dept: PHYSICAL THERAPY | Age: 75
End: 2020-09-21
Attending: PHYSICAL MEDICINE & REHABILITATION
Payer: MEDICARE

## 2020-09-21 PROCEDURE — 97140 MANUAL THERAPY 1/> REGIONS: CPT | Performed by: PHYSICAL THERAPIST

## 2020-09-21 PROCEDURE — 97110 THERAPEUTIC EXERCISES: CPT | Performed by: PHYSICAL THERAPIST

## 2020-09-21 NOTE — PROGRESS NOTES
Diagnosis: Cervical stenosis of spinal canal (M48.02)  Myofascial pain (M79.18)          Next MD visit: none scheduled  Fall Risk: standard         Precautions: n/a          Medication Changes since last visit?: No      Subjective: States her body was ve 2  - seated overhead press with 1lb wt 10 x 2       Manual PT   x10min  - supine neck mobilization into rotation and lateral flexion.  - occiput release   x15min  - supine neck mobilization into rotation, lateral flexion x15min  - supine neck mobilization

## 2020-09-23 ENCOUNTER — OFFICE VISIT (OUTPATIENT)
Dept: PHYSICAL THERAPY | Age: 75
End: 2020-09-23
Attending: PHYSICAL MEDICINE & REHABILITATION
Payer: MEDICARE

## 2020-09-23 PROCEDURE — 97110 THERAPEUTIC EXERCISES: CPT | Performed by: PHYSICAL THERAPIST

## 2020-09-23 NOTE — PROGRESS NOTES
Diagnosis: Cervical stenosis of spinal canal (M48.02)  Myofascial pain (M79.18)          Next MD visit: none scheduled  Fall Risk: standard         Precautions: n/a          Medication Changes since last visit?: No      Subjective: States neck is feeling mobilization into rotation and lateral flexion.  - occiput release x10min  - supine neck mobilization into rotation and lateral flexion.  - occiput release   x15min  - supine neck mobilization into rotation, lateral flexion                       Assessment

## 2020-09-28 ENCOUNTER — APPOINTMENT (OUTPATIENT)
Dept: PHYSICAL THERAPY | Age: 75
End: 2020-09-28
Attending: PHYSICAL MEDICINE & REHABILITATION
Payer: MEDICARE

## 2020-09-28 ENCOUNTER — TELEPHONE (OUTPATIENT)
Dept: PHYSICAL THERAPY | Age: 75
End: 2020-09-28

## 2020-09-29 ENCOUNTER — TELEPHONE (OUTPATIENT)
Dept: PHYSICAL THERAPY | Age: 75
End: 2020-09-29

## 2020-09-30 ENCOUNTER — APPOINTMENT (OUTPATIENT)
Dept: PHYSICAL THERAPY | Age: 75
End: 2020-09-30
Attending: PHYSICAL MEDICINE & REHABILITATION
Payer: MEDICARE

## 2020-10-19 ENCOUNTER — TELEPHONE (OUTPATIENT)
Dept: PHYSICAL THERAPY | Age: 75
End: 2020-10-19

## 2020-10-20 ENCOUNTER — OFFICE VISIT (OUTPATIENT)
Dept: PHYSICAL THERAPY | Age: 75
End: 2020-10-20
Attending: PHYSICAL MEDICINE & REHABILITATION
Payer: MEDICARE

## 2020-10-20 PROCEDURE — 97110 THERAPEUTIC EXERCISES: CPT | Performed by: PHYSICAL THERAPIST

## 2020-10-20 PROCEDURE — 97140 MANUAL THERAPY 1/> REGIONS: CPT | Performed by: PHYSICAL THERAPIST

## 2020-10-20 NOTE — PROGRESS NOTES
Diagnosis: Cervical stenosis of spinal canal (M48.02)  Myofascial pain (M79.18)          Next MD visit: none scheduled  Fall Risk: standard         Precautions: n/a          Medication Changes since last visit?: No      Subjective: States neck feels stif paracervicals in supine   x10min  - supine neck mobilization into rotation and lateral flexion.  - occiput release x10min  - supine neck mobilization into rotation and lateral flexion.  - occiput release                         Assessment: Patient referred signed by therapist: Gilberto Carranza., PT       Certification From: 25/86/4829  To:1/18/2021

## 2020-10-22 ENCOUNTER — OFFICE VISIT (OUTPATIENT)
Dept: PHYSICAL THERAPY | Age: 75
End: 2020-10-22
Attending: PHYSICAL MEDICINE & REHABILITATION
Payer: MEDICARE

## 2020-10-22 ENCOUNTER — TELEPHONE (OUTPATIENT)
Dept: INTERNAL MEDICINE CLINIC | Facility: CLINIC | Age: 75
End: 2020-10-22

## 2020-10-22 PROCEDURE — 97110 THERAPEUTIC EXERCISES: CPT | Performed by: PHYSICAL THERAPIST

## 2020-10-22 NOTE — PROGRESS NOTES
Diagnosis: Cervical stenosis of spinal canal (M48.02)  Myofascial pain (M79.18)          Next MD visit: none scheduled  Fall Risk: standard         Precautions: n/a          Medication Changes since last visit?: No      Subjective: Reports neck pain only end range. Also has improved postural awareness. Patient and PT goals continue to be in progress. Goals     • Therapy Goals      1. Patient will be independent with her home exercises, its progression, and management of residual symptoms.   2. Patient wi

## 2020-10-26 RX ORDER — COLCHICINE 0.6 MG/1
TABLET ORAL
Qty: 30 TABLET | Refills: 2 | Status: SHIPPED | OUTPATIENT
Start: 2020-10-26 | End: 2021-10-22

## 2020-10-26 NOTE — TELEPHONE ENCOUNTER
Requested Prescriptions     Pending Prescriptions Disp Refills   • colchicine (COLCRYS) 0.6 MG Oral Tab [Pharmacy Med Name: COLCRYS 0.6 MG TABLET] 30 tablet 2     Sig: TAKE 1 TABLET BY MOUTH EVERY DAY AS NEEDED     LF: 4/7/20 #90 TAB W/ 0 RF  LOV: 3/18/20 It will be continued. I refilled her prescription. There is active acute gout in her left fifth finger, so she will take a Medrol Dosepak. She will go today for uric acid and creatinine. Her uric acid needs to be less than 6.0.   If not, she will need

## 2020-10-27 ENCOUNTER — OFFICE VISIT (OUTPATIENT)
Dept: PHYSICAL THERAPY | Age: 75
End: 2020-10-27
Attending: PHYSICAL MEDICINE & REHABILITATION
Payer: MEDICARE

## 2020-10-27 PROCEDURE — 97140 MANUAL THERAPY 1/> REGIONS: CPT | Performed by: PHYSICAL THERAPIST

## 2020-10-27 PROCEDURE — 97110 THERAPEUTIC EXERCISES: CPT | Performed by: PHYSICAL THERAPIST

## 2020-10-27 NOTE — PROGRESS NOTES
Diagnosis: Cervical stenosis of spinal canal (M48.02)  Myofascial pain (M79.18)          Next MD visit: none scheduled  Fall Risk: standard         Precautions: n/a          Medication Changes since last visit?: No      Subjective: States her neck is fee continue to be in progress. Goals     • Therapy Goals      1. Patient will be independent with her home exercises, its progression, and management of residual symptoms. 2. Patient will report neck pain of not more than 1/10 during activity.   3. Shyla

## 2020-10-28 ENCOUNTER — TELEPHONE (OUTPATIENT)
Dept: PHYSICAL THERAPY | Facility: HOSPITAL | Age: 75
End: 2020-10-28

## 2020-11-03 ENCOUNTER — APPOINTMENT (OUTPATIENT)
Dept: PHYSICAL THERAPY | Age: 75
End: 2020-11-03
Attending: PHYSICAL MEDICINE & REHABILITATION
Payer: MEDICARE

## 2020-11-05 ENCOUNTER — NURSE TRIAGE (OUTPATIENT)
Dept: INTERNAL MEDICINE CLINIC | Facility: CLINIC | Age: 75
End: 2020-11-05

## 2020-11-05 ENCOUNTER — APPOINTMENT (OUTPATIENT)
Dept: PHYSICAL THERAPY | Age: 75
End: 2020-11-05
Attending: PHYSICAL MEDICINE & REHABILITATION
Payer: MEDICARE

## 2020-11-05 NOTE — TELEPHONE ENCOUNTER
Action Requested: Summary for Provider     []  Critical Lab, Recommendations Needed  [] Need Additional Advice  []   FYI    []   Need Orders  [] Need Medications Sent to Pharmacy  []  Other     SUMMARY: Patient reporting bilateral leg pain, mostly in her t

## 2020-11-09 ENCOUNTER — NURSE TRIAGE (OUTPATIENT)
Dept: INTERNAL MEDICINE CLINIC | Facility: CLINIC | Age: 75
End: 2020-11-09

## 2020-11-09 ENCOUNTER — APPOINTMENT (OUTPATIENT)
Dept: PHYSICAL THERAPY | Age: 75
End: 2020-11-09
Attending: PHYSICAL MEDICINE & REHABILITATION
Payer: MEDICARE

## 2020-11-09 RX ORDER — METHYLPREDNISOLONE 4 MG/1
TABLET ORAL
Qty: 1 PACKAGE | Refills: 0 | Status: SHIPPED | OUTPATIENT
Start: 2020-11-09 | End: 2020-12-08

## 2020-11-09 NOTE — TELEPHONE ENCOUNTER
Ok to give her medrol dospak which is steroid given to her by Dr Link Huizar before. She needs to watch her sugar closely since may go up with med. Need to know what local pharmacy to send erx.

## 2020-11-09 NOTE — TELEPHONE ENCOUNTER
Action Requested: Summary for Provider     []  Critical Lab, Recommendations Needed  [] Need Additional Advice  []   FYI    []   Need Orders  [x] Need Medications Sent to Pharmacy  []  Other     SUMMARY: Patient has an appointment to see you tomorrow for b

## 2020-11-09 NOTE — TELEPHONE ENCOUNTER
Patient was left a message to call back. Transfer to triage dept [id number]    Obtain correct pharmacy. See Dr. Kathie Almodovar note below. Pended RX for medrol tameka.

## 2020-11-09 NOTE — TELEPHONE ENCOUNTER
Pt calling to follow up on medication request as noted below. Pt states Medrol dospak can be sent to [facility] pharmacy listed in EMR.

## 2020-11-10 ENCOUNTER — OFFICE VISIT (OUTPATIENT)
Dept: INTERNAL MEDICINE CLINIC | Facility: CLINIC | Age: 75
End: 2020-11-10
Payer: MEDICARE

## 2020-11-10 VITALS
WEIGHT: 169 LBS | DIASTOLIC BLOOD PRESSURE: 80 MMHG | HEART RATE: 81 BPM | SYSTOLIC BLOOD PRESSURE: 136 MMHG | HEIGHT: 64.5 IN | TEMPERATURE: 99 F | BODY MASS INDEX: 28.5 KG/M2 | RESPIRATION RATE: 12 BRPM

## 2020-11-10 DIAGNOSIS — M1A.9XX1 CHRONIC TOPHACEOUS GOUT: Primary | ICD-10-CM

## 2020-11-10 DIAGNOSIS — J44.9 ASTHMA WITH COPD (CHRONIC OBSTRUCTIVE PULMONARY DISEASE) (HCC): ICD-10-CM

## 2020-11-10 DIAGNOSIS — N18.30 CONTROLLED TYPE 2 DIABETES MELLITUS WITH STAGE 3 CHRONIC KIDNEY DISEASE, WITHOUT LONG-TERM CURRENT USE OF INSULIN (HCC): ICD-10-CM

## 2020-11-10 DIAGNOSIS — E11.22 CONTROLLED TYPE 2 DIABETES MELLITUS WITH STAGE 3 CHRONIC KIDNEY DISEASE, WITHOUT LONG-TERM CURRENT USE OF INSULIN (HCC): ICD-10-CM

## 2020-11-10 PROCEDURE — 99214 OFFICE O/P EST MOD 30 MIN: CPT | Performed by: INTERNAL MEDICINE

## 2020-11-10 PROCEDURE — G0008 ADMIN INFLUENZA VIRUS VAC: HCPCS | Performed by: INTERNAL MEDICINE

## 2020-11-10 PROCEDURE — 3079F DIAST BP 80-89 MM HG: CPT | Performed by: INTERNAL MEDICINE

## 2020-11-10 PROCEDURE — 3008F BODY MASS INDEX DOCD: CPT | Performed by: INTERNAL MEDICINE

## 2020-11-10 PROCEDURE — 3075F SYST BP GE 130 - 139MM HG: CPT | Performed by: INTERNAL MEDICINE

## 2020-11-10 PROCEDURE — 90662 IIV NO PRSV INCREASED AG IM: CPT | Performed by: INTERNAL MEDICINE

## 2020-11-10 RX ORDER — BLOOD-GLUCOSE METER
EACH MISCELLANEOUS
Qty: 1 KIT | Refills: 0 | Status: SHIPPED | OUTPATIENT
Start: 2020-11-10 | End: 2021-12-23

## 2020-11-10 RX ORDER — LANCETS
EACH MISCELLANEOUS
Qty: 100 EACH | Refills: 3 | Status: SHIPPED | OUTPATIENT
Start: 2020-11-10 | End: 2021-12-23 | Stop reason: ALTCHOICE

## 2020-11-10 RX ORDER — BLOOD SUGAR DIAGNOSTIC
STRIP MISCELLANEOUS
Qty: 100 STRIP | Refills: 3 | Status: SHIPPED | OUTPATIENT
Start: 2020-11-10

## 2020-11-10 NOTE — PROGRESS NOTES
HPI:    Patient ID: Olga Gage is a 77year old female. Gout  This is a recurrent problem. The current episode started yesterday (had flare up of gout since yesterday). The problem occurs constantly. The problem has been gradually improving.  As Soln Take 3 mL (2.5 mg total) by nebulization every 4 (four) hours as needed for Wheezing or Shortness of Breath. 1 Box 1   • Acetaminophen (TYLENOL) 325 MG Oral Cap Take 650 mg by mouth as needed.    120 capsule 0   • Multiple Vitamin (MULTI-VITAMIN DAILY) Procedure Laterality Date   • CABG     • CAROTID ENDARTERECTOMY     • CAROTID ENDARTERECTOMY Right 6/28/2019    Performed by Jenfier Farrell MD at [facility] OR   • CATARACT     • COLONOSCOPY      2013   • COLONOSCOPY  2013   • HEART CORONARY ARTERY BY Bowel sounds are normal. She exhibits no distension and no mass. There is no abdominal tenderness. There is no rebound and no guarding. Musculoskeletal:         General: Tenderness present. No edema.       Comments: Left great toe and other toes warm and

## 2020-11-18 ENCOUNTER — APPOINTMENT (OUTPATIENT)
Dept: PHYSICAL THERAPY | Age: 75
End: 2020-11-18
Attending: PHYSICAL MEDICINE & REHABILITATION
Payer: MEDICARE

## 2020-11-23 ENCOUNTER — TELEPHONE (OUTPATIENT)
Dept: INTERNAL MEDICINE CLINIC | Facility: CLINIC | Age: 75
End: 2020-11-23

## 2020-11-23 NOTE — TELEPHONE ENCOUNTER
Pt would like referral for Moises Arguello to be faxed for . Please call when faxed over.  Please advise

## 2020-11-23 NOTE — TELEPHONE ENCOUNTER
[facility] to ask if Dr. Tavo Hammond is an in network rheumatologist.Roderick states that that doctor's name did not show up. Called patient to advise her that we do not show Dr. Sherry Forrest as in network.  Patient states she s/w a ,

## 2020-11-23 NOTE — TELEPHONE ENCOUNTER
Dr. Jazmin Valle, disregard message below. Noted that referral to Dr. Radha Tamez was approve and was your recommendation. Referral faxed per patient's request to DR. Radha Tamez. Confirmation pending.

## 2020-12-10 LAB
HCT VFR BLD CALC: 46.7 %
HGB BLD-MCNC: 14.5 G/DL
PLATELET # BLD: 267 K/MCL
RBC # BLD: 5.06 10*6/UL
WBC # BLD: 7.64 K/MCL

## 2020-12-17 RX ORDER — HYDRALAZINE HYDROCHLORIDE 25 MG/1
TABLET, FILM COATED ORAL
Qty: 90 TABLET | Refills: 0 | Status: SHIPPED | OUTPATIENT
Start: 2020-12-17 | End: 2021-02-16

## 2020-12-22 RX ORDER — METHYLPREDNISOLONE 4 MG/1
TABLET ORAL
Refills: 0 | OUTPATIENT
Start: 2020-12-22

## 2020-12-22 NOTE — TELEPHONE ENCOUNTER
Really needs to fup with rheumatologist since it appears not improving with oral steroids given last week

## 2020-12-22 NOTE — TELEPHONE ENCOUNTER
Patient indicated that started with a gout attack 3 days ago in both feet and could not walk. The rheumatologist refilled prednisone on 12/19/2020. Pain was a 10/10 now down to a 5/10. Patient was taking colcrys mainly for pain.  Since pain has been down wi

## 2020-12-23 NOTE — TELEPHONE ENCOUNTER
Patient returned call. Advised of message below  States she called Dr. Peterson Castillo (rheumatologist) office and was prescribed Prednisone    She then asked if she can take Prednisone with Colcrys. Advised that she should ask Dr. Eli Zeng that question.

## 2021-01-05 RX ORDER — FLUTICASONE PROPIONATE AND SALMETEROL 250; 50 UG/1; UG/1
POWDER RESPIRATORY (INHALATION)
Qty: 180 EACH | Refills: 0 | Status: ON HOLD | OUTPATIENT
Start: 2021-01-05 | End: 2021-01-27

## 2021-01-25 ENCOUNTER — TELEPHONE (OUTPATIENT)
Dept: INTERNAL MEDICINE CLINIC | Facility: CLINIC | Age: 76
End: 2021-01-25

## 2021-01-25 DIAGNOSIS — Z98.41: Primary | ICD-10-CM

## 2021-01-25 DIAGNOSIS — Z23 NEED FOR VACCINATION: ICD-10-CM

## 2021-01-25 NOTE — TELEPHONE ENCOUNTER
Spoke with patient ( verified)--transferred from scheduling after requesting ophthalmology referral--see below:    Patient reports her Wixela inhaler is not working as well as it used to for her COPD. She is using it BID as prescribed.     \"I still feel

## 2021-01-25 NOTE — TELEPHONE ENCOUNTER
Patient is requesting a referral to see Dr. Josh Wise, Ophthalmologist.  She is having Glaucoma laser surgery on her right eye. Her appointment for surgery is 1/28 @ 1PM.    Please call Dr. Amada Cuevas  [phone].

## 2021-01-26 ENCOUNTER — APPOINTMENT (OUTPATIENT)
Dept: CT IMAGING | Facility: HOSPITAL | Age: 76
DRG: 291 | End: 2021-01-26
Attending: EMERGENCY MEDICINE
Payer: MEDICARE

## 2021-01-26 ENCOUNTER — OFFICE VISIT (OUTPATIENT)
Dept: INTERNAL MEDICINE CLINIC | Facility: CLINIC | Age: 76
End: 2021-01-26
Payer: MEDICARE

## 2021-01-26 ENCOUNTER — APPOINTMENT (OUTPATIENT)
Dept: GENERAL RADIOLOGY | Facility: HOSPITAL | Age: 76
DRG: 291 | End: 2021-01-26
Attending: EMERGENCY MEDICINE
Payer: MEDICARE

## 2021-01-26 ENCOUNTER — HOSPITAL ENCOUNTER (INPATIENT)
Facility: HOSPITAL | Age: 76
LOS: 1 days | Discharge: HOME | DRG: 291 | End: 2021-01-27
Attending: EMERGENCY MEDICINE | Admitting: HOSPITALIST
Payer: MEDICARE

## 2021-01-26 DIAGNOSIS — I50.9 HEART FAILURE, UNSPECIFIED HF CHRONICITY, UNSPECIFIED HEART FAILURE TYPE (HCC): Primary | ICD-10-CM

## 2021-01-26 DIAGNOSIS — J44.9 ASTHMA WITH COPD (CHRONIC OBSTRUCTIVE PULMONARY DISEASE) (HCC): ICD-10-CM

## 2021-01-26 DIAGNOSIS — R06.02 SHORTNESS OF BREATH: Primary | ICD-10-CM

## 2021-01-26 DIAGNOSIS — J44.1 COPD EXACERBATION (HCC): ICD-10-CM

## 2021-01-26 PROBLEM — J21.9 ACUTE BRONCHIOLITIS: Status: ACTIVE | Noted: 2021-01-26

## 2021-01-26 PROBLEM — J96.01 ACUTE HYPOXEMIC RESPIRATORY FAILURE (HCC): Status: ACTIVE | Noted: 2019-02-17

## 2021-01-26 PROBLEM — I50.33 ACUTE ON CHRONIC DIASTOLIC (CONGESTIVE) HEART FAILURE (HCC): Status: ACTIVE | Noted: 2021-01-26

## 2021-01-26 LAB
ANION GAP SERPL CALC-SCNC: 1 MMOL/L (ref 0–18)
BASOPHILS # BLD AUTO: 0.03 X10(3) UL (ref 0–0.2)
BASOPHILS NFR BLD AUTO: 0.4 %
BUN BLD-MCNC: 18 MG/DL (ref 7–18)
BUN/CREAT SERPL: 15.5 (ref 10–20)
CALCIUM BLD-MCNC: 10.7 MG/DL (ref 8.5–10.1)
CHLORIDE SERPL-SCNC: 104 MMOL/L (ref 98–112)
CHOLEST SMN-MCNC: 221 MG/DL (ref ?–200)
CO2 SERPL-SCNC: 33 MMOL/L (ref 21–32)
CREAT BLD-MCNC: 1.16 MG/DL
D DIMER PPP FEU-MCNC: 1.15 UG/ML FEU (ref ?–0.74)
DEPRECATED RDW RBC AUTO: 51.7 FL (ref 35.1–46.3)
EOSINOPHIL # BLD AUTO: 0.31 X10(3) UL (ref 0–0.7)
EOSINOPHIL NFR BLD AUTO: 4.3 %
ERYTHROCYTE [DISTWIDTH] IN BLOOD BY AUTOMATED COUNT: 14.8 % (ref 11–15)
GLUCOSE BLD-MCNC: 79 MG/DL (ref 70–99)
HCT VFR BLD AUTO: 46.5 %
HDLC SERPL-MCNC: 40 MG/DL (ref 40–59)
HGB BLD-MCNC: 14.1 G/DL
IMM GRANULOCYTES # BLD AUTO: 0.03 X10(3) UL (ref 0–1)
IMM GRANULOCYTES NFR BLD: 0.4 %
LDLC SERPL CALC-MCNC: 134 MG/DL (ref ?–100)
LYMPHOCYTES # BLD AUTO: 1.48 X10(3) UL (ref 1–4)
LYMPHOCYTES NFR BLD AUTO: 20.6 %
MCH RBC QN AUTO: 29 PG (ref 26–34)
MCHC RBC AUTO-ENTMCNC: 30.3 G/DL (ref 31–37)
MCV RBC AUTO: 95.5 FL
MONOCYTES # BLD AUTO: 0.89 X10(3) UL (ref 0.1–1)
MONOCYTES NFR BLD AUTO: 12.4 %
NEUTROPHILS # BLD AUTO: 4.44 X10 (3) UL (ref 1.5–7.7)
NEUTROPHILS # BLD AUTO: 4.44 X10(3) UL (ref 1.5–7.7)
NEUTROPHILS NFR BLD AUTO: 61.9 %
NONHDLC SERPL-MCNC: 181 MG/DL (ref ?–130)
NT-PROBNP SERPL-MCNC: 1212 PG/ML (ref ?–125)
OSMOLALITY SERPL CALC.SUM OF ELEC: 287 MOSM/KG (ref 275–295)
PLATELET # BLD AUTO: 240 10(3)UL (ref 150–450)
POTASSIUM SERPL-SCNC: 4.4 MMOL/L (ref 3.5–5.1)
RBC # BLD AUTO: 4.87 X10(6)UL
SARS-COV-2 RNA RESP QL NAA+PROBE: NOT DETECTED
SODIUM SERPL-SCNC: 138 MMOL/L (ref 136–145)
TRIGL SERPL-MCNC: 237 MG/DL (ref 30–149)
TROPONIN I SERPL-MCNC: 0.11 NG/ML (ref ?–0.04)
TROPONIN I SERPL-MCNC: 0.13 NG/ML (ref ?–0.04)
VLDLC SERPL CALC-MCNC: 47 MG/DL (ref 0–30)
WBC # BLD AUTO: 7.2 X10(3) UL (ref 4–11)

## 2021-01-26 PROCEDURE — 99222 1ST HOSP IP/OBS MODERATE 55: CPT | Performed by: INTERNAL MEDICINE

## 2021-01-26 PROCEDURE — 99214 OFFICE O/P EST MOD 30 MIN: CPT | Performed by: INTERNAL MEDICINE

## 2021-01-26 PROCEDURE — 3074F SYST BP LT 130 MM HG: CPT | Performed by: INTERNAL MEDICINE

## 2021-01-26 PROCEDURE — 3078F DIAST BP <80 MM HG: CPT | Performed by: INTERNAL MEDICINE

## 2021-01-26 PROCEDURE — 3075F SYST BP GE 130 - 139MM HG: CPT | Performed by: HOSPITALIST

## 2021-01-26 PROCEDURE — 71045 X-RAY EXAM CHEST 1 VIEW: CPT | Performed by: EMERGENCY MEDICINE

## 2021-01-26 PROCEDURE — 99223 1ST HOSP IP/OBS HIGH 75: CPT | Performed by: HOSPITALIST

## 2021-01-26 PROCEDURE — 3008F BODY MASS INDEX DOCD: CPT | Performed by: INTERNAL MEDICINE

## 2021-01-26 PROCEDURE — 3078F DIAST BP <80 MM HG: CPT | Performed by: HOSPITALIST

## 2021-01-26 PROCEDURE — 71260 CT THORAX DX C+: CPT | Performed by: EMERGENCY MEDICINE

## 2021-01-26 PROCEDURE — 1111F DSCHRG MED/CURRENT MED MERGE: CPT | Performed by: INTERNAL MEDICINE

## 2021-01-26 PROCEDURE — 3008F BODY MASS INDEX DOCD: CPT | Performed by: HOSPITALIST

## 2021-01-26 RX ORDER — ENOXAPARIN SODIUM 100 MG/ML
40 INJECTION SUBCUTANEOUS DAILY
Status: CANCELLED | OUTPATIENT
Start: 2021-01-26

## 2021-01-26 RX ORDER — FUROSEMIDE 10 MG/ML
40 INJECTION INTRAMUSCULAR; INTRAVENOUS 2 TIMES DAILY
Status: DISCONTINUED | OUTPATIENT
Start: 2021-01-26 | End: 2021-01-27

## 2021-01-26 RX ORDER — SODIUM CHLORIDE 0.9 % (FLUSH) 0.9 %
10 SYRINGE (ML) INJECTION AS NEEDED
Status: DISCONTINUED | OUTPATIENT
Start: 2021-01-26 | End: 2021-01-27

## 2021-01-26 RX ORDER — HEPARIN SODIUM 5000 [USP'U]/ML
5000 INJECTION, SOLUTION INTRAVENOUS; SUBCUTANEOUS EVERY 12 HOURS SCHEDULED
Status: DISCONTINUED | OUTPATIENT
Start: 2021-01-26 | End: 2021-01-27

## 2021-01-26 RX ORDER — IPRATROPIUM BROMIDE AND ALBUTEROL SULFATE 2.5; .5 MG/3ML; MG/3ML
3 SOLUTION RESPIRATORY (INHALATION) EVERY 6 HOURS
Status: DISCONTINUED | OUTPATIENT
Start: 2021-01-26 | End: 2021-01-27

## 2021-01-26 RX ORDER — ZOLPIDEM TARTRATE 5 MG/1
5 TABLET ORAL NIGHTLY PRN
Status: DISCONTINUED | OUTPATIENT
Start: 2021-01-26 | End: 2021-01-27

## 2021-01-26 RX ORDER — NITROGLYCERIN 0.4 MG/1
0.4 TABLET SUBLINGUAL EVERY 5 MIN PRN
Status: DISCONTINUED | OUTPATIENT
Start: 2021-01-26 | End: 2021-01-27

## 2021-01-26 RX ORDER — FUROSEMIDE 10 MG/ML
40 INJECTION INTRAMUSCULAR; INTRAVENOUS ONCE
Status: COMPLETED | OUTPATIENT
Start: 2021-01-26 | End: 2021-01-26

## 2021-01-26 RX ORDER — IPRATROPIUM BROMIDE AND ALBUTEROL SULFATE 2.5; .5 MG/3ML; MG/3ML
3 SOLUTION RESPIRATORY (INHALATION) EVERY 6 HOURS PRN
Status: DISCONTINUED | OUTPATIENT
Start: 2021-01-26 | End: 2021-01-27

## 2021-01-26 RX ORDER — METHYLPREDNISOLONE SODIUM SUCCINATE 40 MG/ML
40 INJECTION, POWDER, LYOPHILIZED, FOR SOLUTION INTRAMUSCULAR; INTRAVENOUS EVERY 8 HOURS
Status: DISCONTINUED | OUTPATIENT
Start: 2021-01-27 | End: 2021-01-27

## 2021-01-26 RX ORDER — METHYLPREDNISOLONE SODIUM SUCCINATE 125 MG/2ML
60 INJECTION, POWDER, LYOPHILIZED, FOR SOLUTION INTRAMUSCULAR; INTRAVENOUS ONCE
Status: COMPLETED | OUTPATIENT
Start: 2021-01-26 | End: 2021-01-26

## 2021-01-26 RX ORDER — ACETAMINOPHEN 325 MG/1
650 TABLET ORAL EVERY 6 HOURS PRN
Status: DISCONTINUED | OUTPATIENT
Start: 2021-01-26 | End: 2021-01-27

## 2021-01-26 RX ORDER — ONDANSETRON 2 MG/ML
4 INJECTION INTRAMUSCULAR; INTRAVENOUS EVERY 6 HOURS PRN
Status: DISCONTINUED | OUTPATIENT
Start: 2021-01-26 | End: 2021-01-27

## 2021-01-26 RX ORDER — METOCLOPRAMIDE HYDROCHLORIDE 5 MG/ML
5 INJECTION INTRAMUSCULAR; INTRAVENOUS EVERY 8 HOURS PRN
Status: DISCONTINUED | OUTPATIENT
Start: 2021-01-26 | End: 2021-01-27

## 2021-01-26 NOTE — PROGRESS NOTES
HPI:    Patient ID: Kp Leonardo is a 77year old female.     Patient initially presented today for preop medical clearance because she needed laser therapy for I which has been diagnosed of glaucoma however she also started to complain of having inc tablet 0   • Blood Glucose Monitoring Suppl (ONETOUCH ULTRA 2) w/Device Does not apply Kit Check glucose daily 1 kit 0   • Glucose Blood (ONETOUCH ULTRA) In Vitro Strip Check glucose daily 100 strip 3   • OneTouch UltraSoft Lancets Does not apply Misc Chec developed myalgia and myopathy  Xanax [Alprazolam]      RESTLESSNESS  Ace Inhibitors          Coughing    HISTORY:  Past Medical History:   Diagnosis Date   • Age-related cataract of left eye 5/10/2018   • Anxiety    • Asthma    • Atherosclerosis of corona rt breast 77      Social History: Social History    Tobacco Use      Smoking status: Never Smoker      Smokeless tobacco: Never Used    Alcohol use: No      Comment: rarely at weddings    Drug use: No       PHYSICAL EXAM:    Physical Exam   Constitutio patient coming in for evaluation.    (J44.9) Asthma with COPD (chronic [facility]  Plan: see above. No orders of the defined types were placed in this encounter.       Meds This Visit:  Requested Prescriptions      No presc

## 2021-01-26 NOTE — H&P
[facility]    PATIENT'S NAME: Halima Hortencia   ATTENDING PHYSICIAN: Debo Ruiz MD   PATIENT ACCOUNT#:   [de-identified]    LOCATION:  [place] [address]  MEDICAL RECORD #:   [id number]       YOB: 1945  ADMISSION DATE: Retired. Lives by herself. Noncompliant with low salt diet. Usually independent in her basic activities of daily living. REVIEW OF SYSTEMS:  The patient described progressive dyspnea on exertion, orthopnea. No chest pain, wheezing.   No cough, no fe Alejandro Mariscal MD  d: 01/26/2021 16:49:34  t: 01/26/2021 16:53:07  [facility] [id number]  FB/

## 2021-01-26 NOTE — TELEPHONE ENCOUNTER
Advised patient of Dr Eden Sandoval note. Patient verbalized understanding and had no further questions.

## 2021-01-26 NOTE — ED PROVIDER NOTES
Patient Seen in: [facility] AND [facility] Emergency Department      History   Patient presents with:  Difficulty Breathing    Stated Complaint: SOB SP02 88-90%. Hx asthma & Diabetes. NO cough No Covid exposure.     HPI/Subjective:   HPI    71-year-old female pre HERNIA SURGERY     • LUMPECTOMY RIGHT  2014   • MASTECTOMY LEFT  1999   • [address] CATH INSERTION Left 3/29/2019    Performed by Soo Hurst MD at [facility] OR   • [address] CATH REMOVAL Left 4/29/2019    Performed by Soo Hurst MD at [address] [place] [address] rash.   Neurological:      General: No focal deficit present. Mental Status: She is alert and oriented to person, place, and time.                ED Course     Labs Reviewed   BASIC METABOLIC PANEL (8) - Abnormal; Notable for the following components: CHEST PE AORTA (IV ONLY) (CPT=71260)   Final Result    PROCEDURE: CT CHEST PE AORTA (IV ONLY) (CPT=71260)         COMPARISON: [facility], CT CHEST PAIN/PE (IV ONLY) EM     (CPT=71260), 4/19/2019, 4:37 PM.         INDICATIONS: Hypoxia, dyspn sternotomy changes. Numerous surgical clips in the left     axilla suggests prior lymph node dissection. No axillary adenopathy or     chest wall mass is evident in the imaged volume.     LIMITED ABDOMEN: Mild hepatomegaly with borderline low attenuation degenerative disc disease and     spondylosis. Multiple old healed left-sided rib fracture deformities. OTHER: Multiple surgical clips in left axilla/lateral chest                        =====    CONCLUSION:     1.  [facility]    Disposition:  Admit  1/26/2021  7:00 pm    Follow-up:  No follow-up provider specified.   We recommend that you schedule follow up care with a primary care provider within the next three months to obtain basic health screening including reassessmen

## 2021-01-26 NOTE — ED NOTES
Troponin 0.111 value given to Dr. Dagoberto Jay. Patient's blood pressure elevated. MD notified. No further orders at this time.

## 2021-01-26 NOTE — TELEPHONE ENCOUNTER
Patient cancelled today's appt with Dr Suly Rudolph due to bad weather, reports will also be postponing her eye surgery. Reports breathing is better today, advised to monitor and call back or ED for difficulty breathing.

## 2021-01-26 NOTE — ED INITIAL ASSESSMENT (HPI)
Patient sent by PCP for low oxygen saturation. Patient states she feels short of breath and feels this may be a COPD exacerbation.

## 2021-01-27 ENCOUNTER — APPOINTMENT (OUTPATIENT)
Dept: CV DIAGNOSTICS | Facility: HOSPITAL | Age: 76
DRG: 291 | End: 2021-01-27
Attending: HOSPITALIST
Payer: MEDICARE

## 2021-01-27 VITALS
SYSTOLIC BLOOD PRESSURE: 130 MMHG | WEIGHT: 165.88 LBS | RESPIRATION RATE: 18 BRPM | BODY MASS INDEX: 27.97 KG/M2 | OXYGEN SATURATION: 94 % | HEIGHT: 64.5 IN | TEMPERATURE: 98 F | DIASTOLIC BLOOD PRESSURE: 68 MMHG | HEART RATE: 81 BPM

## 2021-01-27 VITALS
BODY MASS INDEX: 29.71 KG/M2 | HEIGHT: 64 IN | OXYGEN SATURATION: 88 % | TEMPERATURE: 99 F | WEIGHT: 174 LBS | HEART RATE: 78 BPM | SYSTOLIC BLOOD PRESSURE: 127 MMHG | DIASTOLIC BLOOD PRESSURE: 71 MMHG

## 2021-01-27 LAB
ANION GAP SERPL CALC-SCNC: 4 MMOL/L (ref 0–18)
BASOPHILS # BLD AUTO: 0.01 X10(3) UL (ref 0–0.2)
BASOPHILS NFR BLD AUTO: 0.1 %
BUN BLD-MCNC: 21 MG/DL (ref 7–18)
BUN/CREAT SERPL: 17.2 (ref 10–20)
CALCIUM BLD-MCNC: 10.1 MG/DL (ref 8.5–10.1)
CHLORIDE SERPL-SCNC: 97 MMOL/L (ref 98–112)
CO2 SERPL-SCNC: 37 MMOL/L (ref 21–32)
CREAT BLD-MCNC: 1.22 MG/DL
DEPRECATED RDW RBC AUTO: 49.8 FL (ref 35.1–46.3)
EOSINOPHIL # BLD AUTO: 0 X10(3) UL (ref 0–0.7)
EOSINOPHIL NFR BLD AUTO: 0 %
ERYTHROCYTE [DISTWIDTH] IN BLOOD BY AUTOMATED COUNT: 14.6 % (ref 11–15)
GLUCOSE BLD-MCNC: 155 MG/DL (ref 70–99)
HAV IGM SER QL: 2.3 MG/DL (ref 1.6–2.6)
HCT VFR BLD AUTO: 46.2 %
HGB BLD-MCNC: 14.1 G/DL
IMM GRANULOCYTES # BLD AUTO: 0.04 X10(3) UL (ref 0–1)
IMM GRANULOCYTES NFR BLD: 0.6 %
LYMPHOCYTES # BLD AUTO: 0.81 X10(3) UL (ref 1–4)
LYMPHOCYTES NFR BLD AUTO: 11.7 %
MCH RBC QN AUTO: 28.7 PG (ref 26–34)
MCHC RBC AUTO-ENTMCNC: 30.5 G/DL (ref 31–37)
MCV RBC AUTO: 94.1 FL
MONOCYTES # BLD AUTO: 0.27 X10(3) UL (ref 0.1–1)
MONOCYTES NFR BLD AUTO: 3.9 %
NEUTROPHILS # BLD AUTO: 5.8 X10 (3) UL (ref 1.5–7.7)
NEUTROPHILS # BLD AUTO: 5.8 X10(3) UL (ref 1.5–7.7)
NEUTROPHILS NFR BLD AUTO: 83.7 %
OSMOLALITY SERPL CALC.SUM OF ELEC: 292 MOSM/KG (ref 275–295)
PLATELET # BLD AUTO: 239 10(3)UL (ref 150–450)
POTASSIUM SERPL-SCNC: 4.2 MMOL/L (ref 3.5–5.1)
RBC # BLD AUTO: 4.91 X10(6)UL
SODIUM SERPL-SCNC: 138 MMOL/L (ref 136–145)
WBC # BLD AUTO: 6.9 X10(3) UL (ref 4–11)

## 2021-01-27 PROCEDURE — 93306 TTE W/DOPPLER COMPLETE: CPT | Performed by: HOSPITALIST

## 2021-01-27 PROCEDURE — 99232 SBSQ HOSP IP/OBS MODERATE 35: CPT | Performed by: STUDENT IN AN ORGANIZED HEALTH CARE EDUCATION/TRAINING PROGRAM

## 2021-01-27 PROCEDURE — 99239 HOSP IP/OBS DSCHRG MGMT >30: CPT | Performed by: HOSPITALIST

## 2021-01-27 PROCEDURE — 99223 1ST HOSP IP/OBS HIGH 75: CPT | Performed by: INTERNAL MEDICINE

## 2021-01-27 RX ORDER — BRIMONIDINE TARTRATE 2 MG/ML
1 SOLUTION/ DROPS OPHTHALMIC EVERY 8 HOURS SCHEDULED
Status: DISCONTINUED | OUTPATIENT
Start: 2021-01-27 | End: 2021-01-27

## 2021-01-27 RX ORDER — LATANOPROST 50 UG/ML
1 SOLUTION/ DROPS OPHTHALMIC NIGHTLY
Status: DISCONTINUED | OUTPATIENT
Start: 2021-01-27 | End: 2021-01-27

## 2021-01-27 RX ORDER — METOPROLOL SUCCINATE 25 MG/1
25 TABLET, EXTENDED RELEASE ORAL DAILY
Status: DISCONTINUED | OUTPATIENT
Start: 2021-01-27 | End: 2021-01-27

## 2021-01-27 RX ORDER — IPRATROPIUM BROMIDE AND ALBUTEROL SULFATE 2.5; .5 MG/3ML; MG/3ML
3 SOLUTION RESPIRATORY (INHALATION)
Status: DISCONTINUED | OUTPATIENT
Start: 2021-01-27 | End: 2021-01-27

## 2021-01-27 RX ORDER — EZETIMIBE 10 MG/1
10 TABLET ORAL NIGHTLY
Status: DISCONTINUED | OUTPATIENT
Start: 2021-01-27 | End: 2021-01-27

## 2021-01-27 RX ORDER — HYDRALAZINE HYDROCHLORIDE 25 MG/1
12.5 TABLET, FILM COATED ORAL NIGHTLY
Status: DISCONTINUED | OUTPATIENT
Start: 2021-01-27 | End: 2021-01-27

## 2021-01-27 RX ORDER — METHYLPREDNISOLONE SODIUM SUCCINATE 40 MG/ML
40 INJECTION, POWDER, LYOPHILIZED, FOR SOLUTION INTRAMUSCULAR; INTRAVENOUS EVERY 12 HOURS
Status: DISCONTINUED | OUTPATIENT
Start: 2021-01-27 | End: 2021-01-27

## 2021-01-27 RX ORDER — ASPIRIN 325 MG
325 TABLET ORAL DAILY
Status: DISCONTINUED | OUTPATIENT
Start: 2021-01-27 | End: 2021-01-27

## 2021-01-27 RX ORDER — FUROSEMIDE 20 MG/1
20 TABLET ORAL DAILY
Status: DISCONTINUED | OUTPATIENT
Start: 2021-01-28 | End: 2021-01-27

## 2021-01-27 RX ORDER — DOXEPIN HYDROCHLORIDE 50 MG/1
1 CAPSULE ORAL DAILY
Status: DISCONTINUED | OUTPATIENT
Start: 2021-01-27 | End: 2021-01-27

## 2021-01-27 RX ORDER — PREDNISONE 20 MG/1
20 TABLET ORAL DAILY
Qty: 9 TABLET | Refills: 0 | Status: SHIPPED | OUTPATIENT
Start: 2021-01-27 | End: 2021-02-02

## 2021-01-27 RX ORDER — COLCHICINE 0.6 MG/1
0.6 TABLET ORAL DAILY
Status: DISCONTINUED | OUTPATIENT
Start: 2021-01-27 | End: 2021-01-27

## 2021-01-27 RX ORDER — AMLODIPINE BESYLATE 2.5 MG/1
2.5 TABLET ORAL NIGHTLY
Status: DISCONTINUED | OUTPATIENT
Start: 2021-01-27 | End: 2021-01-27

## 2021-01-27 RX ORDER — MONTELUKAST SODIUM 10 MG/1
10 TABLET ORAL NIGHTLY
Status: DISCONTINUED | OUTPATIENT
Start: 2021-01-27 | End: 2021-01-27

## 2021-01-27 RX ORDER — HYDRALAZINE HYDROCHLORIDE 25 MG/1
12.5 TABLET, FILM COATED ORAL 2 TIMES DAILY
Status: DISCONTINUED | OUTPATIENT
Start: 2021-01-27 | End: 2021-01-27

## 2021-01-27 RX ORDER — TIMOLOL MALEATE 5 MG/ML
1 SOLUTION/ DROPS OPHTHALMIC 2 TIMES DAILY
Status: DISCONTINUED | OUTPATIENT
Start: 2021-01-27 | End: 2021-01-27

## 2021-01-27 NOTE — ED NOTES
.Orders for admission, patient is aware of plan and ready to go upstairs. Any questions, please call ED RN Jarod Colon at extension [phone].    Type of COVID test sent: Rapid  COVID Suspicion level: Low  Titratable drug(s) infusing: None  Rate:   LOC at time of tra

## 2021-01-27 NOTE — PROGRESS NOTES
[facility]    Cardiology Progress Note  [facility]    Portillo Haley Patient Status:  Inpatient    1945 MRN [id number]   Location [facility] 3W/SW Attending Mireille Mcallister MD   Hosp Day # 1 PCP BID   • Metoprolol Succinate ER  25 mg Oral Daily   • Montelukast Sodium  10 mg Oral Nightly   • multivitamin  1 tablet Oral Daily   • latanoprost  1 drop Right Eye Nightly   • Fluticasone Furoate-Vilanterol  1 puff Inhalation Daily   • brimonidine Tartrat interstitial prominence. Differential includes mild congestive failure, mild pulmonary edema versus mild bilateral interstitial pneumonitis.     Dictated by (CST): Beverley Wood MD on 1/26/2021 at 4:20 PM     Finalized by (CST): Daily Church medications  - Discussed PCSK9 inhibitors with patient, strongly recommend due to intolerance to statin and ezetimibe; patient was indifferent and noncommittal    COPD  - Management per primary service    Currently satting well on 1 L O2, can likely be rito

## 2021-01-27 NOTE — ED NOTES
ERT at bedside for transfer at this time. Patient denies chest pain. Respirations regular and unlabored. Not in distress.

## 2021-01-27 NOTE — PROGRESS NOTES
Education on Trelegy and cost discussed. HME form signed and awaiting oxygen tank. Son will be picking her up this afternoon. Discharge education given and new medications discussed.

## 2021-01-27 NOTE — DISCHARGE SUMMARY
[facility]    Discharge Summary    Vamshi Mehta Jo Patient Status:  Inpatient    1945 MRN [id number]   Location [facility] 3W/SW Attending Dru Bean MD   Hosp Day # 1 PCP Teo Caba MD     Date of Admis Present Illness:   Per Dr. Hubbard Arrow:  Acute hypoxemic respiratory failure. HISTORY OF PRESENT ILLNESS:  The patient is a 78-year-old [employer] female who came into the emergency department for evaluation of progressive shortness of breath. CHANGE how you take these medications      Instructions Prescription details   colchicine 0.6 MG Tabs  Commonly known as: Colcrys  What changed: Another medication with the same name was removed.  Continue taking this medication, and follow the directions Place 1 drop into the right eye nightly. Refills: 0     omega-3 fatty acids 1000 MG Caps  Commonly known as: FISH OIL      Take 1,000 mg by mouth daily.    Refills: 0     True Metrix Blood Glucose Test Strp      TEST BLOOD SUGAR 4 TIMES A DAY   Refills: 0

## 2021-01-27 NOTE — ED NOTES
ED ADMIT NOTE UPDATE:   Other pertinent information: Patient oxygen saturation drops to 70% on room air when ambulating. Patient instructed she needs to use a bed pan. Patient's first troponin was elevated.  Second troponin drawn right now, currently awaiti

## 2021-01-27 NOTE — PROGRESS NOTES
Sp02 on room air at rest is 93%  Sp02 on room air while ambulating is 80%  Sp02 on 2L at rest is 92%  Sp02 on 2L while ambulating is 93%

## 2021-01-27 NOTE — CONSULTS
[facility]  Report of Consultation    Ann Age Patient Status:  Emergency    1945 MRN [id number]   Location [address] Attending Juvencio Akers MD   [address] Day # 0 PCP Eron Sheldon MD     Re 12/5/2016   • Carotid stenosis    • Closed fracture of multiple ribs of left side with routine healing, subsequent encounter 10/19/2018   • Congestive heart disease ([facility]    • COPD (chronic obstructive pulmonary disease) (HCC)     on O2 at 2 liters   • Cor SHORTNESS OF BREATH  Uloric [Febuxostat]     ITCHING, SHORTNESS OF BREATH  Adhesive Tape (Radha*    RASH  Statins                 MYALGIA    Comment:Pt had developed myalgia and myopathy  Xanax [Alprazolam]      RESTLESSNESS  Ace Inhibitors 01/26/2021    HCT 46.5 01/26/2021    MCV 95.5 01/26/2021    MCH 29.0 01/26/2021    MCHC 30.3 01/26/2021    RDW 14.8 01/26/2021    .0 01/26/2021     Lab Results   Component Value Date    INR 1.02 03/28/2019    INR 1.12 03/08/2019    INR 2.1 (H) 02/26 pending clinical course. We will be happy to follow her with you. Thanks.     Weston King  1/26/2021  9:07 PM

## 2021-01-27 NOTE — PAYOR COMM NOTE
Appropriate for inpatient status per guidelines for SOB due to COPD with hypoxia.       --------------  ADMISSION REVIEW     Eugene Hahn MA O  Subscriber #:  [id number]  Authorization Number: [id number]       ED Provider Notes             Patient Seen in 6/28/2019    Performed by Elise Hester MD at [address] [place] [address] OR   • CATARACT     • COLONOSCOPY      2013   • COLONOSCOPY  2013   • HEART CORONARY ARTERY BYPASS GRAFT N/A 2/26/2019    Performed by Elise Hester MD at [address] [place] [address] OR   • HERNIA SURGERY General: Skin is warm and dry. Findings: No rash. Neurological:      General: No focal deficit present. Mental Status: She is alert and oriented to person, place, and time.          ED Course     Labs Reviewed   BASIC METABOLIC PANEL (8) - Abno Results Available and Reviewed while in ED: CT CHEST PE AORTA (IV ONLY) (CPT=71260)   Final Result    PROCEDURE: CT CHEST PE AORTA (IV ONLY) (CPT=71260)         COMPARISON: [facility], CT CHEST PAIN/PE (IV ONLY) EM     (CPT=71260), 4/19/201 left pleural effusion. CHEST WALL: Post sternotomy changes. Numerous surgical clips in the left     axilla suggests prior lymph node dissection. No axillary adenopathy or     chest wall mass is evident in the imaged volume.     LIMITED ABDOMEN: Mild he OTHER: Multiple surgical clips in left axilla/lateral chest              =====    CONCLUSION:     1. Cardiomegaly with pulmonary venous distention. CABG. 2. Mild pulmonary vascular congestion with bibasilar interstitial     prominence.   Differential in unremarkable. CT scan of the chest to rule out PE still pending. REVIEW OF SYSTEMS:  The patient described progressive dyspnea on exertion, orthopnea. No chest pain, wheezing. No cough, no fever or chills. No recent sick contacts.   Her COVID testing diuresis and oxygen supplementation. She does not have any physical exam or imaging evidence to suggest decompensated left ventricular systolic congestive heart failure.   Borderline troponin is probably due to a combination of hypoxia superimposed on her CO2 33.0* 37.0*           Recent Labs   Lab 01/26/21  1545 01/27/21  0705   RBC 4.87 4.91   HGB 14.1 14.1   HCT 46.5 46.2   MCV 95.5 94.1   MCH 29.0 28.7   MCHC 30.3* 30.5*   RDW 14.8 14.6   NEPRELIM 4.44 5.80   WBC 7.2 6.9   .0 239.0

## 2021-01-27 NOTE — PROGRESS NOTES
[facility]    Progress Note    An Zuluaga Patient Status:  Inpatient    1945 MRN [id number]   [facility] 3W/SW Attending Haydee Watson MD   Hosp Day # 1 PCP Portillo Cohn MD       Subjective:   Dex Stuart 01/27/2021     01/27/2021    K 4.2 01/27/2021    CL 97 (L) 01/27/2021    CO2 37.0 (H) 01/27/2021     (H) 01/27/2021    CA 10.1 01/27/2021    ALB 4.1 12/10/2020    ALKPHO 82 12/10/2020    BILT 0.24 12/10/2020    TP 7.6 12/10/2020    AST 23 12/1 changes have occurred Electronically signed on 01/27/2021 at 11:51 by JENNIFER Caro         Assessment and Plan:      Acute and chronic respiratory failure  -multifactorial, copd with exacerbation and mild acute diastolic chf    Severe COPD with exacerbation

## 2021-01-27 NOTE — PLAN OF CARE
AOX4. 2 L O2. IV Lasix & Solumedrol. SBA. Plan for echo w/ doppler. Bed locked and in lowest position at all times. Call light within reach. Will continue to monitor this shift.       Problem: Patient Centered Care  Goal: Patient preferences are identified Continuous cardiac monitoring, monitor vital signs, obtain 12 lead EKG if indicated  - Evaluate effectiveness of antiarrhythmic and heart rate control medications as ordered  - Initiate emergency measures for life threatening arrhythmias  - Monitor electro

## 2021-01-27 NOTE — CM/SW NOTE
Dr German Bush asked if I could follow up on patient's insurance coverage for Trelegy inhaler before patient discharges home.  I called the pharmacy below:    [facility]/pharmacy #[id number]- [facility]

## 2021-01-27 NOTE — CONSULTS
[facility]    Report of Consultation    Wu Robison Patient Status:  Inpatient    1945 MRN [id number]   Location [facility] 3W/SW Attending Leela Gonzalez MD   Hosp Day # 1 PCP Marcela Garvin MD     Date of Ad CABG     • CAROTID ENDARTERECTOMY     • CAROTID ENDARTERECTOMY Right 6/28/2019    Performed by Viktoria Dxion MD at [address] [place] [address] MAIN OR   • CATARACT     • COLONOSCOPY      2013   • COLONOSCOPY  2013   • HEART CORONARY ARTERY BYPASS GRAFT N/A 2/26/2019    Perf Inhalation, Daily    •  brimonidine Tartrate (ALPHAGAN) 0.2 % ophthalmic solution 1 drop, 1 drop, Both Eyes, Q8H STEVEN    •  hydrALAzine HCl (APRESOLINE) tab 12.5 mg, 12.5 mg, Oral, Nightly    •  ipratropium-albuterol (DUONEB) nebulizer solution 3 mL, 3 mL, Cap, Take 1,000 mg by mouth daily. •  aspirin 325 MG Oral Tab, Take 325 mg by mouth daily. •  Multiple Vitamin (MULTI-VITAMIN DAILY) Oral Tab, Take 1 tablet by mouth daily.     •  Cholecalciferol (VITAMIN D) 2000 units Oral Cap, Take 1 capsule by mout anicteric sclera   Neck : no stridor or JVD   Chest \" diminished bilaterally with mild basilar rales and mild expiratory wheezes   Heart : s1 s2 RRR no g/m   abd : soft and benign   Ext : trace pitting edema , no calf tenderness  A,A,ox4  No focal deficit arterial hypertension. 5. Lesser incidental findings as above.      Dictated by (CST): Artem Cardenas MD on 1/26/2021 at [address] by (CST): Artem Cardenas MD on 1/26/2021 at 6:51 PM               Impression:     1–acute and chronic respiratory f

## 2021-01-27 NOTE — ED NOTES
Dr. Ariel Paul notified of two hour troponin of 0.133, no further orders at this time. Will continue to monitor.

## 2021-01-28 ENCOUNTER — TELEPHONE (OUTPATIENT)
Dept: INTERNAL MEDICINE UNIT | Facility: HOSPITAL | Age: 76
End: 2021-01-28

## 2021-01-28 ENCOUNTER — PATIENT OUTREACH (OUTPATIENT)
Dept: CASE MANAGEMENT | Age: 76
End: 2021-01-28

## 2021-01-28 NOTE — PROGRESS NOTES
1st attempt SCC/COPD apt request    [facility]  [address]  [phone]  [address]    Unable to contact patient. Will try again.

## 2021-01-28 NOTE — CM/SW NOTE
01/27/21 1800   Discharge disposition   Expected discharge disposition Home or [facility]   Discharge transportation Private car   CM received orders for discharge, and for Home Oxygen.    A referral was sent to [address]

## 2021-01-28 NOTE — PAYOR COMM NOTE
--------------  DISCHARGE REVIEW    Juve Rosalba MA [facility]  Subscriber #:  [id number]  Authorization Number: [id number]    Admit date: 1/26/21  Admit time:  2232  Discharge Date: 1/27/2021  6:29 PM     Admitting Physician: Chai Shore MD  Attending Physic associated with type 2 diabetes mellitus (HCC)     Chronic nasal congestion     S/P carotid endarterectomy     Chronic diastolic congestive heart failure (HCC)     Cervical stenosis of spinal canal     Menopause     Bilateral hearing loss     Heart failure previous     Anxiety  Consultations:   Cardiology  Pulmonology    Procedures: n/a    Complications: n/a    Discharge Condition: Good    Discharge Medications:      Discharge Medications      START taking these medications      Instructions Prescription det Soln  Commonly known as: COMBIGAN      Place 1 drop into the right eye every 12 (twelve) hours.    Refills: 0     [facility] Lancets Micro Thin 33G Misc      TEST BLOOD SUGAR 4 TIMES A DAY   Refills: 0     OneTouch UltraSoft Lancets Misc      Check glucose daily medications  · Fluticasone-Umeclidin-Vilant 100-62.5-25 MCG/INH Aepb         Follow up Visits:  Follow-up with pcp in 1 week    Follow up Labs: n/a     Other Discharge Instructions: f/u with heart failure clinic, f/u with Dr. Ally Frazier as instructed    [facility]

## 2021-01-28 NOTE — TELEPHONE ENCOUNTER
Call received from [facility] pharmacy to clarify does of prednisone taper. Discussed alysa Lacy whom clarified pt is to take:  Prednisone:   Take 40mg daily x 3 days Take 20mg x 3 days    Call back made to pharmacist with clarification

## 2021-02-02 NOTE — TELEPHONE ENCOUNTER
Current Outpatient Medications   Medication Sig Dispense Refill   • Fluticasone-Umeclidin-Vilant 100-62.5-25 MCG/INH Inhalation Aerosol Powder, Breath Activated Inhale 1 puff into the lungs daily.  1 each 0

## 2021-02-02 NOTE — TELEPHONE ENCOUNTER
Per pharmacy, pt requesting a refill on the following medications:    [place] TEST STRIP    BD SINGLE USE SWAB

## 2021-02-03 RX ORDER — BLOOD-GLUCOSE METER
1 EACH MISCELLANEOUS DAILY
Qty: 1 DEVICE | Refills: 0 | Status: SHIPPED | OUTPATIENT
Start: 2021-02-03 | End: 2021-12-23

## 2021-02-03 RX ORDER — ISOPROPYL ALCOHOL 0.75 G/1
SWAB TOPICAL
Qty: 400 EACH | Refills: 0 | Status: SHIPPED | OUTPATIENT
Start: 2021-02-03

## 2021-02-03 RX ORDER — CALCIUM CITRATE/VITAMIN D3 200MG-6.25
1 TABLET ORAL 4 TIMES DAILY
Qty: 400 STRIP | Refills: 0 | Status: SHIPPED | OUTPATIENT
Start: 2021-02-03

## 2021-02-08 ENCOUNTER — OFFICE VISIT (OUTPATIENT)
Dept: CARDIOLOGY | Age: 76
End: 2021-02-08

## 2021-02-08 VITALS
BODY MASS INDEX: 27.83 KG/M2 | HEIGHT: 64 IN | SYSTOLIC BLOOD PRESSURE: 126 MMHG | HEART RATE: 73 BPM | OXYGEN SATURATION: 94 % | WEIGHT: 163 LBS | DIASTOLIC BLOOD PRESSURE: 80 MMHG

## 2021-02-08 DIAGNOSIS — I25.10 ATHEROSCLEROSIS OF NATIVE CORONARY ARTERY OF NATIVE HEART WITHOUT ANGINA PECTORIS: ICD-10-CM

## 2021-02-08 DIAGNOSIS — E78.00 HYPERCHOLESTEROLEMIA: ICD-10-CM

## 2021-02-08 DIAGNOSIS — Z95.1 HX OF CABG: Primary | ICD-10-CM

## 2021-02-08 DIAGNOSIS — I65.21 STENOSIS OF RIGHT CAROTID ARTERY: ICD-10-CM

## 2021-02-08 DIAGNOSIS — I10 ESSENTIAL HYPERTENSION: ICD-10-CM

## 2021-02-08 PROCEDURE — 99214 OFFICE O/P EST MOD 30 MIN: CPT | Performed by: INTERNAL MEDICINE

## 2021-02-08 PROCEDURE — 3079F DIAST BP 80-89 MM HG: CPT | Performed by: INTERNAL MEDICINE

## 2021-02-08 PROCEDURE — 3074F SYST BP LT 130 MM HG: CPT | Performed by: INTERNAL MEDICINE

## 2021-02-08 ASSESSMENT — PATIENT HEALTH QUESTIONNAIRE - PHQ9
CLINICAL INTERPRETATION OF PHQ2 SCORE: NO FURTHER SCREENING NEEDED
1. LITTLE INTEREST OR PLEASURE IN DOING THINGS: NOT AT ALL
SUM OF ALL RESPONSES TO PHQ9 QUESTIONS 1 AND 2: 0
CLINICAL INTERPRETATION OF PHQ9 SCORE: NO FURTHER SCREENING NEEDED
2. FEELING DOWN, DEPRESSED OR HOPELESS: NOT AT ALL
SUM OF ALL RESPONSES TO PHQ9 QUESTIONS 1 AND 2: 0

## 2021-02-10 ENCOUNTER — TELEPHONE (OUTPATIENT)
Dept: INTERNAL MEDICINE CLINIC | Facility: CLINIC | Age: 76
End: 2021-02-10

## 2021-02-10 NOTE — TELEPHONE ENCOUNTER
[facility] Dumas is following up if site has received a form for patient's oxygen conserving device. The doctor would need to fill out the pulse volume setting, only the number/no letters. Fax # [phone].

## 2021-02-12 ENCOUNTER — TELEPHONE (OUTPATIENT)
Dept: INTERNAL MEDICINE CLINIC | Facility: CLINIC | Age: 76
End: 2021-02-12

## 2021-02-12 NOTE — TELEPHONE ENCOUNTER
Patient upset as she's still waiting for her smaller oxygen tank as she's unable to leave the home with her current heavy oxygen tank.

## 2021-02-12 NOTE — TELEPHONE ENCOUNTER
Phone call to pulmonologist last seen by patient to obtain Oxygen Conserving Device ( pulse volume setting) required on [facility] Prescription form for Oxygen Conserving Device.  S/W Sandeep Huggins who states they have not seen patient since 3/21/2018 a

## 2021-02-12 NOTE — TELEPHONE ENCOUNTER
[facility] Prescription fo rOxygen Conserving Derive received and placed in Dr. Shilpa Lockhart in basket to complete.

## 2021-02-16 RX ORDER — HYDRALAZINE HYDROCHLORIDE 25 MG/1
12.5 TABLET, FILM COATED ORAL 2 TIMES DAILY
Qty: 90 TABLET | Refills: 0 | Status: SHIPPED | OUTPATIENT
Start: 2021-02-16 | End: 2021-04-22

## 2021-02-16 RX ORDER — AMLODIPINE BESYLATE 2.5 MG/1
TABLET ORAL
Qty: 90 TABLET | Refills: 1 | Status: SHIPPED | OUTPATIENT
Start: 2021-02-16 | End: 2021-04-22 | Stop reason: ALTCHOICE

## 2021-02-16 RX ORDER — METOPROLOL SUCCINATE 25 MG/1
TABLET, EXTENDED RELEASE ORAL
Qty: 90 TABLET | Refills: 1 | Status: SHIPPED | OUTPATIENT
Start: 2021-02-16 | End: 2021-10-15

## 2021-02-16 NOTE — TELEPHONE ENCOUNTER
Prescription for Oxygen Conserving Device faxed back to [facility] with note explaining patient need office visit with pulmonologist prior t orders. Pt. Has not been seen since 1918. Fax confirmation received.  Faxed 2/12/21

## 2021-02-19 ENCOUNTER — TELEPHONE (OUTPATIENT)
Dept: INTERNAL MEDICINE CLINIC | Facility: CLINIC | Age: 76
End: 2021-02-19

## 2021-02-19 NOTE — TELEPHONE ENCOUNTER
Patient and advised that she has a question about her Oxygen Tank. The Company that supplies them is still waiting for Dr Rusty Oliver for the portable unit. Also, see TF from 2/12      Please Advise.

## 2021-02-23 ENCOUNTER — LAB ENCOUNTER (OUTPATIENT)
Dept: LAB | Age: 76
End: 2021-02-23
Attending: INTERNAL MEDICINE
Payer: MEDICARE

## 2021-02-23 ENCOUNTER — OFFICE VISIT (OUTPATIENT)
Dept: INTERNAL MEDICINE CLINIC | Facility: CLINIC | Age: 76
End: 2021-02-23
Payer: MEDICARE

## 2021-02-23 VITALS
TEMPERATURE: 99 F | WEIGHT: 168 LBS | HEIGHT: 64 IN | DIASTOLIC BLOOD PRESSURE: 70 MMHG | RESPIRATION RATE: 12 BRPM | OXYGEN SATURATION: 95 % | BODY MASS INDEX: 28.68 KG/M2 | HEART RATE: 80 BPM | SYSTOLIC BLOOD PRESSURE: 126 MMHG

## 2021-02-23 DIAGNOSIS — N18.30 CONTROLLED TYPE 2 DIABETES MELLITUS WITH STAGE 3 CHRONIC KIDNEY DISEASE, WITHOUT LONG-TERM CURRENT USE OF INSULIN (HCC): ICD-10-CM

## 2021-02-23 DIAGNOSIS — Z12.11 COLON CANCER SCREENING: ICD-10-CM

## 2021-02-23 DIAGNOSIS — E11.22 CONTROLLED TYPE 2 DIABETES MELLITUS WITH STAGE 3 CHRONIC KIDNEY DISEASE, WITHOUT LONG-TERM CURRENT USE OF INSULIN (HCC): ICD-10-CM

## 2021-02-23 DIAGNOSIS — E11.69 HYPERLIPIDEMIA ASSOCIATED WITH TYPE 2 DIABETES MELLITUS (HCC): ICD-10-CM

## 2021-02-23 DIAGNOSIS — M1A.9XX1 CHRONIC TOPHACEOUS GOUT: ICD-10-CM

## 2021-02-23 DIAGNOSIS — Z00.00 MEDICARE ANNUAL WELLNESS VISIT, SUBSEQUENT: Primary | ICD-10-CM

## 2021-02-23 DIAGNOSIS — I50.32 CHRONIC DIASTOLIC CONGESTIVE HEART FAILURE (HCC): ICD-10-CM

## 2021-02-23 DIAGNOSIS — E11.59 HYPERTENSION ASSOCIATED WITH TYPE 2 DIABETES MELLITUS (HCC): ICD-10-CM

## 2021-02-23 DIAGNOSIS — E78.5 HYPERLIPIDEMIA ASSOCIATED WITH TYPE 2 DIABETES MELLITUS (HCC): ICD-10-CM

## 2021-02-23 DIAGNOSIS — I25.10 CORONARY ARTERY DISEASE INVOLVING NATIVE CORONARY ARTERY OF NATIVE HEART WITHOUT ANGINA PECTORIS: ICD-10-CM

## 2021-02-23 DIAGNOSIS — G89.29 CHRONIC LEFT EAR PAIN: ICD-10-CM

## 2021-02-23 DIAGNOSIS — J44.9 COPD, SEVERE (HCC): ICD-10-CM

## 2021-02-23 DIAGNOSIS — H91.93 BILATERAL HEARING LOSS, UNSPECIFIED HEARING LOSS TYPE: ICD-10-CM

## 2021-02-23 DIAGNOSIS — I15.2 HYPERTENSION ASSOCIATED WITH TYPE 2 DIABETES MELLITUS (HCC): ICD-10-CM

## 2021-02-23 DIAGNOSIS — Z95.1 S/P CABG (CORONARY ARTERY BYPASS GRAFT): ICD-10-CM

## 2021-02-23 DIAGNOSIS — Z91.81 AT RISK FOR FALLS: ICD-10-CM

## 2021-02-23 DIAGNOSIS — H92.02 CHRONIC LEFT EAR PAIN: ICD-10-CM

## 2021-02-23 DIAGNOSIS — Z12.31 ENCOUNTER FOR SCREENING MAMMOGRAM FOR BREAST CANCER: ICD-10-CM

## 2021-02-23 DIAGNOSIS — Z98.890 S/P CAROTID ENDARTERECTOMY: ICD-10-CM

## 2021-02-23 DIAGNOSIS — N18.31 STAGE 3A CHRONIC KIDNEY DISEASE (HCC): ICD-10-CM

## 2021-02-23 LAB
ALBUMIN SERPL-MCNC: 3.6 G/DL (ref 3.4–5)
ALBUMIN/GLOB SERPL: 0.9 {RATIO} (ref 1–2)
ALP LIVER SERPL-CCNC: 94 U/L
ALT SERPL-CCNC: 27 U/L
ANION GAP SERPL CALC-SCNC: 7 MMOL/L (ref 0–18)
AST SERPL-CCNC: 19 U/L (ref 15–37)
BILIRUB SERPL-MCNC: 0.3 MG/DL (ref 0.1–2)
BUN BLD-MCNC: 24 MG/DL (ref 7–18)
BUN/CREAT SERPL: 21.1 (ref 10–20)
CALCIUM BLD-MCNC: 10.4 MG/DL (ref 8.5–10.1)
CHLORIDE SERPL-SCNC: 103 MMOL/L (ref 98–112)
CHOLEST SMN-MCNC: 275 MG/DL (ref ?–200)
CO2 SERPL-SCNC: 29 MMOL/L (ref 21–32)
CREAT BLD-MCNC: 1.14 MG/DL
EST. AVERAGE GLUCOSE BLD GHB EST-MCNC: 134 MG/DL (ref 68–126)
GLOBULIN PLAS-MCNC: 3.9 G/DL (ref 2.8–4.4)
GLUCOSE BLD-MCNC: 116 MG/DL (ref 70–99)
HBA1C MFR BLD HPLC: 6.3 % (ref ?–5.7)
HDLC SERPL-MCNC: 43 MG/DL (ref 40–59)
LDLC SERPL CALC-MCNC: 185 MG/DL (ref ?–100)
M PROTEIN MFR SERPL ELPH: 7.5 G/DL (ref 6.4–8.2)
NONHDLC SERPL-MCNC: 232 MG/DL (ref ?–130)
OSMOLALITY SERPL CALC.SUM OF ELEC: 293 MOSM/KG (ref 275–295)
PATIENT FASTING Y/N/NP: YES
PATIENT FASTING Y/N/NP: YES
POTASSIUM SERPL-SCNC: 4.7 MMOL/L (ref 3.5–5.1)
SODIUM SERPL-SCNC: 139 MMOL/L (ref 136–145)
TRIGL SERPL-MCNC: 236 MG/DL (ref 30–149)
URATE SERPL-MCNC: 9.7 MG/DL
VLDLC SERPL CALC-MCNC: 47 MG/DL (ref 0–30)

## 2021-02-23 PROCEDURE — 84550 ASSAY OF BLOOD/URIC ACID: CPT

## 2021-02-23 PROCEDURE — 80053 COMPREHEN METABOLIC PANEL: CPT

## 2021-02-23 PROCEDURE — 3078F DIAST BP <80 MM HG: CPT | Performed by: INTERNAL MEDICINE

## 2021-02-23 PROCEDURE — 99397 PER PM REEVAL EST PAT 65+ YR: CPT | Performed by: INTERNAL MEDICINE

## 2021-02-23 PROCEDURE — G0439 PPPS, SUBSEQ VISIT: HCPCS | Performed by: INTERNAL MEDICINE

## 2021-02-23 PROCEDURE — 3008F BODY MASS INDEX DOCD: CPT | Performed by: INTERNAL MEDICINE

## 2021-02-23 PROCEDURE — 80061 LIPID PANEL: CPT

## 2021-02-23 PROCEDURE — 83036 HEMOGLOBIN GLYCOSYLATED A1C: CPT

## 2021-02-23 PROCEDURE — 36415 COLL VENOUS BLD VENIPUNCTURE: CPT

## 2021-02-23 PROCEDURE — 3074F SYST BP LT 130 MM HG: CPT | Performed by: INTERNAL MEDICINE

## 2021-02-23 PROCEDURE — 96160 PT-FOCUSED HLTH RISK ASSMT: CPT | Performed by: INTERNAL MEDICINE

## 2021-02-23 NOTE — PROGRESS NOTES
HPI:   Wu Robison is a 77year old female who presents for a Medicare Subsequent Annual Wellness visit (Pt already had Initial Annual Wellness).       Annual Physical due on 07/21/2021        Fall/Risk Assessment   She has been screened for Falls a it on her medication list.   CAGE Alcohol screening   Ariana Osorio was screened for Alcohol abuse and had a score of 0 so is at low risk.     Patient Care Team: Patient Care Team:  Kishroe Matthews MD as PCP - General (Internal Medicine)  Vielka Townsend She is allergic to allopurinol; dog epithelium; dust; smoke; uloric [febuxostat]; adhesive tape (rosins); statins; xanax [alprazolam]; and ace inhibitors.     CURRENT MEDICATIONS:     •  AMLODIPINE BESYLATE 2.5 MG Oral Tab, TAKE 1 TABLET EVERY NIGHT    • (VITAMIN D) 2000 units Oral Cap, Take 1 capsule by mouth daily.        MEDICAL INFORMATION:   She  has a past medical history of Age-related cataract of left eye (5/10/2018), Anxiety, Asthma, Atherosclerosis of coronary artery, Breast CA ([facility] [address] (1999), Keven or anxiety  HEMATOLOGIC: denies hx of anemia  ENDOCRINE: denies thyroid history  ALL/ASTHMA: + hx of allergy or asthma    EXAM:   /70 (Cuff Size: adult)   Pulse 80   Temp 99.2 °F (37.3 °C) (Tympanic)   Resp 12   Ht 5' 4\" (1.626 m)   Wt 168 lb (76.2 (61352) 11/20/2018   • Pneumococcal (Prevnar 13) 05/10/2018   • Pneumovax 23 07/23/2019   Deferred Date(s) Deferred   • FLUAD High Dose 72 yr and older (49556) 10/20/2018        ASSESSMENT AND OTHER RELEVANT CHRONIC CONDITIONS:   Suleiman leon a 7 panel.  Continue low-fat low-cholesterol diet. She hasnt been able to tolerate statins before due to myalgias.     (E11.59,  I10) Hypertension associated with type 2 diabetes mellitus ([address]  Plan: Blood pressure controlled.   Continue current blood pressure 10 pounds without trying?: 2 - No  Has your appetite been poor?: No  How does the patient maintain a good energy level?: Other(Bicycle at home)  How would you describe your current health state?: Fair  How do you maintain positive mental well-being?: Visit preventive care reminders to display for this patient.  Update Health Maintenance if applicable     Immunizations (Update Immunization Activity if applicable)     Influenza  Covered Annually 11/10/2020 Please get every year    Pneumococcal 13 (Prevnar)  Cov

## 2021-02-24 ENCOUNTER — TELEPHONE (OUTPATIENT)
Dept: INTERNAL MEDICINE CLINIC | Facility: CLINIC | Age: 76
End: 2021-02-24

## 2021-02-24 DIAGNOSIS — R09.02 HYPOXEMIA: ICD-10-CM

## 2021-02-24 DIAGNOSIS — J44.9 COPD, SEVERE (HCC): Primary | ICD-10-CM

## 2021-02-25 NOTE — TELEPHONE ENCOUNTER
pls assist/help pt with regards to her home O2 thru HME .  Pt was discharged home hospital and had COPD exacerbation with hypoxemia thus was discharged on home O2

## 2021-02-25 NOTE — TELEPHONE ENCOUNTER
Referral, demos and insurance card were faxed to [facility] [employer]. Office note wasn't sent because its not finished yet. I left a message for patient to call back.  If she calls back please inform her that the order was sent to [facility] [employer] a

## 2021-02-26 NOTE — TELEPHONE ENCOUNTER
S/W staff at Dr.A. Steen's office(pulmonologist ) asking for fax number to send DME form to them to complete. Staff state patient needs a f/u visit with them prior to completing form. That patient hasn't seen them in quiet some time and was supposed to hav

## 2021-02-26 NOTE — TELEPHONE ENCOUNTER
LMTCB. Need to let patient know that Dr. Jurado Sessions wants to see her in the office before he will complete the forms for Oxygent supplies. Her last office visit with them was 12/17/2019. Office number given to call back. She should call [phone] to schedule Repair Type: Complex

## 2021-02-28 ENCOUNTER — TELEPHONE (OUTPATIENT)
Dept: INTERNAL MEDICINE CLINIC | Facility: CLINIC | Age: 76
End: 2021-02-28

## 2021-02-28 DIAGNOSIS — I25.10 CORONARY ARTERY DISEASE INVOLVING NATIVE CORONARY ARTERY OF NATIVE HEART WITHOUT ANGINA PECTORIS: ICD-10-CM

## 2021-02-28 DIAGNOSIS — E83.52 HYPERCALCEMIA: Primary | ICD-10-CM

## 2021-02-28 DIAGNOSIS — E78.2 MIXED HYPERLIPIDEMIA: ICD-10-CM

## 2021-02-28 PROBLEM — G89.29 CHRONIC LEFT EAR PAIN: Status: ACTIVE | Noted: 2021-02-28

## 2021-02-28 PROBLEM — I50.9 HEART FAILURE (HCC): Status: RESOLVED | Noted: 2021-01-26 | Resolved: 2021-02-28

## 2021-02-28 PROBLEM — R09.81 CHRONIC NASAL CONGESTION: Status: RESOLVED | Noted: 2019-05-27 | Resolved: 2021-02-28

## 2021-02-28 PROBLEM — J44.9 COPD, SEVERE (HCC): Status: ACTIVE | Noted: 2018-08-01

## 2021-02-28 PROBLEM — J21.9 ACUTE BRONCHIOLITIS: Status: RESOLVED | Noted: 2021-01-26 | Resolved: 2021-02-28

## 2021-02-28 PROBLEM — H92.02 CHRONIC LEFT EAR PAIN: Status: ACTIVE | Noted: 2021-02-28

## 2021-02-28 PROBLEM — Z78.0 MENOPAUSE: Status: RESOLVED | Noted: 2020-07-21 | Resolved: 2021-02-28

## 2021-02-28 PROBLEM — J44.1 COPD EXACERBATION (HCC): Status: RESOLVED | Noted: 2021-01-26 | Resolved: 2021-02-28

## 2021-02-28 PROBLEM — I50.33 ACUTE ON CHRONIC DIASTOLIC (CONGESTIVE) HEART FAILURE (HCC): Status: RESOLVED | Noted: 2021-01-26 | Resolved: 2021-02-28

## 2021-02-28 PROBLEM — I50.9 HEART FAILURE, UNSPECIFIED HF CHRONICITY, UNSPECIFIED HEART FAILURE TYPE (HCC): Status: RESOLVED | Noted: 2021-01-26 | Resolved: 2021-02-28

## 2021-03-01 ENCOUNTER — TELEPHONE (OUTPATIENT)
Dept: PULMONOLOGY | Facility: CLINIC | Age: 76
End: 2021-03-01

## 2021-03-01 NOTE — TELEPHONE ENCOUNTER
Patient called in to get refill on medication bryce thompson 100 mcg. I do not see this on the chart, she is requesting this to be sent to [address] [place] [address] [phone], [phone] . Please foll

## 2021-03-01 NOTE — TELEPHONE ENCOUNTER
Patient returning call, provided message below and transferred to pulmonology department to schedule her appointment.

## 2021-03-02 ENCOUNTER — TELEPHONE (OUTPATIENT)
Dept: CARDIOLOGY | Age: 76
End: 2021-03-02

## 2021-03-03 NOTE — TELEPHONE ENCOUNTER
Prescribed Joel by Dr. Jurado Sessions in hospital 2/3/21 #3 with 3 refills. Spoke with patient and she will contact [facility] for refills as they should have it available.

## 2021-03-22 ENCOUNTER — TELEPHONE (OUTPATIENT)
Dept: PULMONOLOGY | Facility: CLINIC | Age: 76
End: 2021-03-22

## 2021-03-22 ENCOUNTER — OFFICE VISIT (OUTPATIENT)
Dept: PULMONOLOGY | Facility: CLINIC | Age: 76
End: 2021-03-22
Payer: MEDICARE

## 2021-03-22 VITALS
WEIGHT: 172 LBS | OXYGEN SATURATION: 91 % | RESPIRATION RATE: 18 BRPM | TEMPERATURE: 99 F | DIASTOLIC BLOOD PRESSURE: 68 MMHG | SYSTOLIC BLOOD PRESSURE: 133 MMHG | BODY MASS INDEX: 29.37 KG/M2 | HEIGHT: 64 IN | HEART RATE: 86 BPM

## 2021-03-22 DIAGNOSIS — J44.9 CHRONIC OBSTRUCTIVE PULMONARY DISEASE, UNSPECIFIED COPD TYPE (HCC): Primary | ICD-10-CM

## 2021-03-22 DIAGNOSIS — J96.11 CHRONIC RESPIRATORY FAILURE WITH HYPOXIA AND HYPERCAPNIA (HCC): ICD-10-CM

## 2021-03-22 DIAGNOSIS — J96.12 CHRONIC RESPIRATORY FAILURE WITH HYPOXIA AND HYPERCAPNIA (HCC): ICD-10-CM

## 2021-03-22 PROCEDURE — 3008F BODY MASS INDEX DOCD: CPT | Performed by: INTERNAL MEDICINE

## 2021-03-22 PROCEDURE — 3078F DIAST BP <80 MM HG: CPT | Performed by: INTERNAL MEDICINE

## 2021-03-22 PROCEDURE — 99214 OFFICE O/P EST MOD 30 MIN: CPT | Performed by: INTERNAL MEDICINE

## 2021-03-22 PROCEDURE — 3075F SYST BP GE 130 - 139MM HG: CPT | Performed by: INTERNAL MEDICINE

## 2021-03-22 NOTE — TELEPHONE ENCOUNTER
DME order, office visit note, and ambulatory oximetry faxed to [facility]. Fax confirmation received.

## 2021-03-22 NOTE — PROGRESS NOTES
HPI/Subjective:   Patient ID: Tong Young is a 77year old female.     HPI    Claimed that her inhaler Trelegy helped her significantly and improved her exercise tolerance  No active cough or sputum  Still with dyspnea upon exertion and better with glucose daily 1 kit 0   • Glucose Blood (ONETOUCH ULTRA) In Vitro Strip Check glucose daily 100 strip 3   • OneTouch UltraSoft Lancets Does not apply Misc Check glucose daily 100 each 3   • MONTELUKAST SODIUM 10 MG Oral Tab TAKE 1 TABLET EVERY NIGHT (Tosha BREATH  Uloric [Febuxostat]     ITCHING, SHORTNESS OF BREATH  Adhesive Tape (Radha*    RASH  Statins                 MYALGIA    Comment:Pt had developed myalgia and myopathy  Xanax [Alprazolam]      RESTLESSNESS  Ace Inhibitors          Coughing    Objectiv needed  Awaiting for COVID-19 vaccination with Ania Matthews        2- chronic respiratory failure   Still requires home O2  6-minute walk in my office today her O2 sat dropped less than 86% on room air recovered with 2 L of oxygen to 96%  Patient will

## 2021-03-25 NOTE — TELEPHONE ENCOUNTER
Spoke with Ann Rubin at [facility] to follow-up on order. Ann Rubin states patient has scheduled delivery for portable oxygen today.

## 2021-03-29 ENCOUNTER — NURSE TRIAGE (OUTPATIENT)
Dept: INTERNAL MEDICINE CLINIC | Facility: CLINIC | Age: 76
End: 2021-03-29

## 2021-03-29 NOTE — TELEPHONE ENCOUNTER
Advised patient of Dr. Love Hogan note. Patient verbalized understanding  Patient scheduled to see Dr. Love Hogan tomorrow at 9:30 a.m.

## 2021-03-29 NOTE — TELEPHONE ENCOUNTER
Action Requested: Summary for Provider     []  Critical Lab, Recommendations Needed  [x] Need Additional Advice  []   FYI    []   Need Orders  [] Need Medications Sent to Pharmacy  []  Other     SUMMARY: Patient reports increase in her BP, please confirm r

## 2021-03-29 NOTE — TELEPHONE ENCOUNTER
Have pt see me tomorrow at  9:30 am as an add on. Have her bring her bp monitor also so I can compare it with ours. bp done on last visit a week ago with pulmnologist showed bp at 133/68 so I want to make sure her bp monitor is accurate.

## 2021-03-30 ENCOUNTER — OFFICE VISIT (OUTPATIENT)
Dept: INTERNAL MEDICINE CLINIC | Facility: CLINIC | Age: 76
End: 2021-03-30
Payer: MEDICARE

## 2021-03-30 ENCOUNTER — LAB ENCOUNTER (OUTPATIENT)
Dept: LAB | Age: 76
End: 2021-03-30
Attending: INTERNAL MEDICINE
Payer: MEDICARE

## 2021-03-30 VITALS
HEART RATE: 81 BPM | TEMPERATURE: 98 F | HEIGHT: 64.5 IN | DIASTOLIC BLOOD PRESSURE: 73 MMHG | RESPIRATION RATE: 12 BRPM | SYSTOLIC BLOOD PRESSURE: 128 MMHG | OXYGEN SATURATION: 97 % | WEIGHT: 165 LBS | BODY MASS INDEX: 27.83 KG/M2

## 2021-03-30 DIAGNOSIS — R22.9 SKIN NODULE: ICD-10-CM

## 2021-03-30 DIAGNOSIS — E83.52 HYPERCALCEMIA: ICD-10-CM

## 2021-03-30 DIAGNOSIS — I10 ESSENTIAL HYPERTENSION: Primary | ICD-10-CM

## 2021-03-30 PROCEDURE — 36415 COLL VENOUS BLD VENIPUNCTURE: CPT

## 2021-03-30 PROCEDURE — 82310 ASSAY OF CALCIUM: CPT

## 2021-03-30 PROCEDURE — 83970 ASSAY OF PARATHORMONE: CPT

## 2021-03-30 PROCEDURE — 3078F DIAST BP <80 MM HG: CPT | Performed by: INTERNAL MEDICINE

## 2021-03-30 PROCEDURE — 3008F BODY MASS INDEX DOCD: CPT | Performed by: INTERNAL MEDICINE

## 2021-03-30 PROCEDURE — 3074F SYST BP LT 130 MM HG: CPT | Performed by: INTERNAL MEDICINE

## 2021-03-30 PROCEDURE — 82040 ASSAY OF SERUM ALBUMIN: CPT

## 2021-03-30 PROCEDURE — 99214 OFFICE O/P EST MOD 30 MIN: CPT | Performed by: INTERNAL MEDICINE

## 2021-03-30 RX ORDER — HYDRALAZINE HYDROCHLORIDE 25 MG/1
25 TABLET, FILM COATED ORAL 2 TIMES DAILY
Qty: 180 TABLET | Refills: 1 | Status: SHIPPED | OUTPATIENT
Start: 2021-03-30 | End: 2021-09-10

## 2021-03-30 RX ORDER — AMLODIPINE BESYLATE 5 MG/1
5 TABLET ORAL DAILY
Qty: 90 TABLET | Refills: 1 | Status: SHIPPED | OUTPATIENT
Start: 2021-03-30 | End: 2021-09-10

## 2021-03-30 NOTE — PROGRESS NOTES
HPI/Subjective:     Patient ID: Wu Robison is a 77year old female. Hypertension  This is a chronic problem. The current episode started more than 1 year ago. The problem has been waxing and waning since onset. The problem is controlled.  Pertin Strip 1 strip by In Vitro route 4 (four) times daily.  400 strip 0   • Blood Glucose Monitoring Suppl (ONETOUCH ULTRA 2) w/Device Does not apply Kit Check glucose daily 1 kit 0   • Glucose Blood (ONETOUCH ULTRA) In Vitro Strip Check glucose daily 100 strip not taking: Reported on 3/30/2021 ) 1 Box 1   • Acetaminophen (TYLENOL) 325 MG Oral Cap Take 650 mg by mouth as needed.    (Patient not taking: Reported on 3/22/2021 ) 120 capsule 0     Allergies:  Allopurinol             HIVES, SWELLING, SHORTNESS OF MAIN OR   • HERNIA SURGERY     • LUMPECTOMY RIGHT  2014   • MASTECTOMY LEFT  1999   • [address] CATH INSERTION Left 3/29/2019    Performed by Estefania Baker MD at [address] [place] [address] MAIN OR   • [address] CATH REMOVAL Left 4/29/2019    Performed by Estefania Baker MD a General: No tenderness. Normal range of motion. Cervical back: Normal range of motion and neck supple. Right lower leg: No edema. Left lower leg: No edema. Lymphadenopathy:      Cervical: No cervical adenopathy.    Skin:     General: eval.          No orders of the defined types were placed in this encounter.       Meds This Visit:  Requested Prescriptions      No prescriptions requested or ordered in this encounter       Imaging & Referrals:  None

## 2021-04-02 ENCOUNTER — TELEPHONE (OUTPATIENT)
Dept: INTERNAL MEDICINE CLINIC | Facility: CLINIC | Age: 76
End: 2021-04-02

## 2021-04-02 DIAGNOSIS — E21.3 HYPERPARATHYROIDISM (HCC): Primary | ICD-10-CM

## 2021-04-02 NOTE — TELEPHONE ENCOUNTER
Patient states for 3 days now, she's been experiencing muscle weakness, her knee \"giving out\" a few times, lack of energy, abdominal pain (\"the lining of my stomach, and whenever I press in on it.\"), joint pain.   Patient is attributing these symptoms t

## 2021-04-02 NOTE — TELEPHONE ENCOUNTER
trelegy inhaler prescribed by her pulmonologist so would suggest calling them and let them know about her complaint regarading this inhaler.

## 2021-04-02 NOTE — TELEPHONE ENCOUNTER
Patient advised of recommendations agreed with plan.  Reports will see how she does tomorrow, if not improved will call pulmonologist.

## 2021-04-03 ENCOUNTER — TELEPHONE (OUTPATIENT)
Dept: PULMONOLOGY | Facility: CLINIC | Age: 76
End: 2021-04-03

## 2021-04-03 RX ORDER — FLUTICASONE PROPIONATE AND SALMETEROL 500; 50 UG/1; UG/1
1 POWDER RESPIRATORY (INHALATION) 2 TIMES DAILY
Qty: 60 EACH | Refills: 0 | Status: SHIPPED | OUTPATIENT
Start: 2021-04-03 | End: 2021-04-21

## 2021-04-03 NOTE — TELEPHONE ENCOUNTER
Patient states that she has been having a lot of arthralgia, generalized weakness after starting Trelegy which she attributes to Trelegy. She also admits to increased blood pressure.   I explained to her that these are not very common symptoms but requesti

## 2021-04-07 RX ORDER — HYDRALAZINE HYDROCHLORIDE 25 MG/1
25 TABLET, FILM COATED ORAL DAILY
Qty: 90 TABLET | Refills: 0 | Status: CANCELLED | OUTPATIENT
Start: 2021-04-07

## 2021-04-07 NOTE — TELEPHONE ENCOUNTER
I would suggest taking 1/2 tablet bid  of  Her hydralazine 25mg tab instead of just taking one tab daily because it has a short duration of action.  Let me know if she agrees and then we can refill her hydralazine

## 2021-04-07 NOTE — TELEPHONE ENCOUNTER
Patient was seen by Dr. Erasto Ayoub on 3/30/21 for hypertension. She states that her Amlodipine and Hydralazine were increased.  She has been \"feeling lousy lately\" and relates that to the Trelegy inhaler which has been stopped by Pulmonology on 4/3/21 and

## 2021-04-09 NOTE — TELEPHONE ENCOUNTER
Patient was called. Given Dr. Dom Salmeron recommendations  She verbalized understanding and agreed to plan of care  States she just received a supply of Hydralazine 25 mg from [facility]  Should have more than enough supply for the next several months

## 2021-04-11 ENCOUNTER — HOSPITAL ENCOUNTER (EMERGENCY)
Facility: HOSPITAL | Age: 76
Discharge: HOME | End: 2021-04-11
Attending: EMERGENCY MEDICINE
Payer: MEDICARE

## 2021-04-11 ENCOUNTER — APPOINTMENT (OUTPATIENT)
Dept: ULTRASOUND IMAGING | Facility: HOSPITAL | Age: 76
End: 2021-04-11
Attending: EMERGENCY MEDICINE
Payer: MEDICARE

## 2021-04-11 VITALS
RESPIRATION RATE: 18 BRPM | BODY MASS INDEX: 28.17 KG/M2 | DIASTOLIC BLOOD PRESSURE: 79 MMHG | WEIGHT: 165 LBS | OXYGEN SATURATION: 94 % | SYSTOLIC BLOOD PRESSURE: 163 MMHG | HEIGHT: 64 IN | HEART RATE: 77 BPM | TEMPERATURE: 97 F

## 2021-04-11 DIAGNOSIS — S86.811A STRAIN OF CALF MUSCLE, RIGHT, INITIAL ENCOUNTER: Primary | ICD-10-CM

## 2021-04-11 DIAGNOSIS — R60.9 PERIPHERAL EDEMA: ICD-10-CM

## 2021-04-11 PROCEDURE — 93970 EXTREMITY STUDY: CPT | Performed by: EMERGENCY MEDICINE

## 2021-04-11 PROCEDURE — 83880 ASSAY OF NATRIURETIC PEPTIDE: CPT | Performed by: EMERGENCY MEDICINE

## 2021-04-11 PROCEDURE — 99284 EMERGENCY DEPT VISIT MOD MDM: CPT

## 2021-04-11 PROCEDURE — 80048 BASIC METABOLIC PNL TOTAL CA: CPT | Performed by: EMERGENCY MEDICINE

## 2021-04-11 PROCEDURE — 85025 COMPLETE CBC W/AUTO DIFF WBC: CPT | Performed by: EMERGENCY MEDICINE

## 2021-04-11 PROCEDURE — 36415 COLL VENOUS BLD VENIPUNCTURE: CPT

## 2021-04-12 NOTE — ED QUICK NOTES
Rounding Completed    Plan of Care reviewed. Waiting for ultrasound. Elimination needs assessed. Provided water. Bed is locked and in lowest position. Call light within reach.

## 2021-04-12 NOTE — ED PROVIDER NOTES
Signed out by previous shift to follow-up results of diagnostic work-up and disposition patient. Patient was complaining of some right calf pain with swelling. Patient's work-up is largely unremarkable. Her ultrasound shows no acute signs of DVT.   Jorge

## 2021-04-13 ENCOUNTER — TELEPHONE (OUTPATIENT)
Dept: INTERNAL MEDICINE CLINIC | Facility: CLINIC | Age: 76
End: 2021-04-13

## 2021-04-13 NOTE — TELEPHONE ENCOUNTER
Spoke with patient ( verified) and confirmed Dexa scan order is still valid until 2021--relayed scheduling # and transferred patient there per her request--patient verbalizes understanding and agreement. No further questions/concerns at this time.

## 2021-04-14 NOTE — ED PROVIDER NOTES
Patient Seen in: [facility] AND [facility] Emergency Department    History   Patient presents with:  Swelling Edema    Stated Complaint: bilateral leg swelling, right leg hurts more per pt    HPI    Patient complains of  Swollen b leg. r > l for couple days.  No Performed by Cheryl Maldonado MD at [facility] MAIN OR   • RADIATION RIGHT  2014       Medications :   fluticasone-salmeterol (WIXELA INHUB) 500-50 MCG/DOSE Inhalation Aerosol Powder, Breath Activated,  Inhale 1 puff into the lungs 2 (two) times daily.    amLOD acids 1000 MG Oral Cap,  Take 1,000 mg by mouth daily. EZETIMIBE 10 MG Oral Tab,  TAKE 1 TABLET  NIGHTLY  Patient not taking: Reported on 3/22/2021   aspirin 325 MG Oral Tab,  Take 325 mg by mouth daily.    Blood Glucose Monitoring Suppl (TRUE METRIX METE Pulse 77   Temp 97 °F (36.1 °C) (Temporal)   Resp 18   Ht 162.6 cm (5' 4\")   Wt 74.8 kg   SpO2 94%   BMI 28.32 kg/m²    PULSE OX The pulse oximeter revealed 95%  on room air which is not hypoxic and normal for the patient as interpreted by me.     GENERAL: (DKN=[id number])    Result Date: 4/12/2021  CONCLUSION:  1. Normal bilateral lower extremity venous duplex exam.  2. No deep venous thrombosis.     Dictated by (CST): Estefanía Vaughan MD on 4/12/2021 at 6:56 AM     Finalized by (CST): Estefanía Vaughan MD o

## 2021-04-15 ENCOUNTER — TELEPHONE (OUTPATIENT)
Dept: INTERNAL MEDICINE CLINIC | Facility: CLINIC | Age: 76
End: 2021-04-15

## 2021-04-15 DIAGNOSIS — H25.9 SENILE CATARACT OF LEFT EYE, UNSPECIFIED AGE-RELATED CATARACT TYPE: Primary | ICD-10-CM

## 2021-04-15 NOTE — TELEPHONE ENCOUNTER
Patient needs referral for     Dr. Brayan Brooks, Ophthalmologist  For left eye cataract removal    Patient has an appointment scheduled for 4/16 @ 2:00PM.    Any questions , please call patient, [phone]

## 2021-04-16 NOTE — TELEPHONE ENCOUNTER
Hi, referral was created yesterday as the status \"routine,\" these normally take 3-5 business days for our department to work.  Please create referral with status \"urgent\" if patient is scheduled the next day so referral can be picked up right away next services require authorization by the Referring or Primary Care Physician. SEND CLAIMS TO:                   OR          ELECTRONICALLY (STEVEN:)  [facility]                                       [id number]                                                  [address]

## 2021-04-20 ENCOUNTER — TELEPHONE (OUTPATIENT)
Dept: CARDIOLOGY | Age: 76
End: 2021-04-20

## 2021-04-20 NOTE — TELEPHONE ENCOUNTER
90 Day suppy    Current Outpatient Medications   Medication Sig Dispense Refill   • fluticasone-salmeterol (WIXELA INHUB) 500-50 MCG/DOSE Inhalation Aerosol Powder, Breath Activated Inhale 1 puff into the lungs 2 (two) times daily.  60 each 0

## 2021-04-21 RX ORDER — FLUTICASONE PROPIONATE AND SALMETEROL 500; 50 UG/1; UG/1
1 POWDER RESPIRATORY (INHALATION) 2 TIMES DAILY
Qty: 3 EACH | Refills: 1 | Status: SHIPPED | OUTPATIENT
Start: 2021-04-21 | End: 2021-05-21

## 2021-04-21 NOTE — TELEPHONE ENCOUNTER
Last office visit 3/22/21  Last refill 4/3/21    Dr. Whitmore Mention- Please review/sign pended refill request.

## 2021-04-22 ENCOUNTER — OFFICE VISIT (OUTPATIENT)
Dept: INTERNAL MEDICINE CLINIC | Facility: CLINIC | Age: 76
End: 2021-04-22
Payer: MEDICARE

## 2021-04-22 ENCOUNTER — HOSPITAL ENCOUNTER (OUTPATIENT)
Dept: BONE DENSITY | Age: 76
Discharge: HOME | End: 2021-04-22
Attending: INTERNAL MEDICINE
Payer: MEDICARE

## 2021-04-22 VITALS
DIASTOLIC BLOOD PRESSURE: 71 MMHG | RESPIRATION RATE: 17 BRPM | HEIGHT: 64 IN | HEART RATE: 80 BPM | OXYGEN SATURATION: 94 % | TEMPERATURE: 99 F | WEIGHT: 170.19 LBS | BODY MASS INDEX: 29.06 KG/M2 | SYSTOLIC BLOOD PRESSURE: 122 MMHG

## 2021-04-22 DIAGNOSIS — R60.0 BILATERAL LEG EDEMA: Primary | ICD-10-CM

## 2021-04-22 DIAGNOSIS — M1A.9XX1 CHRONIC TOPHACEOUS GOUT: ICD-10-CM

## 2021-04-22 DIAGNOSIS — E83.52 HYPERCALCEMIA: ICD-10-CM

## 2021-04-22 DIAGNOSIS — I10 ESSENTIAL HYPERTENSION: ICD-10-CM

## 2021-04-22 DIAGNOSIS — Z78.0 MENOPAUSE: ICD-10-CM

## 2021-04-22 PROCEDURE — 3078F DIAST BP <80 MM HG: CPT | Performed by: INTERNAL MEDICINE

## 2021-04-22 PROCEDURE — 77080 DXA BONE DENSITY AXIAL: CPT | Performed by: INTERNAL MEDICINE

## 2021-04-22 PROCEDURE — 1111F DSCHRG MED/CURRENT MED MERGE: CPT | Performed by: INTERNAL MEDICINE

## 2021-04-22 PROCEDURE — 3008F BODY MASS INDEX DOCD: CPT | Performed by: INTERNAL MEDICINE

## 2021-04-22 PROCEDURE — 99214 OFFICE O/P EST MOD 30 MIN: CPT | Performed by: INTERNAL MEDICINE

## 2021-04-22 PROCEDURE — 3074F SYST BP LT 130 MM HG: CPT | Performed by: INTERNAL MEDICINE

## 2021-04-22 NOTE — PROGRESS NOTES
HPI/Subjective:     Patient ID: Suleiman eDlong is a 77year old female. Patient presents today for post ER ffup. Went to ER due to bilateral leg edema and pain. Workup in ER included venous doppler test which was negative for DVT both legs.  Her pro 1   • Brimonidine Tartrate-Timolol 0.2-0.5 % Ophthalmic Solution Place 1 drop into the right eye every 12 (twelve) hours. COMBIGAN      • Netarsudil-Latanoprost 0.02-0.005 % Ophthalmic Solution Place 1 drop into the right eye nightly.  ROCKLATAN      • colc Reported on 4/22/2021 ) 100 strip 3   • OneTouch UltraSoft Lancets Does not apply Misc Check glucose daily (Patient not taking: Reported on 4/22/2021 ) 100 each 3   • EZETIMIBE 10 MG Oral Tab TAKE 1 TABLET  NIGHTLY (Patient not taking: Reported on 3/22/202 High cholesterol    • History of pituitary adenoma 5/10/2018   • Hyperlipidemia    • Major depressive disorder, single episode, moderate ([facility] [address]    • Obesity       Past Surgical History:   Procedure Laterality Date   • CABG     • CAROTID ENDARTERECTOMY     • PTH.  Referral given. No orders of the defined types were placed in this encounter.       Meds This Visit:  Requested Prescriptions      No prescriptions requested or ordered in this encounter       Imaging & Referrals:  None

## 2021-04-23 RX ORDER — ALIROCUMAB 75 MG/ML
75 INJECTION, SOLUTION SUBCUTANEOUS
Qty: 1.96 ML | Refills: 11 | Status: SHIPPED | OUTPATIENT
Start: 2021-04-23

## 2021-04-23 RX ORDER — EVOLOCUMAB 140 MG/ML
140 INJECTION, SOLUTION SUBCUTANEOUS
Qty: 2 SYRINGE | Refills: 11 | Status: SHIPPED | OUTPATIENT
Start: 2021-04-23

## 2021-05-03 ENCOUNTER — HOSPITAL ENCOUNTER (OUTPATIENT)
Dept: MAMMOGRAPHY | Age: 76
Discharge: HOME | End: 2021-05-03
Attending: INTERNAL MEDICINE
Payer: MEDICARE

## 2021-05-03 DIAGNOSIS — Z12.31 ENCOUNTER FOR SCREENING MAMMOGRAM FOR BREAST CANCER: ICD-10-CM

## 2021-05-20 ENCOUNTER — MED REC SCAN ONLY (OUTPATIENT)
Dept: INTERNAL MEDICINE CLINIC | Facility: CLINIC | Age: 76
End: 2021-05-20

## 2021-05-23 ENCOUNTER — TELEPHONE (OUTPATIENT)
Dept: INTERNAL MEDICINE CLINIC | Facility: CLINIC | Age: 76
End: 2021-05-23

## 2021-05-23 NOTE — TELEPHONE ENCOUNTER
Late entry. Patient called at 12:20 AM.  She was having severe gout pain and colchicine was ineffective. She had some leftover Medrol and wanted to know if she could take it. Patient denied fever or evidence of cellulitis.   She said it was very typical

## 2021-05-28 ENCOUNTER — OFFICE VISIT (OUTPATIENT)
Dept: INTERNAL MEDICINE CLINIC | Facility: CLINIC | Age: 76
End: 2021-05-28
Payer: MEDICARE

## 2021-05-28 VITALS
DIASTOLIC BLOOD PRESSURE: 65 MMHG | SYSTOLIC BLOOD PRESSURE: 115 MMHG | HEIGHT: 64 IN | BODY MASS INDEX: 30.56 KG/M2 | TEMPERATURE: 98 F | HEART RATE: 67 BPM | OXYGEN SATURATION: 97 % | RESPIRATION RATE: 12 BRPM | WEIGHT: 179 LBS

## 2021-05-28 DIAGNOSIS — M1A.9XX1 CHRONIC TOPHACEOUS GOUT: Primary | ICD-10-CM

## 2021-05-28 PROCEDURE — 3074F SYST BP LT 130 MM HG: CPT | Performed by: INTERNAL MEDICINE

## 2021-05-28 PROCEDURE — 3078F DIAST BP <80 MM HG: CPT | Performed by: INTERNAL MEDICINE

## 2021-05-28 PROCEDURE — 99213 OFFICE O/P EST LOW 20 MIN: CPT | Performed by: INTERNAL MEDICINE

## 2021-05-28 PROCEDURE — 3008F BODY MASS INDEX DOCD: CPT | Performed by: INTERNAL MEDICINE

## 2021-05-31 NOTE — PROGRESS NOTES
HPI/Subjective:     Patient ID: Radha Richardson is a 77year old female. Gout  This is a chronic problem. The current episode started more than 1 year ago. The problem occurs intermittently. The problem has been unchanged.  Associated symptoms includ Cholecalciferol (VITAMIN D) 2000 units Oral Cap Take 1 capsule by mouth daily.      • methylPREDNISolone 4 MG Oral Tablet Therapy Pack Use as directed for 6 days (Patient not taking: Reported on 5/28/2021 ) 21 tablet 0   • Blood Glucose Monitoring Suppl (TR myalgia and myopathy  Xanax [Alprazolam]      RESTLESSNESS  Ace Inhibitors          Coughing    Past Medical History:   Diagnosis Date   • Age-related cataract of left eye 5/10/2018   • Anxiety    • Asthma    • Atherosclerosis of coronary artery    • Keven toxic-appearing or diaphoretic. Cardiovascular:      Rate and Rhythm: Normal rate. Pulses: Normal pulses. Pulmonary:      Effort: Pulmonary effort is normal. No respiratory distress. Neurological:      Mental Status: She is alert.          Assess

## 2021-06-07 ENCOUNTER — TELEPHONE (OUTPATIENT)
Dept: INTERNAL MEDICINE CLINIC | Facility: CLINIC | Age: 76
End: 2021-06-07

## 2021-06-07 DIAGNOSIS — M1A.9XX1 CHRONIC TOPHACEOUS GOUT: Primary | ICD-10-CM

## 2021-06-07 NOTE — TELEPHONE ENCOUNTER
Patient is requesting a referral for Dr. Nicolás Ivy. Patient will be transferring care from Dr. Amy Ventura. Please advise.

## 2021-06-17 ENCOUNTER — TELEPHONE (OUTPATIENT)
Dept: INTERNAL MEDICINE CLINIC | Facility: CLINIC | Age: 76
End: 2021-06-17

## 2021-06-17 NOTE — TELEPHONE ENCOUNTER
Pt states she is now taking 12.5mg in the morning and 12.5mg at night. She states she just received her refill through the mail order and asking if the Dr can change the quantity to 90 tabs instead of 180 tabs for her next Rx.  She states she is afraid it w

## 2021-06-26 DIAGNOSIS — M1A.49X1 OTHER SECONDARY CHRONIC GOUT OF MULTIPLE SITES WITH TOPHUS: ICD-10-CM

## 2021-06-28 RX ORDER — FEBUXOSTAT 40 MG/1
TABLET, FILM COATED ORAL
Qty: 30 TABLET | Refills: 10 | Status: SHIPPED | OUTPATIENT
Start: 2021-06-28 | End: 2021-12-23

## 2021-07-06 NOTE — TELEPHONE ENCOUNTER
Not on Med List:    Alberto Mabry 500 MCG-50 MCG/DOSE [address]    Per pharmacy new Rx request.

## 2021-07-07 RX ORDER — FLUTICASONE PROPIONATE AND SALMETEROL 500; 50 UG/1; UG/1
1 POWDER RESPIRATORY (INHALATION) 2 TIMES DAILY
Qty: 3 EACH | Refills: 1 | Status: SHIPPED | OUTPATIENT
Start: 2021-07-07 | End: 2021-08-06

## 2021-07-07 NOTE — TELEPHONE ENCOUNTER
Spoke with patient informed her that original [address] prescription was sent to [facility] in [facility] [place] and now [facility] is requesting refill. Patient states she wants medication to be filled through [facility] order.     Last office visit: 3/22

## 2021-07-26 ENCOUNTER — TELEPHONE (OUTPATIENT)
Dept: FAMILY MEDICINE CLINIC | Facility: CLINIC | Age: 76
End: 2021-07-26

## 2021-07-26 NOTE — TELEPHONE ENCOUNTER
Action Requested: Summary for Provider     []  Critical Lab, Recommendations Needed  [] Need Additional Advice  []   FYI    []   Need Orders  [] Need Medications Sent to Pharmacy  []  Other     SUMMARY: pt asking to be added to Dr Naomie Baez schedule at

## 2021-07-27 ENCOUNTER — OFFICE VISIT (OUTPATIENT)
Dept: INTERNAL MEDICINE CLINIC | Facility: CLINIC | Age: 76
End: 2021-07-27
Payer: MEDICARE

## 2021-07-27 VITALS
BODY MASS INDEX: 29.85 KG/M2 | WEIGHT: 174.88 LBS | HEIGHT: 64 IN | DIASTOLIC BLOOD PRESSURE: 72 MMHG | SYSTOLIC BLOOD PRESSURE: 134 MMHG | HEART RATE: 81 BPM | TEMPERATURE: 100 F | OXYGEN SATURATION: 98 %

## 2021-07-27 DIAGNOSIS — E11.22 CONTROLLED TYPE 2 DIABETES MELLITUS WITH STAGE 3 CHRONIC KIDNEY DISEASE, WITHOUT LONG-TERM CURRENT USE OF INSULIN (HCC): ICD-10-CM

## 2021-07-27 DIAGNOSIS — H92.02 ACUTE OTALGIA, LEFT: Primary | ICD-10-CM

## 2021-07-27 DIAGNOSIS — N18.30 CONTROLLED TYPE 2 DIABETES MELLITUS WITH STAGE 3 CHRONIC KIDNEY DISEASE, WITHOUT LONG-TERM CURRENT USE OF INSULIN (HCC): ICD-10-CM

## 2021-07-27 LAB
CARTRIDGE LOT#: 788 NUMERIC
HEMOGLOBIN A1C: 6.2 % (ref 4.3–5.6)

## 2021-07-27 PROCEDURE — 3075F SYST BP GE 130 - 139MM HG: CPT | Performed by: INTERNAL MEDICINE

## 2021-07-27 PROCEDURE — 3008F BODY MASS INDEX DOCD: CPT | Performed by: INTERNAL MEDICINE

## 2021-07-27 PROCEDURE — 3044F HG A1C LEVEL LT 7.0%: CPT | Performed by: INTERNAL MEDICINE

## 2021-07-27 PROCEDURE — 3078F DIAST BP <80 MM HG: CPT | Performed by: INTERNAL MEDICINE

## 2021-07-27 PROCEDURE — 83036 HEMOGLOBIN GLYCOSYLATED A1C: CPT | Performed by: INTERNAL MEDICINE

## 2021-07-27 PROCEDURE — 99213 OFFICE O/P EST LOW 20 MIN: CPT | Performed by: INTERNAL MEDICINE

## 2021-07-27 RX ORDER — EVOLOCUMAB 140 MG/ML
140 INJECTION, SOLUTION SUBCUTANEOUS
COMMUNITY
Start: 2021-04-23 | End: 2021-12-23 | Stop reason: ALTCHOICE

## 2021-07-27 RX ORDER — ALIROCUMAB 75 MG/ML
75 INJECTION, SOLUTION SUBCUTANEOUS
COMMUNITY
Start: 2021-04-23 | End: 2021-11-08

## 2021-07-27 NOTE — PROGRESS NOTES
HPI/Subjective:     Patient ID: Olga Gage is a 77year old female. Ear Pain   There is pain in the left ear. This is a new problem. The current episode started in the past 7 days. The problem occurs constantly. The problem has been unchanged. eye nightly.  ROCKLATAN      • Blood Glucose Monitoring Suppl (ONETOUCH ULTRA 2) w/Device Does not apply Kit Check glucose daily 1 kit 0   • OneTouch UltraSoft Lancets Does not apply Misc Check glucose daily 100 each 3   • colchicine (COLCRYS) 0.6 MG Oral T (TYLENOL) 325 MG Oral Cap Take 650 mg by mouth as needed.    (Patient not taking: Reported on 7/27/2021 ) 120 capsule 0     Allergies:  Allopurinol             HIVES, SWELLING, SHORTNESS OF                            BREATH  Dog Epithelium          ELISEO Mother         thyroid surgery   • Asthma Sister    • Asthma Brother    • Other (Other) Brother         gout   • Breast Cancer Self 43        rt breast 77      Social History: Social History    Tobacco Use      Smoking status: Never Smoker      Smokeless t

## 2021-07-30 ENCOUNTER — TELEPHONE (OUTPATIENT)
Dept: INTERNAL MEDICINE CLINIC | Facility: CLINIC | Age: 76
End: 2021-07-30

## 2021-07-30 DIAGNOSIS — M1A.9XX1 CHRONIC TOPHACEOUS GOUT: Primary | ICD-10-CM

## 2021-07-30 NOTE — TELEPHONE ENCOUNTER
Patient called.  Was seen in office 07/27/2021    States she had some swelling to left foot while in office  Since then, swelling has worsened in left foot and now includes left hand    Patient has gout history    She is asking for orders for blood work to

## 2021-07-31 ENCOUNTER — LAB ENCOUNTER (OUTPATIENT)
Dept: LAB | Age: 76
End: 2021-07-31
Attending: INTERNAL MEDICINE
Payer: MEDICARE

## 2021-07-31 DIAGNOSIS — Z79.899 HIGH RISK MEDICATION USE: ICD-10-CM

## 2021-07-31 DIAGNOSIS — M1A.9XX1 CHRONIC TOPHACEOUS GOUT: ICD-10-CM

## 2021-07-31 LAB
ALBUMIN SERPL-MCNC: 3.6 G/DL (ref 3.4–5)
ALBUMIN/GLOB SERPL: 0.9 {RATIO} (ref 1–2)
ALP LIVER SERPL-CCNC: 93 U/L
ALT SERPL-CCNC: 26 U/L
ANION GAP SERPL CALC-SCNC: 6 MMOL/L (ref 0–18)
AST SERPL-CCNC: 20 U/L (ref 15–37)
BILIRUB SERPL-MCNC: 0.2 MG/DL (ref 0.1–2)
BUN BLD-MCNC: 18 MG/DL (ref 7–18)
BUN/CREAT SERPL: 16.2 (ref 10–20)
CALCIUM BLD-MCNC: 10.5 MG/DL (ref 8.5–10.1)
CHLORIDE SERPL-SCNC: 106 MMOL/L (ref 98–112)
CO2 SERPL-SCNC: 29 MMOL/L (ref 21–32)
CREAT BLD-MCNC: 1.11 MG/DL
GLOBULIN PLAS-MCNC: 4.1 G/DL (ref 2.8–4.4)
GLUCOSE BLD-MCNC: 148 MG/DL (ref 70–99)
M PROTEIN MFR SERPL ELPH: 7.7 G/DL (ref 6.4–8.2)
OSMOLALITY SERPL CALC.SUM OF ELEC: 297 MOSM/KG (ref 275–295)
PATIENT FASTING Y/N/NP: YES
POTASSIUM SERPL-SCNC: 4.5 MMOL/L (ref 3.5–5.1)
SODIUM SERPL-SCNC: 141 MMOL/L (ref 136–145)
URATE SERPL-MCNC: 6.1 MG/DL

## 2021-07-31 PROCEDURE — 84550 ASSAY OF BLOOD/URIC ACID: CPT

## 2021-07-31 PROCEDURE — 36415 COLL VENOUS BLD VENIPUNCTURE: CPT

## 2021-07-31 PROCEDURE — 80053 COMPREHEN METABOLIC PANEL: CPT

## 2021-08-03 ENCOUNTER — OFFICE VISIT (OUTPATIENT)
Dept: OTOLARYNGOLOGY | Facility: CLINIC | Age: 76
End: 2021-08-03
Payer: MEDICARE

## 2021-08-03 VITALS — WEIGHT: 174 LBS | HEIGHT: 64 IN | BODY MASS INDEX: 29.71 KG/M2

## 2021-08-03 DIAGNOSIS — H92.02 LEFT EAR PAIN: Primary | ICD-10-CM

## 2021-08-03 DIAGNOSIS — H61.21 IMPACTED CERUMEN OF RIGHT EAR: ICD-10-CM

## 2021-08-03 PROCEDURE — 99213 OFFICE O/P EST LOW 20 MIN: CPT | Performed by: OTOLARYNGOLOGY

## 2021-08-03 PROCEDURE — 69210 REMOVE IMPACTED EAR WAX UNI: CPT | Performed by: OTOLARYNGOLOGY

## 2021-08-03 PROCEDURE — 3008F BODY MASS INDEX DOCD: CPT | Performed by: OTOLARYNGOLOGY

## 2021-08-03 NOTE — PROGRESS NOTES
Suzi Clark is a 77year old female. Patient presents with:  Ear Pain: pt c/o left earache for the past week on and off. HISTORY OF PRESENT ILLNESS  She has seen Dr. Shreya Celis in the past for ear discomfort thought to be musculoskeletal in nature. High blood pressure    • High cholesterol    • History of pituitary adenoma 5/10/2018   • Hyperlipidemia    • Major depressive disorder, single episode, moderate ([facility] [address]    • Obesity        Past Surgical History:   Procedure Laterality Date   • CABG     • CA Right: Normal, Left: Normal.   Skin Normal Inspection - Normal.        Lymph Detail Normal Submental. Submandibular. Anterior cervical. Posterior cervical. Supraclavicular.         Nose/Mouth/Throat Normal External nose - Normal. Lips/teeth/gums - Normal. T colchicine (COLCRYS) 0.6 MG Oral Tab, TAKE 1 TABLET BY MOUTH EVERY DAY AS NEEDED, Disp: 30 tablet, Rfl: 2  •  MONTELUKAST SODIUM 10 MG Oral Tab, TAKE 1 TABLET EVERY NIGHT, Disp: 90 tablet, Rfl: 5  •  omega-3 fatty acids 1000 MG Oral Cap, Take 1,000 mg by m Ophthalmic Solution, Place 1 drop into the right eye nightly.  Avelina Heading  (Patient not taking: Reported on 8/3/2021 ), Disp: , Rfl:   •  Glucose Blood (ONETOUCH ULTRA) In Vitro Strip, Check glucose daily (Patient not taking: Reported on 7/27/2021 ), Disp: 1

## 2021-08-31 ENCOUNTER — TELEPHONE (OUTPATIENT)
Dept: INTERNAL MEDICINE CLINIC | Facility: CLINIC | Age: 76
End: 2021-08-31

## 2021-08-31 ENCOUNTER — NURSE TRIAGE (OUTPATIENT)
Dept: INTERNAL MEDICINE CLINIC | Facility: CLINIC | Age: 76
End: 2021-08-31

## 2021-08-31 DIAGNOSIS — R23.8 EASY BRUISING: Primary | ICD-10-CM

## 2021-08-31 NOTE — TELEPHONE ENCOUNTER
She can apply cold compress in area; check if pt having any nose bleeds, bleeding gums; rectal bleeding or hematuria. She needs though her aspirin give her CAD and carotid stenosis before.

## 2021-08-31 NOTE — TELEPHONE ENCOUNTER
Dr John Oconnor is hospitalist.  Mark Bang was ordered by Dr Fernando Breen her pulmonologist; see referral back in March 2021 Closure 4 Information: This tab is for additional flaps and grafts above and beyond our usual structured repairs.  Please note if you enter information here it will not currently bill and you will need to add the billing information manually.

## 2021-08-31 NOTE — TELEPHONE ENCOUNTER
Per patient her insurance called her that her approval of there Oxegyn is already been approved. Patient asked the insurance who is the doctor who asked for that and they told her that it was Dr Marta Meigs.   Patient would like to know if Dr Daryle Draft have so

## 2021-08-31 NOTE — TELEPHONE ENCOUNTER
Reviewed with patient the doctor recommendation to continue the aspirin regimen and to apply ice compress to bruise. Patient denies any bleeding from gums, nosebleeds or in urine, or in stool.  Patient informed of cbc order and pt states she will obtain elio

## 2021-08-31 NOTE — TELEPHONE ENCOUNTER
Spoke with pt, verified , informed pt of Dr Keshawn Charles message, pt verbalized understanding and is very upset that her insurance is stating that Dr Susan Valle recently placed an order for oxygen and pt will be contacting insurance to appeal.

## 2021-08-31 NOTE — TELEPHONE ENCOUNTER
Advised patient of Dr. Deniz Mohamud note. Patient verbalized understanding. Please clarify if patient is suppose to continue her aspirin.    Message below not clear regarding medication instructions

## 2021-08-31 NOTE — TELEPHONE ENCOUNTER
Patient states she is taking aspirin 325 mg daily and bruises easily. Patient states she has a bruise upper inner thigh about the size of a silver dollar that has been there for 7 days. It is black with red around edges and a hard lump in the middle.  Lump w

## 2021-08-31 NOTE — TELEPHONE ENCOUNTER
She needs to continue her aspirin since she is has CAD; as long as not having any bleeding. I also ordered cbc.

## 2021-09-09 NOTE — OCCUPATIONAL THERAPY NOTE
OCCUPATIONAL THERAPY EVALUATION - INPATIENT     Room Number: 337/337-A  Evaluation Date: 4/21/2019  Type of Evaluation: Initial       Physician Order: IP Consult to Occupational Therapy  Reason for Therapy: ADL/IADL Dysfunction and Discharge Planning    OC Pt c/o 2 days vomiting and headache since Tuesday. Pt reports feeling weak and dehydrated.   rehabilitation;Home with home health PT/OT  OT Device Recommendations: TBD    PLAN  OT Treatment Plan: Balance activities; Energy conservation/work simplification techniques;ADL training;Functional transfer training; Endurance training;Patient/Family educati CABG surgery in Feb 2019    SUBJECTIVE  Pt reported that she is tired, stating that she had not slept well. Pt requires encouragement to participate in activity. Pt had no pain c/o.   Pt stated that she hasn't been able to do grocery shopping and has a fr Mobility: min a w/ RW    BALANCE ASSESSMENT  Static Sitting: CGA  Dynamic Sitting: NT  Static Standing: NT  Dynamic Standing: NT    FUNCTIONAL ADL ASSESSMENT  Grooming: NT  Feeding: NT  Bathing: NT  Toileting: min a  Upper Extremity Dressing: NT  Lower Ext

## 2021-09-10 RX ORDER — HYDRALAZINE HYDROCHLORIDE 25 MG/1
TABLET, FILM COATED ORAL
Qty: 90 TABLET | Refills: 1 | Status: SHIPPED | OUTPATIENT
Start: 2021-09-10

## 2021-09-10 RX ORDER — AMLODIPINE BESYLATE 5 MG/1
TABLET ORAL
Qty: 90 TABLET | Refills: 1 | Status: SHIPPED | OUTPATIENT
Start: 2021-09-10

## 2021-09-16 ENCOUNTER — LAB ENCOUNTER (OUTPATIENT)
Dept: LAB | Age: 76
End: 2021-09-16
Attending: INTERNAL MEDICINE
Payer: MEDICARE

## 2021-09-16 DIAGNOSIS — R23.8 EASY BRUISING: ICD-10-CM

## 2021-09-16 LAB
BASOPHILS # BLD AUTO: 0.03 X10(3) UL (ref 0–0.2)
BASOPHILS NFR BLD AUTO: 0.5 %
DEPRECATED RDW RBC AUTO: 46.5 FL (ref 35.1–46.3)
EOSINOPHIL # BLD AUTO: 0.3 X10(3) UL (ref 0–0.7)
EOSINOPHIL NFR BLD AUTO: 4.6 %
ERYTHROCYTE [DISTWIDTH] IN BLOOD BY AUTOMATED COUNT: 13.6 % (ref 11–15)
HCT VFR BLD AUTO: 41.3 %
HGB BLD-MCNC: 12.8 G/DL
IMM GRANULOCYTES # BLD AUTO: 0.06 X10(3) UL (ref 0–1)
IMM GRANULOCYTES NFR BLD: 0.9 %
LYMPHOCYTES # BLD AUTO: 1.47 X10(3) UL (ref 1–4)
LYMPHOCYTES NFR BLD AUTO: 22.8 %
MCH RBC QN AUTO: 29 PG (ref 26–34)
MCHC RBC AUTO-ENTMCNC: 31 G/DL (ref 31–37)
MCV RBC AUTO: 93.4 FL
MONOCYTES # BLD AUTO: 1 X10(3) UL (ref 0.1–1)
MONOCYTES NFR BLD AUTO: 15.5 %
NEUTROPHILS # BLD AUTO: 3.6 X10 (3) UL (ref 1.5–7.7)
NEUTROPHILS # BLD AUTO: 3.6 X10(3) UL (ref 1.5–7.7)
NEUTROPHILS NFR BLD AUTO: 55.7 %
PLATELET # BLD AUTO: 265 10(3)UL (ref 150–450)
RBC # BLD AUTO: 4.42 X10(6)UL
WBC # BLD AUTO: 6.5 X10(3) UL (ref 4–11)

## 2021-09-16 PROCEDURE — 85025 COMPLETE CBC W/AUTO DIFF WBC: CPT

## 2021-09-16 PROCEDURE — 36415 COLL VENOUS BLD VENIPUNCTURE: CPT

## 2021-09-21 ENCOUNTER — TELEPHONE (OUTPATIENT)
Dept: INTERNAL MEDICINE CLINIC | Facility: CLINIC | Age: 76
End: 2021-09-21

## 2021-09-21 DIAGNOSIS — J44.9 COPD, SEVERE (HCC): Primary | ICD-10-CM

## 2021-09-21 NOTE — TELEPHONE ENCOUNTER
Left message for pt to call back to inform that referral for Dr Dean Almonte has been placed for pt's appt on 9/22/21.

## 2021-09-22 ENCOUNTER — OFFICE VISIT (OUTPATIENT)
Dept: PULMONOLOGY | Facility: CLINIC | Age: 76
End: 2021-09-22
Payer: MEDICARE

## 2021-09-22 VITALS
BODY MASS INDEX: 30 KG/M2 | SYSTOLIC BLOOD PRESSURE: 147 MMHG | WEIGHT: 175 LBS | HEART RATE: 83 BPM | OXYGEN SATURATION: 94 % | DIASTOLIC BLOOD PRESSURE: 76 MMHG

## 2021-09-22 DIAGNOSIS — J96.11 CHRONIC RESPIRATORY FAILURE WITH HYPOXIA (HCC): ICD-10-CM

## 2021-09-22 DIAGNOSIS — J45.50 SEVERE PERSISTENT ASTHMA WITHOUT COMPLICATION: Primary | ICD-10-CM

## 2021-09-22 PROCEDURE — 3078F DIAST BP <80 MM HG: CPT | Performed by: INTERNAL MEDICINE

## 2021-09-22 PROCEDURE — 3077F SYST BP >= 140 MM HG: CPT | Performed by: INTERNAL MEDICINE

## 2021-09-22 PROCEDURE — 99214 OFFICE O/P EST MOD 30 MIN: CPT | Performed by: INTERNAL MEDICINE

## 2021-09-22 RX ORDER — ALBUTEROL SULFATE 2.5 MG/3ML
2.5 SOLUTION RESPIRATORY (INHALATION) EVERY 6 HOURS PRN
Qty: 60 EACH | Refills: 1 | Status: SHIPPED | OUTPATIENT
Start: 2021-09-22

## 2021-09-22 RX ORDER — FLUTICASONE PROPIONATE AND SALMETEROL 500; 50 UG/1; UG/1
1 POWDER RESPIRATORY (INHALATION) 2 TIMES DAILY
COMMUNITY
Start: 2021-08-31 | End: 2021-09-22

## 2021-09-22 RX ORDER — FLUTICASONE PROPIONATE AND SALMETEROL 500; 50 UG/1; UG/1
1 POWDER RESPIRATORY (INHALATION) 2 TIMES DAILY
Qty: 60 EACH | Refills: 0 | Status: SHIPPED | OUTPATIENT
Start: 2021-09-22 | End: 2021-12-23 | Stop reason: ALTCHOICE

## 2021-09-22 NOTE — PROGRESS NOTES
Subjective:   Patient ID: Ifeanyi Mccloud is a 77year old female.     HPI    Did not tolerate Trelegy with jittery and palpitation  Now better with Wixela inhaler  Uses oxygen with ambulation and during sleep  Overall doing well with no exacerbation si MOUTH DAILY FOR 3 DAYS, THEN 2 TABLETS DAILY FOR 3 DAYS, THEN 1 TABLET DAILY FOR 3 DAYS THEN STOP (Patient not taking: Reported on 9/22/2021) 18 tablet 0   • methylPREDNISolone 4 MG Oral Tablet Therapy Pack Use as directed for 6 days (Patient not taking: R Monitoring Suppl (TRUE METRIX METER) w/Device Does not apply Kit Use as directed   0   • [employer] LANCETS MICRO THIN 33G Does not apply Misc TEST BLOOD SUGAR 4 TIMES A DAY  0   • Acetaminophen 325 MG Oral Cap Take 650 mg by mouth as needed.    (Patient not takin ([address]      1–severe  asthma/COPD   Asthma since childhood   No history of smoking  FEV1 0.64 L (  33% )  Last exacerbation in January 2021  Now stable      Stable now  Did not tolerate Trelegy inhaler   Now on ICS/LABA with Wixala 500/25 mcg one puff bid

## 2021-09-30 ENCOUNTER — TELEPHONE (OUTPATIENT)
Dept: PULMONOLOGY | Facility: CLINIC | Age: 76
End: 2021-09-30

## 2021-09-30 NOTE — TELEPHONE ENCOUNTER
Current Outpatient Medications   Medication Sig Dispense Refill   • WIXELA INHUB 500-50 MCG/DOSE Inhalation Aerosol Powder, Breath Activated Inhale 1 puff into the lungs 2 (two) times a day.  60 each 0     Per pharmacy this medication is on a  b

## 2021-10-04 RX ORDER — FLUTICASONE PROPIONATE AND SALMETEROL 250; 50 UG/1; UG/1
1 POWDER RESPIRATORY (INHALATION) 2 TIMES DAILY
Qty: 1 EACH | Refills: 5 | Status: SHIPPED | OUTPATIENT
Start: 2021-10-04 | End: 2022-01-12 | Stop reason: DRUGHIGH

## 2021-10-05 ENCOUNTER — TELEPHONE (OUTPATIENT)
Dept: PULMONOLOGY | Facility: CLINIC | Age: 76
End: 2021-10-05

## 2021-10-05 NOTE — TELEPHONE ENCOUNTER
New RX request    Drug: Advair Diskus 500-50 mcg/ACT Inh  To avoid delays in processing this order, please provide Strength,Directions,Quantity and refills.     Current Outpatient Medications   Medication Sig Dispense Refill

## 2021-10-06 RX ORDER — MONTELUKAST SODIUM 10 MG/1
TABLET ORAL
Qty: 90 TABLET | Refills: 5 | Status: SHIPPED | OUTPATIENT
Start: 2021-10-06

## 2021-10-15 RX ORDER — METOPROLOL SUCCINATE 25 MG/1
TABLET, EXTENDED RELEASE ORAL
Qty: 90 TABLET | Refills: 1 | Status: SHIPPED | OUTPATIENT
Start: 2021-10-15

## 2021-10-18 ENCOUNTER — NURSE TRIAGE (OUTPATIENT)
Dept: INTERNAL MEDICINE CLINIC | Facility: CLINIC | Age: 76
End: 2021-10-18

## 2021-10-18 DIAGNOSIS — Z85.3 HISTORY OF BREAST CANCER: ICD-10-CM

## 2021-10-18 DIAGNOSIS — R92.1 MAMMOGRAPHIC CALCIFICATION: Primary | ICD-10-CM

## 2021-10-18 NOTE — TELEPHONE ENCOUNTER
I had reviewed her last diagnostic mammogram done last year and the recommendation was actually to do  diagnostic mammogram in 12 mos  so I will change the order to diagnostic mammogram/  New order in epic. She doesn't have to see me.

## 2021-10-18 NOTE — TELEPHONE ENCOUNTER
Spoke with patient ( verified) and relayed Dr. Faisal Brewster message below--patient verbalizes understanding and agreement. No further questions/concerns at this time.

## 2021-10-18 NOTE — TELEPHONE ENCOUNTER
Action Requested: Summary for Provider     []  Critical Lab, Recommendations Needed  [] Need Additional Advice  []   FYI    []   Need Orders  [] Need Medications Sent to Pharmacy  []  Other     SUMMARY: pt states that she attempted to schedule her screenin

## 2021-11-08 ENCOUNTER — TELEPHONE (OUTPATIENT)
Dept: INTERNAL MEDICINE CLINIC | Facility: CLINIC | Age: 76
End: 2021-11-08

## 2021-11-08 ENCOUNTER — OFFICE VISIT (OUTPATIENT)
Dept: INTERNAL MEDICINE CLINIC | Facility: CLINIC | Age: 76
End: 2021-11-08
Payer: MEDICARE

## 2021-11-08 VITALS
HEIGHT: 64 IN | WEIGHT: 178 LBS | DIASTOLIC BLOOD PRESSURE: 80 MMHG | HEART RATE: 88 BPM | BODY MASS INDEX: 30.39 KG/M2 | SYSTOLIC BLOOD PRESSURE: 135 MMHG | RESPIRATION RATE: 17 BRPM | TEMPERATURE: 99 F

## 2021-11-08 DIAGNOSIS — M54.2 NECK PAIN: Primary | ICD-10-CM

## 2021-11-08 PROCEDURE — 3079F DIAST BP 80-89 MM HG: CPT | Performed by: INTERNAL MEDICINE

## 2021-11-08 PROCEDURE — 99213 OFFICE O/P EST LOW 20 MIN: CPT | Performed by: INTERNAL MEDICINE

## 2021-11-08 PROCEDURE — 3008F BODY MASS INDEX DOCD: CPT | Performed by: INTERNAL MEDICINE

## 2021-11-08 PROCEDURE — 3075F SYST BP GE 130 - 139MM HG: CPT | Performed by: INTERNAL MEDICINE

## 2021-11-08 RX ORDER — METHYLPREDNISOLONE 4 MG/1
TABLET ORAL
Qty: 1 EACH | Refills: 0 | Status: SHIPPED | OUTPATIENT
Start: 2021-11-08 | End: 2021-12-23

## 2021-11-08 RX ORDER — CYCLOBENZAPRINE HCL 5 MG
5 TABLET ORAL NIGHTLY PRN
Qty: 20 TABLET | Refills: 0 | Status: SHIPPED | OUTPATIENT
Start: 2021-11-08

## 2021-11-08 NOTE — TELEPHONE ENCOUNTER
The patient calling to state her son can bring her after 3:00   I offered an appointment with another physician and refused. Only wants to see Dr. Sabrina Martin.    Please see Jocelyn's notes below. Per the patient the pain is so bad.    I stated if the pain i

## 2021-11-08 NOTE — PROGRESS NOTES
Subjective:     Patient ID: Jose Eduardo Long is a 77year old female. HPI    she came in today complaining of left sided neck pain / head pain and shoulder pain   Her pain started  3 days ago / woke up with pain . She has no injuries .  She states dewayne DAY 90 tablet 1   • hydrALAZINE 25 MG Oral Tab 1/2 tab BID 90 tablet 1   • Evolocumab (REPATHA) 140 MG/ML Subcutaneous Solution Prefilled Syringe Inject 140 mg into the skin every 14 (fourteen) days.  (Patient not taking: No sig reported)     • febuxostat 4 tablet by mouth daily. • Cholecalciferol (VITAMIN D) 2000 units Oral Cap Take 1 capsule by mouth daily.        Allergies:  Allopurinol             HIVES, SWELLING, SHORTNESS OF                            BREATH  Dog Epithelium          SHORTNESS OF DARLENE thyroid surgery   • Asthma Sister    • Asthma Brother    • Other (Other) Brother         gout   • Breast Cancer Self 43        rt breast 77      Social History: Social History    Tobacco Use      Smoking status: Never Smoker      Smokeless tobacco: Never U

## 2021-11-08 NOTE — PATIENT INSTRUCTIONS
Neck pain/headache / left shoulder pain/ neck exercise instruction given , will start medrol dose pack, flexeril at night / discussed about side effects / heating pack /  If not better follow up / if any symptoms or worseining pain go to er

## 2021-11-08 NOTE — TELEPHONE ENCOUNTER
Pt informed of Dr. Jae Reveles message. Pt states she does not want to go to the ER because she is already having financial issues and still paying her debts from years ago.  Pt states her ear pain only comes and goes and she can endure the pain one more n

## 2021-11-08 NOTE — TELEPHONE ENCOUNTER
I am really fully booked already today; if she is in severe pain, then would recommend at least IC or ER.

## 2021-11-08 NOTE — TELEPHONE ENCOUNTER
Spoke with patient--now unable to keep 6 p.m. appt today with Dr. Yvette Taylor d/t transportation issues. Rescheduled today at 2:45 p.m. with Dr. Maire Sicard at [facility] office. No further questions/concerns at this time.       Future Appointments   Date Time Prov

## 2021-11-08 NOTE — TELEPHONE ENCOUNTER
Spoke with patient--requesting appt with Dr. Danis Lazcano only for intermittent, worsening left ear pain, stiff neck radiating to shoulder. Patient denies fever, but reports \"a nerve pinching another nerve.  I saw an ear specialist before--he cleaned out my

## 2021-11-10 ENCOUNTER — TELEPHONE (OUTPATIENT)
Dept: CARDIOLOGY | Age: 76
End: 2021-11-10

## 2021-11-10 ENCOUNTER — TELEPHONE (OUTPATIENT)
Dept: INTERNAL MEDICINE CLINIC | Facility: CLINIC | Age: 76
End: 2021-11-10

## 2021-11-10 NOTE — TELEPHONE ENCOUNTER
Please advise on a new cardiologist.  She is also asking when should she see Dr. Aylin Meyer again  Last office visit for ear ache 7/27/21    The patient stated she had open heart surgery 2 years ago and that doctor retired.    He was seeing  Dr. Jamesetta Simmonds who no

## 2021-11-10 NOTE — TELEPHONE ENCOUNTER
Spoke with patient (name and  verified), informed of message below. Gave patient phone number of  to schedule an appointment.

## 2021-11-22 ENCOUNTER — HOSPITAL ENCOUNTER (OUTPATIENT)
Dept: MAMMOGRAPHY | Facility: HOSPITAL | Age: 76
Discharge: HOME | End: 2021-11-22
Attending: INTERNAL MEDICINE
Payer: MEDICARE

## 2021-11-22 ENCOUNTER — TELEPHONE (OUTPATIENT)
Dept: INTERNAL MEDICINE CLINIC | Facility: CLINIC | Age: 76
End: 2021-11-22

## 2021-11-22 DIAGNOSIS — R92.1 MAMMOGRAPHIC CALCIFICATION: ICD-10-CM

## 2021-11-22 DIAGNOSIS — Z85.3 HISTORY OF BREAST CANCER: ICD-10-CM

## 2021-11-22 PROCEDURE — 77065 DX MAMMO INCL CAD UNI: CPT | Performed by: INTERNAL MEDICINE

## 2021-11-22 PROCEDURE — 77061 BREAST TOMOSYNTHESIS UNI: CPT | Performed by: INTERNAL MEDICINE

## 2021-11-22 NOTE — TELEPHONE ENCOUNTER
Spoke with patient--reports persistent neck and ear pain, only when lying down. She did see Dr. Kiesha Guillory 11/08/2021. \"Dr. Daryn Lucero is good--really good- but I would like to see my doctor, now. It's only when I'm lying down.  I'm ok when I an standin

## 2021-11-22 NOTE — TELEPHONE ENCOUNTER
Spoke with patient ( verified) and relayed Dr. Jeronimo Davidson message below--patient verbalizes understanding and agreement--appt made. No further questions/concerns at this time.       Future Appointments   Date Time Provider Raj Quintanilla

## 2021-11-24 ENCOUNTER — OFFICE VISIT (OUTPATIENT)
Dept: INTERNAL MEDICINE CLINIC | Facility: CLINIC | Age: 76
End: 2021-11-24
Payer: MEDICARE

## 2021-11-24 VITALS
OXYGEN SATURATION: 98 % | BODY MASS INDEX: 29.96 KG/M2 | HEART RATE: 82 BPM | HEIGHT: 64 IN | WEIGHT: 175.5 LBS | TEMPERATURE: 98 F | DIASTOLIC BLOOD PRESSURE: 68 MMHG | SYSTOLIC BLOOD PRESSURE: 116 MMHG

## 2021-11-24 DIAGNOSIS — Z12.11 COLON CANCER SCREENING: ICD-10-CM

## 2021-11-24 DIAGNOSIS — M48.02 DEGENERATIVE CERVICAL SPINAL STENOSIS: Primary | ICD-10-CM

## 2021-11-24 PROCEDURE — G0008 ADMIN INFLUENZA VIRUS VAC: HCPCS | Performed by: INTERNAL MEDICINE

## 2021-11-24 PROCEDURE — 90662 IIV NO PRSV INCREASED AG IM: CPT | Performed by: INTERNAL MEDICINE

## 2021-11-24 PROCEDURE — 3008F BODY MASS INDEX DOCD: CPT | Performed by: INTERNAL MEDICINE

## 2021-11-24 PROCEDURE — 99213 OFFICE O/P EST LOW 20 MIN: CPT | Performed by: INTERNAL MEDICINE

## 2021-11-24 PROCEDURE — 3078F DIAST BP <80 MM HG: CPT | Performed by: INTERNAL MEDICINE

## 2021-11-24 PROCEDURE — 3074F SYST BP LT 130 MM HG: CPT | Performed by: INTERNAL MEDICINE

## 2021-11-25 NOTE — PROGRESS NOTES
Subjective:     Patient ID: Tong Young is a 77year old female. Neck Pain   This is a new problem. The current episode started 1 to 4 weeks ago. The problem occurs constantly. The problem has been gradually improving.  The pain is associated wit directed 400 each 0   • Netarsudil-Latanoprost 0.02-0.005 % Ophthalmic Solution Place 1 drop into the right eye nightly.  ROCKLATAN     • Blood Glucose Monitoring Suppl (ONETOUCH ULTRA 2) w/Device Does not apply Kit Check glucose daily 1 kit 0   • OneTouch GLUCOSE TEST) In Vitro Strip 1 strip by In Vitro route 4 (four) times daily. (Patient not taking: No sig reported) 400 strip 0   • Brimonidine Tartrate-Timolol 0.2-0.5 % Ophthalmic Solution Place 1 drop into the right eye every 12 (twelve) hours.  Kerri Zepeda Obesity       Past Surgical History:   Procedure Laterality Date   • CABG     • CAROTID ENDARTERECTOMY     • CATARACT     • COLONOSCOPY      2013   • COLONOSCOPY  2013   • HERNIA SURGERY     • LUMPECTOMY RIGHT  2014   • MASTECTOMY LEFT Left 1999   • RADIAT cervical adenopathy. Left cervical: No superficial, deep or posterior cervical adenopathy. Neurological:      Mental Status: She is alert. Sensory: Sensation is intact. No sensory deficit. Motor: Motor function is intact.  No weakness, tremo

## 2021-11-30 ENCOUNTER — TELEPHONE (OUTPATIENT)
Dept: PULMONOLOGY | Facility: CLINIC | Age: 76
End: 2021-11-30

## 2021-12-01 NOTE — TELEPHONE ENCOUNTER
Spoke with patient. Patient states she received a call from [facility] that something was needed from pulmonary office for oxygen to be covered. Spoke with Deidre at [facility] regarding this.  Per Emi Mclean, their notes state office visit

## 2021-12-09 NOTE — TELEPHONE ENCOUNTER
Spoke with Claudine Lees at [facility] for update. Claudine Lees states documents are still in verification department and to call back next week for further updates.

## 2021-12-10 ENCOUNTER — TELEPHONE (OUTPATIENT)
Dept: PULMONOLOGY | Facility: CLINIC | Age: 76
End: 2021-12-10

## 2021-12-15 NOTE — TELEPHONE ENCOUNTER
Spoke with Patti Pierre at [facility] for further updates. Patti Pierre states oxygen is authorized until March of 2022. Patient will need future appointment in March 9874 for O2 re-certification. Patient informed of this.  Patient requesting RN to call aj

## 2021-12-17 NOTE — TELEPHONE ENCOUNTER
[facility] oxygen order form signed by Dr. Korina Pennington and faxed back to [facility] with 9/22/21 office visit notes attached as requested. Confirmation received, sent to scanning.

## 2021-12-21 NOTE — TELEPHONE ENCOUNTER
Spoke with patient. Attempted to schedule appointment with patient. Patient states she is not currently at home. Informed patient to contact pulmonary office when she is available to schedule follow-up appointment. Patient verbalized understanding.

## 2022-01-12 ENCOUNTER — TELEPHONE (OUTPATIENT)
Dept: PULMONOLOGY | Facility: CLINIC | Age: 77
End: 2022-01-12

## 2022-01-12 RX ORDER — FLUTICASONE PROPIONATE AND SALMETEROL 500; 50 UG/1; UG/1
1 POWDER RESPIRATORY (INHALATION) 2 TIMES DAILY
COMMUNITY

## 2022-01-12 NOTE — TELEPHONE ENCOUNTER
Received fax from [place] [id number] requesting prescription clarification. Prescription received was fluticasone/salmeterol 250-50 mcg, however per form, patient is stating prescription should be fluticasone/salmeterol 500-50 mcg.  Per LOV 9/22/21, should be

## 2022-01-12 NOTE — TELEPHONE ENCOUNTER
Pt called and was very upset about issues with RX.  CSS attempted to tell pt that a fax was just sent to [facility] this afternoon but she would not let CSS speak and was very agitated. Pt would like to speak to RN. Please call.

## 2022-01-12 NOTE — TELEPHONE ENCOUNTER
Order signed by Dr. Leanna Robison and faxed back to [facility]  at fx# [phone]. Confirmation was received and signed order form was sent to scanning.

## 2022-01-14 NOTE — TELEPHONE ENCOUNTER
Spoke with patient states prescription dosage is corrected now she will be receiving correct inhaler, follow up appointment with Dr. Leanna Robison scheduled on 2/25/22 at 1:30 pm, verified date, time and location. Patient verbalized understanding.

## 2022-01-27 ENCOUNTER — TELEPHONE (OUTPATIENT)
Dept: PULMONOLOGY | Facility: CLINIC | Age: 77
End: 2022-01-27

## 2022-01-27 NOTE — TELEPHONE ENCOUNTER
Spoke with patient informed [facility] [place]. Verified date, time, location & parking, patient verbalized understanding.

## 2022-01-27 NOTE — TELEPHONE ENCOUNTER
Pt called in got a call to reschedule the appt, pt declined the soonest appt. Pt states she needs an appt as soon as possible because her oxygen is going to  the first week of March.  Please call

## 2022-02-15 ENCOUNTER — HOSPITAL ENCOUNTER (OUTPATIENT)
Facility: HOSPITAL | Age: 77
Setting detail: OBSERVATION
Discharge: HOME | End: 2022-02-18
Attending: EMERGENCY MEDICINE | Admitting: HOSPITALIST
Payer: MEDICARE

## 2022-02-15 ENCOUNTER — NURSE TRIAGE (OUTPATIENT)
Dept: INTERNAL MEDICINE CLINIC | Facility: CLINIC | Age: 77
End: 2022-02-15

## 2022-02-15 DIAGNOSIS — K92.2 UPPER GI BLEEDING: Primary | ICD-10-CM

## 2022-02-15 LAB
ANION GAP SERPL CALC-SCNC: 2 MMOL/L (ref 0–18)
ANTIBODY SCREEN: POSITIVE
BASOPHILS # BLD AUTO: 0.02 X10(3) UL (ref 0–0.2)
BASOPHILS NFR BLD AUTO: 0.2 %
BUN BLD-MCNC: 58 MG/DL (ref 7–18)
BUN/CREAT SERPL: 46.4 (ref 10–20)
CALCIUM BLD-MCNC: 10.5 MG/DL (ref 8.5–10.1)
CHLORIDE SERPL-SCNC: 107 MMOL/L (ref 98–112)
CO2 SERPL-SCNC: 30 MMOL/L (ref 21–32)
CREAT BLD-MCNC: 1.25 MG/DL
DEPRECATED RDW RBC AUTO: 51.5 FL (ref 35.1–46.3)
DIRECT COOMBS POLY: NEGATIVE
EOSINOPHIL # BLD AUTO: 0.19 X10(3) UL (ref 0–0.7)
EOSINOPHIL NFR BLD AUTO: 2.3 %
ERYTHROCYTE [DISTWIDTH] IN BLOOD BY AUTOMATED COUNT: 14.7 % (ref 11–15)
GLUCOSE BLD-MCNC: 97 MG/DL (ref 70–99)
GLUCOSE BLDC GLUCOMTR-MCNC: 93 MG/DL (ref 70–99)
HCT VFR BLD AUTO: 29.9 %
HGB BLD-MCNC: 9.2 G/DL
IMM GRANULOCYTES # BLD AUTO: 0.08 X10(3) UL (ref 0–1)
IMM GRANULOCYTES NFR BLD: 0.9 %
LYMPHOCYTES # BLD AUTO: 2.05 X10(3) UL (ref 1–4)
LYMPHOCYTES NFR BLD AUTO: 24.3 %
MCH RBC QN AUTO: 29.6 PG (ref 26–34)
MCHC RBC AUTO-ENTMCNC: 30.8 G/DL (ref 31–37)
MCV RBC AUTO: 96.1 FL
MONOCYTES # BLD AUTO: 1.14 X10(3) UL (ref 0.1–1)
MONOCYTES NFR BLD AUTO: 13.5 %
NEUTROPHILS # BLD AUTO: 4.95 X10 (3) UL (ref 1.5–7.7)
NEUTROPHILS # BLD AUTO: 4.95 X10(3) UL (ref 1.5–7.7)
NEUTROPHILS NFR BLD AUTO: 58.8 %
OSMOLALITY SERPL CALC.SUM OF ELEC: 304 MOSM/KG (ref 275–295)
PLATELET # BLD AUTO: 226 10(3)UL (ref 150–450)
POTASSIUM SERPL-SCNC: 4.5 MMOL/L (ref 3.5–5.1)
RBC # BLD AUTO: 3.11 X10(6)UL
RH BLOOD TYPE: POSITIVE
SARS-COV-2 RNA RESP QL NAA+PROBE: NOT DETECTED
SODIUM SERPL-SCNC: 139 MMOL/L (ref 136–145)
WBC # BLD AUTO: 8.4 X10(3) UL (ref 4–11)

## 2022-02-15 PROCEDURE — 99223 1ST HOSP IP/OBS HIGH 75: CPT | Performed by: HOSPITALIST

## 2022-02-15 RX ORDER — ASPIRIN 325 MG
325 TABLET ORAL DAILY
Status: DISCONTINUED | OUTPATIENT
Start: 2022-02-16 | End: 2022-02-15

## 2022-02-15 RX ORDER — COLCHICINE 0.6 MG/1
0.6 TABLET ORAL
Status: DISCONTINUED | OUTPATIENT
Start: 2022-02-15 | End: 2022-02-18

## 2022-02-15 RX ORDER — DOXEPIN HYDROCHLORIDE 50 MG/1
1 CAPSULE ORAL DAILY
Status: DISCONTINUED | OUTPATIENT
Start: 2022-02-16 | End: 2022-02-18

## 2022-02-15 RX ORDER — MELATONIN
2000 DAILY
Status: DISCONTINUED | OUTPATIENT
Start: 2022-02-16 | End: 2022-02-18

## 2022-02-15 RX ORDER — HYDRALAZINE HYDROCHLORIDE 25 MG/1
12.5 TABLET, FILM COATED ORAL 2 TIMES DAILY
Status: DISCONTINUED | OUTPATIENT
Start: 2022-02-15 | End: 2022-02-18

## 2022-02-15 RX ORDER — DEXTROSE MONOHYDRATE 25 G/50ML
50 INJECTION, SOLUTION INTRAVENOUS
Status: DISCONTINUED | OUTPATIENT
Start: 2022-02-15 | End: 2022-02-15

## 2022-02-15 RX ORDER — METOPROLOL SUCCINATE 25 MG/1
25 TABLET, EXTENDED RELEASE ORAL DAILY
Status: DISCONTINUED | OUTPATIENT
Start: 2022-02-16 | End: 2022-02-18

## 2022-02-15 RX ORDER — SODIUM CHLORIDE 9 MG/ML
INJECTION, SOLUTION INTRAVENOUS CONTINUOUS
Status: DISCONTINUED | OUTPATIENT
Start: 2022-02-15 | End: 2022-02-18

## 2022-02-15 RX ORDER — NICOTINE POLACRILEX 4 MG
15 LOZENGE BUCCAL
Status: DISCONTINUED | OUTPATIENT
Start: 2022-02-15 | End: 2022-02-15

## 2022-02-15 RX ORDER — ONDANSETRON 2 MG/ML
4 INJECTION INTRAMUSCULAR; INTRAVENOUS EVERY 6 HOURS PRN
Status: DISCONTINUED | OUTPATIENT
Start: 2022-02-15 | End: 2022-02-18

## 2022-02-15 RX ORDER — ONDANSETRON 2 MG/ML
4 INJECTION INTRAMUSCULAR; INTRAVENOUS EVERY 4 HOURS PRN
Status: ACTIVE | OUTPATIENT
Start: 2022-02-15 | End: 2022-02-15

## 2022-02-15 RX ORDER — MONTELUKAST SODIUM 10 MG/1
10 TABLET ORAL DAILY
Status: DISCONTINUED | OUTPATIENT
Start: 2022-02-16 | End: 2022-02-18

## 2022-02-15 RX ORDER — NICOTINE POLACRILEX 4 MG
30 LOZENGE BUCCAL
Status: DISCONTINUED | OUTPATIENT
Start: 2022-02-15 | End: 2022-02-15

## 2022-02-15 RX ORDER — ACETAMINOPHEN 325 MG/1
650 TABLET ORAL EVERY 6 HOURS PRN
Status: DISCONTINUED | OUTPATIENT
Start: 2022-02-15 | End: 2022-02-18

## 2022-02-15 RX ORDER — METOCLOPRAMIDE HYDROCHLORIDE 5 MG/ML
10 INJECTION INTRAMUSCULAR; INTRAVENOUS EVERY 8 HOURS PRN
Status: DISCONTINUED | OUTPATIENT
Start: 2022-02-15 | End: 2022-02-15

## 2022-02-15 RX ORDER — AMLODIPINE BESYLATE 5 MG/1
5 TABLET ORAL DAILY
Status: DISCONTINUED | OUTPATIENT
Start: 2022-02-15 | End: 2022-02-18

## 2022-02-15 RX ORDER — FEBUXOSTAT 40 MG/1
40 TABLET, FILM COATED ORAL DAILY
Status: DISCONTINUED | OUTPATIENT
Start: 2022-02-16 | End: 2022-02-18

## 2022-02-15 RX ORDER — METOCLOPRAMIDE HYDROCHLORIDE 5 MG/ML
5 INJECTION INTRAMUSCULAR; INTRAVENOUS EVERY 8 HOURS PRN
Status: DISCONTINUED | OUTPATIENT
Start: 2022-02-15 | End: 2022-02-18

## 2022-02-15 NOTE — ED INITIAL ASSESSMENT (HPI)
Patient presents to ER with complaints of decreased appetite x 3 days, notes related to her acid reflux. Patient notes an episode of vomiting this am around am, states it was black. Also notes an episode of dark stool.    On 1.5L NC, has O2 PRN

## 2022-02-15 NOTE — TELEPHONE ENCOUNTER
Spoke with patient, (  verified ) informed of 's  instructions below  Patient reluctant to go , explained to her the emergent need as she is also a cardiac patient     Will go to [place] ER today with her sister       Staff please f/u on

## 2022-02-16 ENCOUNTER — ANESTHESIA (OUTPATIENT)
Dept: ENDOSCOPY | Facility: HOSPITAL | Age: 77
End: 2022-02-16
Payer: MEDICARE

## 2022-02-16 ENCOUNTER — ANESTHESIA EVENT (OUTPATIENT)
Dept: ENDOSCOPY | Facility: HOSPITAL | Age: 77
End: 2022-02-16
Payer: MEDICARE

## 2022-02-16 LAB
BASOPHILS # BLD AUTO: 0.03 X10(3) UL (ref 0–0.2)
BASOPHILS NFR BLD AUTO: 0.5 %
BUN BLD-MCNC: 45 MG/DL (ref 7–18)
BUN/CREAT SERPL: 44.1 (ref 10–20)
CALCIUM BLD-MCNC: 9.2 MG/DL (ref 8.5–10.1)
CHLORIDE SERPL-SCNC: 111 MMOL/L (ref 98–112)
CO2 SERPL-SCNC: 29 MMOL/L (ref 21–32)
CREAT BLD-MCNC: 1.02 MG/DL
DEPRECATED HBV CORE AB SER IA-ACNC: 53.7 NG/ML
DEPRECATED RDW RBC AUTO: 51.2 FL (ref 35.1–46.3)
EOSINOPHIL # BLD AUTO: 0.31 X10(3) UL (ref 0–0.7)
EOSINOPHIL NFR BLD AUTO: 5.1 %
ERYTHROCYTE [DISTWIDTH] IN BLOOD BY AUTOMATED COUNT: 14.7 % (ref 11–15)
EST. AVERAGE GLUCOSE BLD GHB EST-MCNC: 140 MG/DL (ref 68–126)
GLUCOSE BLD-MCNC: 95 MG/DL (ref 70–99)
HBA1C MFR BLD: 6.5 % (ref ?–5.7)
HCT VFR BLD AUTO: 27 %
HGB BLD-MCNC: 8.1 G/DL
IMM GRANULOCYTES # BLD AUTO: 0.05 X10(3) UL (ref 0–1)
IMM GRANULOCYTES NFR BLD: 0.8 %
IRON SATN MFR SERPL: 13 %
IRON SERPL-MCNC: 37 UG/DL
LYMPHOCYTES # BLD AUTO: 1.53 X10(3) UL (ref 1–4)
LYMPHOCYTES NFR BLD AUTO: 25.2 %
MCH RBC QN AUTO: 28.7 PG (ref 26–34)
MCHC RBC AUTO-ENTMCNC: 30 G/DL (ref 31–37)
MCV RBC AUTO: 95.7 FL
MONOCYTES # BLD AUTO: 0.7 X10(3) UL (ref 0.1–1)
MONOCYTES NFR BLD AUTO: 11.6 %
NEUTROPHILS # BLD AUTO: 3.44 X10 (3) UL (ref 1.5–7.7)
NEUTROPHILS # BLD AUTO: 3.44 X10(3) UL (ref 1.5–7.7)
NEUTROPHILS NFR BLD AUTO: 56.8 %
OSMOLALITY SERPL CALC.SUM OF ELEC: 309 MOSM/KG (ref 275–295)
PLATELET # BLD AUTO: 193 10(3)UL (ref 150–450)
POTASSIUM SERPL-SCNC: 4.2 MMOL/L (ref 3.5–5.1)
RBC # BLD AUTO: 2.82 X10(6)UL
SODIUM SERPL-SCNC: 144 MMOL/L (ref 136–145)
TIBC SERPL-MCNC: 279 UG/DL (ref 240–450)
TRANSFERRIN SERPL-MCNC: 187 MG/DL (ref 200–360)
WBC # BLD AUTO: 6.1 X10(3) UL (ref 4–11)

## 2022-02-16 PROCEDURE — 0DB38ZX EXCISION OF LOWER ESOPHAGUS, VIA NATURAL OR ARTIFICIAL OPENING ENDOSCOPIC, DIAGNOSTIC: ICD-10-PCS | Performed by: INTERNAL MEDICINE

## 2022-02-16 PROCEDURE — 99222 1ST HOSP IP/OBS MODERATE 55: CPT | Performed by: INTERNAL MEDICINE

## 2022-02-16 PROCEDURE — 99233 SBSQ HOSP IP/OBS HIGH 50: CPT | Performed by: HOSPITALIST

## 2022-02-16 PROCEDURE — 0DB68ZX EXCISION OF STOMACH, VIA NATURAL OR ARTIFICIAL OPENING ENDOSCOPIC, DIAGNOSTIC: ICD-10-PCS | Performed by: INTERNAL MEDICINE

## 2022-02-16 PROCEDURE — 0DB98ZX EXCISION OF DUODENUM, VIA NATURAL OR ARTIFICIAL OPENING ENDOSCOPIC, DIAGNOSTIC: ICD-10-PCS | Performed by: INTERNAL MEDICINE

## 2022-02-16 RX ORDER — LIDOCAINE HYDROCHLORIDE 10 MG/ML
INJECTION, SOLUTION EPIDURAL; INFILTRATION; INTRACAUDAL; PERINEURAL AS NEEDED
Status: DISCONTINUED | OUTPATIENT
Start: 2022-02-16 | End: 2022-02-16 | Stop reason: SURG

## 2022-02-16 RX ORDER — DOCUSATE SODIUM 100 MG/1
100 CAPSULE, LIQUID FILLED ORAL 2 TIMES DAILY
Status: DISCONTINUED | OUTPATIENT
Start: 2022-02-16 | End: 2022-02-18

## 2022-02-16 RX ORDER — NALOXONE HYDROCHLORIDE 0.4 MG/ML
80 INJECTION, SOLUTION INTRAMUSCULAR; INTRAVENOUS; SUBCUTANEOUS AS NEEDED
Status: ACTIVE | OUTPATIENT
Start: 2022-02-16 | End: 2022-02-16

## 2022-02-16 RX ORDER — DEXTROSE MONOHYDRATE 25 G/50ML
50 INJECTION, SOLUTION INTRAVENOUS
Status: DISCONTINUED | OUTPATIENT
Start: 2022-02-16 | End: 2022-02-18

## 2022-02-16 RX ORDER — NICOTINE POLACRILEX 4 MG
30 LOZENGE BUCCAL
Status: DISCONTINUED | OUTPATIENT
Start: 2022-02-16 | End: 2022-02-18

## 2022-02-16 RX ORDER — SODIUM CHLORIDE, SODIUM LACTATE, POTASSIUM CHLORIDE, CALCIUM CHLORIDE 600; 310; 30; 20 MG/100ML; MG/100ML; MG/100ML; MG/100ML
INJECTION, SOLUTION INTRAVENOUS CONTINUOUS PRN
Status: DISCONTINUED | OUTPATIENT
Start: 2022-02-16 | End: 2022-02-16 | Stop reason: SURG

## 2022-02-16 RX ORDER — NICOTINE POLACRILEX 4 MG
15 LOZENGE BUCCAL
Status: DISCONTINUED | OUTPATIENT
Start: 2022-02-16 | End: 2022-02-18

## 2022-02-16 RX ORDER — SODIUM CHLORIDE, SODIUM LACTATE, POTASSIUM CHLORIDE, CALCIUM CHLORIDE 600; 310; 30; 20 MG/100ML; MG/100ML; MG/100ML; MG/100ML
INJECTION, SOLUTION INTRAVENOUS CONTINUOUS
Status: DISCONTINUED | OUTPATIENT
Start: 2022-02-16 | End: 2022-02-16

## 2022-02-16 RX ADMIN — SODIUM CHLORIDE, SODIUM LACTATE, POTASSIUM CHLORIDE, CALCIUM CHLORIDE: 600; 310; 30; 20 INJECTION, SOLUTION INTRAVENOUS at 09:49:00

## 2022-02-16 RX ADMIN — LIDOCAINE HYDROCHLORIDE 80 MG: 10 INJECTION, SOLUTION EPIDURAL; INFILTRATION; INTRACAUDAL; PERINEURAL at 09:49:00

## 2022-02-16 NOTE — OPERATIVE REPORT
[facility] Endoscopy Report      Date of Procedure:  02/16/22        Preoperative Diagnosis:  Upper gastrointestinal bleeding      Postoperative Diagnosis:  1. LA Grade B Esophagitis  2. Hiatal hernia  3. Gastric antral erosions  4. Duodenitis      Procedure:    Esophagogastroduodenoscopy with biopsy      Surgeon:  Dee Alvarez M.D. Anesthesia:  Monitored anesthesia care  EBL:  Insignificant      Brief History: This is a 69year old female who presents with coffee-ground emesis, melenic stools and an acute blood loss anemia in the setting of full dose aspirin therapy. Upper endoscopy is being performed to evaluate. Technique:  After informed consent, the patient was placed in the left lateral recumbent position. An Olympus adult HD gastroscope was inserted into the hypopharynx and advanced under direct vision into the esophagus, stomach and duodenum. The endoscope was withdrawn and a retroflexed view of the gastric angulus, body, cardia and fundus was performed. The instrument was straightened insufflated air and fluid were suctioned and the endoscope was withdrawn. The procedure was well tolerated without immediate complication. Findings: The esophagus in its proximal and mid portions was normal.  Within the distal esophagus there was a 6 mm erosion extending above the gastroesophageal junction. Biopsies were obtained. The GE junction (ringlike) and diaphragmatic impression were at 37/39 cm with a 1-2 cm sliding hiatal hernia. The stomach distended appropriately with insufflated air. The mucosa of the stomach including cardia, fundus, gastric body and antrum was normal with the exception of several very superficial 3 mm erosions in the antrum which were biopsied. There was fairly brisk bleeding with the biopsies which ceased spontaneously. No dominant ulcerations were seen.   The duodenal bulb was erythematous and softly nodular likely due to gastric metaplastic tissue and/or Brunner's gland hyperplasia. Biopsies were obtained. The second portion of the duodenum was normal with clear bile. Impression:  1. LA grade B esophagitis  2. Small hiatal hernia  3. Gastric antral erosions likely due to aspirin. H. pylori should be excluded. 4.  Duodenitis    Recommendations:  1. Proton pump inhibitor therapy. 2.  Follow-up biopsy results. Treat H. pylori if positive. 3.  Elective colonoscopy.       Dexter Lema MD  2/16/2022

## 2022-02-16 NOTE — PROGRESS NOTES
[facility] Pharmacy Note:  Renal Dose Adjustment for Metoclopramide (REGLAN)    Ariaan Osorio has been prescribed Metoclopramide (REGLAN) 10 mg every 8 hours as needed for nausea/vomiting,. Estimated Creatinine Clearance: 34.5 mL/min (A) (based on SCr of 1.25 mg/dL (H)). Calculated creatinine clearance is < 40 ml/min, therefore, the dose of Metoclopramide (REGLAN) has been changed to 5 mg every 8 hours as needed for nausea/vomiting per P&T approved protocol. Pharmacy will continue to follow, and if renal function improves, will resume the original order.        Thank you,  Armand Cortez, PharmD  2/15/2022 9:49 PM

## 2022-02-16 NOTE — PLAN OF CARE
Problem: Patient Centered Care  Goal: Patient preferences are identified and integrated in the patient's plan of care  Description: Interventions:  - What would you like us to know as we care for you?   - Provide timely, complete, and accurate information to patient/family  - Incorporate patient and family knowledge, values, beliefs, and cultural backgrounds into the planning and delivery of care  - Encourage patient/family to participate in care and decision-making at the level they choose  - Honor patient and family perspectives and choices  Outcome: Progressing     Problem: SAFETY ADULT - FALL  Goal: Free from fall injury  Description: INTERVENTIONS:  - Assess pt frequently for physical needs  - Identify cognitive and physical deficits and behaviors that affect risk of falls.   - [place] fall precautions as indicated by assessment.  - Educate pt/family on patient safety including physical limitations  - Instruct pt to call for assistance with activity based on assessment  - Modify environment to reduce risk of injury  - Provide assistive devices as appropriate  - Consider OT/PT consult to assist with strengthening/mobility  - Encourage toileting schedule  Outcome: Progressing     Problem: GASTROINTESTINAL - ADULT  Goal: Minimal or absence of nausea and vomiting  Description: INTERVENTIONS:  - Maintain adequate hydration with IV or PO as ordered and tolerated  - Nasogastric tube to low intermittent suction as ordered  - Evaluate effectiveness of ordered antiemetic medications  - Provide nonpharmacologic comfort measures as appropriate  - Advance diet as tolerated, if ordered  - Obtain nutritional consult as needed  - Evaluate fluid balance  Outcome: Progressing  Goal: Maintains or returns to baseline bowel function  Description: INTERVENTIONS:  - Assess bowel function  - Maintain adequate hydration with IV or PO as ordered and tolerated  - Evaluate effectiveness of GI medications  - Encourage mobilization and activity  - Obtain nutritional consult as needed  - Establish a toileting routine/schedule  - Consider collaborating with pharmacy to review patient's medication profile  Outcome: Progressing     Problem: METABOLIC/FLUID AND ELECTROLYTES - ADULT  Goal: Glucose maintained within prescribed range  Description: INTERVENTIONS:  - Monitor Blood Glucose as ordered  - Assess for signs and symptoms of hyperglycemia and hypoglycemia  - Administer ordered medications to maintain glucose within target range  - Assess barriers to adequate nutritional intake and initiate nutrition consult as needed  - Instruct patient on self management of diabetes  Outcome: Progressing  Goal: Electrolytes maintained within normal limits  Description: INTERVENTIONS:  - Monitor labs and rhythm and assess patient for signs and symptoms of electrolyte imbalances  - Administer electrolyte replacement as ordered  - Monitor response to electrolyte replacements, including rhythm and repeat lab results as appropriate  - Fluid restriction as ordered  - Instruct patient on fluid and nutrition restrictions as appropriate  Outcome: Progressing  Goal: Hemodynamic stability and optimal renal function maintained  Description: INTERVENTIONS:  - Monitor labs and assess for signs and symptoms of volume excess or deficit  - Monitor intake, output and patient weight  - Monitor urine specific gravity, serum osmolarity and serum sodium as indicated or ordered  - Monitor response to interventions for patient's volume status, including labs, urine output, blood pressure (other measures as available)  - Encourage oral intake as appropriate  - Instruct patient on fluid and nutrition restrictions as appropriate  Outcome: Progressing     Problem: HEMATOLOGIC - ADULT  Goal: Maintains hematologic stability  Description: INTERVENTIONS  - Assess for signs and symptoms of bleeding or hemorrhage  - Monitor labs and vital signs for trends  - Administer supportive blood products/factors, fluids and medications as ordered and appropriate  - Administer supportive blood products/factors as ordered and appropriate  Outcome: Progressing  Goal: Free from bleeding injury  Description: (Example usage: patient with low platelets)  INTERVENTIONS:  - Avoid intramuscular injections, enemas and rectal medication administration  - Ensure safe mobilization of patient  - Hold pressure on venipuncture sites to achieve adequate hemostasis  - Assess for signs and symptoms of internal bleeding  - Monitor lab trends  - Patient is to report abnormal signs of bleeding to staff  - Avoid use of toothpicks and dental floss  - Use electric shaver for shaving  - Use soft bristle tooth brush  - Limit straining and forceful nose blowing  Outcome: Progressing     Patient A/Ox4, ambulates in room, standby assist. Denies pain, will continue to monitor. Patient NPO advance to clear liquids post EGD. EGD scheduled in AM. Continue IV fluids/ IV protonix. Oxygen therapy as needed. Fall precaution. Monitor VS. Left arm precaution. Complaining of constipation, PRN given. Call light within reach.

## 2022-02-16 NOTE — PLAN OF CARE
Problem: Patient Centered Care  Goal: Patient preferences are identified and integrated in the patient's plan of care  Description: Interventions:  - What would you like us to know as we care for you?   - Provide timely, complete, and accurate information to patient/family  - Incorporate patient and family knowledge, values, beliefs, and cultural backgrounds into the planning and delivery of care  - Encourage patient/family to participate in care and decision-making at the level they choose  - Honor patient and family perspectives and choices  Outcome: Progressing     Problem: SAFETY ADULT - FALL  Goal: Free from fall injury  Description: INTERVENTIONS:  - Assess pt frequently for physical needs  - Identify cognitive and physical deficits and behaviors that affect risk of falls.   - [place] fall precautions as indicated by assessment.  - Educate pt/family on patient safety including physical limitations  - Instruct pt to call for assistance with activity based on assessment  - Modify environment to reduce risk of injury  - Provide assistive devices as appropriate  - Consider OT/PT consult to assist with strengthening/mobility  - Encourage toileting schedule  Outcome: Progressing     Problem: GASTROINTESTINAL - ADULT  Goal: Minimal or absence of nausea and vomiting  Description: INTERVENTIONS:  - Maintain adequate hydration with IV or PO as ordered and tolerated  - Nasogastric tube to low intermittent suction as ordered  - Evaluate effectiveness of ordered antiemetic medications  - Provide nonpharmacologic comfort measures as appropriate  - Advance diet as tolerated, if ordered  - Obtain nutritional consult as needed  - Evaluate fluid balance  Outcome: Progressing     Problem: METABOLIC/FLUID AND ELECTROLYTES - ADULT  Goal: Glucose maintained within prescribed range  Description: INTERVENTIONS:  - Monitor Blood Glucose as ordered  - Assess for signs and symptoms of hyperglycemia and hypoglycemia  - Administer ordered medications to maintain glucose within target range  - Assess barriers to adequate nutritional intake and initiate nutrition consult as needed  - Instruct patient on self management of diabetes  Outcome: Progressing  Goal: Electrolytes maintained within normal limits  Description: INTERVENTIONS:  - Monitor labs and rhythm and assess patient for signs and symptoms of electrolyte imbalances  - Administer electrolyte replacement as ordered  - Monitor response to electrolyte replacements, including rhythm and repeat lab results as appropriate  - Fluid restriction as ordered  - Instruct patient on fluid and nutrition restrictions as appropriate  Outcome: Progressing  Goal: Hemodynamic stability and optimal renal function maintained  Description: INTERVENTIONS:  - Monitor labs and assess for signs and symptoms of volume excess or deficit  - Monitor intake, output and patient weight  - Monitor urine specific gravity, serum osmolarity and serum sodium as indicated or ordered  - Monitor response to interventions for patient's volume status, including labs, urine output, blood pressure (other measures as available)  - Encourage oral intake as appropriate  - Instruct patient on fluid and nutrition restrictions as appropriate  Outcome: Progressing     Problem: HEMATOLOGIC - ADULT  Goal: Maintains hematologic stability  Description: INTERVENTIONS  - Assess for signs and symptoms of bleeding or hemorrhage  - Monitor labs and vital signs for trends  - Administer supportive blood products/factors, fluids and medications as ordered and appropriate  - Administer supportive blood products/factors as ordered and appropriate  Outcome: Progressing  Goal: Free from bleeding injury  Description: (Example usage: patient with low platelets)  INTERVENTIONS:  - Avoid intramuscular injections, enemas and rectal medication administration  - Ensure safe mobilization of patient  - Hold pressure on venipuncture sites to achieve adequate hemostasis  - Assess for signs and symptoms of internal bleeding  - Monitor lab trends  - Patient is to report abnormal signs of bleeding to staff  - Avoid use of toothpicks and dental floss  - Use electric shaver for shaving  - Use soft bristle tooth brush  - Limit straining and forceful nose blowing  Outcome: Progressing     Problem: Patient/Family Goals  Goal: Patient/Family Long Term Goal  Description: Patient's Long Term Goal: To go home  Interventions:  - Follow plan of care  - Follow medication regimen  - monitor labs (hgb)  - See additional Care Plan goals for specific interventions  Outcome: Not Progressing  Goal: Patient/Family Short Term Goal  Description: Patient's Short Term Goal: To feel better    Interventions:   - Follow plan of care  - Monitor Bms  - Monitor labs  - See additional Care Plan goals for specific interventions  Outcome: Not Progressing     Problem: GASTROINTESTINAL - ADULT  Goal: Maintains or returns to baseline bowel function  Description: INTERVENTIONS:  - Assess bowel function  - Maintain adequate hydration with IV or PO as ordered and tolerated  - Evaluate effectiveness of GI medications  - Encourage mobilization and activity  - Obtain nutritional consult as needed  - Establish a toileting routine/schedule  - Consider collaborating with pharmacy to review patient's medication profile  Outcome: Not Progressing     Oriented to unit. IVF. GI on consult. Call light within reach, bed in lowest position, alarms on, and nonskid socks on.

## 2022-02-16 NOTE — CM/SW NOTE
02/16/22 0800   CM/SW Referral Data   Referral Source    Reason for Referral Discharge planning   Informant EMR;Clinical Staff Member   Pertinent Medical Hx   Does patient have an established PCP? Yes  ([facility])   Significant Past Medical/Mental Health Hx Breast CA, COPD, asthma, CAD, HTN, anxiety, depression   Patient Info   Patient's Current Mental Status at Time of Assessment Alert;Oriented   Patient's Home Environment House   Patient lives with Alone  (Caregiver services)   Patient Status Prior to Admission   Independent with ADLs and Mobility Yes   Discharge Needs   Anticipated D/C needs No anticipated discharge needs     Pt discussed during nursing rounds. Dx upper GI bleed, endoscopy scheduled this morning. From home alone w/caregiver services. Pt able to ambulate independently. No home care needs anticipated on dc. Plan: Home w/caregiver pending medical clearance. / to remain available for support and/or discharge planning.      SAVI Benavides    [phone]

## 2022-02-16 NOTE — H&P
[facility]    PATIENT'S NAME: Christofer Gonzales [address] 87 PHYSICIAN: Halima Whitfield. Valeri Mora MD   PATIENT ACCOUNT#:   [id number]    LOCATION:  [address]  MEDICAL RECORD #:   [id number]       YOB: 1945  ADMISSION DATE:       02/15/2022    HISTORY AND PHYSICAL EXAMINATION    CHIEF COMPLAINT:  Melena, gastrointestinal bleed. HISTORY OF PRESENT ILLNESS:  Patient is a 58-year-old [place] female with known past medical history of coronary artery disease, who came into the emergency department for evaluation of nausea, 1 episode of vomiting, and dark stool. CBC showed hemoglobin of 9.2, which has dropped from 12.8 in September 2021. BUN and creatinine of 58 and 1.25, and elevated ratio of 46.4. GFR is 42, which is around her baseline. Patient had melena on rectal exam strongly positive for Hemoccult blood. Started on IV Protonix. She will be admitted to the hospital for further management. PAST MEDICAL HISTORY:  Severe multiarticular gout; coronary artery disease, status post coronary artery bypass graft surgery; chronic left ventricular diastolic dysfunction; carotid atherosclerosis; asthma; severe chronic obstructive pulmonary disease; major depressive disorder; gastroesophageal reflux disease; hyperlipidemia; hypertension; and generalized osteoarthritis. PAST SURGICAL HISTORY:  Left mastectomy for breast cancer; right lumpectomy, followed by radiation therapy for breast cancer; coronary artery bypass graft surgery; right carotid endarterectomy; transsphenoidal pituitary macroadenoma resection; hernia repair; and cataract procedure. MEDICATIONS:  Please see medication reconciliation list.     ALLERGIES:  Allopurinol. Side effects to statins, ACE inhibitors. FAMILY HISTORY:  Mother had thyroid disease. Father had asthma, hypertension, and heart disease. SOCIAL HISTORY:  No tobacco, alcohol, or drug use. Retired. Lives by herself.   Usually independent for basic activities of daily living. Denies any over-the-counter NSAIDs use. The only NSAID she takes is aspirin 325 mg daily. REVIEW OF SYSTEMS:  Patient said lately she has been feeling bloated after she eats, uncomfortable. Yesterday started having dark black bowel movements. Continued until today. She vomited once. Her bloating has been going on for several weeks, and she thought it was a side effect from her gout medication. Other 12-point review of systems is negative. No chest pain. No dyspnea. PHYSICAL EXAMINATION:    GENERAL:  Alert and oriented to time, place and person. No acute distress. VITAL SIGNS:  Temperature 98.2, pulse 93, respiratory rate 20, blood pressure 120/73, pulse ox 97% on room air. HEENT:  Atraumatic. Oropharynx clear. Moist mucous membranes. Normal hard and soft palate. Eyes:  Anicteric sclerae. NECK:  Supple. No lymphadenopathy. Trachea midline. Full range of motion. LUNGS:  Clear to auscultation bilaterally. Normal respiratory effort. HEART:  Regular rate and rhythm. S1 and S2 auscultated. No murmur. ABDOMEN:  Soft, nondistended. No tenderness. Positive bowel sounds. EXTREMITIES:  No edema, clubbing or cyanosis, both legs. Right thumb with large tophi at interphalangeal joint area. RECTAL:  Melena, strongly positive for Hemoccult blood. ASSESSMENT:    1. Gastrointestinal bleed. Rule out peptic ulcer disease, upper source. 2.   Acute posthemorrhagic anemia. 3.   Chronic kidney disease stage 3.  4.   Hypertension. PLAN:  Patient will be admitted to general medical floor. Monitor her hemodynamic status closely. IV Protonix. Gastroenterology consult. Hold aspirin. Further recommendations to follow.      Dictated By Jarad Ibrahim MD  d: 02/15/2022 18:12:43  t: 02/15/2022 18:44:11  Job [id number]  QN/

## 2022-02-16 NOTE — ANESTHESIA POSTPROCEDURE EVALUATION
Patient: Jennifer Mcardle    Procedure Summary     Date: 02/16/22 Room / Location: [address] [place] [address] ENDOSCOPY 04 / [address] [place] [address] ENDOSCOPY    Anesthesia Start: 5686 Anesthesia Stop: 8325    Procedure: ESOPHAGOGASTRODUODENOSCOPY (EGD) (N/A ) Diagnosis: (duodenitis, esophagitis, gastric erosions, hiatal hernia)    Surgeons: Morales Mark MD Anesthesiologist: Noel Feldman CRNA    Anesthesia Type: MAC ASA Status: 3          Anesthesia Type: MAC    PACU Vitals    BP 97/55 (02/16/22 1011)    Temp      Pulse 87 (02/16/22 1011)   Resp 16 (02/16/22 1011)    SpO2 93 % (02/16/22 1011)        [facility] AN Post Evaluation:   Patient Evaluated in PACU  Patient Participation: complete - patient participated  Level of Consciousness: awake and alert  Pain Score: 0  Pain Management: adequate  Airway Patency:patent  Yes    Cardiovascular Status: blood pressure returned to baseline  Respiratory Status: acceptable and nasal cannula  Postoperative Hydration acceptable      Magda Barillas CRNA  2/16/2022 10:12 AM

## 2022-02-17 LAB
ANION GAP SERPL CALC-SCNC: 3 MMOL/L (ref 0–18)
BASOPHILS # BLD AUTO: 0.01 X10(3) UL (ref 0–0.2)
BASOPHILS NFR BLD AUTO: 0.2 %
BUN BLD-MCNC: 21 MG/DL (ref 7–18)
BUN/CREAT SERPL: 22.8 (ref 10–20)
CALCIUM BLD-MCNC: 9.4 MG/DL (ref 8.5–10.1)
CHLORIDE SERPL-SCNC: 109 MMOL/L (ref 98–112)
CO2 SERPL-SCNC: 30 MMOL/L (ref 21–32)
CREAT BLD-MCNC: 0.92 MG/DL
DEPRECATED RDW RBC AUTO: 51.7 FL (ref 35.1–46.3)
EOSINOPHIL # BLD AUTO: 0.28 X10(3) UL (ref 0–0.7)
EOSINOPHIL NFR BLD AUTO: 4.4 %
ERYTHROCYTE [DISTWIDTH] IN BLOOD BY AUTOMATED COUNT: 14.6 % (ref 11–15)
GLUCOSE BLD-MCNC: 110 MG/DL (ref 70–99)
HCT VFR BLD AUTO: 25.3 %
HGB BLD-MCNC: 7.7 G/DL
IMM GRANULOCYTES # BLD AUTO: 0.03 X10(3) UL (ref 0–1)
IMM GRANULOCYTES NFR BLD: 0.5 %
LYMPHOCYTES # BLD AUTO: 1.1 X10(3) UL (ref 1–4)
LYMPHOCYTES NFR BLD AUTO: 17.2 %
MAGNESIUM SERPL-MCNC: 2 MG/DL (ref 1.6–2.6)
MCH RBC QN AUTO: 29.2 PG (ref 26–34)
MCHC RBC AUTO-ENTMCNC: 30.4 G/DL (ref 31–37)
MCV RBC AUTO: 95.8 FL
MONOCYTES # BLD AUTO: 0.79 X10(3) UL (ref 0.1–1)
MONOCYTES NFR BLD AUTO: 12.3 %
NEUTROPHILS # BLD AUTO: 4.19 X10 (3) UL (ref 1.5–7.7)
NEUTROPHILS # BLD AUTO: 4.19 X10(3) UL (ref 1.5–7.7)
NEUTROPHILS NFR BLD AUTO: 65.4 %
OSMOLALITY SERPL CALC.SUM OF ELEC: 298 MOSM/KG (ref 275–295)
PHOSPHATE SERPL-MCNC: 2.5 MG/DL (ref 2.5–4.9)
PLATELET # BLD AUTO: 188 10(3)UL (ref 150–450)
POTASSIUM SERPL-SCNC: 3.9 MMOL/L (ref 3.5–5.1)
RBC # BLD AUTO: 2.64 X10(6)UL
SODIUM SERPL-SCNC: 142 MMOL/L (ref 136–145)
WBC # BLD AUTO: 6.4 X10(3) UL (ref 4–11)

## 2022-02-17 PROCEDURE — 99233 SBSQ HOSP IP/OBS HIGH 50: CPT | Performed by: HOSPITALIST

## 2022-02-17 PROCEDURE — 99232 SBSQ HOSP IP/OBS MODERATE 35: CPT | Performed by: PHYSICIAN ASSISTANT

## 2022-02-17 NOTE — PLAN OF CARE
Problem: Patient Centered Care  Goal: Patient preferences are identified and integrated in the patient's plan of care  Description: Interventions:  - What would you like us to know as we care for you?   - Provide timely, complete, and accurate information to patient/family  - Incorporate patient and family knowledge, values, beliefs, and cultural backgrounds into the planning and delivery of care  - Encourage patient/family to participate in care and decision-making at the level they choose  - Honor patient and family perspectives and choices  Outcome: Progressing     Problem: Patient/Family Goals  Goal: Patient/Family Long Term Goal  Description: Patient's Long Term Goal: To go home  Interventions:  - Follow plan of care  - Follow medication regimen  - monitor labs (hgb)  - See additional Care Plan goals for specific interventions  Outcome: Progressing  Goal: Patient/Family Short Term Goal  Description: Patient's Short Term Goal: To feel better    Interventions:   - Follow plan of care  - Monitor Bms  - Monitor labs  - See additional Care Plan goals for specific interventions  Outcome: Progressing     Problem: SAFETY ADULT - FALL  Goal: Free from fall injury  Description: INTERVENTIONS:  - Assess pt frequently for physical needs  - Identify cognitive and physical deficits and behaviors that affect risk of falls.   - [place] fall precautions as indicated by assessment.  - Educate pt/family on patient safety including physical limitations  - Instruct pt to call for assistance with activity based on assessment  - Modify environment to reduce risk of injury  - Provide assistive devices as appropriate  - Consider OT/PT consult to assist with strengthening/mobility  - Encourage toileting schedule  Outcome: Progressing     Problem: GASTROINTESTINAL - ADULT  Goal: Minimal or absence of nausea and vomiting  Description: INTERVENTIONS:  - Maintain adequate hydration with IV or PO as ordered and tolerated  - Nasogastric tube to low intermittent suction as ordered  - Evaluate effectiveness of ordered antiemetic medications  - Provide nonpharmacologic comfort measures as appropriate  - Advance diet as tolerated, if ordered  - Obtain nutritional consult as needed  - Evaluate fluid balance  Outcome: Progressing  Goal: Maintains or returns to baseline bowel function  Description: INTERVENTIONS:  - Assess bowel function  - Maintain adequate hydration with IV or PO as ordered and tolerated  - Evaluate effectiveness of GI medications  - Encourage mobilization and activity  - Obtain nutritional consult as needed  - Establish a toileting routine/schedule  - Consider collaborating with pharmacy to review patient's medication profile  Outcome: Progressing     Problem: METABOLIC/FLUID AND ELECTROLYTES - ADULT  Goal: Glucose maintained within prescribed range  Description: INTERVENTIONS:  - Monitor Blood Glucose as ordered  - Assess for signs and symptoms of hyperglycemia and hypoglycemia  - Administer ordered medications to maintain glucose within target range  - Assess barriers to adequate nutritional intake and initiate nutrition consult as needed  - Instruct patient on self management of diabetes  Outcome: Progressing  Goal: Electrolytes maintained within normal limits  Description: INTERVENTIONS:  - Monitor labs and rhythm and assess patient for signs and symptoms of electrolyte imbalances  - Administer electrolyte replacement as ordered  - Monitor response to electrolyte replacements, including rhythm and repeat lab results as appropriate  - Fluid restriction as ordered  - Instruct patient on fluid and nutrition restrictions as appropriate  Outcome: Progressing  Goal: Hemodynamic stability and optimal renal function maintained  Description: INTERVENTIONS:  - Monitor labs and assess for signs and symptoms of volume excess or deficit  - Monitor intake, output and patient weight  - Monitor urine specific gravity, serum osmolarity and serum sodium as indicated or ordered  - Monitor response to interventions for patient's volume status, including labs, urine output, blood pressure (other measures as available)  - Encourage oral intake as appropriate  - Instruct patient on fluid and nutrition restrictions as appropriate  Outcome: Progressing     Problem: HEMATOLOGIC - ADULT  Goal: Maintains hematologic stability  Description: INTERVENTIONS  - Assess for signs and symptoms of bleeding or hemorrhage  - Monitor labs and vital signs for trends  - Administer supportive blood products/factors, fluids and medications as ordered and appropriate  - Administer supportive blood products/factors as ordered and appropriate  Outcome: Progressing  Goal: Free from bleeding injury  Description: (Example usage: patient with low platelets)  INTERVENTIONS:  - Avoid intramuscular injections, enemas and rectal medication administration  - Ensure safe mobilization of patient  - Hold pressure on venipuncture sites to achieve adequate hemostasis  - Assess for signs and symptoms of internal bleeding  - Monitor lab trends  - Patient is to report abnormal signs of bleeding to staff  - Avoid use of toothpicks and dental floss  - Use electric shaver for shaving  - Use soft bristle tooth brush  - Limit straining and forceful nose blowing  Outcome: Progressing     IVF. Tolerating clear liquid diet. Call light within reach, bed in lowest position, alarms on, and nonskid socks on.

## 2022-02-18 ENCOUNTER — MED REC SCAN ONLY (OUTPATIENT)
Dept: GASTROENTEROLOGY | Facility: CLINIC | Age: 77
End: 2022-02-18

## 2022-02-18 ENCOUNTER — TELEPHONE (OUTPATIENT)
Dept: GASTROENTEROLOGY | Facility: CLINIC | Age: 77
End: 2022-02-18

## 2022-02-18 VITALS
TEMPERATURE: 99 F | BODY MASS INDEX: 26.37 KG/M2 | WEIGHT: 168 LBS | DIASTOLIC BLOOD PRESSURE: 44 MMHG | HEIGHT: 67 IN | OXYGEN SATURATION: 96 % | SYSTOLIC BLOOD PRESSURE: 142 MMHG | RESPIRATION RATE: 18 BRPM | HEART RATE: 82 BPM

## 2022-02-18 LAB
ANION GAP SERPL CALC-SCNC: 5 MMOL/L (ref 0–18)
BASOPHILS # BLD AUTO: 0.01 X10(3) UL (ref 0–0.2)
BASOPHILS NFR BLD AUTO: 0.2 %
BUN BLD-MCNC: 12 MG/DL (ref 7–18)
BUN/CREAT SERPL: 14.5 (ref 10–20)
CALCIUM BLD-MCNC: 9.2 MG/DL (ref 8.5–10.1)
CHLORIDE SERPL-SCNC: 106 MMOL/L (ref 98–112)
CO2 SERPL-SCNC: 30 MMOL/L (ref 21–32)
CREAT BLD-MCNC: 0.83 MG/DL
DEPRECATED RDW RBC AUTO: 51.9 FL (ref 35.1–46.3)
EOSINOPHIL # BLD AUTO: 0.22 X10(3) UL (ref 0–0.7)
EOSINOPHIL NFR BLD AUTO: 3.6 %
ERYTHROCYTE [DISTWIDTH] IN BLOOD BY AUTOMATED COUNT: 14.6 % (ref 11–15)
GLUCOSE BLD-MCNC: 116 MG/DL (ref 70–99)
HCT VFR BLD AUTO: 25.6 %
HGB BLD-MCNC: 7.6 G/DL
IMM GRANULOCYTES # BLD AUTO: 0.03 X10(3) UL (ref 0–1)
IMM GRANULOCYTES NFR BLD: 0.5 %
LYMPHOCYTES # BLD AUTO: 0.92 X10(3) UL (ref 1–4)
LYMPHOCYTES NFR BLD AUTO: 15.1 %
MAGNESIUM SERPL-MCNC: 1.8 MG/DL (ref 1.6–2.6)
MCH RBC QN AUTO: 29.1 PG (ref 26–34)
MCHC RBC AUTO-ENTMCNC: 29.7 G/DL (ref 31–37)
MCV RBC AUTO: 98.1 FL
MONOCYTES # BLD AUTO: 0.7 X10(3) UL (ref 0.1–1)
MONOCYTES NFR BLD AUTO: 11.5 %
NEUTROPHILS # BLD AUTO: 4.22 X10 (3) UL (ref 1.5–7.7)
NEUTROPHILS # BLD AUTO: 4.22 X10(3) UL (ref 1.5–7.7)
NEUTROPHILS NFR BLD AUTO: 69.1 %
OSMOLALITY SERPL CALC.SUM OF ELEC: 293 MOSM/KG (ref 275–295)
PHOSPHATE SERPL-MCNC: 2.6 MG/DL (ref 2.5–4.9)
PLATELET # BLD AUTO: 206 10(3)UL (ref 150–450)
POTASSIUM SERPL-SCNC: 4.1 MMOL/L (ref 3.5–5.1)
RBC # BLD AUTO: 2.61 X10(6)UL
SODIUM SERPL-SCNC: 141 MMOL/L (ref 136–145)
WBC # BLD AUTO: 6.1 X10(3) UL (ref 4–11)

## 2022-02-18 PROCEDURE — 99239 HOSP IP/OBS DSCHRG MGMT >30: CPT | Performed by: HOSPITALIST

## 2022-02-18 PROCEDURE — 99232 SBSQ HOSP IP/OBS MODERATE 35: CPT | Performed by: PHYSICIAN ASSISTANT

## 2022-02-18 RX ORDER — PANTOPRAZOLE SODIUM 40 MG/1
40 TABLET, DELAYED RELEASE ORAL
Qty: 28 TABLET | Refills: 0 | Status: SHIPPED | OUTPATIENT
Start: 2022-02-18 | End: 2022-03-16

## 2022-02-18 NOTE — PLAN OF CARE
Patient resting well overnight with no acute changes. Call light within reach, frequent rounding. Problem: Patient Centered Care  Goal: Patient preferences are identified and integrated in the patient's plan of care  Description: Interventions:  - What would you like us to know as we care for you? Patient lives at home alone with caregiver suppervision  - Provide timely, complete, and accurate information to patient/family  - Incorporate patient and family knowledge, values, beliefs, and cultural backgrounds into the planning and delivery of care  - Encourage patient/family to participate in care and decision-making at the level they choose  - Honor patient and family perspectives and choices  Outcome: Progressing     Problem: Patient/Family Goals  Goal: Patient/Family Long Term Goal  Description: Patient's Long Term Goal: To go home  Interventions:  - Follow plan of care  - Follow medication regimen  - monitor labs (hgb)  - See additional Care Plan goals for specific interventions  Outcome: Progressing  Goal: Patient/Family Short Term Goal  Description: Patient's Short Term Goal: To feel better    Interventions:   - Follow plan of care  - Monitor Bms  - Monitor labs  - See additional Care Plan goals for specific interventions  Outcome: Progressing     Problem: SAFETY ADULT - FALL  Goal: Free from fall injury  Description: INTERVENTIONS:  - Assess pt frequently for physical needs  - Identify cognitive and physical deficits and behaviors that affect risk of falls.   - [place] fall precautions as indicated by assessment.  - Educate pt/family on patient safety including physical limitations  - Instruct pt to call for assistance with activity based on assessment  - Modify environment to reduce risk of injury  - Provide assistive devices as appropriate  - Consider OT/PT consult to assist with strengthening/mobility  - Encourage toileting schedule  Outcome: Progressing     Problem: GASTROINTESTINAL - ADULT  Goal: Minimal or absence of nausea and vomiting  Description: INTERVENTIONS:  - Maintain adequate hydration with IV or PO as ordered and tolerated  - Nasogastric tube to low intermittent suction as ordered  - Evaluate effectiveness of ordered antiemetic medications  - Provide nonpharmacologic comfort measures as appropriate  - Advance diet as tolerated, if ordered  - Obtain nutritional consult as needed  - Evaluate fluid balance  Outcome: Progressing  Goal: Maintains or returns to baseline bowel function  Description: INTERVENTIONS:  - Assess bowel function  - Maintain adequate hydration with IV or PO as ordered and tolerated  - Evaluate effectiveness of GI medications  - Encourage mobilization and activity  - Obtain nutritional consult as needed  - Establish a toileting routine/schedule  - Consider collaborating with pharmacy to review patient's medication profile  Outcome: Progressing     Problem: METABOLIC/FLUID AND ELECTROLYTES - ADULT  Goal: Glucose maintained within prescribed range  Description: INTERVENTIONS:  - Monitor Blood Glucose as ordered  - Assess for signs and symptoms of hyperglycemia and hypoglycemia  - Administer ordered medications to maintain glucose within target range  - Assess barriers to adequate nutritional intake and initiate nutrition consult as needed  - Instruct patient on self management of diabetes  Outcome: Progressing  Goal: Electrolytes maintained within normal limits  Description: INTERVENTIONS:  - Monitor labs and rhythm and assess patient for signs and symptoms of electrolyte imbalances  - Administer electrolyte replacement as ordered  - Monitor response to electrolyte replacements, including rhythm and repeat lab results as appropriate  - Fluid restriction as ordered  - Instruct patient on fluid and nutrition restrictions as appropriate  Outcome: Progressing  Goal: Hemodynamic stability and optimal renal function maintained  Description: INTERVENTIONS:  - Monitor labs and assess for signs and symptoms of volume excess or deficit  - Monitor intake, output and patient weight  - Monitor urine specific gravity, serum osmolarity and serum sodium as indicated or ordered  - Monitor response to interventions for patient's volume status, including labs, urine output, blood pressure (other measures as available)  - Encourage oral intake as appropriate  - Instruct patient on fluid and nutrition restrictions as appropriate  Outcome: Progressing     Problem: HEMATOLOGIC - ADULT  Goal: Maintains hematologic stability  Description: INTERVENTIONS  - Assess for signs and symptoms of bleeding or hemorrhage  - Monitor labs and vital signs for trends  - Administer supportive blood products/factors, fluids and medications as ordered and appropriate  - Administer supportive blood products/factors as ordered and appropriate  Outcome: Progressing  Goal: Free from bleeding injury  Description: (Example usage: patient with low platelets)  INTERVENTIONS:  - Avoid intramuscular injections, enemas and rectal medication administration  - Ensure safe mobilization of patient  - Hold pressure on venipuncture sites to achieve adequate hemostasis  - Assess for signs and symptoms of internal bleeding  - Monitor lab trends  - Patient is to report abnormal signs of bleeding to staff  - Avoid use of toothpicks and dental floss  - Use electric shaver for shaving  - Use soft bristle tooth brush  - Limit straining and forceful nose blowing  Outcome: Progressing

## 2022-02-18 NOTE — DISCHARGE SUMMARY
Hospital Discharge Diagnoses: upper GI bleeding    Lace+ Score: 65  59-90 High Risk  29-58 Medium Risk  0-28   Low Risk. TCM Follow-Up Recommendation:  LACE 29-58:  Moderate Risk of readmission after discharge from the hospital.    Please refer to dictated DC summary

## 2022-02-18 NOTE — CM/SW NOTE
02/18/22 1300   Discharge disposition   Expected discharge disposition Home or Self   Discharge transportation Private car     Pt discussed during nursing rounds. Pt is stable for dc today. MD dc order entered. No home care needs identified on dc. Pt's sister-in-law will provide transport at [place]. Plan: Home w/regular caregiver today. / to remain available for support and/or discharge planning.      SAVI Olvera    [phone]

## 2022-02-18 NOTE — TELEPHONE ENCOUNTER
INPATIENT ORDERS:  - please arrange for outpatient f/u with Dr. Jorje Burns or Pat HILL in the next 4-6 weeks  - elective colonoscopy can be scheduled at that time  - EGD during admission with gastric antral erosions and duodenitis in addition to esophagitis and small hiatal hernia

## 2022-02-18 NOTE — PLAN OF CARE
Pt tolerating diet. Pt upgraded to soft, low-fiber diet. Continue to watch HgB. Problem: Patient Centered Care  Goal: Patient preferences are identified and integrated in the patient's plan of care  Description: Interventions:  - What would you like us to know as we care for you? Patient lives at home alone with caregiver suppervision  - Provide timely, complete, and accurate information to patient/family  - Incorporate patient and family knowledge, values, beliefs, and cultural backgrounds into the planning and delivery of care  - Encourage patient/family to participate in care and decision-making at the level they choose  - Honor patient and family perspectives and choices  Outcome: Progressing     Problem: Patient/Family Goals  Goal: Patient/Family Long Term Goal  Description: Patient's Long Term Goal: To go home  Interventions:  - Follow plan of care  - Follow medication regimen  - monitor labs (hgb)  - See additional Care Plan goals for specific interventions  Outcome: Progressing  Goal: Patient/Family Short Term Goal  Description: Patient's Short Term Goal: To feel better    Interventions:   - Follow plan of care  - Monitor Bms  - Monitor labs  - See additional Care Plan goals for specific interventions  Outcome: Progressing     Problem: SAFETY ADULT - FALL  Goal: Free from fall injury  Description: INTERVENTIONS:  - Assess pt frequently for physical needs  - Identify cognitive and physical deficits and behaviors that affect risk of falls.   - [place] fall precautions as indicated by assessment.  - Educate pt/family on patient safety including physical limitations  - Instruct pt to call for assistance with activity based on assessment  - Modify environment to reduce risk of injury  - Provide assistive devices as appropriate  - Consider OT/PT consult to assist with strengthening/mobility  - Encourage toileting schedule  Outcome: Progressing     Problem: GASTROINTESTINAL - ADULT  Goal: Minimal or absence of nausea and vomiting  Description: INTERVENTIONS:  - Maintain adequate hydration with IV or PO as ordered and tolerated  - Nasogastric tube to low intermittent suction as ordered  - Evaluate effectiveness of ordered antiemetic medications  - Provide nonpharmacologic comfort measures as appropriate  - Advance diet as tolerated, if ordered  - Obtain nutritional consult as needed  - Evaluate fluid balance  Outcome: Progressing  Goal: Maintains or returns to baseline bowel function  Description: INTERVENTIONS:  - Assess bowel function  - Maintain adequate hydration with IV or PO as ordered and tolerated  - Evaluate effectiveness of GI medications  - Encourage mobilization and activity  - Obtain nutritional consult as needed  - Establish a toileting routine/schedule  - Consider collaborating with pharmacy to review patient's medication profile  Outcome: Progressing     Problem: METABOLIC/FLUID AND ELECTROLYTES - ADULT  Goal: Glucose maintained within prescribed range  Description: INTERVENTIONS:  - Monitor Blood Glucose as ordered  - Assess for signs and symptoms of hyperglycemia and hypoglycemia  - Administer ordered medications to maintain glucose within target range  - Assess barriers to adequate nutritional intake and initiate nutrition consult as needed  - Instruct patient on self management of diabetes  Outcome: Progressing  Goal: Electrolytes maintained within normal limits  Description: INTERVENTIONS:  - Monitor labs and rhythm and assess patient for signs and symptoms of electrolyte imbalances  - Administer electrolyte replacement as ordered  - Monitor response to electrolyte replacements, including rhythm and repeat lab results as appropriate  - Fluid restriction as ordered  - Instruct patient on fluid and nutrition restrictions as appropriate  Outcome: Progressing  Goal: Hemodynamic stability and optimal renal function maintained  Description: INTERVENTIONS:  - Monitor labs and assess for signs and symptoms of volume excess or deficit  - Monitor intake, output and patient weight  - Monitor urine specific gravity, serum osmolarity and serum sodium as indicated or ordered  - Monitor response to interventions for patient's volume status, including labs, urine output, blood pressure (other measures as available)  - Encourage oral intake as appropriate  - Instruct patient on fluid and nutrition restrictions as appropriate  Outcome: Progressing     Problem: HEMATOLOGIC - ADULT  Goal: Maintains hematologic stability  Description: INTERVENTIONS  - Assess for signs and symptoms of bleeding or hemorrhage  - Monitor labs and vital signs for trends  - Administer supportive blood products/factors, fluids and medications as ordered and appropriate  - Administer supportive blood products/factors as ordered and appropriate  Outcome: Progressing  Goal: Free from bleeding injury  Description: (Example usage: patient with low platelets)  INTERVENTIONS:  - Avoid intramuscular injections, enemas and rectal medication administration  - Ensure safe mobilization of patient  - Hold pressure on venipuncture sites to achieve adequate hemostasis  - Assess for signs and symptoms of internal bleeding  - Monitor lab trends  - Patient is to report abnormal signs of bleeding to staff  - Avoid use of toothpicks and dental floss  - Use electric shaver for shaving  - Use soft bristle tooth brush  - Limit straining and forceful nose blowing  Outcome: Progressing

## 2022-02-18 NOTE — PROGRESS NOTES
[facility]    Progress Note    Mike Mcdaniel Patient Status:  Inpatient    1945 MRN [id number]   [facility] 2W/SW Attending Soraya Kramer MD   Hosp Day # 2 PCP Alvin Stevens MD       Subjective:   T Left message to call back RM  Discuss colonoscopy - last done Jan 2009? Per epic- not found - no report in care everywhere  PHQ9  February 18, 2022  Ashley Berry MA    Patient called back 2/22/22  Left message to call back [facility]  February 23, 2022  Ashley Berry MA       pulmonary toilet  DVT prophylaxis; scds; Lovenox subcut  Pain medication as needed  Increase activity up and ambulate  Speech evaluated patient yesterday no post-op oral dysphagia  Discharge Planning; home today; patient requesting HHRN to see her.  Patient

## 2022-02-18 NOTE — PLAN OF CARE
Problem: Patient Centered Care  Goal: Patient preferences are identified and integrated in the patient's plan of care  Description: Interventions:  - What would you like us to know as we care for you? Patient lives at home alone with caregiver suppervision  - Provide timely, complete, and accurate information to patient/family  - Incorporate patient and family knowledge, values, beliefs, and cultural backgrounds into the planning and delivery of care  - Encourage patient/family to participate in care and decision-making at the level they choose  - Honor patient and family perspectives and choices  Outcome: Adequate for Discharge     Problem: Patient/Family Goals  Goal: Patient/Family Long Term Goal  Description: Patient's Long Term Goal: To go home  Interventions:  - Follow plan of care  - Follow medication regimen  - monitor labs (hgb)  - See additional Care Plan goals for specific interventions  Outcome: Adequate for Discharge  Goal: Patient/Family Short Term Goal  Description: Patient's Short Term Goal: To feel better    Interventions:   - Follow plan of care  - Monitor Bms  - Monitor labs  - See additional Care Plan goals for specific interventions  Outcome: Adequate for Discharge     Problem: SAFETY ADULT - FALL  Goal: Free from fall injury  Description: INTERVENTIONS:  - Assess pt frequently for physical needs  - Identify cognitive and physical deficits and behaviors that affect risk of falls.   - [place] fall precautions as indicated by assessment.  - Educate pt/family on patient safety including physical limitations  - Instruct pt to call for assistance with activity based on assessment  - Modify environment to reduce risk of injury  - Provide assistive devices as appropriate  - Consider OT/PT consult to assist with strengthening/mobility  - Encourage toileting schedule  Outcome: Adequate for Discharge     Problem: GASTROINTESTINAL - ADULT  Goal: Maintains or returns to baseline bowel function  Description: INTERVENTIONS:  - Assess bowel function  - Maintain adequate hydration with IV or PO as ordered and tolerated  - Evaluate effectiveness of GI medications  - Encourage mobilization and activity  - Obtain nutritional consult as needed  - Establish a toileting routine/schedule  - Consider collaborating with pharmacy to review patient's medication profile  Outcome: Adequate for Discharge     Problem: METABOLIC/FLUID AND ELECTROLYTES - ADULT  Goal: Electrolytes maintained within normal limits  Description: INTERVENTIONS:  - Monitor labs and rhythm and assess patient for signs and symptoms of electrolyte imbalances  - Administer electrolyte replacement as ordered  - Monitor response to electrolyte replacements, including rhythm and repeat lab results as appropriate  - Fluid restriction as ordered  - Instruct patient on fluid and nutrition restrictions as appropriate  Outcome: Adequate for Discharge  Goal: Hemodynamic stability and optimal renal function maintained  Description: INTERVENTIONS:  - Monitor labs and assess for signs and symptoms of volume excess or deficit  - Monitor intake, output and patient weight  - Monitor urine specific gravity, serum osmolarity and serum sodium as indicated or ordered  - Monitor response to interventions for patient's volume status, including labs, urine output, blood pressure (other measures as available)  - Encourage oral intake as appropriate  - Instruct patient on fluid and nutrition restrictions as appropriate  Outcome: Adequate for Discharge     Problem: HEMATOLOGIC - ADULT  Goal: Maintains hematologic stability  Description: INTERVENTIONS  - Assess for signs and symptoms of bleeding or hemorrhage  - Monitor labs and vital signs for trends  - Administer supportive blood products/factors, fluids and medications as ordered and appropriate  - Administer supportive blood products/factors as ordered and appropriate  Outcome: Adequate for Discharge  Goal: Free from bleeding injury  Description: (Example usage: patient with low platelets)  INTERVENTIONS:  - Avoid intramuscular injections, enemas and rectal medication administration  - Ensure safe mobilization of patient  - Hold pressure on venipuncture sites to achieve adequate hemostasis  - Assess for signs and symptoms of internal bleeding  - Monitor lab trends  - Patient is to report abnormal signs of bleeding to staff  - Avoid use of toothpicks and dental floss  - Use electric shaver for shaving  - Use soft bristle tooth brush  - Limit straining and forceful nose blowing  Outcome: Adequate for Discharge     Problem: GASTROINTESTINAL - ADULT  Goal: Minimal or absence of nausea and vomiting  Description: INTERVENTIONS:  - Maintain adequate hydration with IV or PO as ordered and tolerated  - Nasogastric tube to low intermittent suction as ordered  - Evaluate effectiveness of ordered antiemetic medications  - Provide nonpharmacologic comfort measures as appropriate  - Advance diet as tolerated, if ordered  - Obtain nutritional consult as needed  - Evaluate fluid balance  Outcome: Completed     Problem: METABOLIC/FLUID AND ELECTROLYTES - ADULT  Goal: Glucose maintained within prescribed range  Description: INTERVENTIONS:  - Monitor Blood Glucose as ordered  - Assess for signs and symptoms of hyperglycemia and hypoglycemia  - Administer ordered medications to maintain glucose within target range  - Assess barriers to adequate nutritional intake and initiate nutrition consult as needed  - Instruct patient on self management of diabetes  Outcome: Completed

## 2022-02-19 NOTE — DISCHARGE SUMMARY
[facility]    PATIENT'S NAME: Dahlia Moore PHYSICIAN: Shaneka Blue MD   PATIENT ACCOUNT#:   [id number]    LOCATION:  [address] [place] [address],[address] RECORD #:   [id number]       YOB: 1945  ADMISSION DATE:       02/15/2022      DISCHARGE DATE:  02/18/2022    DISCHARGE SUMMARY    DISCHARGE DIAGNOSES:    1. Acute upper gastrointestinal bleeding, present on admission. 2.   Acute renal failure with chronic kidney disease stage 3, present on admission. 3.   History of coronary artery bypass graft, present on admission. 4.   History of carotid endarterectomy, present on admission. HISTORY AND HOSPITAL COURSE:  The patient is a 78-year-old female who had come to the hospital with melena. Gastroenterology was placed on consult. She does take aspirin. She was also found to have epigastric abdominal discomfort. She is status post EGD by Dr. Brent Ballesteros and was found to have grade B esophagitis, small hiatal hernia, gastric antral erosions likely due to aspirin, and gastritis. Her hemoglobin had down-trended to 7.7. Initially it was 12.8. At this time the patient has been deemed stable to be discharged home. She has been tolerating a diet. She will be given Protonix 40 p.o. b.i.d. for the next 14 days and will have close followup with primary care physician, as well as GI as outpatient. Her Medrol Dosepak will be placed on hold. Her aspirin will be placed on hold and will be resumed once the patient follows up with primary care physician. This was discussed with the patient. She verbalized understanding of information given. For a detailed review regarding the patient's hospitalization, please refer to the patient's chart. PHYSICAL EXAMINATION:    VITAL SIGNS:  Reviewed from the patient's chart and currently stable. GENERAL:  The patient lying in bed, appears to be in no acute distress at this time. She is A and O x3.   HEENT:  Extraocular movements are intact. Pupils equal, round, and reactive to light and accommodation. Atraumatic, normocephalic. CARDIAC:  S1, S2 appreciated. LUNGS:  Good air entry bilaterally. ABDOMEN:  Soft, nontender, nondistended. Positive bowel sounds. EXTREMITIES:  Peripheral pulses are positive. NEUROLOGIC:  No focal neurologic deficits noted at this time. LABORATORY DATA:  Reviewed. CONDITION ON DISCHARGE:  At this time, the patient is deemed stable to be discharged home to follow up with primary care physician and GI. Total discharge time is greater than 30 minutes.     Dictated By Patti Delacruz MD  d: 02/18/2022 11:46:35  t: 02/18/2022 14:44:21  [facility] [id number]  SMS/

## 2022-02-21 ENCOUNTER — PATIENT OUTREACH (OUTPATIENT)
Dept: CASE MANAGEMENT | Age: 77
End: 2022-02-21

## 2022-02-21 ENCOUNTER — TELEPHONE (OUTPATIENT)
Dept: INTERNAL MEDICINE CLINIC | Facility: CLINIC | Age: 77
End: 2022-02-21

## 2022-02-21 PROCEDURE — 1111F DSCHRG MED/CURRENT MED MERGE: CPT

## 2022-02-21 NOTE — TELEPHONE ENCOUNTER
Condition update  Patient discharged home on 2/18/2022  DX: Upper GI Bleed    S/P EGD on 2/16/22  Postoperative Diagnosis:  1. [place] Grade B Esophagitis  2. Hiatal hernia  3. Gastric antral erosions  4. Duodenitis  Procedure:    Esophagogastroduodenoscopy with biopsy    Last H&H done on 2/18/2022 was 7.6 & 25.6    Patient states that she is still feel weak and tired. Patient states she is having to use her oxygen all the time right now, she has it at 1.5 Liters per N/C. Patient denies fever/chills, has shortness of breath just walking to the bathroom and back. Patient states no further BRBPR, no black tarry stools, patient states that her stools are formed and brown. Patient denies any abdominal pain, no nausea/vomiting. [facility] scheduled a TCM HFU On 2/24/2022 at 2:00 pm    Triage: Please ask Dr. Fred Reveles if he would like labs done prior to the appointment, if so patient will need lab orders. Thank you.

## 2022-02-21 NOTE — TELEPHONE ENCOUNTER
Spoke with patient ( verified) and relayed Dr. Tameka Dee message below--patient verbalizes understanding and agreement and will complete lab tomorrow. No further questions/concerns at this time.

## 2022-02-21 NOTE — TELEPHONE ENCOUNTER
[facility] to schedule 4-6 week follow up appointment. CSS-    If patient returns call, please utilize res 24 appointment with either [place] or Dr. Hector Shaffer within the next 4-6 weeks. Thank you!

## 2022-02-22 ENCOUNTER — TELEPHONE (OUTPATIENT)
Dept: INTERNAL MEDICINE CLINIC | Facility: CLINIC | Age: 77
End: 2022-02-22

## 2022-02-22 NOTE — TELEPHONE ENCOUNTER
Patient called in and states she does not have a ride to complete lab work prior to Thursday, she will go to lab in AM on Thursday and then has her appointment at 2pm with Dr. Johanna Verma.

## 2022-02-23 NOTE — TELEPHONE ENCOUNTER
Spoke to patient, still having pain and advised me that the steroids helped her. Advised do not take those, to wait until she see's Dr. Tamie Galdamez tomorrow at 2 pm. She agreed. She will get blood test done it the morning before her visit. Advised foods to avoid with stomach ulcer because she asked me if she can eat an orange.  Provided foods to avoid including citrus

## 2022-02-23 NOTE — TELEPHONE ENCOUNTER
Paged on call re: question about medication. Recent discharge from [place] [id number]. [facility]Gi, rx ppi BID. Now gout of left foot. Had medrol dose pack at home, not recently rx. Took 6 yesterday, took 5 today. Advised patient to immediately stop steroids, no nsaids, use tylenol only for pain. She states her pain has resolved. Advised monitor, increase hydration, if return of ssx call for specialist to discuss possible injection. Pt also eating beets, advised no beets, would be difficult to distinguish blood vs beet color in stool. Pt agreeable. If any changes in ssx, bleeding, call 911. Low threshold to return to ER, currently O2 dependent but has caregiver. Please ffup in AM, has appt on 2/24/22 with pcp.

## 2022-02-24 ENCOUNTER — OFFICE VISIT (OUTPATIENT)
Dept: INTERNAL MEDICINE CLINIC | Facility: CLINIC | Age: 77
End: 2022-02-24
Payer: MEDICARE

## 2022-02-24 ENCOUNTER — TELEPHONE (OUTPATIENT)
Dept: INTERNAL MEDICINE CLINIC | Facility: CLINIC | Age: 77
End: 2022-02-24

## 2022-02-24 ENCOUNTER — LAB ENCOUNTER (OUTPATIENT)
Dept: LAB | Age: 77
End: 2022-02-24
Attending: INTERNAL MEDICINE
Payer: MEDICARE

## 2022-02-24 VITALS
HEIGHT: 67 IN | TEMPERATURE: 98 F | OXYGEN SATURATION: 95 % | HEART RATE: 85 BPM | BODY MASS INDEX: 27.37 KG/M2 | WEIGHT: 174.38 LBS | DIASTOLIC BLOOD PRESSURE: 64 MMHG | SYSTOLIC BLOOD PRESSURE: 124 MMHG

## 2022-02-24 DIAGNOSIS — K92.2 UGIB (UPPER GASTROINTESTINAL BLEED): Primary | ICD-10-CM

## 2022-02-24 DIAGNOSIS — I25.10 CORONARY ARTERY DISEASE INVOLVING NATIVE CORONARY ARTERY OF NATIVE HEART WITHOUT ANGINA PECTORIS: ICD-10-CM

## 2022-02-24 DIAGNOSIS — J96.11 CHRONIC RESPIRATORY FAILURE WITH HYPOXIA (HCC): ICD-10-CM

## 2022-02-24 DIAGNOSIS — I50.32 CHRONIC DIASTOLIC CONGESTIVE HEART FAILURE (HCC): ICD-10-CM

## 2022-02-24 DIAGNOSIS — E11.69 HYPERLIPIDEMIA ASSOCIATED WITH TYPE 2 DIABETES MELLITUS (HCC): ICD-10-CM

## 2022-02-24 DIAGNOSIS — N18.31 STAGE 3A CHRONIC KIDNEY DISEASE (HCC): ICD-10-CM

## 2022-02-24 DIAGNOSIS — J44.9 COPD, SEVERE (HCC): ICD-10-CM

## 2022-02-24 DIAGNOSIS — E21.3 HYPERPARATHYROIDISM (HCC): ICD-10-CM

## 2022-02-24 DIAGNOSIS — K92.2 UGIB (UPPER GASTROINTESTINAL BLEED): ICD-10-CM

## 2022-02-24 DIAGNOSIS — E11.22 CONTROLLED TYPE 2 DIABETES MELLITUS WITH STAGE 3 CHRONIC KIDNEY DISEASE, WITHOUT LONG-TERM CURRENT USE OF INSULIN (HCC): ICD-10-CM

## 2022-02-24 DIAGNOSIS — N18.30 CONTROLLED TYPE 2 DIABETES MELLITUS WITH STAGE 3 CHRONIC KIDNEY DISEASE, WITHOUT LONG-TERM CURRENT USE OF INSULIN (HCC): ICD-10-CM

## 2022-02-24 DIAGNOSIS — M10.372 ACUTE GOUT DUE TO RENAL IMPAIRMENT INVOLVING LEFT ANKLE: ICD-10-CM

## 2022-02-24 DIAGNOSIS — I15.2 HYPERTENSION ASSOCIATED WITH TYPE 2 DIABETES MELLITUS (HCC): ICD-10-CM

## 2022-02-24 DIAGNOSIS — E78.5 HYPERLIPIDEMIA ASSOCIATED WITH TYPE 2 DIABETES MELLITUS (HCC): ICD-10-CM

## 2022-02-24 DIAGNOSIS — E11.59 HYPERTENSION ASSOCIATED WITH TYPE 2 DIABETES MELLITUS (HCC): ICD-10-CM

## 2022-02-24 LAB
DEPRECATED RDW RBC AUTO: 51.8 FL (ref 35.1–46.3)
ERYTHROCYTE [DISTWIDTH] IN BLOOD BY AUTOMATED COUNT: 14.7 % (ref 11–15)
HCT VFR BLD AUTO: 31.4 %
MCH RBC QN AUTO: 28.7 PG (ref 26–34)
MCHC RBC AUTO-ENTMCNC: 29.9 G/DL (ref 31–37)
MCV RBC AUTO: 95.7 FL
PLATELET # BLD AUTO: 433 10(3)UL (ref 150–450)
RBC # BLD AUTO: 3.28 X10(6)UL
WBC # BLD AUTO: 10.8 X10(3) UL (ref 4–11)

## 2022-02-24 PROCEDURE — 36415 COLL VENOUS BLD VENIPUNCTURE: CPT

## 2022-02-24 PROCEDURE — 3078F DIAST BP <80 MM HG: CPT | Performed by: INTERNAL MEDICINE

## 2022-02-24 PROCEDURE — 3008F BODY MASS INDEX DOCD: CPT | Performed by: INTERNAL MEDICINE

## 2022-02-24 PROCEDURE — 99495 TRANSJ CARE MGMT MOD F2F 14D: CPT | Performed by: INTERNAL MEDICINE

## 2022-02-24 PROCEDURE — 85027 COMPLETE CBC AUTOMATED: CPT

## 2022-02-24 PROCEDURE — 3074F SYST BP LT 130 MM HG: CPT | Performed by: INTERNAL MEDICINE

## 2022-02-24 RX ORDER — METHYLPREDNISOLONE 4 MG/1
TABLET ORAL
COMMUNITY
Start: 2022-02-21

## 2022-02-24 NOTE — TELEPHONE ENCOUNTER
Manjinder Baird    Have a question regarding Mrs Matias Myles. Her aspirin therapy has been on hold since she had upper GI bleed 2 weeks ago. I had repeated her cbc today and her hemoglobin has improved to 9.4 from 7.6 when she was discharged from [address] [place] [address]. Can she go back to her aspirin since she has hx of CAD and carotid stenosis before ? Is it also ok with you to start her also on iron pills to help her anemia? Thank you.  Ivelisse Bradford

## 2022-02-25 ENCOUNTER — TELEPHONE (OUTPATIENT)
Dept: PULMONOLOGY | Facility: CLINIC | Age: 77
End: 2022-02-25

## 2022-02-25 ENCOUNTER — TELEPHONE (OUTPATIENT)
Dept: INTERNAL MEDICINE CLINIC | Facility: CLINIC | Age: 77
End: 2022-02-25

## 2022-02-25 RX ORDER — MELATONIN
325
Qty: 90 TABLET | Refills: 0 | Status: SHIPPED | OUTPATIENT
Start: 2022-02-25 | End: 2022-06-21 | Stop reason: ALTCHOICE

## 2022-02-25 NOTE — TELEPHONE ENCOUNTER
Patient called in and states she missed a call from Dr. Patrizia Gann. She can be reached at [phone].

## 2022-02-25 NOTE — TELEPHONE ENCOUNTER
I called back pt and told her regarding previous message left in her voice mail;  Pt expressed understanding and ageed.

## 2022-02-25 NOTE — TELEPHONE ENCOUNTER
Spoke with patient states she had to cancel appointment for today with Dr. Dean Almonte because she cannot walk and is very weak. She is short of breath due to low hemoglobin. She needs oxygen recertification for HME. Follow-up appointment scheduled with Dr. Dean Almonte on 3/4/22 at 4:00pm.    Spoke with Emperatriz Pate at [facility] informed him of new appointment date. Emperatriz Pate verbalized understanding.

## 2022-02-25 NOTE — TELEPHONE ENCOUNTER
I called pt and left message in voice mail. Told her that Dr Tarango Katya ok for her to restart asiprin so she can do 81mg daily and should take her pantoprazole 40mg bid as been advsied before. Will also start iron pilll to help her anemia further but will need to check cbc in 1 to 2 weeks. erx sent and cbc ordered.

## 2022-02-25 NOTE — TELEPHONE ENCOUNTER
Pt called in stating she is having severe trouble with gout. She had to cancel appt today is unable to walk well and is requesting to speak to nurse now. Pt also states its hard for her to speak is having shortness of breathe and is weak. Attempting rn now.  Please call

## 2022-02-25 NOTE — TELEPHONE ENCOUNTER
[place],    As long as the patient takes concurrent pantoprazole or equivalent she may resume the aspirin. I would have no objection to oral iron therapy, however, I would closely monitor blood counts as the iron induced black stools will make it difficult to ascertain whether she has recurrent bleeding. She should have an semi-elective screening colonoscopy performed as well.

## 2022-02-26 ENCOUNTER — HOSPITAL ENCOUNTER (EMERGENCY)
Facility: HOSPITAL | Age: 77
Discharge: HOME | End: 2022-02-26
Attending: EMERGENCY MEDICINE
Payer: MEDICARE

## 2022-02-26 VITALS
SYSTOLIC BLOOD PRESSURE: 157 MMHG | OXYGEN SATURATION: 99 % | DIASTOLIC BLOOD PRESSURE: 75 MMHG | RESPIRATION RATE: 16 BRPM | TEMPERATURE: 98 F | HEART RATE: 90 BPM

## 2022-02-26 DIAGNOSIS — M10.9 ACUTE GOUT OF ANKLE, UNSPECIFIED CAUSE, UNSPECIFIED LATERALITY: Primary | ICD-10-CM

## 2022-02-26 PROBLEM — J96.01 ACUTE HYPOXEMIC RESPIRATORY FAILURE (HCC): Status: RESOLVED | Noted: 2019-02-17 | Resolved: 2022-02-26

## 2022-02-26 PROBLEM — E21.3 HYPERPARATHYROIDISM (HCC): Status: ACTIVE | Noted: 2022-02-26

## 2022-02-26 PROCEDURE — 99283 EMERGENCY DEPT VISIT LOW MDM: CPT

## 2022-02-26 RX ORDER — PREDNISONE 10 MG/1
TABLET ORAL
Qty: 20 TABLET | Refills: 0 | Status: SHIPPED | OUTPATIENT
Start: 2022-02-26 | End: 2022-03-06

## 2022-02-26 RX ORDER — HYDROCODONE BITARTRATE AND ACETAMINOPHEN 5; 325 MG/1; MG/1
1 TABLET ORAL EVERY 6 HOURS PRN
Qty: 15 TABLET | Refills: 0 | Status: SHIPPED | OUTPATIENT
Start: 2022-02-26

## 2022-02-26 RX ORDER — HYDROCODONE BITARTRATE AND ACETAMINOPHEN 5; 325 MG/1; MG/1
1 TABLET ORAL ONCE
Status: COMPLETED | OUTPATIENT
Start: 2022-02-26 | End: 2022-02-26

## 2022-02-26 RX ORDER — PREDNISONE 20 MG/1
40 TABLET ORAL ONCE
Status: COMPLETED | OUTPATIENT
Start: 2022-02-26 | End: 2022-02-26

## 2022-02-26 NOTE — ED INITIAL ASSESSMENT (HPI)
Pt reports she was dc last Friday after duodenal ulcer w/ GI bleeding, and that following Sunday pt reports she was unable to walk and c/o bilat foot pain. Denies pain. Reports continued dark stool.  Denies n/v.

## 2022-02-28 NOTE — TELEPHONE ENCOUNTER
Spoke with Gabino López. She was scheduled for May apt with Dr. Michelle Roa. Per recommended hospital follow up from [address], 4-6 week office follow up advised. She would like to follow up with Dr. Michelle Roa if possible. Offered apt with Dr. Michelle Roa on 3-17-22 & cancelled her previously scheduled May apt. She verbalizes understanding and is very appreciative of the assistance in getting in sooner.     Your Appointments           Thursday March 17, 2022  2:00 PM  Follow Up Visit with Dexter Lema MD  [address] [place] [address], [address] [place] [address] ([facility] [address]) [address]  [phone]

## 2022-03-01 ENCOUNTER — TELEPHONE (OUTPATIENT)
Dept: INTERNAL MEDICINE CLINIC | Facility: CLINIC | Age: 77
End: 2022-03-01

## 2022-03-01 NOTE — TELEPHONE ENCOUNTER
Patient called asking if Cee Nguyen can place an order for a wheelchair. Patient states she has difficulty walking because of her gout she can't put weight on foot.

## 2022-03-04 ENCOUNTER — OFFICE VISIT (OUTPATIENT)
Dept: PULMONOLOGY | Facility: CLINIC | Age: 77
End: 2022-03-04
Payer: MEDICARE

## 2022-03-04 VITALS
SYSTOLIC BLOOD PRESSURE: 123 MMHG | HEIGHT: 67 IN | OXYGEN SATURATION: 94 % | HEART RATE: 73 BPM | RESPIRATION RATE: 16 BRPM | BODY MASS INDEX: 26.21 KG/M2 | DIASTOLIC BLOOD PRESSURE: 71 MMHG | WEIGHT: 167 LBS

## 2022-03-04 DIAGNOSIS — J44.9 CHRONIC OBSTRUCTIVE PULMONARY DISEASE, UNSPECIFIED COPD TYPE (HCC): Primary | ICD-10-CM

## 2022-03-04 PROCEDURE — 3008F BODY MASS INDEX DOCD: CPT | Performed by: INTERNAL MEDICINE

## 2022-03-04 PROCEDURE — 3074F SYST BP LT 130 MM HG: CPT | Performed by: INTERNAL MEDICINE

## 2022-03-04 PROCEDURE — 99214 OFFICE O/P EST MOD 30 MIN: CPT | Performed by: INTERNAL MEDICINE

## 2022-03-04 PROCEDURE — 3078F DIAST BP <80 MM HG: CPT | Performed by: INTERNAL MEDICINE

## 2022-03-04 PROCEDURE — 94761 N-INVAS EAR/PLS OXIMETRY MLT: CPT | Performed by: INTERNAL MEDICINE

## 2022-03-04 NOTE — PROGRESS NOTES
Oxygen order, office visit note and demographics faxed to [facility]. Confirmation received.

## 2022-03-10 RX ORDER — METOPROLOL SUCCINATE 25 MG/1
25 TABLET, EXTENDED RELEASE ORAL DAILY
Qty: 90 TABLET | Refills: 1 | Status: SHIPPED | OUTPATIENT
Start: 2022-03-10

## 2022-03-11 NOTE — TELEPHONE ENCOUNTER
Patient called for status of wheelchair order. Relayed that Dr. Daija Mackenzie did sign order but it is still going through the referral process. She verbalized understanding and will continue to wait.

## 2022-03-11 NOTE — TELEPHONE ENCOUNTER
Refill passed per [facility] protocol.     Requested Prescriptions   Pending Prescriptions Disp Refills    METOPROLOL SUCCINATE ER 25 MG Oral Tablet 24 Hr [Pharmacy Med Name: METOPROLOL SUCCINATE ER 25 MG Tablet Extended Release 24 Hour] 90 tablet 1     Sig: TAKE 1 TABLET EVERY DAY        Hypertensive Medications Protocol Passed - 3/10/2022  3:29 AM        Passed - CMP or BMP in past 12 months        Passed - Appointment in past 6 or next 3 months        Passed - GFR Non- > 50     Lab Results   Component Value Date    GFRNAA 69 02/18/2022                       Recent Outpatient Visits              6 days ago Chronic obstructive pulmonary disease, unspecified COPD type [facility])    [facility], [address] [place] [address], Zulema Carter MD    Office Visit    2 weeks ago UGIB (upper gastrointestinal bleed)    [facility], [facility] [address], Felix [address] MD    Office Visit    2 months ago Gouty arthritis    Rheumatology - Biju Vivar MD    Office Visit    3 months ago Degenerative cervical spinal stenosis    [facility], Felix Harris MD    Office Visit    4 months ago Neck pain    [facility], [address] [place] [address],[address], Aram Perea MD    Office Visit            Future Appointments         Provider Department Appt Notes    In 1 week Valentina Melendez [facility], [facility], [facility] [place] kamari    In 2 weeks Lily Roman MD Rheumatology - Cristopher Cedeño     In 6 months Oanh Gray MD [facility], [address], [place] 6 months

## 2022-03-14 ENCOUNTER — NURSE TRIAGE (OUTPATIENT)
Dept: INTERNAL MEDICINE CLINIC | Facility: CLINIC | Age: 77
End: 2022-03-14

## 2022-03-14 NOTE — TELEPHONE ENCOUNTER
Spoke to patient. Patient scheduled for appointment.     Future Appointments   Date Time Provider Raj Socorro   3/16/2022  2:45 PM [facility] MD [facility]   3/17/2022  2:00 PM Efren Merritt MD [facility] [place] Shweta Segovia BETY   3/25/2022  2:20 PM Elodia Shukla MD [facility] S[facility]chez Doctor   9/9/2022  1:00 PM Carolina Walsh MD [facility] [place]

## 2022-03-16 ENCOUNTER — OFFICE VISIT (OUTPATIENT)
Dept: INTERNAL MEDICINE CLINIC | Facility: CLINIC | Age: 77
End: 2022-03-16
Payer: MEDICARE

## 2022-03-16 ENCOUNTER — TELEPHONE (OUTPATIENT)
Dept: GASTROENTEROLOGY | Facility: CLINIC | Age: 77
End: 2022-03-16

## 2022-03-16 ENCOUNTER — NURSE TRIAGE (OUTPATIENT)
Dept: INTERNAL MEDICINE CLINIC | Facility: CLINIC | Age: 77
End: 2022-03-16

## 2022-03-16 VITALS
HEART RATE: 92 BPM | OXYGEN SATURATION: 100 % | TEMPERATURE: 99 F | WEIGHT: 162.81 LBS | DIASTOLIC BLOOD PRESSURE: 55 MMHG | HEIGHT: 67 IN | BODY MASS INDEX: 25.55 KG/M2 | SYSTOLIC BLOOD PRESSURE: 103 MMHG

## 2022-03-16 DIAGNOSIS — N18.30 CONTROLLED TYPE 2 DIABETES MELLITUS WITH STAGE 3 CHRONIC KIDNEY DISEASE, WITHOUT LONG-TERM CURRENT USE OF INSULIN (HCC): ICD-10-CM

## 2022-03-16 DIAGNOSIS — E11.22 CONTROLLED TYPE 2 DIABETES MELLITUS WITH STAGE 3 CHRONIC KIDNEY DISEASE, WITHOUT LONG-TERM CURRENT USE OF INSULIN (HCC): ICD-10-CM

## 2022-03-16 DIAGNOSIS — M10.372 ACUTE GOUT DUE TO RENAL IMPAIRMENT INVOLVING LEFT ANKLE: Primary | ICD-10-CM

## 2022-03-16 LAB
GLUCOSE BLOOD: 434
TEST STRIP EXPIRATION DATE: NORMAL DATE

## 2022-03-16 PROCEDURE — 3074F SYST BP LT 130 MM HG: CPT | Performed by: INTERNAL MEDICINE

## 2022-03-16 PROCEDURE — 99214 OFFICE O/P EST MOD 30 MIN: CPT | Performed by: INTERNAL MEDICINE

## 2022-03-16 PROCEDURE — 3078F DIAST BP <80 MM HG: CPT | Performed by: INTERNAL MEDICINE

## 2022-03-16 PROCEDURE — 82947 ASSAY GLUCOSE BLOOD QUANT: CPT | Performed by: INTERNAL MEDICINE

## 2022-03-16 PROCEDURE — 3008F BODY MASS INDEX DOCD: CPT | Performed by: INTERNAL MEDICINE

## 2022-03-16 RX ORDER — DOCUSATE SODIUM 100 MG/1
100 CAPSULE, LIQUID FILLED ORAL 2 TIMES DAILY
Qty: 180 CAPSULE | Refills: 1 | Status: SHIPPED | OUTPATIENT
Start: 2022-03-16

## 2022-03-16 RX ORDER — PANTOPRAZOLE SODIUM 40 MG/1
40 TABLET, DELAYED RELEASE ORAL
Qty: 180 TABLET | Refills: 1 | Status: SHIPPED | OUTPATIENT
Start: 2022-03-16

## 2022-03-16 RX ORDER — GLIPIZIDE 5 MG/1
5 TABLET ORAL
Qty: 30 TABLET | Refills: 0 | Status: SHIPPED | OUTPATIENT
Start: 2022-03-16

## 2022-03-16 NOTE — TELEPHONE ENCOUNTER
Dr. Maty Trammell is in procedures at the time of office visit scheduled for tomorrow. Patient contacted and accepted appointment on Monday and given detailed directions to [address] [place] [address] office.     Your Appointments    Wednesday March 16, 2022  2:45 PM  Exam - Established with Nilda Bazan MD  [address], [facility], [place] ([place] [address]) [address] [address]  [phone]      Monday March 21, 2022  2:00 PM  Follow Up Visit with Claudene Lemons, MD  [address], [address] [place] [address], [facility] ([address] [place] [address]) [address]  [phone]

## 2022-03-18 RX ORDER — AMLODIPINE BESYLATE 5 MG/1
5 TABLET ORAL DAILY
Qty: 90 TABLET | Refills: 1 | Status: SHIPPED | OUTPATIENT
Start: 2022-03-18 | End: 2022-12-15

## 2022-03-18 NOTE — TELEPHONE ENCOUNTER
Refill passed per [facility] protocol.      Requested Prescriptions   Pending Prescriptions Disp Refills    AMLODIPINE 5 MG Oral Tab [Pharmacy Med Name: AMLODIPINE BESYLATE 5 MG Tablet] 90 tablet 1     Sig: TAKE 1 TABLET EVERY DAY        Hypertensive Medications Protocol Passed - 3/18/2022  3:39 PM        Passed - CMP or BMP in past 12 months        Passed - Appointment in past 6 or next 3 months        Passed - GFR Non- > 50     Lab Results   Component Value Date    GFRNAA 69 2022                         Recent Outpatient Visits              2 days ago Acute gout due to renal impairment involving left ankle    [facility], [address], Gorge Hightower MD    Office Visit    2 weeks ago Chronic obstructive pulmonary disease, unspecified COPD type [facility])    [facility], [address] [place], [place] [address]andrew Vieira MD    Office Visit    3 weeks ago UGIB (upper gastrointestinal bleed)    Gorge Baker MD    Office Visit    2 months ago Gouty arthritis    Rheumatology - Carie Doty MD    Office Visit    3 months ago Degenerative cervical spinal stenosis    Gorge Baker MD    Office Visit             Future Appointments         Provider Department Appt Notes    In 3 days An Melendez Art, [facility], [address] [place] [address] AND CLINICS follow up    In 1 week Kameron Munoz, [address] [place] [address]     In 5 months Ang Joya MD [place] [facility], [address], [place] 6 months

## 2022-03-21 ENCOUNTER — TELEPHONE (OUTPATIENT)
Dept: GASTROENTEROLOGY | Facility: CLINIC | Age: 77
End: 2022-03-21

## 2022-03-21 ENCOUNTER — TELEPHONE (OUTPATIENT)
Dept: INTERNAL MEDICINE CLINIC | Facility: CLINIC | Age: 77
End: 2022-03-21

## 2022-03-21 ENCOUNTER — OFFICE VISIT (OUTPATIENT)
Dept: GASTROENTEROLOGY | Facility: CLINIC | Age: 77
End: 2022-03-21
Payer: MEDICARE

## 2022-03-21 VITALS — DIASTOLIC BLOOD PRESSURE: 62 MMHG | SYSTOLIC BLOOD PRESSURE: 127 MMHG | HEART RATE: 75 BPM

## 2022-03-21 DIAGNOSIS — K92.1 GASTROINTESTINAL HEMORRHAGE WITH MELENA: Primary | ICD-10-CM

## 2022-03-21 DIAGNOSIS — Z12.12 SCREENING FOR COLORECTAL CANCER: ICD-10-CM

## 2022-03-21 DIAGNOSIS — Z12.11 SCREENING FOR COLORECTAL CANCER: ICD-10-CM

## 2022-03-21 DIAGNOSIS — D62 ACUTE BLOOD LOSS ANEMIA: ICD-10-CM

## 2022-03-21 PROCEDURE — 3078F DIAST BP <80 MM HG: CPT | Performed by: INTERNAL MEDICINE

## 2022-03-21 PROCEDURE — 99213 OFFICE O/P EST LOW 20 MIN: CPT | Performed by: INTERNAL MEDICINE

## 2022-03-21 PROCEDURE — 3074F SYST BP LT 130 MM HG: CPT | Performed by: INTERNAL MEDICINE

## 2022-03-21 RX ORDER — POLYETHYLENE GLYCOL 3350, SODIUM CHLORIDE, SODIUM BICARBONATE, POTASSIUM CHLORIDE 420; 11.2; 5.72; 1.48 G/4L; G/4L; G/4L; G/4L
4 POWDER, FOR SOLUTION ORAL ONCE
Qty: 4000 ML | Refills: 0 | Status: SHIPPED | OUTPATIENT
Start: 2022-03-21 | End: 2022-03-21

## 2022-03-21 NOTE — PATIENT INSTRUCTIONS
1.  Repeat blood count in the next several days. 2.  May decrease pantoprazole to once daily. 3.  Schedule colonoscopy for screening following a split dose TriLyte preparation and monitored anesthesia care. 4.  Hold diabetic medication the evening before and the morning of the procedure. 5.  Hold iron for 3 days preceding the procedure.

## 2022-03-22 NOTE — TELEPHONE ENCOUNTER
----- Message from Micah Muhammad MD sent at 2/18/2022  4:30 PM CST -----  Please contact the patient and let them know the heart function remains stable at 40%, will continue current medications and adjust doses as needed based on blood pressure/weights/symptoms. Continue to monitor BP and weights daily. Will refer to our CHF clinic program here at [facility].      Spoke with pt, verified , informed pt that referral has been placed for visit with Dr Tobias Castañeda, pt verbalized understanding.

## 2022-03-22 NOTE — TELEPHONE ENCOUNTER
Dr Ksenia Moreland, pt had an appt with gastro Dr Clint Macdonald yesterday 3/21/2022 please place a referral for pt's visit as requested.

## 2022-04-05 ENCOUNTER — TELEPHONE (OUTPATIENT)
Dept: PULMONOLOGY | Facility: CLINIC | Age: 77
End: 2022-04-05

## 2022-04-05 NOTE — TELEPHONE ENCOUNTER
Patient indicates she needs certification to prove that patient needs oxygen otherwise [facility] will discontinue her oxygen. Patient indicates this is very important, please call at [phone].   Fax [phone], [facility]

## 2022-04-05 NOTE — TELEPHONE ENCOUNTER
Spoke with [facility] at JENAE Zhang they received 3/4/22 order, office visit notes. Account is on hold due to a balance of $46.95. Spoke with patient informed her of above, states she is currently on hold with [employer] and verbalized understanding.

## 2022-04-06 ENCOUNTER — LAB ENCOUNTER (OUTPATIENT)
Dept: LAB | Age: 77
End: 2022-04-06
Attending: INTERNAL MEDICINE
Payer: MEDICARE

## 2022-04-06 DIAGNOSIS — Z79.899 HIGH RISK MEDICATION USE: ICD-10-CM

## 2022-04-06 DIAGNOSIS — K92.2 UGIB (UPPER GASTROINTESTINAL BLEED): ICD-10-CM

## 2022-04-06 DIAGNOSIS — M10.372 ACUTE GOUT DUE TO RENAL IMPAIRMENT INVOLVING LEFT ANKLE: ICD-10-CM

## 2022-04-06 DIAGNOSIS — M10.9 GOUTY ARTHRITIS: ICD-10-CM

## 2022-04-06 DIAGNOSIS — E78.00 HYPERCHOLESTEREMIA: ICD-10-CM

## 2022-04-06 LAB
ALBUMIN SERPL-MCNC: 3.4 G/DL (ref 3.4–5)
ALBUMIN/GLOB SERPL: 1 {RATIO} (ref 1–2)
ALP LIVER SERPL-CCNC: 82 U/L
ALT SERPL-CCNC: 27 U/L
ANION GAP SERPL CALC-SCNC: 5 MMOL/L (ref 0–18)
AST SERPL-CCNC: 20 U/L (ref 15–37)
BASOPHILS # BLD AUTO: 0.02 X10(3) UL (ref 0–0.2)
BASOPHILS NFR BLD AUTO: 0.4 %
BILIRUB SERPL-MCNC: 0.3 MG/DL (ref 0.1–2)
BUN BLD-MCNC: 21 MG/DL (ref 7–18)
BUN/CREAT SERPL: 16 (ref 10–20)
CALCIUM BLD-MCNC: 10.2 MG/DL (ref 8.5–10.1)
CHLORIDE SERPL-SCNC: 104 MMOL/L (ref 98–112)
CO2 SERPL-SCNC: 34 MMOL/L (ref 21–32)
CREAT BLD-MCNC: 1.31 MG/DL
DEPRECATED RDW RBC AUTO: 59.8 FL (ref 35.1–46.3)
EOSINOPHIL # BLD AUTO: 0.61 X10(3) UL (ref 0–0.7)
EOSINOPHIL NFR BLD AUTO: 11.5 %
ERYTHROCYTE [DISTWIDTH] IN BLOOD BY AUTOMATED COUNT: 18.2 % (ref 11–15)
FASTING STATUS PATIENT QL REPORTED: YES
GLOBULIN PLAS-MCNC: 3.5 G/DL (ref 2.8–4.4)
GLUCOSE BLD-MCNC: 121 MG/DL (ref 70–99)
HCT VFR BLD AUTO: 38.8 %
HGB BLD-MCNC: 11.1 G/DL
IMM GRANULOCYTES # BLD AUTO: 0.03 X10(3) UL (ref 0–1)
IMM GRANULOCYTES NFR BLD: 0.6 %
LYMPHOCYTES # BLD AUTO: 1.43 X10(3) UL (ref 1–4)
LYMPHOCYTES NFR BLD AUTO: 26.9 %
MCH RBC QN AUTO: 25.9 PG (ref 26–34)
MCHC RBC AUTO-ENTMCNC: 28.6 G/DL (ref 31–37)
MCV RBC AUTO: 90.4 FL
MONOCYTES # BLD AUTO: 0.67 X10(3) UL (ref 0.1–1)
MONOCYTES NFR BLD AUTO: 12.6 %
NEUTROPHILS # BLD AUTO: 2.55 X10 (3) UL (ref 1.5–7.7)
NEUTROPHILS # BLD AUTO: 2.55 X10(3) UL (ref 1.5–7.7)
NEUTROPHILS NFR BLD AUTO: 48 %
OSMOLALITY SERPL CALC.SUM OF ELEC: 300 MOSM/KG (ref 275–295)
PLATELET # BLD AUTO: 260 10(3)UL (ref 150–450)
POTASSIUM SERPL-SCNC: 4.2 MMOL/L (ref 3.5–5.1)
PROT SERPL-MCNC: 6.9 G/DL (ref 6.4–8.2)
RBC # BLD AUTO: 4.29 X10(6)UL
SODIUM SERPL-SCNC: 143 MMOL/L (ref 136–145)
URATE SERPL-MCNC: 6.6 MG/DL
WBC # BLD AUTO: 5.3 X10(3) UL (ref 4–11)

## 2022-04-06 PROCEDURE — 80053 COMPREHEN METABOLIC PANEL: CPT

## 2022-04-06 PROCEDURE — 84550 ASSAY OF BLOOD/URIC ACID: CPT

## 2022-04-06 PROCEDURE — 36415 COLL VENOUS BLD VENIPUNCTURE: CPT

## 2022-04-06 PROCEDURE — 85025 COMPLETE CBC W/AUTO DIFF WBC: CPT

## 2022-04-06 PROCEDURE — 80061 LIPID PANEL: CPT

## 2022-04-07 ENCOUNTER — TELEPHONE (OUTPATIENT)
Dept: INTERNAL MEDICINE CLINIC | Facility: CLINIC | Age: 77
End: 2022-04-07

## 2022-04-07 DIAGNOSIS — E78.00 HYPERCHOLESTEREMIA: Primary | ICD-10-CM

## 2022-04-07 LAB
CHOLEST SERPL-MCNC: 237 MG/DL (ref ?–200)
HDLC SERPL-MCNC: 38 MG/DL (ref 40–59)
LDLC SERPL CALC-MCNC: 160 MG/DL (ref ?–100)
NONHDLC SERPL-MCNC: 199 MG/DL (ref ?–130)
TRIGL SERPL-MCNC: 213 MG/DL (ref 30–149)
VLDLC SERPL CALC-MCNC: 42 MG/DL (ref 0–30)

## 2022-04-07 NOTE — TELEPHONE ENCOUNTER
Pt stated she thought a cholesterol test was ordered, advised  Only CBC, Uric acid was order but canceled d/t already ordered by Dr Lisset Martinez      Stated she have not had cholesterol checked and it was very high, upset about coming back

## 2022-04-07 NOTE — TELEPHONE ENCOUNTER
Pt called wanted an explanation of why Dr Sole coates wasn't done, advised do not see any orders from him,only Dr Paradise Sandoval, Pt started screaming, stated she wanted the number to the lab, tried to explain  But volume louder, disconnected, called lab spoke to [facility]

## 2022-04-07 NOTE — TELEPHONE ENCOUNTER
Please review. Protocol failed / No protocol. Requested Prescriptions   Pending Prescriptions Disp Refills    GLIPIZIDE 5 MG Oral Tab [Pharmacy Med Name: GLIPIZIDE 5 MG TABLET] 30 tablet 0     Sig: TAKE 1 TABLET BY MOUTH EVERY MORNING BEFORE BREAKFAST.         Diabetes Medication Protocol Failed - 4/7/2022 12:48 PM        Failed - GFR Non- > 50     Lab Results   Component Value Date    GFRNAA 40 (L) 04/06/2022                 Passed - Last A1C < 7.5 and within past 6 months     Lab Results   Component Value Date    A1C 6.5 (H) 02/15/2022               Passed - Appointment in past 6 or next 3 months        Passed - GFR in the past 12 months             Recent Outpatient Visits              2 weeks ago Gastrointestinal hemorrhage with melena    [facility] [place] [address], [facility], Dayron Duff MD    Office Visit    3 weeks ago Acute gout due to renal impairment involving left ankle    [facility] [address], [address], Chance Vicki Elizabeth MD    Office Visit    1 month ago Chronic obstructive pulmonary disease, unspecified COPD type [facility])    [address] [place] [address] [address]a, [address], [place] Kristopher Hunter MD    Office Visit    1 month ago UGIB (upper gastrointestinal bleed)    [address] Alberto Medellin MD    Office Visit    3 months ago Gouty arthritis    Rheumatology - Walter Alexis MD    Office Visit           Future Appointments         Provider Department Appt Notes    In 5 days Vicki Elizabeth MD [address] [place] [address], [facility], [address] [place] [facility]    In 2 weeks Bart Cage MD Rheumatology - Fernando De La Cruz [place] follow up, [facility] [place] 12/23/21    In 4 months STATHOPOULOS, PROCEDURE [address] GI PROCEDURE Colonoscopy @ [address] [place] [address]    In 5 months Kristopher Hunter MD [address] [place] [address] [address]a, [address], [place] 6 months

## 2022-04-08 ENCOUNTER — TELEPHONE (OUTPATIENT)
Dept: INTERNAL MEDICINE CLINIC | Facility: CLINIC | Age: 77
End: 2022-04-08

## 2022-04-08 NOTE — TELEPHONE ENCOUNTER
Patient called asking for referral for Dr. Romy Kirk eye doctor she is currently at her appointment. Advised her that we would not be able to send a referral that fast try to explain the process she then was rude and ask that I transfer the call to someone else who can help her. She become verbal abusive and I asked her to stop with the treatment.     Referral pend for review/approval

## 2022-04-08 NOTE — TELEPHONE ENCOUNTER
Good AFternoon dr Kristine Silva and staff,    Please see message below and advise.     Thank you    [facility]

## 2022-04-12 ENCOUNTER — OFFICE VISIT (OUTPATIENT)
Dept: INTERNAL MEDICINE CLINIC | Facility: CLINIC | Age: 77
End: 2022-04-12
Payer: MEDICARE

## 2022-04-12 VITALS
HEART RATE: 84 BPM | SYSTOLIC BLOOD PRESSURE: 136 MMHG | RESPIRATION RATE: 12 BRPM | BODY MASS INDEX: 29.02 KG/M2 | HEIGHT: 64 IN | DIASTOLIC BLOOD PRESSURE: 72 MMHG | WEIGHT: 170 LBS

## 2022-04-12 DIAGNOSIS — E11.59 HYPERTENSION ASSOCIATED WITH TYPE 2 DIABETES MELLITUS (HCC): ICD-10-CM

## 2022-04-12 DIAGNOSIS — N18.31 STAGE 3A CHRONIC KIDNEY DISEASE (HCC): ICD-10-CM

## 2022-04-12 DIAGNOSIS — M1A.9XX1 CHRONIC TOPHACEOUS GOUT: ICD-10-CM

## 2022-04-12 DIAGNOSIS — E11.69 HYPERLIPIDEMIA ASSOCIATED WITH TYPE 2 DIABETES MELLITUS (HCC): ICD-10-CM

## 2022-04-12 DIAGNOSIS — I50.32 CHRONIC DIASTOLIC CONGESTIVE HEART FAILURE (HCC): ICD-10-CM

## 2022-04-12 DIAGNOSIS — Z85.3 HISTORY OF BREAST CANCER: ICD-10-CM

## 2022-04-12 DIAGNOSIS — E78.5 HYPERLIPIDEMIA ASSOCIATED WITH TYPE 2 DIABETES MELLITUS (HCC): ICD-10-CM

## 2022-04-12 DIAGNOSIS — I15.2 HYPERTENSION ASSOCIATED WITH TYPE 2 DIABETES MELLITUS (HCC): ICD-10-CM

## 2022-04-12 DIAGNOSIS — E11.22 CONTROLLED TYPE 2 DIABETES MELLITUS WITH STAGE 3 CHRONIC KIDNEY DISEASE, WITHOUT LONG-TERM CURRENT USE OF INSULIN (HCC): ICD-10-CM

## 2022-04-12 DIAGNOSIS — Z98.890 S/P CAROTID ENDARTERECTOMY: ICD-10-CM

## 2022-04-12 DIAGNOSIS — J44.9 COPD, SEVERE (HCC): ICD-10-CM

## 2022-04-12 DIAGNOSIS — H91.93 BILATERAL HEARING LOSS, UNSPECIFIED HEARING LOSS TYPE: ICD-10-CM

## 2022-04-12 DIAGNOSIS — N18.30 CONTROLLED TYPE 2 DIABETES MELLITUS WITH STAGE 3 CHRONIC KIDNEY DISEASE, WITHOUT LONG-TERM CURRENT USE OF INSULIN (HCC): ICD-10-CM

## 2022-04-12 DIAGNOSIS — M48.02 CERVICAL STENOSIS OF SPINAL CANAL: ICD-10-CM

## 2022-04-12 DIAGNOSIS — Z95.1 S/P CABG (CORONARY ARTERY BYPASS GRAFT): ICD-10-CM

## 2022-04-12 DIAGNOSIS — E21.3 HYPERPARATHYROIDISM (HCC): ICD-10-CM

## 2022-04-12 DIAGNOSIS — I25.10 CORONARY ARTERY DISEASE INVOLVING NATIVE CORONARY ARTERY OF NATIVE HEART WITHOUT ANGINA PECTORIS: ICD-10-CM

## 2022-04-12 DIAGNOSIS — Z00.00 MEDICARE ANNUAL WELLNESS VISIT, SUBSEQUENT: Primary | ICD-10-CM

## 2022-04-12 DIAGNOSIS — Z91.81 AT RISK FOR FALLS: ICD-10-CM

## 2022-04-12 PROCEDURE — 3075F SYST BP GE 130 - 139MM HG: CPT | Performed by: INTERNAL MEDICINE

## 2022-04-12 PROCEDURE — 3078F DIAST BP <80 MM HG: CPT | Performed by: INTERNAL MEDICINE

## 2022-04-12 PROCEDURE — 99397 PER PM REEVAL EST PAT 65+ YR: CPT | Performed by: INTERNAL MEDICINE

## 2022-04-12 PROCEDURE — 96160 PT-FOCUSED HLTH RISK ASSMT: CPT | Performed by: INTERNAL MEDICINE

## 2022-04-12 PROCEDURE — G0439 PPPS, SUBSEQ VISIT: HCPCS | Performed by: INTERNAL MEDICINE

## 2022-04-12 PROCEDURE — 3008F BODY MASS INDEX DOCD: CPT | Performed by: INTERNAL MEDICINE

## 2022-04-17 PROBLEM — I10 ESSENTIAL HYPERTENSION: Status: RESOLVED | Noted: 2018-05-10 | Resolved: 2022-04-17

## 2022-04-17 PROBLEM — K92.2 UPPER GI BLEEDING: Status: RESOLVED | Noted: 2022-02-15 | Resolved: 2022-04-17

## 2022-04-17 PROBLEM — R22.9 SKIN NODULE: Status: RESOLVED | Noted: 2021-03-30 | Resolved: 2022-04-17

## 2022-04-17 PROBLEM — G89.29 CHRONIC LEFT EAR PAIN: Status: RESOLVED | Noted: 2021-02-28 | Resolved: 2022-04-17

## 2022-04-17 PROBLEM — H92.02 CHRONIC LEFT EAR PAIN: Status: RESOLVED | Noted: 2021-02-28 | Resolved: 2022-04-17

## 2022-04-19 ENCOUNTER — MED REC SCAN ONLY (OUTPATIENT)
Dept: INTERNAL MEDICINE CLINIC | Facility: CLINIC | Age: 77
End: 2022-04-19

## 2022-04-19 RX ORDER — ISOPROPYL ALCOHOL 0.75 G/1
SWAB TOPICAL
Qty: 400 EACH | Refills: 3 | Status: SHIPPED | OUTPATIENT
Start: 2022-04-19 | End: 2023-06-16

## 2022-04-19 RX ORDER — CALCIUM CITRATE/VITAMIN D3 200MG-6.25
1 TABLET ORAL 4 TIMES DAILY
Qty: 400 STRIP | Refills: 3 | Status: SHIPPED | OUTPATIENT
Start: 2022-04-19

## 2022-04-19 NOTE — TELEPHONE ENCOUNTER
Refill passed per [employer] [facility] protocol. Requested Prescriptions   Pending Prescriptions Disp Refills    Alcohol Swabs (BD SWAB SINGLE USE REGULAR) Does not apply Pads 400 each 0     Sig: Use as directed        There is no refill protocol information for this order        Glucose Blood (TRUE METRIX BLOOD GLUCOSE TEST) In Vitro Strip 400 strip 0     Si strip by In Vitro route 4 (four) times daily.         Diabetic Supplies Protocol Passed - 2022  4:54 PM        Passed - Appointment in past 12 or next 3 months            Future Appointments         Provider Department Appt Notes    In 4 months STATHOPSTEPAN, PROCEDURE [address] GI PROCEDURE Colonoscopy [facility] @ [address] [place] [address]    In 4 months Maryanne Perez MD [place] [address] [place] [facility], [address], [facility] 6 months    In 6 months [address] Endocrinology consult           Recent Outpatient Visits              1 week ago Medicare annual wellness visit, subsequent    [place] [address] [place] [facility], Brayan Pena MD    Office Visit    4 weeks ago Gastrointestinal hemorrhage with melena    [place] [address] [place] [facility], [facility] MD Luz Maria    Office Visit    1 month ago Acute gout due to renal impairment involving left ankle    [place] [address] [place] [facility], Brayan Pena MD    Office Visit    1 month ago Chronic obstructive pulmonary disease, unspecified COPD type [facility])    [place] [address] [place] [place] [facility], [address] [place], [place] [address]Sherry MD    Office Visit    1 month ago UGIB (upper gastrointestinal bleed)    [address] Brayan Jain MD    Office Visit

## 2022-04-19 NOTE — TELEPHONE ENCOUNTER
Refill request on the following.     TRUE METRIX BLOOD GLUCOSE [facility]    [facility] TRUE METRIX TEST STRIP    TRUEPLUS 33G LANCETS

## 2022-04-20 RX ORDER — GLUCOSAM/CHON-MSM1/C/MANG/BOSW 500-416.6
1 TABLET ORAL DAILY
Qty: 100 EACH | Refills: 3 | Status: SHIPPED | OUTPATIENT
Start: 2022-04-20

## 2022-04-26 ENCOUNTER — TELEPHONE (OUTPATIENT)
Dept: INTERNAL MEDICINE CLINIC | Facility: CLINIC | Age: 77
End: 2022-04-26

## 2022-04-26 RX ORDER — GLIPIZIDE 5 MG/1
5 TABLET ORAL
Qty: 90 TABLET | Refills: 1 | Status: SHIPPED
Start: 2022-04-26 | End: 2022-04-28

## 2022-04-26 NOTE — TELEPHONE ENCOUNTER
Ok to restart glipizide 5mg po daily with breakfast. I will refer her glipiizide; continue monitor her glucose daily.   Remind pt she needs to do her labs ordered bmp before/

## 2022-04-26 NOTE — TELEPHONE ENCOUNTER
Dr. Alexandrea De Jesus    HI ,  Before we call the patient can you please clarify when patient is to have the BMP done   Thank you

## 2022-04-26 NOTE — TELEPHONE ENCOUNTER
Pt advised , verbalized understanding, stated her Ins , will not cover her Lab at [facility], relayed [facility] lab number in [place]

## 2022-04-26 NOTE — TELEPHONE ENCOUNTER
Patient calling, identified name and , states had been taking Steroids for a few weeks , was given Glipizide to take until steroid were done , she stopped taking Glipizide one  week ago    She has 2 tablets of Glipizide left     Was told to report her BG levels;         BG= 149 ( FBS )          BG= 148    BG= AFTER DINNER   89    BG=      BG FBS =144    BG FBS =139     DID NOT DO     FBS =148     FBS BG =156 ( HAD CKE AND ICE CREAM )           Please advise and thank you.

## 2022-04-27 NOTE — OCCUPATIONAL THERAPY NOTE
OCCUPATIONAL THERAPY EVALUATION - INPATIENT      Room Number: 230/230-A  Evaluation Date: 2/28/2019  Type of Evaluation: Initial  Presenting Problem: (CABG x3)    Physician Order: IP Consult to Occupational Therapy  Reason for Therapy: ADL/IADL Dysfunction benefit from [facility]. OT to continue to assess. She was left in bedside chair with call light in reach and all needs met. Notified RN.      DISCHARGE RECOMMENDATIONS  OT Discharge Recommendations: Home with home health PT/OT       PLAN  OT Treatment Plan: Kp Leonardo risk          PAIN ASSESSMENT  Rating: (reports pain with movement)          ACTIVITY TOLERANCE   HR: 87      BP: 101/54        O2 SATURATIONS   O2 Saturations: 91%           COGNITION  Overall Cognitive Status:  WFL - within functional limits    RANGE OF will complete self care task at sink level with SBA   Comment:    Patient will independently recall /6 sternal precautions  Comment:         Goals  on: 3/14  Frequency: 3-5x a week    Jing SAINZ/R   JOE Ba Depth Of Tumor Invasion (For Histology): adipose tissue

## 2022-04-28 ENCOUNTER — TELEPHONE (OUTPATIENT)
Dept: INTERNAL MEDICINE CLINIC | Facility: CLINIC | Age: 77
End: 2022-04-28

## 2022-04-28 RX ORDER — GLIPIZIDE 5 MG/1
TABLET ORAL
Qty: 30 TABLET | Refills: 0 | OUTPATIENT
Start: 2022-04-28

## 2022-04-28 RX ORDER — GLIPIZIDE 5 MG/1
5 TABLET ORAL
Qty: 90 TABLET | Refills: 1 | Status: SHIPPED | OUTPATIENT
Start: 2022-04-28

## 2022-04-28 NOTE — TELEPHONE ENCOUNTER
Patient requesting that Glipizide 5 mg be sent to [facility] in [facility] as [facility] will charge her over $200.00. Resent Glipizide 5 mg to [facility].

## 2022-05-12 NOTE — TELEPHONE ENCOUNTER
----- Message from Risk I/O  Kemal sent at 5/12/2022 10:19 AM EDT -----  Regarding: Oncology treatments   My infusion schedule stops after June 1  Can we please schedule me Martita 15 and every two weeks after that? Ill need bloodwork orders for [facility]  Currently I have a follow up with Dr Lizeth Atwood in 6/15 which would mean he comes up to infusion center to see me if we keep the every Wednesday schedule as is  Please call me to resolve at    Thank you Pt states pharmacy is giving her generic medication vs the rx Yariel Corral dr prescribed.   Pt states she cant just take any medication

## 2022-05-13 ENCOUNTER — LAB ENCOUNTER (OUTPATIENT)
Dept: LAB | Age: 77
End: 2022-05-13
Attending: INTERNAL MEDICINE
Payer: MEDICARE

## 2022-05-13 LAB
ANION GAP SERPL CALC-SCNC: 4 MMOL/L (ref 0–18)
BUN BLD-MCNC: 26 MG/DL (ref 7–18)
BUN/CREAT SERPL: 20.6 (ref 10–20)
CALCIUM BLD-MCNC: 10.3 MG/DL (ref 8.5–10.1)
CHLORIDE SERPL-SCNC: 104 MMOL/L (ref 98–112)
CO2 SERPL-SCNC: 31 MMOL/L (ref 21–32)
CREAT BLD-MCNC: 1.26 MG/DL
FASTING STATUS PATIENT QL REPORTED: YES
GLUCOSE BLD-MCNC: 112 MG/DL (ref 70–99)
OSMOLALITY SERPL CALC.SUM OF ELEC: 294 MOSM/KG (ref 275–295)
POTASSIUM SERPL-SCNC: 4.2 MMOL/L (ref 3.5–5.1)
SODIUM SERPL-SCNC: 139 MMOL/L (ref 136–145)

## 2022-05-13 PROCEDURE — 36415 COLL VENOUS BLD VENIPUNCTURE: CPT | Performed by: INTERNAL MEDICINE

## 2022-05-13 PROCEDURE — 80048 BASIC METABOLIC PNL TOTAL CA: CPT | Performed by: INTERNAL MEDICINE

## 2022-05-15 ENCOUNTER — TELEPHONE (OUTPATIENT)
Dept: INTERNAL MEDICINE CLINIC | Facility: CLINIC | Age: 77
End: 2022-05-15

## 2022-05-17 ENCOUNTER — TELEPHONE (OUTPATIENT)
Dept: ENDOCRINOLOGY CLINIC | Facility: CLINIC | Age: 77
End: 2022-05-17

## 2022-05-17 NOTE — TELEPHONE ENCOUNTER
Patient is calling because dr. Monico Ventura told her to request a sooner appointment due to lab work being abnormal and doesn't want the patient to wait until first available.  Please call

## 2022-05-18 NOTE — TELEPHONE ENCOUNTER
Pt called IM triage line, states she received a call from our office this morning. Call was transferred to ENDO department for further assistance.

## 2022-05-18 NOTE — TELEPHONE ENCOUNTER
Attempt to phone patient, no answer, left message asking for call back to assist with scheduling sooner appointment time/date.

## 2022-05-24 NOTE — TELEPHONE ENCOUNTER
LOV- 05/31/19    Last refill- 04/24/19    Dr Nayeli Bolden to sign Imiquimod Counseling:  I discussed with the patient the risks of imiquimod including but not limited to erythema, scaling, itching, weeping, crusting, and pain.  Patient understands that the inflammatory response to imiquimod is variable from person to person and was educated regarded proper titration schedule.  If flu-like symptoms develop, patient knows to discontinue the medication and contact us.

## 2022-05-31 ENCOUNTER — TELEPHONE (OUTPATIENT)
Dept: INTERNAL MEDICINE CLINIC | Facility: CLINIC | Age: 77
End: 2022-05-31

## 2022-05-31 DIAGNOSIS — I25.10 CORONARY ARTERY DISEASE INVOLVING NATIVE CORONARY ARTERY OF NATIVE HEART WITHOUT ANGINA PECTORIS: Primary | ICD-10-CM

## 2022-05-31 DIAGNOSIS — J44.9 CHRONIC OBSTRUCTIVE PULMONARY DISEASE, UNSPECIFIED COPD TYPE (HCC): ICD-10-CM

## 2022-05-31 DIAGNOSIS — R06.02 SHORTNESS OF BREATH: ICD-10-CM

## 2022-05-31 DIAGNOSIS — H92.09 OTALGIA, UNSPECIFIED LATERALITY: ICD-10-CM

## 2022-05-31 NOTE — TELEPHONE ENCOUNTER
Spoke with patient ( verified)--asking to speak with Dr. Tamie Galdamez directly RE: side effects of \"all this medicine he is giving me. I am having hallucinations and I have trouble breathing at night. I don't need my oxygen during the day, but at night, I have a problem. I wear my oxygen at 1 1/2 liters and my oxygen level is sometimes 84-89%. \"    Relayed to patient she needs to go to ER with O2 sats that low--\"I want to talk to Dr. Michael Petersen keeps giving me too many medications--there's been 5 new medications recently. Of course, it's going to cause side effects! \"    Please advise if you can add to your schedule today for video visit--patient insisting on talking to PCP directly

## 2022-06-01 NOTE — TELEPHONE ENCOUNTER
Talked to pt and she states she had been havving SOB for 4 weeks and also O2 sats low at night; she denies having chest pains. Did also say some visual hallucination. Pt is thinking could be related some meds. I told her will also need to think about other causes like cardiac or pulmonary give her hx of COPD as well as CAD. I told her doubt iron pills causing symptms. She had started glipizide a month ago and glucose in good range and not getting hypoglycemia. She can hold it as long as she continues to monitor glucose   Her colchicine was given by rheumatologist though I had change dosing when she started to take to every other day and she said she wants to take it Q 3 days. I told her to ffup with her pulmonologist. Also to see cardio. Will do check cbc, cmp and bnp.   Pt had been advised to go to ER if any worsning of symptoms

## 2022-06-02 NOTE — TELEPHONE ENCOUNTER
Please review; protocol failed/no protocol    Requested Prescriptions   Pending Prescriptions Disp Refills    GLIPIZIDE 5 MG Oral Tab [Pharmacy Med Name: GLIPIZIDE 5 MG TABLET] 30 tablet 0     Sig: TAKE 1 TABLET BY MOUTH EVERY MORNING BEFORE BREAKFAST.         Diabetes Medication Protocol Failed - 2022  4:15 PM        Failed - GFR Non- > 50     Lab Results   Component Value Date    GFRNAA 41 (L) 2022                 Passed - Last A1C < 7.5 and within past 6 months     Lab Results   Component Value Date    A1C 6.5 (H) 02/15/2022               Passed - Appointment in past 6 or next 3 months        Passed - GFR in the past 12 months               Recent Outpatient Visits              1 month ago Medicare annual wellness visit, subsequent    [place], Rajinder Pike, Loly Chanel MD    Office Visit    2 months ago Gastrointestinal hemorrhage with melena    [place], [address] [place] [address], MD Luz Maria    Office Visit    2 months ago Acute gout due to renal impairment involving left ankle    [place], Rajinder Pike, Chance Dao MD    Office Visit    3 months ago Chronic obstructive pulmonary disease, unspecified COPD type [facility])    [place], [address], [place] Zoraida Apgar, MD    Office Visit    3 months ago UGIB (upper gastrointestinal bleed)    [facility], Loly Chanel MD    Office Visit             Future Appointments         Provider Department Appt Notes    In 4 days Angelika Sands MD [place] [facility], [address] [place] [address], [address] - Elevated serum creatinine    In 5 days Gissell Arredondo MD [facility] [place], [place] Otalgia  $13 copay   \"policy informed\"     In 4 weeks Eliz Garcia MD [place] [facility] Endocrinology consult -hyperparathyroidism -per Dr. Jaguar Prasad    In 2 months STATHOPOULOS, PROCEDURE [address] GI PROCEDURE Colonoscopy [facility] @ [address] [place] [address]    In 3 months Sammy Wilson MD [facility], [facility], [address], [place] 6 months

## 2022-06-02 NOTE — TELEPHONE ENCOUNTER
Spoke with patient and relayed Dr. Ranjit Jolley message below--patient verbalizes understanding and agreement--has Dr. Real Fairly # to call for appt. No further questions/concerns at this time.

## 2022-06-02 NOTE — TELEPHONE ENCOUNTER
Dr. Alexandrea De Jesus - She forgot to ask you for referral to Dr. Mayur Branch (OTOLARYNGOLOGY)      Says pain is worse than it's ever been. Left ear. Familiar pain as before, when she saw Dr. Mayur Branch. Feels like something crawling around in there. RN advised ER/IC due to severe pain being worse than it has ever been. Patient declines. Only wants to see Dr. Mayur Branch. And then patient says that the pain is tolerable.

## 2022-06-06 ENCOUNTER — OFFICE VISIT (OUTPATIENT)
Dept: NEPHROLOGY | Facility: CLINIC | Age: 77
End: 2022-06-06
Payer: MEDICARE

## 2022-06-06 VITALS
DIASTOLIC BLOOD PRESSURE: 76 MMHG | WEIGHT: 175 LBS | SYSTOLIC BLOOD PRESSURE: 139 MMHG | HEART RATE: 73 BPM | HEIGHT: 64 IN | BODY MASS INDEX: 29.88 KG/M2

## 2022-06-06 DIAGNOSIS — D64.9 ANEMIA, UNSPECIFIED TYPE: ICD-10-CM

## 2022-06-06 DIAGNOSIS — N18.32 STAGE 3B CHRONIC KIDNEY DISEASE (HCC): Primary | ICD-10-CM

## 2022-06-06 PROCEDURE — 3008F BODY MASS INDEX DOCD: CPT | Performed by: INTERNAL MEDICINE

## 2022-06-06 PROCEDURE — 3075F SYST BP GE 130 - 139MM HG: CPT | Performed by: INTERNAL MEDICINE

## 2022-06-06 PROCEDURE — 3078F DIAST BP <80 MM HG: CPT | Performed by: INTERNAL MEDICINE

## 2022-06-06 PROCEDURE — 99205 OFFICE O/P NEW HI 60 MIN: CPT | Performed by: INTERNAL MEDICINE

## 2022-06-06 NOTE — PROGRESS NOTES
06/06/22        Patient: Rosenda Jansen   YOB: 1945   Date of Visit: 6/6/2022       Dear  Dr. Miesha Ingram MD,      Thank you for referring Rosenda Jansen to my practice. Please find my assessment and plan below. As you know she is a 69-year-old female with a history of hypertension, adult onset diabetes mellitus, coronary artery disease, status post CABG, gout, asthma/COPD who I now had the pleasure of seeing for evaluation of chronic kidney disease stage III. Laboratory studies have been reviewed in epic. Her creatinines have been mildly elevated dating back to 2019. They  fluctuated in the 1.1-1.5 range. More recently she was hospitalized in February 2022 for an upper GI bleed. Her creatinine at time of admission was 1.25 but actually improved to 0.83 at time of discharge in February 18, 2022. Unclear whether she was receiving IV fluids. However more recently on April 6, 2022 her creatinine is 1.31 and finally on May 13, 2022 was 1.26 with an estimated GFR 41 cc/min and renal consultation has now been advised. Her past medical history is significant for hypertension but she states she is only had it for a couple of years. She had coronary artery bypass graft surgery 2 years ago. Her left ventricular ejection fraction though was normal.  She states she has had intermittent courses of steroids for both her gout and for her asthma. As result she feels this is what caused her diabetes. Followed by rheumatology for her gout. She is also status post left carotid endarterectomy. Medications are as listed and denies any significant use of nonsteroidals. Social history she is a non-smoker. Family history is negative for renal pathology. Review of system the patient still has some sense of dyspnea on exertion but no chest pain, GI or urinary tract symptoms. 10 point review of systems is otherwise unremarkable.     On physical exam her blood pressure is 139/76 with a pulse of 73 and she weighed 175 pounds. Her neck was supple without JVD. Lungs were clear. Heart revealed a regular rate and rhythm with an S4 but no gallops, murmurs or rubs. Abdomen was soft, flat, nontender without organomegaly, masses or bruits. Extremities revealed no edema. I therefore informed the patient that she does appear to have chronic kidney disease stage III. This may be on the basis of hypertension but other causes need to be excluded. For completion sake a repeat CBC, renal panel, urinalysis, urine for microalbumin, urine for Bence-Bah protein, sed rate, connective tissue profile and a renal ultrasound have been ordered. Further impressions and recommendations will be forthcoming after reviewing the above. Maintain adequate hydration. Avoid nonsteroidals. Thank you very much for allowing me to participate in the care of your patient. If you have any questions please feel free to call.            Sincerely,   Alex Conrad MD   [address]  [place] [address] [place] [address]  [id number] [facility] [id number]    Document electronically generated by:  Alex Conrad MD

## 2022-06-12 ENCOUNTER — APPOINTMENT (OUTPATIENT)
Dept: GENERAL RADIOLOGY | Facility: HOSPITAL | Age: 77
End: 2022-06-12
Attending: EMERGENCY MEDICINE
Payer: MEDICARE

## 2022-06-12 ENCOUNTER — HOSPITAL ENCOUNTER (EMERGENCY)
Facility: HOSPITAL | Age: 77
Discharge: HOME | End: 2022-06-12
Attending: EMERGENCY MEDICINE
Payer: MEDICARE

## 2022-06-12 ENCOUNTER — TELEPHONE (OUTPATIENT)
Dept: INTERNAL MEDICINE CLINIC | Facility: CLINIC | Age: 77
End: 2022-06-12

## 2022-06-12 VITALS
TEMPERATURE: 99 F | RESPIRATION RATE: 18 BRPM | WEIGHT: 170 LBS | HEIGHT: 64 IN | OXYGEN SATURATION: 95 % | DIASTOLIC BLOOD PRESSURE: 72 MMHG | SYSTOLIC BLOOD PRESSURE: 166 MMHG | HEART RATE: 70 BPM | BODY MASS INDEX: 29.02 KG/M2

## 2022-06-12 DIAGNOSIS — U07.1 COVID: Primary | ICD-10-CM

## 2022-06-12 LAB — SARS-COV-2 RNA RESP QL NAA+PROBE: DETECTED

## 2022-06-12 PROCEDURE — 71045 X-RAY EXAM CHEST 1 VIEW: CPT | Performed by: EMERGENCY MEDICINE

## 2022-06-12 PROCEDURE — 99284 EMERGENCY DEPT VISIT MOD MDM: CPT

## 2022-06-12 RX ORDER — BEBTELOVIMAB 87.5 MG/ML
175 INJECTION, SOLUTION INTRAVENOUS ONCE
Status: COMPLETED | OUTPATIENT
Start: 2022-06-12 | End: 2022-06-12

## 2022-06-12 NOTE — TELEPHONE ENCOUNTER
Paged by pt; feeling bad, started since Monday; had fevers, colds, couhging. Body aches. Had been exposed to covid after going to party Sunday. Son also has covid. States her fevers had resolved. No has no smell and taste. Still with colds and fatigue. o2 sat 94% this am, been using her oxygen every night. She has not testeed for covid and dont have testing kit  I told pt need to go to ER since she likely has covid and given her comorbid conditions, she will benefit from monoclonal ab treatment. She was asking for abx and told her will not work against covid. I told her MB tx is recommended to lower risk of severe covid infection even for vaccinated patients. I told her to go to ER now so she can be evaluated and be treated right away. Pt agreed.

## 2022-06-12 NOTE — ED INITIAL ASSESSMENT (HPI)
Patient here with nasal congestion and low o2 saturation at home. Wears home o2.  Lost sense of taste and smell this am.

## 2022-06-13 ENCOUNTER — TELEPHONE (OUTPATIENT)
Dept: CASE MANAGEMENT | Age: 77
End: 2022-06-13

## 2022-06-13 NOTE — TELEPHONE ENCOUNTER
Pt received MAB infusion at [address] [place] [address] on 6/12/22 for COVID-19. Please follow-up with pt for post-infusion assessment and home monitoring if needed. Thank you.

## 2022-06-13 NOTE — TELEPHONE ENCOUNTER
Home Monitoring Day 1 of 3. What  was your temp today? Did not have fever       What was your pulse ox today? 94-95    Are you feeling short of breath today?   no    Is the shortness of breath better, the same, or worse than yesterday? Never had    Are you having a cough today? No cough,phlegm in throat     Is the cough better, the same, or worse than yesterday? better     Are you experiencing weakness today? no    Is the weakness better, the same or worse than yesterday?    better      How is your appetite compared to yesterday?     good    Are you vomiting?   no    Are you experiencing diarrhea?no    Any loss of taste or smell?yes , both      Nursing notes:Pt stated she is doing good, even though she don't have taste and smell, staying hydrated ,no cough but  clearing throat a lot while speaking , asking what can she take for the phlegm in throat , otherwise she feels good

## 2022-06-21 ENCOUNTER — OFFICE VISIT (OUTPATIENT)
Dept: OTOLARYNGOLOGY | Facility: CLINIC | Age: 77
End: 2022-06-21
Payer: MEDICARE

## 2022-06-21 VITALS — TEMPERATURE: 98 F

## 2022-06-21 DIAGNOSIS — H60.42 CHOLESTEATOMA OF LEFT EXTERNAL AUDITORY CANAL: Primary | ICD-10-CM

## 2022-06-21 DIAGNOSIS — H92.02 LEFT EAR PAIN: ICD-10-CM

## 2022-06-21 PROCEDURE — 92504 EAR MICROSCOPY EXAMINATION: CPT | Performed by: OTOLARYNGOLOGY

## 2022-06-21 PROCEDURE — 99213 OFFICE O/P EST LOW 20 MIN: CPT | Performed by: OTOLARYNGOLOGY

## 2022-06-21 RX ORDER — GLIPIZIDE 5 MG/1
5 TABLET ORAL
Qty: 90 TABLET | Refills: 1 | OUTPATIENT
Start: 2022-06-21

## 2022-06-23 ENCOUNTER — PATIENT OUTREACH (OUTPATIENT)
Dept: CASE MANAGEMENT | Age: 77
End: 2022-06-23

## 2022-06-27 ENCOUNTER — TELEPHONE (OUTPATIENT)
Dept: INTERNAL MEDICINE CLINIC | Facility: CLINIC | Age: 77
End: 2022-06-27

## 2022-06-27 ENCOUNTER — PATIENT OUTREACH (OUTPATIENT)
Dept: CASE MANAGEMENT | Age: 77
End: 2022-06-27

## 2022-06-27 DIAGNOSIS — J44.9 CHRONIC OBSTRUCTIVE PULMONARY DISEASE, UNSPECIFIED COPD TYPE (HCC): Primary | ICD-10-CM

## 2022-06-27 NOTE — TELEPHONE ENCOUNTER
[facility] calling to request documentation for continued use of oxygen, needs primary care referral.     Fax# [phone]

## 2022-06-27 NOTE — TELEPHONE ENCOUNTER
Form requesting referral/ prior authorization for DME( Portable Oxygen Concentrator) received.  Order pended and sent to Dr. Tanya Mcknight.

## 2022-06-29 ENCOUNTER — TELEPHONE (OUTPATIENT)
Dept: PULMONOLOGY | Facility: CLINIC | Age: 77
End: 2022-06-29

## 2022-06-29 NOTE — TELEPHONE ENCOUNTER
Selam pt chronic care provider called in to request that pt get a portable oxygen machine as it will be more helpful for the pt.  Please follow up

## 2022-06-29 NOTE — TELEPHONE ENCOUNTER
Spoke to Nury at [employer] to discuss portable oxygen concentrator  For patient. Account on hold due to balance due is $89.16 but they can still offer patient POC. Nury agreed to call patient to discuss options-they have all paperwork required. Nothing further needed from [facility].

## 2022-06-30 ENCOUNTER — OFFICE VISIT (OUTPATIENT)
Dept: ENDOCRINOLOGY CLINIC | Facility: CLINIC | Age: 77
End: 2022-06-30
Payer: MEDICARE

## 2022-06-30 VITALS
WEIGHT: 172 LBS | HEART RATE: 87 BPM | DIASTOLIC BLOOD PRESSURE: 64 MMHG | SYSTOLIC BLOOD PRESSURE: 127 MMHG | BODY MASS INDEX: 30 KG/M2

## 2022-06-30 DIAGNOSIS — E55.9 VITAMIN D DEFICIENCY: ICD-10-CM

## 2022-06-30 DIAGNOSIS — E83.52 HYPERCALCEMIA: Primary | ICD-10-CM

## 2022-07-05 ENCOUNTER — TELEPHONE (OUTPATIENT)
Dept: INTERNAL MEDICINE CLINIC | Facility: CLINIC | Age: 77
End: 2022-07-05

## 2022-07-05 ENCOUNTER — HOSPITAL ENCOUNTER (OUTPATIENT)
Dept: ULTRASOUND IMAGING | Age: 77
Discharge: HOME | End: 2022-07-05
Attending: INTERNAL MEDICINE
Payer: MEDICARE

## 2022-07-05 DIAGNOSIS — E83.52 HYPERCALCEMIA: ICD-10-CM

## 2022-07-05 PROCEDURE — 76536 US EXAM OF HEAD AND NECK: CPT | Performed by: INTERNAL MEDICINE

## 2022-07-05 NOTE — Clinical Note
TCM appt 3/11. Outreach completed. Minocycline Counseling: Patient advised regarding possible photosensitivity and discoloration of the teeth, skin, lips, tongue and gums.  Patient instructed to avoid sunlight, if possible.  When exposed to sunlight, patients should wear protective clothing, sunglasses, and sunscreen.  The patient was instructed to call the office immediately if the following severe adverse effects occur:  hearing changes, easy bruising/bleeding, severe headache, or vision changes.  The patient verbalized understanding of the proper use and possible adverse effects of minocycline.  All of the patient's questions and concerns were addressed.

## 2022-07-05 NOTE — TELEPHONE ENCOUNTER
Patient reports was previously prescribed ferrous sulfate, asking if this should be refilled. Reports has appt next week for labs, this includes CBC. Advised to complete labs and follow up to confirm if PCP advises to continue ferrous sulfate. Patient agreed with plan.

## 2022-07-12 ENCOUNTER — LAB ENCOUNTER (OUTPATIENT)
Dept: LAB | Facility: HOSPITAL | Age: 77
End: 2022-07-12
Attending: INTERNAL MEDICINE
Payer: MEDICARE

## 2022-07-12 ENCOUNTER — TELEPHONE (OUTPATIENT)
Dept: INTERNAL MEDICINE CLINIC | Facility: CLINIC | Age: 77
End: 2022-07-12

## 2022-07-12 ENCOUNTER — HOSPITAL ENCOUNTER (OUTPATIENT)
Dept: ULTRASOUND IMAGING | Facility: HOSPITAL | Age: 77
Discharge: HOME | End: 2022-07-12
Attending: INTERNAL MEDICINE
Payer: MEDICARE

## 2022-07-12 DIAGNOSIS — E83.52 HYPERCALCEMIA: ICD-10-CM

## 2022-07-12 DIAGNOSIS — D64.9 ANEMIA, UNSPECIFIED TYPE: ICD-10-CM

## 2022-07-12 DIAGNOSIS — N18.32 STAGE 3B CHRONIC KIDNEY DISEASE (HCC): ICD-10-CM

## 2022-07-12 DIAGNOSIS — R06.02 SHORTNESS OF BREATH: ICD-10-CM

## 2022-07-12 DIAGNOSIS — E55.9 VITAMIN D DEFICIENCY: ICD-10-CM

## 2022-07-12 LAB
ALBUMIN SERPL-MCNC: 3.5 G/DL (ref 3.4–5)
ALBUMIN/GLOB SERPL: 0.9 {RATIO} (ref 1–2)
ALP LIVER SERPL-CCNC: 83 U/L
ALT SERPL-CCNC: 35 U/L
ANION GAP SERPL CALC-SCNC: 5 MMOL/L (ref 0–18)
AST SERPL-CCNC: 31 U/L (ref 15–37)
BASOPHILS # BLD AUTO: 0.03 X10(3) UL (ref 0–0.2)
BASOPHILS NFR BLD AUTO: 0.5 %
BILIRUB SERPL-MCNC: 0.3 MG/DL (ref 0.1–2)
BILIRUB UR QL: NEGATIVE
BUN BLD-MCNC: 21 MG/DL (ref 7–18)
BUN/CREAT SERPL: 17.4 (ref 10–20)
C3 SERPL-MCNC: 105 MG/DL (ref 90–180)
C4 SERPL-MCNC: 27.5 MG/DL (ref 10–40)
CALCIUM BLD-MCNC: 10.2 MG/DL (ref 8.5–10.1)
CHLORIDE SERPL-SCNC: 102 MMOL/L (ref 98–112)
CLARITY UR: CLEAR
CO2 SERPL-SCNC: 29 MMOL/L (ref 21–32)
COLOR UR: YELLOW
CREAT BLD-MCNC: 1.21 MG/DL
CREAT UR-SCNC: 68.7 MG/DL
CRP SERPL-MCNC: 0.66 MG/DL (ref ?–0.3)
DEPRECATED RDW RBC AUTO: 50.3 FL (ref 35.1–46.3)
EOSINOPHIL # BLD AUTO: 0.26 X10(3) UL (ref 0–0.7)
EOSINOPHIL NFR BLD AUTO: 4.5 %
ERYTHROCYTE [DISTWIDTH] IN BLOOD BY AUTOMATED COUNT: 15.1 % (ref 11–15)
ERYTHROCYTE [SEDIMENTATION RATE] IN BLOOD: 10 MM/HR
FASTING STATUS PATIENT QL REPORTED: YES
GLOBULIN PLAS-MCNC: 4.1 G/DL (ref 2.8–4.4)
GLUCOSE BLD-MCNC: 110 MG/DL (ref 70–99)
GLUCOSE UR-MCNC: NEGATIVE MG/DL
HCT VFR BLD AUTO: 45.8 %
HGB BLD-MCNC: 13.9 G/DL
HGB UR QL STRIP.AUTO: NEGATIVE
IMM GRANULOCYTES # BLD AUTO: 0.02 X10(3) UL (ref 0–1)
IMM GRANULOCYTES NFR BLD: 0.3 %
IRON SATN MFR SERPL: 23 %
IRON SERPL-MCNC: 78 UG/DL
KETONES UR-MCNC: NEGATIVE MG/DL
LEUKOCYTE ESTERASE UR QL STRIP.AUTO: NEGATIVE
LYMPHOCYTES # BLD AUTO: 1.33 X10(3) UL (ref 1–4)
LYMPHOCYTES NFR BLD AUTO: 23 %
MCH RBC QN AUTO: 27.7 PG (ref 26–34)
MCHC RBC AUTO-ENTMCNC: 30.3 G/DL (ref 31–37)
MCV RBC AUTO: 91.2 FL
MICROALBUMIN UR-MCNC: 1.78 MG/DL
MICROALBUMIN/CREAT 24H UR-RTO: 25.9 UG/MG (ref ?–30)
MONOCYTES # BLD AUTO: 0.81 X10(3) UL (ref 0.1–1)
MONOCYTES NFR BLD AUTO: 14 %
NEUTROPHILS # BLD AUTO: 3.33 X10 (3) UL (ref 1.5–7.7)
NEUTROPHILS # BLD AUTO: 3.33 X10(3) UL (ref 1.5–7.7)
NEUTROPHILS NFR BLD AUTO: 57.7 %
NITRITE UR QL STRIP.AUTO: NEGATIVE
NT-PROBNP SERPL-MCNC: 304 PG/ML (ref ?–450)
OSMOLALITY SERPL CALC.SUM OF ELEC: 286 MOSM/KG (ref 275–295)
PH UR: 7 [PH] (ref 5–8)
PHOSPHATE SERPL-MCNC: 2.5 MG/DL (ref 2.5–4.9)
PLATELET # BLD AUTO: 226 10(3)UL (ref 150–450)
POTASSIUM SERPL-SCNC: 4.2 MMOL/L (ref 3.5–5.1)
PROT SERPL-MCNC: 7.6 G/DL (ref 6.4–8.2)
PROT UR-MCNC: 10.8 MG/DL
PROT UR-MCNC: NEGATIVE MG/DL
PTH-INTACT SERPL-MCNC: 121.6 PG/ML (ref 18.5–88)
PTH-INTACT SERPL-MCNC: 135.2 PG/ML (ref 18.5–88)
RBC # BLD AUTO: 5.02 X10(6)UL
RHEUMATOID FACT SERPL-ACNC: <10 IU/ML (ref ?–15)
SODIUM SERPL-SCNC: 136 MMOL/L (ref 136–145)
SP GR UR STRIP: 1.01 (ref 1–1.03)
TIBC SERPL-MCNC: 337 UG/DL (ref 240–450)
TRANSFERRIN SERPL-MCNC: 226 MG/DL (ref 200–360)
UROBILINOGEN UR STRIP-ACNC: <2
VIT C UR-MCNC: NEGATIVE MG/DL
VIT D+METAB SERPL-MCNC: 26.4 NG/ML (ref 30–100)
WBC # BLD AUTO: 5.8 X10(3) UL (ref 4–11)

## 2022-07-12 PROCEDURE — 82043 UR ALBUMIN QUANTITATIVE: CPT

## 2022-07-12 PROCEDURE — 81003 URINALYSIS AUTO W/O SCOPE: CPT

## 2022-07-12 PROCEDURE — 83970 ASSAY OF PARATHORMONE: CPT

## 2022-07-12 PROCEDURE — 85652 RBC SED RATE AUTOMATED: CPT

## 2022-07-12 PROCEDURE — 80053 COMPREHEN METABOLIC PANEL: CPT

## 2022-07-12 PROCEDURE — 86160 COMPLEMENT ANTIGEN: CPT

## 2022-07-12 PROCEDURE — 86431 RHEUMATOID FACTOR QUANT: CPT

## 2022-07-12 PROCEDURE — 82570 ASSAY OF URINE CREATININE: CPT

## 2022-07-12 PROCEDURE — 36415 COLL VENOUS BLD VENIPUNCTURE: CPT

## 2022-07-12 PROCEDURE — 86038 ANTINUCLEAR ANTIBODIES: CPT

## 2022-07-12 PROCEDURE — 84100 ASSAY OF PHOSPHORUS: CPT

## 2022-07-12 PROCEDURE — 86039 ANTINUCLEAR ANTIBODIES (ANA): CPT

## 2022-07-12 PROCEDURE — 86335 IMMUNFIX E-PHORSIS/URINE/CSF: CPT

## 2022-07-12 PROCEDURE — 86140 C-REACTIVE PROTEIN: CPT

## 2022-07-12 PROCEDURE — 83540 ASSAY OF IRON: CPT

## 2022-07-12 PROCEDURE — 84466 ASSAY OF TRANSFERRIN: CPT

## 2022-07-12 PROCEDURE — 84166 PROTEIN E-PHORESIS/URINE/CSF: CPT

## 2022-07-12 PROCEDURE — 82306 VITAMIN D 25 HYDROXY: CPT

## 2022-07-12 PROCEDURE — 83880 ASSAY OF NATRIURETIC PEPTIDE: CPT

## 2022-07-12 PROCEDURE — 76770 US EXAM ABDO BACK WALL COMP: CPT | Performed by: INTERNAL MEDICINE

## 2022-07-12 PROCEDURE — 85025 COMPLETE CBC W/AUTO DIFF WBC: CPT

## 2022-07-12 PROCEDURE — 84165 PROTEIN E-PHORESIS SERUM: CPT

## 2022-07-12 NOTE — TELEPHONE ENCOUNTER
Patient asking about doing labs this morning, discussed needs to be fasting since comprehensive metabolic panel contains blood glucose. She verbalized understanding.   Has not eaten this am.

## 2022-07-13 ENCOUNTER — PATIENT OUTREACH (OUTPATIENT)
Dept: CASE MANAGEMENT | Age: 77
End: 2022-07-13

## 2022-07-13 ENCOUNTER — TELEPHONE (OUTPATIENT)
Dept: ENDOCRINOLOGY CLINIC | Facility: CLINIC | Age: 77
End: 2022-07-13

## 2022-07-13 DIAGNOSIS — Z95.1 S/P CABG (CORONARY ARTERY BYPASS GRAFT): ICD-10-CM

## 2022-07-13 DIAGNOSIS — E11.59 HYPERTENSION ASSOCIATED WITH TYPE 2 DIABETES MELLITUS (HCC): ICD-10-CM

## 2022-07-13 DIAGNOSIS — E78.5 HYPERLIPIDEMIA ASSOCIATED WITH TYPE 2 DIABETES MELLITUS (HCC): ICD-10-CM

## 2022-07-13 DIAGNOSIS — E55.9 VITAMIN D DEFICIENCY: ICD-10-CM

## 2022-07-13 DIAGNOSIS — J44.9 COPD, SEVERE (HCC): ICD-10-CM

## 2022-07-13 DIAGNOSIS — Z91.81 AT RISK FOR FALLS: ICD-10-CM

## 2022-07-13 DIAGNOSIS — E11.69 HYPERLIPIDEMIA ASSOCIATED WITH TYPE 2 DIABETES MELLITUS (HCC): ICD-10-CM

## 2022-07-13 DIAGNOSIS — M1A.9XX1 CHRONIC TOPHACEOUS GOUT: ICD-10-CM

## 2022-07-13 DIAGNOSIS — I50.32 CHRONIC DIASTOLIC CONGESTIVE HEART FAILURE (HCC): ICD-10-CM

## 2022-07-13 DIAGNOSIS — E11.22 CONTROLLED TYPE 2 DIABETES MELLITUS WITH STAGE 3 CHRONIC KIDNEY DISEASE, WITHOUT LONG-TERM CURRENT USE OF INSULIN (HCC): ICD-10-CM

## 2022-07-13 DIAGNOSIS — I15.2 HYPERTENSION ASSOCIATED WITH TYPE 2 DIABETES MELLITUS (HCC): ICD-10-CM

## 2022-07-13 DIAGNOSIS — E83.52 HYPERCALCEMIA: Primary | ICD-10-CM

## 2022-07-13 DIAGNOSIS — N18.30 CONTROLLED TYPE 2 DIABETES MELLITUS WITH STAGE 3 CHRONIC KIDNEY DISEASE, WITHOUT LONG-TERM CURRENT USE OF INSULIN (HCC): ICD-10-CM

## 2022-07-13 DIAGNOSIS — E21.3 HYPERPARATHYROIDISM (HCC): ICD-10-CM

## 2022-07-13 DIAGNOSIS — Z98.890 S/P CAROTID ENDARTERECTOMY: ICD-10-CM

## 2022-07-13 DIAGNOSIS — M48.02 CERVICAL STENOSIS OF SPINAL CANAL: ICD-10-CM

## 2022-07-13 NOTE — TELEPHONE ENCOUNTER
Please call patient - calcium level is stable and secondary to parathyroid disease. There is no need for medication at this time since calcium remains low. Continue Vitamin D 2000 units daily. No OTC calcium supplements. Recheck PTH, Vitamin D, BMP in 6  Months. Thanks.

## 2022-07-14 LAB — NUCLEAR IGG TITR SER IF: POSITIVE {TITER}

## 2022-07-15 LAB
ALBUMIN SERPL ELPH-MCNC: 4.28 G/DL (ref 3.75–5.21)
ALBUMIN/GLOB SERPL: 1.29 {RATIO} (ref 1–2)
ALPHA1 GLOB SERPL ELPH-MCNC: 0.26 G/DL (ref 0.19–0.46)
ALPHA2 GLOB SERPL ELPH-MCNC: 0.69 G/DL (ref 0.48–1.05)
ANA NUCLEOLAR TITR SER IF: 160 {TITER}
B-GLOBULIN SERPL ELPH-MCNC: 0.94 G/DL (ref 0.68–1.23)
GAMMA GLOB SERPL ELPH-MCNC: 1.43 G/DL (ref 0.62–1.7)
PROT SERPL-MCNC: 7.6 G/DL (ref 6.4–8.2)

## 2022-07-17 ENCOUNTER — TELEPHONE (OUTPATIENT)
Dept: NEPHROLOGY | Facility: CLINIC | Age: 77
End: 2022-07-17

## 2022-07-22 NOTE — TELEPHONE ENCOUNTER
Received a returned call from pt son and informed of note below,Rn offered to schedule pt stated will call back.

## 2022-07-26 ENCOUNTER — NURSE TRIAGE (OUTPATIENT)
Dept: INTERNAL MEDICINE CLINIC | Facility: CLINIC | Age: 77
End: 2022-07-26

## 2022-07-26 ENCOUNTER — APPOINTMENT (OUTPATIENT)
Dept: GENERAL RADIOLOGY | Facility: HOSPITAL | Age: 77
End: 2022-07-26
Attending: EMERGENCY MEDICINE
Payer: MEDICARE

## 2022-07-26 ENCOUNTER — HOSPITAL ENCOUNTER (EMERGENCY)
Facility: HOSPITAL | Age: 77
Discharge: HOME | End: 2022-07-26
Attending: EMERGENCY MEDICINE
Payer: MEDICARE

## 2022-07-26 VITALS
HEIGHT: 64 IN | DIASTOLIC BLOOD PRESSURE: 51 MMHG | WEIGHT: 168 LBS | SYSTOLIC BLOOD PRESSURE: 90 MMHG | TEMPERATURE: 98 F | HEART RATE: 81 BPM | RESPIRATION RATE: 21 BRPM | OXYGEN SATURATION: 98 % | BODY MASS INDEX: 28.68 KG/M2

## 2022-07-26 DIAGNOSIS — J44.1 COPD EXACERBATION (HCC): Primary | ICD-10-CM

## 2022-07-26 DIAGNOSIS — J40 BRONCHITIS: ICD-10-CM

## 2022-07-26 LAB — SARS-COV-2 RNA RESP QL NAA+PROBE: NOT DETECTED

## 2022-07-26 PROCEDURE — 99284 EMERGENCY DEPT VISIT MOD MDM: CPT

## 2022-07-26 PROCEDURE — 94640 AIRWAY INHALATION TREATMENT: CPT

## 2022-07-26 PROCEDURE — 71045 X-RAY EXAM CHEST 1 VIEW: CPT | Performed by: EMERGENCY MEDICINE

## 2022-07-26 PROCEDURE — 93010 ELECTROCARDIOGRAM REPORT: CPT | Performed by: EMERGENCY MEDICINE

## 2022-07-26 PROCEDURE — 93005 ELECTROCARDIOGRAM TRACING: CPT

## 2022-07-26 RX ORDER — METHYLPREDNISOLONE 4 MG/1
TABLET ORAL
Qty: 1 EACH | Refills: 0 | Status: ON HOLD | OUTPATIENT
Start: 2022-07-26

## 2022-07-26 RX ORDER — LEVOFLOXACIN 500 MG/1
500 TABLET, FILM COATED ORAL DAILY
Qty: 5 TABLET | Refills: 0 | Status: SHIPPED | OUTPATIENT
Start: 2022-07-26 | End: 2022-07-26

## 2022-07-26 RX ORDER — METHYLPREDNISOLONE 4 MG/1
TABLET ORAL
Qty: 1 EACH | Refills: 0 | Status: SHIPPED | OUTPATIENT
Start: 2022-07-26 | End: 2022-07-26

## 2022-07-26 RX ORDER — PREDNISONE 20 MG/1
60 TABLET ORAL ONCE
Status: COMPLETED | OUTPATIENT
Start: 2022-07-26 | End: 2022-07-26

## 2022-07-26 RX ORDER — IPRATROPIUM BROMIDE AND ALBUTEROL SULFATE 2.5; .5 MG/3ML; MG/3ML
3 SOLUTION RESPIRATORY (INHALATION) ONCE
Status: COMPLETED | OUTPATIENT
Start: 2022-07-26 | End: 2022-07-26

## 2022-07-26 RX ORDER — LEVOFLOXACIN 500 MG/1
500 TABLET, FILM COATED ORAL DAILY
Qty: 7 TABLET | Refills: 0 | Status: ON HOLD | OUTPATIENT
Start: 2022-07-26 | End: 2022-08-02

## 2022-07-26 NOTE — ED INITIAL ASSESSMENT (HPI)
Patient presents to ED with c/o of congestion and states she is coughing up green phlegm. Patient also states increased difficulty in breathing. Patinet states she baseline wears O2 at night to sleep.

## 2022-07-27 ENCOUNTER — HOSPITAL ENCOUNTER (INPATIENT)
Facility: HOSPITAL | Age: 77
LOS: 6 days | Discharge: NURSING, REHAB OR LONG-TERM CARE | End: 2022-08-03
Attending: EMERGENCY MEDICINE | Admitting: HOSPITALIST
Payer: MEDICARE

## 2022-07-27 ENCOUNTER — APPOINTMENT (OUTPATIENT)
Dept: GENERAL RADIOLOGY | Facility: HOSPITAL | Age: 77
End: 2022-07-27
Attending: EMERGENCY MEDICINE
Payer: MEDICARE

## 2022-07-27 DIAGNOSIS — J44.1 COPD EXACERBATION (HCC): Primary | ICD-10-CM

## 2022-07-27 DIAGNOSIS — Z78.9 FAILURE OF OUTPATIENT TREATMENT: ICD-10-CM

## 2022-07-27 LAB
ANION GAP SERPL CALC-SCNC: 6 MMOL/L (ref 0–18)
APTT PPP: 28.7 SECONDS (ref 23.3–35.6)
BASOPHILS # BLD AUTO: 0.01 X10(3) UL (ref 0–0.2)
BASOPHILS NFR BLD AUTO: 0.1 %
BUN BLD-MCNC: 27 MG/DL (ref 7–18)
BUN/CREAT SERPL: 20.3 (ref 10–20)
CALCIUM BLD-MCNC: 10.8 MG/DL (ref 8.5–10.1)
CHLORIDE SERPL-SCNC: 101 MMOL/L (ref 98–112)
CHOLEST SERPL-MCNC: 238 MG/DL (ref ?–200)
CO2 SERPL-SCNC: 31 MMOL/L (ref 21–32)
CREAT BLD-MCNC: 1.33 MG/DL
DEPRECATED RDW RBC AUTO: 50.3 FL (ref 35.1–46.3)
EOSINOPHIL # BLD AUTO: 0 X10(3) UL (ref 0–0.7)
EOSINOPHIL NFR BLD AUTO: 0 %
ERYTHROCYTE [DISTWIDTH] IN BLOOD BY AUTOMATED COUNT: 14.7 % (ref 11–15)
GLUCOSE BLD-MCNC: 76 MG/DL (ref 70–99)
HCT VFR BLD AUTO: 42.8 %
HDLC SERPL-MCNC: 48 MG/DL (ref 40–59)
HGB BLD-MCNC: 13.1 G/DL
IMM GRANULOCYTES # BLD AUTO: 0.04 X10(3) UL (ref 0–1)
IMM GRANULOCYTES NFR BLD: 0.5 %
LDLC SERPL CALC-MCNC: 171 MG/DL (ref ?–100)
LYMPHOCYTES # BLD AUTO: 0.41 X10(3) UL (ref 1–4)
LYMPHOCYTES NFR BLD AUTO: 5.2 %
MCH RBC QN AUTO: 28.3 PG (ref 26–34)
MCHC RBC AUTO-ENTMCNC: 30.6 G/DL (ref 31–37)
MCV RBC AUTO: 92.4 FL
MONOCYTES # BLD AUTO: 0.58 X10(3) UL (ref 0.1–1)
MONOCYTES NFR BLD AUTO: 7.3 %
NEUTROPHILS # BLD AUTO: 6.89 X10 (3) UL (ref 1.5–7.7)
NEUTROPHILS # BLD AUTO: 6.89 X10(3) UL (ref 1.5–7.7)
NEUTROPHILS NFR BLD AUTO: 86.9 %
NONHDLC SERPL-MCNC: 190 MG/DL (ref ?–130)
NT-PROBNP SERPL-MCNC: 272 PG/ML (ref ?–450)
OSMOLALITY SERPL CALC.SUM OF ELEC: 290 MOSM/KG (ref 275–295)
PLATELET # BLD AUTO: 212 10(3)UL (ref 150–450)
POTASSIUM SERPL-SCNC: 4.6 MMOL/L (ref 3.5–5.1)
RBC # BLD AUTO: 4.63 X10(6)UL
SARS-COV-2 RNA RESP QL NAA+PROBE: NOT DETECTED
SODIUM SERPL-SCNC: 138 MMOL/L (ref 136–145)
TRIGL SERPL-MCNC: 109 MG/DL (ref 30–149)
TROPONIN I HIGH SENSITIVITY: 136 NG/L
TROPONIN I HIGH SENSITIVITY: 140 NG/L
VLDLC SERPL CALC-MCNC: 22 MG/DL (ref 0–30)
WBC # BLD AUTO: 7.9 X10(3) UL (ref 4–11)

## 2022-07-27 PROCEDURE — 71045 X-RAY EXAM CHEST 1 VIEW: CPT | Performed by: EMERGENCY MEDICINE

## 2022-07-27 PROCEDURE — 99223 1ST HOSP IP/OBS HIGH 75: CPT | Performed by: HOSPITALIST

## 2022-07-27 RX ORDER — ALBUTEROL SULFATE 2.5 MG/3ML
5 SOLUTION RESPIRATORY (INHALATION) ONCE
Status: COMPLETED | OUTPATIENT
Start: 2022-07-27 | End: 2022-07-27

## 2022-07-27 RX ORDER — ASPIRIN 81 MG/1
324 TABLET, CHEWABLE ORAL ONCE
Status: COMPLETED | OUTPATIENT
Start: 2022-07-27 | End: 2022-07-27

## 2022-07-27 RX ORDER — METHYLPREDNISOLONE SODIUM SUCCINATE 40 MG/ML
40 INJECTION, POWDER, LYOPHILIZED, FOR SOLUTION INTRAMUSCULAR; INTRAVENOUS ONCE
Status: COMPLETED | OUTPATIENT
Start: 2022-07-27 | End: 2022-07-27

## 2022-07-27 NOTE — TELEPHONE ENCOUNTER
Dr Ayana Steel: are you able to call patient? Patient contacted. Started on abx and medrol tameka last night given at ER yesterday. She couldn't sleep because chest congestion; she doesn't feel medrol tameka is helping her. She has wheezing. Patient is seeking to discuss treatment plan with DR Ayana Steel. She declines to call pulmonologist.     Kylie Vaughan done in ER.   Dx COPD exacerbation

## 2022-07-27 NOTE — ED INITIAL ASSESSMENT (HPI)
Pt to ED with c/o worsening dyspnea at home. Pt states she was seen here yesterday with same complaint. Pt states started on oral prednisone and Levaquin. Pt states she wears home oxygen at 2L nasal cannula prn. Pt denies fever. +congested cough noted. Pt states she called 911 and was brought to [facility], [place]- pt states she signed out AMA and came here because this is \"where my doctors are\". Pt is alert and oriented x4. Pt able to speak in full sentences. Pt skin warm and dry.

## 2022-07-27 NOTE — TELEPHONE ENCOUNTER
Tried to call back pt, not available. Left voice mail that if she isnt improving with meds given from ER yesterday, she will need to back to ER for reeval.  Also told to call us back.

## 2022-07-27 NOTE — TELEPHONE ENCOUNTER
Relayed Dr message , Pt stated she do not want pay $120 again, stated she's not better with breathing, when she walks SOB while on O2  Advised the benefits of going back to ER and not to delay her care, stated she will have someone take her  Or  call 911

## 2022-07-28 PROBLEM — Z78.9 FAILURE OF OUTPATIENT TREATMENT: Status: ACTIVE | Noted: 2022-07-28

## 2022-07-28 LAB
ADENOVIRUS PCR:: NOT DETECTED
B PARAPERT DNA SPEC QL NAA+PROBE: NOT DETECTED
B PERT DNA SPEC QL NAA+PROBE: NOT DETECTED
C PNEUM DNA SPEC QL NAA+PROBE: NOT DETECTED
CORONAVIRUS 229E PCR:: NOT DETECTED
CORONAVIRUS HKU1 PCR:: NOT DETECTED
CORONAVIRUS NL63 PCR:: NOT DETECTED
CORONAVIRUS OC43 PCR:: NOT DETECTED
EST. AVERAGE GLUCOSE BLD GHB EST-MCNC: 123 MG/DL (ref 68–126)
FLUAV RNA SPEC QL NAA+PROBE: NOT DETECTED
FLUBV RNA SPEC QL NAA+PROBE: NOT DETECTED
GLUCOSE BLDC GLUCOMTR-MCNC: 138 MG/DL (ref 70–99)
GLUCOSE BLDC GLUCOMTR-MCNC: 142 MG/DL (ref 70–99)
GLUCOSE BLDC GLUCOMTR-MCNC: 160 MG/DL (ref 70–99)
GLUCOSE BLDC GLUCOMTR-MCNC: 164 MG/DL (ref 70–99)
GLUCOSE BLDC GLUCOMTR-MCNC: 292 MG/DL (ref 70–99)
HBA1C MFR BLD: 5.9 % (ref ?–5.7)
METAPNEUMOVIRUS PCR:: NOT DETECTED
MYCOPLASMA PNEUMONIA PCR:: NOT DETECTED
PARAINFLUENZA 1 PCR:: NOT DETECTED
PARAINFLUENZA 2 PCR:: NOT DETECTED
PARAINFLUENZA 3 PCR:: NOT DETECTED
PARAINFLUENZA 4 PCR:: NOT DETECTED
RHINOVIRUS/ENTERO PCR:: NOT DETECTED
RSV RNA SPEC QL NAA+PROBE: DETECTED
SARS-COV-2 RNA NPH QL NAA+NON-PROBE: NOT DETECTED

## 2022-07-28 PROCEDURE — 99233 SBSQ HOSP IP/OBS HIGH 50: CPT | Performed by: HOSPITALIST

## 2022-07-28 PROCEDURE — 99223 1ST HOSP IP/OBS HIGH 75: CPT | Performed by: INTERNAL MEDICINE

## 2022-07-28 RX ORDER — TEMAZEPAM 15 MG/1
15 CAPSULE ORAL NIGHTLY PRN
Status: DISCONTINUED | OUTPATIENT
Start: 2022-07-28 | End: 2022-08-03

## 2022-07-28 RX ORDER — HEPARIN SODIUM 5000 [USP'U]/ML
5000 INJECTION, SOLUTION INTRAVENOUS; SUBCUTANEOUS EVERY 12 HOURS SCHEDULED
Status: DISCONTINUED | OUTPATIENT
Start: 2022-07-28 | End: 2022-08-03

## 2022-07-28 RX ORDER — NICOTINE POLACRILEX 4 MG
15 LOZENGE BUCCAL
Status: DISCONTINUED | OUTPATIENT
Start: 2022-07-28 | End: 2022-08-03

## 2022-07-28 RX ORDER — FEBUXOSTAT 40 MG/1
40 TABLET, FILM COATED ORAL DAILY
Status: DISCONTINUED | OUTPATIENT
Start: 2022-07-28 | End: 2022-07-29

## 2022-07-28 RX ORDER — METHYLPREDNISOLONE SODIUM SUCCINATE 40 MG/ML
40 INJECTION, POWDER, LYOPHILIZED, FOR SOLUTION INTRAMUSCULAR; INTRAVENOUS EVERY 12 HOURS
Status: DISCONTINUED | OUTPATIENT
Start: 2022-07-28 | End: 2022-07-31

## 2022-07-28 RX ORDER — BENZONATATE 100 MG/1
200 CAPSULE ORAL 3 TIMES DAILY PRN
Status: DISCONTINUED | OUTPATIENT
Start: 2022-07-28 | End: 2022-08-03

## 2022-07-28 RX ORDER — DEXTROSE MONOHYDRATE 25 G/50ML
50 INJECTION, SOLUTION INTRAVENOUS
Status: DISCONTINUED | OUTPATIENT
Start: 2022-07-28 | End: 2022-08-03

## 2022-07-28 RX ORDER — MONTELUKAST SODIUM 10 MG/1
10 TABLET ORAL NIGHTLY
Status: DISCONTINUED | OUTPATIENT
Start: 2022-07-28 | End: 2022-08-03

## 2022-07-28 RX ORDER — FLUTICASONE FUROATE AND VILANTEROL 100; 25 UG/1; UG/1
1 POWDER RESPIRATORY (INHALATION) DAILY
Status: DISCONTINUED | OUTPATIENT
Start: 2022-07-28 | End: 2022-07-31

## 2022-07-28 RX ORDER — NICOTINE POLACRILEX 4 MG
30 LOZENGE BUCCAL
Status: DISCONTINUED | OUTPATIENT
Start: 2022-07-28 | End: 2022-08-03

## 2022-07-28 RX ORDER — AMLODIPINE BESYLATE 5 MG/1
5 TABLET ORAL DAILY
Status: DISCONTINUED | OUTPATIENT
Start: 2022-07-28 | End: 2022-08-03

## 2022-07-28 RX ORDER — METOCLOPRAMIDE HYDROCHLORIDE 5 MG/ML
5 INJECTION INTRAMUSCULAR; INTRAVENOUS EVERY 8 HOURS PRN
Status: DISCONTINUED | OUTPATIENT
Start: 2022-07-28 | End: 2022-08-03

## 2022-07-28 RX ORDER — IPRATROPIUM BROMIDE AND ALBUTEROL SULFATE 2.5; .5 MG/3ML; MG/3ML
3 SOLUTION RESPIRATORY (INHALATION) EVERY 6 HOURS PRN
Status: DISCONTINUED | OUTPATIENT
Start: 2022-07-28 | End: 2022-07-29

## 2022-07-28 RX ORDER — FEBUXOSTAT 40 MG/1
40 TABLET, FILM COATED ORAL DAILY
Status: DISCONTINUED | OUTPATIENT
Start: 2022-07-28 | End: 2022-07-28

## 2022-07-28 RX ORDER — METOPROLOL SUCCINATE 25 MG/1
25 TABLET, EXTENDED RELEASE ORAL DAILY
Status: DISCONTINUED | OUTPATIENT
Start: 2022-07-28 | End: 2022-08-03

## 2022-07-28 RX ORDER — ONDANSETRON 2 MG/ML
4 INJECTION INTRAMUSCULAR; INTRAVENOUS EVERY 6 HOURS PRN
Status: DISCONTINUED | OUTPATIENT
Start: 2022-07-28 | End: 2022-08-03

## 2022-07-28 RX ORDER — METHYLPREDNISOLONE SODIUM SUCCINATE 40 MG/ML
40 INJECTION, POWDER, LYOPHILIZED, FOR SOLUTION INTRAMUSCULAR; INTRAVENOUS EVERY 8 HOURS
Status: DISCONTINUED | OUTPATIENT
Start: 2022-07-28 | End: 2022-07-28

## 2022-07-28 RX ORDER — COLCHICINE 0.6 MG/1
0.6 TABLET ORAL DAILY
Status: DISCONTINUED | OUTPATIENT
Start: 2022-07-28 | End: 2022-08-03

## 2022-07-28 RX ORDER — IPRATROPIUM BROMIDE AND ALBUTEROL SULFATE 2.5; .5 MG/3ML; MG/3ML
3 SOLUTION RESPIRATORY (INHALATION) EVERY 6 HOURS
Status: DISCONTINUED | OUTPATIENT
Start: 2022-07-28 | End: 2022-07-29

## 2022-07-28 RX ORDER — HYDRALAZINE HYDROCHLORIDE 10 MG/1
10 TABLET, FILM COATED ORAL 2 TIMES DAILY
Status: DISCONTINUED | OUTPATIENT
Start: 2022-07-28 | End: 2022-07-30

## 2022-07-28 RX ORDER — PANTOPRAZOLE SODIUM 40 MG/1
40 TABLET, DELAYED RELEASE ORAL
Status: DISCONTINUED | OUTPATIENT
Start: 2022-07-28 | End: 2022-07-30

## 2022-07-28 RX ORDER — ACETAMINOPHEN 500 MG
500 TABLET ORAL EVERY 4 HOURS PRN
Status: DISCONTINUED | OUTPATIENT
Start: 2022-07-28 | End: 2022-08-03

## 2022-07-28 RX ORDER — METHYLPREDNISOLONE SODIUM SUCCINATE 40 MG/ML
40 INJECTION, POWDER, LYOPHILIZED, FOR SOLUTION INTRAMUSCULAR; INTRAVENOUS EVERY 6 HOURS
Status: DISCONTINUED | OUTPATIENT
Start: 2022-07-28 | End: 2022-07-28

## 2022-07-28 NOTE — H&P
[facility]    PATIENT'S NAME: Amanda Shelton   ATTENDING PHYSICIAN: Christine Snell MD   PATIENT ACCOUNT#:   [de-identified]    LOCATION:  [address]  MEDICAL RECORD #:   [id number]       YOB: 1945  ADMISSION DATE:       07/27/2022    HISTORY AND PHYSICAL EXAMINATION    CHIEF COMPLAINT:  COPD exacerbation. HISTORY OF PRESENT ILLNESS:  Patient is a 69-year-old Carlyle Levi female with known past medical history of chronic obstructive pulmonary disease, who was on a cruise ship to [place]. On the way back, weather was foggy and rainy, and she started having progressive wheezing and shortness of breath. Patient was seen yesterday in the emergency room and was given a Medrol Dosepak without significant improvement in her symptoms. Today came back to the emergency room to be evaluated again. CBC and chemistry were unremarkable. GFR is 39, which is at baseline. Troponin 140, chronically elevated. Chest x-ray still pending. Patient was started on IV Solu-Medrol and nebulizer treatments, and she will be admitted to the hospital for further management. PAST MEDICAL HISTORY:  Multiarticular gout; coronary artery disease, status post coronary artery bypass graft surgery; chronic left ventricular diastolic dysfunction; carotid atherosclerosis; asthma; severe chronic obstructive pulmonary disease; major depressive disorder; gastroesophageal reflux disease; hyperlipidemia; hypertension; and generalized osteoarthritis. PAST SURGICAL HISTORY:  Left mastectomy for breast cancer, right breast lumpectomy, followed by radiation therapy for breast cancer; coronary artery bypass graft surgery; right carotid endarterectomy; transsphenoidal pituitary macroadenoma resection; hernia repair; and cataract procedure. MEDICATIONS:  Please see medication reconciliation list.     ALLERGIES:  Allopurinol and side effects to statins and ACE inhibitors.   She had side effects also to Xanax and rash to adhesive tape. FAMILY HISTORY:  Mother had thyroid disease. Father had asthma, hypertension, and heart disease. SOCIAL HISTORY:  No tobacco, alcohol, or drug use. Retired. Lives by herself. Independent for basic activities of daily living. REVIEW OF SYSTEMS:  Patient reports progressive wheezing and shortness of breath while on the cruise ship when the weather started getting humid and on-and-off rain. No fever or chills. No body aches. No chest pain. No abdominal pain. She is dyspneic on exertion and dyspneic on minimal physical activity. PHYSICAL EXAMINATION:    GENERAL:  Alert and oriented to time, place and person. Moderate distress. Visibly dyspneic. VITAL SIGNS:  Temperature 98.0, pulse 79, respiratory rate 22, blood pressure 163/75, pulse ox 96% on room air. HEENT:  Atraumatic. Oropharynx clear. Moist mucous membranes. Normal hard and soft palate. Eyes:  Anicteric sclerae. NECK:  Supple. No lymphadenopathy. Trachea midline. Full range of motion. LUNGS:  Bilateral expiratory wheezes with moderate air restriction. HEART:  Regular rate and rhythm. S1 and S2 auscultated. No murmur. ABDOMEN:  Soft, nondistended. No tenderness. Positive bowel sounds. EXTREMITIES:  No peripheral edema, clubbing or cyanosis. NEUROLOGIC:  Motor and sensory intact. Cranial nerves II to XII are intact. ASSESSMENT:    1. Chronic obstructive pulmonary disease exacerbation, failed outpatient management. 2.   Hypertension. 3.   Elevated troponin and history of coronary artery disease. Patient has no chest pain. Second set still pending. Troponin level is chronically elevated per electronic records. 4.   Diabetes mellitus type 2. PLAN:  Patient will be admitted to general medical floor. IV Solu-Medrol. Nebulizer treatments. Oxygen protocol. DVT prophylaxis. Pulmonary consult. Monitor Accu-Cheks. Monitor respiratory status. Further recommendations to follow. Dictated By Pelon Ashby MD  d: 07/27/2022 19:30:01  t: 07/27/2022 21:58:32  Job [id number]  /

## 2022-07-28 NOTE — PLAN OF CARE
Problem: Patient Centered Care  Goal: Patient preferences are identified and integrated in the patient's plan of care  Description: Interventions:  - What would you like us to know as we care for you?  From home alone  - Provide timely, complete, and accurate information to patient/family  - Incorporate patient and family knowledge, values, beliefs, and cultural backgrounds into the planning and delivery of care  - Encourage patient/family to participate in care and decision-making at the level they choose  - Honor patient and family perspectives and choices  Outcome: Progressing     Problem: Diabetes/Glucose Control  Goal: Glucose maintained within prescribed range  Description: INTERVENTIONS:  - Monitor Blood Glucose as ordered  - Assess for signs and symptoms of hyperglycemia and hypoglycemia  - Administer ordered medications to maintain glucose within target range  - Assess barriers to adequate nutritional intake and initiate nutrition consult as needed  - Instruct patient on self management of diabetes  Outcome: Progressing     Problem: Patient/Family Goals  Goal: Patient/Family Long Term Goal  Description: Patient's Long Term Goal: discharge from hospital    Interventions:  - Monitor vitals  - Monitor appropriate labs  - Administer medications as ordered  - Follow MD's orders  - Update patient on plan of care   - Discharge planning   - See additional Care Plan goals for specific interventions  Outcome: Progressing  Goal: Patient/Family Short Term Goal  Description: Patient's Short Term Goal: treat COPD exacerbation    Interventions:   - Pulmonology consult  - IV steroids  - Scheduled nebulizer treatments  - See additional Care Plan goals for specific interventions  Outcome: Progressing     Problem: RESPIRATORY - ADULT  Goal: Achieves optimal ventilation and oxygenation  Description: INTERVENTIONS:  - Assess for changes in respiratory status  - Assess for changes in mentation and behavior  - Position to facilitate oxygenation and minimize respiratory effort  - Oxygen supplementation based on oxygen saturation or ABGs  - Provide Smoking Cessation handout, if applicable  - Encourage broncho-pulmonary hygiene including cough, deep breathe, Incentive Spirometry  - Assess the need for suctioning and perform as needed  - Assess and instruct to report SOB or any respiratory difficulty  - Respiratory Therapy support as indicated  - Manage/alleviate anxiety  - Monitor for signs/symptoms of CO2 retention  Outcome: Progressing     Problem: METABOLIC/FLUID AND ELECTROLYTES - ADULT  Goal: Glucose maintained within prescribed range  Description: INTERVENTIONS:  - Monitor Blood Glucose as ordered  - Assess for signs and symptoms of hyperglycemia and hypoglycemia  - Administer ordered medications to maintain glucose within target range  - Assess barriers to adequate nutritional intake and initiate nutrition consult as needed  - Instruct patient on self management of diabetes  Outcome: Progressing     Problem: RISK FOR INFECTION - ADULT  Goal: Absence of fever/infection during anticipated neutropenic period  Description: INTERVENTIONS  - Monitor WBC  - Administer growth factors as ordered  - Implement neutropenic guidelines  Outcome: Progressing     Problem: SAFETY ADULT - FALL  Goal: Free from fall injury  Description: INTERVENTIONS:  - Assess pt frequently for physical needs  - Identify cognitive and physical deficits and behaviors that affect risk of falls. - [place] fall precautions as indicated by assessment.  - Educate pt/family on patient safety including physical limitations  - Instruct pt to call for assistance with activity based on assessment  - Modify environment to reduce risk of injury  - Provide assistive devices as appropriate  - Consider OT/PT consult to assist with strengthening/mobility  - Encourage toileting schedule  Outcome: Progressing    Vital signs stable. Resp panel RSV+. Remains on 1L. Denies pain. Safety precautions in place, call light within reach

## 2022-07-28 NOTE — ED QUICK NOTES
Orders for admission, patient is aware of plan and ready to go upstairs. Any questions, please call ED RN Joaquin Fairchild at extension [phone]. Patient Covid vaccination status: Fully vaccinated     COVID Test Ordered in ED: Rapid SARS-CoV-2 by PCR    COVID Suspicion at Admission: Low clinical suspicion for COVID    Running Infusions:  None    Mental Status/LOC at time of transport:  Aox4    Other pertinent information: On 2L NC.  CIWA score: N/A   NIH score:  N/A

## 2022-07-28 NOTE — PLAN OF CARE
Problem: Patient Centered Care  Goal: Patient preferences are identified and integrated in the patient's plan of care  Description: Interventions:  - What would you like us to know as we care for you?  From home alone  - Provide timely, complete, and accurate information to patient/family  - Incorporate patient and family knowledge, values, beliefs, and cultural backgrounds into the planning and delivery of care  - Encourage patient/family to participate in care and decision-making at the level they choose  - Honor patient and family perspectives and choices  Outcome: Progressing     Problem: Diabetes/Glucose Control  Goal: Glucose maintained within prescribed range  Description: INTERVENTIONS:  - Monitor Blood Glucose as ordered  - Assess for signs and symptoms of hyperglycemia and hypoglycemia  - Administer ordered medications to maintain glucose within target range  - Assess barriers to adequate nutritional intake and initiate nutrition consult as needed  - Instruct patient on self management of diabetes  Outcome: Progressing     Problem: Patient/Family Goals  Goal: Patient/Family Long Term Goal  Description: Patient's Long Term Goal: discharge from hospital    Interventions:  - Monitor vitals  - Monitor appropriate labs  - Administer medications as ordered  - Follow MD's orders  - Update patient on plan of care   - Discharge planning   - See additional Care Plan goals for specific interventions  Outcome: Progressing  Goal: Patient/Family Short Term Goal  Description: Patient's Short Term Goal: treat COPD exacerbation    Interventions:   - Pulmonology consult  - IV steroids  - Scheduled nebulizer treatments  - See additional Care Plan goals for specific interventions  Outcome: Progressing     Problem: RESPIRATORY - ADULT  Goal: Achieves optimal ventilation and oxygenation  Description: INTERVENTIONS:  - Assess for changes in respiratory status  - Assess for changes in mentation and behavior  - Position to facilitate oxygenation and minimize respiratory effort  - Oxygen supplementation based on oxygen saturation or ABGs  - Provide Smoking Cessation handout, if applicable  - Encourage broncho-pulmonary hygiene including cough, deep breathe, Incentive Spirometry  - Assess the need for suctioning and perform as needed  - Assess and instruct to report SOB or any respiratory difficulty  - Respiratory Therapy support as indicated  - Manage/alleviate anxiety  - Monitor for signs/symptoms of CO2 retention  Outcome: Progressing     Problem: METABOLIC/FLUID AND ELECTROLYTES - ADULT  Goal: Glucose maintained within prescribed range  Description: INTERVENTIONS:  - Monitor Blood Glucose as ordered  - Assess for signs and symptoms of hyperglycemia and hypoglycemia  - Administer ordered medications to maintain glucose within target range  - Assess barriers to adequate nutritional intake and initiate nutrition consult as needed  - Instruct patient on self management of diabetes  Outcome: Progressing     Problem: RISK FOR INFECTION - ADULT  Goal: Absence of fever/infection during anticipated neutropenic period  Description: INTERVENTIONS  - Monitor WBC  - Administer growth factors as ordered  - Implement neutropenic guidelines  Outcome: Progressing     Problem: SAFETY ADULT - FALL  Goal: Free from fall injury  Description: INTERVENTIONS:  - Assess pt frequently for physical needs  - Identify cognitive and physical deficits and behaviors that affect risk of falls. - [place] fall precautions as indicated by assessment.  - Educate pt/family on patient safety including physical limitations  - Instruct pt to call for assistance with activity based on assessment  - Modify environment to reduce risk of injury  - Provide assistive devices as appropriate  - Consider OT/PT consult to assist with strengthening/mobility  - Encourage toileting schedule  Outcome: Progressing  Patient admitted overnight, a and o x 4.  Oriented to unit and updated on plan of care. Wearing 1L nasal cannula. Received nebulizer treatment and IV Solu-Medrol. Denies pain. Call light within reach. Safety precautions in place.

## 2022-07-28 NOTE — CM/SW NOTE
MDO for HH eval.    Pt was admitted from home where she lives alone. Pt recently returned from an [address] when she started to have wheezing and SOB. Pt is A&O, currently on 1L NC 02 however, sat is 97%. Pt is independent with RW in the room. Pt is not home bound, will not meet criteria for HHC, consider outpt PT.    8/1 1145  Per RN in dc rounds pt continues with significant SOB and is on 02 at 2L NC but, refusing neb tx. CM met with pt to discuss dc planning. Per pt she lives at home alone, has a private duty cg 3x/week. Pt has a home 02 concentrator but, no 02 tanks,unknown DME provider. Per chart review pt was set up with home 02 with HME 1 year ago. CM notified liaison of above. Liaison will f/u on DME supply and liter flow at baseline. Pt was informed that an ambulance can be ordered on day of dc for 02 needs. If needed. 1430  Per HME liaison pt is at 2LPM and has an inogen at home in place of portable tanks. No new orders are needed. 8/2 1500  CM was notified by RN that PT rec is now ANGE. CM asked if pt is agreeable to rec as she is ambulatory 20ft with RW and on 2L 02 NC which is her baseline. RN will confirm if pt is agreeable to ANGE. 1545  CM met with pt and family at bedside to provide nfo re ANGE. Pt is agreeable to ANGE and is aware that ins Jamie [employer] will be needed. Pt has a hx at [facility] (formerly [facility] and this will be her preferred facility if available. Pt has had 2 covid vaccinces w/ Moderna. Pt/fam aware of quarantine r/t not fully vaxed. Ref for ANGE started. PASRR initiated, queued for review. Pt has major depression documented on H&P, no meds currently. RN notified    Tentative ref for [address] started,  f2f done for  [address] RN/PT    8/3 1000  MDO for dc. PASRR completed and added to ref, facility notified. Awaiting ins auth, requested it be processed today if possible. 1200  Ins auth approved.  Liaison aware and requested 2:30pm admit and neg rapid covid test today    [facility] 2:30pm  [facility] done  RN report [phone]    / to remain available for support and/or discharge planning.      Danny Rader, RN    Ext [phone]

## 2022-07-29 LAB
ANION GAP SERPL CALC-SCNC: 6 MMOL/L (ref 0–18)
BASOPHILS # BLD AUTO: 0.01 X10(3) UL (ref 0–0.2)
BASOPHILS NFR BLD AUTO: 0.1 %
BILIRUB UR QL: NEGATIVE
BUN BLD-MCNC: 43 MG/DL (ref 7–18)
BUN/CREAT SERPL: 32.6 (ref 10–20)
CALCIUM BLD-MCNC: 10.2 MG/DL (ref 8.5–10.1)
CHLORIDE SERPL-SCNC: 103 MMOL/L (ref 98–112)
CLARITY UR: CLEAR
CO2 SERPL-SCNC: 29 MMOL/L (ref 21–32)
COLOR UR: YELLOW
CREAT BLD-MCNC: 1.32 MG/DL
DEPRECATED RDW RBC AUTO: 52.1 FL (ref 35.1–46.3)
EOSINOPHIL # BLD AUTO: 0 X10(3) UL (ref 0–0.7)
EOSINOPHIL NFR BLD AUTO: 0 %
ERYTHROCYTE [DISTWIDTH] IN BLOOD BY AUTOMATED COUNT: 15 % (ref 11–15)
GLUCOSE BLD-MCNC: 173 MG/DL (ref 70–99)
GLUCOSE BLDC GLUCOMTR-MCNC: 103 MG/DL (ref 70–99)
GLUCOSE BLDC GLUCOMTR-MCNC: 125 MG/DL (ref 70–99)
GLUCOSE BLDC GLUCOMTR-MCNC: 132 MG/DL (ref 70–99)
GLUCOSE BLDC GLUCOMTR-MCNC: 210 MG/DL (ref 70–99)
GLUCOSE UR-MCNC: NEGATIVE MG/DL
HCT VFR BLD AUTO: 44.7 %
HGB BLD-MCNC: 13.4 G/DL
HGB UR QL STRIP.AUTO: NEGATIVE
IMM GRANULOCYTES # BLD AUTO: 0.06 X10(3) UL (ref 0–1)
IMM GRANULOCYTES NFR BLD: 0.7 %
KETONES UR-MCNC: NEGATIVE MG/DL
LEUKOCYTE ESTERASE UR QL STRIP.AUTO: NEGATIVE
LYMPHOCYTES # BLD AUTO: 0.59 X10(3) UL (ref 1–4)
LYMPHOCYTES NFR BLD AUTO: 6.4 %
MCH RBC QN AUTO: 28.3 PG (ref 26–34)
MCHC RBC AUTO-ENTMCNC: 30 G/DL (ref 31–37)
MCV RBC AUTO: 94.5 FL
MONOCYTES # BLD AUTO: 0.65 X10(3) UL (ref 0.1–1)
MONOCYTES NFR BLD AUTO: 7.1 %
NEUTROPHILS # BLD AUTO: 7.84 X10 (3) UL (ref 1.5–7.7)
NEUTROPHILS # BLD AUTO: 7.84 X10(3) UL (ref 1.5–7.7)
NEUTROPHILS NFR BLD AUTO: 85.7 %
NITRITE UR QL STRIP.AUTO: NEGATIVE
NT-PROBNP SERPL-MCNC: 147 PG/ML (ref ?–450)
OSMOLALITY SERPL CALC.SUM OF ELEC: 301 MOSM/KG (ref 275–295)
PH UR: 5.5 [PH] (ref 5–8)
PLATELET # BLD AUTO: 211 10(3)UL (ref 150–450)
POTASSIUM SERPL-SCNC: 5.2 MMOL/L (ref 3.5–5.1)
PROT UR-MCNC: NEGATIVE MG/DL
RBC # BLD AUTO: 4.73 X10(6)UL
SODIUM SERPL-SCNC: 138 MMOL/L (ref 136–145)
SP GR UR STRIP: 1.01 (ref 1–1.03)
UROBILINOGEN UR STRIP-ACNC: 0.2
WBC # BLD AUTO: 9.2 X10(3) UL (ref 4–11)

## 2022-07-29 PROCEDURE — 99233 SBSQ HOSP IP/OBS HIGH 50: CPT | Performed by: INTERNAL MEDICINE

## 2022-07-29 PROCEDURE — 99233 SBSQ HOSP IP/OBS HIGH 50: CPT | Performed by: HOSPITALIST

## 2022-07-29 RX ORDER — IPRATROPIUM BROMIDE AND ALBUTEROL SULFATE 2.5; .5 MG/3ML; MG/3ML
3 SOLUTION RESPIRATORY (INHALATION)
Status: DISCONTINUED | OUTPATIENT
Start: 2022-07-29 | End: 2022-07-29

## 2022-07-29 RX ORDER — CODEINE PHOSPHATE AND GUAIFENESIN 10; 100 MG/5ML; MG/5ML
5 SOLUTION ORAL EVERY 6 HOURS PRN
Status: DISCONTINUED | OUTPATIENT
Start: 2022-07-29 | End: 2022-08-03

## 2022-07-29 RX ORDER — GUAIFENESIN 600 MG
600 TABLET, EXTENDED RELEASE 12 HR ORAL 2 TIMES DAILY
Status: DISCONTINUED | OUTPATIENT
Start: 2022-07-29 | End: 2022-07-31

## 2022-07-29 RX ORDER — FEBUXOSTAT 40 MG/1
40 TABLET, FILM COATED ORAL DAILY
Status: DISCONTINUED | OUTPATIENT
Start: 2022-07-29 | End: 2022-08-03

## 2022-07-29 NOTE — RESPIRATORY THERAPY NOTE
Pt refused treatment twice, educated the patient regarding the tx and she still refused. She doesn't think the medication is benefiting her.     [facility]

## 2022-07-29 NOTE — PLAN OF CARE
Problem: Patient Centered Care  Goal: Patient preferences are identified and integrated in the patient's plan of care  Description: Interventions:  - What would you like us to know as we care for you?  From home alone  - Provide timely, complete, and accurate information to patient/family  - Incorporate patient and family knowledge, values, beliefs, and cultural backgrounds into the planning and delivery of care  - Encourage patient/family to participate in care and decision-making at the level they choose  - Honor patient and family perspectives and choices  Outcome: Progressing     Problem: Diabetes/Glucose Control  Goal: Glucose maintained within prescribed range  Description: INTERVENTIONS:  - Monitor Blood Glucose as ordered  - Assess for signs and symptoms of hyperglycemia and hypoglycemia  - Administer ordered medications to maintain glucose within target range  - Assess barriers to adequate nutritional intake and initiate nutrition consult as needed  - Instruct patient on self management of diabetes  Outcome: Progressing     Problem: Patient/Family Goals  Goal: Patient/Family Long Term Goal  Description: Patient's Long Term Goal: discharge from hospital    Interventions:  - Monitor vitals  - Monitor appropriate labs  - Administer medications as ordered  - Follow MD's orders  - Update patient on plan of care   - Discharge planning   - See additional Care Plan goals for specific interventions  Outcome: Progressing  Goal: Patient/Family Short Term Goal  Description: Patient's Short Term Goal: treat COPD exacerbation    Interventions:   - Pulmonology consult  - IV steroids  - Scheduled nebulizer treatments  - See additional Care Plan goals for specific interventions  Outcome: Progressing     Problem: RESPIRATORY - ADULT  Goal: Achieves optimal ventilation and oxygenation  Description: INTERVENTIONS:  - Assess for changes in respiratory status  - Assess for changes in mentation and behavior  - Position to facilitate oxygenation and minimize respiratory effort  - Oxygen supplementation based on oxygen saturation or ABGs  - Provide Smoking Cessation handout, if applicable  - Encourage broncho-pulmonary hygiene including cough, deep breathe, Incentive Spirometry  - Assess the need for suctioning and perform as needed  - Assess and instruct to report SOB or any respiratory difficulty  - Respiratory Therapy support as indicated  - Manage/alleviate anxiety  - Monitor for signs/symptoms of CO2 retention  Outcome: Progressing     Problem: METABOLIC/FLUID AND ELECTROLYTES - ADULT  Goal: Glucose maintained within prescribed range  Description: INTERVENTIONS:  - Monitor Blood Glucose as ordered  - Assess for signs and symptoms of hyperglycemia and hypoglycemia  - Administer ordered medications to maintain glucose within target range  - Assess barriers to adequate nutritional intake and initiate nutrition consult as needed  - Instruct patient on self management of diabetes  Outcome: Progressing     Problem: RISK FOR INFECTION - ADULT  Goal: Absence of fever/infection during anticipated neutropenic period  Description: INTERVENTIONS  - Monitor WBC  - Administer growth factors as ordered  - Implement neutropenic guidelines  Outcome: Progressing     Problem: SAFETY ADULT - FALL  Goal: Free from fall injury  Description: INTERVENTIONS:  - Assess pt frequently for physical needs  - Identify cognitive and physical deficits and behaviors that affect risk of falls. - [place] fall precautions as indicated by assessment.  - Educate pt/family on patient safety including physical limitations  - Instruct pt to call for assistance with activity based on assessment  - Modify environment to reduce risk of injury  - Provide assistive devices as appropriate  - Consider OT/PT consult to assist with strengthening/mobility  - Encourage toileting schedule  Outcome: Progressing     Patient alert, vitals stable, medications tolerated well.  No c/o pain. Patient assisted with needs to stay clean, dry, and comfortable. Bed alarm on. Call light within reach.

## 2022-07-30 ENCOUNTER — APPOINTMENT (OUTPATIENT)
Dept: GENERAL RADIOLOGY | Facility: HOSPITAL | Age: 77
End: 2022-07-30
Attending: HOSPITALIST
Payer: MEDICARE

## 2022-07-30 LAB
ANION GAP SERPL CALC-SCNC: 3 MMOL/L (ref 0–18)
BUN BLD-MCNC: 41 MG/DL (ref 7–18)
BUN/CREAT SERPL: 33.1 (ref 10–20)
CALCIUM BLD-MCNC: 10.3 MG/DL (ref 8.5–10.1)
CHLORIDE SERPL-SCNC: 101 MMOL/L (ref 98–112)
CO2 SERPL-SCNC: 33 MMOL/L (ref 21–32)
CREAT BLD-MCNC: 1.24 MG/DL
DEPRECATED RDW RBC AUTO: 52.9 FL (ref 35.1–46.3)
ERYTHROCYTE [DISTWIDTH] IN BLOOD BY AUTOMATED COUNT: 15 % (ref 11–15)
GLUCOSE BLD-MCNC: 150 MG/DL (ref 70–99)
GLUCOSE BLDC GLUCOMTR-MCNC: 109 MG/DL (ref 70–99)
GLUCOSE BLDC GLUCOMTR-MCNC: 124 MG/DL (ref 70–99)
GLUCOSE BLDC GLUCOMTR-MCNC: 139 MG/DL (ref 70–99)
GLUCOSE BLDC GLUCOMTR-MCNC: 160 MG/DL (ref 70–99)
HCT VFR BLD AUTO: 44.7 %
HGB BLD-MCNC: 13.3 G/DL
MCH RBC QN AUTO: 28.6 PG (ref 26–34)
MCHC RBC AUTO-ENTMCNC: 29.8 G/DL (ref 31–37)
MCV RBC AUTO: 96.1 FL
OSMOLALITY SERPL CALC.SUM OF ELEC: 297 MOSM/KG (ref 275–295)
PLATELET # BLD AUTO: 227 10(3)UL (ref 150–450)
POTASSIUM SERPL-SCNC: 4.9 MMOL/L (ref 3.5–5.1)
RBC # BLD AUTO: 4.65 X10(6)UL
SODIUM SERPL-SCNC: 137 MMOL/L (ref 136–145)
WBC # BLD AUTO: 9 X10(3) UL (ref 4–11)

## 2022-07-30 PROCEDURE — 71045 X-RAY EXAM CHEST 1 VIEW: CPT | Performed by: HOSPITALIST

## 2022-07-30 PROCEDURE — 99232 SBSQ HOSP IP/OBS MODERATE 35: CPT | Performed by: INTERNAL MEDICINE

## 2022-07-30 PROCEDURE — 99233 SBSQ HOSP IP/OBS HIGH 50: CPT | Performed by: HOSPITALIST

## 2022-07-30 RX ORDER — SIMETHICONE 80 MG
80 TABLET,CHEWABLE ORAL 4 TIMES DAILY PRN
Status: DISCONTINUED | OUTPATIENT
Start: 2022-07-30 | End: 2022-08-03

## 2022-07-30 RX ORDER — SODIUM CHLORIDE 9 MG/ML
INJECTION, SOLUTION INTRAVENOUS CONTINUOUS
Status: DISCONTINUED | OUTPATIENT
Start: 2022-07-30 | End: 2022-07-31

## 2022-07-30 RX ORDER — HYDRALAZINE HYDROCHLORIDE 10 MG/1
5 TABLET, FILM COATED ORAL 2 TIMES DAILY
Status: DISCONTINUED | OUTPATIENT
Start: 2022-07-31 | End: 2022-08-02

## 2022-07-30 RX ORDER — PANTOPRAZOLE SODIUM 40 MG/1
40 TABLET, DELAYED RELEASE ORAL
Status: DISCONTINUED | OUTPATIENT
Start: 2022-07-31 | End: 2022-08-03

## 2022-07-30 NOTE — PLAN OF CARE
A/Ox4, vitals stable. Home med brought in by son, confirmed by pharmacy and in nurse . 2L of oxygen worn at all times. Call light within reach. Fall precautions in place. Non-skid socks on. Ibed and bed alarm on. Frequent rounding completed. Problem: Patient Centered Care  Goal: Patient preferences are identified and integrated in the patient's plan of care  Description: Interventions:  - What would you like us to know as we care for you?  From home alone  - Provide timely, complete, and accurate information to patient/family  - Incorporate patient and family knowledge, values, beliefs, and cultural backgrounds into the planning and delivery of care  - Encourage patient/family to participate in care and decision-making at the level they choose  - Honor patient and family perspectives and choices  Outcome: Progressing     Problem: Diabetes/Glucose Control  Goal: Glucose maintained within prescribed range  Description: INTERVENTIONS:  - Monitor Blood Glucose as ordered  - Assess for signs and symptoms of hyperglycemia and hypoglycemia  - Administer ordered medications to maintain glucose within target range  - Assess barriers to adequate nutritional intake and initiate nutrition consult as needed  - Instruct patient on self management of diabetes  Outcome: Progressing     Problem: Patient/Family Goals  Goal: Patient/Family Long Term Goal  Description: Patient's Long Term Goal: discharge from hospital    Interventions:  - Monitor vitals  - Monitor appropriate labs  - Administer medications as ordered  - Follow MD's orders  - Update patient on plan of care   - Discharge planning   - See additional Care Plan goals for specific interventions  Outcome: Progressing  Goal: Patient/Family Short Term Goal  Description: Patient's Short Term Goal: treat COPD exacerbation    Interventions:   - Pulmonology consult  - IV steroids  - Scheduled nebulizer treatments  - See additional Care Plan goals for specific interventions  Outcome: Progressing     Problem: RESPIRATORY - ADULT  Goal: Achieves optimal ventilation and oxygenation  Description: INTERVENTIONS:  - Assess for changes in respiratory status  - Assess for changes in mentation and behavior  - Position to facilitate oxygenation and minimize respiratory effort  - Oxygen supplementation based on oxygen saturation or ABGs  - Provide Smoking Cessation handout, if applicable  - Encourage broncho-pulmonary hygiene including cough, deep breathe, Incentive Spirometry  - Assess the need for suctioning and perform as needed  - Assess and instruct to report SOB or any respiratory difficulty  - Respiratory Therapy support as indicated  - Manage/alleviate anxiety  - Monitor for signs/symptoms of CO2 retention  Outcome: Progressing     Problem: METABOLIC/FLUID AND ELECTROLYTES - ADULT  Goal: Glucose maintained within prescribed range  Description: INTERVENTIONS:  - Monitor Blood Glucose as ordered  - Assess for signs and symptoms of hyperglycemia and hypoglycemia  - Administer ordered medications to maintain glucose within target range  - Assess barriers to adequate nutritional intake and initiate nutrition consult as needed  - Instruct patient on self management of diabetes  Outcome: Progressing     Problem: RISK FOR INFECTION - ADULT  Goal: Absence of fever/infection during anticipated neutropenic period  Description: INTERVENTIONS  - Monitor WBC  - Administer growth factors as ordered  - Implement neutropenic guidelines  Outcome: Progressing     Problem: SAFETY ADULT - FALL  Goal: Free from fall injury  Description: INTERVENTIONS:  - Assess pt frequently for physical needs  - Identify cognitive and physical deficits and behaviors that affect risk of falls.   - [place] fall precautions as indicated by assessment.  - Educate pt/family on patient safety including physical limitations  - Instruct pt to call for assistance with activity based on assessment  - Modify environment to reduce risk of injury  - Provide assistive devices as appropriate  - Consider OT/PT consult to assist with strengthening/mobility  - Encourage toileting schedule  Outcome: Progressing

## 2022-07-30 NOTE — PLAN OF CARE
Problem: Patient Centered Care  Goal: Patient preferences are identified and integrated in the patient's plan of care  Description: Interventions:  - What would you like us to know as we care for you?  From home alone  - Provide timely, complete, and accurate information to patient/family  - Incorporate patient and family knowledge, values, beliefs, and cultural backgrounds into the planning and delivery of care  - Encourage patient/family to participate in care and decision-making at the level they choose  - Honor patient and family perspectives and choices  Outcome: Progressing     Problem: Diabetes/Glucose Control  Goal: Glucose maintained within prescribed range  Description: INTERVENTIONS:  - Monitor Blood Glucose as ordered  - Assess for signs and symptoms of hyperglycemia and hypoglycemia  - Administer ordered medications to maintain glucose within target range  - Assess barriers to adequate nutritional intake and initiate nutrition consult as needed  - Instruct patient on self management of diabetes  Outcome: Progressing     Problem: Patient/Family Goals  Goal: Patient/Family Long Term Goal  Description: Patient's Long Term Goal: discharge from hospital    Interventions:  - Monitor vitals  - Monitor appropriate labs  - Administer medications as ordered  - Follow MD's orders  - Update patient on plan of care   - Discharge planning   - See additional Care Plan goals for specific interventions  Outcome: Progressing  Goal: Patient/Family Short Term Goal  Description: Patient's Short Term Goal: treat COPD exacerbation    Interventions:   - Pulmonology consult  - IV steroids  - Scheduled nebulizer treatments  - See additional Care Plan goals for specific interventions  Outcome: Progressing     Problem: RESPIRATORY - ADULT  Goal: Achieves optimal ventilation and oxygenation  Description: INTERVENTIONS:  - Assess for changes in respiratory status  - Assess for changes in mentation and behavior  - Position to facilitate oxygenation and minimize respiratory effort  - Oxygen supplementation based on oxygen saturation or ABGs  - Provide Smoking Cessation handout, if applicable  - Encourage broncho-pulmonary hygiene including cough, deep breathe, Incentive Spirometry  - Assess the need for suctioning and perform as needed  - Assess and instruct to report SOB or any respiratory difficulty  - Respiratory Therapy support as indicated  - Manage/alleviate anxiety  - Monitor for signs/symptoms of CO2 retention  Outcome: Progressing     Problem: METABOLIC/FLUID AND ELECTROLYTES - ADULT  Goal: Glucose maintained within prescribed range  Description: INTERVENTIONS:  - Monitor Blood Glucose as ordered  - Assess for signs and symptoms of hyperglycemia and hypoglycemia  - Administer ordered medications to maintain glucose within target range  - Assess barriers to adequate nutritional intake and initiate nutrition consult as needed  - Instruct patient on self management of diabetes  Outcome: Progressing     Problem: RISK FOR INFECTION - ADULT  Goal: Absence of fever/infection during anticipated neutropenic period  Description: INTERVENTIONS  - Monitor WBC  - Administer growth factors as ordered  - Implement neutropenic guidelines  Outcome: Progressing     Problem: SAFETY ADULT - FALL  Goal: Free from fall injury  Description: INTERVENTIONS:  - Assess pt frequently for physical needs  - Identify cognitive and physical deficits and behaviors that affect risk of falls. - [place] fall precautions as indicated by assessment.  - Educate pt/family on patient safety including physical limitations  - Instruct pt to call for assistance with activity based on assessment  - Modify environment to reduce risk of injury  - Provide assistive devices as appropriate  - Consider OT/PT consult to assist with strengthening/mobility  - Encourage toileting schedule  Outcome: Progressing  No acute changes overnight. VS and blood sugar stable.  Robitussin given for cough. Call light within reach. Safety measures in place. Frequent rounding by nursing staff.

## 2022-07-30 NOTE — PLAN OF CARE
No acute changes. Continues on IV steroids. Denies pain. Still requiring 2 L O2 at rest. Safety precautions in place. Problem: Patient Centered Care  Goal: Patient preferences are identified and integrated in the patient's plan of care  Description: Interventions:  - What would you like us to know as we care for you?  From home alone  - Provide timely, complete, and accurate information to patient/family  - Incorporate patient and family knowledge, values, beliefs, and cultural backgrounds into the planning and delivery of care  - Encourage patient/family to participate in care and decision-making at the level they choose  - Honor patient and family perspectives and choices  Outcome: Progressing     Problem: Diabetes/Glucose Control  Goal: Glucose maintained within prescribed range  Description: INTERVENTIONS:  - Monitor Blood Glucose as ordered  - Assess for signs and symptoms of hyperglycemia and hypoglycemia  - Administer ordered medications to maintain glucose within target range  - Assess barriers to adequate nutritional intake and initiate nutrition consult as needed  - Instruct patient on self management of diabetes  Outcome: Progressing     Problem: Patient/Family Goals  Goal: Patient/Family Long Term Goal  Description: Patient's Long Term Goal: discharge from hospital    Interventions:  - Monitor vitals  - Monitor appropriate labs  - Administer medications as ordered  - Follow MD's orders  - Update patient on plan of care   - Discharge planning   - See additional Care Plan goals for specific interventions  Outcome: Progressing  Goal: Patient/Family Short Term Goal  Description: Patient's Short Term Goal: treat COPD exacerbation    Interventions:   - Pulmonology consult  - IV steroids  - Scheduled nebulizer treatments  - See additional Care Plan goals for specific interventions  Outcome: Progressing     Problem: RESPIRATORY - ADULT  Goal: Achieves optimal ventilation and oxygenation  Description: INTERVENTIONS:  - Assess for changes in respiratory status  - Assess for changes in mentation and behavior  - Position to facilitate oxygenation and minimize respiratory effort  - Oxygen supplementation based on oxygen saturation or ABGs  - Provide Smoking Cessation handout, if applicable  - Encourage broncho-pulmonary hygiene including cough, deep breathe, Incentive Spirometry  - Assess the need for suctioning and perform as needed  - Assess and instruct to report SOB or any respiratory difficulty  - Respiratory Therapy support as indicated  - Manage/alleviate anxiety  - Monitor for signs/symptoms of CO2 retention  Outcome: Progressing     Problem: METABOLIC/FLUID AND ELECTROLYTES - ADULT  Goal: Glucose maintained within prescribed range  Description: INTERVENTIONS:  - Monitor Blood Glucose as ordered  - Assess for signs and symptoms of hyperglycemia and hypoglycemia  - Administer ordered medications to maintain glucose within target range  - Assess barriers to adequate nutritional intake and initiate nutrition consult as needed  - Instruct patient on self management of diabetes  Outcome: Progressing     Problem: RISK FOR INFECTION - ADULT  Goal: Absence of fever/infection during anticipated neutropenic period  Description: INTERVENTIONS  - Monitor WBC  - Administer growth factors as ordered  - Implement neutropenic guidelines  Outcome: Progressing     Problem: SAFETY ADULT - FALL  Goal: Free from fall injury  Description: INTERVENTIONS:  - Assess pt frequently for physical needs  - Identify cognitive and physical deficits and behaviors that affect risk of falls.   - [place] fall precautions as indicated by assessment.  - Educate pt/family on patient safety including physical limitations  - Instruct pt to call for assistance with activity based on assessment  - Modify environment to reduce risk of injury  - Provide assistive devices as appropriate  - Consider OT/PT consult to assist with strengthening/mobility  - Encourage toileting schedule  Outcome: Progressing

## 2022-07-31 LAB
ANION GAP SERPL CALC-SCNC: 3 MMOL/L (ref 0–18)
BUN BLD-MCNC: 41 MG/DL (ref 7–18)
BUN/CREAT SERPL: 36.3 (ref 10–20)
CALCIUM BLD-MCNC: 10 MG/DL (ref 8.5–10.1)
CHLORIDE SERPL-SCNC: 103 MMOL/L (ref 98–112)
CO2 SERPL-SCNC: 31 MMOL/L (ref 21–32)
CREAT BLD-MCNC: 1.13 MG/DL
DEPRECATED RDW RBC AUTO: 53.2 FL (ref 35.1–46.3)
ERYTHROCYTE [DISTWIDTH] IN BLOOD BY AUTOMATED COUNT: 14.6 % (ref 11–15)
GLUCOSE BLD-MCNC: 160 MG/DL (ref 70–99)
GLUCOSE BLDC GLUCOMTR-MCNC: 107 MG/DL (ref 70–99)
GLUCOSE BLDC GLUCOMTR-MCNC: 115 MG/DL (ref 70–99)
GLUCOSE BLDC GLUCOMTR-MCNC: 121 MG/DL (ref 70–99)
GLUCOSE BLDC GLUCOMTR-MCNC: 130 MG/DL (ref 70–99)
HCT VFR BLD AUTO: 47.4 %
HGB BLD-MCNC: 13.6 G/DL
MCH RBC QN AUTO: 28.2 PG (ref 26–34)
MCHC RBC AUTO-ENTMCNC: 28.7 G/DL (ref 31–37)
MCV RBC AUTO: 98.1 FL
OSMOLALITY SERPL CALC.SUM OF ELEC: 298 MOSM/KG (ref 275–295)
PLATELET # BLD AUTO: 214 10(3)UL (ref 150–450)
POTASSIUM SERPL-SCNC: 4.9 MMOL/L (ref 3.5–5.1)
RBC # BLD AUTO: 4.83 X10(6)UL
SODIUM SERPL-SCNC: 137 MMOL/L (ref 136–145)
WBC # BLD AUTO: 7.6 X10(3) UL (ref 4–11)

## 2022-07-31 PROCEDURE — 3008F BODY MASS INDEX DOCD: CPT | Performed by: HOSPITALIST

## 2022-07-31 PROCEDURE — 3077F SYST BP >= 140 MM HG: CPT | Performed by: HOSPITALIST

## 2022-07-31 PROCEDURE — 99233 SBSQ HOSP IP/OBS HIGH 50: CPT | Performed by: INTERNAL MEDICINE

## 2022-07-31 PROCEDURE — 99233 SBSQ HOSP IP/OBS HIGH 50: CPT | Performed by: HOSPITALIST

## 2022-07-31 PROCEDURE — 3078F DIAST BP <80 MM HG: CPT | Performed by: HOSPITALIST

## 2022-07-31 RX ORDER — MIRTAZAPINE 7.5 MG/1
7.5 TABLET, FILM COATED ORAL NIGHTLY
Status: DISCONTINUED | OUTPATIENT
Start: 2022-07-31 | End: 2022-08-01

## 2022-07-31 RX ORDER — FLUTICASONE FUROATE AND VILANTEROL 200; 25 UG/1; UG/1
1 POWDER RESPIRATORY (INHALATION) DAILY
Status: DISCONTINUED | OUTPATIENT
Start: 2022-07-31 | End: 2022-08-01

## 2022-07-31 RX ORDER — IPRATROPIUM BROMIDE AND ALBUTEROL SULFATE 2.5; .5 MG/3ML; MG/3ML
3 SOLUTION RESPIRATORY (INHALATION) EVERY 6 HOURS PRN
Status: DISCONTINUED | OUTPATIENT
Start: 2022-07-31 | End: 2022-08-03

## 2022-07-31 NOTE — PLAN OF CARE
Patient breathing easier. Up to chair almost all day. Still requiring 2L O2 at rest and with ambulation. Denies pain. Good appetite. Steroids changes to p.o. Anticipating possible discharge tomorrow morning. Safety precautions in place. Problem: Patient Centered Care  Goal: Patient preferences are identified and integrated in the patient's plan of care  Description: Interventions:  - What would you like us to know as we care for you?  From home alone  - Provide timely, complete, and accurate information to patient/family  - Incorporate patient and family knowledge, values, beliefs, and cultural backgrounds into the planning and delivery of care  - Encourage patient/family to participate in care and decision-making at the level they choose  - Honor patient and family perspectives and choices  Outcome: Progressing     Problem: Diabetes/Glucose Control  Goal: Glucose maintained within prescribed range  Description: INTERVENTIONS:  - Monitor Blood Glucose as ordered  - Assess for signs and symptoms of hyperglycemia and hypoglycemia  - Administer ordered medications to maintain glucose within target range  - Assess barriers to adequate nutritional intake and initiate nutrition consult as needed  - Instruct patient on self management of diabetes  Outcome: Progressing     Problem: Patient/Family Goals  Goal: Patient/Family Long Term Goal  Description: Patient's Long Term Goal: discharge from hospital    Interventions:  - Monitor vitals  - Monitor appropriate labs  - Administer medications as ordered  - Follow MD's orders  - Update patient on plan of care   - Discharge planning   - See additional Care Plan goals for specific interventions  Outcome: Progressing  Goal: Patient/Family Short Term Goal  Description: Patient's Short Term Goal: treat COPD exacerbation    Interventions:   - Pulmonology consult  - IV steroids  - Scheduled nebulizer treatments  - See additional Care Plan goals for specific interventions  Outcome: Progressing     Problem: RESPIRATORY - ADULT  Goal: Achieves optimal ventilation and oxygenation  Description: INTERVENTIONS:  - Assess for changes in respiratory status  - Assess for changes in mentation and behavior  - Position to facilitate oxygenation and minimize respiratory effort  - Oxygen supplementation based on oxygen saturation or ABGs  - Provide Smoking Cessation handout, if applicable  - Encourage broncho-pulmonary hygiene including cough, deep breathe, Incentive Spirometry  - Assess the need for suctioning and perform as needed  - Assess and instruct to report SOB or any respiratory difficulty  - Respiratory Therapy support as indicated  - Manage/alleviate anxiety  - Monitor for signs/symptoms of CO2 retention  Outcome: Progressing     Problem: METABOLIC/FLUID AND ELECTROLYTES - ADULT  Goal: Glucose maintained within prescribed range  Description: INTERVENTIONS:  - Monitor Blood Glucose as ordered  - Assess for signs and symptoms of hyperglycemia and hypoglycemia  - Administer ordered medications to maintain glucose within target range  - Assess barriers to adequate nutritional intake and initiate nutrition consult as needed  - Instruct patient on self management of diabetes  Outcome: Progressing     Problem: RISK FOR INFECTION - ADULT  Goal: Absence of fever/infection during anticipated neutropenic period  Description: INTERVENTIONS  - Monitor WBC  - Administer growth factors as ordered  - Implement neutropenic guidelines  Outcome: Progressing     Problem: SAFETY ADULT - FALL  Goal: Free from fall injury  Description: INTERVENTIONS:  - Assess pt frequently for physical needs  - Identify cognitive and physical deficits and behaviors that affect risk of falls.   - [place] fall precautions as indicated by assessment.  - Educate pt/family on patient safety including physical limitations  - Instruct pt to call for assistance with activity based on assessment  - Modify environment to reduce risk of injury  - Provide assistive devices as appropriate  - Consider OT/PT consult to assist with strengthening/mobility  - Encourage toileting schedule  Outcome: Progressing

## 2022-07-31 NOTE — PROGRESS NOTES
07/31/22 0900   Mobility   O2 walk?  Yes   SPO2% on Room Air at Rest 80   SPO2% on Oxygen at Rest 96   At rest oxygen flow (liters per minute) 2   SPO2% Ambulation on Oxygen 89   Ambulation oxygen flow (liters per minute) 2

## 2022-07-31 NOTE — PLAN OF CARE
Patient c/o some gas and pressure to stomach, prn simethicone ordered for gas; changed medication as per home dosage Pantoprazole and hydralazine. Safety precautions in place. Problem: Patient Centered Care  Goal: Patient preferences are identified and integrated in the patient's plan of care  Description: Interventions:  - What would you like us to know as we care for you?  From home alone  - Provide timely, complete, and accurate information to patient/family  - Incorporate patient and family knowledge, values, beliefs, and cultural backgrounds into the planning and delivery of care  - Encourage patient/family to participate in care and decision-making at the level they choose  - Honor patient and family perspectives and choices  Outcome: Progressing     Problem: Diabetes/Glucose Control  Goal: Glucose maintained within prescribed range  Description: INTERVENTIONS:  - Monitor Blood Glucose as ordered  - Assess for signs and symptoms of hyperglycemia and hypoglycemia  - Administer ordered medications to maintain glucose within target range  - Assess barriers to adequate nutritional intake and initiate nutrition consult as needed  - Instruct patient on self management of diabetes  Outcome: Progressing     Problem: Patient/Family Goals  Goal: Patient/Family Long Term Goal  Description: Patient's Long Term Goal: discharge from hospital    Interventions:  - Monitor vitals  - Monitor appropriate labs  - Administer medications as ordered  - Follow MD's orders  - Update patient on plan of care   - Discharge planning   - See additional Care Plan goals for specific interventions  Outcome: Progressing  Goal: Patient/Family Short Term Goal  Description: Patient's Short Term Goal: treat COPD exacerbation    Interventions:   - Pulmonology consult  - IV steroids  - Scheduled nebulizer treatments  - See additional Care Plan goals for specific interventions  Outcome: Progressing     Problem: RESPIRATORY - ADULT  Goal: Achieves optimal ventilation and oxygenation  Description: INTERVENTIONS:  - Assess for changes in respiratory status  - Assess for changes in mentation and behavior  - Position to facilitate oxygenation and minimize respiratory effort  - Oxygen supplementation based on oxygen saturation or ABGs  - Provide Smoking Cessation handout, if applicable  - Encourage broncho-pulmonary hygiene including cough, deep breathe, Incentive Spirometry  - Assess the need for suctioning and perform as needed  - Assess and instruct to report SOB or any respiratory difficulty  - Respiratory Therapy support as indicated  - Manage/alleviate anxiety  - Monitor for signs/symptoms of CO2 retention  Outcome: Progressing     Problem: METABOLIC/FLUID AND ELECTROLYTES - ADULT  Goal: Glucose maintained within prescribed range  Description: INTERVENTIONS:  - Monitor Blood Glucose as ordered  - Assess for signs and symptoms of hyperglycemia and hypoglycemia  - Administer ordered medications to maintain glucose within target range  - Assess barriers to adequate nutritional intake and initiate nutrition consult as needed  - Instruct patient on self management of diabetes  Outcome: Progressing     Problem: RISK FOR INFECTION - ADULT  Goal: Absence of fever/infection during anticipated neutropenic period  Description: INTERVENTIONS  - Monitor WBC  - Administer growth factors as ordered  - Implement neutropenic guidelines  Outcome: Progressing     Problem: SAFETY ADULT - FALL  Goal: Free from fall injury  Description: INTERVENTIONS:  - Assess pt frequently for physical needs  - Identify cognitive and physical deficits and behaviors that affect risk of falls.   - [place] fall precautions as indicated by assessment.  - Educate pt/family on patient safety including physical limitations  - Instruct pt to call for assistance with activity based on assessment  - Modify environment to reduce risk of injury  - Provide assistive devices as appropriate  - Consider OT/PT consult to assist with strengthening/mobility  - Encourage toileting schedule  Outcome: Progressing

## 2022-08-01 ENCOUNTER — APPOINTMENT (OUTPATIENT)
Dept: CT IMAGING | Facility: HOSPITAL | Age: 77
End: 2022-08-01
Attending: HOSPITALIST
Payer: MEDICARE

## 2022-08-01 LAB
ANION GAP SERPL CALC-SCNC: 4 MMOL/L (ref 0–18)
BUN BLD-MCNC: 38 MG/DL (ref 7–18)
BUN/CREAT SERPL: 31.1 (ref 10–20)
CALCIUM BLD-MCNC: 10.6 MG/DL (ref 8.5–10.1)
CHLORIDE SERPL-SCNC: 99 MMOL/L (ref 98–112)
CO2 SERPL-SCNC: 34 MMOL/L (ref 21–32)
CREAT BLD-MCNC: 1.22 MG/DL
GLUCOSE BLD-MCNC: 203 MG/DL (ref 70–99)
GLUCOSE BLDC GLUCOMTR-MCNC: 102 MG/DL (ref 70–99)
GLUCOSE BLDC GLUCOMTR-MCNC: 113 MG/DL (ref 70–99)
GLUCOSE BLDC GLUCOMTR-MCNC: 128 MG/DL (ref 70–99)
GLUCOSE BLDC GLUCOMTR-MCNC: 72 MG/DL (ref 70–99)
OSMOLALITY SERPL CALC.SUM OF ELEC: 299 MOSM/KG (ref 275–295)
POTASSIUM SERPL-SCNC: 4.9 MMOL/L (ref 3.5–5.1)
SODIUM SERPL-SCNC: 137 MMOL/L (ref 136–145)

## 2022-08-01 PROCEDURE — 70450 CT HEAD/BRAIN W/O DYE: CPT | Performed by: HOSPITALIST

## 2022-08-01 PROCEDURE — 99232 SBSQ HOSP IP/OBS MODERATE 35: CPT | Performed by: HOSPITALIST

## 2022-08-01 PROCEDURE — 99232 SBSQ HOSP IP/OBS MODERATE 35: CPT | Performed by: INTERNAL MEDICINE

## 2022-08-01 RX ORDER — FLUTICASONE FUROATE AND VILANTEROL 100; 25 UG/1; UG/1
1 POWDER RESPIRATORY (INHALATION) DAILY
Status: DISCONTINUED | OUTPATIENT
Start: 2022-08-01 | End: 2022-08-03

## 2022-08-01 RX ORDER — GUAIFENESIN 600 MG
600 TABLET, EXTENDED RELEASE 12 HR ORAL 2 TIMES DAILY PRN
Status: DISCONTINUED | OUTPATIENT
Start: 2022-08-01 | End: 2022-08-02

## 2022-08-01 RX ORDER — HYDRALAZINE HYDROCHLORIDE 20 MG/ML
10 INJECTION INTRAMUSCULAR; INTRAVENOUS EVERY 4 HOURS PRN
Status: DISCONTINUED | OUTPATIENT
Start: 2022-08-01 | End: 2022-08-03

## 2022-08-01 NOTE — PLAN OF CARE
Patient noted to have intermittent confusion with grogginess. MD aware, CT head completed. Patient and family updated with plan. No other acute changes. Patient resting comfortably, call light within reach. Problem: Patient Centered Care  Goal: Patient preferences are identified and integrated in the patient's plan of care  Description: Interventions:  - What would you like us to know as we care for you?  From home alone  - Provide timely, complete, and accurate information to patient/family  - Incorporate patient and family knowledge, values, beliefs, and cultural backgrounds into the planning and delivery of care  - Encourage patient/family to participate in care and decision-making at the level they choose  - Honor patient and family perspectives and choices  Outcome: Progressing     Problem: Diabetes/Glucose Control  Goal: Glucose maintained within prescribed range  Description: INTERVENTIONS:  - Monitor Blood Glucose as ordered  - Assess for signs and symptoms of hyperglycemia and hypoglycemia  - Administer ordered medications to maintain glucose within target range  - Assess barriers to adequate nutritional intake and initiate nutrition consult as needed  - Instruct patient on self management of diabetes  Outcome: Progressing     Problem: Patient/Family Goals  Goal: Patient/Family Long Term Goal  Description: Patient's Long Term Goal: discharge from hospital    Interventions:  - Monitor vitals  - Monitor appropriate labs  - Administer medications as ordered  - Follow MD's orders  - Update patient on plan of care   - Discharge planning   - See additional Care Plan goals for specific interventions  Outcome: Progressing  Goal: Patient/Family Short Term Goal  Description: Patient's Short Term Goal: treat COPD exacerbation    Interventions:   - Pulmonology consult  - IV steroids  - Scheduled nebulizer treatments  - See additional Care Plan goals for specific interventions  Outcome: Progressing     Problem: RESPIRATORY - ADULT  Goal: Achieves optimal ventilation and oxygenation  Description: INTERVENTIONS:  - Assess for changes in respiratory status  - Assess for changes in mentation and behavior  - Position to facilitate oxygenation and minimize respiratory effort  - Oxygen supplementation based on oxygen saturation or ABGs  - Provide Smoking Cessation handout, if applicable  - Encourage broncho-pulmonary hygiene including cough, deep breathe, Incentive Spirometry  - Assess the need for suctioning and perform as needed  - Assess and instruct to report SOB or any respiratory difficulty  - Respiratory Therapy support as indicated  - Manage/alleviate anxiety  - Monitor for signs/symptoms of CO2 retention  Outcome: Progressing     Problem: METABOLIC/FLUID AND ELECTROLYTES - ADULT  Goal: Glucose maintained within prescribed range  Description: INTERVENTIONS:  - Monitor Blood Glucose as ordered  - Assess for signs and symptoms of hyperglycemia and hypoglycemia  - Administer ordered medications to maintain glucose within target range  - Assess barriers to adequate nutritional intake and initiate nutrition consult as needed  - Instruct patient on self management of diabetes  Outcome: Progressing     Problem: RISK FOR INFECTION - ADULT  Goal: Absence of fever/infection during anticipated neutropenic period  Description: INTERVENTIONS  - Monitor WBC  - Administer growth factors as ordered  - Implement neutropenic guidelines  Outcome: Progressing     Problem: SAFETY ADULT - FALL  Goal: Free from fall injury  Description: INTERVENTIONS:  - Assess pt frequently for physical needs  - Identify cognitive and physical deficits and behaviors that affect risk of falls.   - [place] fall precautions as indicated by assessment.  - Educate pt/family on patient safety including physical limitations  - Instruct pt to call for assistance with activity based on assessment  - Modify environment to reduce risk of injury  - Provide assistive devices as appropriate  - Consider OT/PT consult to assist with strengthening/mobility  - Encourage toileting schedule  Outcome: Progressing

## 2022-08-01 NOTE — PLAN OF CARE
Patient given robitussin AC with good effect. Started on Remeron this shift for c/o sleeplessness and hallucinations yesterday with IV steroids, patient noted with grogginess and some confusion, words slurring. Some anxiety noted about side effects, patient states she does not want to take Remeron. Remeron d/c'd as per Dr. Madi Richardson. Safety precautions in place. Problem: Patient Centered Care  Goal: Patient preferences are identified and integrated in the patient's plan of care  Description: Interventions:  - What would you like us to know as we care for you?  From home alone  - Provide timely, complete, and accurate information to patient/family  - Incorporate patient and family knowledge, values, beliefs, and cultural backgrounds into the planning and delivery of care  - Encourage patient/family to participate in care and decision-making at the level they choose  - Honor patient and family perspectives and choices  Outcome: Progressing     Problem: Diabetes/Glucose Control  Goal: Glucose maintained within prescribed range  Description: INTERVENTIONS:  - Monitor Blood Glucose as ordered  - Assess for signs and symptoms of hyperglycemia and hypoglycemia  - Administer ordered medications to maintain glucose within target range  - Assess barriers to adequate nutritional intake and initiate nutrition consult as needed  - Instruct patient on self management of diabetes  Outcome: Progressing     Problem: Patient/Family Goals  Goal: Patient/Family Long Term Goal  Description: Patient's Long Term Goal: discharge from hospital    Interventions:  - Monitor vitals  - Monitor appropriate labs  - Administer medications as ordered  - Follow MD's orders  - Update patient on plan of care   - Discharge planning   - See additional Care Plan goals for specific interventions  Outcome: Progressing  Goal: Patient/Family Short Term Goal  Description: Patient's Short Term Goal: treat COPD exacerbation    Interventions:   - Pulmonology consult  - IV steroids  - Scheduled nebulizer treatments  - See additional Care Plan goals for specific interventions  Outcome: Progressing     Problem: RESPIRATORY - ADULT  Goal: Achieves optimal ventilation and oxygenation  Description: INTERVENTIONS:  - Assess for changes in respiratory status  - Assess for changes in mentation and behavior  - Position to facilitate oxygenation and minimize respiratory effort  - Oxygen supplementation based on oxygen saturation or ABGs  - Provide Smoking Cessation handout, if applicable  - Encourage broncho-pulmonary hygiene including cough, deep breathe, Incentive Spirometry  - Assess the need for suctioning and perform as needed  - Assess and instruct to report SOB or any respiratory difficulty  - Respiratory Therapy support as indicated  - Manage/alleviate anxiety  - Monitor for signs/symptoms of CO2 retention  Outcome: Progressing     Problem: METABOLIC/FLUID AND ELECTROLYTES - ADULT  Goal: Glucose maintained within prescribed range  Description: INTERVENTIONS:  - Monitor Blood Glucose as ordered  - Assess for signs and symptoms of hyperglycemia and hypoglycemia  - Administer ordered medications to maintain glucose within target range  - Assess barriers to adequate nutritional intake and initiate nutrition consult as needed  - Instruct patient on self management of diabetes  Outcome: Progressing     Problem: RISK FOR INFECTION - ADULT  Goal: Absence of fever/infection during anticipated neutropenic period  Description: INTERVENTIONS  - Monitor WBC  - Administer growth factors as ordered  - Implement neutropenic guidelines  Outcome: Progressing     Problem: SAFETY ADULT - FALL  Goal: Free from fall injury  Description: INTERVENTIONS:  - Assess pt frequently for physical needs  - Identify cognitive and physical deficits and behaviors that affect risk of falls.   - [place] fall precautions as indicated by assessment.  - Educate pt/family on patient safety including physical limitations  - Instruct pt to call for assistance with activity based on assessment  - Modify environment to reduce risk of injury  - Provide assistive devices as appropriate  - Consider OT/PT consult to assist with strengthening/mobility  - Encourage toileting schedule  Outcome: Progressing

## 2022-08-02 ENCOUNTER — APPOINTMENT (OUTPATIENT)
Dept: GENERAL RADIOLOGY | Facility: HOSPITAL | Age: 77
End: 2022-08-02
Attending: INTERNAL MEDICINE
Payer: MEDICARE

## 2022-08-02 LAB
ANION GAP SERPL CALC-SCNC: 1 MMOL/L (ref 0–18)
BILIRUB UR QL: NEGATIVE
BUN BLD-MCNC: 38 MG/DL (ref 7–18)
BUN/CREAT SERPL: 36.9 (ref 10–20)
CALCIUM BLD-MCNC: 10.4 MG/DL (ref 8.5–10.1)
CHLORIDE SERPL-SCNC: 99 MMOL/L (ref 98–112)
CLARITY UR: CLEAR
CO2 SERPL-SCNC: 32 MMOL/L (ref 21–32)
COLOR UR: YELLOW
CREAT BLD-MCNC: 1.03 MG/DL
D DIMER PPP FEU-MCNC: 0.52 UG/ML FEU (ref ?–0.76)
DEPRECATED RDW RBC AUTO: 49.5 FL (ref 35.1–46.3)
ERYTHROCYTE [DISTWIDTH] IN BLOOD BY AUTOMATED COUNT: 14.6 % (ref 11–15)
GLUCOSE BLD-MCNC: 98 MG/DL (ref 70–99)
GLUCOSE BLDC GLUCOMTR-MCNC: 104 MG/DL (ref 70–99)
GLUCOSE BLDC GLUCOMTR-MCNC: 112 MG/DL (ref 70–99)
GLUCOSE BLDC GLUCOMTR-MCNC: 176 MG/DL (ref 70–99)
GLUCOSE BLDC GLUCOMTR-MCNC: 193 MG/DL (ref 70–99)
GLUCOSE UR-MCNC: NEGATIVE MG/DL
HCT VFR BLD AUTO: 45 %
HGB BLD-MCNC: 13.8 G/DL
HGB UR QL STRIP.AUTO: NEGATIVE
KETONES UR-MCNC: NEGATIVE MG/DL
MCH RBC QN AUTO: 28.3 PG (ref 26–34)
MCHC RBC AUTO-ENTMCNC: 30.7 G/DL (ref 31–37)
MCV RBC AUTO: 92.2 FL
NITRITE UR QL STRIP.AUTO: NEGATIVE
OSMOLALITY SERPL CALC.SUM OF ELEC: 283 MOSM/KG (ref 275–295)
PH UR: 6 [PH] (ref 5–8)
PLATELET # BLD AUTO: 243 10(3)UL (ref 150–450)
POTASSIUM SERPL-SCNC: 4.9 MMOL/L (ref 3.5–5.1)
PROCALCITONIN SERPL-MCNC: 0.04 NG/ML (ref ?–0.16)
PROT UR-MCNC: NEGATIVE MG/DL
RBC # BLD AUTO: 4.88 X10(6)UL
SODIUM SERPL-SCNC: 132 MMOL/L (ref 136–145)
SP GR UR STRIP: 1.01 (ref 1–1.03)
UROBILINOGEN UR STRIP-ACNC: 0.2
WBC # BLD AUTO: 9.1 X10(3) UL (ref 4–11)

## 2022-08-02 PROCEDURE — 71046 X-RAY EXAM CHEST 2 VIEWS: CPT | Performed by: INTERNAL MEDICINE

## 2022-08-02 PROCEDURE — 99233 SBSQ HOSP IP/OBS HIGH 50: CPT | Performed by: HOSPITALIST

## 2022-08-02 PROCEDURE — 99233 SBSQ HOSP IP/OBS HIGH 50: CPT | Performed by: PHYSICIAN ASSISTANT

## 2022-08-02 RX ORDER — IPRATROPIUM BROMIDE AND ALBUTEROL SULFATE 2.5; .5 MG/3ML; MG/3ML
3 SOLUTION RESPIRATORY (INHALATION)
Status: DISCONTINUED | OUTPATIENT
Start: 2022-08-02 | End: 2022-08-03

## 2022-08-02 RX ORDER — HYDRALAZINE HYDROCHLORIDE 10 MG/1
10 TABLET, FILM COATED ORAL 2 TIMES DAILY
Status: DISCONTINUED | OUTPATIENT
Start: 2022-08-02 | End: 2022-08-03

## 2022-08-02 RX ORDER — GUAIFENESIN 600 MG
600 TABLET, EXTENDED RELEASE 12 HR ORAL 2 TIMES DAILY PRN
Status: DISCONTINUED | OUTPATIENT
Start: 2022-08-02 | End: 2022-08-03

## 2022-08-02 NOTE — PHYSICAL THERAPY NOTE
PHYSICAL THERAPY TREATMENT NOTE - INPATIENT     Room Number: 555/555-A       Presenting Problem: worsening MARTINEZ, SOB. Went to  and started oral steroid but did not improve so came back to ED. Needing 2-3L O2 at this time, normally only wears it at night    Problem List  Principal Problem:    COPD exacerbation ([address]  Active Problems:    Failure of outpatient treatment      PHYSICAL THERAPY ASSESSMENT   Chart reviewed. RN approved participation in physical therapy. Pt's sister in room with mask on, encouraged patent to improve her mobility. PPE worn by therapist: mask and gloves. Patient was not wearing a mask during session. Patient presented in bed with 0/10 pain. Patient with fair  progress towards goals during this session. Education provided on Physical therapy plan of care, physiological benefits of out of bed mobility and proepr breathing with activity and energy conservation. Patient with good carryover. Patient sleepy and c/o tired today. She is coughing during session, unable to wear mask d/t inc SON on 2 L O2. Patient amb in her room with small and slow jaylen, stops d/t fatigue and SOB. Bed mobility: Min assist  Transfers: Min assist  Gait Assistance: Minimum assistance  Distance (ft): 20  Assistive Device: Rolling walker  Pattern: Shuffle (unsteady gait)     Instructed on therapeutic exer both legs in sitting with proper breathing. Patient was left in bedside chair, alarm activated and sister present at end of session with all needs in reach. The patient's Approx Degree of Impairment: 50.57% has been calculated based on documentation in the [facility] '6 clicks' Inpatient Basic Mobility Short Form. Research supports that patients with this level of impairment may benefit from Subacute Rehab. Patient below her functional mobility and dec all mobility tolerance at present. She leaves [facility] and re 24 care and supervision at present. RN aware of patient status post session.     DISCHARGE RECOMMENDATIONS  PT Discharge Recommendations: 24 hour care/supervision;Sub-acute rehabilitation (if improve her functional mobility may HH PT )     PLAN  PT Treatment Plan: Endurance;Gait training;Transfer training;Balance training;Strengthening;Patient education    SUBJECTIVE  I am tired. OBJECTIVE  Precautions: Bed/chair alarm;Cardiac (O2)    WEIGHT BEARING RESTRICTION  Weight Bearing Restriction: None                PAIN ASSESSMENT   Ratin  Location: overall soreness  Management Techniques: Activity promotion    BALANCE                                                                                                                       Static Sitting: Good  Dynamic Sitting: Fair           Static Standing: Fair -  Dynamic Standing: Poor +    ACTIVITY TOLERANCE  Pulse: 92  Heart Rate Source: Monitor  Resp: 20  BP: 146/52  BP Location: Right arm  BP Method: Automatic  Patient Position: Sitting    O2 WALK  Oxygen Therapy  SPO2% on Oxygen at Rest: 94  At rest oxygen flow (liters per minute): 2  SPO2% Ambulation on Room Air: 91  SPO2% Ambulation on Oxygen: 2    AM-PAC '6-Clicks' INPATIENT SHORT FORM - BASIC MOBILITY  How much difficulty does the patient currently have. .. Patient Difficulty: Turning over in bed (including adjusting bedclothes, sheets and blankets)?: A Little   Patient Difficulty: Sitting down on and standing up from a chair with arms (e.g., wheelchair, bedside commode, etc.): A Little   Patient Difficulty: Moving from lying on back to sitting on the side of the bed?: A Little   How much help from another person does the patient currently need. ..    Help from Another: Moving to and from a bed to a chair (including a wheelchair)?: A Little   Help from Another: Need to walk in hospital room?: A Little   Help from Another: Climbing 3-5 steps with a railing?: A Lot     AM-PAC Score:  Raw Score: 17   Approx Degree of Impairment: 50.57%   Standardized Score (AM-PAC Scale): 42.13   CMS Modifier (G-Code): CK      Additional information:     THERAPEUTIC EXERCISES  Lower Extremity Alternating marching  Ankle pumps  Knee extension  Quad sets     Position Sitting       Patient End of Session: Up in chair; All patient questions and concerns addressed;RN aware of session/findings;Call light within reach; Needs met; Family present; Alarm set    CURRENT GOALS       Goals to be met by: 8/5/22  Patient Goal Patient's self-stated goal is: to go home when she feels better   Goal #1 Patient is able to demonstrate supine - sit EOB @ level: independent     Goal #1   Current Status Min A   Goal #2 Patient is able to demonstrate transfers Sit to/from Stand and bed to/from chair at assistance level: modified independent with walker - rolling     Goal #2  Current Status Min a   Goal #3 Patient is able to ambulate 100 feet with assist device: walker - rolling at assistance level: supervision   Goal #3   Current Status 20 ft RW Min A   Goal #4 Patient will negotiate one curb w/ assistive device and supervision   Goal #4   Current Status NT   Goal #5 Patient to demonstrate independence with home activity/exercise instructions provided to patient in preparation for discharge.    Goal #5   Current Status instructed   Goal #6    Goal #6  Current Status

## 2022-08-02 NOTE — CM/SW NOTE
Department  notified of request for [facility] referrals started. Assigned CM/SW to follow up with pt/family on further discharge planning.      Venora [place]   August 02, 2022   14:40

## 2022-08-02 NOTE — CM/SW NOTE
Department  notified of request for [facility], [facility] referrals started. Assigned CM/SW to follow up with pt/family on further discharge planning.      Shante Giles   August 02, 2022   15:04

## 2022-08-02 NOTE — PLAN OF CARE
Patient VSS, no acute changes during shift. 2L O2. PRN Robitussin, Mucinex given. Isolation precautions in place. Updated patient on plan of care. Frequent rounding with bed always in the lowest position. Call light always in reach and safety precautions in place. Problem: Patient Centered Care  Goal: Patient preferences are identified and integrated in the patient's plan of care  Description: Interventions:  - What would you like us to know as we care for you?  From home alone  - Provide timely, complete, and accurate information to patient/family  - Incorporate patient and family knowledge, values, beliefs, and cultural backgrounds into the planning and delivery of care  - Encourage patient/family to participate in care and decision-making at the level they choose  - Honor patient and family perspectives and choices  Outcome: Progressing     Problem: Diabetes/Glucose Control  Goal: Glucose maintained within prescribed range  Description: INTERVENTIONS:  - Monitor Blood Glucose as ordered  - Assess for signs and symptoms of hyperglycemia and hypoglycemia  - Administer ordered medications to maintain glucose within target range  - Assess barriers to adequate nutritional intake and initiate nutrition consult as needed  - Instruct patient on self management of diabetes  Outcome: Progressing     Problem: Patient/Family Goals  Goal: Patient/Family Long Term Goal  Description: Patient's Long Term Goal: discharge from hospital    Interventions:  - Monitor vitals  - Monitor appropriate labs  - Administer medications as ordered  - Follow MD's orders  - Update patient on plan of care   - Discharge planning   - See additional Care Plan goals for specific interventions  Outcome: Progressing  Goal: Patient/Family Short Term Goal  Description: Patient's Short Term Goal: treat COPD exacerbation    Interventions:   - Pulmonology consult  - IV steroids  - Scheduled nebulizer treatments  - See additional Care Plan goals for specific interventions  Outcome: Progressing     Problem: RESPIRATORY - ADULT  Goal: Achieves optimal ventilation and oxygenation  Description: INTERVENTIONS:  - Assess for changes in respiratory status  - Assess for changes in mentation and behavior  - Position to facilitate oxygenation and minimize respiratory effort  - Oxygen supplementation based on oxygen saturation or ABGs  - Provide Smoking Cessation handout, if applicable  - Encourage broncho-pulmonary hygiene including cough, deep breathe, Incentive Spirometry  - Assess the need for suctioning and perform as needed  - Assess and instruct to report SOB or any respiratory difficulty  - Respiratory Therapy support as indicated  - Manage/alleviate anxiety  - Monitor for signs/symptoms of CO2 retention  Outcome: Progressing     Problem: METABOLIC/FLUID AND ELECTROLYTES - ADULT  Goal: Glucose maintained within prescribed range  Description: INTERVENTIONS:  - Monitor Blood Glucose as ordered  - Assess for signs and symptoms of hyperglycemia and hypoglycemia  - Administer ordered medications to maintain glucose within target range  - Assess barriers to adequate nutritional intake and initiate nutrition consult as needed  - Instruct patient on self management of diabetes  Outcome: Progressing     Problem: RISK FOR INFECTION - ADULT  Goal: Absence of fever/infection during anticipated neutropenic period  Description: INTERVENTIONS  - Monitor WBC  - Administer growth factors as ordered  - Implement neutropenic guidelines  Outcome: Progressing     Problem: SAFETY ADULT - FALL  Goal: Free from fall injury  Description: INTERVENTIONS:  - Assess pt frequently for physical needs  - Identify cognitive and physical deficits and behaviors that affect risk of falls.   - [place] fall precautions as indicated by assessment.  - Educate pt/family on patient safety including physical limitations  - Instruct pt to call for assistance with activity based on assessment  - Modify environment to reduce risk of injury  - Provide assistive devices as appropriate  - Consider OT/PT consult to assist with strengthening/mobility  - Encourage toileting schedule  Outcome: Progressing

## 2022-08-03 VITALS
TEMPERATURE: 98 F | DIASTOLIC BLOOD PRESSURE: 49 MMHG | HEIGHT: 64 IN | HEART RATE: 92 BPM | BODY MASS INDEX: 29.74 KG/M2 | OXYGEN SATURATION: 95 % | WEIGHT: 174.19 LBS | SYSTOLIC BLOOD PRESSURE: 143 MMHG | RESPIRATION RATE: 21 BRPM

## 2022-08-03 LAB
GLUCOSE BLDC GLUCOMTR-MCNC: 108 MG/DL (ref 70–99)
GLUCOSE BLDC GLUCOMTR-MCNC: 148 MG/DL (ref 70–99)
SARS-COV-2 RNA RESP QL NAA+PROBE: NOT DETECTED

## 2022-08-03 PROCEDURE — 99239 HOSP IP/OBS DSCHRG MGMT >30: CPT | Performed by: HOSPITALIST

## 2022-08-03 PROCEDURE — 99232 SBSQ HOSP IP/OBS MODERATE 35: CPT | Performed by: PHYSICIAN ASSISTANT

## 2022-08-03 RX ORDER — IPRATROPIUM BROMIDE AND ALBUTEROL SULFATE 2.5; .5 MG/3ML; MG/3ML
3 SOLUTION RESPIRATORY (INHALATION) EVERY 6 HOURS PRN
Qty: 21 EACH | Refills: 0 | Status: SHIPPED | COMMUNITY
Start: 2022-08-03

## 2022-08-03 RX ORDER — GUAIFENESIN 600 MG
600 TABLET, EXTENDED RELEASE 12 HR ORAL 2 TIMES DAILY PRN
Qty: 30 TABLET | Refills: 0 | Status: SHIPPED | COMMUNITY
Start: 2022-08-03

## 2022-08-03 RX ORDER — PREDNISONE 10 MG/1
TABLET ORAL
Qty: 9 TABLET | Refills: 0 | Status: SHIPPED | COMMUNITY
Start: 2022-08-04

## 2022-08-03 RX ORDER — DOCUSATE SODIUM 100 MG/1
100 CAPSULE, LIQUID FILLED ORAL DAILY
Qty: 180 CAPSULE | Refills: 1 | Status: SHIPPED | COMMUNITY
Start: 2022-08-03

## 2022-08-03 NOTE — PLAN OF CARE
Problem: Patient Centered Care  Goal: Patient preferences are identified and integrated in the patient's plan of care  Description: Interventions:  - What would you like us to know as we care for you?  From home alone  - Provide timely, complete, and accurate information to patient/family  - Incorporate patient and family knowledge, values, beliefs, and cultural backgrounds into the planning and delivery of care  - Encourage patient/family to participate in care and decision-making at the level they choose  - Honor patient and family perspectives and choices  Outcome: Progressing     Problem: Diabetes/Glucose Control  Goal: Glucose maintained within prescribed range  Description: INTERVENTIONS:  - Monitor Blood Glucose as ordered  - Assess for signs and symptoms of hyperglycemia and hypoglycemia  - Administer ordered medications to maintain glucose within target range  - Assess barriers to adequate nutritional intake and initiate nutrition consult as needed  - Instruct patient on self management of diabetes  Outcome: Progressing     Problem: Patient/Family Goals  Goal: Patient/Family Long Term Goal  Description: Patient's Long Term Goal: discharge from hospital    Interventions:  - Monitor vitals  - Monitor appropriate labs  - Administer medications as ordered  - Follow MD's orders  - Update patient on plan of care   - Discharge planning   - See additional Care Plan goals for specific interventions  Outcome: Progressing  Goal: Patient/Family Short Term Goal  Description: Patient's Short Term Goal: treat COPD exacerbation    Interventions:   - Pulmonology consult  - IV steroids  - Scheduled nebulizer treatments  - See additional Care Plan goals for specific interventions  Outcome: Progressing     Problem: RESPIRATORY - ADULT  Goal: Achieves optimal ventilation and oxygenation  Description: INTERVENTIONS:  - Assess for changes in respiratory status  - Assess for changes in mentation and behavior  - Position to facilitate oxygenation and minimize respiratory effort  - Oxygen supplementation based on oxygen saturation or ABGs  - Provide Smoking Cessation handout, if applicable  - Encourage broncho-pulmonary hygiene including cough, deep breathe, Incentive Spirometry  - Assess the need for suctioning and perform as needed  - Assess and instruct to report SOB or any respiratory difficulty  - Respiratory Therapy support as indicated  - Manage/alleviate anxiety  - Monitor for signs/symptoms of CO2 retention  Outcome: Progressing     Problem: METABOLIC/FLUID AND ELECTROLYTES - ADULT  Goal: Glucose maintained within prescribed range  Description: INTERVENTIONS:  - Monitor Blood Glucose as ordered  - Assess for signs and symptoms of hyperglycemia and hypoglycemia  - Administer ordered medications to maintain glucose within target range  - Assess barriers to adequate nutritional intake and initiate nutrition consult as needed  - Instruct patient on self management of diabetes  Outcome: Progressing     Problem: RISK FOR INFECTION - ADULT  Goal: Absence of fever/infection during anticipated neutropenic period  Description: INTERVENTIONS  - Monitor WBC  - Administer growth factors as ordered  - Implement neutropenic guidelines  Outcome: Progressing     Problem: SAFETY ADULT - FALL  Goal: Free from fall injury  Description: INTERVENTIONS:  - Assess pt frequently for physical needs  - Identify cognitive and physical deficits and behaviors that affect risk of falls. - [place] fall precautions as indicated by assessment.  - Educate pt/family on patient safety including physical limitations  - Instruct pt to call for assistance with activity based on assessment  - Modify environment to reduce risk of injury  - Provide assistive devices as appropriate  - Consider OT/PT consult to assist with strengthening/mobility  - Encourage toileting schedule  Outcome: Progressing 8/3/22 amber. Diet, Ins  discharge today,. Auth.  Approved for [facility] today via [facility] son aware. Remote tele d'cd.

## 2022-08-03 NOTE — PROGRESS NOTES
Report given to [id number], son called by floor rn to notify him of discharge time to [facility]. Ambulance here, discharged with belongings, no c/o voiced.

## 2022-08-03 NOTE — PLAN OF CARE
Problem: Patient Centered Care  Goal: Patient preferences are identified and integrated in the patient's plan of care  Description: Interventions:  - What would you like us to know as we care for you?  From home alone  - Provide timely, complete, and accurate information to patient/family  - Incorporate patient and family knowledge, values, beliefs, and cultural backgrounds into the planning and delivery of care  - Encourage patient/family to participate in care and decision-making at the level they choose  - Honor patient and family perspectives and choices  8/3/2022 1251 by Patricia Pearce RN  Outcome: Completed  8/3/2022 1237 by Patricia Pearce RN  Outcome: Progressing     Problem: Diabetes/Glucose Control  Goal: Glucose maintained within prescribed range  Description: INTERVENTIONS:  - Monitor Blood Glucose as ordered  - Assess for signs and symptoms of hyperglycemia and hypoglycemia  - Administer ordered medications to maintain glucose within target range  - Assess barriers to adequate nutritional intake and initiate nutrition consult as needed  - Instruct patient on self management of diabetes  8/3/2022 1251 by Patricia eParce RN  Outcome: Completed  8/3/2022 1237 by Patricia Pearce RN  Outcome: Progressing     Problem: Patient/Family Goals  Goal: Patient/Family Long Term Goal  Description: Patient's Long Term Goal: discharge from hospital    Interventions:  - Monitor vitals  - Monitor appropriate labs  - Administer medications as ordered  - Follow MD's orders  - Update patient on plan of care   - Discharge planning   - See additional Care Plan goals for specific interventions  8/3/2022 1251 by Patricia Pearce RN  Outcome: Completed  8/3/2022 1237 by Patricia Pearce RN  Outcome: Progressing  Goal: Patient/Family Short Term Goal  Description: Patient's Short Term Goal: treat COPD exacerbation    Interventions:   - Pulmonology consult  - IV steroids  - Scheduled nebulizer treatments  - See additional Care Plan goals for specific interventions  8/3/2022 1251 by Tessy Bradley RN  Outcome: Completed  8/3/2022 1237 by Tessy Bradley RN  Outcome: Progressing     Problem: RESPIRATORY - ADULT  Goal: Achieves optimal ventilation and oxygenation  Description: INTERVENTIONS:  - Assess for changes in respiratory status  - Assess for changes in mentation and behavior  - Position to facilitate oxygenation and minimize respiratory effort  - Oxygen supplementation based on oxygen saturation or ABGs  - Provide Smoking Cessation handout, if applicable  - Encourage broncho-pulmonary hygiene including cough, deep breathe, Incentive Spirometry  - Assess the need for suctioning and perform as needed  - Assess and instruct to report SOB or any respiratory difficulty  - Respiratory Therapy support as indicated  - Manage/alleviate anxiety  - Monitor for signs/symptoms of CO2 retention  8/3/2022 1251 by Tessy Bradley RN  Outcome: Completed  8/3/2022 1237 by Tessy Bradley RN  Outcome: Progressing     Problem: METABOLIC/FLUID AND ELECTROLYTES - ADULT  Goal: Glucose maintained within prescribed range  Description: INTERVENTIONS:  - Monitor Blood Glucose as ordered  - Assess for signs and symptoms of hyperglycemia and hypoglycemia  - Administer ordered medications to maintain glucose within target range  - Assess barriers to adequate nutritional intake and initiate nutrition consult as needed  - Instruct patient on self management of diabetes  8/3/2022 1251 by Tessy Bradley RN  Outcome: Completed  8/3/2022 1237 by Tessy Bradley RN  Outcome: Progressing     Problem: RISK FOR INFECTION - ADULT  Goal: Absence of fever/infection during anticipated neutropenic period  Description: INTERVENTIONS  - Monitor WBC  - Administer growth factors as ordered  - Implement neutropenic guidelines  8/3/2022 1251 by J Luis Mckeon RN  Outcome: Completed  8/3/2022 1237 by J Luis Mckeon RN  Outcome: Progressing     Problem: SAFETY ADULT - FALL  Goal: Free from fall injury  Description: INTERVENTIONS:  - Assess pt frequently for physical needs  - Identify cognitive and physical deficits and behaviors that affect risk of falls. - [place] fall precautions as indicated by assessment.  - Educate pt/family on patient safety including physical limitations  - Instruct pt to call for assistance with activity based on assessment  - Modify environment to reduce risk of injury  - Provide assistive devices as appropriate  - Consider OT/PT consult to assist with strengthening/mobility  - Encourage toileting schedule  8/3/2022 1251 by J Luis Mckeon RN  Outcome: Completed  8/3/2022 1237 by J Luis Mckeon RN  Outcome: Progressing 8/3/22 discharge to [address] today. Msg left with son.

## 2022-08-03 NOTE — PLAN OF CARE
Problem: Diabetes/Glucose Control  Goal: Glucose maintained within prescribed range  Description: INTERVENTIONS:  - Monitor Blood Glucose as ordered  - Assess for signs and symptoms of hyperglycemia and hypoglycemia  - Administer ordered medications to maintain glucose within target range  - Assess barriers to adequate nutritional intake and initiate nutrition consult as needed  - Instruct patient on self management of diabetes  Outcome: Progressing     Problem: RESPIRATORY - ADULT  Goal: Achieves optimal ventilation and oxygenation  Description: INTERVENTIONS:  - Assess for changes in respiratory status  - Assess for changes in mentation and behavior  - Position to facilitate oxygenation and minimize respiratory effort  - Oxygen supplementation based on oxygen saturation or ABGs  - Provide Smoking Cessation handout, if applicable  - Encourage broncho-pulmonary hygiene including cough, deep breathe, Incentive Spirometry  - Assess the need for suctioning and perform as needed  - Assess and instruct to report SOB or any respiratory difficulty  - Respiratory Therapy support as indicated  - Manage/alleviate anxiety  - Monitor for signs/symptoms of CO2 retention  Outcome: Progressing     Problem: METABOLIC/FLUID AND ELECTROLYTES - ADULT  Goal: Glucose maintained within prescribed range  Description: INTERVENTIONS:  - Monitor Blood Glucose as ordered  - Assess for signs and symptoms of hyperglycemia and hypoglycemia  - Administer ordered medications to maintain glucose within target range  - Assess barriers to adequate nutritional intake and initiate nutrition consult as needed  - Instruct patient on self management of diabetes  Outcome: Progressing     Problem: RISK FOR INFECTION - ADULT  Goal: Absence of fever/infection during anticipated neutropenic period  Description: INTERVENTIONS  - Monitor WBC  - Administer growth factors as ordered  - Implement neutropenic guidelines  Outcome: Progressing     Problem: SAFETY ADULT - FALL  Goal: Free from fall injury  Description: INTERVENTIONS:  - Assess pt frequently for physical needs  - Identify cognitive and physical deficits and behaviors that affect risk of falls. - [place] fall precautions as indicated by assessment.  - Educate pt/family on patient safety including physical limitations  - Instruct pt to call for assistance with activity based on assessment  - Modify environment to reduce risk of injury  - Provide assistive devices as appropriate  - Consider OT/PT consult to assist with strengthening/mobility  - Encourage toileting schedule  Outcome: Progressing     Patient is resting comfortably in bed, denies pain, VSS, safety/fall precautions in place, prn mucinex given.  All needs met at this time

## 2022-08-03 NOTE — PLAN OF CARE
No acute changes. Fall precautions in place, bed alarm on. Patient resting comfortably, call light within reach. Problem: Patient Centered Care  Goal: Patient preferences are identified and integrated in the patient's plan of care  Description: Interventions:  - What would you like us to know as we care for you?  From home alone  - Provide timely, complete, and accurate information to patient/family  - Incorporate patient and family knowledge, values, beliefs, and cultural backgrounds into the planning and delivery of care  - Encourage patient/family to participate in care and decision-making at the level they choose  - Honor patient and family perspectives and choices  Outcome: Progressing     Problem: Diabetes/Glucose Control  Goal: Glucose maintained within prescribed range  Description: INTERVENTIONS:  - Monitor Blood Glucose as ordered  - Assess for signs and symptoms of hyperglycemia and hypoglycemia  - Administer ordered medications to maintain glucose within target range  - Assess barriers to adequate nutritional intake and initiate nutrition consult as needed  - Instruct patient on self management of diabetes  Outcome: Progressing     Problem: Patient/Family Goals  Goal: Patient/Family Long Term Goal  Description: Patient's Long Term Goal: discharge from hospital    Interventions:  - Monitor vitals  - Monitor appropriate labs  - Administer medications as ordered  - Follow MD's orders  - Update patient on plan of care   - Discharge planning   - See additional Care Plan goals for specific interventions  Outcome: Progressing  Goal: Patient/Family Short Term Goal  Description: Patient's Short Term Goal: treat COPD exacerbation    Interventions:   - Pulmonology consult  - IV steroids  - Scheduled nebulizer treatments  - See additional Care Plan goals for specific interventions  Outcome: Progressing     Problem: RESPIRATORY - ADULT  Goal: Achieves optimal ventilation and oxygenation  Description: INTERVENTIONS:  - Assess for changes in respiratory status  - Assess for changes in mentation and behavior  - Position to facilitate oxygenation and minimize respiratory effort  - Oxygen supplementation based on oxygen saturation or ABGs  - Provide Smoking Cessation handout, if applicable  - Encourage broncho-pulmonary hygiene including cough, deep breathe, Incentive Spirometry  - Assess the need for suctioning and perform as needed  - Assess and instruct to report SOB or any respiratory difficulty  - Respiratory Therapy support as indicated  - Manage/alleviate anxiety  - Monitor for signs/symptoms of CO2 retention  Outcome: Progressing     Problem: METABOLIC/FLUID AND ELECTROLYTES - ADULT  Goal: Glucose maintained within prescribed range  Description: INTERVENTIONS:  - Monitor Blood Glucose as ordered  - Assess for signs and symptoms of hyperglycemia and hypoglycemia  - Administer ordered medications to maintain glucose within target range  - Assess barriers to adequate nutritional intake and initiate nutrition consult as needed  - Instruct patient on self management of diabetes  Outcome: Progressing     Problem: RISK FOR INFECTION - ADULT  Goal: Absence of fever/infection during anticipated neutropenic period  Description: INTERVENTIONS  - Monitor WBC  - Administer growth factors as ordered  - Implement neutropenic guidelines  Outcome: Progressing     Problem: SAFETY ADULT - FALL  Goal: Free from fall injury  Description: INTERVENTIONS:  - Assess pt frequently for physical needs  - Identify cognitive and physical deficits and behaviors that affect risk of falls.   - [place] fall precautions as indicated by assessment.  - Educate pt/family on patient safety including physical limitations  - Instruct pt to call for assistance with activity based on assessment  - Modify environment to reduce risk of injury  - Provide assistive devices as appropriate  - Consider OT/PT consult to assist with strengthening/mobility  - Encourage toileting schedule  Outcome: Progressing

## 2022-08-03 NOTE — CM/SW NOTE
Submitted clinical via [employer] portal.  [employer] Case/Auth ID [id number]. Final insurance authorization is pending at this time. SW/CM assigned to the case will continue to follow auth status.       Becky Braga   August 03, 2022   09:41

## 2022-08-04 ENCOUNTER — PATIENT OUTREACH (OUTPATIENT)
Dept: CASE MANAGEMENT | Age: 77
End: 2022-08-04

## 2022-08-04 DIAGNOSIS — Z95.1 S/P CABG (CORONARY ARTERY BYPASS GRAFT): ICD-10-CM

## 2022-08-04 DIAGNOSIS — N18.30 CONTROLLED TYPE 2 DIABETES MELLITUS WITH STAGE 3 CHRONIC KIDNEY DISEASE, WITHOUT LONG-TERM CURRENT USE OF INSULIN (HCC): ICD-10-CM

## 2022-08-04 DIAGNOSIS — J44.1 COPD EXACERBATION (HCC): ICD-10-CM

## 2022-08-04 DIAGNOSIS — J44.9 COPD, SEVERE (HCC): ICD-10-CM

## 2022-08-04 DIAGNOSIS — Z98.890 S/P CAROTID ENDARTERECTOMY: ICD-10-CM

## 2022-08-04 DIAGNOSIS — E11.22 CONTROLLED TYPE 2 DIABETES MELLITUS WITH STAGE 3 CHRONIC KIDNEY DISEASE, WITHOUT LONG-TERM CURRENT USE OF INSULIN (HCC): ICD-10-CM

## 2022-08-04 DIAGNOSIS — I25.10 CORONARY ARTERY DISEASE INVOLVING NATIVE CORONARY ARTERY OF NATIVE HEART WITHOUT ANGINA PECTORIS: ICD-10-CM

## 2022-08-04 DIAGNOSIS — Z85.3 HISTORY OF BREAST CANCER: ICD-10-CM

## 2022-08-04 DIAGNOSIS — M1A.9XX1 CHRONIC TOPHACEOUS GOUT: ICD-10-CM

## 2022-08-04 DIAGNOSIS — E21.3 HYPERPARATHYROIDISM (HCC): ICD-10-CM

## 2022-08-04 DIAGNOSIS — M48.02 CERVICAL STENOSIS OF SPINAL CANAL: ICD-10-CM

## 2022-08-04 DIAGNOSIS — E11.69 HYPERLIPIDEMIA ASSOCIATED WITH TYPE 2 DIABETES MELLITUS (HCC): ICD-10-CM

## 2022-08-04 DIAGNOSIS — E78.5 HYPERLIPIDEMIA ASSOCIATED WITH TYPE 2 DIABETES MELLITUS (HCC): ICD-10-CM

## 2022-08-04 DIAGNOSIS — I15.2 HYPERTENSION ASSOCIATED WITH TYPE 2 DIABETES MELLITUS (HCC): ICD-10-CM

## 2022-08-04 DIAGNOSIS — E11.59 HYPERTENSION ASSOCIATED WITH TYPE 2 DIABETES MELLITUS (HCC): ICD-10-CM

## 2022-08-04 DIAGNOSIS — I50.32 CHRONIC DIASTOLIC CONGESTIVE HEART FAILURE (HCC): ICD-10-CM

## 2022-08-05 ENCOUNTER — INITIAL APN SNF VISIT (OUTPATIENT)
Dept: INTERNAL MEDICINE CLINIC | Facility: SKILLED NURSING FACILITY | Age: 77
End: 2022-08-05

## 2022-08-05 DIAGNOSIS — I25.10 CORONARY ARTERY DISEASE INVOLVING NATIVE CORONARY ARTERY OF NATIVE HEART WITHOUT ANGINA PECTORIS: ICD-10-CM

## 2022-08-05 DIAGNOSIS — J44.1 COPD EXACERBATION (HCC): ICD-10-CM

## 2022-08-05 DIAGNOSIS — N18.30 CONTROLLED TYPE 2 DIABETES MELLITUS WITH STAGE 3 CHRONIC KIDNEY DISEASE, WITHOUT LONG-TERM CURRENT USE OF INSULIN (HCC): ICD-10-CM

## 2022-08-05 DIAGNOSIS — E11.22 CONTROLLED TYPE 2 DIABETES MELLITUS WITH STAGE 3 CHRONIC KIDNEY DISEASE, WITHOUT LONG-TERM CURRENT USE OF INSULIN (HCC): ICD-10-CM

## 2022-08-05 DIAGNOSIS — Z95.1 S/P CABG (CORONARY ARTERY BYPASS GRAFT): ICD-10-CM

## 2022-08-05 DIAGNOSIS — M1A.9XX1 CHRONIC TOPHACEOUS GOUT: ICD-10-CM

## 2022-08-05 DIAGNOSIS — J45.901 ACUTE EXACERBATION OF ASTHMA WITH ALLERGIC RHINITIS: ICD-10-CM

## 2022-08-05 DIAGNOSIS — J45.909 SEVERE ASTHMA, UNSPECIFIED WHETHER COMPLICATED, UNSPECIFIED WHETHER PERSISTENT: ICD-10-CM

## 2022-08-05 DIAGNOSIS — F41.9 ANXIETY: ICD-10-CM

## 2022-08-05 DIAGNOSIS — J21.0 RSV (ACUTE BRONCHIOLITIS DUE TO RESPIRATORY SYNCYTIAL VIRUS): ICD-10-CM

## 2022-08-05 DIAGNOSIS — M1A.0490 CHRONIC GOUT OF HAND, UNSPECIFIED CAUSE, UNSPECIFIED LATERALITY: ICD-10-CM

## 2022-08-05 DIAGNOSIS — I10 PRIMARY HYPERTENSION: ICD-10-CM

## 2022-08-05 PROCEDURE — 1111F DSCHRG MED/CURRENT MED MERGE: CPT | Performed by: NURSE PRACTITIONER

## 2022-08-05 PROCEDURE — 1123F ACP DISCUSS/DSCN MKR DOCD: CPT | Performed by: NURSE PRACTITIONER

## 2022-08-05 PROCEDURE — 99310 SBSQ NF CARE HIGH MDM 45: CPT | Performed by: NURSE PRACTITIONER

## 2022-08-05 RX ORDER — METOPROLOL SUCCINATE 25 MG/1
TABLET, EXTENDED RELEASE ORAL
Qty: 90 TABLET | Refills: 1 | Status: SHIPPED | OUTPATIENT
Start: 2022-08-05

## 2022-08-08 ENCOUNTER — EXTERNAL FACILITY (OUTPATIENT)
Dept: PULMONOLOGY | Facility: CLINIC | Age: 77
End: 2022-08-08

## 2022-08-08 DIAGNOSIS — B33.8 RSV (RESPIRATORY SYNCYTIAL VIRUS INFECTION): ICD-10-CM

## 2022-08-08 DIAGNOSIS — J44.9 CHRONIC OBSTRUCTIVE PULMONARY DISEASE, UNSPECIFIED COPD TYPE (HCC): Primary | ICD-10-CM

## 2022-08-08 DIAGNOSIS — J96.11 CHRONIC RESPIRATORY FAILURE WITH HYPOXIA (HCC): ICD-10-CM

## 2022-08-09 ENCOUNTER — SNF VISIT (OUTPATIENT)
Dept: INTERNAL MEDICINE CLINIC | Facility: SKILLED NURSING FACILITY | Age: 77
End: 2022-08-09

## 2022-08-09 DIAGNOSIS — J21.0 RSV (ACUTE BRONCHIOLITIS DUE TO RESPIRATORY SYNCYTIAL VIRUS): ICD-10-CM

## 2022-08-09 DIAGNOSIS — F41.9 ANXIETY: ICD-10-CM

## 2022-08-09 DIAGNOSIS — J44.1 COPD EXACERBATION (HCC): ICD-10-CM

## 2022-08-09 DIAGNOSIS — Z79.4 TYPE 2 DIABETES MELLITUS WITHOUT COMPLICATION, WITH LONG-TERM CURRENT USE OF INSULIN (HCC): ICD-10-CM

## 2022-08-09 DIAGNOSIS — E11.9 TYPE 2 DIABETES MELLITUS WITHOUT COMPLICATION, WITH LONG-TERM CURRENT USE OF INSULIN (HCC): ICD-10-CM

## 2022-08-09 PROCEDURE — 99309 SBSQ NF CARE MODERATE MDM 30: CPT | Performed by: NURSE PRACTITIONER

## 2022-08-09 PROCEDURE — 1111F DSCHRG MED/CURRENT MED MERGE: CPT | Performed by: NURSE PRACTITIONER

## 2022-08-11 ENCOUNTER — TELEPHONE (OUTPATIENT)
Dept: GASTROENTEROLOGY | Facility: CLINIC | Age: 77
End: 2022-08-11

## 2022-08-11 NOTE — TELEPHONE ENCOUNTER
GI RNs: The patient was recently discharged from the hospital to a skilled nursing facility on 8/3/2022. She is scheduled for an outpatient colonoscopy next week. Can we check with the patient's family. Should the procedure be rescheduled?

## 2022-08-12 ENCOUNTER — TELEPHONE (OUTPATIENT)
Dept: INTERNAL MEDICINE CLINIC | Facility: CLINIC | Age: 77
End: 2022-08-12

## 2022-08-12 ENCOUNTER — SNF DISCHARGE (OUTPATIENT)
Dept: INTERNAL MEDICINE CLINIC | Facility: SKILLED NURSING FACILITY | Age: 77
End: 2022-08-12

## 2022-08-12 DIAGNOSIS — E11.9 TYPE 2 DIABETES MELLITUS WITHOUT COMPLICATION, WITH LONG-TERM CURRENT USE OF INSULIN (HCC): ICD-10-CM

## 2022-08-12 DIAGNOSIS — E11.59 HYPERTENSION ASSOCIATED WITH TYPE 2 DIABETES MELLITUS (HCC): ICD-10-CM

## 2022-08-12 DIAGNOSIS — I15.2 HYPERTENSION ASSOCIATED WITH TYPE 2 DIABETES MELLITUS (HCC): ICD-10-CM

## 2022-08-12 DIAGNOSIS — F41.9 ANXIETY: ICD-10-CM

## 2022-08-12 DIAGNOSIS — J96.01 ACUTE RESPIRATORY FAILURE WITH HYPOXIA (HCC): ICD-10-CM

## 2022-08-12 DIAGNOSIS — J21.0 RSV (ACUTE BRONCHIOLITIS DUE TO RESPIRATORY SYNCYTIAL VIRUS): ICD-10-CM

## 2022-08-12 DIAGNOSIS — M1A.0410 CHRONIC GOUT OF RIGHT HAND, UNSPECIFIED CAUSE: ICD-10-CM

## 2022-08-12 DIAGNOSIS — Z79.4 TYPE 2 DIABETES MELLITUS WITHOUT COMPLICATION, WITH LONG-TERM CURRENT USE OF INSULIN (HCC): ICD-10-CM

## 2022-08-12 NOTE — TELEPHONE ENCOUNTER
Dr Michelle Roa,    I called the pt and the son. I was able to speak to both of them    Pt plans on having the colonoscopy on Wednesday    She is going home from rehab tomorrow    I spoke to the son and reviewed the bowel prep instructions. I put a copy of the instructions on [employer] because the pt did not know if she still had the instructions at home    Pt's son states he will help his mother with the instructions. Reviewed how to take medications:    Hold diabetic medication (Glipizide) the evening before and the morning of the procedure.     Hold iron for 3 days preceding the procedure    Pt's son will be driving her to the procedure

## 2022-08-15 ENCOUNTER — TELEPHONE (OUTPATIENT)
Dept: GASTROENTEROLOGY | Facility: CLINIC | Age: 77
End: 2022-08-15

## 2022-08-15 DIAGNOSIS — Z12.11 SCREENING FOR COLON CANCER: Primary | ICD-10-CM

## 2022-08-16 ENCOUNTER — TELEPHONE (OUTPATIENT)
Dept: INTERNAL MEDICINE CLINIC | Facility: CLINIC | Age: 77
End: 2022-08-16

## 2022-08-16 NOTE — TELEPHONE ENCOUNTER
Admission Order Dated 8/16/22 , Start of Care, received and placed in Dr. Oliver Sarah in basket to review and sign.

## 2022-08-16 NOTE — TELEPHONE ENCOUNTER
I did call pt on same day of message. Told she can hold glipizide when she is home since not on oral steroids anymore and sugars are in good range on diet.

## 2022-08-18 NOTE — TELEPHONE ENCOUNTER
Spoke to patient to relay message below - patient stated understanding and will repeat labs in 5 months - labs ordered

## 2022-08-18 NOTE — TELEPHONE ENCOUNTER
Coordination of care, certification period 8/16/2022-1014/2022 received from [facility], placed on Dr Christoph Maurer desbenoit for review and signature.

## 2022-08-23 ENCOUNTER — TELEPHONE (OUTPATIENT)
Dept: INTERNAL MEDICINE CLINIC | Facility: CLINIC | Age: 77
End: 2022-08-23

## 2022-08-23 NOTE — TELEPHONE ENCOUNTER
Physician order for OT dated 8.18.2022, OT evaluation episode 8.16-10.14.2022, and [facility] [address] [facility] Placed on Dr. Pineda Terry desk for signature and review.

## 2022-08-25 ENCOUNTER — TELEPHONE (OUTPATIENT)
Dept: INTERNAL MEDICINE CLINIC | Facility: CLINIC | Age: 77
End: 2022-08-25

## 2022-08-25 NOTE — TELEPHONE ENCOUNTER
Patient called seeking advise. She was in hospital in July and discharged from [facility] 8/13/22. She asked if she will need appt. I did recommend she f/u with DR Braun to go over medications and any changes. Patient verbalized understanding. NON TCM appt 9/1/22.

## 2022-08-31 RX ORDER — GLIPIZIDE 5 MG/1
5 TABLET ORAL
Qty: 90 TABLET | Refills: 0 | Status: SHIPPED | OUTPATIENT
Start: 2022-08-31

## 2022-09-01 ENCOUNTER — TELEPHONE (OUTPATIENT)
Dept: INTERNAL MEDICINE CLINIC | Facility: CLINIC | Age: 77
End: 2022-09-01

## 2022-09-01 ENCOUNTER — PATIENT OUTREACH (OUTPATIENT)
Dept: CASE MANAGEMENT | Age: 77
End: 2022-09-01

## 2022-09-01 ENCOUNTER — OFFICE VISIT (OUTPATIENT)
Dept: INTERNAL MEDICINE CLINIC | Facility: CLINIC | Age: 77
End: 2022-09-01
Payer: MEDICARE

## 2022-09-01 VITALS
TEMPERATURE: 99 F | BODY MASS INDEX: 30 KG/M2 | SYSTOLIC BLOOD PRESSURE: 126 MMHG | DIASTOLIC BLOOD PRESSURE: 76 MMHG | WEIGHT: 172 LBS | HEART RATE: 91 BPM | OXYGEN SATURATION: 96 %

## 2022-09-01 DIAGNOSIS — E78.5 HYPERLIPIDEMIA ASSOCIATED WITH TYPE 2 DIABETES MELLITUS (HCC): ICD-10-CM

## 2022-09-01 DIAGNOSIS — I25.10 CORONARY ARTERY DISEASE INVOLVING NATIVE CORONARY ARTERY OF NATIVE HEART WITHOUT ANGINA PECTORIS: ICD-10-CM

## 2022-09-01 DIAGNOSIS — J45.901 ASTHMA WITH COPD WITH EXACERBATION (HCC): Primary | ICD-10-CM

## 2022-09-01 DIAGNOSIS — M1A.9XX1 CHRONIC TOPHACEOUS GOUT: ICD-10-CM

## 2022-09-01 DIAGNOSIS — E11.22 CONTROLLED TYPE 2 DIABETES MELLITUS WITH STAGE 3 CHRONIC KIDNEY DISEASE, WITHOUT LONG-TERM CURRENT USE OF INSULIN (HCC): ICD-10-CM

## 2022-09-01 DIAGNOSIS — I50.32 CHRONIC DIASTOLIC CONGESTIVE HEART FAILURE (HCC): ICD-10-CM

## 2022-09-01 DIAGNOSIS — J44.9 COPD, SEVERE (HCC): ICD-10-CM

## 2022-09-01 DIAGNOSIS — J44.1 ASTHMA WITH COPD WITH EXACERBATION (HCC): Primary | ICD-10-CM

## 2022-09-01 DIAGNOSIS — E11.59 HYPERTENSION ASSOCIATED WITH TYPE 2 DIABETES MELLITUS (HCC): ICD-10-CM

## 2022-09-01 DIAGNOSIS — Z87.11 PERSONAL HISTORY OF GASTRIC ULCER: ICD-10-CM

## 2022-09-01 DIAGNOSIS — J44.1 COPD EXACERBATION (HCC): ICD-10-CM

## 2022-09-01 DIAGNOSIS — J21.0 RSV (ACUTE BRONCHIOLITIS DUE TO RESPIRATORY SYNCYTIAL VIRUS): ICD-10-CM

## 2022-09-01 DIAGNOSIS — F41.9 ANXIETY: ICD-10-CM

## 2022-09-01 DIAGNOSIS — E21.3 HYPERPARATHYROIDISM (HCC): ICD-10-CM

## 2022-09-01 DIAGNOSIS — M48.02 CERVICAL STENOSIS OF SPINAL CANAL: ICD-10-CM

## 2022-09-01 DIAGNOSIS — Z98.890 S/P CAROTID ENDARTERECTOMY: ICD-10-CM

## 2022-09-01 DIAGNOSIS — I15.2 HYPERTENSION ASSOCIATED WITH TYPE 2 DIABETES MELLITUS (HCC): ICD-10-CM

## 2022-09-01 DIAGNOSIS — E11.69 HYPERLIPIDEMIA ASSOCIATED WITH TYPE 2 DIABETES MELLITUS (HCC): ICD-10-CM

## 2022-09-01 DIAGNOSIS — N18.30 CONTROLLED TYPE 2 DIABETES MELLITUS WITH STAGE 3 CHRONIC KIDNEY DISEASE, WITHOUT LONG-TERM CURRENT USE OF INSULIN (HCC): ICD-10-CM

## 2022-09-01 DIAGNOSIS — Z95.1 S/P CABG (CORONARY ARTERY BYPASS GRAFT): ICD-10-CM

## 2022-09-01 PROCEDURE — 3078F DIAST BP <80 MM HG: CPT | Performed by: INTERNAL MEDICINE

## 2022-09-01 PROCEDURE — 3074F SYST BP LT 130 MM HG: CPT | Performed by: INTERNAL MEDICINE

## 2022-09-01 PROCEDURE — 1126F AMNT PAIN NOTED NONE PRSNT: CPT | Performed by: INTERNAL MEDICINE

## 2022-09-01 PROCEDURE — 1111F DSCHRG MED/CURRENT MED MERGE: CPT | Performed by: INTERNAL MEDICINE

## 2022-09-01 PROCEDURE — 99214 OFFICE O/P EST MOD 30 MIN: CPT | Performed by: INTERNAL MEDICINE

## 2022-09-01 NOTE — TELEPHONE ENCOUNTER
Patient called, verified Name and . She is requesting a copy of all her test results from her inpatient hospital stay from 2 weeks ago. Informed patient that she will have to call Medical Records. Contact number given.  Transferred the call per patient's request.

## 2022-09-02 NOTE — PLAN OF CARE
Student's Name:   Tin Brand Birth Date  2011  Sex  male  Race/Ethnicity   School/Grade Level/ID#     Address:   [address] [place] [id number]  Parent/Guardian             Telephone#                                            Home:                               Work:   IMMUNIZATIONS: To be completed by health care provider. The mo/da/yr for every dose administered is required. If a specific vaccine is medically contraindicated, a separate written statement must be attached by the health care responsible for completing the health examination explaining the medical reason for the contraindication.      Immunization History   Administered Date(s) Administered   • BCG 2012   • COVID-19 5-11Y Pfizer-BioNtech 11/18/2021, 12/09/2021   • DTaP, 5 Pertussis Antigens (DAPTACEL) 2012, 2012, 2012, 10/28/2013   • DTaP/HIB/IPV 10/12/2016   • HIB, Unspecified Formulation 12/02/2013, 08/07/2015   • Hep A, ped/adol, 2 dose 12/02/2013, 08/06/2014   • Hep B, adolescent or pediatric 2012, 2012, 2012, 2012, 09/02/2022   • Influenza, Unspecified Formulation 10/28/2013, 12/02/2013   • Influenza, quadrivalent, preserve-free 10/12/2016, 11/24/2017, 03/05/2020   • Measles 03/05/2013   • Measles Mumps Rubella Varicella 10/28/2013, 10/12/2016   • Pneumococcal Conjugate 13 Valent Vacc (Prevnar 13) 10/28/2013, 08/07/2015, 10/12/2016   • Polio, Oral 2012, 2012, 2012, 2012      Immunizations given 9/2/2022: None      Health care provider (MD, DO, APN, PA, school health professional, health official) verifying above immunization history must sign below. If adding dates to the above immunization history section, put your initials by date(s) and sign here.)  Signature                                                                 Title                                                 Problem: Patient/Family Goals  Goal: Patient/Family Long Term Goal  Patient's Long Term Goal: To not have anymore breathing problems    Interventions:  - Administer medications as prescribed  - See additional Care Plan goals for specific interventions    O Date  _____________________________________________9/2/22________________________________________  Signature                                                                 Title                                                Date  _____________________________________________________________________________________   ALTERNATIVE PROOF OF IMMUNITY   1. Clinical diagnosis (measles, mumps, hepatitis B) is allowed when verified by physician and supported with lab confirmation.  Attach copy of lab result.    * MEASLES (Rubeola)  MO   DA  YR          **MUMPS  MO DA  YR             HEPATITIS B  MO   DA   YR           VARICELLA  MO DA  YR      2. History of varicella (chickenpox) disease is acceptable if verified by health care provider, school health professional or health official.  Person signing below verifies that the parent/guardian’s description of varicella disease history is indicative of past infection and is accepting such history as documentation of disease.  Date of Disease                                  Signature                                                           Title   3. Laboratory Evidence of Immunity (check one)      __ Measles*         __ Mumps**        __ Rubella        __Varicella    (Attach copy of lab result)         *All measles cases diagnosed on or after July 1, 2002, must be confirmed by laboratory evidence.      **All mumps cases diagnosed on or after July 1, 2013, must be confirmed by laboratory evidence.     Completion of Alternative 1 or 3 MUST be accompanied by Labs & Physician Signature: ___________________________  Physician Statements of Immunity MUST be submitted to [facility] for review.     Certificates of Confucianist Exemption to Immunizations or Physician Medical Statements of Medical Contraindication Are Reviewed   and Maintained by the School Authority     (11/2015)                                         (COMPLETE BOTH SIDES)                                  Approved [facility]  GASTROINTESTINAL - ADULT  Goal: Minimal or absence of nausea and vomiting  INTERVENTIONS:  - Maintain adequate hydration with IV or PO as ordered and tolerated  - Nasogastric tube to low intermittent suction as ordered  - Evaluate effectiveness of ordered [facility] 3/2016    HEALTH HISTORY      TO BE COMPLETED AND SIGNED BY PARENT/GUARDIAN AND VERIFIED BY HEALTH CARE PROVIDER  ALLERGIES: (Food, drug, insect, other) has No Known Allergies.   MEDICATION: (List all prescribed or taken on a regular basis.) has a current medication list which includes the following prescription(s): albuterol (VENTOLIN) (2.5 MG/3ML) 0.083% nebulizer solution, amoxicillin-clavulanate (Augmentin) 250-62.5 MG/5ML suspension, albuterol 108 (90 Base) MCG/ACT inhaler, and Loratadine 5 MG/5ML Solution.   Diagnosis of asthma?  Child wakes during night coughing? Yes    No  Yes    No  Loss of function of one of paired  organs?(eye/ear/kidney/testicle) Yes    No    Birth defects? Yes    No  Hospitalizations? Yes    No    Developmental delay? Yes    No   When? What for? Yes    No    Blood disorders? Hemophilia,  Sickle Cell, Other? Explain. Yes    No  Surgery? (List all.)  When? What for? Yes    No    Diabetes? Yes    No  Serious injury or illness? Yes    No    Head injury/Concussion/Passed out? Yes    No  TB skin test positive (past/present)? * Yes    No *If yes, refer to [facility]   Seizures? What are they like?  Yes    No  TB disease (past or present)? * Yes    No *If yes, refer to [facility]   Heart problem/Shortness of breath?  Yes    No  Tobacco use (type, frequency)?  Yes    No    Heart murmur/High blood pressure? Yes    No  Alcohol/Drug use?  Yes    No    Dizziness or chest pain with exercise? Yes    No  Family history of sudden death  before age 50? (Cause?) Yes    No    Eye/Vision problems? ______           __Glasses          __Contacts  Last exam by eye doctor ______  Other concerns? (crossed eye, drooping lids, squinting, difficulty reading) Dental?   __ Braces     __ Bridge     __ Plate   __Other   Ear/Hearing problems? Yes    No  Information may be shared with appropriate personnel for health and educational purposes.   Bone/Joint problem/injury/scoliosis? Yes    No   restriction despite education. Pt does not adhere to dietary restriction despite education. Family will bring fluids and food from home.  CPM    Problem: CARDIOVASCULAR - ADULT  Goal: Maintains optimal cardiac output and hemodynamic stability  INTERVENTIONS Parent/Guardian  Signature                                               Date   PHYSICAL EXAMINATION REQUIREMENTS   Entire section below to be completed by MD//SREEKANTH/PA Head Circumference if <2-3 years old - /77 (BP Location: LUE - Left upper extremity, Patient Position: Sitting, Cuff Size: Small Adult)   Pulse 86   Temp 98.6 °F (37 °C) (Temporal)   Ht 4' 8.38\" (1.432 m)   Wt 43.6 kg (96 lb 3.7 oz)   BMI 21.29 kg/m²   BSA 1.31 m²    92 %ile (Z= 1.43) based on CDC (Boys, 2-20 Years) BMI-for-age based on BMI available as of 9/2/2022.   DIABETES SCREENING (NOT REQUIRED FOR ) BMI>85% age/sex: Yes     And any two of the following: Family History: Yes  Ethnic Minority: Yes    Signs of Insulin Resistance (hypertension, dyslipidemia, polycystic ovarian syndrome, acanthosis nigricans): No  At Risk: yes   LEAD RISK QUESTIONNAIRE Required for children age 6 months through 6 years enrolled in licensed or public school operated day care, , nursery school and/or . (Blood test required if resides in [place] or high risk zip [facility].)  Questionnaire Administered? No  Blood Test Indicated? No   Blood Test Date/Result:    TB SKIN OR BLOOD TEST Recommended only for children in high-risk groups including children immunosuppressed due to HIV infection or other conditions, frequent travel to or born in high prevalence countries or those exposed to adults in high-risk categories. See CDC guidelines. http://www.cdc.gov/tb/publications/factsheets/testing/TB_testing.htm.  Test Needed: No     Test performed: No                          Skin Test:    Date Read              /      /             Result:   Positive__      Negative__        mm________                          Blood Test: Date Reported       /      /               Result:  Positive__      Negative__      Value________  LAB TESTS (recommended) Date/Result  Date/Results   Hemoglobin or Hematocrit  Sickle Cell (when indicated)    Urinalysis NA  activity and pre-medicate as appropriate  Outcome: Progressing  Pt denies pain at this time.  CPM    Problem: SKIN/TISSUE INTEGRITY - ADULT  Goal: Skin integrity remains intact  INTERVENTIONS  - Assess and document risk factors for pressure ulcer developmen Developmental Screening Tool Normal - ASQ        SYSTEM REVIEW Normal  Comments/Follow-up/Needs  Normal Comments/Follow-up/Needs   Skin  Yes  Endocrine Yes    Ears Yes                  Screening Result Gastrointestinal Yes    Eyes Yes                  Screening Result: Genito-Urinary Yes                                      LMP:    Nose Yes  Neurologic Yes    Throat Yes  Musculoskeletal Yes    Mouth/Dental Yes  Spinal Exam Yes    Cardiovascular/HTN Yes  Nutritional status Yes    Respiratory Yes Diagnosis of Asthma: Yes   Mental Health Yes    Currently Prescribed Asthma Medication:        Yes  Quick-relief medication (e.g. Short Acting Beta Antagonist)        No  Controller medication (e.g. inhaled corticosteroid) Other     NEEDS/MODIFICATIONS required in the school setting: No restrictions DIETARY Needs/Restrictions: No restrictions   SPECIAL INSTRUCTIONS/DEVICES e.g. safety glasses, glass eye, chest protector for arrhythmia, pacemaker, prosthetic device, dental bridge, false teeth, athletic support/cup: No restrictions   MENTAL HEALTH/OTHER Is there anything else the school should know about this student?  If you would like to discuss this student’s health with school or school health personnel:  Not needed   EMERGENCY ACTION needed while at school due to child’s health condition (e.g. ,seizures, asthma, insect sting, food, peanut allergy, bleeding problem, diabetes, heart problem)?   No asthma action plan   On the basis of the examination on this day, I approve this child’s participation in                                (If No or Modified please attach explanation.)  PHYSICAL EDUCATION:  Yes                               INTERSCHOLASTIC SPORTS   Yes   Print Name  Romina Millan MD         Signature                                                                       Date: 9/2/2022   Address:  [facility] [address]  [facility] [address]  [address] [place] [address]  [id number]  [phone]         (Complete Both Sides)     Approved [facility] 3/2016

## 2022-09-12 ENCOUNTER — OFFICE VISIT (OUTPATIENT)
Dept: NEPHROLOGY | Facility: CLINIC | Age: 77
End: 2022-09-12
Payer: MEDICARE

## 2022-09-12 VITALS
DIASTOLIC BLOOD PRESSURE: 73 MMHG | HEART RATE: 80 BPM | SYSTOLIC BLOOD PRESSURE: 131 MMHG | BODY MASS INDEX: 30.22 KG/M2 | HEIGHT: 64 IN | WEIGHT: 177 LBS

## 2022-09-12 DIAGNOSIS — E21.3 HYPERPARATHYROIDISM (HCC): ICD-10-CM

## 2022-09-12 DIAGNOSIS — N18.32 STAGE 3B CHRONIC KIDNEY DISEASE (HCC): Primary | ICD-10-CM

## 2022-09-12 PROCEDURE — 99214 OFFICE O/P EST MOD 30 MIN: CPT | Performed by: INTERNAL MEDICINE

## 2022-09-12 PROCEDURE — 1126F AMNT PAIN NOTED NONE PRSNT: CPT | Performed by: INTERNAL MEDICINE

## 2022-09-12 PROCEDURE — 3078F DIAST BP <80 MM HG: CPT | Performed by: INTERNAL MEDICINE

## 2022-09-12 PROCEDURE — 3008F BODY MASS INDEX DOCD: CPT | Performed by: INTERNAL MEDICINE

## 2022-09-12 PROCEDURE — 3075F SYST BP GE 130 - 139MM HG: CPT | Performed by: INTERNAL MEDICINE

## 2022-09-12 NOTE — PROGRESS NOTES
09/12/22        Patient: Jennifer Mcardle   YOB: 1945   Date of Visit: 9/12/2022       Dear  Dr. Patrizia Gann MD,      Thank you for referring Jennifer Mcardle to my practice. Please find my assessment and plan below. As you know she is a 71-year-old female with a history of hypertension, adult onset diabetes mellitus, coronary artery disease, status post CABG, gout, asthma/COPD who I now had the pleasure of seeing for follow-up of chronic kidney disease stage III. The patient also underwent a recent renal evaluation. Work-up was somewhat complicated by the fact that she was hospitalized from July 27 through August 3, 2022 for acute exacerbation of COPD. Had just returned from a cruise in [place]. Was debilitated enough that she was subsequently transferred to a nursing home. Now back home. Overall the patient now states she is doing well without any chest pain, shortness of breath, GI or urinary tract symptoms. Energy level improved. On physical exam her blood pressure is 131/73 with a pulse of 80 and she weighed 177 pounds. Her neck was supple without JVD. Lungs were clear. Heart revealed a regular rate and rhythm with an S4 but no gallops, murmurs or rubs. Abdomen was soft, flat, nontender without organomegaly, masses or bruits. Extremities revealed no edema. I reviewed her recent laboratory studies done prior to this recent hospitalization and during this hospitalization. Sed rate, connective tissue profile, random urine for Bence-Bah protein and a urinalysis were unremarkable. A urine microalbumin to creatinine ratio was normal.  Finally a renal ultrasound showed renal cortical scarring but otherwise was unremarkable. Her creatinines during this recent hospitalization ranged from as low as 1.03-1. 33. I therefore informed the patient that she does have chronic kidney disease stage III.   This appears to be primary on the basis of hypertension as she has no significant proteinuria. Reinforced importance of both blood pressure and blood sugar control. Maintain adequate hydration. Avoid nonsteroidals. Repeat CBC and renal panel in 3 months. Will continue quarterly. I will see her again in 6 months for follow-up or sooner if clinically indicated. Thank you again for allowing me to participate in the care of your patient. If you have any questions please feel free to call.            Sincerely,   Trell Delgado MD   [address]  [address] [facility] [address]  [place] [address]    Document electronically generated by:  Trell Delgado MD

## 2022-09-20 ENCOUNTER — OFFICE VISIT (OUTPATIENT)
Dept: OTOLARYNGOLOGY | Facility: CLINIC | Age: 77
End: 2022-09-20

## 2022-09-20 DIAGNOSIS — H92.02 LEFT EAR PAIN: Primary | ICD-10-CM

## 2022-09-20 PROCEDURE — 99213 OFFICE O/P EST LOW 20 MIN: CPT | Performed by: OTOLARYNGOLOGY

## 2022-09-20 RX ORDER — HYDROCODONE BITARTRATE AND ACETAMINOPHEN 5; 325 MG/1; MG/1
0.5 TABLET ORAL 2 TIMES DAILY
COMMUNITY
Start: 2022-08-02 | End: 2022-09-20 | Stop reason: ALTCHOICE

## 2022-09-20 RX ORDER — MELATONIN
1
COMMUNITY
Start: 2022-08-02 | End: 2022-09-20 | Stop reason: ALTCHOICE

## 2022-09-20 RX ORDER — CYCLOBENZAPRINE HCL 5 MG
1 TABLET ORAL NIGHTLY PRN
COMMUNITY
Start: 2022-08-02 | End: 2022-09-20 | Stop reason: ALTCHOICE

## 2022-09-20 RX ORDER — METHYLPREDNISOLONE 4 MG/1
1 TABLET ORAL EVERY 6 HOURS PRN
COMMUNITY
Start: 2022-08-02 | End: 2022-09-20 | Stop reason: ALTCHOICE

## 2022-09-22 ENCOUNTER — TELEPHONE (OUTPATIENT)
Dept: INTERNAL MEDICINE CLINIC | Facility: CLINIC | Age: 77
End: 2022-09-22

## 2022-09-22 NOTE — TELEPHONE ENCOUNTER
Physician order dated 9/17/2022 received from [facility], placed on Dr [employer] for review and signature.

## 2022-10-03 ENCOUNTER — PATIENT OUTREACH (OUTPATIENT)
Dept: CASE MANAGEMENT | Age: 77
End: 2022-10-03

## 2022-10-03 DIAGNOSIS — E78.5 HYPERLIPIDEMIA ASSOCIATED WITH TYPE 2 DIABETES MELLITUS (HCC): ICD-10-CM

## 2022-10-03 DIAGNOSIS — Z95.1 S/P CABG (CORONARY ARTERY BYPASS GRAFT): ICD-10-CM

## 2022-10-03 DIAGNOSIS — M48.02 CERVICAL STENOSIS OF SPINAL CANAL: ICD-10-CM

## 2022-10-03 DIAGNOSIS — I50.32 CHRONIC DIASTOLIC CONGESTIVE HEART FAILURE (HCC): ICD-10-CM

## 2022-10-03 DIAGNOSIS — E21.3 HYPERPARATHYROIDISM (HCC): ICD-10-CM

## 2022-10-03 DIAGNOSIS — E11.22 CONTROLLED TYPE 2 DIABETES MELLITUS WITH STAGE 3 CHRONIC KIDNEY DISEASE, WITHOUT LONG-TERM CURRENT USE OF INSULIN (HCC): ICD-10-CM

## 2022-10-03 DIAGNOSIS — J44.9 COPD, SEVERE (HCC): ICD-10-CM

## 2022-10-03 DIAGNOSIS — N18.30 CONTROLLED TYPE 2 DIABETES MELLITUS WITH STAGE 3 CHRONIC KIDNEY DISEASE, WITHOUT LONG-TERM CURRENT USE OF INSULIN (HCC): ICD-10-CM

## 2022-10-03 DIAGNOSIS — E11.69 HYPERLIPIDEMIA ASSOCIATED WITH TYPE 2 DIABETES MELLITUS (HCC): ICD-10-CM

## 2022-10-03 DIAGNOSIS — E11.59 HYPERTENSION ASSOCIATED WITH TYPE 2 DIABETES MELLITUS (HCC): ICD-10-CM

## 2022-10-03 DIAGNOSIS — Z91.81 AT RISK FOR FALLS: ICD-10-CM

## 2022-10-03 DIAGNOSIS — J44.1 COPD EXACERBATION (HCC): ICD-10-CM

## 2022-10-03 DIAGNOSIS — Z98.890 S/P CAROTID ENDARTERECTOMY: ICD-10-CM

## 2022-10-03 DIAGNOSIS — I15.2 HYPERTENSION ASSOCIATED WITH TYPE 2 DIABETES MELLITUS (HCC): ICD-10-CM

## 2022-10-04 ENCOUNTER — PATIENT OUTREACH (OUTPATIENT)
Dept: CASE MANAGEMENT | Age: 77
End: 2022-10-04

## 2022-10-04 NOTE — PROGRESS NOTES
Attempted to call pt regarding zachary on oxygen tank. I left a detailed message on pt answering phone if any questions to call us back. Please see communication on 6/29/2022. Pt has an appointment with Pulmonary on 10/17/22 and can discuss it at that time.      Future Appointments   Date Time Provider Raj Quintanilla   10/17/2022 10:45 AM Geo Steen MD [facility]   2/21/2023 11:15 AM STATDELFINO, PROCEDURE ECWMOGIPROC None   3/7/2023 11:00 AM Guilherme Vaughan MD [address] [facility] OF THE [facility]   3/13/2023  1:20 PM Nuvia Iglesias MD [facility]     Total time - 3min  Total Monthly time- 3min

## 2022-10-04 NOTE — PROGRESS NOTES
Patient called back and I informed pt of communication below pt verbal understanding. Pt mentioned that she went to the bank yesterday with her care giver and she fell coming out of the car. Pt stated she fell on her butt and landed on her head pt was dizzy and stated it went away. I informed pt if symptomatic to call Dr. Summer Townsend office for an appointment. Pt stated that she is feeling good and no symptoms. Pt verbal understanding.     Future Appointments   Date Time Provider Raj Socorro   10/17/2022 10:45 AM Freida Steen MD [facility]   2/21/2023 11:15 AM STATHOPOULOS, PROCEDURE ECWMOGIPROC None   3/7/2023 11:00 AM Ezio Gardiner MD  [facility]   3/13/2023  1:20 PM Daja De La Cruz MD [facility]     Total time - 6 min  Total Monthly time- 6 min

## 2022-10-06 ENCOUNTER — MED REC SCAN ONLY (OUTPATIENT)
Dept: INTERNAL MEDICINE CLINIC | Facility: CLINIC | Age: 77
End: 2022-10-06

## 2022-10-06 ENCOUNTER — TELEPHONE (OUTPATIENT)
Dept: INTERNAL MEDICINE CLINIC | Facility: CLINIC | Age: 77
End: 2022-10-06

## 2022-10-06 NOTE — TELEPHONE ENCOUNTER
Plan of care certification period 8/16/2022-10/14/2022 received from [facility], placed on Dr Laura Bolanos desbenoit for review and signature.

## 2022-10-10 RX ORDER — MONTELUKAST SODIUM 10 MG/1
TABLET ORAL
Qty: 90 TABLET | Refills: 0 | Status: SHIPPED | OUTPATIENT
Start: 2022-10-10

## 2022-10-11 NOTE — TELEPHONE ENCOUNTER
Plan of care certification period 08/16/2022-10/14/2022 faxed to [phone] confirmation received.

## 2022-10-19 ENCOUNTER — TELEPHONE (OUTPATIENT)
Facility: CLINIC | Age: 77
End: 2022-10-19

## 2022-10-19 RX ORDER — PANTOPRAZOLE SODIUM 40 MG/1
40 TABLET, DELAYED RELEASE ORAL
Qty: 180 TABLET | Refills: 1 | Status: SHIPPED | OUTPATIENT
Start: 2022-10-19

## 2022-10-20 ENCOUNTER — OFFICE VISIT (OUTPATIENT)
Dept: ENDOCRINOLOGY CLINIC | Facility: CLINIC | Age: 77
End: 2022-10-20
Payer: MEDICARE

## 2022-10-20 ENCOUNTER — LAB ENCOUNTER (OUTPATIENT)
Dept: LAB | Facility: HOSPITAL | Age: 77
End: 2022-10-20
Attending: INTERNAL MEDICINE
Payer: MEDICARE

## 2022-10-20 VITALS
SYSTOLIC BLOOD PRESSURE: 153 MMHG | WEIGHT: 174 LBS | HEART RATE: 89 BPM | BODY MASS INDEX: 30 KG/M2 | DIASTOLIC BLOOD PRESSURE: 80 MMHG

## 2022-10-20 DIAGNOSIS — E21.0 PRIMARY HYPERPARATHYROIDISM (HCC): Primary | ICD-10-CM

## 2022-10-20 DIAGNOSIS — N18.32 STAGE 3B CHRONIC KIDNEY DISEASE (HCC): ICD-10-CM

## 2022-10-20 DIAGNOSIS — E83.52 HYPERCALCEMIA: ICD-10-CM

## 2022-10-20 DIAGNOSIS — E55.9 VITAMIN D DEFICIENCY: ICD-10-CM

## 2022-10-20 DIAGNOSIS — E04.2 MULTIPLE THYROID NODULES: ICD-10-CM

## 2022-10-20 DIAGNOSIS — E21.3 HYPERPARATHYROIDISM (HCC): ICD-10-CM

## 2022-10-20 LAB
ALBUMIN SERPL-MCNC: 3.9 G/DL (ref 3.4–5)
ANION GAP SERPL CALC-SCNC: 6 MMOL/L (ref 0–18)
BASOPHILS # BLD AUTO: 0.04 X10(3) UL (ref 0–0.2)
BASOPHILS NFR BLD AUTO: 0.6 %
BUN BLD-MCNC: 23 MG/DL (ref 7–18)
BUN/CREAT SERPL: 18.1 (ref 10–20)
CALCIUM BLD-MCNC: 11.1 MG/DL (ref 8.5–10.1)
CHLORIDE SERPL-SCNC: 102 MMOL/L (ref 98–112)
CO2 SERPL-SCNC: 30 MMOL/L (ref 21–32)
CREAT BLD-MCNC: 1.27 MG/DL
DEPRECATED RDW RBC AUTO: 45.1 FL (ref 35.1–46.3)
EOSINOPHIL # BLD AUTO: 0.3 X10(3) UL (ref 0–0.7)
EOSINOPHIL NFR BLD AUTO: 4.8 %
ERYTHROCYTE [DISTWIDTH] IN BLOOD BY AUTOMATED COUNT: 13 % (ref 11–15)
GFR SERPLBLD BASED ON 1.73 SQ M-ARVRAT: 44 ML/MIN/1.73M2 (ref 60–?)
GLUCOSE BLD-MCNC: 105 MG/DL (ref 70–99)
HCT VFR BLD AUTO: 46.1 %
HGB BLD-MCNC: 14.3 G/DL
IMM GRANULOCYTES # BLD AUTO: 0.02 X10(3) UL (ref 0–1)
IMM GRANULOCYTES NFR BLD: 0.3 %
LYMPHOCYTES # BLD AUTO: 1.58 X10(3) UL (ref 1–4)
LYMPHOCYTES NFR BLD AUTO: 25.5 %
MCH RBC QN AUTO: 29.3 PG (ref 26–34)
MCHC RBC AUTO-ENTMCNC: 31 G/DL (ref 31–37)
MCV RBC AUTO: 94.5 FL
MONOCYTES # BLD AUTO: 0.71 X10(3) UL (ref 0.1–1)
MONOCYTES NFR BLD AUTO: 11.5 %
NEUTROPHILS # BLD AUTO: 3.55 X10 (3) UL (ref 1.5–7.7)
NEUTROPHILS # BLD AUTO: 3.55 X10(3) UL (ref 1.5–7.7)
NEUTROPHILS NFR BLD AUTO: 57.3 %
OSMOLALITY SERPL CALC.SUM OF ELEC: 290 MOSM/KG (ref 275–295)
PHOSPHATE SERPL-MCNC: 2.7 MG/DL (ref 2.5–4.9)
PLATELET # BLD AUTO: 257 10(3)UL (ref 150–450)
POTASSIUM SERPL-SCNC: 4.5 MMOL/L (ref 3.5–5.1)
PTH-INTACT SERPL-MCNC: 123.8 PG/ML (ref 18.5–88)
RBC # BLD AUTO: 4.88 X10(6)UL
SODIUM SERPL-SCNC: 138 MMOL/L (ref 136–145)
VIT D+METAB SERPL-MCNC: 23.3 NG/ML (ref 30–100)
WBC # BLD AUTO: 6.2 X10(3) UL (ref 4–11)

## 2022-10-20 PROCEDURE — 99213 OFFICE O/P EST LOW 20 MIN: CPT | Performed by: INTERNAL MEDICINE

## 2022-10-20 PROCEDURE — 85025 COMPLETE CBC W/AUTO DIFF WBC: CPT

## 2022-10-20 PROCEDURE — 82306 VITAMIN D 25 HYDROXY: CPT

## 2022-10-20 PROCEDURE — 36415 COLL VENOUS BLD VENIPUNCTURE: CPT

## 2022-10-20 PROCEDURE — 80069 RENAL FUNCTION PANEL: CPT

## 2022-10-20 PROCEDURE — 1126F AMNT PAIN NOTED NONE PRSNT: CPT | Performed by: INTERNAL MEDICINE

## 2022-10-20 PROCEDURE — 3077F SYST BP >= 140 MM HG: CPT | Performed by: INTERNAL MEDICINE

## 2022-10-20 PROCEDURE — 3079F DIAST BP 80-89 MM HG: CPT | Performed by: INTERNAL MEDICINE

## 2022-10-20 PROCEDURE — 83970 ASSAY OF PARATHORMONE: CPT

## 2022-10-21 ENCOUNTER — TELEPHONE (OUTPATIENT)
Dept: NEPHROLOGY | Facility: CLINIC | Age: 77
End: 2022-10-21

## 2022-10-21 ENCOUNTER — TELEPHONE (OUTPATIENT)
Dept: ENDOCRINOLOGY CLINIC | Facility: CLINIC | Age: 77
End: 2022-10-21

## 2022-10-21 DIAGNOSIS — E21.0 PRIMARY HYPERPARATHYROIDISM (HCC): Primary | ICD-10-CM

## 2022-10-21 RX ORDER — CINACALCET 30 MG/1
15 TABLET, FILM COATED ORAL
Qty: 30 TABLET | Refills: 2 | Status: SHIPPED | OUTPATIENT
Start: 2022-10-21

## 2022-10-21 NOTE — TELEPHONE ENCOUNTER
Kidney function a little worse. Make sure she is drinking plenty of fluids. Calcium level has increased as well. Review with Dr. Tanja Rivera. Repeat labs in 1 month.

## 2022-10-21 NOTE — TELEPHONE ENCOUNTER
Spoke to patient regarding Dr. Santos Serfrancisca result notes below. Patient verbalized understanding, prescription sent and lab order placed to be done again in 4 weeks. Patient will put a reminder in her phone for next upcoming appointment.

## 2022-10-21 NOTE — TELEPHONE ENCOUNTER
Please call patient - unfortunately her calcium level has gotten worst on repeat blood work. I would like to start her on medication as discussed at her visit. Start Cinacalcet 15mg PO daily - she can take 1/2 tablet of 30mg dose since she was concerned about medication. #30, refill 2. Repeat BMP in 4 weeks. Thanks.

## 2022-10-25 RX ORDER — HYDRALAZINE HYDROCHLORIDE 25 MG/1
TABLET, FILM COATED ORAL
Qty: 90 TABLET | Refills: 1 | Status: SHIPPED | OUTPATIENT
Start: 2022-10-25

## 2022-10-25 NOTE — TELEPHONE ENCOUNTER
Refill passed per [address] protocol.       Requested Prescriptions   Pending Prescriptions Disp Refills    hydrALAZINE 25 MG Oral Tab 90 tablet 1     Si/2 tab BID        Hypertension Medications Protocol Passed - 10/25/2022  5:50 PM        Passed - CMP or BMP in past 12 months        Passed - Last serum creatinine< 2.0     Lab Results   Component Value Date    CREATSERUM 1.27 (H) 10/20/2022                 Passed - Appointment in past 6 or next 3 months               Future Appointments         Provider Department Appt Notes    In 3 weeks Hubert Steen MD [place], [address] 6 months-pt advised location changed/le    In 3 months STATHOPOULOS, PROCEDURE [address] GI PROCEDURE Colonoscopy @ [address] [place] [address]    In 4 months Pinky Parkinson MD [facility], [address], [place] [address]    In 4 months Glynn Lock MD [address], [address] [facility] 6 MOS    In 5 months MD Alex [address] 6 month fu             Recent Outpatient Visits              5 days ago Primary hyperparathyroidism [facility])    [address] Endocrinology Sarahi Mitchell MD    Office Visit    1 month ago Left ear pain    [facility], [address] [facility],[address], [place] Ohara MD    Office Visit    1 month ago Stage 3b chronic kidney disease [facility])    Dennis Harp MD    Office Visit    1 month ago Asthma with COPD with exacerbation [facility])    [address] MD    Office Visit    3 months ago Hypercalcemia    [address] Endocrinology Sarahi Mitchell MD    Office Visit

## 2022-10-25 NOTE — TELEPHONE ENCOUNTER
An individual from [facility] [email or web address] [facility] LVM on the [address] main p# stating that patient needs a refill on hydrALAZINE. Please contact patient at [phone]. No further info provided.

## 2022-10-27 ENCOUNTER — TELEPHONE (OUTPATIENT)
Dept: ENDOCRINOLOGY CLINIC | Facility: CLINIC | Age: 77
End: 2022-10-27

## 2022-10-28 RX ORDER — CINACALCET 30 MG/1
30 TABLET, FILM COATED ORAL EVERY OTHER DAY
Qty: 30 TABLET | Refills: 2 | Status: SHIPPED | OUTPATIENT
Start: 2022-10-28 | End: 2022-10-28

## 2022-10-28 RX ORDER — CINACALCET 30 MG/1
30 TABLET, FILM COATED ORAL EVERY OTHER DAY
Qty: 45 TABLET | Refills: 0 | Status: SHIPPED | OUTPATIENT
Start: 2022-10-28

## 2022-10-28 NOTE — TELEPHONE ENCOUNTER
Spoke with Payal and [place]. States pill is unable to be spit in half, not scored and will alter medication release. Dr. Sherly Sagastume please advise.

## 2022-11-01 ENCOUNTER — TELEPHONE (OUTPATIENT)
Dept: INTERNAL MEDICINE CLINIC | Facility: CLINIC | Age: 77
End: 2022-11-01

## 2022-11-01 NOTE — TELEPHONE ENCOUNTER
Discharge summary dated 10/13/2022 received from [employer] received, placed on Dr Semaj de jesus for review and signature.

## 2022-11-02 ENCOUNTER — PATIENT OUTREACH (OUTPATIENT)
Dept: CASE MANAGEMENT | Age: 77
End: 2022-11-02

## 2022-11-02 DIAGNOSIS — J44.9 COPD, SEVERE (HCC): ICD-10-CM

## 2022-11-02 DIAGNOSIS — Z95.1 S/P CABG (CORONARY ARTERY BYPASS GRAFT): ICD-10-CM

## 2022-11-02 DIAGNOSIS — N18.31 STAGE 3A CHRONIC KIDNEY DISEASE (HCC): ICD-10-CM

## 2022-11-02 DIAGNOSIS — E11.59 HYPERTENSION ASSOCIATED WITH TYPE 2 DIABETES MELLITUS (HCC): ICD-10-CM

## 2022-11-02 DIAGNOSIS — J44.1 COPD EXACERBATION (HCC): ICD-10-CM

## 2022-11-02 DIAGNOSIS — E78.5 HYPERLIPIDEMIA ASSOCIATED WITH TYPE 2 DIABETES MELLITUS (HCC): ICD-10-CM

## 2022-11-02 DIAGNOSIS — M1A.9XX1 CHRONIC TOPHACEOUS GOUT: ICD-10-CM

## 2022-11-02 DIAGNOSIS — E11.22 CONTROLLED TYPE 2 DIABETES MELLITUS WITH STAGE 3 CHRONIC KIDNEY DISEASE, WITHOUT LONG-TERM CURRENT USE OF INSULIN (HCC): ICD-10-CM

## 2022-11-02 DIAGNOSIS — N18.30 CONTROLLED TYPE 2 DIABETES MELLITUS WITH STAGE 3 CHRONIC KIDNEY DISEASE, WITHOUT LONG-TERM CURRENT USE OF INSULIN (HCC): ICD-10-CM

## 2022-11-02 DIAGNOSIS — Z85.3 HISTORY OF BREAST CANCER: ICD-10-CM

## 2022-11-02 DIAGNOSIS — E11.69 HYPERLIPIDEMIA ASSOCIATED WITH TYPE 2 DIABETES MELLITUS (HCC): ICD-10-CM

## 2022-11-02 DIAGNOSIS — E21.3 HYPERPARATHYROIDISM (HCC): ICD-10-CM

## 2022-11-02 DIAGNOSIS — I15.2 HYPERTENSION ASSOCIATED WITH TYPE 2 DIABETES MELLITUS (HCC): ICD-10-CM

## 2022-11-02 DIAGNOSIS — I50.32 CHRONIC DIASTOLIC CONGESTIVE HEART FAILURE (HCC): ICD-10-CM

## 2022-11-03 NOTE — TELEPHONE ENCOUNTER
Discharge summary dated 10/13/2022 signed and faxed to [address] at [phone], confirmation received.

## 2022-11-07 ENCOUNTER — TELEPHONE (OUTPATIENT)
Dept: INTERNAL MEDICINE CLINIC | Facility: CLINIC | Age: 77
End: 2022-11-07

## 2022-11-07 NOTE — TELEPHONE ENCOUNTER
[facility] order date 09/17/2022 placed on Dr. Job Holstein desk for signature and review.

## 2022-11-07 NOTE — TELEPHONE ENCOUNTER
[facility] order date 9/24/2022, placed on Dr. Braulio de jesus for signature and review.

## 2022-11-07 NOTE — TELEPHONE ENCOUNTER
[facility] order date 9/14/2022, placed on Dr. Kathy de jesus for signature and review.

## 2022-11-21 ENCOUNTER — OFFICE VISIT (OUTPATIENT)
Dept: PULMONOLOGY | Facility: CLINIC | Age: 77
End: 2022-11-21
Payer: MEDICARE

## 2022-11-21 VITALS
DIASTOLIC BLOOD PRESSURE: 73 MMHG | BODY MASS INDEX: 29.88 KG/M2 | HEART RATE: 74 BPM | SYSTOLIC BLOOD PRESSURE: 143 MMHG | OXYGEN SATURATION: 91 % | RESPIRATION RATE: 16 BRPM | WEIGHT: 175 LBS | HEIGHT: 64 IN

## 2022-11-21 DIAGNOSIS — J44.9 CHRONIC OBSTRUCTIVE PULMONARY DISEASE, UNSPECIFIED COPD TYPE (HCC): Primary | ICD-10-CM

## 2022-11-21 DIAGNOSIS — Z23 NEED FOR IMMUNIZATION AGAINST INFLUENZA: ICD-10-CM

## 2022-11-21 PROCEDURE — G0008 ADMIN INFLUENZA VIRUS VAC: HCPCS | Performed by: INTERNAL MEDICINE

## 2022-11-21 PROCEDURE — 90662 IIV NO PRSV INCREASED AG IM: CPT | Performed by: INTERNAL MEDICINE

## 2022-11-21 PROCEDURE — 3077F SYST BP >= 140 MM HG: CPT | Performed by: INTERNAL MEDICINE

## 2022-11-21 PROCEDURE — 3008F BODY MASS INDEX DOCD: CPT | Performed by: INTERNAL MEDICINE

## 2022-11-21 PROCEDURE — 99214 OFFICE O/P EST MOD 30 MIN: CPT | Performed by: INTERNAL MEDICINE

## 2022-11-21 PROCEDURE — 3078F DIAST BP <80 MM HG: CPT | Performed by: INTERNAL MEDICINE

## 2022-11-21 NOTE — PROGRESS NOTES
Signed DME order for nebulizer faxed to E with chart notes and demographics attached. Received confirmation.

## 2022-11-22 ENCOUNTER — NURSE TRIAGE (OUTPATIENT)
Dept: INTERNAL MEDICINE CLINIC | Facility: CLINIC | Age: 77
End: 2022-11-22

## 2022-11-23 ENCOUNTER — OFFICE VISIT (OUTPATIENT)
Dept: INTERNAL MEDICINE CLINIC | Facility: CLINIC | Age: 77
End: 2022-11-23
Payer: MEDICARE

## 2022-11-23 VITALS
HEIGHT: 64 IN | BODY MASS INDEX: 29.88 KG/M2 | SYSTOLIC BLOOD PRESSURE: 133 MMHG | DIASTOLIC BLOOD PRESSURE: 73 MMHG | HEART RATE: 77 BPM | WEIGHT: 175 LBS

## 2022-11-23 DIAGNOSIS — N76.89 BOIL, VAGINA: Primary | ICD-10-CM

## 2022-11-23 PROCEDURE — 3075F SYST BP GE 130 - 139MM HG: CPT | Performed by: NURSE PRACTITIONER

## 2022-11-23 PROCEDURE — 99213 OFFICE O/P EST LOW 20 MIN: CPT | Performed by: NURSE PRACTITIONER

## 2022-11-23 PROCEDURE — 3008F BODY MASS INDEX DOCD: CPT | Performed by: NURSE PRACTITIONER

## 2022-11-23 PROCEDURE — 3078F DIAST BP <80 MM HG: CPT | Performed by: NURSE PRACTITIONER

## 2022-11-23 RX ORDER — DOXYCYCLINE HYCLATE 100 MG/1
100 CAPSULE ORAL 2 TIMES DAILY
Qty: 14 CAPSULE | Refills: 0 | Status: SHIPPED | OUTPATIENT
Start: 2022-11-23

## 2022-11-24 RX ORDER — GLIPIZIDE 5 MG/1
TABLET ORAL
Qty: 90 TABLET | Refills: 0 | Status: SHIPPED | OUTPATIENT
Start: 2022-11-24

## 2022-11-24 NOTE — TELEPHONE ENCOUNTER
Please review. Protocol failed / No protocol.    Requested Prescriptions   Pending Prescriptions Disp Refills    GLIPIZIDE 5 MG Oral Tab [Pharmacy Med Name: GLIPIZIDE 5 MG Tablet] 90 tablet 0     Sig: TAKE 1 TABLET EVERY DAY BEFORE BREAKFAST       Diabetes Medication Protocol Failed - 2022  5:31 PM        Failed - EGFRCR or GFRNAA > 50     GFR Evaluation  EGFRCR: 44 , resulted on 10/20/2022          Passed - Last A1C < 7.5 and within past 6 months     Lab Results   Component Value Date    A1C 5.9 (H) 2022             Passed - In person appointment or virtual visit in the past 6 mos or appointment in next 3 mos     Recent Outpatient Visits              Today Boil, vagina    [address], [address] Johny Cease, [address]    Office Visit    2 days ago Chronic obstructive pulmonary disease, unspecified COPD type [facility])    [address] Zoraida Apgar, MD    Office Visit    1 month ago Primary hyperparathyroidism [facility])    [address] Endocrinology Eliz Garcia MD    Office Visit    2 months ago Left ear pain    [facility], [address], [place], Rossy Thomas MD    Office Visit    2 months ago Stage 3b chronic kidney disease [facility])    Vika Fitzgerald MD    Office Visit          Future Appointments         Provider Department Appt Notes    In 3 months STATHOPOULOS, PROCEDURE [address] GI PROCEDURE Colonoscopy @ [address] [place] [address]    In 3 months Gissell Arredondo MD [facility], [address], [place] [address]    In 3 months Angelika Sands MD [address], [address]    In 4 months Eliz Garcia MD [address] 6 month fu     In 6 months Zoraida Apgar, MD [address] 6 mos               Passed - GFR in the past 12 months            Future Appointments         Provider Department Appt Notes    In 3 months STATHOPOULOS, PROCEDURE [address] GI PROCEDURE Colonoscopy @ [address] [facility]    In 3 months Carlo Hurst MD [facility], [address], [place] [address]    In 3 months [facility], [address]    In 4 months Jose Stoyr MD [facility], [address] 6 month fu     In 6 months MD Alex [address] 6 mos           Recent Outpatient Visits              Today Boil, vagina    [facility], [address], [place], LIZ    Office Visit    2 days ago Chronic obstructive pulmonary disease, unspecified COPD type [facility])    [address], [address] [facility], Maddy Baig MD    Office Visit    1 month ago Primary hyperparathyroidism [facility])    [facility], [facility] Endocrinology Jose Story MD    Office Visit    2 months ago Left ear pain    [facility], [address], Danilo Cash MD    Office Visit    2 months ago Stage 3b chronic kidney disease [facility])    Dennis Wheat MD    Office Visit

## 2022-12-01 ENCOUNTER — PATIENT OUTREACH (OUTPATIENT)
Dept: CASE MANAGEMENT | Age: 77
End: 2022-12-01

## 2022-12-01 NOTE — PROGRESS NOTES
Called patient regarding CCM monthly  and left a message for patient to call back when they can. Reviewed patient chart. Left contact my contact number [phone].        Time Spent This Encounter Total: 3  min medical record review  Monthly Minute Total including today: 3 minutes

## 2022-12-02 ENCOUNTER — NURSE TRIAGE (OUTPATIENT)
Dept: INTERNAL MEDICINE CLINIC | Facility: CLINIC | Age: 77
End: 2022-12-02

## 2022-12-03 ENCOUNTER — APPOINTMENT (OUTPATIENT)
Dept: GENERAL RADIOLOGY | Age: 77
End: 2022-12-03
Attending: NURSE PRACTITIONER
Payer: MEDICARE

## 2022-12-03 ENCOUNTER — HOSPITAL ENCOUNTER (OUTPATIENT)
Age: 77
Discharge: HOME | End: 2022-12-03
Payer: MEDICARE

## 2022-12-03 VITALS
SYSTOLIC BLOOD PRESSURE: 159 MMHG | DIASTOLIC BLOOD PRESSURE: 65 MMHG | HEART RATE: 77 BPM | TEMPERATURE: 97 F | RESPIRATION RATE: 18 BRPM | OXYGEN SATURATION: 97 %

## 2022-12-03 DIAGNOSIS — M19.011 PRIMARY OSTEOARTHRITIS OF RIGHT SHOULDER: ICD-10-CM

## 2022-12-03 DIAGNOSIS — M25.511 CHRONIC RIGHT SHOULDER PAIN: Primary | ICD-10-CM

## 2022-12-03 DIAGNOSIS — G89.29 CHRONIC RIGHT SHOULDER PAIN: Primary | ICD-10-CM

## 2022-12-03 PROCEDURE — 99213 OFFICE O/P EST LOW 20 MIN: CPT | Performed by: NURSE PRACTITIONER

## 2022-12-03 PROCEDURE — 73030 X-RAY EXAM OF SHOULDER: CPT | Performed by: NURSE PRACTITIONER

## 2022-12-03 NOTE — ED INITIAL ASSESSMENT (HPI)
Pt c/o right arm pain from elbow to shoulder, rates pain 10/10. States pain has been worsening over the last week. Pt denies injury. Cannot take pain medicine d/t \"my kidneys are bad\". States pain is worse when shes sleeping, when laying on or lifting arm up.

## 2022-12-05 ENCOUNTER — PATIENT OUTREACH (OUTPATIENT)
Dept: CASE MANAGEMENT | Age: 77
End: 2022-12-05

## 2022-12-05 DIAGNOSIS — E78.5 HYPERLIPIDEMIA ASSOCIATED WITH TYPE 2 DIABETES MELLITUS (HCC): ICD-10-CM

## 2022-12-05 DIAGNOSIS — N18.30 CONTROLLED TYPE 2 DIABETES MELLITUS WITH STAGE 3 CHRONIC KIDNEY DISEASE, WITHOUT LONG-TERM CURRENT USE OF INSULIN (HCC): ICD-10-CM

## 2022-12-05 DIAGNOSIS — E11.69 HYPERLIPIDEMIA ASSOCIATED WITH TYPE 2 DIABETES MELLITUS (HCC): ICD-10-CM

## 2022-12-05 DIAGNOSIS — J44.1 COPD EXACERBATION (HCC): ICD-10-CM

## 2022-12-05 DIAGNOSIS — M48.02 CERVICAL STENOSIS OF SPINAL CANAL: ICD-10-CM

## 2022-12-05 DIAGNOSIS — J44.9 COPD, SEVERE (HCC): ICD-10-CM

## 2022-12-05 DIAGNOSIS — N18.31 STAGE 3A CHRONIC KIDNEY DISEASE (HCC): ICD-10-CM

## 2022-12-05 DIAGNOSIS — I50.32 CHRONIC DIASTOLIC CONGESTIVE HEART FAILURE (HCC): ICD-10-CM

## 2022-12-05 DIAGNOSIS — I15.2 HYPERTENSION ASSOCIATED WITH TYPE 2 DIABETES MELLITUS (HCC): ICD-10-CM

## 2022-12-05 DIAGNOSIS — Z95.1 S/P CABG (CORONARY ARTERY BYPASS GRAFT): ICD-10-CM

## 2022-12-05 DIAGNOSIS — E21.3 HYPERPARATHYROIDISM (HCC): ICD-10-CM

## 2022-12-05 DIAGNOSIS — E11.22 CONTROLLED TYPE 2 DIABETES MELLITUS WITH STAGE 3 CHRONIC KIDNEY DISEASE, WITHOUT LONG-TERM CURRENT USE OF INSULIN (HCC): ICD-10-CM

## 2022-12-05 DIAGNOSIS — Z91.81 AT RISK FOR FALLS: ICD-10-CM

## 2022-12-05 DIAGNOSIS — E11.59 HYPERTENSION ASSOCIATED WITH TYPE 2 DIABETES MELLITUS (HCC): ICD-10-CM

## 2022-12-08 ENCOUNTER — TELEPHONE (OUTPATIENT)
Dept: INTERNAL MEDICINE CLINIC | Facility: CLINIC | Age: 77
End: 2022-12-08

## 2022-12-08 DIAGNOSIS — N76.89 VAGINA BOIL: Primary | ICD-10-CM

## 2022-12-08 NOTE — TELEPHONE ENCOUNTER
Good afternoon,   Please refer patient to urgent care. We do not have any openings until Wed 12/14/22 with Dr. Navarro Smalls in our [facility] office.

## 2022-12-08 NOTE — TELEPHONE ENCOUNTER
Unable to reach pt, Left message to call back.     Unable to reach Gen surgery to assist with appt x 2 only ringing , will route to review Provider's message for appt

## 2022-12-08 NOTE — TELEPHONE ENCOUNTER
Patient was notified with understanding. I do not see an appointment scheduled with Dr. Sergei Jeter. Phone number provided to patient.   She will call in the am.

## 2022-12-08 NOTE — TELEPHONE ENCOUNTER
Patient states she took antibiotic doxycycline and the boil on her vagina went away, Patient states now boil had returned. Per office visit 11/23/22, plan to follow up with surgery for I and D if boil does not resolve. Patient was to follow up in 1 week. Appointment scheduled for Saturday 12/10/22 with LIZ Rodriguez. Patient unable to make earlier appointment. Please advise further.  Referral pended

## 2022-12-08 NOTE — TELEPHONE ENCOUNTER
Patient was advised of messages below. Patient states there is no pain, would not mind waiting. Patient would only like  to see a female surgeon. Dr. Jorge Andrade , female surgeon and earliest appointment would be Dec 22nd. Is it ok to wait?

## 2022-12-09 NOTE — TELEPHONE ENCOUNTER
Patient called (identified name and ),   States someone called this AM but she was in bathroom. States same as before, boil is small, asking if she should see surgeon? Advised yes, make an appointment for evaluation, it might need to be drained if it enlarges again. Patient agreed to plan, has number to call.

## 2022-12-14 ENCOUNTER — HOSPITAL ENCOUNTER (OUTPATIENT)
Dept: ULTRASOUND IMAGING | Facility: HOSPITAL | Age: 77
Discharge: HOME | End: 2022-12-14
Attending: INTERNAL MEDICINE
Payer: MEDICARE

## 2022-12-14 DIAGNOSIS — I25.10 CORONARY ARTERY DISEASE INVOLVING NATIVE CORONARY ARTERY OF NATIVE HEART WITHOUT ANGINA PECTORIS: ICD-10-CM

## 2022-12-14 DIAGNOSIS — E78.5 HYPERLIPIDEMIA ASSOCIATED WITH TYPE 2 DIABETES MELLITUS (HCC): ICD-10-CM

## 2022-12-14 DIAGNOSIS — R06.02 SOB (SHORTNESS OF BREATH): ICD-10-CM

## 2022-12-14 DIAGNOSIS — E11.69 HYPERLIPIDEMIA ASSOCIATED WITH TYPE 2 DIABETES MELLITUS (HCC): ICD-10-CM

## 2022-12-14 DIAGNOSIS — I25.10 CORONARY ARTERY DISEASE: ICD-10-CM

## 2022-12-14 DIAGNOSIS — Z95.1 S/P CABG (CORONARY ARTERY BYPASS GRAFT): ICD-10-CM

## 2022-12-14 PROCEDURE — 93880 EXTRACRANIAL BILAT STUDY: CPT | Performed by: INTERNAL MEDICINE

## 2022-12-15 DIAGNOSIS — E11.59 HYPERTENSION ASSOCIATED WITH TYPE 2 DIABETES MELLITUS (HCC): Primary | ICD-10-CM

## 2022-12-15 DIAGNOSIS — I15.2 HYPERTENSION ASSOCIATED WITH TYPE 2 DIABETES MELLITUS (HCC): Primary | ICD-10-CM

## 2022-12-15 RX ORDER — AMLODIPINE BESYLATE 5 MG/1
5 TABLET ORAL DAILY
Qty: 90 TABLET | Refills: 1 | OUTPATIENT
Start: 2022-12-15

## 2022-12-20 RX ORDER — AMLODIPINE BESYLATE 5 MG/1
5 TABLET ORAL DAILY
Qty: 90 TABLET | Refills: 1 | Status: SHIPPED | OUTPATIENT
Start: 2022-12-20

## 2022-12-20 NOTE — TELEPHONE ENCOUNTER
Left a message to call back to make sure she is still on the medication    Amlodipine  Last filed on 3/18/22  #90 and 1 refill. At the 11/23/22 office visit the Amlodipine 5 mg is on the medication list.    The patient does not read [employer] messages.

## 2022-12-20 NOTE — TELEPHONE ENCOUNTER
Patient called back still on the 5 mg dose    Not on prednisone any longer    Currently taking Cinacalcet 30 mg every other day

## 2022-12-22 ENCOUNTER — TELEPHONE (OUTPATIENT)
Dept: INTERNAL MEDICINE CLINIC | Facility: CLINIC | Age: 77
End: 2022-12-22

## 2022-12-22 NOTE — TELEPHONE ENCOUNTER
Patient states every time she takes prednisone for her gout, her blood sugar goes up. Patient tests her blood sugar every day in the morning. Patient states she was hospitalized in July and then was told she can hold the glipizide when not on oral steroids. Patient states she takes the glipizide when her blood sugars are over 109. Then the next day, blood sugars are 94-98. Patient afraid to take glipizide every day,will have to be on it for life. Patient states ,endocrinology prescribed medication for high calcium levels called Cinacalcet 30 mg. She was taking it every day, but now told to take it every other day. Patient states she is sensitive to medications, this medication makes her blood pressure go up to 140-148/76. Her normal blood pressure is 119-120/65-70. Patient denies any lightheadedness,blurry vision or headache. Patient scheduled for follow up visit 1/3/22 at 3pm to discuss with . Patient will call back with any questions or worsening symptoms.

## 2023-01-03 DIAGNOSIS — J44.1 COPD EXACERBATION (HCC): Primary | ICD-10-CM

## 2023-01-03 RX ORDER — AZITHROMYCIN 250 MG/1
TABLET, FILM COATED ORAL
Qty: 6 TABLET | Refills: 0 | Status: SHIPPED | OUTPATIENT
Start: 2023-01-03 | End: 2023-01-08

## 2023-01-03 NOTE — PROGRESS NOTES
Spoke with patient, she reports worsening SOB and cough for the past 24 hours, oxygen at home is 94% on home oxygen. Reports body aches and chills. Declines to come in and testing for flu or covid. Advised patient to go to ER, Pt refusing, requests anitbiotics, states zpak always helps. Advised patient that she needs evaluation. Will send Levada Smoker, hold colchicine due to drug interaction (Pt reports she ran out of medication this week, waiiting on refill). Advised patient to test for covid, reviewed tx window is 5 days and test takes 24 hours to return. Pt declines testing. Advised to f/u with PCP tomorrow.

## 2023-01-04 ENCOUNTER — NURSE TRIAGE (OUTPATIENT)
Dept: INTERNAL MEDICINE CLINIC | Facility: CLINIC | Age: 78
End: 2023-01-04

## 2023-01-10 ENCOUNTER — TELEPHONE (OUTPATIENT)
Dept: PULMONOLOGY | Facility: CLINIC | Age: 78
End: 2023-01-10

## 2023-01-10 ENCOUNTER — PATIENT OUTREACH (OUTPATIENT)
Dept: CASE MANAGEMENT | Age: 78
End: 2023-01-10

## 2023-01-10 DIAGNOSIS — J44.9 COPD, SEVERE (HCC): ICD-10-CM

## 2023-01-10 DIAGNOSIS — Z91.81 AT RISK FOR FALLS: ICD-10-CM

## 2023-01-10 DIAGNOSIS — J44.1 COPD EXACERBATION (HCC): ICD-10-CM

## 2023-01-10 DIAGNOSIS — Z98.890 S/P CAROTID ENDARTERECTOMY: ICD-10-CM

## 2023-01-10 DIAGNOSIS — Z85.3 HISTORY OF BREAST CANCER: ICD-10-CM

## 2023-01-10 DIAGNOSIS — E78.5 HYPERLIPIDEMIA ASSOCIATED WITH TYPE 2 DIABETES MELLITUS (HCC): ICD-10-CM

## 2023-01-10 DIAGNOSIS — M48.02 CERVICAL STENOSIS OF SPINAL CANAL: ICD-10-CM

## 2023-01-10 DIAGNOSIS — Z95.1 S/P CABG (CORONARY ARTERY BYPASS GRAFT): ICD-10-CM

## 2023-01-10 DIAGNOSIS — I50.32 CHRONIC DIASTOLIC CONGESTIVE HEART FAILURE (HCC): ICD-10-CM

## 2023-01-10 DIAGNOSIS — M1A.9XX1 CHRONIC TOPHACEOUS GOUT: ICD-10-CM

## 2023-01-10 DIAGNOSIS — I15.2 HYPERTENSION ASSOCIATED WITH TYPE 2 DIABETES MELLITUS (HCC): ICD-10-CM

## 2023-01-10 DIAGNOSIS — E11.69 HYPERLIPIDEMIA ASSOCIATED WITH TYPE 2 DIABETES MELLITUS (HCC): ICD-10-CM

## 2023-01-10 DIAGNOSIS — E21.3 HYPERPARATHYROIDISM (HCC): ICD-10-CM

## 2023-01-10 DIAGNOSIS — Z78.9 FAILURE OF OUTPATIENT TREATMENT: ICD-10-CM

## 2023-01-10 DIAGNOSIS — N18.31 STAGE 3A CHRONIC KIDNEY DISEASE (HCC): ICD-10-CM

## 2023-01-10 DIAGNOSIS — E11.59 HYPERTENSION ASSOCIATED WITH TYPE 2 DIABETES MELLITUS (HCC): ICD-10-CM

## 2023-01-10 DIAGNOSIS — E11.22 CONTROLLED TYPE 2 DIABETES MELLITUS WITH STAGE 3 CHRONIC KIDNEY DISEASE, WITHOUT LONG-TERM CURRENT USE OF INSULIN (HCC): ICD-10-CM

## 2023-01-10 DIAGNOSIS — N18.30 CONTROLLED TYPE 2 DIABETES MELLITUS WITH STAGE 3 CHRONIC KIDNEY DISEASE, WITHOUT LONG-TERM CURRENT USE OF INSULIN (HCC): ICD-10-CM

## 2023-01-10 RX ORDER — PREDNISONE 10 MG/1
TABLET ORAL
Qty: 18 TABLET | Refills: 0 | Status: SHIPPED | OUTPATIENT
Start: 2023-01-10

## 2023-01-10 RX ORDER — PREDNISONE 10 MG/1
TABLET ORAL
Qty: 18 TABLET | Refills: 0 | Status: SHIPPED | OUTPATIENT
Start: 2023-01-10 | End: 2023-01-10

## 2023-01-10 RX ORDER — FLUTICASONE PROPIONATE AND SALMETEROL 500; 50 UG/1; UG/1
1 POWDER RESPIRATORY (INHALATION) 2 TIMES DAILY
Qty: 1 EACH | Refills: 5 | Status: SHIPPED | OUTPATIENT
Start: 2023-01-10 | End: 2023-02-09

## 2023-01-10 NOTE — TELEPHONE ENCOUNTER
Spoke with patient and informed her of Dr. Sood Pass orders. Verified pharmacy. Patient verbalized understanding.

## 2023-01-10 NOTE — TELEPHONE ENCOUNTER
Pt states that she is having chest congestion and would like to speak to RN. Pt is also coughing. Pt also states that she is out of her Advair inhaler. Please call.

## 2023-01-10 NOTE — TELEPHONE ENCOUNTER
Spoke with patient. Patient reports she has been sick since last Monday, PCP ordered Z-tameka, completed on Friday. Patient reports symptoms are coming back: chest congestion and coughing up grey phlegm. Patient taking Nyquil as needed. Dr. Leigh Powell: Patient requesting medication to help with chest congestion and cough. Also requesting refill on Wixela.

## 2023-01-10 NOTE — PROGRESS NOTES
Called patient regarding CCM monthly and left a message for patient to call back when they can. Reviewed patient chart. Time Spent This Encounter Total: 3  min medical record review  Monthly Minute Total including today: 3 minutes    Madan Muñoz [address] [place] [address] [id number]  Phone: [phone]  [employer]

## 2023-01-11 RX ORDER — FLUTICASONE PROPIONATE AND SALMETEROL 500; 50 UG/1; UG/1
POWDER RESPIRATORY (INHALATION)
Refills: 0 | OUTPATIENT
Start: 2023-01-11

## 2023-01-12 ENCOUNTER — HOSPITAL ENCOUNTER (OUTPATIENT)
Dept: ULTRASOUND IMAGING | Facility: HOSPITAL | Age: 78
Discharge: HOME | End: 2023-01-12
Attending: INTERNAL MEDICINE
Payer: MEDICARE

## 2023-01-12 DIAGNOSIS — E78.5 HYPERLIPIDEMIA ASSOCIATED WITH TYPE 2 DIABETES MELLITUS (HCC): ICD-10-CM

## 2023-01-12 DIAGNOSIS — I25.10 CORONARY ARTERY DISEASE INVOLVING NATIVE CORONARY ARTERY OF NATIVE HEART WITHOUT ANGINA PECTORIS: ICD-10-CM

## 2023-01-12 DIAGNOSIS — Z95.1 S/P CABG (CORONARY ARTERY BYPASS GRAFT): ICD-10-CM

## 2023-01-12 DIAGNOSIS — R06.02 SOB (SHORTNESS OF BREATH): ICD-10-CM

## 2023-01-12 DIAGNOSIS — I25.10 CORONARY ARTERY DISEASE: ICD-10-CM

## 2023-01-12 DIAGNOSIS — E11.69 HYPERLIPIDEMIA ASSOCIATED WITH TYPE 2 DIABETES MELLITUS (HCC): ICD-10-CM

## 2023-01-12 PROCEDURE — 93922 UPR/L XTREMITY ART 2 LEVELS: CPT | Performed by: INTERNAL MEDICINE

## 2023-01-20 ENCOUNTER — PATIENT OUTREACH (OUTPATIENT)
Dept: CASE MANAGEMENT | Age: 78
End: 2023-01-20

## 2023-01-20 NOTE — PROGRESS NOTES
Patient called to inform me that she is feeling very good and that her breathing is getting better and better. She stated that her cold symptoms went away. She was prescribed Prednisone by Dr. Pascale Samayoa and she only took it for 3 days she did not follow the instruction. She stated that last 4 days she stopped taking Glipizide and her blood glucose has been good she gave me blood glucose numbers 115,108,99. Her blood pressure today was 140/75 and she weights 170lb  I advised patient when she is watching TV to do some seated exercises and during commercial to get up and move around. Patient verbal understanding and she will try her best to get up more often. Total time - 12 min  Total Monthly time- 12 min    Madan Edmondson [address] [place] [address], [facility] [id number]  Phone: [phone]  [employer]

## 2023-02-04 ENCOUNTER — NURSE TRIAGE (OUTPATIENT)
Dept: INTERNAL MEDICINE CLINIC | Facility: CLINIC | Age: 78
End: 2023-02-04

## 2023-02-04 NOTE — TELEPHONE ENCOUNTER
Dr. Peyton Yan: patient is sorry and will not be able to come MOnday that early.      She asking if Tuesday 2/7/23  anytime before 3pm if possible?    (patient has other appts on Monday and Thursday and hard to accommodate)

## 2023-02-07 ENCOUNTER — OFFICE VISIT (OUTPATIENT)
Dept: INTERNAL MEDICINE CLINIC | Facility: CLINIC | Age: 78
End: 2023-02-07

## 2023-02-07 VITALS
TEMPERATURE: 99 F | SYSTOLIC BLOOD PRESSURE: 124 MMHG | HEART RATE: 76 BPM | BODY MASS INDEX: 29.45 KG/M2 | WEIGHT: 172.5 LBS | HEIGHT: 64 IN | OXYGEN SATURATION: 98 % | DIASTOLIC BLOOD PRESSURE: 70 MMHG

## 2023-02-07 DIAGNOSIS — N18.30 CONTROLLED TYPE 2 DIABETES MELLITUS WITH STAGE 3 CHRONIC KIDNEY DISEASE, WITHOUT LONG-TERM CURRENT USE OF INSULIN (HCC): ICD-10-CM

## 2023-02-07 DIAGNOSIS — E11.22 CONTROLLED TYPE 2 DIABETES MELLITUS WITH STAGE 3 CHRONIC KIDNEY DISEASE, WITHOUT LONG-TERM CURRENT USE OF INSULIN (HCC): ICD-10-CM

## 2023-02-07 DIAGNOSIS — M17.0 PRIMARY OSTEOARTHRITIS OF BOTH KNEES: ICD-10-CM

## 2023-02-07 DIAGNOSIS — M75.31 CALCIFIC TENDINITIS OF RIGHT SHOULDER: Primary | ICD-10-CM

## 2023-02-07 LAB
CARTRIDGE LOT#: ABNORMAL NUMERIC
HEMOGLOBIN A1C: 5.9 % (ref 4.3–5.6)

## 2023-02-07 PROCEDURE — 83036 HEMOGLOBIN GLYCOSYLATED A1C: CPT | Performed by: INTERNAL MEDICINE

## 2023-02-07 PROCEDURE — 3074F SYST BP LT 130 MM HG: CPT | Performed by: INTERNAL MEDICINE

## 2023-02-07 PROCEDURE — 3008F BODY MASS INDEX DOCD: CPT | Performed by: INTERNAL MEDICINE

## 2023-02-07 PROCEDURE — 3078F DIAST BP <80 MM HG: CPT | Performed by: INTERNAL MEDICINE

## 2023-02-07 PROCEDURE — 99214 OFFICE O/P EST MOD 30 MIN: CPT | Performed by: INTERNAL MEDICINE

## 2023-02-07 PROCEDURE — 1125F AMNT PAIN NOTED PAIN PRSNT: CPT | Performed by: INTERNAL MEDICINE

## 2023-02-08 ENCOUNTER — PATIENT OUTREACH (OUTPATIENT)
Dept: CASE MANAGEMENT | Age: 78
End: 2023-02-08

## 2023-02-08 DIAGNOSIS — E78.5 HYPERLIPIDEMIA ASSOCIATED WITH TYPE 2 DIABETES MELLITUS (HCC): ICD-10-CM

## 2023-02-08 DIAGNOSIS — I15.2 HYPERTENSION ASSOCIATED WITH TYPE 2 DIABETES MELLITUS (HCC): ICD-10-CM

## 2023-02-08 DIAGNOSIS — Z98.890 S/P CAROTID ENDARTERECTOMY: ICD-10-CM

## 2023-02-08 DIAGNOSIS — Z85.3 HISTORY OF BREAST CANCER: ICD-10-CM

## 2023-02-08 DIAGNOSIS — J44.1 COPD EXACERBATION (HCC): ICD-10-CM

## 2023-02-08 DIAGNOSIS — E11.22 CONTROLLED TYPE 2 DIABETES MELLITUS WITH STAGE 3 CHRONIC KIDNEY DISEASE, WITHOUT LONG-TERM CURRENT USE OF INSULIN (HCC): ICD-10-CM

## 2023-02-08 DIAGNOSIS — E11.69 HYPERLIPIDEMIA ASSOCIATED WITH TYPE 2 DIABETES MELLITUS (HCC): ICD-10-CM

## 2023-02-08 DIAGNOSIS — M1A.9XX1 CHRONIC TOPHACEOUS GOUT: ICD-10-CM

## 2023-02-08 DIAGNOSIS — J44.9 COPD, SEVERE (HCC): ICD-10-CM

## 2023-02-08 DIAGNOSIS — E11.59 HYPERTENSION ASSOCIATED WITH TYPE 2 DIABETES MELLITUS (HCC): ICD-10-CM

## 2023-02-08 DIAGNOSIS — Z78.9 FAILURE OF OUTPATIENT TREATMENT: ICD-10-CM

## 2023-02-08 DIAGNOSIS — E21.3 HYPERPARATHYROIDISM (HCC): ICD-10-CM

## 2023-02-08 DIAGNOSIS — N18.30 CONTROLLED TYPE 2 DIABETES MELLITUS WITH STAGE 3 CHRONIC KIDNEY DISEASE, WITHOUT LONG-TERM CURRENT USE OF INSULIN (HCC): ICD-10-CM

## 2023-02-08 DIAGNOSIS — I50.32 CHRONIC DIASTOLIC CONGESTIVE HEART FAILURE (HCC): ICD-10-CM

## 2023-02-08 DIAGNOSIS — Z95.1 S/P CABG (CORONARY ARTERY BYPASS GRAFT): ICD-10-CM

## 2023-02-08 DIAGNOSIS — Z91.81 AT RISK FOR FALLS: ICD-10-CM

## 2023-02-08 DIAGNOSIS — M48.02 CERVICAL STENOSIS OF SPINAL CANAL: ICD-10-CM

## 2023-02-10 RX ORDER — FLUTICASONE PROPIONATE AND SALMETEROL 500; 50 UG/1; UG/1
POWDER RESPIRATORY (INHALATION)
Qty: 180 EACH | Refills: 0 | Status: SHIPPED | OUTPATIENT
Start: 2023-02-10

## 2023-02-17 ENCOUNTER — TELEPHONE (OUTPATIENT)
Facility: CLINIC | Age: 78
End: 2023-02-17

## 2023-02-18 NOTE — TELEPHONE ENCOUNTER
Called and spoke to the patient,  verified. I reviewed bowel prep instructions. Instructed to hold iron for 3 days pre-procedure and hold glipizide the evening before and day of procedure. The patient verbalized understanding of the info given.

## 2023-02-20 ENCOUNTER — OFFICE VISIT (OUTPATIENT)
Dept: INTERNAL MEDICINE CLINIC | Facility: CLINIC | Age: 78
End: 2023-02-20

## 2023-02-20 VITALS
TEMPERATURE: 99 F | SYSTOLIC BLOOD PRESSURE: 122 MMHG | HEART RATE: 74 BPM | OXYGEN SATURATION: 94 % | WEIGHT: 165.69 LBS | HEIGHT: 64 IN | DIASTOLIC BLOOD PRESSURE: 68 MMHG | BODY MASS INDEX: 28.29 KG/M2

## 2023-02-20 DIAGNOSIS — M1A.9XX1 CHRONIC TOPHACEOUS GOUT: ICD-10-CM

## 2023-02-20 DIAGNOSIS — Z85.3 HISTORY OF BREAST CANCER: ICD-10-CM

## 2023-02-20 DIAGNOSIS — I50.32 CHRONIC DIASTOLIC CONGESTIVE HEART FAILURE (HCC): ICD-10-CM

## 2023-02-20 DIAGNOSIS — E11.69 HYPERLIPIDEMIA ASSOCIATED WITH TYPE 2 DIABETES MELLITUS (HCC): ICD-10-CM

## 2023-02-20 DIAGNOSIS — M85.859 OSTEOPENIA OF HIP, UNSPECIFIED LATERALITY: ICD-10-CM

## 2023-02-20 DIAGNOSIS — Z95.1 S/P CABG (CORONARY ARTERY BYPASS GRAFT): ICD-10-CM

## 2023-02-20 DIAGNOSIS — E55.9 VITAMIN D DEFICIENCY: ICD-10-CM

## 2023-02-20 DIAGNOSIS — N18.32 STAGE 3B CHRONIC KIDNEY DISEASE (HCC): ICD-10-CM

## 2023-02-20 DIAGNOSIS — I70.0 THORACIC AORTA ATHEROSCLEROSIS (HCC): ICD-10-CM

## 2023-02-20 DIAGNOSIS — I73.9 PAD (PERIPHERAL ARTERY DISEASE) (HCC): ICD-10-CM

## 2023-02-20 DIAGNOSIS — I15.2 HYPERTENSION ASSOCIATED WITH TYPE 2 DIABETES MELLITUS (HCC): ICD-10-CM

## 2023-02-20 DIAGNOSIS — I65.23 CAROTID STENOSIS, NON-SYMPTOMATIC, BILATERAL: ICD-10-CM

## 2023-02-20 DIAGNOSIS — E11.59 HYPERTENSION ASSOCIATED WITH TYPE 2 DIABETES MELLITUS (HCC): ICD-10-CM

## 2023-02-20 DIAGNOSIS — N18.30 CONTROLLED TYPE 2 DIABETES MELLITUS WITH STAGE 3 CHRONIC KIDNEY DISEASE, WITHOUT LONG-TERM CURRENT USE OF INSULIN (HCC): ICD-10-CM

## 2023-02-20 DIAGNOSIS — I25.10 CORONARY ARTERY DISEASE INVOLVING NATIVE CORONARY ARTERY OF NATIVE HEART WITHOUT ANGINA PECTORIS: ICD-10-CM

## 2023-02-20 DIAGNOSIS — J44.9 COPD, SEVERE (HCC): ICD-10-CM

## 2023-02-20 DIAGNOSIS — H61.23 IMPACTED CERUMEN, BILATERAL: ICD-10-CM

## 2023-02-20 DIAGNOSIS — Z98.890 S/P CAROTID ENDARTERECTOMY: ICD-10-CM

## 2023-02-20 DIAGNOSIS — E11.22 CONTROLLED TYPE 2 DIABETES MELLITUS WITH STAGE 3 CHRONIC KIDNEY DISEASE, WITHOUT LONG-TERM CURRENT USE OF INSULIN (HCC): ICD-10-CM

## 2023-02-20 DIAGNOSIS — Z91.81 AT RISK FOR FALLS: ICD-10-CM

## 2023-02-20 DIAGNOSIS — Z00.00 MEDICARE ANNUAL WELLNESS VISIT, SUBSEQUENT: Primary | ICD-10-CM

## 2023-02-20 DIAGNOSIS — E78.5 HYPERLIPIDEMIA ASSOCIATED WITH TYPE 2 DIABETES MELLITUS (HCC): ICD-10-CM

## 2023-02-20 DIAGNOSIS — H40.9 GLAUCOMA, UNSPECIFIED GLAUCOMA TYPE, UNSPECIFIED LATERALITY: ICD-10-CM

## 2023-02-20 DIAGNOSIS — E21.3 HYPERPARATHYROIDISM (HCC): ICD-10-CM

## 2023-02-20 DIAGNOSIS — H91.93 BILATERAL HEARING LOSS, UNSPECIFIED HEARING LOSS TYPE: ICD-10-CM

## 2023-02-21 ENCOUNTER — ANESTHESIA (OUTPATIENT)
Dept: ENDOSCOPY | Facility: HOSPITAL | Age: 78
End: 2023-02-21
Payer: MEDICARE

## 2023-02-21 ENCOUNTER — HOSPITAL ENCOUNTER (OUTPATIENT)
Facility: HOSPITAL | Age: 78
Setting detail: HOSPITAL OUTPATIENT SURGERY
Discharge: HOME | End: 2023-02-21
Attending: INTERNAL MEDICINE | Admitting: INTERNAL MEDICINE
Payer: MEDICARE

## 2023-02-21 ENCOUNTER — ANESTHESIA EVENT (OUTPATIENT)
Dept: ENDOSCOPY | Facility: HOSPITAL | Age: 78
End: 2023-02-21
Payer: MEDICARE

## 2023-02-21 VITALS
RESPIRATION RATE: 13 BRPM | HEART RATE: 77 BPM | OXYGEN SATURATION: 95 % | DIASTOLIC BLOOD PRESSURE: 62 MMHG | SYSTOLIC BLOOD PRESSURE: 136 MMHG

## 2023-02-21 DIAGNOSIS — Z12.11 SCREENING FOR COLON CANCER: ICD-10-CM

## 2023-02-21 LAB — GLUCOSE BLDC GLUCOMTR-MCNC: 107 MG/DL (ref 70–99)

## 2023-02-21 PROCEDURE — 0DBL8ZX EXCISION OF TRANSVERSE COLON, VIA NATURAL OR ARTIFICIAL OPENING ENDOSCOPIC, DIAGNOSTIC: ICD-10-PCS | Performed by: INTERNAL MEDICINE

## 2023-02-21 PROCEDURE — 0DBP8ZX EXCISION OF RECTUM, VIA NATURAL OR ARTIFICIAL OPENING ENDOSCOPIC, DIAGNOSTIC: ICD-10-PCS | Performed by: INTERNAL MEDICINE

## 2023-02-21 PROCEDURE — 0DBM8ZX EXCISION OF DESCENDING COLON, VIA NATURAL OR ARTIFICIAL OPENING ENDOSCOPIC, DIAGNOSTIC: ICD-10-PCS | Performed by: INTERNAL MEDICINE

## 2023-02-21 PROCEDURE — 0DBH8ZX EXCISION OF CECUM, VIA NATURAL OR ARTIFICIAL OPENING ENDOSCOPIC, DIAGNOSTIC: ICD-10-PCS | Performed by: INTERNAL MEDICINE

## 2023-02-21 PROCEDURE — 45380 COLONOSCOPY AND BIOPSY: CPT | Performed by: INTERNAL MEDICINE

## 2023-02-21 PROCEDURE — 0DBK8ZX EXCISION OF ASCENDING COLON, VIA NATURAL OR ARTIFICIAL OPENING ENDOSCOPIC, DIAGNOSTIC: ICD-10-PCS | Performed by: INTERNAL MEDICINE

## 2023-02-21 PROCEDURE — 45385 COLONOSCOPY W/LESION REMOVAL: CPT | Performed by: INTERNAL MEDICINE

## 2023-02-21 RX ORDER — SODIUM CHLORIDE 9 MG/ML
INJECTION, SOLUTION INTRAVENOUS CONTINUOUS PRN
Status: DISCONTINUED | OUTPATIENT
Start: 2023-02-21 | End: 2023-02-21 | Stop reason: SURG

## 2023-02-21 RX ORDER — LIDOCAINE HYDROCHLORIDE 10 MG/ML
INJECTION, SOLUTION EPIDURAL; INFILTRATION; INTRACAUDAL; PERINEURAL AS NEEDED
Status: DISCONTINUED | OUTPATIENT
Start: 2023-02-21 | End: 2023-02-21 | Stop reason: SURG

## 2023-02-21 RX ORDER — SODIUM CHLORIDE, SODIUM LACTATE, POTASSIUM CHLORIDE, CALCIUM CHLORIDE 600; 310; 30; 20 MG/100ML; MG/100ML; MG/100ML; MG/100ML
INJECTION, SOLUTION INTRAVENOUS CONTINUOUS
Status: DISCONTINUED | OUTPATIENT
Start: 2023-02-21 | End: 2023-02-21

## 2023-02-21 RX ADMIN — SODIUM CHLORIDE: 9 INJECTION, SOLUTION INTRAVENOUS at 11:40:00

## 2023-02-21 RX ADMIN — LIDOCAINE HYDROCHLORIDE 30 MG: 10 INJECTION, SOLUTION EPIDURAL; INFILTRATION; INTRACAUDAL; PERINEURAL at 11:43:00

## 2023-02-21 RX ADMIN — SODIUM CHLORIDE: 9 INJECTION, SOLUTION INTRAVENOUS at 12:27:00

## 2023-02-21 NOTE — DISCHARGE INSTRUCTIONS
Home Care Instructions for Colonoscopy with Sedation    Diet:  - Resume your regular diet as tolerated unless otherwise instructed. - Start with light meals to minimize bloating.  - Do not drink alcohol today. Medication:  - If you have questions about resuming your normal medications, please contact your Primary Care Physician. Activities:  - Take it easy today. Do not return to work today. - Do not drive today. - Do not operate any machinery today (including kitchen equipment). Colonoscopy:  - You may notice some rectal \"spotting\" (a little blood on the toilet tissue) for a day or two after the exam. This is normal.  - If you experience any rectal bleeding (not spotting), persistent tenderness or sharp severe abdominal pains, oral temperature over 100 degrees Fahrenheit, light-headedness or dizziness, or any other problems, contact your doctor. **If unable to reach your doctor, please go to the [facility] Emergency Room**    - Your referring physician will receive a full report of your examination.  - If you do not hear from your doctor's office within two weeks of your biopsy, please call them for your results. You may be able to see your laboratory results in [facility] between 4 and 7 business days. In some cases, your physician may not have viewed the results before they are released to [address] If you have questions regarding your results contact the physician who ordered the test/exam by phone or via [address] by choosing \"Ask a Medical Question. \"

## 2023-02-21 NOTE — OPERATIVE REPORT
[address] Endoscopy Report      Date of Procedure:  02/21/23      Preoperative Diagnosis:  Colorectal cancer screening      Postoperative Diagnosis:  1. Colon polyps  2. Pancolonic diverticulosis      Procedure:    Colonoscopy with polypectomy      Surgeon:  Dk Hernadez M.D. Anesthesia:  Monitored anesthesia care  Cecal withdrawal time: 26 minutes  EBL:  Insignificant      Brief History: This is a 69year old female who presents for a screening colonoscopy. Other than chronic constipation, the patient has been asymptomatic from a lower gastrointestinal tract standpoint. She has a history of upper gastrointestinal bleeding in February 2022 secondary to aspirin induced gastric antral erosions/duodenitis. She also had LA grade B esophagitis. The patient was on a PPI which she has stopped as she felt it was worsening her constipation. She is no longer taking aspirin. Technique:  After informed consent, the patient was placed in the left lateral recumbent position. Digital rectal examination revealed no palpable intraluminal abnormalities. An Olympus variable stiffness 190 series HD pediatric colonoscope was inserted into the rectum and advanced under direct vision by following the lumen to the terminal ileum. The colon was examined upon withdrawal in the left lateral recumbent position. Findings:  The preparation of the colon was very good. The terminal ileum was examined for 5 cm and visually normal.  The ileocecal valve was well preserved. The visualized colonic mucosa from the cecum to the anal verge was normal with an intact vascular pattern. There were #6 polyps seen within the colon which removed as follows:    1. In the cecum there were #3 polyps measuring 3-10 mm in size. These were cold snare excised and retrieved. Polypectomy of the smaller polyps was augmented by a cold biopsy forceps.   2.  In the ascending colon there was a 3 mm sessile polyp which was removed with a cold biopsy forceps. 3.  In the proximal descending/distal transverse colon there was a 5-6 mm serrated sessile polyp which was cold snare excised and retrieved. 4.  In the proximal rectum there was a 9 mm serrated sessile polyp which was removed with a cold snare. Inspection of all sites revealed no evidence of ongoing bleeding. There were diverticula seen throughout the colon most numerous in the right and sigmoid colon without current signs of complication. There were no other colonic polyps, mass lesions, vascular anomalies or signs of inflammation seen. Retroflexion in the rectum revealed incidental internal hemorrhoids. The procedure was well tolerated without immediate complication. Impression:  1. Colon polyps  2. Uncomplicated pancolonic diverticulosis    Recommendations:  1. High-fiber diet. 2.  Follow-up biopsy results. 3.  Further recommendations pending biopsy, clinical course and status of comorbidities.         Kendra Lawson MD  2/21/2023

## 2023-02-21 NOTE — ANESTHESIA POSTPROCEDURE EVALUATION
Patient: Benjamin Busby    Procedure Summary     Date: 02/21/23 Room / Location: [facility]COPY 04 / [facility]    Anesthesia Start: 1139 Anesthesia Stop:     Procedure: COLONOSCOPY Diagnosis:       Screening for colon cancer      (polyps, diverticulosis)    Surgeons: Evan Paige MD Anesthesiologist: Clarinda Ganser, CRNA    Anesthesia Type: MAC ASA Status: 4          Anesthesia Type: MAC    Vitals Value Taken Time   BP 91/38 02/21/23 1225   Temp  02/21/23 1227   Pulse 82 02/21/23 1225   Resp 20 02/21/23 1225   SpO2 96 % 02/21/23 1225       [facility] AN Post Evaluation:   Patient Evaluated in PACU  Patient Participation: complete - patient participated  Level of Consciousness: sleepy but conscious  Pain Score: 0  Pain Management: adequate  Airway Patency:patent  Yes    Cardiovascular Status: hemodynamically stable  Respiratory Status: room air  Postoperative Hydration acceptable      Brayden Roach CRNA  2/21/2023 12:27 PM

## 2023-02-22 ENCOUNTER — TELEPHONE (OUTPATIENT)
Facility: CLINIC | Age: 78
End: 2023-02-22

## 2023-02-22 PROBLEM — N18.32 STAGE 3B CHRONIC KIDNEY DISEASE (HCC): Status: ACTIVE | Noted: 2019-05-27

## 2023-02-22 PROBLEM — I70.0 THORACIC AORTA ATHEROSCLEROSIS: Status: ACTIVE | Noted: 2023-02-22

## 2023-02-22 PROBLEM — M48.02 CERVICAL STENOSIS OF SPINAL CANAL: Status: RESOLVED | Noted: 2020-07-20 | Resolved: 2023-02-22

## 2023-02-22 PROBLEM — I70.0 THORACIC AORTA ATHEROSCLEROSIS (HCC): Status: ACTIVE | Noted: 2023-02-22

## 2023-02-22 PROBLEM — H40.9 GLAUCOMA: Status: ACTIVE | Noted: 2023-02-22

## 2023-02-22 PROBLEM — E55.9 VITAMIN D DEFICIENCY: Status: ACTIVE | Noted: 2023-02-22

## 2023-02-22 PROBLEM — I73.9 PAD (PERIPHERAL ARTERY DISEASE): Status: ACTIVE | Noted: 2023-02-22

## 2023-02-22 PROBLEM — Z78.9 FAILURE OF OUTPATIENT TREATMENT: Status: RESOLVED | Noted: 2022-07-28 | Resolved: 2023-02-22

## 2023-02-22 PROBLEM — H61.23 IMPACTED CERUMEN, BILATERAL: Status: ACTIVE | Noted: 2018-07-26

## 2023-02-22 PROBLEM — I73.9 PAD (PERIPHERAL ARTERY DISEASE) (HCC): Status: ACTIVE | Noted: 2023-02-22

## 2023-02-22 PROBLEM — I65.23 CAROTID STENOSIS, NON-SYMPTOMATIC, BILATERAL: Status: ACTIVE | Noted: 2023-02-22

## 2023-02-22 PROBLEM — J44.1 COPD EXACERBATION (HCC): Status: RESOLVED | Noted: 2022-07-27 | Resolved: 2023-02-22

## 2023-02-22 PROBLEM — M85.859 OSTEOPENIA OF HIP: Status: ACTIVE | Noted: 2023-02-22

## 2023-02-22 NOTE — TELEPHONE ENCOUNTER
Called and spoke to the patient. Unable to obtain requested info. Will try to call unit distribution tomorrow.

## 2023-02-22 NOTE — TELEPHONE ENCOUNTER
Pt asking what kind of disposable underwear she was given after [facility] yesterday - she states they fit her perfect and she has been unable to find a good brand and size - asking if RN can give her info on these

## 2023-02-27 ENCOUNTER — TELEPHONE (OUTPATIENT)
Dept: PULMONOLOGY | Facility: CLINIC | Age: 78
End: 2023-02-27

## 2023-02-27 NOTE — TELEPHONE ENCOUNTER
Received fax from [address] [place] [address] requesting signed CMN form and office visit notes from 5/28/22 onwards. Attached office visit notes from 11/21/22 and placed fax in Dr. Sangita Ruffin folder for review.

## 2023-03-02 ENCOUNTER — PATIENT OUTREACH (OUTPATIENT)
Dept: CASE MANAGEMENT | Age: 78
End: 2023-03-02

## 2023-03-02 DIAGNOSIS — E11.69 HYPERLIPIDEMIA ASSOCIATED WITH TYPE 2 DIABETES MELLITUS (HCC): ICD-10-CM

## 2023-03-02 DIAGNOSIS — M1A.9XX1 CHRONIC TOPHACEOUS GOUT: ICD-10-CM

## 2023-03-02 DIAGNOSIS — Z98.890 S/P CAROTID ENDARTERECTOMY: ICD-10-CM

## 2023-03-02 DIAGNOSIS — E78.5 HYPERLIPIDEMIA ASSOCIATED WITH TYPE 2 DIABETES MELLITUS (HCC): ICD-10-CM

## 2023-03-02 DIAGNOSIS — I50.32 CHRONIC DIASTOLIC CONGESTIVE HEART FAILURE (HCC): ICD-10-CM

## 2023-03-02 DIAGNOSIS — E11.59 HYPERTENSION ASSOCIATED WITH TYPE 2 DIABETES MELLITUS (HCC): ICD-10-CM

## 2023-03-02 DIAGNOSIS — N18.32 STAGE 3B CHRONIC KIDNEY DISEASE (HCC): ICD-10-CM

## 2023-03-02 DIAGNOSIS — E11.22 CONTROLLED TYPE 2 DIABETES MELLITUS WITH STAGE 3 CHRONIC KIDNEY DISEASE, WITHOUT LONG-TERM CURRENT USE OF INSULIN (HCC): ICD-10-CM

## 2023-03-02 DIAGNOSIS — E55.9 VITAMIN D DEFICIENCY: ICD-10-CM

## 2023-03-02 DIAGNOSIS — I65.23 CAROTID STENOSIS, NON-SYMPTOMATIC, BILATERAL: ICD-10-CM

## 2023-03-02 DIAGNOSIS — Z91.81 AT RISK FOR FALLS: ICD-10-CM

## 2023-03-02 DIAGNOSIS — N18.30 CONTROLLED TYPE 2 DIABETES MELLITUS WITH STAGE 3 CHRONIC KIDNEY DISEASE, WITHOUT LONG-TERM CURRENT USE OF INSULIN (HCC): ICD-10-CM

## 2023-03-02 DIAGNOSIS — Z85.3 HISTORY OF BREAST CANCER: ICD-10-CM

## 2023-03-02 DIAGNOSIS — I73.9 PAD (PERIPHERAL ARTERY DISEASE) (HCC): ICD-10-CM

## 2023-03-02 DIAGNOSIS — H61.23 IMPACTED CERUMEN, BILATERAL: ICD-10-CM

## 2023-03-02 DIAGNOSIS — Z95.1 S/P CABG (CORONARY ARTERY BYPASS GRAFT): ICD-10-CM

## 2023-03-02 DIAGNOSIS — I70.0 THORACIC AORTA ATHEROSCLEROSIS (HCC): ICD-10-CM

## 2023-03-02 DIAGNOSIS — M85.859 OSTEOPENIA OF HIP, UNSPECIFIED LATERALITY: ICD-10-CM

## 2023-03-02 DIAGNOSIS — I15.2 HYPERTENSION ASSOCIATED WITH TYPE 2 DIABETES MELLITUS (HCC): ICD-10-CM

## 2023-03-02 DIAGNOSIS — E21.3 HYPERPARATHYROIDISM (HCC): ICD-10-CM

## 2023-03-02 DIAGNOSIS — J44.9 COPD, SEVERE (HCC): ICD-10-CM

## 2023-03-07 ENCOUNTER — OFFICE VISIT (OUTPATIENT)
Dept: OTOLARYNGOLOGY | Facility: CLINIC | Age: 78
End: 2023-03-07

## 2023-03-07 DIAGNOSIS — H61.23 IMPACTED CERUMEN, BILATERAL: Primary | ICD-10-CM

## 2023-03-07 PROCEDURE — 69210 REMOVE IMPACTED EAR WAX UNI: CPT | Performed by: OTOLARYNGOLOGY

## 2023-03-09 ENCOUNTER — TELEPHONE (OUTPATIENT)
Facility: CLINIC | Age: 78
End: 2023-03-09

## 2023-03-09 NOTE — TELEPHONE ENCOUNTER
----- Message from Dk Hernadez MD sent at 3/4/2023  1:29 PM CST -----  I spoke to the patient. She had #6 polyps removed. #3 were either traditional/serrated adenomas and the remainder hyperplastic. I have discussed the significance. Uncomplicated diverticulosis was present. I would recommend a high-fiber diet for the diverticulosis. Normally a surveillance colonoscopy would be performed in 3 years. The decision to proceed should be based on patient preference, functional status, comorbidities and Dr. Semaj Ventura input. The patient states that she stopped the pantoprazole. She has noted a slight increase in reflux symptoms. I have recommended that she monitor this symptom carefully. If she has frequent reflux symptoms she should definitely resume the pantoprazole. She is no longer taking aspirin which was felt to be responsible for the episode of gastrointestinal bleeding. I have asked the patient to follow-up in the office in 3-6 months to monitor her symptoms. GI RNs: Please enter in health maintenance that a colonoscopy was performed. No recall.   Nic Mckeon

## 2023-03-09 NOTE — TELEPHONE ENCOUNTER
Entered into Epic. Colonoscopy performed with Dr. Esteban Campos 2/21/2023. No recall needed per physician. Health Maintenance updated.

## 2023-03-14 ENCOUNTER — TELEPHONE (OUTPATIENT)
Dept: OTOLARYNGOLOGY | Facility: CLINIC | Age: 78
End: 2023-03-14

## 2023-03-14 DIAGNOSIS — H92.09 OTALGIA, UNSPECIFIED LATERALITY: Primary | ICD-10-CM

## 2023-03-14 NOTE — TELEPHONE ENCOUNTER
Pt called stating pt had an appointment on 3-7-23 for ear wax removal and pain went away. Pain has returned. On and off.   Please call pt

## 2023-03-15 NOTE — TELEPHONE ENCOUNTER
Back in September we discussed the fact that she may have some musculoskeletal pain(TMJ). She should use a heating pad to the area when she sleeps and use Ibuprofen 600 up to TID and see if she clenches or grinds her teeth. If she does she may need to see her dentist to discuss this issues if present. If pain does not improve she may have to see me so that I may examine her.

## 2023-03-15 NOTE — TELEPHONE ENCOUNTER
Per pt ear pain bilateral and come and goes, states pain when cleaning the outer ear with towel or sleeping on that ear she has pain but than goes away. Pt concerned as to why her ear is so sensative ? Any advise, pt to use warm compress?  Please advise

## 2023-03-15 NOTE — TELEPHONE ENCOUNTER
per pt states she has already addressed this issue with her dentist, pt is had teeth fixed and she is not grinding her teeth, pt does not want to take ibuprofen has had kidney issues. Pt asking if xray or any type of scanning can be ordered, pt frustrated , states she has hx of pituitary tumor, pt states what if she has something in her ears.  Please advise

## 2023-03-15 NOTE — TELEPHONE ENCOUNTER
We can order a CT of her temporal bones without contrast if she wishes to rule out more dangerous things. Diagnosis chronic ear pain.

## 2023-03-16 NOTE — TELEPHONE ENCOUNTER
CT scan was ordered. Spoke to patient and provided her with the phone number for central scheduling. Patient understanding.

## 2023-03-30 ENCOUNTER — HOSPITAL ENCOUNTER (OUTPATIENT)
Dept: CT IMAGING | Facility: HOSPITAL | Age: 78
Discharge: HOME | End: 2023-03-30
Attending: OTOLARYNGOLOGY
Payer: MEDICARE

## 2023-03-30 DIAGNOSIS — H92.09 OTALGIA, UNSPECIFIED LATERALITY: ICD-10-CM

## 2023-03-30 PROCEDURE — 70480 CT ORBIT/EAR/FOSSA W/O DYE: CPT | Performed by: OTOLARYNGOLOGY

## 2023-04-06 ENCOUNTER — PATIENT OUTREACH (OUTPATIENT)
Dept: CASE MANAGEMENT | Age: 78
End: 2023-04-06

## 2023-04-06 DIAGNOSIS — M1A.9XX1 CHRONIC TOPHACEOUS GOUT: ICD-10-CM

## 2023-04-06 DIAGNOSIS — I65.23 CAROTID STENOSIS, NON-SYMPTOMATIC, BILATERAL: ICD-10-CM

## 2023-04-06 DIAGNOSIS — E21.3 HYPERPARATHYROIDISM (HCC): ICD-10-CM

## 2023-04-06 DIAGNOSIS — H61.23 IMPACTED CERUMEN, BILATERAL: ICD-10-CM

## 2023-04-06 DIAGNOSIS — I50.32 CHRONIC DIASTOLIC CONGESTIVE HEART FAILURE (HCC): ICD-10-CM

## 2023-04-06 DIAGNOSIS — Z91.81 AT RISK FOR FALLS: ICD-10-CM

## 2023-04-06 DIAGNOSIS — E11.69 HYPERLIPIDEMIA ASSOCIATED WITH TYPE 2 DIABETES MELLITUS (HCC): ICD-10-CM

## 2023-04-06 DIAGNOSIS — Z85.3 HISTORY OF BREAST CANCER: ICD-10-CM

## 2023-04-06 DIAGNOSIS — E11.22 CONTROLLED TYPE 2 DIABETES MELLITUS WITH STAGE 3 CHRONIC KIDNEY DISEASE, WITHOUT LONG-TERM CURRENT USE OF INSULIN (HCC): ICD-10-CM

## 2023-04-06 DIAGNOSIS — J44.9 COPD, SEVERE (HCC): ICD-10-CM

## 2023-04-06 DIAGNOSIS — N18.32 STAGE 3B CHRONIC KIDNEY DISEASE (HCC): ICD-10-CM

## 2023-04-06 DIAGNOSIS — E11.59 HYPERTENSION ASSOCIATED WITH TYPE 2 DIABETES MELLITUS (HCC): ICD-10-CM

## 2023-04-06 DIAGNOSIS — N18.30 CONTROLLED TYPE 2 DIABETES MELLITUS WITH STAGE 3 CHRONIC KIDNEY DISEASE, WITHOUT LONG-TERM CURRENT USE OF INSULIN (HCC): ICD-10-CM

## 2023-04-06 DIAGNOSIS — Z95.1 S/P CABG (CORONARY ARTERY BYPASS GRAFT): ICD-10-CM

## 2023-04-06 DIAGNOSIS — I70.0 THORACIC AORTA ATHEROSCLEROSIS (HCC): ICD-10-CM

## 2023-04-06 DIAGNOSIS — E78.5 HYPERLIPIDEMIA ASSOCIATED WITH TYPE 2 DIABETES MELLITUS (HCC): ICD-10-CM

## 2023-04-06 DIAGNOSIS — Z98.890 S/P CAROTID ENDARTERECTOMY: ICD-10-CM

## 2023-04-06 DIAGNOSIS — I73.9 PAD (PERIPHERAL ARTERY DISEASE) (HCC): ICD-10-CM

## 2023-04-06 DIAGNOSIS — I15.2 HYPERTENSION ASSOCIATED WITH TYPE 2 DIABETES MELLITUS (HCC): ICD-10-CM

## 2023-04-12 ENCOUNTER — LAB ENCOUNTER (OUTPATIENT)
Dept: LAB | Facility: REFERENCE LAB | Age: 78
End: 2023-04-12
Attending: INTERNAL MEDICINE
Payer: MEDICARE

## 2023-04-12 DIAGNOSIS — N18.32 STAGE 3B CHRONIC KIDNEY DISEASE (HCC): ICD-10-CM

## 2023-04-12 DIAGNOSIS — M1A.9XX1 CHRONIC TOPHACEOUS GOUT: ICD-10-CM

## 2023-04-12 DIAGNOSIS — E21.3 HYPERPARATHYROIDISM (HCC): ICD-10-CM

## 2023-04-12 DIAGNOSIS — E78.5 HYPERLIPIDEMIA ASSOCIATED WITH TYPE 2 DIABETES MELLITUS (HCC): ICD-10-CM

## 2023-04-12 DIAGNOSIS — N18.30 CONTROLLED TYPE 2 DIABETES MELLITUS WITH STAGE 3 CHRONIC KIDNEY DISEASE, WITHOUT LONG-TERM CURRENT USE OF INSULIN (HCC): ICD-10-CM

## 2023-04-12 DIAGNOSIS — E11.69 HYPERLIPIDEMIA ASSOCIATED WITH TYPE 2 DIABETES MELLITUS (HCC): ICD-10-CM

## 2023-04-12 DIAGNOSIS — E11.22 CONTROLLED TYPE 2 DIABETES MELLITUS WITH STAGE 3 CHRONIC KIDNEY DISEASE, WITHOUT LONG-TERM CURRENT USE OF INSULIN (HCC): ICD-10-CM

## 2023-04-12 DIAGNOSIS — E21.0 PRIMARY HYPERPARATHYROIDISM (HCC): ICD-10-CM

## 2023-04-12 DIAGNOSIS — E55.9 VITAMIN D DEFICIENCY: ICD-10-CM

## 2023-04-12 LAB
ALBUMIN SERPL-MCNC: 3.5 G/DL (ref 3.4–5)
ALBUMIN/GLOB SERPL: 0.9 {RATIO} (ref 1–2)
ALP LIVER SERPL-CCNC: 79 U/L
ALT SERPL-CCNC: 27 U/L
ANION GAP SERPL CALC-SCNC: 5 MMOL/L (ref 0–18)
AST SERPL-CCNC: 24 U/L (ref 15–37)
BASOPHILS # BLD AUTO: 0.03 X10(3) UL (ref 0–0.2)
BASOPHILS NFR BLD AUTO: 0.5 %
BILIRUB SERPL-MCNC: 0.2 MG/DL (ref 0.1–2)
BUN BLD-MCNC: 27 MG/DL (ref 7–18)
BUN/CREAT SERPL: 21.3 (ref 10–20)
CALCIUM BLD-MCNC: 10.3 MG/DL (ref 8.5–10.1)
CHLORIDE SERPL-SCNC: 101 MMOL/L (ref 98–112)
CHOLEST SERPL-MCNC: 234 MG/DL (ref ?–200)
CO2 SERPL-SCNC: 32 MMOL/L (ref 21–32)
CREAT BLD-MCNC: 1.27 MG/DL
CREAT UR-SCNC: 70.3 MG/DL
DEPRECATED RDW RBC AUTO: 47.7 FL (ref 35.1–46.3)
EOSINOPHIL # BLD AUTO: 0.35 X10(3) UL (ref 0–0.7)
EOSINOPHIL NFR BLD AUTO: 5.4 %
ERYTHROCYTE [DISTWIDTH] IN BLOOD BY AUTOMATED COUNT: 13.6 % (ref 11–15)
FASTING PATIENT LIPID ANSWER: YES
FASTING STATUS PATIENT QL REPORTED: YES
GFR SERPLBLD BASED ON 1.73 SQ M-ARVRAT: 44 ML/MIN/1.73M2 (ref 60–?)
GLOBULIN PLAS-MCNC: 4 G/DL (ref 2.8–4.4)
GLUCOSE BLD-MCNC: 114 MG/DL (ref 70–99)
HCT VFR BLD AUTO: 44.9 %
HDLC SERPL-MCNC: 43 MG/DL (ref 40–59)
HGB BLD-MCNC: 13.5 G/DL
IMM GRANULOCYTES # BLD AUTO: 0.04 X10(3) UL (ref 0–1)
IMM GRANULOCYTES NFR BLD: 0.6 %
LDLC SERPL CALC-MCNC: 152 MG/DL (ref ?–100)
LYMPHOCYTES # BLD AUTO: 1.51 X10(3) UL (ref 1–4)
LYMPHOCYTES NFR BLD AUTO: 23.4 %
MCH RBC QN AUTO: 28.4 PG (ref 26–34)
MCHC RBC AUTO-ENTMCNC: 30.1 G/DL (ref 31–37)
MCV RBC AUTO: 94.5 FL
MICROALBUMIN UR-MCNC: 0.76 MG/DL
MICROALBUMIN/CREAT 24H UR-RTO: 10.8 UG/MG (ref ?–30)
MONOCYTES # BLD AUTO: 0.8 X10(3) UL (ref 0.1–1)
MONOCYTES NFR BLD AUTO: 12.4 %
NEUTROPHILS # BLD AUTO: 3.72 X10 (3) UL (ref 1.5–7.7)
NEUTROPHILS # BLD AUTO: 3.72 X10(3) UL (ref 1.5–7.7)
NEUTROPHILS NFR BLD AUTO: 57.7 %
NONHDLC SERPL-MCNC: 191 MG/DL (ref ?–130)
OSMOLALITY SERPL CALC.SUM OF ELEC: 292 MOSM/KG (ref 275–295)
PHOSPHATE SERPL-MCNC: 3.3 MG/DL (ref 2.5–4.9)
PLATELET # BLD AUTO: 213 10(3)UL (ref 150–450)
POTASSIUM SERPL-SCNC: 5 MMOL/L (ref 3.5–5.1)
PROT SERPL-MCNC: 7.5 G/DL (ref 6.4–8.2)
RBC # BLD AUTO: 4.75 X10(6)UL
SODIUM SERPL-SCNC: 138 MMOL/L (ref 136–145)
TRIGL SERPL-MCNC: 215 MG/DL (ref 30–149)
URATE SERPL-MCNC: 5.7 MG/DL
VIT D+METAB SERPL-MCNC: 36.8 NG/ML (ref 30–100)
VLDLC SERPL CALC-MCNC: 41 MG/DL (ref 0–30)
WBC # BLD AUTO: 6.5 X10(3) UL (ref 4–11)

## 2023-04-12 PROCEDURE — 82043 UR ALBUMIN QUANTITATIVE: CPT

## 2023-04-12 PROCEDURE — 84100 ASSAY OF PHOSPHORUS: CPT

## 2023-04-12 PROCEDURE — 80061 LIPID PANEL: CPT

## 2023-04-12 PROCEDURE — 80053 COMPREHEN METABOLIC PANEL: CPT

## 2023-04-12 PROCEDURE — 82306 VITAMIN D 25 HYDROXY: CPT

## 2023-04-12 PROCEDURE — 36415 COLL VENOUS BLD VENIPUNCTURE: CPT

## 2023-04-12 PROCEDURE — 82570 ASSAY OF URINE CREATININE: CPT

## 2023-04-12 PROCEDURE — 85025 COMPLETE CBC W/AUTO DIFF WBC: CPT

## 2023-04-12 PROCEDURE — 84550 ASSAY OF BLOOD/URIC ACID: CPT

## 2023-04-13 ENCOUNTER — OFFICE VISIT (OUTPATIENT)
Dept: INTERNAL MEDICINE CLINIC | Facility: CLINIC | Age: 78
End: 2023-04-13

## 2023-04-13 VITALS
TEMPERATURE: 98 F | BODY MASS INDEX: 29.79 KG/M2 | OXYGEN SATURATION: 92 % | WEIGHT: 174.5 LBS | SYSTOLIC BLOOD PRESSURE: 124 MMHG | DIASTOLIC BLOOD PRESSURE: 74 MMHG | HEIGHT: 64 IN | HEART RATE: 80 BPM

## 2023-04-13 DIAGNOSIS — M1A.9XX1 CHRONIC TOPHACEOUS GOUT: ICD-10-CM

## 2023-04-13 DIAGNOSIS — E78.5 HYPERLIPIDEMIA ASSOCIATED WITH TYPE 2 DIABETES MELLITUS (HCC): ICD-10-CM

## 2023-04-13 DIAGNOSIS — R13.14 PHARYNGOESOPHAGEAL DYSPHAGIA: Primary | ICD-10-CM

## 2023-04-13 DIAGNOSIS — E21.3 HYPERPARATHYROIDISM (HCC): ICD-10-CM

## 2023-04-13 DIAGNOSIS — E11.22 CONTROLLED TYPE 2 DIABETES MELLITUS WITH STAGE 3 CHRONIC KIDNEY DISEASE, WITHOUT LONG-TERM CURRENT USE OF INSULIN (HCC): ICD-10-CM

## 2023-04-13 DIAGNOSIS — E11.69 HYPERLIPIDEMIA ASSOCIATED WITH TYPE 2 DIABETES MELLITUS (HCC): ICD-10-CM

## 2023-04-13 DIAGNOSIS — N18.30 CONTROLLED TYPE 2 DIABETES MELLITUS WITH STAGE 3 CHRONIC KIDNEY DISEASE, WITHOUT LONG-TERM CURRENT USE OF INSULIN (HCC): ICD-10-CM

## 2023-04-13 DIAGNOSIS — E11.59 HYPERTENSION ASSOCIATED WITH TYPE 2 DIABETES MELLITUS (HCC): ICD-10-CM

## 2023-04-13 DIAGNOSIS — J44.9 COPD, SEVERE (HCC): ICD-10-CM

## 2023-04-13 DIAGNOSIS — I15.2 HYPERTENSION ASSOCIATED WITH TYPE 2 DIABETES MELLITUS (HCC): ICD-10-CM

## 2023-04-13 DIAGNOSIS — I25.10 CORONARY ARTERY DISEASE INVOLVING NATIVE CORONARY ARTERY OF NATIVE HEART WITHOUT ANGINA PECTORIS: ICD-10-CM

## 2023-04-13 PROCEDURE — 1126F AMNT PAIN NOTED NONE PRSNT: CPT | Performed by: INTERNAL MEDICINE

## 2023-04-13 PROCEDURE — 3074F SYST BP LT 130 MM HG: CPT | Performed by: INTERNAL MEDICINE

## 2023-04-13 PROCEDURE — 3008F BODY MASS INDEX DOCD: CPT | Performed by: INTERNAL MEDICINE

## 2023-04-13 PROCEDURE — 3078F DIAST BP <80 MM HG: CPT | Performed by: INTERNAL MEDICINE

## 2023-04-13 PROCEDURE — 99214 OFFICE O/P EST MOD 30 MIN: CPT | Performed by: INTERNAL MEDICINE

## 2023-04-13 RX ORDER — COLCHICINE 0.6 MG/1
0.6 TABLET ORAL DAILY
COMMUNITY
Start: 2023-03-23

## 2023-04-13 RX ORDER — FLUTICASONE PROPIONATE AND SALMETEROL 500; 50 UG/1; UG/1
1 POWDER RESPIRATORY (INHALATION) 2 TIMES DAILY
Qty: 3 EACH | Refills: 1 | Status: SHIPPED | OUTPATIENT
Start: 2023-04-13 | End: 2023-05-13

## 2023-04-17 ENCOUNTER — OFFICE VISIT (OUTPATIENT)
Dept: NEPHROLOGY | Facility: CLINIC | Age: 78
End: 2023-04-17

## 2023-04-17 VITALS
SYSTOLIC BLOOD PRESSURE: 155 MMHG | BODY MASS INDEX: 29.37 KG/M2 | WEIGHT: 172 LBS | DIASTOLIC BLOOD PRESSURE: 71 MMHG | HEART RATE: 76 BPM | HEIGHT: 64 IN

## 2023-04-17 DIAGNOSIS — N18.32 STAGE 3B CHRONIC KIDNEY DISEASE (HCC): Primary | ICD-10-CM

## 2023-04-17 PROCEDURE — 3008F BODY MASS INDEX DOCD: CPT | Performed by: INTERNAL MEDICINE

## 2023-04-17 PROCEDURE — 3078F DIAST BP <80 MM HG: CPT | Performed by: INTERNAL MEDICINE

## 2023-04-17 PROCEDURE — 99214 OFFICE O/P EST MOD 30 MIN: CPT | Performed by: INTERNAL MEDICINE

## 2023-04-17 PROCEDURE — 3077F SYST BP >= 140 MM HG: CPT | Performed by: INTERNAL MEDICINE

## 2023-04-18 NOTE — PATIENT INSTRUCTIONS
Continue to monitor your blood pressures regularly. Repeat your kidney blood test in 3 months. Orders are in the computer.

## 2023-04-18 NOTE — PROGRESS NOTES
04/18/23        Patient: Paul Ramirez   YOB: 1945   Date of Visit: 4/17/2023       Dear  Dr. Sam Teresa MD,      Thank you for referring Paul Ramirez to my practice. Please find my assessment and plan below. As you know she is a 56-year-old female with a history of hypertension, adult onset diabetes mellitus, coronary artery disease, status post CABG, gout, asthma/COPD who is now here for follow-up of chronic kidney disease stage III. She states she has been having more problems with her COPD. Had been using home oxygen as needed but more recently has been using it on a daily basis. On 2 L. Just saw Dr. Seamus Pierre from pulmonary who did a number of test to evaluate her COPD. Results are pending. Otherwise she states she been doing well without any chest pain, GI or urinary tract symptoms. Has whitecoat syndrome. States blood pressures at home are in the 1 teens over 70. On physical exam initial blood pressure 155/71. Repeat was 138/70 with a pulse of 76 and she weighed 172 pounds. Her neck was supple without JVD. Lungs were clear without wheezing or rales. Heart revealed a regular rate and rhythm and S4 but no gallops, murmurs or rubs. Abdomen was soft, flat, nontender without organomegaly, masses or bruits. Extremities revealed no edema. I reviewed her most recent labs done on April 12, 2023. Creatinine stable at 1.27 with an estimated GFR of 44 cc/min. Potassium 5.0. I therefore reassured the patient that overall her renal function is stable. Modest potassium restricted diet was recommended. Hopefully pulmonary can help her with her sense of dyspnea. Follow-up with cardiology as advised. Maintain adequate hydration. Avoid nonsteroidals. Continue to monitor blood pressures at home. She will continue to do CBC and renal panels quarterly. I will see her again in 6 months for follow-up or sooner if clinically indicated.     Thank you again for allowing me to participate in the care of your patient. If you have any questions please feel free to call.            Sincerely,   Demarco Mendez MD   [facility], [address] [place] [address]  [facility] [address]  [address] [facility] [id number]    Document electronically generated by:  Demarco Mendez MD

## 2023-04-19 ENCOUNTER — TELEPHONE (OUTPATIENT)
Dept: INTERNAL MEDICINE CLINIC | Facility: CLINIC | Age: 78
End: 2023-04-19

## 2023-04-19 DIAGNOSIS — E21.3 HYPERPARATHYROIDISM (HCC): Primary | ICD-10-CM

## 2023-04-20 ENCOUNTER — OFFICE VISIT (OUTPATIENT)
Dept: ENDOCRINOLOGY CLINIC | Facility: CLINIC | Age: 78
End: 2023-04-20

## 2023-04-20 VITALS
BODY MASS INDEX: 30 KG/M2 | WEIGHT: 175 LBS | SYSTOLIC BLOOD PRESSURE: 107 MMHG | HEART RATE: 84 BPM | DIASTOLIC BLOOD PRESSURE: 63 MMHG

## 2023-04-20 DIAGNOSIS — E21.3 HYPERPARATHYROIDISM (HCC): Primary | ICD-10-CM

## 2023-04-20 PROCEDURE — 3074F SYST BP LT 130 MM HG: CPT | Performed by: INTERNAL MEDICINE

## 2023-04-20 PROCEDURE — 99213 OFFICE O/P EST LOW 20 MIN: CPT | Performed by: INTERNAL MEDICINE

## 2023-04-20 PROCEDURE — 3078F DIAST BP <80 MM HG: CPT | Performed by: INTERNAL MEDICINE

## 2023-04-20 PROCEDURE — 1126F AMNT PAIN NOTED NONE PRSNT: CPT | Performed by: INTERNAL MEDICINE

## 2023-04-20 NOTE — TELEPHONE ENCOUNTER
Attempted to contact pt to inform that referral for Dr Jaxon Dubose has been placed, unable to leave a message, voicemail box has not been set up yet.

## 2023-05-02 ENCOUNTER — NURSE TRIAGE (OUTPATIENT)
Dept: INTERNAL MEDICINE CLINIC | Facility: CLINIC | Age: 78
End: 2023-05-02

## 2023-05-04 ENCOUNTER — PATIENT OUTREACH (OUTPATIENT)
Dept: CASE MANAGEMENT | Age: 78
End: 2023-05-04

## 2023-05-04 DIAGNOSIS — N18.30 CONTROLLED TYPE 2 DIABETES MELLITUS WITH STAGE 3 CHRONIC KIDNEY DISEASE, WITHOUT LONG-TERM CURRENT USE OF INSULIN (HCC): ICD-10-CM

## 2023-05-04 DIAGNOSIS — J44.9 COPD, SEVERE (HCC): ICD-10-CM

## 2023-05-04 DIAGNOSIS — N18.32 STAGE 3B CHRONIC KIDNEY DISEASE (HCC): ICD-10-CM

## 2023-05-04 DIAGNOSIS — Z00.00 MEDICARE ANNUAL WELLNESS VISIT, SUBSEQUENT: ICD-10-CM

## 2023-05-04 DIAGNOSIS — E78.5 HYPERLIPIDEMIA ASSOCIATED WITH TYPE 2 DIABETES MELLITUS (HCC): ICD-10-CM

## 2023-05-04 DIAGNOSIS — H61.23 IMPACTED CERUMEN, BILATERAL: ICD-10-CM

## 2023-05-04 DIAGNOSIS — I73.9 PAD (PERIPHERAL ARTERY DISEASE) (HCC): ICD-10-CM

## 2023-05-04 DIAGNOSIS — E11.22 CONTROLLED TYPE 2 DIABETES MELLITUS WITH STAGE 3 CHRONIC KIDNEY DISEASE, WITHOUT LONG-TERM CURRENT USE OF INSULIN (HCC): ICD-10-CM

## 2023-05-04 DIAGNOSIS — E21.3 HYPERPARATHYROIDISM (HCC): ICD-10-CM

## 2023-05-04 DIAGNOSIS — E11.69 HYPERLIPIDEMIA ASSOCIATED WITH TYPE 2 DIABETES MELLITUS (HCC): ICD-10-CM

## 2023-05-04 DIAGNOSIS — M1A.9XX1 CHRONIC TOPHACEOUS GOUT: ICD-10-CM

## 2023-05-04 DIAGNOSIS — Z91.81 AT RISK FOR FALLS: ICD-10-CM

## 2023-05-04 DIAGNOSIS — E11.59 HYPERTENSION ASSOCIATED WITH TYPE 2 DIABETES MELLITUS (HCC): ICD-10-CM

## 2023-05-04 DIAGNOSIS — Z98.890 S/P CAROTID ENDARTERECTOMY: ICD-10-CM

## 2023-05-04 DIAGNOSIS — M85.859 OSTEOPENIA OF HIP, UNSPECIFIED LATERALITY: ICD-10-CM

## 2023-05-04 DIAGNOSIS — I50.32 CHRONIC DIASTOLIC CONGESTIVE HEART FAILURE (HCC): ICD-10-CM

## 2023-05-04 DIAGNOSIS — Z95.1 S/P CABG (CORONARY ARTERY BYPASS GRAFT): ICD-10-CM

## 2023-05-04 DIAGNOSIS — I70.0 THORACIC AORTA ATHEROSCLEROSIS (HCC): ICD-10-CM

## 2023-05-04 DIAGNOSIS — I65.23 CAROTID STENOSIS, NON-SYMPTOMATIC, BILATERAL: ICD-10-CM

## 2023-05-04 DIAGNOSIS — I15.2 HYPERTENSION ASSOCIATED WITH TYPE 2 DIABETES MELLITUS (HCC): ICD-10-CM

## 2023-05-08 NOTE — TELEPHONE ENCOUNTER
Patient called and states she noticed when she lays down with oxygen would have ringing in ear. She saw Dr Frank Marin, pulmonologist, (for 2nd opinion) and told her she doesn't have to wear oxygen all the time. She was told to use oxygen only as needed. So she discontinued wearing oxygen \"all day\" and doesn't use while sleeping for the past 3 days. She noticed that her ringing in ear stopped. She cancelled appt 5/22/23. Patient no longer feels she needs appt. I told her to call back if her ear problem resumes.

## 2023-05-23 NOTE — TELEPHONE ENCOUNTER
Please review. Protocol failed / Has no protocol.      Requested Prescriptions   Pending Prescriptions Disp Refills    AMLODIPINE 5 MG Oral Tab [Pharmacy Med Name: AMLODIPINE BESYLATE 5 MG Tablet] 90 tablet 1     Sig: TAKE 1 TABLET EVERY DAY       Hypertensive Medications Protocol Failed - 5/23/2023  2:07 PM        Failed - EGFRCR or GFRNAA > 50     GFR Evaluation  EGFRCR: 44 , resulted on 4/12/2023            Passed - In person appointment in the past 12 or next 3 months     Recent Outpatient Visits              1 month ago Hyperparathyroidism [facility])    [address] [facility]    Office Visit    1 month ago Stage 3b chronic kidney disease [facility])    [address], [address] [facility], Kaylee Velasco MD    Office Visit    1 month ago Pharyngoesophageal dysphagia    [facility], [address], Wander Marvin MD    Office Visit    2 months ago Impacted cerumen, bilateral    [facility], [address], [place], Carli Thompson MD    Office Visit    3 months ago Nithya Kim annual wellness visit, subsequent    [address] [facility], Wander Marvin MD    Office Visit          Future Appointments         Provider Department Appt Notes    In 2 months Ara Thomas MD [address], [place] [address] [facility] 3 mos               Passed - Last BP reading less than 140/90     BP Readings from Last 1 Encounters:  04/20/23 : 107/63                Passed - CMP or BMP in past 6 months     Recent Results (from the past 4392 hour(s))   COMP METABOLIC PANEL (14)    Collection Time: 04/12/23 11:22 AM   Result Value Ref Range    Glucose 114 (H) 70 - 99 mg/dL    Sodium 138 136 - 145 mmol/L    Potassium 5.0 3.5 - 5.1 mmol/L    Chloride 101 98 - 112 mmol/L    CO2 32.0 21.0 - 32.0 mmol/L    Anion Gap 5 0 - 18 mmol/L    BUN 27 (H) 7 - 18 mg/dL    Creatinine 1.27 (H) 0.55 - 1.02 mg/dL    BUN/CREA Ratio 21.3 (H) 10.0 - 20.0    Calcium, Total 10.3 (H) 8.5 - 10.1 mg/dL    Calculated Osmolality 292 275 - 295 mOsm/kg    eGFR-Cr 44 (L) >=60 mL/min/1.73m2    ALT 27 13 - 56 U/L    AST 24 15 - 37 U/L    Alkaline Phosphatase 79 55 - 142 U/L    Bilirubin, Total 0.2 0.1 - 2.0 mg/dL    Total Protein 7.5 6.4 - 8.2 g/dL    Albumin 3.5 3.4 - 5.0 g/dL    Globulin  4.0 2.8 - 4.4 g/dL    A/G Ratio 0.9 (L) 1.0 - 2.0    Patient Fasting for CMP? Yes      *Note: Due to a large number of results and/or encounters for the requested time period, some results have not been displayed. A complete set of results can be found in Results Review.                  Passed - In person appointment or virtual visit in the past 6 months     Recent Outpatient Visits              1 month ago Hyperparathyroidism [facility])    [address], [address] [facility] [place], Eric Galicia MD    Office Visit    1 month ago Stage 3b chronic kidney disease [facility])    [address], [address] [facility] [place], [place], MD    Office Visit    1 month ago Pharyngoesophageal dysphagia    [facility], [place]ike, Erwin Hutchinson MD    Office Visit    2 months ago Impacted cerumen, bilateral    [address], [address], [place] Carlo Hurst MD    Office Visit    3 months ago [place] annual wellness visit, subsequent    [address], [place], Erwin Hutchinson MD    Office Visit          Future Appointments         Provider Department Appt Notes    In 2 months Jackson Johnson MD [address], [address] 3 mos                  Future Appointments         Provider Department Appt Notes    In 2 months Jackson Johnson MD [address], [place] [address] 3 mos           Recent Outpatient Visits              1 month ago Hyperparathyroidism [facility])    [address], [address], Vira Aguilar MD    Office Visit    1 month ago Stage 3b chronic kidney disease [facility])    Lamar Arenas, [address] [facility], [place], MD    Office Visit    1 month ago Pharyngoesophageal dysphagia    [facility] Cole, Lexy Diaz MD    Office Visit    2 months ago Impacted cerumen, bilateral    [place] Arenas, [address] [facility],[address], [place] Randy Page MD    Office Visit    3 months ago Nithya Kim annual wellness visit, subsequent    [facility] Cole, Lexy Diaz MD    Office Visit

## 2023-05-24 RX ORDER — AMLODIPINE BESYLATE 5 MG/1
TABLET ORAL
Qty: 90 TABLET | Refills: 1 | Status: SHIPPED | OUTPATIENT
Start: 2023-05-24

## 2023-05-30 ENCOUNTER — TELEPHONE (OUTPATIENT)
Dept: INTERNAL MEDICINE CLINIC | Facility: CLINIC | Age: 78
End: 2023-05-30

## 2023-05-30 RX ORDER — METOPROLOL SUCCINATE 25 MG/1
25 TABLET, EXTENDED RELEASE ORAL DAILY
Qty: 14 TABLET | Refills: 0 | Status: SHIPPED | OUTPATIENT
Start: 2023-05-30

## 2023-05-30 RX ORDER — METOPROLOL SUCCINATE 25 MG/1
25 TABLET, EXTENDED RELEASE ORAL DAILY
Qty: 90 TABLET | Refills: 3 | Status: SHIPPED | OUTPATIENT
Start: 2023-05-30

## 2023-05-30 NOTE — TELEPHONE ENCOUNTER
Pt states mail order pharmacy has not sent a request to our office for metoprolol 25mg. Pt asking if she still needs to take this. Pt informed metoprolol is still on her active medication list.  Pt states she is out of medication. Pt states she will need a few pills to hold her over until mail order comes in. Pt would like to ask Dr. Daija Mackenzie first if she needs to continue taking Metroprolol. Please advise. Medication pended for mail order and local pharmacy.

## 2023-05-30 NOTE — TELEPHONE ENCOUNTER
Spoke with patient,  verified. Gave her Dr Roslyn Gayle intructions. Mail order and a short term refill were sent for metoprolol. Patient verbalized understanding.

## 2023-06-01 ENCOUNTER — TELEPHONE (OUTPATIENT)
Dept: INTERNAL MEDICINE CLINIC | Facility: CLINIC | Age: 78
End: 2023-06-01

## 2023-06-01 NOTE — TELEPHONE ENCOUNTER
Pt stated she accidentally took another dose of her inhaler Wixela this morning,asking if ok to repeat tonight

## 2023-06-05 ENCOUNTER — PATIENT OUTREACH (OUTPATIENT)
Dept: CASE MANAGEMENT | Age: 78
End: 2023-06-05

## 2023-06-05 RX ORDER — METOPROLOL SUCCINATE 25 MG/1
25 TABLET, EXTENDED RELEASE ORAL DAILY
Qty: 14 TABLET | Refills: 0 | OUTPATIENT
Start: 2023-06-05

## 2023-06-05 NOTE — PROGRESS NOTES
Called patient regarding CCM monthly and left a message for patient to call back when they can. Reviewed patient chart. Left contact my contact number [phone]. Time Spent This Encounter Total: 3  min medical record review  Monthly Minute Total including today: 3 minutes    Alejandro Gautam, [address] [place] [address] Management   [address] [place] [address]  Phone: [phone]. Time [place] 3rd Floor

## 2023-06-09 ENCOUNTER — PATIENT OUTREACH (OUTPATIENT)
Dept: CASE MANAGEMENT | Age: 78
End: 2023-06-09

## 2023-06-09 DIAGNOSIS — I15.2 HYPERTENSION ASSOCIATED WITH TYPE 2 DIABETES MELLITUS (HCC): ICD-10-CM

## 2023-06-09 DIAGNOSIS — I50.32 CHRONIC DIASTOLIC CONGESTIVE HEART FAILURE (HCC): ICD-10-CM

## 2023-06-09 DIAGNOSIS — N18.32 STAGE 3B CHRONIC KIDNEY DISEASE (HCC): ICD-10-CM

## 2023-06-09 DIAGNOSIS — Z91.81 AT RISK FOR FALLS: ICD-10-CM

## 2023-06-09 DIAGNOSIS — I73.9 PAD (PERIPHERAL ARTERY DISEASE) (HCC): ICD-10-CM

## 2023-06-09 DIAGNOSIS — Z95.1 S/P CABG (CORONARY ARTERY BYPASS GRAFT): ICD-10-CM

## 2023-06-09 DIAGNOSIS — I70.0 THORACIC AORTA ATHEROSCLEROSIS (HCC): ICD-10-CM

## 2023-06-09 DIAGNOSIS — H61.23 IMPACTED CERUMEN, BILATERAL: ICD-10-CM

## 2023-06-09 DIAGNOSIS — M1A.9XX1 CHRONIC TOPHACEOUS GOUT: ICD-10-CM

## 2023-06-09 DIAGNOSIS — I65.23 CAROTID STENOSIS, NON-SYMPTOMATIC, BILATERAL: ICD-10-CM

## 2023-06-09 DIAGNOSIS — Z98.890 S/P CAROTID ENDARTERECTOMY: ICD-10-CM

## 2023-06-09 DIAGNOSIS — E21.3 HYPERPARATHYROIDISM (HCC): ICD-10-CM

## 2023-06-09 DIAGNOSIS — E55.9 VITAMIN D DEFICIENCY: ICD-10-CM

## 2023-06-09 DIAGNOSIS — E78.5 HYPERLIPIDEMIA ASSOCIATED WITH TYPE 2 DIABETES MELLITUS (HCC): ICD-10-CM

## 2023-06-09 DIAGNOSIS — Z00.00 MEDICARE ANNUAL WELLNESS VISIT, SUBSEQUENT: ICD-10-CM

## 2023-06-09 DIAGNOSIS — N18.30 CONTROLLED TYPE 2 DIABETES MELLITUS WITH STAGE 3 CHRONIC KIDNEY DISEASE, WITHOUT LONG-TERM CURRENT USE OF INSULIN (HCC): ICD-10-CM

## 2023-06-09 DIAGNOSIS — E11.59 HYPERTENSION ASSOCIATED WITH TYPE 2 DIABETES MELLITUS (HCC): ICD-10-CM

## 2023-06-09 DIAGNOSIS — J44.9 COPD, SEVERE (HCC): ICD-10-CM

## 2023-06-09 DIAGNOSIS — Z85.3 HISTORY OF BREAST CANCER: ICD-10-CM

## 2023-06-09 DIAGNOSIS — E11.69 HYPERLIPIDEMIA ASSOCIATED WITH TYPE 2 DIABETES MELLITUS (HCC): ICD-10-CM

## 2023-06-09 DIAGNOSIS — E11.22 CONTROLLED TYPE 2 DIABETES MELLITUS WITH STAGE 3 CHRONIC KIDNEY DISEASE, WITHOUT LONG-TERM CURRENT USE OF INSULIN (HCC): ICD-10-CM

## 2023-06-16 RX ORDER — ISOPROPYL ALCOHOL 70 ML/100ML
1 SWAB TOPICAL AS DIRECTED
Qty: 400 EACH | Refills: 0 | Status: SHIPPED | OUTPATIENT
Start: 2023-06-16

## 2023-06-16 NOTE — TELEPHONE ENCOUNTER
Please review; protocol failed/No Protcol    Requested Prescriptions   Pending Prescriptions Disp Refills    DROPSAFE ALCOHOL PREP 70 % Does not apply Pads [Pharmacy Med Name: [address] 70 % Pad]  0     Sig: USE AS DIRECTED       There is no refill protocol information for this order          Recent Outpatient Visits              1 month ago Hyperparathyroidism [facility])    Trav Spaulding MD    Office Visit    2 months ago Stage 3b chronic kidney disease [facility])    [address], [facility] MD Cristi    Office Visit    2 months ago Pharyngoesophageal dysphagia    [address], Alea Michelle MD    Office Visit    3 months ago Impacted cerumen, bilateral    [facility], [address], Ara Cash MD    Office Visit    3 months ago Nithya Kim annual wellness visit, subsequent    [address], [facility], Alea Michelle MD    Office Visit            Future Appointments         Provider Department Appt Notes    In 1 month Renee Rodriguez MD [address], [facility] 3 mos

## 2023-06-29 ENCOUNTER — APPOINTMENT (OUTPATIENT)
Dept: CARDIAC REHAB | Facility: HOSPITAL | Age: 78
End: 2023-06-29
Attending: INTERNAL MEDICINE
Payer: MEDICARE

## 2023-07-03 RX ORDER — METOPROLOL SUCCINATE 25 MG/1
25 TABLET, EXTENDED RELEASE ORAL DAILY
Qty: 14 TABLET | Refills: 0 | OUTPATIENT
Start: 2023-07-03

## 2023-07-03 RX ORDER — METOPROLOL SUCCINATE 25 MG/1
25 TABLET, EXTENDED RELEASE ORAL DAILY
Qty: 90 TABLET | Refills: 3 | Status: SHIPPED | OUTPATIENT
Start: 2023-07-03 | End: 2023-07-03

## 2023-07-03 RX ORDER — METOPROLOL SUCCINATE 25 MG/1
25 TABLET, EXTENDED RELEASE ORAL DAILY
Qty: 90 TABLET | Refills: 3 | Status: SHIPPED | OUTPATIENT
Start: 2023-07-03

## 2023-07-04 ENCOUNTER — HOSPITAL ENCOUNTER (INPATIENT)
Facility: HOSPITAL | Age: 78
LOS: 2 days | Discharge: HOME | End: 2023-07-06
Attending: EMERGENCY MEDICINE | Admitting: HOSPITALIST
Payer: MEDICARE

## 2023-07-04 ENCOUNTER — HOSPITAL ENCOUNTER (OUTPATIENT)
Facility: HOSPITAL | Age: 78
Setting detail: OBSERVATION
Discharge: HOME | DRG: 554 | End: 2023-07-06
Attending: EMERGENCY MEDICINE | Admitting: HOSPITALIST
Payer: MEDICARE

## 2023-07-04 DIAGNOSIS — R26.2 UNABLE TO WALK: ICD-10-CM

## 2023-07-04 DIAGNOSIS — M25.561 ACUTE PAIN OF RIGHT KNEE: Primary | ICD-10-CM

## 2023-07-04 LAB
ANION GAP SERPL CALC-SCNC: 2 MMOL/L (ref 0–18)
BASOPHILS # BLD AUTO: 0.03 X10(3) UL (ref 0–0.2)
BASOPHILS NFR BLD AUTO: 0.3 %
BUN BLD-MCNC: 23 MG/DL (ref 7–18)
BUN/CREAT SERPL: 20.5 (ref 10–20)
CALCIUM BLD-MCNC: 10.5 MG/DL (ref 8.5–10.1)
CHLORIDE SERPL-SCNC: 104 MMOL/L (ref 98–112)
CO2 SERPL-SCNC: 28 MMOL/L (ref 21–32)
CREAT BLD-MCNC: 1.12 MG/DL
CRP SERPL-MCNC: 0.91 MG/DL (ref ?–0.3)
DEPRECATED RDW RBC AUTO: 47.3 FL (ref 35.1–46.3)
EOSINOPHIL # BLD AUTO: 0.02 X10(3) UL (ref 0–0.7)
EOSINOPHIL NFR BLD AUTO: 0.2 %
ERYTHROCYTE [DISTWIDTH] IN BLOOD BY AUTOMATED COUNT: 13.9 % (ref 11–15)
ERYTHROCYTE [SEDIMENTATION RATE] IN BLOOD: 57 MM/HR
EST. AVERAGE GLUCOSE BLD GHB EST-MCNC: 120 MG/DL (ref 68–126)
GFR SERPLBLD BASED ON 1.73 SQ M-ARVRAT: 51 ML/MIN/1.73M2 (ref 60–?)
GLUCOSE BLD-MCNC: 197 MG/DL (ref 70–99)
GLUCOSE BLDC GLUCOMTR-MCNC: 100 MG/DL (ref 70–99)
GLUCOSE BLDC GLUCOMTR-MCNC: 179 MG/DL (ref 70–99)
GLUCOSE BLDC GLUCOMTR-MCNC: 213 MG/DL (ref 70–99)
HBA1C MFR BLD: 5.8 % (ref ?–5.7)
HCT VFR BLD AUTO: 49.8 %
HGB BLD-MCNC: 15.9 G/DL
IMM GRANULOCYTES # BLD AUTO: 0.07 X10(3) UL (ref 0–1)
IMM GRANULOCYTES NFR BLD: 0.6 %
LYMPHOCYTES # BLD AUTO: 0.74 X10(3) UL (ref 1–4)
LYMPHOCYTES NFR BLD AUTO: 6.5 %
MCH RBC QN AUTO: 29.6 PG (ref 26–34)
MCHC RBC AUTO-ENTMCNC: 31.9 G/DL (ref 31–37)
MCV RBC AUTO: 92.7 FL
MONOCYTES # BLD AUTO: 0.21 X10(3) UL (ref 0.1–1)
MONOCYTES NFR BLD AUTO: 1.9 %
NEUTROPHILS # BLD AUTO: 10.24 X10 (3) UL (ref 1.5–7.7)
NEUTROPHILS # BLD AUTO: 10.24 X10(3) UL (ref 1.5–7.7)
NEUTROPHILS NFR BLD AUTO: 90.5 %
OSMOLALITY SERPL CALC.SUM OF ELEC: 287 MOSM/KG (ref 275–295)
PLATELET # BLD AUTO: 218 10(3)UL (ref 150–450)
POTASSIUM SERPL-SCNC: 4.5 MMOL/L (ref 3.5–5.1)
RBC # BLD AUTO: 5.37 X10(6)UL
SODIUM SERPL-SCNC: 134 MMOL/L (ref 136–145)
URATE SERPL-MCNC: 5.3 MG/DL
WBC # BLD AUTO: 11.3 X10(3) UL (ref 4–11)

## 2023-07-04 PROCEDURE — 99222 1ST HOSP IP/OBS MODERATE 55: CPT | Performed by: HOSPITALIST

## 2023-07-04 RX ORDER — METOPROLOL SUCCINATE 25 MG/1
25 TABLET, EXTENDED RELEASE ORAL DAILY
Status: DISCONTINUED | OUTPATIENT
Start: 2023-07-04 | End: 2023-07-06

## 2023-07-04 RX ORDER — MORPHINE SULFATE 2 MG/ML
2 INJECTION, SOLUTION INTRAMUSCULAR; INTRAVENOUS EVERY 2 HOUR PRN
Status: DISCONTINUED | OUTPATIENT
Start: 2023-07-04 | End: 2023-07-06

## 2023-07-04 RX ORDER — MORPHINE SULFATE 2 MG/ML
1 INJECTION, SOLUTION INTRAMUSCULAR; INTRAVENOUS EVERY 2 HOUR PRN
Status: DISCONTINUED | OUTPATIENT
Start: 2023-07-04 | End: 2023-07-06

## 2023-07-04 RX ORDER — NICOTINE POLACRILEX 4 MG
30 LOZENGE BUCCAL
Status: DISCONTINUED | OUTPATIENT
Start: 2023-07-04 | End: 2023-07-06

## 2023-07-04 RX ORDER — HYDROCODONE BITARTRATE AND ACETAMINOPHEN 5; 325 MG/1; MG/1
1 TABLET ORAL EVERY 4 HOURS PRN
Status: DISCONTINUED | OUTPATIENT
Start: 2023-07-04 | End: 2023-07-06

## 2023-07-04 RX ORDER — ACETAMINOPHEN 500 MG
500 TABLET ORAL EVERY 4 HOURS PRN
Status: DISCONTINUED | OUTPATIENT
Start: 2023-07-04 | End: 2023-07-06

## 2023-07-04 RX ORDER — HEPARIN SODIUM 5000 [USP'U]/ML
5000 INJECTION, SOLUTION INTRAVENOUS; SUBCUTANEOUS EVERY 12 HOURS SCHEDULED
Status: DISCONTINUED | OUTPATIENT
Start: 2023-07-04 | End: 2023-07-06

## 2023-07-04 RX ORDER — METOCLOPRAMIDE HYDROCHLORIDE 5 MG/ML
10 INJECTION INTRAMUSCULAR; INTRAVENOUS EVERY 8 HOURS PRN
Status: DISCONTINUED | OUTPATIENT
Start: 2023-07-04 | End: 2023-07-06

## 2023-07-04 RX ORDER — PREDNISONE 20 MG/1
40 TABLET ORAL ONCE
Status: COMPLETED | OUTPATIENT
Start: 2023-07-04 | End: 2023-07-04

## 2023-07-04 RX ORDER — HYDRALAZINE HYDROCHLORIDE 20 MG/ML
10 INJECTION INTRAMUSCULAR; INTRAVENOUS ONCE
Status: DISCONTINUED | OUTPATIENT
Start: 2023-07-04 | End: 2023-07-06

## 2023-07-04 RX ORDER — HYDROCODONE BITARTRATE AND ACETAMINOPHEN 5; 325 MG/1; MG/1
1 TABLET ORAL ONCE
Status: COMPLETED | OUTPATIENT
Start: 2023-07-04 | End: 2023-07-04

## 2023-07-04 RX ORDER — MORPHINE SULFATE 4 MG/ML
4 INJECTION, SOLUTION INTRAMUSCULAR; INTRAVENOUS EVERY 2 HOUR PRN
Status: DISCONTINUED | OUTPATIENT
Start: 2023-07-04 | End: 2023-07-06

## 2023-07-04 RX ORDER — ONDANSETRON 2 MG/ML
4 INJECTION INTRAMUSCULAR; INTRAVENOUS EVERY 6 HOURS PRN
Status: DISCONTINUED | OUTPATIENT
Start: 2023-07-04 | End: 2023-07-06

## 2023-07-04 RX ORDER — HYDROCODONE BITARTRATE AND ACETAMINOPHEN 5; 325 MG/1; MG/1
2 TABLET ORAL EVERY 4 HOURS PRN
Status: DISCONTINUED | OUTPATIENT
Start: 2023-07-04 | End: 2023-07-06

## 2023-07-04 RX ORDER — NICOTINE POLACRILEX 4 MG
15 LOZENGE BUCCAL
Status: DISCONTINUED | OUTPATIENT
Start: 2023-07-04 | End: 2023-07-06

## 2023-07-04 RX ORDER — METHYLPREDNISOLONE SODIUM SUCCINATE 40 MG/ML
40 INJECTION, POWDER, LYOPHILIZED, FOR SOLUTION INTRAMUSCULAR; INTRAVENOUS EVERY 12 HOURS
Status: DISCONTINUED | OUTPATIENT
Start: 2023-07-04 | End: 2023-07-06

## 2023-07-04 RX ORDER — HYDRALAZINE HYDROCHLORIDE 25 MG/1
25 TABLET, FILM COATED ORAL 2 TIMES DAILY
Status: DISCONTINUED | OUTPATIENT
Start: 2023-07-04 | End: 2023-07-06

## 2023-07-04 RX ORDER — AMLODIPINE BESYLATE 5 MG/1
5 TABLET ORAL DAILY
Status: DISCONTINUED | OUTPATIENT
Start: 2023-07-04 | End: 2023-07-06

## 2023-07-04 RX ORDER — ACETAMINOPHEN 325 MG/1
650 TABLET ORAL EVERY 4 HOURS PRN
Status: DISCONTINUED | OUTPATIENT
Start: 2023-07-04 | End: 2023-07-06

## 2023-07-04 RX ORDER — DEXTROSE MONOHYDRATE 25 G/50ML
50 INJECTION, SOLUTION INTRAVENOUS
Status: DISCONTINUED | OUTPATIENT
Start: 2023-07-04 | End: 2023-07-06

## 2023-07-04 NOTE — ED QUICK NOTES
Orders for admission, patient is aware of plan and ready to go upstairs. Any questions, please call ED RN Jensen Miller at [address].      Patient Covid vaccination status: Fully vaccinated     COVID Test Ordered in ED: None    COVID Suspicion at Admission: N/A    Running Infusions:  None    Mental Status/LOC at time of transport: x4    Other pertinent information:   CIWA score: N/A   NIH score:  N/A

## 2023-07-04 NOTE — H&P
[facility]    PATIENT'S NAME: TYRONE RUSSELL   ATTENDING PHYSICIAN: Carmen Reyes MD   PATIENT ACCOUNT#:   [id number]    LOCATION:  [place] [address]  MEDICAL RECORD #:   [id number]       YOB: 1945  ADMISSION DATE:       07/04/2023    HISTORY AND PHYSICAL EXAMINATION    CHIEF COMPLAINT:  Right knee intractable pain with acute gout attack. HISTORY OF PRESENT ILLNESS:  The patient is a 72-year-old [place] female who came into the emergency department for evaluation of intractable pain and swelling in her right knee starting this morning. She admitted being noncompliant with no red meat diet. She had a steak 3 days ago and last night she ate a burger. This morning woke up with intractable knee pain. CBC and chemistry still pending. She will be admitted to the hospital for further management. PAST MEDICAL HISTORY:  Severe gout; has been stable on febuxostat and diet. She had coronary artery disease status post coronary artery bypass graft; chronic left ventricular diastolic dysfunction; carotid atherosclerosis; asthma; severe chronic obstructive pulmonary disease; major depressive disorder; gastroesophageal reflux disease; hyperlipidemia; hypertension; generalized osteoarthritis; chronic kidney disease stage 3; diabetes mellitus type 2. PAST SURGICAL HISTORY:   Left mastectomy for breast cancer, right breast lumpectomy, followed by radiation therapy for breast cancer; coronary artery bypass graft surgery; right carotid endarterectomy; transsphenoidal pituitary macroadenoma resection; hernia repair; cataract procedure. MEDICATIONS:  Please see medication reconciliation list.     ALLERGIES:  She is allergic to allopurinol. She had side effects to statins and ACE inhibitors. She had side effects to Xanax and adhesive tape. FAMILY HISTORY:  Mother had thyroid disease. Father had asthma, hypertension, and heart disease.      SOCIAL HISTORY:  No tobacco, alcohol, or drug use. Lives by herself. Usually independent for basic activities of daily living. REVIEW OF SYSTEMS:  The patient reports she woke up this morning with intractable knee swelling and pain. She could not bear weight. She took 2 tablets of colchicine and waited for a few hours without improvement in her pain. She decided to come into the emergency room. She received pain medications and oral prednisone and refused arthrocentesis, and because of her intractable pain, she will be observed overnight. Otherwise, unremarkable. No recent trauma. No fever or chills. PHYSICAL EXAMINATION:    GENERAL:   Alert and oriented to time, place and person, moderate distress. VITAL SIGNS:  Temperature 97.0, pulse 76, respiratory rate 18, blood pressure 194/84, pulse ox 94% on room air. HEENT:  Atraumatic. Oropharynx clear. Moist mucous membranes. Ears, nose normal.  Eyes:  Anicteric sclerae. NECK:  Supple. No lymphadenopathy. Trachea midline. Full range of motion. LUNGS:  Clear to auscultation bilaterally. Normal respiratory effort. HEART:  Regular rate and rhythm. S1 and S2 auscultated. No murmur. ABDOMEN:  Soft, nondistended. No tenderness. Positive bowel sounds. EXTREMITIES:  No edema, clubbing, or cyanosis. Multiple finger digits tophi lesions noted. Right knee with effusion and warmness to touch; limitation of range of motion secondary to excruciating pain but no erythema. ASSESSMENT AND PLAN:    1. Right knee effusion and pain most likely related to acute gout attack secondary to noncompliance with diet. 2.   Diabetes mellitus type 2.  3.   Chronic kidney disease stage 3.  4.   Hypertension. The patient will be admitted to general medical floor. IV Solu-Medrol. Monitor Accu-Cheks. Sliding scale insulin. Pain control. Fall precautions. Monitor her clinical status. Obtain uric acid and inflammatory markers. Further recommendations to follow.     Dictated By Marley Patino MD  d: 07/04/2023 15:43:38  t: 07/04/2023 15:59:48  [facility] [id number]  /

## 2023-07-04 NOTE — PLAN OF CARE
ED admit. Assumed care of patient this evening, arrived in a wheelchair needing total assist/sling lift to transfer to bed d/t pain. Pt. Alert. Strict bedrest. Gout flare up primarily in RLE. ACHS - refusing insulin. Education provided about importance of insulin and glucose management. Cardiac diet - no red meat. Plan for pain management, obs overnight.      Problem: Patient Centered Care  Goal: Patient preferences are identified and integrated in the patient's plan of care  Description: Interventions:  - What would you like us to know as we care for you?   - Provide timely, complete, and accurate information to patient/family  - Incorporate patient and family knowledge, values, beliefs, and cultural backgrounds into the planning and delivery of care  - Encourage patient/family to participate in care and decision-making at the level they choose  - Honor patient and family perspectives and choices  Outcome: Progressing     Problem: Diabetes/Glucose Control  Goal: Glucose maintained within prescribed range  Description: INTERVENTIONS:  - Monitor Blood Glucose as ordered  - Assess for signs and symptoms of hyperglycemia and hypoglycemia  - Administer ordered medications to maintain glucose within target range  - Assess barriers to adequate nutritional intake and initiate nutrition consult as needed  - Instruct patient on self management of diabetes  Outcome: Progressing     Problem: Patient/Family Goals  Goal: Patient/Family Long Term Goal  Description: Patient's Long Term Goal:     Interventions:  -   - See additional Care Plan goals for specific interventions  Outcome: Progressing  Goal: Patient/Family Short Term Goal  Description: Patient's Short Term Goal:     Interventions:   -   - See additional Care Plan goals for specific interventions  Outcome: Progressing

## 2023-07-04 NOTE — ED INITIAL ASSESSMENT (HPI)
Pt presents to ED via EMS for a gout flare up of the right knee that started this morning. Pt has significant gout history but is allergic to most gout meds. Pt is uncomfortable.

## 2023-07-05 ENCOUNTER — APPOINTMENT (OUTPATIENT)
Dept: GENERAL RADIOLOGY | Facility: HOSPITAL | Age: 78
DRG: 554 | End: 2023-07-05
Attending: HOSPITALIST
Payer: MEDICARE

## 2023-07-05 ENCOUNTER — APPOINTMENT (OUTPATIENT)
Dept: GENERAL RADIOLOGY | Facility: HOSPITAL | Age: 78
End: 2023-07-05
Attending: HOSPITALIST
Payer: MEDICARE

## 2023-07-05 ENCOUNTER — PATIENT OUTREACH (OUTPATIENT)
Dept: CASE MANAGEMENT | Age: 78
End: 2023-07-05

## 2023-07-05 DIAGNOSIS — I15.2 HYPERTENSION ASSOCIATED WITH TYPE 2 DIABETES MELLITUS: ICD-10-CM

## 2023-07-05 DIAGNOSIS — E21.3 HYPERPARATHYROIDISM (HCC): ICD-10-CM

## 2023-07-05 DIAGNOSIS — I70.0 THORACIC AORTA ATHEROSCLEROSIS (HCC): ICD-10-CM

## 2023-07-05 DIAGNOSIS — E11.59 HYPERTENSION ASSOCIATED WITH TYPE 2 DIABETES MELLITUS: ICD-10-CM

## 2023-07-05 DIAGNOSIS — E11.69 HYPERLIPIDEMIA ASSOCIATED WITH TYPE 2 DIABETES MELLITUS: ICD-10-CM

## 2023-07-05 DIAGNOSIS — J44.9 COPD, SEVERE (HCC): ICD-10-CM

## 2023-07-05 DIAGNOSIS — M1A.9XX1 CHRONIC TOPHACEOUS GOUT: ICD-10-CM

## 2023-07-05 DIAGNOSIS — H61.23 IMPACTED CERUMEN, BILATERAL: ICD-10-CM

## 2023-07-05 DIAGNOSIS — E78.5 HYPERLIPIDEMIA ASSOCIATED WITH TYPE 2 DIABETES MELLITUS: ICD-10-CM

## 2023-07-05 DIAGNOSIS — E11.22 CONTROLLED TYPE 2 DIABETES MELLITUS WITH STAGE 3 CHRONIC KIDNEY DISEASE, WITHOUT LONG-TERM CURRENT USE OF INSULIN (HCC): ICD-10-CM

## 2023-07-05 DIAGNOSIS — M85.859 OSTEOPENIA OF HIP, UNSPECIFIED LATERALITY: ICD-10-CM

## 2023-07-05 DIAGNOSIS — Z98.890 S/P CAROTID ENDARTERECTOMY: Primary | ICD-10-CM

## 2023-07-05 DIAGNOSIS — Z91.81 AT RISK FOR FALLS: ICD-10-CM

## 2023-07-05 DIAGNOSIS — Z85.3 HISTORY OF BREAST CANCER: ICD-10-CM

## 2023-07-05 DIAGNOSIS — N18.32 STAGE 3B CHRONIC KIDNEY DISEASE (HCC): ICD-10-CM

## 2023-07-05 DIAGNOSIS — N18.30 CONTROLLED TYPE 2 DIABETES MELLITUS WITH STAGE 3 CHRONIC KIDNEY DISEASE, WITHOUT LONG-TERM CURRENT USE OF INSULIN (HCC): ICD-10-CM

## 2023-07-05 LAB
ANION GAP SERPL CALC-SCNC: 7 MMOL/L (ref 0–18)
BASOPHILS # BLD AUTO: 0.02 X10(3) UL (ref 0–0.2)
BASOPHILS NFR BLD AUTO: 0.2 %
BASOPHILS NFR SNV: 0 %
BUN BLD-MCNC: 24 MG/DL (ref 7–18)
BUN/CREAT SERPL: 21.6 (ref 10–20)
CALCIUM BLD-MCNC: 10.6 MG/DL (ref 8.5–10.1)
CHLORIDE SERPL-SCNC: 102 MMOL/L (ref 98–112)
CO2 SERPL-SCNC: 28 MMOL/L (ref 21–32)
CREAT BLD-MCNC: 1.11 MG/DL
DEPRECATED RDW RBC AUTO: 46.5 FL (ref 35.1–46.3)
EOSINOPHIL # BLD AUTO: 0 X10(3) UL (ref 0–0.7)
EOSINOPHIL NFR BLD AUTO: 0 %
EOSINOPHIL NFR SNV: 3 %
ERYTHROCYTE [DISTWIDTH] IN BLOOD BY AUTOMATED COUNT: 13.8 % (ref 11–15)
GFR SERPLBLD BASED ON 1.73 SQ M-ARVRAT: 51 ML/MIN/1.73M2 (ref 60–?)
GLUCOSE BLD-MCNC: 144 MG/DL (ref 70–99)
GLUCOSE BLDC GLUCOMTR-MCNC: 127 MG/DL (ref 70–99)
GLUCOSE BLDC GLUCOMTR-MCNC: 261 MG/DL (ref 70–99)
GLUCOSE BLDC GLUCOMTR-MCNC: 79 MG/DL (ref 70–99)
GLUCOSE BLDC GLUCOMTR-MCNC: 82 MG/DL (ref 70–99)
HCT VFR BLD AUTO: 47.8 %
HGB BLD-MCNC: 15.4 G/DL
IMM GRANULOCYTES # BLD AUTO: 0.05 X10(3) UL (ref 0–1)
IMM GRANULOCYTES NFR BLD: 0.4 %
LYMPHOCYTES # BLD AUTO: 0.88 X10(3) UL (ref 1–4)
LYMPHOCYTES NFR BLD AUTO: 7.7 %
LYMPHOCYTES NFR SNV: 9 %
MCH RBC QN AUTO: 29.4 PG (ref 26–34)
MCHC RBC AUTO-ENTMCNC: 32.2 G/DL (ref 31–37)
MCV RBC AUTO: 91.2 FL
MONOCYTES # BLD AUTO: 0.59 X10(3) UL (ref 0.1–1)
MONOCYTES NFR BLD AUTO: 5.1 %
MONOS+MACROS NFR SNV: 55 %
NEUTROPHILS # BLD AUTO: 9.96 X10 (3) UL (ref 1.5–7.7)
NEUTROPHILS # BLD AUTO: 9.96 X10(3) UL (ref 1.5–7.7)
NEUTROPHILS NFR BLD AUTO: 86.6 %
NEUTROPHILS NFR FLD: 33 %
OSMOLALITY SERPL CALC.SUM OF ELEC: 291 MOSM/KG (ref 275–295)
PLATELET # BLD AUTO: 228 10(3)UL (ref 150–450)
POTASSIUM SERPL-SCNC: 4.6 MMOL/L (ref 3.5–5.1)
RBC # BLD AUTO: 5.24 X10(6)UL
SODIUM SERPL-SCNC: 137 MMOL/L (ref 136–145)
TOTAL CELLS COUNTED FLD: 100
TOTAL CELLS COUNTED SNV: 3318 /CUMM (ref 0–200)
WBC # BLD AUTO: 11.5 X10(3) UL (ref 4–11)
WBC # SNV: 3318 /CUMM

## 2023-07-05 PROCEDURE — 99233 SBSQ HOSP IP/OBS HIGH 50: CPT | Performed by: HOSPITALIST

## 2023-07-05 PROCEDURE — 73560 X-RAY EXAM OF KNEE 1 OR 2: CPT | Performed by: HOSPITALIST

## 2023-07-05 RX ORDER — FEBUXOSTAT 40 MG/1
40 TABLET, FILM COATED ORAL DAILY
Status: DISCONTINUED | OUTPATIENT
Start: 2023-07-05 | End: 2023-07-06

## 2023-07-05 RX ORDER — BUPIVACAINE HYDROCHLORIDE 5 MG/ML
5 INJECTION, SOLUTION EPIDURAL; INTRACAUDAL ONCE
Status: DISCONTINUED | OUTPATIENT
Start: 2023-07-05 | End: 2023-07-06

## 2023-07-05 RX ORDER — TRIAMCINOLONE ACETONIDE 40 MG/ML
40 INJECTION, SUSPENSION INTRA-ARTICULAR; INTRAMUSCULAR ONCE
Status: DISCONTINUED | OUTPATIENT
Start: 2023-07-05 | End: 2023-07-06

## 2023-07-05 NOTE — PLAN OF CARE
Pt. Alert, refusing IV hydralazine, heparin, amlodipine. States taking Febuxostat 40mg daily. RN removed from allergy list as not a true allergy to this medication. Pt. States \"If I can walk without pain I want to go home\" Pt. Denies pain this morning and believes the flare up was attributed to her ingestion of burgers and steaks. RN provided list of foods pt should avoid and pt. Should eat/drink more of for her condition. Diabetes education provided. Pt. Anxious to go home. Call light within reach, bed locked and in lowest position, SCDs on. Ortho consulted - R knee aspirated, sent for culx.      Problem: Patient Centered Care  Goal: Patient preferences are identified and integrated in the patient's plan of care  Description: Interventions:  - What would you like us to know as we care for you?   - Provide timely, complete, and accurate information to patient/family  - Incorporate patient and family knowledge, values, beliefs, and cultural backgrounds into the planning and delivery of care  - Encourage patient/family to participate in care and decision-making at the level they choose  - Honor patient and family perspectives and choices  7/5/2023 0736 by Portia Garcia RN  Outcome: Progressing  7/4/2023 1754 by Portia Garcia RN  Outcome: Progressing     Problem: Diabetes/Glucose Control  Goal: Glucose maintained within prescribed range  Description: INTERVENTIONS:  - Monitor Blood Glucose as ordered  - Assess for signs and symptoms of hyperglycemia and hypoglycemia  - Administer ordered medications to maintain glucose within target range  - Assess barriers to adequate nutritional intake and initiate nutrition consult as needed  - Instruct patient on self management of diabetes  7/5/2023 0736 by Portia Garcia RN  Outcome: Progressing  7/4/2023 1754 by Portia Garcia RN  Outcome: Progressing     Problem: Patient/Family Goals  Goal: Patient/Family Long Term Goal  Description: Patient's Long Term Goal: Interventions:  -   - See additional Care Plan goals for specific interventions  7/5/2023 0736 by Marlin Schmidt RN  Outcome: Progressing  7/4/2023 1754 by Marlin Schmidt RN  Outcome: Progressing  Goal: Patient/Family Short Term Goal  Description: Patient's Short Term Goal:     Interventions:   -   - See additional Care Plan goals for specific interventions  7/5/2023 0736 by Marlin Schmidt RN  Outcome: Progressing  7/4/2023 1754 by Marlin Schmidt RN  Outcome: Progressing     Problem: PAIN - ADULT  Goal: Verbalizes/displays adequate comfort level or patient's stated pain goal  Description: INTERVENTIONS:  - Encourage pt to monitor pain and request assistance  - Assess pain using appropriate pain scale  - Administer analgesics based on type and severity of pain and evaluate response  - Implement non-pharmacological measures as appropriate and evaluate response  - Consider cultural and social influences on pain and pain management  - Manage/alleviate anxiety  - Utilize distraction and/or relaxation techniques  - Monitor for opioid side effects  - Notify MD/LIP if interventions unsuccessful or patient reports new pain  - Anticipate increased pain with activity and pre-medicate as appropriate  Outcome: Progressing     Problem: RISK FOR INFECTION - ADULT  Goal: Absence of fever/infection during anticipated neutropenic period  Description: INTERVENTIONS  - Monitor WBC  - Administer growth factors as ordered  - Implement neutropenic guidelines  Outcome: Progressing     Problem: SAFETY ADULT - FALL  Goal: Free from fall injury  Description: INTERVENTIONS:  - Assess pt frequently for physical needs  - Identify cognitive and physical deficits and behaviors that affect risk of falls.   - [place] fall precautions as indicated by assessment.  - Educate pt/family on patient safety including physical limitations  - Instruct pt to call for assistance with activity based on assessment  - Modify environment to reduce risk of injury  - Provide assistive devices as appropriate  - Consider OT/PT consult to assist with strengthening/mobility  - Encourage toileting schedule  Outcome: Progressing     Problem: DISCHARGE PLANNING  Goal: Discharge to home or other facility with appropriate resources  Description: INTERVENTIONS:  - Identify barriers to discharge w/pt and caregiver  - Include patient/family/discharge partner in discharge planning  - Arrange for needed discharge resources and transportation as appropriate  - Identify discharge learning needs (meds, wound care, etc)  - Arrange for interpreters to assist at discharge as needed  - Consider post-discharge preferences of patient/family/discharge partner  - Complete POLST form as appropriate  - Assess patient's ability to be responsible for managing their own health  - Refer to Case Management Department for coordinating discharge planning if the patient needs post-hospital services based on physician/LIP order or complex needs related to functional status, cognitive ability or social support system  Outcome: Progressing

## 2023-07-05 NOTE — PROGRESS NOTES
Patient called me back from the [facility].  She was admitted yesterday. Patient stated that she eat a big hamburger and after her big hamburger her right knee started to hurt really bad. Patient stated the pain was severe she could not walk. Her care giver helped her with with the walker. Patient could not walk with her left leg she almost fell. Patient was admitted in hospital she is doing fine when she is in bed, but has hard time getting up and putting pressure on her right knee. Patient stated that she was given prednisone and that her blood pressure went up and her blood sugar went up. She did not give me any blood pressure reading. Patient stated that everyone in the hospital is so nice to her. I will follow up with patient after discharge. Total time - 14 min  Total Monthly time- 14 min    Phillip Gambino, [address] [place] [address] [address]  Phone: [phone]. Time [place] [address]

## 2023-07-05 NOTE — CONSULTS
Spiritual Care Visit Note    Visited With: Patient    Writer report consulting with RN. Patient is alert and decisional. No one at bedside. Writer offered empathic listening and support. Writer helped patient complete HCPoA. Patient named son, Steve Shrestha as agent. A copy of HCPoA is placed on chart and copies given to patient. Patient requested to be added to communion list. Follow up as requested. Spiritual Care Taxonomy:    Intended Effects: Demonstrate caring and concern    Methods: Collaborate with care team member;Offer support    Interventions: Active listening; Ask guided questions;Silent prayer     [address], [address] [facility]   [id number]     On call  services [phone]

## 2023-07-05 NOTE — PLAN OF CARE
Patient is alert and oriented. RA. SCDs on for DVT prophylaxis. Voiding freely. Declined pain medications. MD notified of elevated BP, one time dose of IV hydralazine order, patient refused, educated patient on importance of keeping BP WDL, patient continue to refuse dose of medication, Bps rechecked throughout shift. Call light within reach, bed alarm active. Problem: Patient Centered Care  Goal: Patient preferences are identified and integrated in the patient's plan of care  Description: Interventions:  - What would you like us to know as we care for you?  I'm very sensitive to medications  - Provide timely, complete, and accurate information to patient/family  - Incorporate patient and family knowledge, values, beliefs, and cultural backgrounds into the planning and delivery of care  - Encourage patient/family to participate in care and decision-making at the level they choose  - Honor patient and family perspectives and choices  Outcome: Progressing    Problem: Diabetes/Glucose Control  Goal: Glucose maintained within prescribed range  Description: INTERVENTIONS:  - Monitor Blood Glucose as ordered  - Assess for signs and symptoms of hyperglycemia and hypoglycemia  - Administer ordered medications to maintain glucose within target range  - Assess barriers to adequate nutritional intake and initiate nutrition consult as needed  - Instruct patient on self management of diabetes  Outcome: Progressing         Problem: PAIN - ADULT  Goal: Verbalizes/displays adequate comfort level or patient's stated pain goal  Description: INTERVENTIONS:  - Encourage pt to monitor pain and request assistance  - Assess pain using appropriate pain scale  - Administer analgesics based on type and severity of pain and evaluate response  - Implement non-pharmacological measures as appropriate and evaluate response  - Consider cultural and social influences on pain and pain management  - Manage/alleviate anxiety  - Utilize distraction and/or relaxation techniques  - Monitor for opioid side effects  - Notify MD/LIP if interventions unsuccessful or patient reports new pain  - Anticipate increased pain with activity and pre-medicate as appropriate  Outcome: Progressing     Problem: RISK FOR INFECTION - ADULT  Goal: Absence of fever/infection during anticipated neutropenic period  Description: INTERVENTIONS  - Monitor WBC  - Administer growth factors as ordered  - Implement neutropenic guidelines  Outcome: Progressing     Problem: SAFETY ADULT - FALL  Goal: Free from fall injury  Description: INTERVENTIONS:  - Assess pt frequently for physical needs  - Identify cognitive and physical deficits and behaviors that affect risk of falls.   - [place] fall precautions as indicated by assessment.  - Educate pt/family on patient safety including physical limitations  - Instruct pt to call for assistance with activity based on assessment  - Modify environment to reduce risk of injury  - Provide assistive devices as appropriate  - Consider OT/PT consult to assist with strengthening/mobility  - Encourage toileting schedule  Outcome: Progressing     Problem: DISCHARGE PLANNING  Goal: Discharge to home or other facility with appropriate resources  Description: INTERVENTIONS:  - Identify barriers to discharge w/pt and caregiver  - Include patient/family/discharge partner in discharge planning  - Arrange for needed discharge resources and transportation as appropriate  - Identify discharge learning needs (meds, wound care, etc)  - Arrange for interpreters to assist at discharge as needed  - Consider post-discharge preferences of patient/family/discharge partner  - Complete POLST form as appropriate  - Assess patient's ability to be responsible for managing their own health  - Refer to Case Management Department for coordinating discharge planning if the patient needs post-hospital services based on physician/LIP order or complex needs related to functional status, cognitive ability or social support system  Outcome: Progressing

## 2023-07-05 NOTE — DISCHARGE INSTRUCTIONS
AVOID -  Alcohol (beer, hard liquor, and red wine). You may be told to give up alcohol completely. Certain fish (anchovies, sardines, fish roes, herring, tuna, mussels, codfish, scallops, trout, and darrion)  Certain meats (red meat, processed meat, estes, turkey, wild game, and goose)  Sauces and gravies made with meat  Organ meats (such as liver, kidneys, sweetbreads, and tripe)  Yeast and yeast extract supplements    BEST FOODS TO CONSUME FOR GOUT -   Cherries contain chemicals that may lower uric acid. Omega fatty acids. These are found in some fatty fish such as salmon, certain oils (flax, olive, or nut), and nuts themselves. Omega fatty acids may help prevent inflammation due to gout.   Dairy products that are low-fat or fat-free, such as cheese and yogurt  Complex carbohydrate foods, including whole grains, brown rice, oats, and beans  Coffee, in moderation  Water, approximately 64 ounces per day

## 2023-07-05 NOTE — PROGRESS NOTES
Patient called yesterday and left me a message to call her back. I see that patient went to ER yesterday and was admitted. Will try calling pt again to follow up. Maegan Bearden, [address] [place] [address] Management   [address] [place] [address], [address]rd [address] Bridget [place], [address] [place] [address]  Phone: [phone]. Time [address]

## 2023-07-06 VITALS
TEMPERATURE: 98 F | OXYGEN SATURATION: 94 % | HEART RATE: 69 BPM | SYSTOLIC BLOOD PRESSURE: 156 MMHG | DIASTOLIC BLOOD PRESSURE: 73 MMHG | WEIGHT: 170 LBS | BODY MASS INDEX: 29.02 KG/M2 | HEIGHT: 64 IN | RESPIRATION RATE: 18 BRPM

## 2023-07-06 LAB
ANION GAP SERPL CALC-SCNC: 3 MMOL/L (ref 0–18)
BASOPHILS # BLD AUTO: 0.01 X10(3) UL (ref 0–0.2)
BASOPHILS NFR BLD AUTO: 0.1 %
BUN BLD-MCNC: 33 MG/DL (ref 7–18)
BUN/CREAT SERPL: 28.7 (ref 10–20)
CALCIUM BLD-MCNC: 10.5 MG/DL (ref 8.5–10.1)
CHLORIDE SERPL-SCNC: 105 MMOL/L (ref 98–112)
CO2 SERPL-SCNC: 32 MMOL/L (ref 21–32)
CREAT BLD-MCNC: 1.15 MG/DL
DEPRECATED RDW RBC AUTO: 49.1 FL (ref 35.1–46.3)
EOSINOPHIL # BLD AUTO: 0 X10(3) UL (ref 0–0.7)
EOSINOPHIL NFR BLD AUTO: 0 %
ERYTHROCYTE [DISTWIDTH] IN BLOOD BY AUTOMATED COUNT: 14.1 % (ref 11–15)
GFR SERPLBLD BASED ON 1.73 SQ M-ARVRAT: 49 ML/MIN/1.73M2 (ref 60–?)
GLUCOSE BLD-MCNC: 131 MG/DL (ref 70–99)
GLUCOSE BLDC GLUCOMTR-MCNC: 110 MG/DL (ref 70–99)
GLUCOSE BLDC GLUCOMTR-MCNC: 112 MG/DL (ref 70–99)
HCT VFR BLD AUTO: 45.8 %
HGB BLD-MCNC: 14.2 G/DL
IMM GRANULOCYTES # BLD AUTO: 0.12 X10(3) UL (ref 0–1)
IMM GRANULOCYTES NFR BLD: 0.9 %
LYMPHOCYTES # BLD AUTO: 1.25 X10(3) UL (ref 1–4)
LYMPHOCYTES NFR BLD AUTO: 9 %
MCH RBC QN AUTO: 29.2 PG (ref 26–34)
MCHC RBC AUTO-ENTMCNC: 31 G/DL (ref 31–37)
MCV RBC AUTO: 94 FL
MONOCYTES # BLD AUTO: 0.9 X10(3) UL (ref 0.1–1)
MONOCYTES NFR BLD AUTO: 6.5 %
NEUTROPHILS # BLD AUTO: 11.6 X10 (3) UL (ref 1.5–7.7)
NEUTROPHILS # BLD AUTO: 11.6 X10(3) UL (ref 1.5–7.7)
NEUTROPHILS NFR BLD AUTO: 83.5 %
OSMOLALITY SERPL CALC.SUM OF ELEC: 299 MOSM/KG (ref 275–295)
PHOSPHATE SERPL-MCNC: 3.7 MG/DL (ref 2.5–4.9)
PLATELET # BLD AUTO: 228 10(3)UL (ref 150–450)
POTASSIUM SERPL-SCNC: 4.8 MMOL/L (ref 3.5–5.1)
RBC # BLD AUTO: 4.87 X10(6)UL
SODIUM SERPL-SCNC: 140 MMOL/L (ref 136–145)
WBC # BLD AUTO: 13.9 X10(3) UL (ref 4–11)

## 2023-07-06 PROCEDURE — 99239 HOSP IP/OBS DSCHRG MGMT >30: CPT | Performed by: INTERNAL MEDICINE

## 2023-07-06 RX ORDER — AMLODIPINE BESYLATE 10 MG/1
10 TABLET ORAL DAILY
Status: DISCONTINUED | OUTPATIENT
Start: 2023-07-07 | End: 2023-07-06

## 2023-07-06 NOTE — PLAN OF CARE
Patient is alert and oriented. RA. SCDs on for DVT prophylaxis. Voiding freely. Up x1 with walker. Declined pain medication. ACHS accucheck. Call light within reach, bed alarm active. Problem: Patient Centered Care  Goal: Patient preferences are identified and integrated in the patient's plan of care  Description: Interventions:  - What would you like us to know as we care for you? I'm very sensitive to medications.   - Provide timely, complete, and accurate information to patient/family  - Incorporate patient and family knowledge, values, beliefs, and cultural backgrounds into the planning and delivery of care  - Encourage patient/family to participate in care and decision-making at the level they choose  - Honor patient and family perspectives and choices  Outcome: Progressing     Problem: Diabetes/Glucose Control  Goal: Glucose maintained within prescribed range  Description: INTERVENTIONS:  - Monitor Blood Glucose as ordered  - Assess for signs and symptoms of hyperglycemia and hypoglycemia  - Administer ordered medications to maintain glucose within target range  - Assess barriers to adequate nutritional intake and initiate nutrition consult as needed  - Instruct patient on self management of diabetes  Outcome: Progressing     Problem: PAIN - ADULT  Goal: Verbalizes/displays adequate comfort level or patient's stated pain goal  Description: INTERVENTIONS:  - Encourage pt to monitor pain and request assistance  - Assess pain using appropriate pain scale  - Administer analgesics based on type and severity of pain and evaluate response  - Implement non-pharmacological measures as appropriate and evaluate response  - Consider cultural and social influences on pain and pain management  - Manage/alleviate anxiety  - Utilize distraction and/or relaxation techniques  - Monitor for opioid side effects  - Notify MD/LIP if interventions unsuccessful or patient reports new pain  - Anticipate increased pain with activity and pre-medicate as appropriate  Outcome: Progressing     Problem: RISK FOR INFECTION - ADULT  Goal: Absence of fever/infection during anticipated neutropenic period  Description: INTERVENTIONS  - Monitor WBC  - Administer growth factors as ordered  - Implement neutropenic guidelines  Outcome: Progressing     Problem: SAFETY ADULT - FALL  Goal: Free from fall injury  Description: INTERVENTIONS:  - Assess pt frequently for physical needs  - Identify cognitive and physical deficits and behaviors that affect risk of falls.   - [address] fall precautions as indicated by assessment.  - Educate pt/family on patient safety including physical limitations  - Instruct pt to call for assistance with activity based on assessment  - Modify environment to reduce risk of injury  - Provide assistive devices as appropriate  - Consider OT/PT consult to assist with strengthening/mobility  - Encourage toileting schedule  Outcome: Progressing     Problem: DISCHARGE PLANNING  Goal: Discharge to home or other facility with appropriate resources  Description: INTERVENTIONS:  - Identify barriers to discharge w/pt and caregiver  - Include patient/family/discharge partner in discharge planning  - Arrange for needed discharge resources and transportation as appropriate  - Identify discharge learning needs (meds, wound care, etc)  - Arrange for interpreters to assist at discharge as needed  - Consider post-discharge preferences of patient/family/discharge partner  - Complete POLST form as appropriate  - Assess patient's ability to be responsible for managing their own health  - Refer to Case Management Department for coordinating discharge planning if the patient needs post-hospital services based on physician/LIP order or complex needs related to functional status, cognitive ability or social support system  Outcome: Progressing

## 2023-07-06 NOTE — PHYSICAL THERAPY NOTE
07/06/23 1435   VISIT TYPE   PT Inpatient Visit Type (Documentation Required) Attempted Evaluation     PT orders received, chart reviewed. 2 attempts made to see pt this afternoon per RN request for DC home today. On initial attempt, pt stated she is on an important phone call and eating lunch, requested follow up by PT in 1 hour. On second attempt when PT arrived after 1 hour, pt declined participation with therapy, stated she is independent and feels back to her baseline, has no pain, and has no concerns or questions about safe DC home today, stated her sister will be here in 30 minutes to pick her up. Pt feels she has no needs for skilled PT at this time. Will DC PT at this time; pt agreeable; RN aware.

## 2023-07-06 NOTE — PLAN OF CARE
Problem: PAIN - ADULT  Goal: Verbalizes/displays adequate comfort level or patient's stated pain goal  Description: INTERVENTIONS:  - Encourage pt to monitor pain and request assistance  - Assess pain using appropriate pain scale  - Administer analgesics based on type and severity of pain and evaluate response  - Implement non-pharmacological measures as appropriate and evaluate response  - Consider cultural and social influences on pain and pain management  - Manage/alleviate anxiety  - Utilize distraction and/or relaxation techniques  - Monitor for opioid side effects  - Notify MD/LIP if interventions unsuccessful or patient reports new pain  - Anticipate increased pain with activity and pre-medicate as appropriate  Outcome: Adequate for Discharge     Problem: SAFETY ADULT - FALL  Goal: Free from fall injury  Description: INTERVENTIONS:  - Assess pt frequently for physical needs  - Identify cognitive and physical deficits and behaviors that affect risk of falls.   - [place] fall precautions as indicated by assessment.  - Educate pt/family on patient safety including physical limitations  - Instruct pt to call for assistance with activity based on assessment  - Modify environment to reduce risk of injury  - Provide assistive devices as appropriate  - Consider OT/PT consult to assist with strengthening/mobility  - Encourage toileting schedule  Outcome: Adequate for Discharge     Problem: DISCHARGE PLANNING  Goal: Discharge to home or other facility with appropriate resources  Description: INTERVENTIONS:  - Identify barriers to discharge w/pt and caregiver  - Include patient/family/discharge partner in discharge planning  - Arrange for needed discharge resources and transportation as appropriate  - Identify discharge learning needs (meds, wound care, etc)  - Arrange for interpreters to assist at discharge as needed  - Consider post-discharge preferences of patient/family/discharge partner  - Complete POLST form as appropriate  - Assess patient's ability to be responsible for managing their own health  - Refer to Case Management Department for coordinating discharge planning if the patient needs post-hospital services based on physician/LIP order or complex needs related to functional status, cognitive ability or social support system  Outcome: Adequate for Discharge  Pain and swelling to R knee improved. Patient's refusing pain medications. Ice pack applied. Patient ambulates with stand by assist and a walker. PT/OT ordered. Patient cleared for discharge home with no needs.

## 2023-07-07 ENCOUNTER — PATIENT OUTREACH (OUTPATIENT)
Dept: CASE MANAGEMENT | Age: 78
End: 2023-07-07

## 2023-07-07 DIAGNOSIS — Z02.9 ENCOUNTERS FOR UNSPECIFIED ADMINISTRATIVE PURPOSE: Primary | ICD-10-CM

## 2023-07-07 PROCEDURE — 1111F DSCHRG MED/CURRENT MED MERGE: CPT

## 2023-07-07 PROCEDURE — 1159F MED LIST DOCD IN RCRD: CPT

## 2023-07-07 NOTE — PROGRESS NOTES
Initial Post Discharge Follow Up   Discharge Date: 7/6/23  Contact Date: 7/7/2023    Consent Verification:  Assessment Completed With: Patient  HIPAA Verified? Yes    Discharge Dx:     Accelerated HTN  -likely in the setting of pain  Right Knee effusion    Was TCC ordered: no    General:   How have you been since your discharge from the hospital? Pt reports she is feeling much better. She is ambulating often and feels well. Pt reports the right knee is better, swelling has improved. Pt denies redness and warm skin to the knee. Pt is walking without a walker. Pt denies fevers, CP, SOB, dizziness, weakness, n/v/d, confusion, HA and pain. Pt slept well last night. Pt is not needing her oxygen at night as of now but has it has needed. Her oxygen is currently 94- 95% on RA. Pt is checking her glucose and B/P at home. Pt reports her glucose today was 119 and her B/P was 140/65. Pt is aware to keep a log of all readings. Pt has a caregiver M, T, Th for 4 hours but she does live alone. Pt denies concerns at this time. Do you have any pain since discharge?  no  How well was your pain managed while in the hospital?   On a scale of 1-5   1- Very Poor and 5- Very well   Very well  When you were leaving the hospital were your discharge instructions reviewed with you? yes  How well were your discharge instructions explained to you? On a scale of 1-5   1- Very Poor and 5- Very well   Very well  Do you have any questions about your discharge instructions? No  Before leaving the hospital was your diagnoses explained to you? Yes  Do you have any questions about your diagnoses? No  Are you able to perform normal daily activities of living as you have prior to your hospital stay (dressing, bathing, ambulating to the bathroom, etc)? no  If No, What are some barriers or concerns? Taking her time.   (NCM) Was patient given a different diet per AVS? no  If so, which diet?   DM diet, Healthy heart diet   Are there any barriers to following this diet? no    Medications: Reviewed medication list with the patient. Medications are up to date. Current Outpatient Medications   Medication Sig Dispense Refill    metoprolol succinate ER 25 MG Oral Tablet 24 Hr Take 1 tablet (25 mg total) by mouth daily. 90 tablet 3    Alcohol Swabs (DROPSAFE ALCOHOL PREP) 70 % Does not apply Pads Take 1 Bottle by mouth As Directed. 400 each 0    metoprolol succinate ER 25 MG Oral Tablet 24 Hr Take 1 tablet (25 mg total) by mouth daily. 14 tablet 0    AMLODIPINE 5 MG Oral Tab TAKE 1 TABLET EVERY DAY 90 tablet 1    colchicine 0.6 MG Oral Tab Take 1 tablet (0.6 mg total) by mouth as needed. Geoffrey Lori 500-50 MCG/ACT Inhalation Aerosol Powder, Breath Activated Inhale 1 puff into the lungs 2 (two) times daily. 3 each 1    hydrALAZINE 25 MG Oral Tab TAKE 1/2 TABLET TWICE DAILY 90 tablet 3    TRUEplus Lancets 33G Does not apply Misc 1 lancet by Finger stick route daily. 100 each 3    Glucose Blood (TRUE METRIX BLOOD GLUCOSE TEST) In Vitro Strip 1 strip by In Vitro route 4 (four) times daily. 400 strip 3    febuxostat 40 MG Oral Tab Take 1 tablet (40 mg total) by mouth daily. 90 tablet 1    omega-3 fatty acids 1000 MG Oral Cap Take 1,000 mg by mouth daily. Multiple Vitamin (MULTI-VITAMIN DAILY) Oral Tab Take 1 tablet by mouth daily. Cholecalciferol (VITAMIN D) 2000 units Oral Cap Take 1 capsule (2,000 Units total) by mouth daily.       (NCM)  Were there any medication changes noted on AVS?  no  (NCM) If a new medication was prescribed:  No new medications. May I go over your medications with you to make sure we are not missing anything?yes  Are there any reasons that keep you from taking your medication as prescribed? No  Are you having any concerns with constipation? No    Referrals/orders at D/C:  Home Health/Services ordered at D/C? No- Denies need for HHC, has a caregiver     DME ordered at D/C? No      SDOH:  Transportation Needs: No Transportation Needs (7/7/2023)      Transportation Needs          Lack of Transportation: No       Financial Resource Strain: Low Risk  (7/7/2023)      Financial Resource Strain          Difficulty of Paying Living Expenses: Not very hard          Med Affordability: No          Needs post D/C:   Now that you are home, are there any needs or concerns you need addressed before your next visit with your PCP?  (DME, meds, disease concerns, Etc): No     Follow up appointments:  Reviewed recommended follow up appointments. NCM scheduled pt to see PCP on 7/20/23. Pt requested to only see Dr. Hemal Lovell, unable to schedule sooner. Pt denies any barriers to keeping/getting to HFU appts. Pt stated her caregiver takes her to her appt's. Pt plans to FU with only Ortho if needed. Your appointments       Date & Time Appointment Department [facility])    Jul 13, 2023  9:45 AM [facility] [place] [facility] ([address])          Jul 24, 2023  1:40 PM CDT Follow Up Visit with Glynn Lock MD [address], [address] [place] [address] ([address] [facility] [address])                [facility], [address] [place] [id number]  [phone] [facility] 14 Cardiopulmonary [address]             PCP TCM/HFU appointment: scheduled at D/C within 7-14 days  no     NCM Reviewed/scheduled/rescheduled PCP TCM/HFU appointment with pt:  Yes- NCM scheduled HFU appt       Have you made all of your follow up appointments? no    Is there any reason as to why you cannot make your appointments? No     NCM Reviewed upcoming Specialist Appt with patient     Yes    Overall Rating:    How would you rate the care you received while in the hospital?  excellent     Interventions by NCM: All d/c instructions reviewed with the pt. Reviewed when to call MD vs when to call 911 or go the ED. Discussed s/s of HTN. Educated pt on the importance of taking all meds as prescribed as well as close f/u with PCP/specialists. Pt verbalized understanding and will contact the office with any further questions or concerns. CCM referral placed:  Not Applicable      [x]  Discharge Summary, Discharge medications reviewed/discussed/and reconciled against outpatient medications with patient,  and orders reviewed and discussed. Any changes or updates to medications and or orders sent to PCP. Louann Pyle

## 2023-07-13 ENCOUNTER — CARDPULM VISIT (OUTPATIENT)
Dept: CARDIAC REHAB | Facility: HOSPITAL | Age: 78
End: 2023-07-13
Attending: INTERNAL MEDICINE
Payer: MEDICARE

## 2023-07-14 ENCOUNTER — TELEPHONE (OUTPATIENT)
Dept: CASE MANAGEMENT | Age: 78
End: 2023-07-14

## 2023-07-14 DIAGNOSIS — J44.9 COPD, SEVERE (HCC): Primary | ICD-10-CM

## 2023-07-14 RX ORDER — GLUCOSAM/CHON-MSM1/C/MANG/BOSW 500-416.6
1 TABLET ORAL DAILY
Qty: 100 EACH | Refills: 3 | Status: SHIPPED | OUTPATIENT
Start: 2023-07-14
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
## 2023-07-14 NOTE — TELEPHONE ENCOUNTER
Hi this pulmonary rehab and not just physical therapy; this would have been ordered by her pulmonologist and not me.  thanks

## 2023-07-14 NOTE — TELEPHONE ENCOUNTER
Refill passed per DDN, [facility] protocol.      Requested Prescriptions   Pending Prescriptions Disp Refills    TRUEPLUS LANCETS 33G Does not apply Misc [Pharmacy Med Name: Bettie Chatterjee 33G] 100 each 3     Sig: TEST BLOOD SUGAR EVERY DAY       Diabetic Supplies Protocol Passed - 7/14/2023 10:55 AM        Passed - In person appointment or virtual visit in the past 12 mos or appointment in next 3 mos     Recent Outpatient Visits              2 months ago Hyperparathyroidism [facility])    Mirella Apodaca MD    Office Visit    2 months ago Stage 3b chronic kidney disease [facility])    Torie Lara MD    Office Visit    3 months ago Pharyngoesophageal dysphagia    [facility], [facility], Chance Kuhn Mt, MD    Office Visit    4 months ago Impacted cerumen, bilateral    [facility], [address] [facility],[address]Rd Floor, Fcfz-Zhsfrt-CoetqcrKarla Ohara MD    Office Visit    4 months ago [employer] annual wellness visit, subsequent    Kaushik Mercedes, Cyndi Mckeon MD    Office Visit          Future Appointments         Provider Department Appt Notes    In 6 days [address] PULM PHASE [address] Cardiopulmonary Rehab [place] COPD    In 6 days Zannie Mt, MD Drexel Boas, [facility], Chance TCM/HFU    In 1 week Doretha Sin, MD Drexel Boas, [address] [place] [address] 3 mos    In 1 week [address] PULM PHASE [address] Cardiopulmonary Rehab [place] COPD    In 1 week [address] PULM PHASE [address] Cardiopulmonary Rehab [place] COPD    In 2 weeks [facility] PULM PHASE 2 [address] Cardiopulmonary Rehab [place] COPD    In 2 weeks [facility] PULM PHASE [address] Cardiopulmonary Rehab [place] COPD    In 3 weeks CF PULM PHASE [facility] [address] [facility] Cardiopulmonary Rehab [place] COPD    In 3 weeks [facility] PUL PHASE [address] Cardiopulmonary Rehab [place] COPD    In 1 month [facility] PUL PHASE [address] Cardiopulmonary Rehab [place] COPD    In 1 month [facility] PUL PHASE [address] Cardiopulmonary Rehab [place] COPD    In 1 month [facility] PUL PHASE [address] Cardiopulmonary Rehab [place] COPD    In 1 month [facility] PUL PHASE [address] Cardiopulmonary Rehab [place] COPD    In 1 month [facility] PUL PHASE [address] Cardiopulmonary Rehab [place] COPD    In 1 month [facility] PUL PHASE [address] Cardiopulmonary Rehab [place] COPD    In 1 month [facility] PUL PHASE [address] Cardiopulmonary Rehab [place] COPD    In 1 month [facility] PUL PHASE [address] Cardiopulmonary Rehab [place] COPD    In 2 months [facility] PUL PHASE [address] Cardiopulmonary Rehab [place] COPD    In 2 months [facility] PUL PHASE [address] Cardiopulmonary Rehab [place] COPD    In 2 months [facility] PUL PHASE [address] Cardiopulmonary Rehab [place] COPD    In 2 months [facility] PUL PHASE [address] Cardiopulmonary Rehab [place] COPD    In 2 months [facility] PUL PHASE [address] Cardiopulmonary Rehab [place] COPD    In 2 months [facility] PUL PHASE [address] Cardiopulmonary Rehab [place] COPD    In 2 months [facility] PUL PHASE [address] Cardiopulmonary Rehab [place] COPD    In 2 months [facility] PULM PHASE [address] Cardiopulmonary Rehab [place] COPD    In 2 months [facility] PUL PHASE [address] Cardiopulmonary Rehab [place] COPD    In 3 months [facility] PULM PHASE [address] Cardiopulmonary Rehab [place] COPD    In 3 months [facility] PULM PHASE [address] Cardiopulmonary Rehab [place] COPD    In 3 months [facility] PULM PHASE [address] Cardiopulmonary Rehab [place] COPD    In 3 months [facility] PULM PHASE 2 [address] Cardiopulmonary Rehab [place] COPD    In 3 months [address] PULM PHASE [address] Cardiopulmonary Rehab [place] COPD    In 3 months [address] PULM PHASE [address] Cardiopulmonary Rehab [place] COPD    In 3 months [address] PULM PHASE [address] Cardiopulmonary Rehab [place] COPD    In 3 months [address] PULM PHASE [address] Cardiopulmonary Rehab [place] COPD    In 3 months [address] PULM PHASE [address] Cardiopulmonary Rehab [place] COPD    In 4 months [address] PULM PHASE [address] Cardiopulmonary Rehab [place] COPD    In 4 months [address] PULM PHASE [address] Cardiopulmonary Rehab [place] COPD    In 4 months [address] PULM PHASE [address] Cardiopulmonary Rehab [place] COPD    In 4 months [address] PULM PHASE [address] Cardiopulmonary Rehab [place] COPD    In 4 months [address] PULM PHASE [address] Cardiopulmonary Rehab [place] COPD    In 4 months [address] PULM PHASE [address] Cardiopulmonary Rehab [place] COPD    In 4 months [address] PULM PHASE [address] Cardiopulmonary Rehab [place] COPD    In 4 months [facility] PULM PHASE 2 [address] Cardiopulmonary Rehab [place] COPD    In 5 months [facility] PULM PHASE 2 [address] Cardiopulmonary Rehab [place] COPD    In 5 months [facility] PULM PHASE 2 [address] Cardiopulmonary Rehab [place] COPD    In 5 months [facility] PULM PHASE 2 [address] Cardiopulmonary Rehab [place] COPD    In 5 months [facility] PULM PHASE 2 [address] Cardiopulmonary Rehab [place] COPD    In 5 months [facility] PULM PHASE [address] Cardiopulmonary Rehab [place] COPD Future Appointments         Provider Department Appt Notes    In 6 days [address] PULM PHASE [address] Cardiopulmonary Rehab [place] COPD    In 6 days Víctor Roger MD [address] [facility], [place] TCM/HFU    In 1 week Antoinette Theodore MD [address], [address] [facility] 3 mos    In 1 week [address] PULM PHASE [address] Cardiopulmonary Rehab [place] COPD    In 1 week [address] PULM PHASE [address] Cardiopulmonary Rehab [place] COPD    In 2 weeks CF PULM PHASE [address] Cardiopulmonary Rehab [place] COPD    In 2 weeks CF PULM PHASE [address] Cardiopulmonary Rehab [place] COPD    In 3 weeks CF PULM PHASE [address] Cardiopulmonary Rehab [place] COPD    In 3 weeks CF PULM PHASE [address] Cardiopulmonary Rehab [place] COPD    In 1 month CF PULM PHASE [address] Cardiopulmonary Rehab [place] COPD    In 1 month CF PULM PHASE [address] Cardiopulmonary Rehab [place] COPD    In 1 month CF PULM PHASE [address] Cardiopulmonary Rehab [place] COPD    In 1 month [facility] PULM PHASE [address] Cardiopulmonary Rehab [place] COPD    In 1 month [facility] PULM PHASE 2 [address] Cardiopulmonary Rehab [place] COPD    In 1 month [facility] PULM PHASE 2 [address] Cardiopulmonary Rehab [place] COPD    In 1 month [facility] PULM PHASE 2 [address] Cardiopulmonary Rehab [place] COPD    In 1 month CF PULM PHASE 2 [address] Cardiopulmonary Rehab [place] COPD    In 2 months CF PULM PHASE 2 [address] Cardiopulmonary Rehab [place] COPD    In 2 months CF PULM PHASE 2 [address] Cardiopulmonary Rehab [place] COPD    In 2 months CF PULM PHASE [address] Cardiopulmonary Rehab [place] COPD    In 2 months [facility] PUL PHASE [address] Cardiopulmonary Rehab [place] COPD    In 2 months [facility] PUL PHASE [address] Cardiopulmonary Rehab [place] COPD    In 2 months [facility] PUL PHASE [address] Cardiopulmonary Rehab [place] COPD    In 2 months [facility] PUL PHASE [address] Cardiopulmonary Rehab [place] COPD    In 2 months [facility] PUL PHASE [address] Cardiopulmonary Rehab [place] COPD    In 2 months [facility] PUL PHASE [address] Cardiopulmonary Rehab [place] COPD    In 3 months [facility] PUL PHASE [address] Cardiopulmonary Rehab [place] COPD    In 3 months [facility] PUL PHASE [address] Cardiopulmonary Rehab [place] COPD    In 3 months [facility] PHASE [address] Cardiopulmonary Rehab [place] COPD    In 3 months [facility] PUL PHASE [address] Cardiopulmonary Rehab [place] COPD    In 3 months [facility] PHASE [address] Cardiopulmonary Rehab [place] COPD    In 3 months [facility] PHASE [address] Cardiopulmonary Rehab [place] COPD    In 3 months [facility] PHASE [address] Cardiopulmonary Rehab [place] COPD    In 3 months [facility] PUL PHASE [address] Cardiopulmonary Rehab [place] COPD    In 3 months [facility] PHASE 2 [address] Cardiopulmonary Rehab [place] COPD    In 4 months [facility] PUL PHASE 2 [address] Cardiopulmonary Rehab [place] COPD    In 4 months [facility] PUL PHASE 2 [address] Cardiopulmonary Rehab [place] COPD    In 4 months [facility] PUL PHASE 2 [address] Cardiopulmonary Rehab [place] COPD    In 4 months [facility] PUL PHASE 2 [address] Cardiopulmonary Rehab [place] COPD    In 4 months [facility] PUL PHASE [address] Cardiopulmonary Rehab [place] COPD    In 4 months CF PULM PHASE [address] Cardiopulmonary Rehab [place] COPD    In 4 months CF PULM PHASE [address] Cardiopulmonary Rehab [place] COPD    In 4 months CF PULM PHASE [address] Cardiopulmonary Rehab [place] COPD    In 5 months CF PULM PHASE [address] Cardiopulmonary Rehab [place] COPD    In 5 months CF PULM PHASE [address] Cardiopulmonary Rehab [place] COPD    In 5 months CF PULM PHASE [address] Cardiopulmonary Rehab [place] COPD    In 5 months CF PULM PHASE [address] Cardiopulmonary Rehab [place] COPD    In 5 months CF PULM PHASE [address] Cardiopulmonary Rehab [place] COPD             Recent Outpatient Visits              2 months ago Hyperparathyroidism [facility])    Stephan Russo, [address] [facility], SylviaEric dobbins MD    Office Visit    2 months ago Stage 3b chronic kidney disease [facility])    Stephan Russo, [address] [facility], [place], MD    Office Visit    3 months ago Pharyngoesophageal dysphagia    [facility], [place], Elan Carrizales MD    Office Visit    4 months ago Impacted cerumen, bilateral    [place] Candy, [address] [facility],[address]Rd [facility], Olatonjaneth Hamlin MD    Office Visit    4 months ago [employer] annual wellness visit, subsequent    Bur[place] Candy, [place], Elan Carrizales MD    Office Visit

## 2023-07-14 NOTE — TELEPHONE ENCOUNTER
Dr. Sena Duff,     The patient has [facility] rehab for pulmonary on 7/20/23. Pended referral please review diagnosis and sign off if you agree. Thank you.   Dennis Chambers

## 2023-07-18 ENCOUNTER — ORDER TRANSCRIPTION (OUTPATIENT)
Dept: CARDIAC REHAB | Facility: HOSPITAL | Age: 78
End: 2023-07-18

## 2023-07-18 DIAGNOSIS — J44.9 OBSTRUCTIVE CHRONIC BRONCHITIS WITHOUT EXACERBATION (HCC): Primary | ICD-10-CM

## 2023-07-20 ENCOUNTER — OFFICE VISIT (OUTPATIENT)
Dept: INTERNAL MEDICINE CLINIC | Facility: CLINIC | Age: 78
End: 2023-07-20

## 2023-07-20 VITALS
HEIGHT: 64 IN | HEART RATE: 81 BPM | WEIGHT: 172.5 LBS | TEMPERATURE: 99 F | DIASTOLIC BLOOD PRESSURE: 70 MMHG | OXYGEN SATURATION: 92 % | BODY MASS INDEX: 29.45 KG/M2 | SYSTOLIC BLOOD PRESSURE: 128 MMHG

## 2023-07-20 DIAGNOSIS — I25.10 CORONARY ARTERY DISEASE INVOLVING NATIVE CORONARY ARTERY OF NATIVE HEART WITHOUT ANGINA PECTORIS: ICD-10-CM

## 2023-07-20 DIAGNOSIS — E11.59 HYPERTENSION ASSOCIATED WITH TYPE 2 DIABETES MELLITUS: ICD-10-CM

## 2023-07-20 DIAGNOSIS — E11.22 CONTROLLED TYPE 2 DIABETES MELLITUS WITH STAGE 3 CHRONIC KIDNEY DISEASE, WITHOUT LONG-TERM CURRENT USE OF INSULIN (HCC): ICD-10-CM

## 2023-07-20 DIAGNOSIS — N18.30 CONTROLLED TYPE 2 DIABETES MELLITUS WITH STAGE 3 CHRONIC KIDNEY DISEASE, WITHOUT LONG-TERM CURRENT USE OF INSULIN (HCC): ICD-10-CM

## 2023-07-20 DIAGNOSIS — I15.2 HYPERTENSION ASSOCIATED WITH TYPE 2 DIABETES MELLITUS: ICD-10-CM

## 2023-07-20 DIAGNOSIS — M10.361 ACUTE GOUT DUE TO RENAL IMPAIRMENT INVOLVING RIGHT KNEE: Primary | ICD-10-CM

## 2023-07-20 DIAGNOSIS — E11.69 HYPERLIPIDEMIA ASSOCIATED WITH TYPE 2 DIABETES MELLITUS: ICD-10-CM

## 2023-07-20 DIAGNOSIS — E78.5 HYPERLIPIDEMIA ASSOCIATED WITH TYPE 2 DIABETES MELLITUS: ICD-10-CM

## 2023-07-20 DIAGNOSIS — J44.9 COPD, SEVERE (HCC): ICD-10-CM

## 2023-07-20 PROCEDURE — 3074F SYST BP LT 130 MM HG: CPT | Performed by: INTERNAL MEDICINE

## 2023-07-20 PROCEDURE — 3078F DIAST BP <80 MM HG: CPT | Performed by: INTERNAL MEDICINE

## 2023-07-20 PROCEDURE — 1126F AMNT PAIN NOTED NONE PRSNT: CPT | Performed by: INTERNAL MEDICINE

## 2023-07-20 PROCEDURE — 99495 TRANSJ CARE MGMT MOD F2F 14D: CPT | Performed by: INTERNAL MEDICINE

## 2023-07-20 PROCEDURE — 3008F BODY MASS INDEX DOCD: CPT | Performed by: INTERNAL MEDICINE

## 2023-07-20 PROCEDURE — 1111F DSCHRG MED/CURRENT MED MERGE: CPT | Performed by: INTERNAL MEDICINE

## 2023-07-20 PROCEDURE — 1159F MED LIST DOCD IN RCRD: CPT | Performed by: INTERNAL MEDICINE

## 2023-07-20 PROCEDURE — 1160F RVW MEDS BY RX/DR IN RCRD: CPT | Performed by: INTERNAL MEDICINE

## 2023-07-21 RX ORDER — CINACALCET 30 MG/1
30 TABLET, FILM COATED ORAL EVERY OTHER DAY
Qty: 45 TABLET | Refills: 0 | Status: SHIPPED | OUTPATIENT
Start: 2023-07-21

## 2023-07-21 NOTE — TELEPHONE ENCOUNTER
Impression: Unspecified papilledema: H47.10. elevated ON. Plan: monitor. LOV 04/20/23. RTC 1 year. No f/u. Pended 90 day supply.

## 2023-07-27 ENCOUNTER — APPOINTMENT (OUTPATIENT)
Dept: CARDIAC REHAB | Facility: HOSPITAL | Age: 78
End: 2023-07-27
Attending: INTERNAL MEDICINE
Payer: MEDICARE

## 2023-08-01 ENCOUNTER — PATIENT OUTREACH (OUTPATIENT)
Dept: CASE MANAGEMENT | Age: 78
End: 2023-08-01

## 2023-08-01 ENCOUNTER — CARDPULM VISIT (OUTPATIENT)
Dept: CARDIAC REHAB | Facility: HOSPITAL | Age: 78
End: 2023-08-01
Attending: INTERNAL MEDICINE
Payer: MEDICARE

## 2023-08-01 PROCEDURE — 94625 PHY/QHP OP PULM RHB W/O MNTR: CPT

## 2023-08-01 NOTE — PROGRESS NOTES
Attempted to call pt for CCM monthly. She stated that she is eating her breakfast right now and that she will call me back when she has some time. Total time - 3 min  Total Monthly time- 3 min    Natty Downey 93, [address] [facility], [address] [place] [address]  Phone: [phone]. Time [place] 3rd Floor

## 2023-08-03 ENCOUNTER — CARDPULM VISIT (OUTPATIENT)
Dept: CARDIAC REHAB | Facility: HOSPITAL | Age: 78
End: 2023-08-03
Attending: INTERNAL MEDICINE
Payer: MEDICARE

## 2023-08-03 PROCEDURE — 94626 PHY/QHP OP PULM RHB W/MNTR: CPT

## 2023-08-08 ENCOUNTER — APPOINTMENT (OUTPATIENT)
Dept: CARDIAC REHAB | Facility: HOSPITAL | Age: 78
End: 2023-08-08
Attending: INTERNAL MEDICINE
Payer: MEDICARE

## 2023-08-10 ENCOUNTER — CARDPULM VISIT (OUTPATIENT)
Dept: CARDIAC REHAB | Facility: HOSPITAL | Age: 78
End: 2023-08-10
Attending: INTERNAL MEDICINE
Payer: MEDICARE

## 2023-08-10 PROCEDURE — 94625 PHY/QHP OP PULM RHB W/O MNTR: CPT

## 2023-08-12 ENCOUNTER — NURSE TRIAGE (OUTPATIENT)
Dept: INTERNAL MEDICINE CLINIC | Facility: CLINIC | Age: 78
End: 2023-08-12

## 2023-08-12 NOTE — TELEPHONE ENCOUNTER
Action Requested: Summary for Provider     []  Critical Lab, Recommendations Needed  [] Need Additional Advice  [x]   FYI    []   Need Orders  [] Need Medications Sent to Pharmacy  []  Other     FYI Dr Yaz Segundo,   Patient was advised to go to the ER for shortness of breath. SUMMARY: Per Protocol disposition advised to be seen in the ER for shortness of breath symptoms and low pulse ox with oxygen. Patient is hesitant to go to the ER and requests to schedule an appt with PCP on Monday. Explained that shortness of breath symptoms need to be evaluated in the ER and addressed emergently. Patient states she will call her son to drive her and declines 911. Patient was instructed that if symptoms worsen to be seen in the ER/IC for evaluation. Verbalized understanding. Reason for call: Shortness Of Breath  Onset: one week    Patient (name and  verified) c/o shortness of breath. Denies any chest pain. Patient has hx of COPD. Patient wears Home oxygen up to 2 liters and is using her inhalers as prescribed. Noted over the phone that patient is speaking in full sentences over the phone. Patient reports that her pulse ox is decreasing when she is wearing the oxygen and is up walking around. Reports pulse ox is 85 to 88%. Denies any fever or URI. Patient reports that she is also having lower leg swelling for one week. Patient states that she was told by the pharmacist that the aspirin 81 mg that she is taking can cause shortness of breath so she stopped taking the ASA. Last OV 23 with PCP    Patient is requesting to schedule an appt with PCP on Monday however advised because she is c/o shortness of breath and the ER visit is advised over the weekend, that she will need to be seen in the ER to have her concerns addressed immediately. Patient does not want to call 911 because the ambulance will take her to [facility]. Patient will call her son to have him drive her to [place]. Patient is reporting that her pulse ox is now 95% with oxygen.      Reason for Disposition   MODERATE difficulty breathing (e.g., speaks in phrases, SOB even at rest, pulse 100-120) of new-onset or worse than normal    Protocols used: Breathing Difficulty-A-[place]

## 2023-08-14 ENCOUNTER — PATIENT OUTREACH (OUTPATIENT)
Dept: CASE MANAGEMENT | Age: 78
End: 2023-08-14

## 2023-08-14 DIAGNOSIS — E21.3 HYPERPARATHYROIDISM (HCC): ICD-10-CM

## 2023-08-14 DIAGNOSIS — I15.2 HYPERTENSION ASSOCIATED WITH TYPE 2 DIABETES MELLITUS: ICD-10-CM

## 2023-08-14 DIAGNOSIS — E11.22 CONTROLLED TYPE 2 DIABETES MELLITUS WITH STAGE 3 CHRONIC KIDNEY DISEASE, WITHOUT LONG-TERM CURRENT USE OF INSULIN (HCC): ICD-10-CM

## 2023-08-14 DIAGNOSIS — N18.30 CONTROLLED TYPE 2 DIABETES MELLITUS WITH STAGE 3 CHRONIC KIDNEY DISEASE, WITHOUT LONG-TERM CURRENT USE OF INSULIN (HCC): ICD-10-CM

## 2023-08-14 DIAGNOSIS — N18.32 STAGE 3B CHRONIC KIDNEY DISEASE (HCC): ICD-10-CM

## 2023-08-14 DIAGNOSIS — I73.9 PAD (PERIPHERAL ARTERY DISEASE) (HCC): ICD-10-CM

## 2023-08-14 DIAGNOSIS — Z95.1 S/P CABG (CORONARY ARTERY BYPASS GRAFT): ICD-10-CM

## 2023-08-14 DIAGNOSIS — J44.9 COPD, SEVERE (HCC): ICD-10-CM

## 2023-08-14 DIAGNOSIS — E11.69 HYPERLIPIDEMIA ASSOCIATED WITH TYPE 2 DIABETES MELLITUS: Primary | ICD-10-CM

## 2023-08-14 DIAGNOSIS — I65.23 CAROTID STENOSIS, NON-SYMPTOMATIC, BILATERAL: ICD-10-CM

## 2023-08-14 DIAGNOSIS — E78.5 HYPERLIPIDEMIA ASSOCIATED WITH TYPE 2 DIABETES MELLITUS: Primary | ICD-10-CM

## 2023-08-14 DIAGNOSIS — E11.59 HYPERTENSION ASSOCIATED WITH TYPE 2 DIABETES MELLITUS: ICD-10-CM

## 2023-08-14 RX ORDER — PREDNISONE 10 MG/1
TABLET ORAL
Qty: 12 TABLET | Refills: 0 | Status: SHIPPED | OUTPATIENT
Start: 2023-08-14

## 2023-08-14 NOTE — TELEPHONE ENCOUNTER
Called pt; states she had reaction to asa that was just restarted by Cardio Dr Rina Lezama; states having reaction to aspirin also 6mos ago when she took it also as recommended by cardio then. She said having dyspnea since last week, mucle aches, throat also some swallowing issues and some feet swelling. No chest pains. She calleed clinic late last week and told to go to ER but pt didn't go and declines to go today. She said some improvement of symptoms since off asa since last week, asking if she can get steroid which had helped her breathiingb before  and pt does have severe copd. I told her would be best for her to go to ER but she declines again. She said she will go only if she doesn't respond to prednisone. I told her will send erx for prednisone, but again go to ER if no improvement.  Also needs to watch her gljucose levels since she is diabetic

## 2023-08-14 NOTE — TELEPHONE ENCOUNTER
please see message below from 8/12/2023. Pt stated that she did not go to ER. Pt calling today and she stated that she is still having some breathing difficulty breathing but that its much better then Saturday. Per pt she is on 2 liters of Oxygen and her Pulse Ox is 97 % and heart rate is 76. Pt was inform if she is having difficulty breathing she needs to go to ER. Pt stated that she does not want to go to ER. Pt is requesting to speak to Dr. Mik Ozuna. Per pt she is allergic to aspirin and it will take 3-4 days to be out of her system but she does feel better today. Pt does not want to go to the ER as she says they will just give her prednisone that will raise her b/p and her sugar and then they will want to give her insulin. Pt is refusing to go to ER. Pt is requesting to speak to Dr. Mik Ozuna. If she gets worse she will call 911.  please advise.

## 2023-08-14 NOTE — PROGRESS NOTES
8/14/2023  Spoke to [place] for CCM. Updates to patient care team/ comments: UTD  Patient reported changes in medications: None  Med Adherence  Comment: Taking as directed    Health Maintenance:  UTD, reviewed with patient. Zoster Vaccines(1 of 2) Never done  Diabetes Care Dilated Eye Exam due on 10/13/2021  COVID-19 Vaccine(4 - Juan Barba series) due on 12/29/2021  Diabetes Care Foot Exam (Annual) due on 07/22/2024  Influenza Vaccine(1) due on 10/01/2023  Diabetes Care A1C due on 01/04/2024  Diabetes Care: Microalb/Creat Ratio due on 04/12/2024  Diabetes Care: GFR due on 07/06/2024  DEXA Scan Completed  MA Annual Health Assessment Completed  Annual Depression Screening Completed  Fall Risk Screening (Annual) Completed  Pneumococcal Vaccine: 65+ Years Completed  Mammogram Discontinued  Colonoscopy Discontinued    Patient current concerns:     Patient stated that she is doing ok. Mood is good/stable. Patient stated that she was having shortness of breath denies any chest pain. She stated that Dr. Harriet Nascimento advised patient taking Aspirin and she did. Then her oxygen level will go down to 79 80%. She stated that she did stop taking aspirin and now she is breathing better and her oxygen went back up to 97%. Patient stated that she knows her body and when to call 911. See communication from 8/12/2023. Communication was sent by a nurse today to Dr. Bimal Solitario to see if she can bee seen today. Patient stated that she has an appointment tomorrow with Pulmonary Rehabilitation. Patient stated that she will be going for that appointment her care giver will be taking her. When speaking with patient over the phone. She did not have any hard time breathing she was very happy. She did stated that her feet are swollen, but are better since yesterday and she has been feeling weakness. Again she stated that she knows her body very well and knows when to call 911.         No future appointments. Previous goal met: Continues working towards goal    Self Management Goals/Action Plan: Active goal from previous outreach:                       DM/COPD    Patient reported progress toward goal: Progressing       Update to previous barriers: Pt's balance is better. Patient Reported New Barriers And Concerns: shortness of breath. - Plan for overcoming all barriers: there is a communication sent to Dr. Kory Ohara to see if pt can be seen today in office. Patient agrees to goal action plan. yes  Self-Management Abilities (patient reported)             1= least confident in achieving goal, 5= very confident               - confidence: 3        Care Manager Follow Up: One month    Reason For Follow Up: review progress and or barriers towards patients goals. Care managers interventions:     Reviewed healthy eating habits, regular exercise and preventative guidelines. Reinforced healthy diet, lifestyle, and exercise. No future appointments. Time Spent This Encounter Total: 20 min medical record review, telephone communication, care plan updates where needed, education, goals and action plan recreation/update. Provided acknowledgment and validation to patient's concerns. Monthly Minute Total including today:20  Physical assessment, complete health history, and care plan established by Donell Stearns MD, need for CCM determined from these assessments and data.

## 2023-08-15 ENCOUNTER — APPOINTMENT (OUTPATIENT)
Dept: CARDIAC REHAB | Facility: HOSPITAL | Age: 78
End: 2023-08-15
Attending: INTERNAL MEDICINE
Payer: MEDICARE

## 2023-08-15 PROCEDURE — 94625 PHY/QHP OP PULM RHB W/O MNTR: CPT

## 2023-08-17 ENCOUNTER — APPOINTMENT (OUTPATIENT)
Dept: CARDIAC REHAB | Facility: HOSPITAL | Age: 78
End: 2023-08-17
Attending: INTERNAL MEDICINE
Payer: MEDICARE

## 2023-08-17 PROCEDURE — 94625 PHY/QHP OP PULM RHB W/O MNTR: CPT

## 2023-08-22 ENCOUNTER — CARDPULM VISIT (OUTPATIENT)
Dept: CARDIAC REHAB | Facility: HOSPITAL | Age: 78
End: 2023-08-22
Attending: INTERNAL MEDICINE
Payer: MEDICARE

## 2023-08-22 PROCEDURE — 94625 PHY/QHP OP PULM RHB W/O MNTR: CPT

## 2023-08-24 ENCOUNTER — APPOINTMENT (OUTPATIENT)
Dept: CARDIAC REHAB | Facility: HOSPITAL | Age: 78
End: 2023-08-24
Attending: INTERNAL MEDICINE
Payer: MEDICARE

## 2023-08-29 ENCOUNTER — APPOINTMENT (OUTPATIENT)
Dept: CARDIAC REHAB | Facility: HOSPITAL | Age: 78
End: 2023-08-29
Attending: INTERNAL MEDICINE
Payer: MEDICARE

## 2023-08-31 ENCOUNTER — CARDPULM VISIT (OUTPATIENT)
Dept: CARDIAC REHAB | Facility: HOSPITAL | Age: 78
End: 2023-08-31
Attending: INTERNAL MEDICINE
Payer: MEDICARE

## 2023-08-31 PROCEDURE — 94625 PHY/QHP OP PULM RHB W/O MNTR: CPT

## 2023-08-31 RX ORDER — HYDRALAZINE HYDROCHLORIDE 25 MG/1
TABLET, FILM COATED ORAL
Qty: 90 TABLET | Refills: 1 | Status: SHIPPED | OUTPATIENT
Start: 2023-08-31

## 2023-09-05 ENCOUNTER — PATIENT OUTREACH (OUTPATIENT)
Dept: CASE MANAGEMENT | Age: 78
End: 2023-09-05

## 2023-09-05 ENCOUNTER — CARDPULM VISIT (OUTPATIENT)
Dept: CARDIAC REHAB | Facility: HOSPITAL | Age: 78
End: 2023-09-05
Attending: INTERNAL MEDICINE
Payer: MEDICARE

## 2023-09-05 ENCOUNTER — TELEPHONE (OUTPATIENT)
Dept: INTERNAL MEDICINE CLINIC | Facility: CLINIC | Age: 78
End: 2023-09-05

## 2023-09-05 DIAGNOSIS — K57.92 DIVERTICULITIS: ICD-10-CM

## 2023-09-05 DIAGNOSIS — Z12.11 COLON CANCER SCREENING: Primary | ICD-10-CM

## 2023-09-05 PROCEDURE — 94625 PHY/QHP OP PULM RHB W/O MNTR: CPT

## 2023-09-05 NOTE — PROGRESS NOTES
Called patient regarding CCM monthly and left a message for patient to call back when they can. Reviewed patient chart. Time Spent This Encounter Total: 3  min medical record review  Monthly Minute Total including today: 3 minutes    Mina Alex, [address]  Phone: [phone]. Time [place] 3rd Floor

## 2023-09-05 NOTE — TELEPHONE ENCOUNTER
Patient is requesting referral.     Name of specialist and specialty department : Justin Hagan  Reason for visit with the specialist: rx follow up  Address of the specialist office:[address] [facility] [address]  Appointment date: n/a         CSS informed patient the turnaround time for referral is 5-7 business days. Patient was informed to check their [employer] account for referral status.

## 2023-09-06 ENCOUNTER — TELEPHONE (OUTPATIENT)
Dept: INTERNAL MEDICINE CLINIC | Facility: CLINIC | Age: 78
End: 2023-09-06

## 2023-09-06 NOTE — TELEPHONE ENCOUNTER
Spoke with pt,  verified, pt stated she was admitted at the [facility] on 23. Pt wants to know her last potassium and calcium level. Per chart review, pt was informed labs for BMP done on 23, K+  (4.8) was normal, calcium 10.5 (8.5-10.1). Pt currently on cardio pulmo rehab. Pt is looking for f/u appt with PCP. Ashlyn transferred to [facility] staff.       DANYEL

## 2023-09-06 NOTE — TELEPHONE ENCOUNTER
Hello     Please sign off if you agree with plan of care.      Thank you,  [facility]  Referral specialist

## 2023-09-07 ENCOUNTER — APPOINTMENT (OUTPATIENT)
Dept: CARDIAC REHAB | Facility: HOSPITAL | Age: 78
End: 2023-09-07
Attending: INTERNAL MEDICINE
Payer: MEDICARE

## 2023-09-07 PROCEDURE — 94625 PHY/QHP OP PULM RHB W/O MNTR: CPT

## 2023-09-12 ENCOUNTER — APPOINTMENT (OUTPATIENT)
Dept: CARDIAC REHAB | Facility: HOSPITAL | Age: 78
End: 2023-09-12
Attending: INTERNAL MEDICINE
Payer: MEDICARE

## 2023-09-12 PROCEDURE — 94625 PHY/QHP OP PULM RHB W/O MNTR: CPT

## 2023-09-14 ENCOUNTER — APPOINTMENT (OUTPATIENT)
Dept: CARDIAC REHAB | Facility: HOSPITAL | Age: 78
End: 2023-09-14
Attending: INTERNAL MEDICINE
Payer: MEDICARE

## 2023-09-14 PROCEDURE — 94625 PHY/QHP OP PULM RHB W/O MNTR: CPT

## 2023-09-16 ENCOUNTER — NURSE TRIAGE (OUTPATIENT)
Dept: INTERNAL MEDICINE CLINIC | Facility: CLINIC | Age: 78
End: 2023-09-16

## 2023-09-19 ENCOUNTER — APPOINTMENT (OUTPATIENT)
Dept: CARDIAC REHAB | Facility: HOSPITAL | Age: 78
End: 2023-09-19
Attending: INTERNAL MEDICINE
Payer: MEDICARE

## 2023-09-19 ENCOUNTER — PATIENT OUTREACH (OUTPATIENT)
Dept: CASE MANAGEMENT | Age: 78
End: 2023-09-19

## 2023-09-19 DIAGNOSIS — N18.30 CONTROLLED TYPE 2 DIABETES MELLITUS WITH STAGE 3 CHRONIC KIDNEY DISEASE, WITHOUT LONG-TERM CURRENT USE OF INSULIN (HCC): ICD-10-CM

## 2023-09-19 DIAGNOSIS — E11.59 HYPERTENSION ASSOCIATED WITH TYPE 2 DIABETES MELLITUS: ICD-10-CM

## 2023-09-19 DIAGNOSIS — E21.3 HYPERPARATHYROIDISM (HCC): ICD-10-CM

## 2023-09-19 DIAGNOSIS — N18.32 STAGE 3B CHRONIC KIDNEY DISEASE (HCC): ICD-10-CM

## 2023-09-19 DIAGNOSIS — I65.23 CAROTID STENOSIS, NON-SYMPTOMATIC, BILATERAL: ICD-10-CM

## 2023-09-19 DIAGNOSIS — E78.5 HYPERLIPIDEMIA ASSOCIATED WITH TYPE 2 DIABETES MELLITUS: ICD-10-CM

## 2023-09-19 DIAGNOSIS — Z91.81 AT RISK FOR FALLS: Primary | ICD-10-CM

## 2023-09-19 DIAGNOSIS — I15.2 HYPERTENSION ASSOCIATED WITH TYPE 2 DIABETES MELLITUS: ICD-10-CM

## 2023-09-19 DIAGNOSIS — I50.32 CHRONIC DIASTOLIC CONGESTIVE HEART FAILURE (HCC): ICD-10-CM

## 2023-09-19 DIAGNOSIS — E11.69 HYPERLIPIDEMIA ASSOCIATED WITH TYPE 2 DIABETES MELLITUS: ICD-10-CM

## 2023-09-19 DIAGNOSIS — J44.9 COPD, SEVERE (HCC): ICD-10-CM

## 2023-09-19 DIAGNOSIS — I25.10 CORONARY ARTERY DISEASE INVOLVING NATIVE CORONARY ARTERY OF NATIVE HEART WITHOUT ANGINA PECTORIS: ICD-10-CM

## 2023-09-19 DIAGNOSIS — I73.9 PAD (PERIPHERAL ARTERY DISEASE) (HCC): ICD-10-CM

## 2023-09-19 DIAGNOSIS — E11.22 CONTROLLED TYPE 2 DIABETES MELLITUS WITH STAGE 3 CHRONIC KIDNEY DISEASE, WITHOUT LONG-TERM CURRENT USE OF INSULIN (HCC): ICD-10-CM

## 2023-09-19 NOTE — PROGRESS NOTES
9/19/2023  Spoke to [facility] for CCM. Updates to patient care team/ comments: UTD  Patient reported changes in medications: None  Med Adherence  Comment: Taking as directed    Health Maintenance:  UTD, reviewed with patient.    Zoster Vaccines(1 of 2) Never done  Diabetes Care Dilated Eye Exam due on 10/13/2021  COVID-19 Vaccine(4 - Juan Barba series) due on 12/29/2021  Diabetes Care Foot Exam (Annual) due on 07/22/2024  Influenza Vaccine(1) due on 10/01/2023  Diabetes Care A1C due on 01/04/2024  Diabetes Care: Microalb/Creat Ratio due on 04/12/2024  Diabetes Care: GFR due on 07/06/2024  DEXA Scan Completed  MA Annual Health Assessment Completed  Annual Depression Screening Completed  Fall Risk Screening (Annual) Completed  Pneumococcal Vaccine: 65+ Years Completed  Mammogram Discontinued  Colonoscopy Discontinued    Patient current concerns:       Future Appointments   Date Time Provider Raj Quintanilla   9/21/2023  1:15 PM [facility] PULM PHASE 2 [facility] CP REHAB EM [facility]   9/26/2023  1:15 PM [facility] PULM PHASE 2 [facility] CP REHAB EM [facility]   9/27/2023  1:40 PM Erica Melendez MD [facility] EC [address]   9/28/2023  1:15 PM [facility] PULM PHASE 2 [facility] CP REHAB EM [facility]   10/3/2023  1:15 PM [facility] PULM PHASE 2 [facility] CP REHAB EM [facility]   10/5/2023  1:15 PM [facility] PULM PHASE 2 [facility] CP REHAB EM [facility]   10/10/2023  1:15 PM [facility] PULM PHASE 2 [facility] CP REHAB EM H   10/12/2023  1:15 PM [facility] PULM PHASE 2 [facility] CP REHAB EM [facility]   10/17/2023  1:15 PM [facility] PULM PHASE 2 [facility] CP REHAB EM [facility]   10/19/2023  1:15 PM [facility] PULM PHASE 2 [facility] CP REHAB EM [facility]   10/24/2023  1:15 PM [facility] PULM PHASE 2 [facility] CP REHAB EM [facility]   10/26/2023  1:15 PM [facility] PULM PHASE 2 [facility] CP REHAB EM [facility]   10/31/2023  1:15 PM [facility] PULM PHASE 2 [facility] CP REHAB EM [facility]   11/2/2023  1:15 PM [facility] PULM PHASE 2 [facility] CP REHAB EM [facility]   11/7/2023  1:15 PM [facility] PULM PHASE 2 [facility] CP REHAB EM [facility]   11/9/2023  1:15 PM [facility] PULM PHASE 2 [facility] CP REHAB EM [facility]   11/14/2023  1:15 PM [facility] PULM PHASE 2 [facility] CP REHAB EM [facility]   11/16/2023  1:15 PM [facility] PULM PHASE 2 [facility] CP REHAB EM [facility]   11/21/2023  1:15 PM [facility] PULM PHASE 2 [facility] CP REHAB EM [facility]   11/23/2023  1:15 PM [facility] PULM PHASE 2 [facility] CP REHAB EM [facility]   11/28/2023  1:15 PM [facility] PULM PHASE 2 [facility] CP REHAB EM [facility]   11/30/2023  1:15 PM [facility] PULM PHASE 2 [facility] CP REHAB EM [facility]   12/5/2023  1:15 PM [facility] PULM PHASE 2 [facility] CP REHAB EM [facility]   12/7/2023  1:15 PM [facility] PULM PHASE 2 [facility] CP REHAB EM [facility]   12/12/2023  1:15 PM [facility] PULM PHASE 2 [facility] CP REHAB EM [facility]   12/14/2023  1:15 PM [facility] PULM PHASE 2 [facility] CP REHAB EM [facility]   12/19/2023  1:15 PM [facility] PULM PHASE 2 [facility] CP REHAB [facility]   12/21/2023  1:15 PM [facility] PULM PHASE 2 [facility] CP REHAB [facility]   12/26/2023  1:15 PM [facility] PULM PHASE 2 [facility] CP REHAB EM [facility]           Previous goal met: Continues working towards goal    Self Management Goals/Action Plan: Active goal from previous outreach:                           Patient reported progress toward goal:       Update to previous barriers:     Patient Reported New Barriers And Concerns:                    - Plan for overcoming all barriers:             Patient agrees to goal action plan. yes  Self-Management Abilities (patient reported)             1= least confident in achieving goal, 5= very confident               - confidence: 4      Care Manager Follow Up: One month    Reason For Follow Up: review progress and or barriers towards patients goals. Care managers interventions:     Reviewed healthy eating habits, regular exercise and preventative guidelines. Reinforced healthy diet, lifestyle, and exercise.     Future Appointments   Date Time Provider Raj Quintanilla   9/21/2023  1:15 PM [facility] PULM PHASE 2 [facility] CP REHAB [facility]   9/26/2023  1:15 PM [facility] PULM PHASE 2 [facility] CP REHAB EM [facility]   9/27/2023  1:40 PM Andie Melendez MD [facility]   9/28/2023  1:15 PM [facility] PULM PHASE 2 [facility] CP REHAB EM [facility]   10/3/2023  1:15 PM [facility] PULM PHASE 2 [facility] CP REHAB EM [facility]   10/5/2023  1:15 PM [facility] PULM PHASE 2 [facility] CP REHAB EM [facility]   10/10/2023  1:15 PM [facility] PULM PHASE 2 [facility] CP REHAB [facility]   10/12/2023  1:15 PM [facility] PULM PHASE 2 [facility] CP REHAB [facility]   10/17/2023  1:15 PM [facility] PULM PHASE 2 [facility] CP REHAB EM [facility]   10/19/2023  1:15 PM [facility] PULM PHASE 2 [facility] CP REHAB [facility]   10/24/2023  1:15 PM [facility] PULM PHASE 2 [facility] CP REHAB EM [facility]   10/26/2023  1:15 PM [facility] PULM PHASE 2 [facility] CP REHAB [facility]   10/31/2023  1:15 PM [facility] PULM PHASE 2 [facility] CP REHAB [facility]   11/2/2023  1:15 PM [facility] PULM PHASE 2 [facility] CP REHAB [facility]   11/7/2023  1:15 PM [facility] PULM PHASE 2 [facility] CP REHAB [facility]   11/9/2023  1:15 PM [facility] PULM PHASE 2 [facility] CP REHAB EM [facility]   11/14/2023  1:15 PM [facility] PULM PHASE 2 [facility] CP REHAB EM [facility]   11/16/2023  1:15 PM [facility] PULM PHASE 2 [facility] CP REHAB [facility]   11/21/2023  1:15 PM [facility] PULM PHASE 2 [facility] CP REHAB [facility]   11/23/2023  1:15 PM [facility] PULM PHASE 2 [facility] CP REHAB [facility]   11/28/2023  1:15 PM [facility] PULM PHASE 2 [facility] CP REHAB EM [facility]   11/30/2023  1:15 PM [facility] PULM PHASE 2 [facility] CP REHAB EM [facility]   12/5/2023  1:15 PM [facility] PULM PHASE 2 [facility] CP REHAB [facility]   12/7/2023  1:15 PM [facility] PULM PHASE 2 [facility] CP REHAB EM [facility]   12/12/2023  1:15 PM [facility] PULM PHASE 2 [facility] CP REHAB [facility]   12/14/2023  1:15 PM [facility] PULM PHASE 2 [facility] CP REHAB [facility]   12/19/2023  1:15 PM [facility] PULM PHASE 2 [facility] CP REHAB [facility]   12/21/2023  1:15 PM [facility] PULM PHASE 2 [facility] CP REHAB EM [facility]   12/26/2023  1:15 PM [facility] PUL PHASE 2 [facility] CP REHAB EM [facility]        Time Spent This Encounter Total: 21 min medical record review, telephone communication, care plan updates where needed, education, goals and action plan recreation/update. Provided acknowledgment and validation to patient's concerns. Monthly Minute Total including today: 21  Physical assessment, complete health history, and care plan established by Earle Barnard MD, need for CCM determined from these assessments and data.

## 2023-09-21 ENCOUNTER — APPOINTMENT (OUTPATIENT)
Dept: CARDIAC REHAB | Facility: HOSPITAL | Age: 78
End: 2023-09-21
Attending: INTERNAL MEDICINE
Payer: MEDICARE

## 2023-09-21 PROCEDURE — 94625 PHY/QHP OP PULM RHB W/O MNTR: CPT

## 2023-09-26 ENCOUNTER — APPOINTMENT (OUTPATIENT)
Dept: CARDIAC REHAB | Facility: HOSPITAL | Age: 78
End: 2023-09-26
Attending: INTERNAL MEDICINE
Payer: MEDICARE

## 2023-09-26 PROCEDURE — 94625 PHY/QHP OP PULM RHB W/O MNTR: CPT

## 2023-09-27 ENCOUNTER — OFFICE VISIT (OUTPATIENT)
Facility: CLINIC | Age: 78
End: 2023-09-27

## 2023-09-27 VITALS
WEIGHT: 172 LBS | HEART RATE: 82 BPM | BODY MASS INDEX: 29.37 KG/M2 | SYSTOLIC BLOOD PRESSURE: 116 MMHG | HEIGHT: 64 IN | DIASTOLIC BLOOD PRESSURE: 68 MMHG

## 2023-09-27 DIAGNOSIS — Z86.010 HISTORY OF COLON POLYPS: ICD-10-CM

## 2023-09-27 DIAGNOSIS — K21.00 GASTROESOPHAGEAL REFLUX DISEASE WITH ESOPHAGITIS WITHOUT HEMORRHAGE: Primary | ICD-10-CM

## 2023-09-27 PROCEDURE — 1159F MED LIST DOCD IN RCRD: CPT | Performed by: INTERNAL MEDICINE

## 2023-09-27 PROCEDURE — 3078F DIAST BP <80 MM HG: CPT | Performed by: INTERNAL MEDICINE

## 2023-09-27 PROCEDURE — 3008F BODY MASS INDEX DOCD: CPT | Performed by: INTERNAL MEDICINE

## 2023-09-27 PROCEDURE — 3074F SYST BP LT 130 MM HG: CPT | Performed by: INTERNAL MEDICINE

## 2023-09-27 PROCEDURE — 99213 OFFICE O/P EST LOW 20 MIN: CPT | Performed by: INTERNAL MEDICINE

## 2023-09-27 NOTE — PATIENT INSTRUCTIONS
1.  Take pantoprazole 3 times weekly. 2.  Please contact me with a symptom update in a few weeks. 3.  You are due for a colonoscopy in April 2026.

## 2023-09-27 NOTE — PROGRESS NOTES
Subjective:   Patient ID: Cassie Thapa is a 69year old female. HPI  The patient returns in follow-up. She was last seen at colonoscopy in February 2023. As per previous notes the patient was admitted to the hospital in February 2022 with coffee-ground emesis and black stools on full dose aspirin therapy prescribed for a history of cardiovascular disease (CABG and left carotid endarterectomy). Upper endoscopy revealed LA grade B esophagitis and H. pylori negative gastroduodenitis with antral erosions. No dominant ulceration was seen. Baseline hemoglobin was 12.8 which decreased to a fiordaliza value of 7.6 on 2/18/2022. The patient was discharged on pantoprazole. The patient was advised to continue pantoprazole maintenance (once daily). The patient underwent a screening colonoscopy in February 2023. #6 subcentimeter polyps were removed of which #3 were traditional or serrated adenomas. Uncomplicated diverticulosis was present. A surveillance colonoscopy in 3 years was advised. The patient endorses that over the past several months she discontinued the pantoprazole as she was felt that it was causing constipation. She has since noted a flare in reflux symptoms described as epigastric abdominal discomfort, bloating and belching. Symptoms improve with drinking water, avoiding late night meals and going to bed later in the evening. The patient's appetite is good. She notes that certain foods are \"hard to swallow\". She describes fried foods and tomatoes but has no difficulty swallowing the larger substances such as beef. She denies other abdominal pain. Bowel movements tend to be regular as long as fiber intake is adequate. Stools are light in color. She has noted no bleeding. The patient is not taking aspirin or NSAIDs. She also endorses that she feels better if she avoids some of her other medications.       Objective:   History/Other:   Review of Systems  See above    Wt Readings from Last 7 Encounters:  09/27/23 : 172 lb (78 kg)  07/20/23 : 172 lb 8 oz (78.2 kg)  07/04/23 : 170 lb (77.1 kg)  04/20/23 : 175 lb (79.4 kg)  04/17/23 : 172 lb (78 kg)  04/13/23 : 174 lb 8 oz (79.2 kg)  02/20/23 : 165 lb 11.2 oz (75.2 kg)      Current Outpatient Medications   Medication Sig Dispense Refill    hydrALAZINE 25 MG Oral Tab TAKE 1/2 TABLET TWICE DAILY 90 tablet 1    cinacalcet 30 MG Oral Tab Take 1 tablet (30 mg total) by mouth every other day. 45 tablet 0    TRUEplus Lancets 33G Does not apply Misc 1 each by In Vitro route daily. 100 each 3    metoprolol succinate ER 25 MG Oral Tablet 24 Hr Take 1 tablet (25 mg total) by mouth daily. 90 tablet 3    Alcohol Swabs (DROPSAFE ALCOHOL PREP) 70 % Does not apply Pads Take 1 Bottle by mouth As Directed. 400 each 0    metoprolol succinate ER 25 MG Oral Tablet 24 Hr Take 1 tablet (25 mg total) by mouth daily. 14 tablet 0    AMLODIPINE 5 MG Oral Tab TAKE 1 TABLET EVERY DAY 90 tablet 1    colchicine 0.6 MG Oral Tab Take 1 tablet (0.6 mg total) by mouth as needed. Glucose Blood (TRUE METRIX BLOOD GLUCOSE TEST) In Vitro Strip 1 strip by In Vitro route 4 (four) times daily. 400 strip 3    febuxostat 40 MG Oral Tab Take 1 tablet (40 mg total) by mouth daily. 90 tablet 1    omega-3 fatty acids 1000 MG Oral Cap Take 1,000 mg by mouth daily. Multiple Vitamin (MULTI-VITAMIN DAILY) Oral Tab Take 1 tablet by mouth daily. Cholecalciferol (VITAMIN D) 2000 units Oral Cap Take 1 capsule (2,000 Units total) by mouth daily. predniSONE 10 MG Oral Tab Take 3 tabs po x2days, then 2 tabs po x2days, then 1 tab po x2d (Patient not taking: Reported on 9/27/2023) 12 tablet 0    WIXELA INHUB 500-50 MCG/ACT Inhalation Aerosol Powder, Breath Activated Inhale 1 puff into the lungs 2 (two) times daily.  3 each 1     Allergies:  Allopurinol             HIVES, SWELLING, SHORTNESS OF                            BREATH  Dog Epithelium          SHORTNESS OF BREATH Comment:Hair and saliva  Dust                    SHORTNESS OF BREATH  Smoke                   SHORTNESS OF BREATH  Adhesive Tape (Radha*    RASH  Montelukast             HALLUCINATION  Statins                 MYALGIA    Comment:Pt had developed myalgia and myopathy  Xanax [Alprazolam]      RESTLESSNESS  Adhesive Tape           OTHER (SEE COMMENTS)  Other                   OTHER (SEE COMMENTS)  Sulfa Antibiotics       OTHER (SEE COMMENTS)  Ace Inhibitors          Coughing    Objective:   Physical Exam  Constitutional:       General: She is not in acute distress. Appearance: Normal appearance. She is not ill-appearing, toxic-appearing or diaphoretic. Eyes:      General: No scleral icterus. Cardiovascular:      Rate and Rhythm: Normal rate. Pulmonary:      Effort: Pulmonary effort is normal. No respiratory distress. Abdominal:      General: There is no distension. Musculoskeletal:      Comments: Tophaceous changes of the right thumb   Neurological:      General: No focal deficit present. Mental Status: She is oriented to person, place, and time. Psychiatric:         Mood and Affect: Mood normal.         Behavior: Behavior normal.         Assessment & Plan:   1. Gastroesophageal reflux disease with esophagitis without hemorrhage  The patient has a history of gastrointestinal bleeding in February 2022 with findings of H. pylori negative gastric erosions and LA grade B esophagitis. She is no longer taking aspirin. She has noted increasing reflux symptoms off pantoprazole. The difficulty swallowing suggests mucosal inflammation as opposed to a stricture. I am recommending that the patient resume the pantoprazole at least 3 times weekly. She will contact me with a symptom update in a few weeks. If not tolerated we will consider an alternative PPI or higher dose H2 antagonist therapy. 2. History of colon polyps  Up-to-date with colonoscopic screening/surveillance.   The patient will be due for a surveillance colonoscopy in April 2026.         Meds This Visit:  Requested Prescriptions      No prescriptions requested or ordered in this encounter       Imaging & Referrals:  None

## 2023-09-28 ENCOUNTER — CARDPULM VISIT (OUTPATIENT)
Dept: CARDIAC REHAB | Facility: HOSPITAL | Age: 78
End: 2023-09-28
Attending: INTERNAL MEDICINE
Payer: MEDICARE

## 2023-09-28 PROCEDURE — 94625 PHY/QHP OP PULM RHB W/O MNTR: CPT

## 2023-10-03 ENCOUNTER — APPOINTMENT (OUTPATIENT)
Dept: CARDIAC REHAB | Facility: HOSPITAL | Age: 78
End: 2023-10-03
Attending: INTERNAL MEDICINE
Payer: MEDICARE

## 2023-10-03 PROCEDURE — 94625 PHY/QHP OP PULM RHB W/O MNTR: CPT

## 2023-10-05 ENCOUNTER — CARDPULM VISIT (OUTPATIENT)
Dept: CARDIAC REHAB | Facility: HOSPITAL | Age: 78
End: 2023-10-05
Attending: INTERNAL MEDICINE
Payer: MEDICARE

## 2023-10-05 PROCEDURE — 94625 PHY/QHP OP PULM RHB W/O MNTR: CPT

## 2023-10-10 ENCOUNTER — APPOINTMENT (OUTPATIENT)
Dept: CARDIAC REHAB | Facility: HOSPITAL | Age: 78
End: 2023-10-10
Attending: INTERNAL MEDICINE
Payer: MEDICARE

## 2023-10-12 ENCOUNTER — NURSE TRIAGE (OUTPATIENT)
Dept: INTERNAL MEDICINE CLINIC | Facility: CLINIC | Age: 78
End: 2023-10-12

## 2023-10-12 ENCOUNTER — APPOINTMENT (OUTPATIENT)
Dept: CARDIAC REHAB | Facility: HOSPITAL | Age: 78
End: 2023-10-12
Attending: INTERNAL MEDICINE
Payer: MEDICARE

## 2023-10-12 NOTE — TELEPHONE ENCOUNTER
Action Requested: Summary for Provider     []  Critical Lab, Recommendations Needed  [] Need Additional Advice  []   FYI    []   Need Orders  [] Need Medications Sent to Pharmacy  []  Other     SUMMARY: Patient reports a painful lump on left hand middle finger. Has been there 1-2 months but in past 2 weeks getting larger, size of a marble. Skin around lump is red and painful to touch. Asking if she can be seen, but no open appointments. Wants to see Dr Axel Callahan.  Dr Axel Callahan, please advise? Reason for call: Acute lump/lesion on L middle finger  Onset: 1-2 months with redness and pain in last 2 week. Spoke with patient,  verified. As summarized above.       Reason for Disposition   Swelling is painful to touch and no fever    Protocols used: Skin Lump or Localized Swelling-A-OH

## 2023-10-12 NOTE — TELEPHONE ENCOUNTER
Spoke with the patient,verified full name and     Assisted with appointment    Future Appointments   Date Time Provider Raj Quintanilla   10/13/2023 11:45 AM Bonnie Raymundo MD [facility]   10/17/2023  1:15 PM [facility] PULM PHASE 2 [facility] CP REHAB EM [facility]   10/19/2023  1:15 PM [facility] PULM PHASE 2 [facility] CP REHAB EM [facility]   10/24/2023  1:15 PM [facility] PULM PHASE 2 [facility] CP REHAB [facility]   10/26/2023  1:15 PM [facility] PULM PHASE 2 [facility] CP REHAB EM [facility]   10/31/2023  1:15 PM [facility] PULM PHASE 2 [facility] CP REHAB EM [facility]   2023  1:15 PM [facility] PULM PHASE 2 [facility] CP REHAB EM [facility]   2023  1:15 PM [facility] PULM PHASE 2 [facility] CP REHAB EM [facility]   2023  1:15 PM [facility] PULM PHASE 2 [facility] CP REHAB EM [facility]   2023  1:15 PM [facility] PULM PHASE 2 [facility] CP REHAB EM [facility]   2023  1:15 PM [facility] PULM PHASE 2 [facility] CP REHAB EM [facility]   2023  1:15 PM [facility] PULM PHASE 2 [facility] CP REHAB EM [facility]   2023  1:15 PM [facility] PULM PHASE 2 [facility] CP REHAB EM [facility]   2023  1:15 PM [facility] PULM PHASE 2 [facility] CP REHAB EM [facility]   2023  1:15 PM [facility] PULM PHASE 2 [facility] CP REHAB EM [facility]   2023  1:15 PM [facility] PULM PHASE 2 [facility] CP REHAB EM [facility]   2023  1:15 PM [facility] PULM PHASE 2 [facility] CP REHAB EM [facility]   2023  1:15 PM [facility] PULM PHASE 2 [facility] CP REHAB EM [facility]   2023  1:15 PM [facility] PULM PHASE 2 [facility] CP REHAB EM [facility]   2023  1:15 PM [facility] PULM PHASE 2 [facility] CP REHAB EM [facility]   2023  1:15 PM [facility] PULM PHASE 2 [facility] CP REHAB EM [facility]   2023  1:15 PM [facility] PULM PHASE 2 [facility] CP REHAB EM H

## 2023-10-13 ENCOUNTER — OFFICE VISIT (OUTPATIENT)
Dept: INTERNAL MEDICINE CLINIC | Facility: CLINIC | Age: 78
End: 2023-10-13

## 2023-10-13 VITALS
HEIGHT: 64 IN | WEIGHT: 173.13 LBS | OXYGEN SATURATION: 96 % | BODY MASS INDEX: 29.56 KG/M2 | SYSTOLIC BLOOD PRESSURE: 133 MMHG | DIASTOLIC BLOOD PRESSURE: 73 MMHG | TEMPERATURE: 99 F | HEART RATE: 77 BPM

## 2023-10-13 DIAGNOSIS — R22.32 NODULE OF FINGER, LEFT: Primary | ICD-10-CM

## 2023-10-13 PROCEDURE — 3075F SYST BP GE 130 - 139MM HG: CPT | Performed by: INTERNAL MEDICINE

## 2023-10-13 PROCEDURE — 1159F MED LIST DOCD IN RCRD: CPT | Performed by: INTERNAL MEDICINE

## 2023-10-13 PROCEDURE — 3078F DIAST BP <80 MM HG: CPT | Performed by: INTERNAL MEDICINE

## 2023-10-13 PROCEDURE — 1160F RVW MEDS BY RX/DR IN RCRD: CPT | Performed by: INTERNAL MEDICINE

## 2023-10-13 PROCEDURE — 3008F BODY MASS INDEX DOCD: CPT | Performed by: INTERNAL MEDICINE

## 2023-10-13 PROCEDURE — 99212 OFFICE O/P EST SF 10 MIN: CPT | Performed by: INTERNAL MEDICINE

## 2023-10-13 PROCEDURE — 1126F AMNT PAIN NOTED NONE PRSNT: CPT | Performed by: INTERNAL MEDICINE

## 2023-10-13 RX ORDER — PANTOPRAZOLE SODIUM 20 MG/1
20 TABLET, DELAYED RELEASE ORAL
COMMUNITY

## 2023-10-13 NOTE — PROGRESS NOTES
Subjective:     Patient ID: Cesar Puga is a 69year old female. Finger Pain   Pain location: left 3rd finger. This is a new problem. The current episode started more than 1 month ago (2 mos). There has been no history of extremity trauma. The problem occurs constantly. The problem has been gradually worsening. The pain is moderate. Associated symptoms include a limited range of motion. Associated symptoms comments: Tenderness noted and redndess. She has tried nothing for the symptoms. The treatment provided no relief. History/Other:   Review of Systems  Current Outpatient Medications   Medication Sig Dispense Refill    pantoprazole 20 MG Oral Tab EC Take 1 tablet (20 mg total) by mouth every morning before breakfast.      hydrALAZINE 25 MG Oral Tab TAKE 1/2 TABLET TWICE DAILY 90 tablet 1    cinacalcet 30 MG Oral Tab Take 1 tablet (30 mg total) by mouth every other day. 45 tablet 0    metoprolol succinate ER 25 MG Oral Tablet 24 Hr Take 1 tablet (25 mg total) by mouth daily. 90 tablet 3    metoprolol succinate ER 25 MG Oral Tablet 24 Hr Take 1 tablet (25 mg total) by mouth daily. 14 tablet 0    AMLODIPINE 5 MG Oral Tab TAKE 1 TABLET EVERY DAY 90 tablet 1    febuxostat 40 MG Oral Tab Take 1 tablet (40 mg total) by mouth daily. 90 tablet 1    omega-3 fatty acids 1000 MG Oral Cap Take 1,000 mg by mouth daily. Multiple Vitamin (MULTI-VITAMIN DAILY) Oral Tab Take 1 tablet by mouth daily. Cholecalciferol (VITAMIN D) 2000 units Oral Cap Take 1 capsule (2,000 Units total) by mouth daily. predniSONE 10 MG Oral Tab Take 3 tabs po x2days, then 2 tabs po x2days, then 1 tab po x2d (Patient not taking: Reported on 9/27/2023) 12 tablet 0    TRUEplus Lancets 33G Does not apply Misc 1 each by In Vitro route daily. 100 each 3    Alcohol Swabs (DROPSAFE ALCOHOL PREP) 70 % Does not apply Pads Take 1 Bottle by mouth As Directed.  400 each 0    colchicine 0.6 MG Oral Tab Take 1 tablet (0.6 mg total) by mouth as needed. (Patient not taking: Reported on 10/13/2023)      Dalia Josh BISHOPUB 500-50 MCG/ACT Inhalation Aerosol Powder, Breath Activated Inhale 1 puff into the lungs 2 (two) times daily. 3 each 1    Glucose Blood (TRUE METRIX BLOOD GLUCOSE TEST) In Vitro Strip 1 strip by In Vitro route 4 (four) times daily.  400 strip 3     Allergies:  Allopurinol             HIVES, SWELLING, SHORTNESS OF                            BREATH  Dog Epithelium          SHORTNESS OF BREATH    Comment:Hair and saliva  Dust                    SHORTNESS OF BREATH  Smoke                   SHORTNESS OF BREATH  Adhesive Tape (Radha*    RASH  Montelukast             HALLUCINATION  Statins                 MYALGIA    Comment:Pt had developed myalgia and myopathy  Xanax [Alprazolam]      RESTLESSNESS  Adhesive Tape           OTHER (SEE COMMENTS)  Other                   OTHER (SEE COMMENTS)  Sulfa Antibiotics       OTHER (SEE COMMENTS)  Ace Inhibitors          Coughing    Past Medical History:   Diagnosis Date    Age-related cataract of left eye 05/10/2018    Age-related cataract of right eye 07/28/2022    Anxiety     Asthma     Atherosclerosis of coronary artery     Breast CA ([facility] [address] 1999    lt mastectomy    Breast CA ([address] 2014    lumpectomy, right    Cancer ([address]     breast cancer    Carotid artery disease ([address] 12/05/2016    Carotid stenosis     Closed fracture of multiple ribs of left side with routine healing, subsequent encounter 10/19/2018    Congestive heart disease (HCC)     COPD (chronic obstructive pulmonary disease) (HCC)     on O2 at 2 liters    Coronary atherosclerosis     Depression     Diabetes ([address] 07/28/2022    takes insulin when hospitalized, takes oral meds at home    Diastolic dysfunction without heart failure     Esophageal reflux     Essential hypertension     Generalized anxiety disorder     Gout     Gout     High blood pressure     High cholesterol     History of pituitary adenoma 05/10/2018    Hyperlipidemia     Major depressive disorder, single episode, moderate ([address]     Obesity       Past Surgical History:   Procedure Laterality Date    CABG      CAROTID ENDARTERECTOMY      CATARACT      COLONOSCOPY      2013    COLONOSCOPY  2013    COLONOSCOPY N/A 2/21/2023    Procedure: COLONOSCOPY;  Surgeon: Michelle Crump MD;  Location: [facility] ENDOSCOPY    HERNIA SURGERY      LUMPECTOMY RIGHT  2014    MASTECTOMY LEFT Left 1999    RADIATION RIGHT  2014      Family History   Problem Relation Age of Onset    Asthma Father     Hypertension Father     Heart Disorder Father     Other (Other) Mother         thyroid surgery    Asthma Sister     Asthma Brother     Other (Other) Brother         gout    Breast Cancer Self 43        rt breast 77      Social History:   Social History     Socioeconomic History    Marital status:     Tobacco Use    Smoking status: Never     Passive exposure: Never    Smokeless tobacco: Never   Vaping Use    Vaping Use: Never used   Substance and Sexual Activity    Alcohol use: No     Comment: rarely at weddings    Drug use: No   Social Determinants of Health  Financial Resource Strain: Low Risk  (7/7/2023)      Financial Resource Strain          Difficulty of Paying Living Expenses: Not very hard          Med Affordability: No  Food Insecurity: No Food Insecurity (10/6/2023)      Food Insecurity          Food Insecurity: Never true  Transportation Needs: No Transportation Needs (7/7/2023)      Transportation Needs          Lack of Transportation: No  Physical Activity: Insufficiently Active (7/13/2022)      Exercise Vital Sign          Days of Exercise per Week: 1 day          Minutes of Exercise per Session: 10 min  Stress: No Stress Concern Present (10/6/2023)      Stress          Feeling of Stress : No      Social Connections  Housing Stability: Low Risk  (10/6/2023)      Housing Stability          Housing Instability: No     Objective:   Physical Exam  Constitutional:       General: She is not in acute distress. Appearance: She is not ill-appearing, toxic-appearing or diaphoretic. Skin:     Findings: Lesion present. Assessment & Plan:   (R22.32) Nodule of finger, left  (primary encounter diagnosis)  Plan: Derm Referral - In Network        I told the patient this appears to me like a warrant. I will refer her to Derm. She also was very thankful about the patient going for pulmonary rehab ordered by her current pulmonologist.  She said that she had improved significantly in terms of her energy level and her shortness of breath. She is now able to do more physical activities at home compared to before when she was really sedentary. Patient also advised to get RSV vaccine. No orders of the defined types were placed in this encounter.       Meds This Visit:  Requested Prescriptions      No prescriptions requested or ordered in this encounter       Imaging & Referrals:  None

## 2023-10-17 ENCOUNTER — APPOINTMENT (OUTPATIENT)
Dept: CARDIAC REHAB | Facility: HOSPITAL | Age: 78
End: 2023-10-17
Attending: INTERNAL MEDICINE
Payer: MEDICARE

## 2023-10-17 PROCEDURE — 94625 PHY/QHP OP PULM RHB W/O MNTR: CPT

## 2023-10-19 ENCOUNTER — APPOINTMENT (OUTPATIENT)
Dept: CARDIAC REHAB | Facility: HOSPITAL | Age: 78
End: 2023-10-19
Attending: INTERNAL MEDICINE
Payer: MEDICARE

## 2023-10-19 PROCEDURE — 94625 PHY/QHP OP PULM RHB W/O MNTR: CPT

## 2023-10-23 ENCOUNTER — OFFICE VISIT (OUTPATIENT)
Dept: DERMATOLOGY CLINIC | Facility: CLINIC | Age: 78
End: 2023-10-23

## 2023-10-23 DIAGNOSIS — B07.9 VERRUCA(E): Primary | ICD-10-CM

## 2023-10-23 PROCEDURE — 17110 DESTRUCTION B9 LES UP TO 14: CPT | Performed by: PHYSICIAN ASSISTANT

## 2023-10-23 PROCEDURE — 1159F MED LIST DOCD IN RCRD: CPT | Performed by: PHYSICIAN ASSISTANT

## 2023-10-23 PROCEDURE — 99213 OFFICE O/P EST LOW 20 MIN: CPT | Performed by: PHYSICIAN ASSISTANT

## 2023-10-24 ENCOUNTER — APPOINTMENT (OUTPATIENT)
Dept: CARDIAC REHAB | Facility: HOSPITAL | Age: 78
End: 2023-10-24
Attending: INTERNAL MEDICINE
Payer: MEDICARE

## 2023-10-24 PROCEDURE — 94625 PHY/QHP OP PULM RHB W/O MNTR: CPT

## 2023-10-25 ENCOUNTER — PATIENT OUTREACH (OUTPATIENT)
Dept: CASE MANAGEMENT | Age: 78
End: 2023-10-25

## 2023-10-25 ENCOUNTER — TELEPHONE (OUTPATIENT)
Dept: INTERNAL MEDICINE CLINIC | Facility: CLINIC | Age: 78
End: 2023-10-25

## 2023-10-25 DIAGNOSIS — E11.59 HYPERTENSION ASSOCIATED WITH TYPE 2 DIABETES MELLITUS: ICD-10-CM

## 2023-10-25 DIAGNOSIS — E21.3 HYPERPARATHYROIDISM (HCC): ICD-10-CM

## 2023-10-25 DIAGNOSIS — I73.9 PAD (PERIPHERAL ARTERY DISEASE) (HCC): Primary | ICD-10-CM

## 2023-10-25 DIAGNOSIS — I65.23 CAROTID STENOSIS, NON-SYMPTOMATIC, BILATERAL: ICD-10-CM

## 2023-10-25 DIAGNOSIS — J44.9 COPD, SEVERE (HCC): ICD-10-CM

## 2023-10-25 DIAGNOSIS — I15.2 HYPERTENSION ASSOCIATED WITH TYPE 2 DIABETES MELLITUS: ICD-10-CM

## 2023-10-25 DIAGNOSIS — N18.32 STAGE 3B CHRONIC KIDNEY DISEASE (HCC): ICD-10-CM

## 2023-10-25 DIAGNOSIS — N18.30 CONTROLLED TYPE 2 DIABETES MELLITUS WITH STAGE 3 CHRONIC KIDNEY DISEASE, WITHOUT LONG-TERM CURRENT USE OF INSULIN (HCC): ICD-10-CM

## 2023-10-25 DIAGNOSIS — M25.561 ACUTE PAIN OF RIGHT KNEE: ICD-10-CM

## 2023-10-25 DIAGNOSIS — Z98.890 S/P CAROTID ENDARTERECTOMY: ICD-10-CM

## 2023-10-25 DIAGNOSIS — E11.22 CONTROLLED TYPE 2 DIABETES MELLITUS WITH STAGE 3 CHRONIC KIDNEY DISEASE, WITHOUT LONG-TERM CURRENT USE OF INSULIN (HCC): ICD-10-CM

## 2023-10-25 DIAGNOSIS — Z95.1 S/P CABG (CORONARY ARTERY BYPASS GRAFT): ICD-10-CM

## 2023-10-25 DIAGNOSIS — E78.5 HYPERLIPIDEMIA ASSOCIATED WITH TYPE 2 DIABETES MELLITUS: ICD-10-CM

## 2023-10-25 DIAGNOSIS — I70.0 THORACIC AORTA ATHEROSCLEROSIS (HCC): ICD-10-CM

## 2023-10-25 DIAGNOSIS — E11.69 HYPERLIPIDEMIA ASSOCIATED WITH TYPE 2 DIABETES MELLITUS: ICD-10-CM

## 2023-10-25 DIAGNOSIS — M1A.9XX1 CHRONIC TOPHACEOUS GOUT: ICD-10-CM

## 2023-10-25 NOTE — PROGRESS NOTES
10/25/2023  Spoke to [place] for CCM. Updates to patient care team/ comments: UTD  Patient reported changes in medications: None  Med Adherence  Comment: Taking as directed    Health Maintenance:  UTD, reviewed with patient. Zoster Vaccines(1 of 2) Never done  Diabetes Care Dilated Eye Exam due on 10/13/2021  COVID-19 Vaccine(4 - 2023-24 season) due on 09/01/2023  Diabetes Care Foot Exam (Annual) due on 07/22/2024  Diabetes Care A1C due on 01/04/2024  Diabetes Care: Microalb/Creat Ratio due on 04/12/2024  Diabetes Care: GFR due on 07/06/2024  Influenza Vaccine Completed  DEXA Scan Completed  MA Annual Health Assessment Completed  Annual Depression Screening Completed  Fall Risk Screening (Annual) Completed  Pneumococcal Vaccine: 65+ Years Completed  Mammogram Discontinued  Colonoscopy Discontinued    Patient current concerns:       Patient stated that she is doing ok. Mood is good/stable. Patient stated that [facility] visits are going really good. Patient stated that everyone there is really nice and helpful. Patient stated that she could only walk one block and now she can walk more then one block. She stated that when she is at [address] visit she does not get shortness of breath, but when she is at home her o2 goes down to 92 she gets shortness of breath just by walking to the bathroom. She was going to take a shower and she was scared to her care giver is not there today. She will take shower when her care giver is present so that she is not alone. Patient stated that she is able to walk with out a walker and before she could not walk with out a walker. Patient did mention when she takes a deep breath she gets like needle pain around her heart. She stated it happens every time she takes deep breaths. I informed patient that I will be sending out a communication to the triage just to make sure. Patient verbal understanding. Patient did check her blood sugar today was 103.     Patient did get her flu shot on 9/11/2023. Informed pt to get her Diabetes Care Dilated Eye Exam due on 10/13/2021. Future Appointments   Date Time Provider Raj Quintanilla   10/26/2023  1:15 PM [facility] PULM PHASE 2 [facility] CP REHAB EM [facility]   10/31/2023  1:15 PM [facility] PULM PHASE 2 [facility] REHAB EM [facility]   11/2/2023  1:15 PM [facility] PULM PHASE 2 [facility] CP REHAB EM [facility]   11/7/2023  1:15 PM [facility] PULM PHASE 2 [facility]   11/9/2023  1:15 PM [facility] PULM PHASE 2 [facility] CP REHAB EM [facility]   11/14/2023  1:15 PM [facility] PULM PHASE 2 [facility] CP REHAB EM [facility]   11/16/2023  1:15 PM [facility] PULM PHASE 2 [facility] CP REHAB EM [facility]   11/21/2023  1:15 PM [facility] PULM PHASE 2 [facility] CP REHAB EM [facility]   11/23/2023  1:15 PM [facility] PULM PHASE 2 [facility] CP REHAB EM [facility]   11/28/2023  1:15 PM [facility] PULM PHASE 2 [facility]   11/30/2023  1:15 PM [facility] PULM PHASE 2 [facility] CP REHAB EM [facility]   12/5/2023  1:15 PM [facility] PULM PHASE 2 [facility] REHAB EM [facility]   12/7/2023  1:15 PM [facility] PULM PHASE 2 [facility] REHAB [facility]   12/12/2023  1:15 PM [facility] PULM PHASE 2 [facility] CP REHAB EM [facility]   12/14/2023  1:15 PM [facility] PULM PHASE 2 [facility] CP REHAB EM [facility]   12/19/2023  1:15 PM [facility] PULM PHASE 2 [facility] CP REHAB [facility]   12/21/2023  1:15 PM [facility] PULM PHASE 2 [facility] CP REHAB EM [facility]   12/26/2023  1:15 PM [facility] PULM PHASE 2 [facility]           Previous goal met: Continues working towards goal    Self Management Goals/Action Plan: Active goal from previous outreach:                       DM/COPD     Patient reported progress toward goal: Progressing blood sugar today 103 very good. Pt continues with sob. Update to previous barriers: Boil is gone     Patient Reported New Barriers And Concerns: SOB                    - Plan for overcoming all barriers: Acute communication sent to Dr. Overton New office. Pt to use her O2. Patient agrees to goal action plan.  yes  Self-Management Abilities (patient reported)             1= least confident in achieving goal, 5= very confident               - confidence: 3        Care Manager Follow Up: One month    Reason For Follow Up: review progress and or barriers towards patients goals. Care managers interventions:     Reviewed healthy eating habits, regular exercise and preventative guidelines. Reinforced healthy diet, lifestyle, and exercise. Future Appointments   Date Time Provider Raj Quintanilla   10/26/2023  1:15 PM [facility] PULM PHASE 2 [facility] CP REHAB EM [facility]   10/31/2023  1:15 PM [facility] PULM PHASE 2 [facility] CP REHAB EM [facility]   11/2/2023  1:15 PM [facility] PULM PHASE 2 [facility] CP REHAB EM [facility]   11/7/2023  1:15 PM [facility] PULM PHASE 2 [facility] CP REHAB [facility]   11/9/2023  1:15 PM [facility] PULM PHASE 2 [facility] CP REHAB EM [facility]   11/14/2023  1:15 PM [facility] PULM PHASE 2 [facility] CP REHAB EM [facility]   11/16/2023  1:15 PM [facility] PULM PHASE 2 [facility] CP REHAB EM [facility]   11/21/2023  1:15 PM [facility] PULM PHASE 2 [facility] CP REHAB [facility]   11/23/2023  1:15 PM [facility] PULM PHASE 2 [facility] CP REHAB [facility]   11/28/2023  1:15 PM [facility] PULM PHASE 2 [facility] CP REHAB [facility]   11/30/2023  1:15 PM [facility] PULM PHASE 2 [facility] CP REHAB [facility]   12/5/2023  1:15 PM [facility] PULM PHASE 2 [facility] CP REHAB [facility]   12/7/2023  1:15 PM [facility] PULM PHASE 2 [facility] CP REHAB [facility]   12/12/2023  1:15 PM [facility] PULM PHASE 2 [facility] CP REHAB EM [facility]   12/14/2023  1:15 PM [facility] PULM PHASE 2 [facility] CP REHAB [facility]   12/19/2023  1:15 PM [facility] PULM PHASE 2 [facility] CP REHAB EM [facility]   12/21/2023  1:15 PM [facility] PULM PHASE 2 [facility] CP REHAB [facility]   12/26/2023  1:15 PM [facility] PULM PHASE 2 [facility] CP REHAB EM [facility]        Time Spent This Encounter Total: 20 min medical record review, telephone communication, care plan updates where needed, education, goals and action plan recreation/update. Provided acknowledgment and validation to patient's concerns. Monthly Minute Total including today: 20  Physical assessment, complete health history, and care plan established by Josephine Jurado MD, need for CCM determined from these assessments and data.

## 2023-10-25 NOTE — TELEPHONE ENCOUNTER
Spoke to patient , states that her O2 drops to 88-90% when she is walking. Feels like she has to catch her breath. Chest pain when taking deep breaths. \"Put small knife on breast\". +headaches, comes and goes, +dizziness, no nausea or vomiting. Pain does not radiate down left arm or to back. States 's / 65-75's. Is on O2 1.5L of oxygen. Advised patient to go to ER for evaluation of symptoms. Pt states that she does not want to go to ER, \"I am okay\". Reiterated that she needs an immediate evaluation of her symptoms. Offered to call 911 for patient. She refused, states that Brigida Ropes will take me to [facility] ER and I hate that place\". Advised [employer]. Patient declined again, \"I'm broke\"    States that she will try to find someone to take her.

## 2023-10-25 NOTE — TELEPHONE ENCOUNTER
Agree with ER; pt has CAD and also has COPD; both of which can be cause of her current complaint so need to be evaluated in ER right away.

## 2023-10-25 NOTE — TELEPHONE ENCOUNTER
Patient stated that she has been having shortness of breath and when she takes deep breaths she gets some needle pain around her heart. Please call patient and triage she just wants to make sure that she is fine.

## 2023-10-26 ENCOUNTER — CARDPULM VISIT (OUTPATIENT)
Dept: CARDIAC REHAB | Facility: HOSPITAL | Age: 78
End: 2023-10-26
Attending: INTERNAL MEDICINE
Payer: MEDICARE

## 2023-10-26 PROCEDURE — 94625 PHY/QHP OP PULM RHB W/O MNTR: CPT

## 2023-10-26 NOTE — TELEPHONE ENCOUNTER
Call patient no answer    Called Sowmya Caba ([facility]) informed him of patient was advised to proceed to ER earlier, and no answer when called patient. He will follow up with mom and proceed to ER.

## 2023-10-26 NOTE — TELEPHONE ENCOUNTER
Action Requested: Summary for Provider     []  Critical Lab, Recommendations Needed  [] Need Additional Advice  [x]   FYI    []   Need Orders  [] Need Medications Sent to Pharmacy  []  Other     SUMMARY: Patient called back stated at the time of the call she did not have her O2 on and was cleaning house. Never went to ER. Currently feeling better and will be going to physical therapy. [facility] physical therapy is helping her.     Reason for call: Acute  Onset: Data Unavailable

## 2023-10-31 ENCOUNTER — CARDPULM VISIT (OUTPATIENT)
Dept: CARDIAC REHAB | Facility: HOSPITAL | Age: 78
End: 2023-10-31
Attending: INTERNAL MEDICINE
Payer: MEDICARE

## 2023-10-31 PROCEDURE — 94625 PHY/QHP OP PULM RHB W/O MNTR: CPT

## 2023-11-02 ENCOUNTER — APPOINTMENT (OUTPATIENT)
Dept: CARDIAC REHAB | Facility: HOSPITAL | Age: 78
End: 2023-11-02
Attending: INTERNAL MEDICINE
Payer: MEDICARE

## 2023-11-02 PROCEDURE — 94625 PHY/QHP OP PULM RHB W/O MNTR: CPT

## 2023-11-04 ENCOUNTER — TELEPHONE (OUTPATIENT)
Dept: DERMATOLOGY CLINIC | Facility: CLINIC | Age: 78
End: 2023-11-04

## 2023-11-06 NOTE — TELEPHONE ENCOUNTER
Pt states the wart turned hard and dark in color after treatment with cryotherapy. Per NK - this is normal.  Pt informed.

## 2023-11-07 ENCOUNTER — CARDPULM VISIT (OUTPATIENT)
Dept: CARDIAC REHAB | Facility: HOSPITAL | Age: 78
End: 2023-11-07
Attending: INTERNAL MEDICINE
Payer: MEDICARE

## 2023-11-07 ENCOUNTER — PATIENT OUTREACH (OUTPATIENT)
Dept: CASE MANAGEMENT | Age: 78
End: 2023-11-07

## 2023-11-07 DIAGNOSIS — I65.23 CAROTID STENOSIS, NON-SYMPTOMATIC, BILATERAL: ICD-10-CM

## 2023-11-07 DIAGNOSIS — Z91.81 AT RISK FOR FALLS: ICD-10-CM

## 2023-11-07 DIAGNOSIS — Z95.1 S/P CABG (CORONARY ARTERY BYPASS GRAFT): ICD-10-CM

## 2023-11-07 DIAGNOSIS — E78.5 HYPERLIPIDEMIA ASSOCIATED WITH TYPE 2 DIABETES MELLITUS: ICD-10-CM

## 2023-11-07 DIAGNOSIS — E11.22 CONTROLLED TYPE 2 DIABETES MELLITUS WITH STAGE 3 CHRONIC KIDNEY DISEASE, WITHOUT LONG-TERM CURRENT USE OF INSULIN (HCC): ICD-10-CM

## 2023-11-07 DIAGNOSIS — I73.9 PAD (PERIPHERAL ARTERY DISEASE) (HCC): Primary | ICD-10-CM

## 2023-11-07 DIAGNOSIS — E21.3 HYPERPARATHYROIDISM (HCC): ICD-10-CM

## 2023-11-07 DIAGNOSIS — I15.2 HYPERTENSION ASSOCIATED WITH TYPE 2 DIABETES MELLITUS: ICD-10-CM

## 2023-11-07 DIAGNOSIS — E11.69 HYPERLIPIDEMIA ASSOCIATED WITH TYPE 2 DIABETES MELLITUS: ICD-10-CM

## 2023-11-07 DIAGNOSIS — Z98.890 S/P CAROTID ENDARTERECTOMY: ICD-10-CM

## 2023-11-07 DIAGNOSIS — N18.32 STAGE 3B CHRONIC KIDNEY DISEASE (HCC): ICD-10-CM

## 2023-11-07 DIAGNOSIS — E11.59 HYPERTENSION ASSOCIATED WITH TYPE 2 DIABETES MELLITUS: ICD-10-CM

## 2023-11-07 DIAGNOSIS — J44.9 COPD, SEVERE (HCC): ICD-10-CM

## 2023-11-07 DIAGNOSIS — N18.30 CONTROLLED TYPE 2 DIABETES MELLITUS WITH STAGE 3 CHRONIC KIDNEY DISEASE, WITHOUT LONG-TERM CURRENT USE OF INSULIN (HCC): ICD-10-CM

## 2023-11-07 PROCEDURE — 94625 PHY/QHP OP PULM RHB W/O MNTR: CPT

## 2023-11-07 NOTE — PROGRESS NOTES
11/7/2023  Spoke to [place] for CCM. Updates to patient care team/ comments: UTD  Patient reported changes in medications: None  Med Adherence  Comment: Taking as directed    Health Maintenance:  UTD, reviewed with patient. Health Maintenance   Topic Date Due    Zoster Vaccines (1 of 2) Never done    Diabetes Care Dilated Eye Exam  10/13/2021    COVID-19 Vaccine (4 - 2023-24 season) 09/01/2023    Diabetes Care Foot Exam (Annual)  07/22/2024 (Originally 1/1/2023)    Diabetes Care A1C  01/04/2024    Diabetes Care: Microalb/Creat Ratio  04/12/2024    Diabetes Care: GFR  07/06/2024    Influenza Vaccine  Completed    DEXA Scan  Completed    MA Annual Health Assessment  Completed    Annual Depression Screening  Completed    Fall Risk Screening (Annual)  Completed    Pneumococcal Vaccine: 65+ Years  Completed    Mammogram  Discontinued    Colonoscopy  Discontinued       Patient current concerns:     Patient stated that she is doing ok. Mood is good/stable. Patient stated that she will be going for her rehabilitation appointment today at 1:15 pm and she is getting ready. Patient stated that she likes going to rehabilitation she feels much better when she is there. Patient stated that there is something wrong with her oxygen machine she has it at 1.5 painter now and when she gets up to go to bathroom she takes oxygen off to prevent tripping. When she comes back her oxygen level breathing goes down to 70 or 75, but she stated that she can breath with no problems. Patient stated that she used to have her oxygen level at 2. Patient stated that she will boost it to 2 and will monitor her O2 level. Patient stated that her wart is finally healing it was taking such long time to heal.     Patient to take medications as prescribed.        Future Appointments   Date Time Provider Raj Quintanilla   11/7/2023  1:15 PM [facility] PULM PHASE 2 [facility] CP REHAB EM [facility]   11/9/2023  1:15 PM [facility] PULM PHASE 2 [facility] CP REHAB [facility]   11/14/2023  1:15 PM [facility] PULM PHASE 2 [facility]   11/16/2023  1:15 PM [facility] PULM PHASE 2 [facility]   11/21/2023  1:15 PM [facility] PULM PHASE 2 [facility]   11/23/2023  1:15 PM [facility] PULM PHASE 2 [facility]   11/28/2023  1:15 PM [facility] PULM PHASE 2 [facility]   11/30/2023  1:15 PM [facility] PULM PHASE 2 [facility]   12/5/2023  1:15 PM [facility] PULM PHASE 2 [facility]   12/7/2023  1:15 PM [facility] PULM PHASE 2 [facility]   12/12/2023  1:15 PM [facility] PULM PHASE [facility]   12/14/2023  1:15 PM [facility] PULM PHASE 2 [facility]   12/19/2023  1:15 PM [facility] PULM PHASE 2 [facility]   12/21/2023  1:15 PM [facility] PULM PHASE 2 [facility]   12/26/2023  1:15 PM [facility] PULM PHASE 2 [facility]       Previous goal met: Continues working towards goal    Self Management Goals/Action Plan: Active goal from previous outreach:                       DM/COPD    Patient reported progress toward goal: Pulmonary rehabilitation is helping pt. Update to previous barriers: SOB continues     Patient Reported New Barriers And Concerns: shortness of breath                    - Plan for overcoming all barriers: Pt goes in for pulmonary rehabilitation and feels much better when she is there. Advised pt to continues using her oxygen. If symptoms get worse to call Dr. Navarro Fischer office             Patient agrees to goal action plan. yes  Self-Management Abilities (patient reported)             1= least confident in achieving goal, 5= very confident               - confidence: 4        Care Manager Follow Up: One month    Reason For Follow Up: review progress and or barriers towards patients goals. Care managers interventions:     Reviewed healthy eating habits, regular exercise and preventative guidelines. Reinforced healthy diet, lifestyle, and exercise.     Future Appointments   Date Time Provider Raj Quintanilla   11/7/2023  1:15 PM [facility] PULM PHASE 2 [facility]   11/9/2023  1:15 PM [facility] PULM PHASE 2 [facility]   11/14/2023  1:15 PM [facility] PULM PHASE 2 [facility]   11/16/2023  1:15 PM [facility] PULM PHASE 2 [facility]   11/21/2023  1:15 PM [facility] PULM PHASE 2 [facility]   11/23/2023  1:15 PM [facility] PULM PHASE 2 [facility]   11/28/2023  1:15 PM [facility] PULM PHASE 2 [facility]   11/30/2023  1:15 PM [facility] PULM PHASE 2 [facility]   12/5/2023  1:15 PM [facility] PULM PHASE 2 [facility]   12/7/2023  1:15 PM [facility] PULM PHASE 2 [facility]   12/12/2023  1:15 PM [facility] PULM PHASE 2 [facility]   12/14/2023  1:15 PM [facility] PULM PHASE 2 [facility]   12/19/2023  1:15 PM [facility] PULM PHASE 2 [facility]   12/21/2023  1:15 PM [facility] PULM PHASE 2 [facility]   12/26/2023  1:15 PM [facility] PULM PHASE 2 [facility] CP REHAB EM [facility]        Time Spent This Encounter Total: 15 min medical record review, telephone communication, care plan updates where needed, education, goals and action plan recreation/update. Provided acknowledgment and validation to patient's concerns. Monthly Minute Total including today: 15  Physical assessment, complete health history, and care plan established by Maria Ines Victor MD, need for CCM determined from these assessments and data.

## 2023-11-09 ENCOUNTER — TELEPHONE (OUTPATIENT)
Dept: INTERNAL MEDICINE CLINIC | Facility: CLINIC | Age: 78
End: 2023-11-09

## 2023-11-09 ENCOUNTER — APPOINTMENT (OUTPATIENT)
Dept: CARDIAC REHAB | Facility: HOSPITAL | Age: 78
End: 2023-11-09
Attending: INTERNAL MEDICINE
Payer: MEDICARE

## 2023-11-09 PROCEDURE — 94625 PHY/QHP OP PULM RHB W/O MNTR: CPT

## 2023-11-09 NOTE — TELEPHONE ENCOUNTER
Would recommend checking with her pulmonologist since she will be traveling via plane and given her severe copd, will need to clear with her pulmonologist.

## 2023-11-09 NOTE — TELEPHONE ENCOUNTER
Patient returned our call ( Identified name and  )     Patient  informed of Dr. Laura Bolanos instructions below    Patient verbalizes understanding and agrees with plan.      She will call her Pulmonologist tomorrow

## 2023-11-09 NOTE — TELEPHONE ENCOUNTER
Left a detailed message to cell (ok per INDIO) regarding note below.  Advised to call back as needed

## 2023-11-09 NOTE — TELEPHONE ENCOUNTER
Patient called and would like to  ask  if it is okay for her to travel to the [facility] it is an 17-18 hour flights. Please advise.

## 2023-11-14 ENCOUNTER — APPOINTMENT (OUTPATIENT)
Dept: CARDIAC REHAB | Facility: HOSPITAL | Age: 78
End: 2023-11-14
Attending: INTERNAL MEDICINE
Payer: MEDICARE

## 2023-11-14 PROCEDURE — 94625 PHY/QHP OP PULM RHB W/O MNTR: CPT

## 2023-11-16 ENCOUNTER — CARDPULM VISIT (OUTPATIENT)
Dept: CARDIAC REHAB | Facility: HOSPITAL | Age: 78
End: 2023-11-16
Attending: INTERNAL MEDICINE
Payer: MEDICARE

## 2023-11-16 PROCEDURE — 94625 PHY/QHP OP PULM RHB W/O MNTR: CPT

## 2023-11-21 ENCOUNTER — APPOINTMENT (OUTPATIENT)
Dept: CARDIAC REHAB | Facility: HOSPITAL | Age: 78
End: 2023-11-21
Attending: INTERNAL MEDICINE
Payer: MEDICARE

## 2023-11-23 ENCOUNTER — APPOINTMENT (OUTPATIENT)
Dept: CARDIAC REHAB | Facility: HOSPITAL | Age: 78
End: 2023-11-23
Attending: INTERNAL MEDICINE
Payer: MEDICARE

## 2023-11-28 ENCOUNTER — APPOINTMENT (OUTPATIENT)
Dept: CARDIAC REHAB | Facility: HOSPITAL | Age: 78
End: 2023-11-28
Attending: INTERNAL MEDICINE
Payer: MEDICARE

## 2023-11-28 NOTE — PROGRESS NOTES
Called patient regarding ccm monthly and left a message for patient to call back when they can. Reviewed patient chart. Left contact my contact number [phone].        Time Spent This Encounter Total: 3  min medical record review  Monthly Minute Total including today: 3 minutes no

## 2023-11-30 ENCOUNTER — CARDPULM VISIT (OUTPATIENT)
Dept: CARDIAC REHAB | Facility: HOSPITAL | Age: 78
End: 2023-11-30
Attending: INTERNAL MEDICINE
Payer: MEDICARE

## 2023-11-30 ENCOUNTER — NURSE TRIAGE (OUTPATIENT)
Dept: INTERNAL MEDICINE CLINIC | Facility: CLINIC | Age: 78
End: 2023-11-30

## 2023-11-30 PROCEDURE — 94625 PHY/QHP OP PULM RHB W/O MNTR: CPT

## 2023-11-30 NOTE — TELEPHONE ENCOUNTER
Just monitor her bp and pulse rate;  the dose she took is still not high (max dose is usually 200mg/day ) so still not overdosing.

## 2023-11-30 NOTE — TELEPHONE ENCOUNTER
Action Requested: Summary for Provider     []  Critical Lab, Recommendations Needed  [] Need Additional Advice  []   FYI    []   Need Orders  [] Need Medications Sent to Pharmacy  []  Other     SUMMARY: Patient called stating she accidentally took double the medications of Metoprolol succinate ER 25 mg, multivitamin,omega fish oil,and vitamin D. Patient took medication at 11 am before her rehab and then accidentally took medication just now. Patient states she feels energetic. Patient denies any symptoms, feels energetic states her blood pressure was 122/65 at rehab today 3pm.    Patient advised to monitor her blood pressure, monitor for dizziness,chest pain, shortness of breath, or any unusual symptoms. If any dizziness, chest pain to call 911.                  Reason for call: Acute  Onset: today    Reason for Disposition   DOUBLE DOSE (an extra dose or lesser amount) of prescription drug and NO symptoms  (Exception: A double dose of antibiotics.)    Protocols used: Medication Question Miguel Mao

## 2023-12-01 NOTE — TELEPHONE ENCOUNTER
Spoke with patient, (  Name and  verified ) informed of Dr. Navarro Fischer  instructions below    Patient verbalizes understanding and agrees with plan.        She is feeling fine, energetic ,eating dinner

## 2023-12-05 ENCOUNTER — PATIENT OUTREACH (OUTPATIENT)
Dept: CASE MANAGEMENT | Age: 78
End: 2023-12-05

## 2023-12-05 ENCOUNTER — CARDPULM VISIT (OUTPATIENT)
Dept: CARDIAC REHAB | Facility: HOSPITAL | Age: 78
End: 2023-12-05
Attending: INTERNAL MEDICINE
Payer: MEDICARE

## 2023-12-05 DIAGNOSIS — N18.30 CONTROLLED TYPE 2 DIABETES MELLITUS WITH STAGE 3 CHRONIC KIDNEY DISEASE, WITHOUT LONG-TERM CURRENT USE OF INSULIN (HCC): ICD-10-CM

## 2023-12-05 DIAGNOSIS — E11.59 HYPERTENSION ASSOCIATED WITH TYPE 2 DIABETES MELLITUS: ICD-10-CM

## 2023-12-05 DIAGNOSIS — M25.561 ACUTE PAIN OF RIGHT KNEE: Primary | ICD-10-CM

## 2023-12-05 DIAGNOSIS — E11.22 CONTROLLED TYPE 2 DIABETES MELLITUS WITH STAGE 3 CHRONIC KIDNEY DISEASE, WITHOUT LONG-TERM CURRENT USE OF INSULIN (HCC): ICD-10-CM

## 2023-12-05 DIAGNOSIS — E21.3 HYPERPARATHYROIDISM (HCC): ICD-10-CM

## 2023-12-05 DIAGNOSIS — I15.2 HYPERTENSION ASSOCIATED WITH TYPE 2 DIABETES MELLITUS: ICD-10-CM

## 2023-12-05 DIAGNOSIS — I50.32 CHRONIC DIASTOLIC CONGESTIVE HEART FAILURE (HCC): ICD-10-CM

## 2023-12-05 DIAGNOSIS — J44.9 COPD, SEVERE (HCC): ICD-10-CM

## 2023-12-05 DIAGNOSIS — I65.23 CAROTID STENOSIS, NON-SYMPTOMATIC, BILATERAL: ICD-10-CM

## 2023-12-05 DIAGNOSIS — H61.23 IMPACTED CERUMEN, BILATERAL: ICD-10-CM

## 2023-12-05 DIAGNOSIS — N18.32 STAGE 3B CHRONIC KIDNEY DISEASE (HCC): ICD-10-CM

## 2023-12-05 DIAGNOSIS — E78.5 HYPERLIPIDEMIA ASSOCIATED WITH TYPE 2 DIABETES MELLITUS: ICD-10-CM

## 2023-12-05 DIAGNOSIS — H91.93 BILATERAL HEARING LOSS, UNSPECIFIED HEARING LOSS TYPE: ICD-10-CM

## 2023-12-05 DIAGNOSIS — M85.859 OSTEOPENIA OF HIP, UNSPECIFIED LATERALITY: ICD-10-CM

## 2023-12-05 DIAGNOSIS — E11.69 HYPERLIPIDEMIA ASSOCIATED WITH TYPE 2 DIABETES MELLITUS: ICD-10-CM

## 2023-12-05 PROCEDURE — 94625 PHY/QHP OP PULM RHB W/O MNTR: CPT

## 2023-12-05 NOTE — PROGRESS NOTES
12/5/2023  Spoke to [place] for CCM. Updates to patient care team/ comments: UTD  Patient reported changes in medications: None  Med Adherence  Comment: Taking as directed    Health Maintenance:  UTD, reviewed with patient. Health Maintenance   Topic Date Due    Zoster Vaccines (1 of 2) Never done    Diabetes Care Dilated Eye Exam  10/13/2021    COVID-19 Vaccine (4 - 2023-24 season) 09/01/2023    Diabetes Care A1C  01/04/2024    Diabetes Care Foot Exam (Annual)  07/22/2024 (Originally 1/1/2023)    Diabetes Care: Microalb/Creat Ratio  04/12/2024    Diabetes Care: GFR  07/06/2024    Influenza Vaccine  Completed    DEXA Scan  Completed    MA Annual Health Assessment  Completed    Annual Depression Screening  Completed    Fall Risk Screening (Annual)  Completed    Pneumococcal Vaccine: 65+ Years  Completed    Mammogram  Discontinued    Colonoscopy  Discontinued       Patient current concerns:     Patient stated that she is doing fine today. Patient was very happy over the phone. Patient stated that she is scheduled for [facility] visit today at 1:15 pm and her caregiver is there to take her. Patient stated that her breathing changes when she is sitting and watching television her oxygen drops. When patient goes to sleep at night it does not change then her oxygen is fine. Patient stated that her blood sugar today was 109 very good. Patient's blood pressure was 130/70 stable. Patient stated that she will be going back home to [facility] to see her family. Patient is leaving on 01/02/2024 and will be there for 1 month. Patient stated that doctor told her that she should bring her oxygen. Patient stated that she has doctors in her family and that they will take care of her there. Patient stated that her sister covered her expenses and she is very happy. Patient to continue taking all medications as prescribed and to monitor blood sugar. Pt to continue with DM diet.      Future Appointments Date Time Provider Raj Quintanilla   12/5/2023  1:15 PM [facility] PULM PHASE 2 [facility] REHAB [facility]   12/7/2023  1:15 PM [facility] PULM PHASE 2 [facility]   12/12/2023  1:15 PM [facility] PULM PHASE 2 [facility]   12/14/2023  1:15 PM [facility] PULM PHASE 2 [facility]   12/19/2023  1:15 PM [facility] PULM PHASE 2 [facility]   12/21/2023  1:15 PM [facility] PULM PHASE 2 [facility]   12/26/2023  1:15 PM [facility] PULM PHASE 2 [facility]           Previous goal met: Continues working towards goal    Self Management Goals/Action Plan: Active goal from previous outreach:                       DM/COPD    Patient reported progress toward goal: COPD is stable. DM today 109. Update to previous barriers: shortness of breath is stable. Patient Reported New Barriers And Concerns: N/A                   - Plan for overcoming all barriers: Pt to call with any questions or concern. Patient agrees to goal action plan. yes  Self-Management Abilities (patient reported)             1= least confident in achieving goal, 5= very confident               - confidence: 4      Care Manager Follow Up: One month    Reason For Follow Up: review progress and or barriers towards patients goals. Care managers interventions:     Reviewed healthy eating habits, regular exercise and preventative guidelines. Reinforced healthy diet, lifestyle, and exercise.     Future Appointments   Date Time Provider Raj Quintanilla   12/5/2023  1:15 PM [facility] PULM PHASE 2 [facility] CP REHAB [facility]   12/7/2023  1:15 PM [facility] PULM PHASE 2 [facility]   12/12/2023  1:15 PM [facility] PULM PHASE 2 [facility]   12/14/2023  1:15 PM [facility] PULM PHASE 2 [facility]AB [facility]   12/19/2023  1:15 PM [facility] PULM PHASE 2 [facility]   12/21/2023  1:15 PM [facility] PULM PHASE 2 [facility]   12/26/2023  1:15 PM [facility] PULM PHASE 2 [facility]HAB [facility]        Time Spent This Encounter Total: 15 min medical record review, telephone communication, care plan updates where needed, education, goals and action plan recreation/update. Provided acknowledgment and validation to patient's concerns. Monthly Minute Total including today: 15  Physical assessment, complete health history, and care plan established by Indigo Morfin MD, need for CCM determined from these assessments and data.

## 2023-12-07 ENCOUNTER — APPOINTMENT (OUTPATIENT)
Dept: CARDIAC REHAB | Facility: HOSPITAL | Age: 78
End: 2023-12-07
Attending: INTERNAL MEDICINE
Payer: MEDICARE

## 2023-12-07 PROCEDURE — 94625 PHY/QHP OP PULM RHB W/O MNTR: CPT

## 2023-12-12 ENCOUNTER — CARDPULM VISIT (OUTPATIENT)
Dept: CARDIAC REHAB | Facility: HOSPITAL | Age: 78
End: 2023-12-12
Attending: INTERNAL MEDICINE
Payer: MEDICARE

## 2023-12-12 PROCEDURE — 94625 PHY/QHP OP PULM RHB W/O MNTR: CPT

## 2023-12-14 ENCOUNTER — CARDPULM VISIT (OUTPATIENT)
Dept: CARDIAC REHAB | Facility: HOSPITAL | Age: 78
End: 2023-12-14
Attending: INTERNAL MEDICINE
Payer: MEDICARE

## 2023-12-14 ENCOUNTER — OFFICE VISIT (OUTPATIENT)
Dept: INTERNAL MEDICINE CLINIC | Facility: CLINIC | Age: 78
End: 2023-12-14

## 2023-12-14 VITALS
HEIGHT: 64 IN | BODY MASS INDEX: 29.47 KG/M2 | HEART RATE: 77 BPM | SYSTOLIC BLOOD PRESSURE: 126 MMHG | TEMPERATURE: 98 F | DIASTOLIC BLOOD PRESSURE: 68 MMHG | WEIGHT: 172.63 LBS | OXYGEN SATURATION: 93 %

## 2023-12-14 DIAGNOSIS — N18.30 CONTROLLED TYPE 2 DIABETES MELLITUS WITH STAGE 3 CHRONIC KIDNEY DISEASE, WITHOUT LONG-TERM CURRENT USE OF INSULIN (HCC): Primary | ICD-10-CM

## 2023-12-14 DIAGNOSIS — E11.59 HYPERTENSION ASSOCIATED WITH TYPE 2 DIABETES MELLITUS  (HCC): ICD-10-CM

## 2023-12-14 DIAGNOSIS — E11.69 HYPERLIPIDEMIA ASSOCIATED WITH TYPE 2 DIABETES MELLITUS  (HCC): ICD-10-CM

## 2023-12-14 DIAGNOSIS — I25.10 CORONARY ARTERY DISEASE INVOLVING NATIVE CORONARY ARTERY OF NATIVE HEART WITHOUT ANGINA PECTORIS: ICD-10-CM

## 2023-12-14 DIAGNOSIS — I15.2 HYPERTENSION ASSOCIATED WITH TYPE 2 DIABETES MELLITUS  (HCC): ICD-10-CM

## 2023-12-14 DIAGNOSIS — J44.9 COPD, SEVERE (HCC): ICD-10-CM

## 2023-12-14 DIAGNOSIS — E78.5 HYPERLIPIDEMIA ASSOCIATED WITH TYPE 2 DIABETES MELLITUS  (HCC): ICD-10-CM

## 2023-12-14 DIAGNOSIS — E11.22 CONTROLLED TYPE 2 DIABETES MELLITUS WITH STAGE 3 CHRONIC KIDNEY DISEASE, WITHOUT LONG-TERM CURRENT USE OF INSULIN (HCC): Primary | ICD-10-CM

## 2023-12-14 DIAGNOSIS — M1A.9XX1 CHRONIC TOPHACEOUS GOUT: ICD-10-CM

## 2023-12-14 LAB
CARTRIDGE LOT#: ABNORMAL NUMERIC
HEMOGLOBIN A1C: 6 % (ref 4.3–5.6)

## 2023-12-14 PROCEDURE — 1159F MED LIST DOCD IN RCRD: CPT | Performed by: INTERNAL MEDICINE

## 2023-12-14 PROCEDURE — 1160F RVW MEDS BY RX/DR IN RCRD: CPT | Performed by: INTERNAL MEDICINE

## 2023-12-14 PROCEDURE — 3074F SYST BP LT 130 MM HG: CPT | Performed by: INTERNAL MEDICINE

## 2023-12-14 PROCEDURE — 83036 HEMOGLOBIN GLYCOSYLATED A1C: CPT | Performed by: INTERNAL MEDICINE

## 2023-12-14 PROCEDURE — 3008F BODY MASS INDEX DOCD: CPT | Performed by: INTERNAL MEDICINE

## 2023-12-14 PROCEDURE — 3078F DIAST BP <80 MM HG: CPT | Performed by: INTERNAL MEDICINE

## 2023-12-14 PROCEDURE — 99214 OFFICE O/P EST MOD 30 MIN: CPT | Performed by: INTERNAL MEDICINE

## 2023-12-14 PROCEDURE — 1126F AMNT PAIN NOTED NONE PRSNT: CPT | Performed by: INTERNAL MEDICINE

## 2023-12-14 PROCEDURE — 94625 PHY/QHP OP PULM RHB W/O MNTR: CPT

## 2023-12-14 RX ORDER — METHYLPREDNISOLONE 4 MG/1
TABLET ORAL
Qty: 1 EACH | Refills: 0 | Status: SHIPPED | OUTPATIENT
Start: 2023-12-14

## 2023-12-14 RX ORDER — DOXYCYCLINE 100 MG/1
100 TABLET ORAL 2 TIMES DAILY
Qty: 14 TABLET | Refills: 0 | Status: SHIPPED | OUTPATIENT
Start: 2023-12-14

## 2023-12-14 NOTE — PROGRESS NOTES
Subjective:     Patient ID: Ryan Reynolds is a 69year old female. Patient presents today for her checkup. She is traveling to the [facility] in 2 weeks. Overall she states she is feeling well. She states she had been doing her pulmonary rehab and her dyspnea had markedly improved as well as her energy level. She had been able to walk longer now than before. She is on O2 at 2L/min and continues to ffup with her pulmnologist.  She had asked to get abx to bring with her trip just in case has acute bronchitis. Her glucose levels are in good range also just with diet alone, does take glipzide prn if glucose goes up. She had seen her cardiologist few months ago and had been told overall cardiac wise she is stable. She states her gout has not had flare up recently but she had asked for refill of medrol dospak to bring her trip just in case she does get a flare up. History/Other:   Review of Systems   Constitutional: Negative. Respiratory:  Positive for shortness of breath. Negative for cough and wheezing. Cardiovascular: Negative. Gastrointestinal: Negative. Genitourinary: Negative. Current Outpatient Medications   Medication Sig Dispense Refill    pantoprazole 20 MG Oral Tab EC Take 1 tablet (20 mg total) by mouth every morning before breakfast.      hydrALAZINE 25 MG Oral Tab TAKE 1/2 TABLET TWICE DAILY 90 tablet 1    cinacalcet 30 MG Oral Tab Take 1 tablet (30 mg total) by mouth every other day. 45 tablet 0    TRUEplus Lancets 33G Does not apply Misc 1 each by In Vitro route daily. 100 each 3    metoprolol succinate ER 25 MG Oral Tablet 24 Hr Take 1 tablet (25 mg total) by mouth daily. 90 tablet 3    Alcohol Swabs (DROPSAFE ALCOHOL PREP) 70 % Does not apply Pads Take 1 Bottle by mouth As Directed. 400 each 0    metoprolol succinate ER 25 MG Oral Tablet 24 Hr Take 1 tablet (25 mg total) by mouth daily.  14 tablet 0    AMLODIPINE 5 MG Oral Tab TAKE 1 TABLET EVERY DAY 90 tablet 1 Glucose Blood (TRUE METRIX BLOOD GLUCOSE TEST) In Vitro Strip 1 strip by In Vitro route 4 (four) times daily. 400 strip 3    febuxostat 40 MG Oral Tab Take 1 tablet (40 mg total) by mouth daily. 90 tablet 1    omega-3 fatty acids 1000 MG Oral Cap Take 1,000 mg by mouth daily. Multiple Vitamin (MULTI-VITAMIN DAILY) Oral Tab Take 1 tablet by mouth daily. Cholecalciferol (VITAMIN D) 2000 units Oral Cap Take 1 capsule (2,000 Units total) by mouth daily. predniSONE 10 MG Oral Tab Take 3 tabs po x2days, then 2 tabs po x2days, then 1 tab po x2d (Patient not taking: Reported on 9/27/2023) 12 tablet 0    colchicine 0.6 MG Oral Tab Take 1 tablet (0.6 mg total) by mouth as needed. (Patient not taking: Reported on 10/13/2023)      Yahaira Eves INHUB 500-50 MCG/ACT Inhalation Aerosol Powder, Breath Activated Inhale 1 puff into the lungs 2 (two) times daily. 3 each 1     Allergies:   Allergies   Allergen Reactions    Allopurinol HIVES, SWELLING and SHORTNESS OF BREATH    Dog Epithelium SHORTNESS OF BREATH     Hair and saliva    Dust SHORTNESS OF BREATH    Smoke SHORTNESS OF BREATH    Adhesive Tape (Rosins) RASH    Montelukast HALLUCINATION    Statins MYALGIA     Pt had developed myalgia and myopathy    Xanax [Alprazolam] RESTLESSNESS    Adhesive Tape OTHER (SEE COMMENTS)    Other OTHER (SEE COMMENTS)    Sulfa Antibiotics OTHER (SEE COMMENTS)    Ace Inhibitors Coughing    Aspirin RASH       Past Medical History:   Diagnosis Date    Age-related cataract of left eye 05/10/2018    Age-related cataract of right eye 07/28/2022    Anxiety     Asthma     Atherosclerosis of coronary artery     Breast CA ([address] 1999    lt mastectomy    Breast CA ([address] 2014    lumpectomy, right    Cancer ([address]     breast cancer    Carotid artery disease ([address] 12/05/2016    Carotid stenosis     Closed fracture of multiple ribs of left side with routine healing, subsequent encounter 10/19/2018    Congestive heart disease (HCC)     COPD (chronic obstructive pulmonary disease) (HCC)     on O2 at 2 liters    Coronary atherosclerosis     Depression     Di[facility] 07/28/2022    takes insulin when hospitalized, takes oral meds at home    Diastolic dysfunction without heart failure     Esophageal reflux     Essential hypertension     Generalized anxiety disorder     Gout     Gout     High blood pressure     High cholesterol     History of pituitary adenoma 05/10/2018    Hyperlipidemia     Major depressive disorder, single episode, moderate ([address]     Obesity       Past Surgical History:   Procedure Laterality Date    CABG      CAROTID ENDARTERECTOMY      CATARACT      COLONOSCOPY      2013    COLONOSCOPY  2013    COLONOSCOPY N/A 2/21/2023    Procedure: COLONOSCOPY;  Surgeon: Les Marie MD;  Location: [address] [place] [address] ENDOSCOPY    HERNIA SURGERY      LUMPECTOMY RIGHT  2014    MASTECTOMY LEFT Left 1999    RADIATION RIGHT  2014      Family History   Problem Relation Age of Onset    Asthma Father     Hypertension Father     Heart Disorder Father     Other (Other) Mother         thyroid surgery    Asthma Sister     Asthma Brother     Other (Other) Brother         gout    Breast Cancer Self 43        rt breast 77      Social History:   Social History     Socioeconomic History    Marital status:     Tobacco Use    Smoking status: Never     Passive exposure: Never    Smokeless tobacco: Never   Vaping Use    Vaping Use: Never used   Substance and Sexual Activity    Alcohol use: No     Comment: rarely at weddings    Drug use: No   Other Topics Concern    Reaction to local anesthetic No    Pt has a pacemaker No    Pt has a defibrillator No     Social Determinants of Health     Financial Resource Strain: Low Risk  (7/7/2023)    Financial Resource Strain     Difficulty of Paying Living Expenses: Not very hard     Med Affordability: No   Food Insecurity: No Food Insecurity (10/6/2023)    Food Insecurity     Food Insecurity: Never true   Transportation Needs: No Transportation Needs (7/7/2023)    Transportation Needs     Lack of Transportation: No   Physical Activity: Insufficiently Active (7/13/2022)    Exercise Vital Sign     Days of Exercise per Week: 1 day     Minutes of Exercise per Session: 10 min   Stress: No Stress Concern Present (10/6/2023)    Stress     Feeling of Stress : No    Social Connections   Housing Stability: Low Risk  (10/6/2023)    Housing Stability     Housing Instability: No        Objective:   Physical Exam  Constitutional:       General: She is not in acute distress. Appearance: She is well-developed. She is not ill-appearing, toxic-appearing or diaphoretic. HENT:      Head: Normocephalic and atraumatic. Right Ear: External ear normal.      Left Ear: External ear normal.      Nose: Nose normal.      Mouth/Throat:      Pharynx: No oropharyngeal exudate. Eyes:      General:         Right eye: No discharge. Left eye: No discharge. Conjunctiva/sclera: Conjunctivae normal.      Pupils: Pupils are equal, round, and reactive to light. Neck:      Vascular: No JVD. Cardiovascular:      Rate and Rhythm: Normal rate and regular rhythm. Heart sounds: Normal heart sounds. No murmur heard. Pulmonary:      Effort: Pulmonary effort is normal. No respiratory distress. Breath sounds: Normal breath sounds. No wheezing or rales. Abdominal:      General: Bowel sounds are normal. There is no distension. Palpations: Abdomen is soft. There is no mass. Tenderness: There is no abdominal tenderness. There is no guarding or rebound. Musculoskeletal:         General: No tenderness. Normal range of motion. Cervical back: Normal range of motion and neck supple. No tenderness. Right lower leg: No edema. Left lower leg: No edema. Lymphadenopathy:      Cervical: No cervical adenopathy. Skin:     General: Skin is warm and dry. Findings: No rash.    Neurological:      Mental Status: She is alert and oriented to person, place, and time. Assessment & Plan:   (E11.22,  N18.30) Controlled type 2 diabetes mellitus with stage 3 chronic kidney disease, without long-term current use of insulin (HCC)  (primary encounter diagnosis)  Plan: POC Glycohemoglobin [07224]        We did her A1c and was 6.0 so her dm remains controlled. Cpm.     (I25.10) Coronary artery disease involving native coronary artery of native heart without angina pectoris  Plan: she is asymptomatic, had seen her cardio few months ago, had stress test about 12 months ago and was negative. She is on antiplatelet med but unable to tolerate statin, zetia and had declined to try PCSK9 inh     (E11.59,  I15.2) Hypertension associated with type 2 diabetes mellitus   Plan: bp controlled with bp meds. Cpm.     (E11.69,  E78.5) Hyperlipidemia associated with type 2 diabetes mellitus   Plan: Comp Metabolic Panel (14), Lipid Panel        Check lipid panel;     (J44.9) COPD, severe (HCC)  Plan: pt had been doing pulmonary rehab and doing very well. Pt states she is now able to walk few blocks compared to one block omly before. She is on O2 supplement which she continues to use, she will be traveling to the [facility] next month and has apptment with her pulmonologist tomorrow for pulmo clearance for her travel. Pt advised to get the latest covid booster and also RSV vaccine before her trip.     (M1A.9XX1) Chronic tophaceous gout  Plan: Uric Acid        Had been stable since started by rheum on uloric; will check her uric acid. Her tophi on her fingers had been getting smaller per pt. No orders of the defined types were placed in this encounter.       Meds This Visit:  Requested Prescriptions      No prescriptions requested or ordered in this encounter       Imaging & Referrals:  None

## 2023-12-15 ENCOUNTER — TELEPHONE (OUTPATIENT)
Dept: INTERNAL MEDICINE CLINIC | Facility: CLINIC | Age: 78
End: 2023-12-15

## 2023-12-19 ENCOUNTER — MED REC SCAN ONLY (OUTPATIENT)
Dept: INTERNAL MEDICINE CLINIC | Facility: CLINIC | Age: 78
End: 2023-12-19

## 2023-12-19 ENCOUNTER — APPOINTMENT (OUTPATIENT)
Dept: CARDIAC REHAB | Facility: HOSPITAL | Age: 78
End: 2023-12-19
Attending: INTERNAL MEDICINE
Payer: MEDICARE

## 2023-12-19 PROCEDURE — 94625 PHY/QHP OP PULM RHB W/O MNTR: CPT

## 2023-12-20 RX ORDER — ISOPROPYL ALCOHOL 70 ML/100ML
1 SWAB TOPICAL AS DIRECTED
Qty: 400 EACH | Refills: 3 | Status: SHIPPED | OUTPATIENT
Start: 2023-12-20

## 2023-12-20 NOTE — TELEPHONE ENCOUNTER
Please review; no protocol  Medication pended for your review and approval.     Requested Prescriptions   Pending Prescriptions Disp Refills    DROPSAFE ALCOHOL PREP 70 % Does not apply Pads [Pharmacy Med Name: DROPSAFE ALCOHOL PREP PADS 70 % Pad]  3     Sig: USE AS DIRECTED       There is no refill protocol information for this order

## 2023-12-21 ENCOUNTER — CARDPULM VISIT (OUTPATIENT)
Dept: CARDIAC REHAB | Facility: HOSPITAL | Age: 78
End: 2023-12-21
Attending: INTERNAL MEDICINE
Payer: MEDICARE

## 2023-12-21 PROCEDURE — 94625 PHY/QHP OP PULM RHB W/O MNTR: CPT

## 2023-12-22 ENCOUNTER — APPOINTMENT (OUTPATIENT)
Dept: GENERAL RADIOLOGY | Facility: HOSPITAL | Age: 78
End: 2023-12-22
Attending: EMERGENCY MEDICINE
Payer: MEDICARE

## 2023-12-22 ENCOUNTER — HOSPITAL ENCOUNTER (OUTPATIENT)
Age: 78
Discharge: ANOTHER HOSPITAL | End: 2023-12-22
Payer: MEDICARE

## 2023-12-22 ENCOUNTER — NURSE TRIAGE (OUTPATIENT)
Dept: INTERNAL MEDICINE CLINIC | Facility: CLINIC | Age: 78
End: 2023-12-22

## 2023-12-22 ENCOUNTER — HOSPITAL ENCOUNTER (EMERGENCY)
Facility: HOSPITAL | Age: 78
Discharge: HOME | End: 2023-12-22
Attending: EMERGENCY MEDICINE
Payer: MEDICARE

## 2023-12-22 VITALS
RESPIRATION RATE: 24 BRPM | TEMPERATURE: 99 F | HEART RATE: 86 BPM | HEIGHT: 64 IN | BODY MASS INDEX: 29.37 KG/M2 | WEIGHT: 172 LBS | DIASTOLIC BLOOD PRESSURE: 61 MMHG | SYSTOLIC BLOOD PRESSURE: 157 MMHG | OXYGEN SATURATION: 97 %

## 2023-12-22 VITALS
RESPIRATION RATE: 24 BRPM | OXYGEN SATURATION: 81 % | TEMPERATURE: 99 F | DIASTOLIC BLOOD PRESSURE: 77 MMHG | SYSTOLIC BLOOD PRESSURE: 147 MMHG | HEART RATE: 96 BPM

## 2023-12-22 DIAGNOSIS — J18.9 COMMUNITY ACQUIRED PNEUMONIA, UNSPECIFIED LATERALITY: Primary | ICD-10-CM

## 2023-12-22 DIAGNOSIS — R09.02 HYPOXIA: Primary | ICD-10-CM

## 2023-12-22 DIAGNOSIS — R06.02 SHORTNESS OF BREATH: ICD-10-CM

## 2023-12-22 LAB
ALBUMIN SERPL-MCNC: 4.2 G/DL (ref 3.2–4.8)
ALBUMIN/GLOB SERPL: 1.2 {RATIO} (ref 1–2)
ALP LIVER SERPL-CCNC: 76 U/L
ALT SERPL-CCNC: 16 U/L
ANION GAP SERPL CALC-SCNC: 6 MMOL/L (ref 0–18)
AST SERPL-CCNC: 25 U/L (ref ?–34)
BASOPHILS # BLD AUTO: 0.02 X10(3) UL (ref 0–0.2)
BASOPHILS NFR BLD AUTO: 0.2 %
BILIRUB SERPL-MCNC: 0.2 MG/DL (ref 0.2–1.1)
BNP SERPL-MCNC: 137 PG/ML
BUN BLD-MCNC: 23 MG/DL (ref 9–23)
BUN/CREAT SERPL: 18.5 (ref 10–20)
CALCIUM BLD-MCNC: 10.2 MG/DL (ref 8.7–10.4)
CHLORIDE SERPL-SCNC: 98 MMOL/L (ref 98–112)
CO2 SERPL-SCNC: 32 MMOL/L (ref 21–32)
CREAT BLD-MCNC: 1.24 MG/DL
D DIMER PPP FEU-MCNC: 0.61 UG/ML FEU (ref ?–0.77)
DEPRECATED RDW RBC AUTO: 48.9 FL (ref 35.1–46.3)
EGFRCR SERPLBLD CKD-EPI 2021: 45 ML/MIN/1.73M2 (ref 60–?)
EOSINOPHIL # BLD AUTO: 0.1 X10(3) UL (ref 0–0.7)
EOSINOPHIL NFR BLD AUTO: 0.8 %
ERYTHROCYTE [DISTWIDTH] IN BLOOD BY AUTOMATED COUNT: 13.9 % (ref 11–15)
FLUAV + FLUBV RNA SPEC NAA+PROBE: NEGATIVE
FLUAV + FLUBV RNA SPEC NAA+PROBE: NEGATIVE
GLOBULIN PLAS-MCNC: 3.5 G/DL (ref 2.8–4.4)
GLUCOSE BLD-MCNC: 145 MG/DL (ref 70–99)
HCT VFR BLD AUTO: 43.8 %
HGB BLD-MCNC: 13.4 G/DL
IMM GRANULOCYTES # BLD AUTO: 0.07 X10(3) UL (ref 0–1)
IMM GRANULOCYTES NFR BLD: 0.5 %
LYMPHOCYTES # BLD AUTO: 1.22 X10(3) UL (ref 1–4)
LYMPHOCYTES NFR BLD AUTO: 9.3 %
MCH RBC QN AUTO: 29.3 PG (ref 26–34)
MCHC RBC AUTO-ENTMCNC: 30.6 G/DL (ref 31–37)
MCV RBC AUTO: 95.6 FL
MONOCYTES # BLD AUTO: 1.44 X10(3) UL (ref 0.1–1)
MONOCYTES NFR BLD AUTO: 11 %
NEUTROPHILS # BLD AUTO: 10.24 X10 (3) UL (ref 1.5–7.7)
NEUTROPHILS # BLD AUTO: 10.24 X10(3) UL (ref 1.5–7.7)
NEUTROPHILS NFR BLD AUTO: 78.2 %
OSMOLALITY SERPL CALC.SUM OF ELEC: 288 MOSM/KG (ref 275–295)
PLATELET # BLD AUTO: 217 10(3)UL (ref 150–450)
POTASSIUM SERPL-SCNC: 4.8 MMOL/L (ref 3.5–5.1)
PROT SERPL-MCNC: 7.7 G/DL (ref 5.7–8.2)
RBC # BLD AUTO: 4.58 X10(6)UL
RSV RNA SPEC NAA+PROBE: NEGATIVE
SARS-COV-2 RNA RESP QL NAA+PROBE: NOT DETECTED
SODIUM SERPL-SCNC: 136 MMOL/L (ref 136–145)
WBC # BLD AUTO: 13.1 X10(3) UL (ref 4–11)

## 2023-12-22 PROCEDURE — 0241U SARS-COV-2/FLU A AND B/RSV BY PCR (GENEXPERT): CPT

## 2023-12-22 PROCEDURE — 99285 EMERGENCY DEPT VISIT HI MDM: CPT

## 2023-12-22 PROCEDURE — 85025 COMPLETE CBC W/AUTO DIFF WBC: CPT | Performed by: EMERGENCY MEDICINE

## 2023-12-22 PROCEDURE — 0241U SARS-COV-2/FLU A AND B/RSV BY PCR (GENEXPERT): CPT | Performed by: EMERGENCY MEDICINE

## 2023-12-22 PROCEDURE — 96365 THER/PROPH/DIAG IV INF INIT: CPT

## 2023-12-22 PROCEDURE — 80053 COMPREHEN METABOLIC PANEL: CPT | Performed by: EMERGENCY MEDICINE

## 2023-12-22 PROCEDURE — 99213 OFFICE O/P EST LOW 20 MIN: CPT

## 2023-12-22 PROCEDURE — 71045 X-RAY EXAM CHEST 1 VIEW: CPT | Performed by: EMERGENCY MEDICINE

## 2023-12-22 PROCEDURE — 85379 FIBRIN DEGRADATION QUANT: CPT | Performed by: EMERGENCY MEDICINE

## 2023-12-22 PROCEDURE — 99284 EMERGENCY DEPT VISIT MOD MDM: CPT

## 2023-12-22 PROCEDURE — 83880 ASSAY OF NATRIURETIC PEPTIDE: CPT | Performed by: EMERGENCY MEDICINE

## 2023-12-22 RX ORDER — DOXYCYCLINE HYCLATE 100 MG/1
100 CAPSULE ORAL 2 TIMES DAILY
Qty: 14 CAPSULE | Refills: 0 | Status: SHIPPED | OUTPATIENT
Start: 2023-12-22 | End: 2023-12-29

## 2023-12-22 RX ORDER — BENZONATATE 100 MG/1
100 CAPSULE ORAL 3 TIMES DAILY PRN
Qty: 30 CAPSULE | Refills: 0 | Status: SHIPPED | OUTPATIENT
Start: 2023-12-22 | End: 2024-01-21

## 2023-12-22 RX ORDER — ALBUTEROL SULFATE 90 UG/1
2 AEROSOL, METERED RESPIRATORY (INHALATION) EVERY 4 HOURS PRN
Qty: 1 EACH | Refills: 0 | Status: SHIPPED | OUTPATIENT
Start: 2023-12-22 | End: 2024-01-21

## 2023-12-22 RX ORDER — IPRATROPIUM BROMIDE AND ALBUTEROL SULFATE 2.5; .5 MG/3ML; MG/3ML
3 SOLUTION RESPIRATORY (INHALATION) EVERY 6 HOURS PRN
Status: DISCONTINUED | OUTPATIENT
Start: 2023-12-22 | End: 2023-12-23

## 2023-12-22 NOTE — TELEPHONE ENCOUNTER
Patient contacted and made aware of Dr. Yfn Parker interpretation and recommendations. She states she is too weak to walk. She declined ED/IC she states \"if I die, I die, that is God's will\"; she declined calling 911 for transport or for me to call 911. She states he daughter-in-law is calling now she will ask if she will give her a ride, then disconnected the call. In the best interest of the patient I called Romulo/son [facility] Verbal Release verified)--> I made him aware of what has transpired in all details; He states he turned on the video camera at patient's home and heard her say \"if I die, I die, that is God's will\" as well. He will coordinate transportation for his mother to go to [address] [place] [address]; he was made aware ED or 911 is also an option. He verbalized understanding. No further questions or concerns at this time.     DANYEL Bravo

## 2023-12-22 NOTE — ED INITIAL ASSESSMENT (HPI)
Pt arrived via EMS from [address] [place] [address] for complaints of SOB, cough w/ phlegm. Per report pt \"in the 80s\" on RA at [address] [place] [address]. Pt on 2L at baseline. PT states she had a temperature last night of 100.1.     1 neb given en route. Pt lives alone and has caregiver.

## 2023-12-22 NOTE — TELEPHONE ENCOUNTER
Action Requested: Summary for Provider     []  Critical Lab, Recommendations Needed  [x] Need Additional Advice  []   FYI    []   Need Orders  [x] Need Medications Sent to Pharmacy  []  Other     SUMMARY: Patient is asking that message be sent to Dr. Rodger Sommer to request further recommendations and prescription for antibiotic. She states that Dr. Rodger Sommer normally prescribes antibiotics when she has symptoms of chest congestion and fevers. Allergies reviewed and pharmacy confirmed. Please advise and thank you. Patient reports that fevers, chest congestion, and feeling weak started yesterday. She denies any difficulty breathing or chest pain at this time. She states that her oxygen saturation is 94% on 2L of oxygen at this time. Patient is requesting prescription for antibiotic. She was advised that evaluation is recommended- due to no openings today, advised to go to walk-in clinic or immediate care. Patient declines and states that she cannot drive herself at this time.        Reason for call: Acute  Onset: Data Unavailable

## 2023-12-22 NOTE — TELEPHONE ENCOUNTER
Dr. Lillia Crigler also please see Nella Diaz, RN's below note    Patient returning call stating that she wants [facility] to come to her home to take her to [facility] as 911 would not. Advised patient, this nurse is unsure if this transport company come to private homes, but stated could call and ask  Patient stated \"you don't understand what I am saying\" and the call ended. Attempted to call patient back-went straight to voicemail  Left voicemail to call back our office. Office phone number provided with telephone hours. ,

## 2023-12-22 NOTE — TELEPHONE ENCOUNTER
Pt has severe COPD/asthma and with pt having fevers, coughing, weakness, she may have pneumonia, flu, covid and will need to be evaluated and tested before appropriate treatment can be given. Antibiotics doesn't work for covid nor for flu if that is what she has (which are treated with antiviral med instead) so really need to go to either IC or to ER to be treated appropriately. Given her severe copd, she can get worse fast if  the right treatment is not given .

## 2023-12-22 NOTE — TELEPHONE ENCOUNTER
RN called patient's son on INDIO. Patient's date of birth and full name both confirmed. He says they are currently at the [facility] immediate care.

## 2023-12-22 NOTE — ED INITIAL ASSESSMENT (HPI)
Pt presents to the IC with c/o a productive cough with chest congestion and low oxygen levels. Pt reports a pulse ox of 77% at home. Hx of COPD and should be wearing home oxygen. Per her family with her, she was not compliant today. Pt presents with a pulse ox at 80% during initial triage, 2L NC placed with an increase to 84%. Finally oxygen was increased to 4L NC and oxygen increased to 94%. Pt c/o chills.

## 2023-12-25 ENCOUNTER — MOBILE ENCOUNTER (OUTPATIENT)
Dept: INTERNAL MEDICINE CLINIC | Facility: CLINIC | Age: 78
End: 2023-12-25

## 2023-12-26 ENCOUNTER — HOSPITAL ENCOUNTER (INPATIENT)
Facility: HOSPITAL | Age: 78
LOS: 3 days | Discharge: HOME HEALTH | End: 2023-12-29
Attending: EMERGENCY MEDICINE | Admitting: STUDENT IN AN ORGANIZED HEALTH CARE EDUCATION/TRAINING PROGRAM
Payer: MEDICARE

## 2023-12-26 ENCOUNTER — APPOINTMENT (OUTPATIENT)
Dept: CARDIAC REHAB | Facility: HOSPITAL | Age: 78
End: 2023-12-26
Attending: INTERNAL MEDICINE
Payer: MEDICARE

## 2023-12-26 DIAGNOSIS — J44.1 COPD EXACERBATION (HCC): Primary | ICD-10-CM

## 2023-12-26 LAB
ADENOVIRUS PCR:: NOT DETECTED
ALBUMIN SERPL-MCNC: 3.9 G/DL (ref 3.2–4.8)
ALBUMIN/GLOB SERPL: 1.1 {RATIO} (ref 1–2)
ALP LIVER SERPL-CCNC: 84 U/L
ALT SERPL-CCNC: 35 U/L
ANION GAP SERPL CALC-SCNC: 2 MMOL/L (ref 0–18)
AST SERPL-CCNC: 33 U/L (ref ?–34)
ATRIAL RATE: 80 BPM
B PARAPERT DNA SPEC QL NAA+PROBE: NOT DETECTED
B PERT DNA SPEC QL NAA+PROBE: NOT DETECTED
BASOPHILS # BLD AUTO: 0.02 X10(3) UL (ref 0–0.2)
BASOPHILS NFR BLD AUTO: 0.2 %
BILIRUB SERPL-MCNC: 0.3 MG/DL (ref 0.2–1.1)
BNP SERPL-MCNC: 72 PG/ML
BUN BLD-MCNC: 21 MG/DL (ref 9–23)
BUN/CREAT SERPL: 17.6 (ref 10–20)
C PNEUM DNA SPEC QL NAA+PROBE: NOT DETECTED
CALCIUM BLD-MCNC: 10.7 MG/DL (ref 8.7–10.4)
CHLORIDE SERPL-SCNC: 100 MMOL/L (ref 98–112)
CHOLEST SERPL-MCNC: 196 MG/DL (ref ?–200)
CO2 SERPL-SCNC: 33 MMOL/L (ref 21–32)
CORONAVIRUS 229E PCR:: NOT DETECTED
CORONAVIRUS HKU1 PCR:: NOT DETECTED
CORONAVIRUS NL63 PCR:: NOT DETECTED
CORONAVIRUS OC43 PCR:: NOT DETECTED
CREAT BLD-MCNC: 1.19 MG/DL
DEPRECATED RDW RBC AUTO: 47.5 FL (ref 35.1–46.3)
EGFRCR SERPLBLD CKD-EPI 2021: 47 ML/MIN/1.73M2 (ref 60–?)
EOSINOPHIL # BLD AUTO: 0.18 X10(3) UL (ref 0–0.7)
EOSINOPHIL NFR BLD AUTO: 2.2 %
ERYTHROCYTE [DISTWIDTH] IN BLOOD BY AUTOMATED COUNT: 13.3 % (ref 11–15)
FLUAV RNA SPEC QL NAA+PROBE: NOT DETECTED
FLUBV RNA SPEC QL NAA+PROBE: NOT DETECTED
GLOBULIN PLAS-MCNC: 3.4 G/DL (ref 2.8–4.4)
GLUCOSE BLD-MCNC: 132 MG/DL (ref 70–99)
GLUCOSE BLDC GLUCOMTR-MCNC: 131 MG/DL (ref 70–99)
GLUCOSE BLDC GLUCOMTR-MCNC: 156 MG/DL (ref 70–99)
GLUCOSE BLDC GLUCOMTR-MCNC: 183 MG/DL (ref 70–99)
GLUCOSE BLDC GLUCOMTR-MCNC: 187 MG/DL (ref 70–99)
GLUCOSE BLDC GLUCOMTR-MCNC: 194 MG/DL (ref 70–99)
HCT VFR BLD AUTO: 41.6 %
HDLC SERPL-MCNC: 43 MG/DL (ref 40–59)
HGB BLD-MCNC: 12.4 G/DL
IMM GRANULOCYTES # BLD AUTO: 0.04 X10(3) UL (ref 0–1)
IMM GRANULOCYTES NFR BLD: 0.5 %
LDLC SERPL CALC-MCNC: 139 MG/DL (ref ?–100)
LYMPHOCYTES # BLD AUTO: 1.21 X10(3) UL (ref 1–4)
LYMPHOCYTES NFR BLD AUTO: 14.5 %
MCH RBC QN AUTO: 29.1 PG (ref 26–34)
MCHC RBC AUTO-ENTMCNC: 29.8 G/DL (ref 31–37)
MCV RBC AUTO: 97.7 FL
METAPNEUMOVIRUS PCR:: NOT DETECTED
MONOCYTES # BLD AUTO: 1.16 X10(3) UL (ref 0.1–1)
MONOCYTES NFR BLD AUTO: 13.9 %
MYCOPLASMA PNEUMONIA PCR:: NOT DETECTED
NEUTROPHILS # BLD AUTO: 5.74 X10 (3) UL (ref 1.5–7.7)
NEUTROPHILS # BLD AUTO: 5.74 X10(3) UL (ref 1.5–7.7)
NEUTROPHILS NFR BLD AUTO: 68.7 %
NONHDLC SERPL-MCNC: 153 MG/DL (ref ?–130)
OSMOLALITY SERPL CALC.SUM OF ELEC: 285 MOSM/KG (ref 275–295)
P AXIS: 58 DEGREES
P-R INTERVAL: 172 MS
PARAINFLUENZA 1 PCR:: NOT DETECTED
PARAINFLUENZA 2 PCR:: NOT DETECTED
PARAINFLUENZA 3 PCR:: NOT DETECTED
PARAINFLUENZA 4 PCR:: NOT DETECTED
PLATELET # BLD AUTO: 220 10(3)UL (ref 150–450)
POTASSIUM SERPL-SCNC: 4.7 MMOL/L (ref 3.5–5.1)
PROCALCITONIN SERPL-MCNC: 0.08 NG/ML (ref ?–0.05)
PROT SERPL-MCNC: 7.3 G/DL (ref 5.7–8.2)
Q-T INTERVAL: 350 MS
QRS DURATION: 80 MS
QTC CALCULATION (BEZET): 403 MS
R AXIS: 40 DEGREES
RBC # BLD AUTO: 4.26 X10(6)UL
RHINOVIRUS/ENTERO PCR:: NOT DETECTED
RSV RNA SPEC QL NAA+PROBE: NOT DETECTED
SARS-COV-2 RNA NPH QL NAA+NON-PROBE: NOT DETECTED
SODIUM SERPL-SCNC: 135 MMOL/L (ref 136–145)
T AXIS: 71 DEGREES
TRIGL SERPL-MCNC: 79 MG/DL (ref 30–149)
TROPONIN I SERPL HS-MCNC: 48 NG/L
VENTRICULAR RATE: 80 BPM
VLDLC SERPL CALC-MCNC: 14 MG/DL (ref 0–30)
WBC # BLD AUTO: 8.4 X10(3) UL (ref 4–11)

## 2023-12-26 PROCEDURE — 99222 1ST HOSP IP/OBS MODERATE 55: CPT | Performed by: STUDENT IN AN ORGANIZED HEALTH CARE EDUCATION/TRAINING PROGRAM

## 2023-12-26 RX ORDER — HEPARIN SODIUM 5000 [USP'U]/ML
5000 INJECTION, SOLUTION INTRAVENOUS; SUBCUTANEOUS EVERY 8 HOURS SCHEDULED
Status: DISCONTINUED | OUTPATIENT
Start: 2023-12-26 | End: 2023-12-29

## 2023-12-26 RX ORDER — FEBUXOSTAT 40 MG/1
40 TABLET, FILM COATED ORAL DAILY
Status: DISCONTINUED | OUTPATIENT
Start: 2023-12-26 | End: 2023-12-29

## 2023-12-26 RX ORDER — PREDNISONE 20 MG/1
40 TABLET ORAL
Status: DISCONTINUED | OUTPATIENT
Start: 2023-12-26 | End: 2023-12-27

## 2023-12-26 RX ORDER — NICOTINE POLACRILEX 4 MG
15 LOZENGE BUCCAL
Status: DISCONTINUED | OUTPATIENT
Start: 2023-12-26 | End: 2023-12-29

## 2023-12-26 RX ORDER — BENZONATATE 100 MG/1
100 CAPSULE ORAL 3 TIMES DAILY
Status: DISPENSED | OUTPATIENT
Start: 2023-12-26 | End: 2023-12-28

## 2023-12-26 RX ORDER — METOPROLOL SUCCINATE 25 MG/1
25 TABLET, EXTENDED RELEASE ORAL DAILY
Status: DISCONTINUED | OUTPATIENT
Start: 2023-12-26 | End: 2023-12-29

## 2023-12-26 RX ORDER — NICOTINE POLACRILEX 4 MG
30 LOZENGE BUCCAL
Status: DISCONTINUED | OUTPATIENT
Start: 2023-12-26 | End: 2023-12-29

## 2023-12-26 RX ORDER — ALBUTEROL SULFATE 2.5 MG/3ML
5 SOLUTION RESPIRATORY (INHALATION) CONTINUOUS
Status: DISCONTINUED | OUTPATIENT
Start: 2023-12-26 | End: 2023-12-28 | Stop reason: ALTCHOICE

## 2023-12-26 RX ORDER — IPRATROPIUM BROMIDE AND ALBUTEROL SULFATE 2.5; .5 MG/3ML; MG/3ML
3 SOLUTION RESPIRATORY (INHALATION)
Status: DISCONTINUED | OUTPATIENT
Start: 2023-12-26 | End: 2023-12-27

## 2023-12-26 RX ORDER — AMLODIPINE BESYLATE 5 MG/1
5 TABLET ORAL DAILY
Status: DISCONTINUED | OUTPATIENT
Start: 2023-12-26 | End: 2023-12-29

## 2023-12-26 RX ORDER — IPRATROPIUM BROMIDE AND ALBUTEROL SULFATE 2.5; .5 MG/3ML; MG/3ML
3 SOLUTION RESPIRATORY (INHALATION) ONCE
Status: COMPLETED | OUTPATIENT
Start: 2023-12-26 | End: 2023-12-26

## 2023-12-26 RX ORDER — PANTOPRAZOLE SODIUM 20 MG/1
20 TABLET, DELAYED RELEASE ORAL
Status: DISCONTINUED | OUTPATIENT
Start: 2023-12-26 | End: 2023-12-29

## 2023-12-26 RX ORDER — HYDRALAZINE HYDROCHLORIDE 25 MG/1
25 TABLET, FILM COATED ORAL 2 TIMES DAILY
Status: DISCONTINUED | OUTPATIENT
Start: 2023-12-26 | End: 2023-12-27

## 2023-12-26 RX ORDER — DEXTROSE MONOHYDRATE 25 G/50ML
50 INJECTION, SOLUTION INTRAVENOUS
Status: DISCONTINUED | OUTPATIENT
Start: 2023-12-26 | End: 2023-12-29

## 2023-12-26 NOTE — ED INITIAL ASSESSMENT (HPI)
Seen at [address] [place] [address] ED 12/22/2023 for pneumonia, currently on doxycycline, believes she may have allergic reaction to the antibiotic \"feeling weak, not getting better, couldn't walk because feet are swollen\"    Wears 2L NC intermittently at baseline, \"now I have to wear it all the time because I couldn't breathe\", +low grade fevers

## 2023-12-26 NOTE — PROGRESS NOTES
Patient paged me for shortness of breath and wheezing. She was diagnosed with left lower lobe pneumonia and was started on doxycycline 2 days ago. She noticed increased body swelling, difficulty breathing and insomnia. She thinks that she has an allergic reaction. Instructed to call 911 to get her to the nearest hospital.  Patient understands and agreeable to plan.

## 2023-12-26 NOTE — ED QUICK NOTES
Orders for admission, patient is aware of plan and ready to go upstairs. Any questions, please call ED RN nicky at extension [phone].      Patient Covid vaccination status: Fully vaccinated     COVID Test Ordered in ED: $$$$Respiratory Flu Expanded Panel + [id number]-68$$$$    COVID Suspicion at Admission: N/A    Running Infusions:    albuterol      - azithromycin running     Mental Status/LOC at time of transport: A&O x 3    Other pertinent information: Pt states that she was just here a few days ago  CIWA score: N/A   NIH score:  N/A

## 2023-12-26 NOTE — PLAN OF CARE
Pt admitted under observation at the time, pt believes they are having an allergic reaction to Diclofanec, BLE edema/swelling, dizziness, fatigue, chills. PT a&o X4, uses walker baseline, O2 2L/min at home PRN and now using continuously. L arm precautions, HX of L mastectomy. Rounded hourly, vitals stable, fall precautions in place. Problem: Patient Centered Care  Goal: Patient preferences are identified and integrated in the patient's plan of care  Description: Interventions:  - What would you like us to know as we care for you?  From home alone   - Provide timely, complete, and accurate information to patient/family  - Incorporate patient and family knowledge, values, beliefs, and cultural backgrounds into the planning and delivery of care  - Encourage patient/family to participate in care and decision-making at the level they choose  - Honor patient and family perspectives and choices  Outcome: Progressing     Problem: Diabetes/Glucose Control  Goal: Glucose maintained within prescribed range  Description: INTERVENTIONS:  - Monitor Blood Glucose as ordered  - Assess for signs and symptoms of hyperglycemia and hypoglycemia  - Administer ordered medications to maintain glucose within target range  - Assess barriers to adequate nutritional intake and initiate nutrition consult as needed  - Instruct patient on self management of diabetes  Outcome: Progressing     Problem: Patient/Family Goals  Goal: Patient/Family Long Term Goal  Description: Patient's Long Term Goal: free from O2 continuously    Interventions:  - follow poc & med regimen  - See additional Care Plan goals for specific interventions  Outcome: Progressing  Goal: Patient/Family Short Term Goal  Description: Patient's Short Term Goal: no swelling, better w/ ambulation in BLE    Interventions:   - follow poc  - See additional Care Plan goals for specific interventions  Outcome: Progressing

## 2023-12-27 LAB
ANION GAP SERPL CALC-SCNC: <0 MMOL/L (ref 0–18)
BASOPHILS # BLD AUTO: 0.02 X10(3) UL (ref 0–0.2)
BASOPHILS NFR BLD AUTO: 0.3 %
BILIRUB UR QL: NEGATIVE
BUN BLD-MCNC: 28 MG/DL (ref 9–23)
BUN/CREAT SERPL: 21.9 (ref 10–20)
CALCIUM BLD-MCNC: 10.4 MG/DL (ref 8.7–10.4)
CHLORIDE SERPL-SCNC: 102 MMOL/L (ref 98–112)
CLARITY UR: CLEAR
CO2 SERPL-SCNC: 35 MMOL/L (ref 21–32)
COLOR UR: COLORLESS
CREAT BLD-MCNC: 1.28 MG/DL
DEPRECATED RDW RBC AUTO: 45.5 FL (ref 35.1–46.3)
EGFRCR SERPLBLD CKD-EPI 2021: 43 ML/MIN/1.73M2 (ref 60–?)
EOSINOPHIL # BLD AUTO: 0.05 X10(3) UL (ref 0–0.7)
EOSINOPHIL NFR BLD AUTO: 0.6 %
ERYTHROCYTE [DISTWIDTH] IN BLOOD BY AUTOMATED COUNT: 13 % (ref 11–15)
GLUCOSE BLD-MCNC: 101 MG/DL (ref 70–99)
GLUCOSE BLDC GLUCOMTR-MCNC: 195 MG/DL (ref 70–99)
GLUCOSE BLDC GLUCOMTR-MCNC: 195 MG/DL (ref 70–99)
GLUCOSE BLDC GLUCOMTR-MCNC: 224 MG/DL (ref 70–99)
GLUCOSE BLDC GLUCOMTR-MCNC: 98 MG/DL (ref 70–99)
GLUCOSE UR-MCNC: NORMAL MG/DL
HCT VFR BLD AUTO: 37.8 %
HGB BLD-MCNC: 11.6 G/DL
HGB UR QL STRIP.AUTO: NEGATIVE
IMM GRANULOCYTES # BLD AUTO: 0.04 X10(3) UL (ref 0–1)
IMM GRANULOCYTES NFR BLD: 0.5 %
KETONES UR-MCNC: NEGATIVE MG/DL
LEUKOCYTE ESTERASE UR QL STRIP.AUTO: NEGATIVE
LYMPHOCYTES # BLD AUTO: 1.08 X10(3) UL (ref 1–4)
LYMPHOCYTES NFR BLD AUTO: 13.5 %
MCH RBC QN AUTO: 29.1 PG (ref 26–34)
MCHC RBC AUTO-ENTMCNC: 30.7 G/DL (ref 31–37)
MCV RBC AUTO: 94.7 FL
MONOCYTES # BLD AUTO: 1.13 X10(3) UL (ref 0.1–1)
MONOCYTES NFR BLD AUTO: 14.2 %
NEUTROPHILS # BLD AUTO: 5.66 X10 (3) UL (ref 1.5–7.7)
NEUTROPHILS # BLD AUTO: 5.66 X10(3) UL (ref 1.5–7.7)
NEUTROPHILS NFR BLD AUTO: 70.9 %
NITRITE UR QL STRIP.AUTO: NEGATIVE
OSMOLALITY SERPL CALC.SUM OF ELEC: 288 MOSM/KG (ref 275–295)
PH UR: 5 [PH] (ref 5–8)
PLATELET # BLD AUTO: 209 10(3)UL (ref 150–450)
POTASSIUM SERPL-SCNC: 4.6 MMOL/L (ref 3.5–5.1)
PROT UR-MCNC: NEGATIVE MG/DL
RBC # BLD AUTO: 3.99 X10(6)UL
SODIUM SERPL-SCNC: 136 MMOL/L (ref 136–145)
SP GR UR STRIP: 1.01 (ref 1–1.03)
UROBILINOGEN UR STRIP-ACNC: NORMAL
WBC # BLD AUTO: 8 X10(3) UL (ref 4–11)

## 2023-12-27 PROCEDURE — 99233 SBSQ HOSP IP/OBS HIGH 50: CPT | Performed by: HOSPITALIST

## 2023-12-27 RX ORDER — PREDNISONE 20 MG/1
40 TABLET ORAL
Status: DISCONTINUED | OUTPATIENT
Start: 2023-12-28 | End: 2023-12-29

## 2023-12-27 RX ORDER — IPRATROPIUM BROMIDE AND ALBUTEROL SULFATE 2.5; .5 MG/3ML; MG/3ML
3 SOLUTION RESPIRATORY (INHALATION)
Status: DISCONTINUED | OUTPATIENT
Start: 2023-12-28 | End: 2023-12-29

## 2023-12-27 RX ORDER — METHYLPREDNISOLONE SODIUM SUCCINATE 125 MG/2ML
60 INJECTION, POWDER, LYOPHILIZED, FOR SOLUTION INTRAMUSCULAR; INTRAVENOUS EVERY 8 HOURS
Status: DISCONTINUED | OUTPATIENT
Start: 2023-12-27 | End: 2023-12-27

## 2023-12-27 RX ORDER — HYDRALAZINE HYDROCHLORIDE 25 MG/1
12.5 TABLET, FILM COATED ORAL 2 TIMES DAILY
Status: DISCONTINUED | OUTPATIENT
Start: 2023-12-27 | End: 2023-12-29

## 2023-12-27 NOTE — PLAN OF CARE
Problem: Patient Centered Care  Goal: Patient preferences are identified and integrated in the patient's plan of care  Description: Interventions:  - What would you like us to know as we care for you? \"My skin starts to itch from the stickers for the heart monitor. \"  - Provide timely, complete, and accurate information to patient/family  - Incorporate patient and family knowledge, values, beliefs, and cultural backgrounds into the planning and delivery of care  - Encourage patient/family to participate in care and decision-making at the level they choose  - Honor patient and family perspectives and choices  Outcome: Progressing     Problem: Diabetes/Glucose Control  Goal: Glucose maintained within prescribed range  Description: INTERVENTIONS:  - Monitor Blood Glucose as ordered  - Assess for signs and symptoms of hyperglycemia and hypoglycemia  - Administer ordered medications to maintain glucose within target range  - Assess barriers to adequate nutritional intake and initiate nutrition consult as needed  - Instruct patient on self management of diabetes  Outcome: Progressing     Problem: Patient/Family Goals  Goal: Patient/Family Long Term Goal  Description: Patient's Long Term Goal:     Interventions:  -   - See additional Care Plan goals for specific interventions  Outcome: Progressing  Goal: Patient/Family Short Term Goal  Description: Patient's Short Term Goal:     Interventions:   -   - See additional Care Plan goals for specific interventions  Outcome: Progressing

## 2023-12-27 NOTE — CM/SW NOTE
12/27/23 1300   CM/SW Referral Data   Referral Source Social Work (self-referral)   Reason for Referral Discharge planning   Informant Patient;Clinical Staff Member;EMR   Medical Hx   Does patient have an established PCP? Yes  (Dr. John Calderon)   Patient Info   Patient's Current Mental Status at Time of Assessment Alert;Oriented   Patient's Home Environment Independent Living   Number of Levels in Home 1   Patient lives with Alone   Patient Status Prior to Admission   Independent with ADLs and Mobility No   Pt. requires assistance with Housework;Driving;Meals   Services in place prior to admission DME/Supplies at home;retirement [address]   DME Provider/Supplier [facility]   Type of DME/Supplies Kaylen Hu 6728 Provider Private Duty  ([address])   [facility] days per week 3   Discharge Needs   Anticipated D/C needs Home health care;Caregiver services     SW received MDO for a home health evaluation. Per EMR review pt current with [facility] for home Oxygen therapy. SW spoke to pt at bedside. Pt lives in [facility] building. Pt describes it as low-income housing but states she does not have rental assistance. Pt confirms she is current with HME for home Oxygen. Her baseline is 2L O2 PRN. Pt states she is unhappy with the OOP cost of Oxygen per month. SW explained due to her HMO plan DME will typically have an OOP cost through any supplier. Pt requests a home concentrator- JOHN PAUL consulted with Nikolas Friedman from [address] [place] [address] who confirms pt is current and had requested an Inogen portable O2 concentrator on 12/17 for travel. HME unable to order Inogen while pt is admitted and needs to do so through her PCP. Pt states she is current with [address]. Pt states caregivers come 3x a week for help with house keeping. Pt has Meals on Wheels delivered daily as she can no longer cook.   JOHN PAUL faxed referral to [facility] as SW could not find them on [facility]. JOHN PAUL called [facility] to confirm if pt was current with [facility] or [facility] care. [facility] to contact JOHN PAUL after reviewing pt information. PT/OT recommending [facility] with transition to the pulmonary rehab clinic.  Jamie Diaz attached therapy recommendations to referral and faxed it to [facility] for review. PLAN: DC home w/home Oxyegn and TBD pending PT/OT evaluations    / to remain available for support and/or discharge planning.      Izabela Bragg MSW, [address]

## 2023-12-27 NOTE — PHYSICAL THERAPY NOTE
PHYSICAL THERAPY EVALUATION - INPATIENT     Room Number: 523/523-A  Evaluation Date: 12/27/2023  Type of Evaluation: Initial   Physician Order: PT Eval and Treat    Presenting Problem: fever, weakness, cough, PNA and COPD exacerbation  Co-Morbidities : glaucoma, PAD, CAD, DM, gout, left mastectomy/breast CA; CKD  Reason for Therapy: Mobility Dysfunction and Discharge Planning    PHYSICAL THERAPY ASSESSMENT     Patient is a 69year old female admitted 12/26/2023 for fever, weakness, cough, found to have PNA and COPD exacerbation, currently on 2-3L at all times, pt reports at baseline only uses at night. Patient's current functional deficits include activity tolerance and SpO2, bed mobility, transfers, gait distance, which are below the patient's pre-admission status. Patient reports living in a 4th floor ILF apt and uses the elevator, ramp to enter building. The patient's Approx Degree of Impairment: 46.58% has been calculated based on documentation in the [facility] '6 clicks' Inpatient Basic Mobility Short Form. Research supports that patients with this level of impairment may benefit from home with HHPT and this is the recommendation with intermittent supervision, she has a homemaker 3x/wk. SHOSHANA Krishnan approves participation in therapy session. Patient presents sitting upright in bed and agreeable to therapy session, reports she is feeling a bit better since admit, still on 3L O2. She has SpO2 at rest 93% and with activity including walking 50' with RW and CGA and standing tasks SpO2 drops to 79% and pt feels SOB, needs 4 min to recover. She performs bed mobility with SPV, transfers with Rw SPV and gait with CGA and assist to manage O2 lines. She is able to stand for 2 min at a time and work on breathing and improving SpO2. She ambulates 50'x3 total with RW and improves SpO2 to 83% with cues for PLB, consistently needs help to manage Oxygen lines.  She is up in chair with all needs in reach and alarm set, RN aware. Patient will benefit from continued IP PT services to address these deficits in preparation for discharge. DISCHARGE RECOMMENDATIONS  PT Discharge Recommendations: 24 hour care/supervision;Home with home health PT (progress to return to pulmonary rehab)    PLAN  PT Treatment Plan: Endurance; Energy conservation;Patient education;Gait training;Transfer training;Balance training  Rehab Potential : Good  Frequency (Obs): 3-5x/week       PHYSICAL THERAPY MEDICAL/SOCIAL HISTORY     History related to current admission: Frank Hudson is a 69year old female with Pmhx of Asthma, Breast Cancer, CAD, HTN, HLD, DM, GERD, COPD on home 2L o2, presents with weakness, cough. She was recently seen at [facility] ED 12/22/2023 for pneumonia, discharged on doxycycline, took 3-4 days of it but states she did not tolerate it, and that it caused LE swelling. At baseline wears continuous 2L NC for COPD, and had been getting more dyspnea on exertion, nonproductive cough, chills, and fever 101F today. No travel, no known sick contacts. Up to date on her vaccines. In the ED, CXR showed no acute pulmonary process. Patient treated like COPD exacerbation, given duoneb and albuterol neb treatment, along with azithromycin. Problem List  Principal Problem:    COPD exacerbation ([facility] [address]      HOME SITUATION  Home Situation  Type of Home: Independent living facility  Home Layout: One level;Elevator  Lives With: Alone;Caregiver part-time (CG 3x/wk for cleaning, homemaking)  Drives: Yes  Patient Owned Equipment: Other (Comment) (has a small square shopping style cart that she uses to transport her O2 and purse)  Patient Regularly Uses: Home O2     Prior Level of [place]: Patient lives in an [address] senior apt, independent with mobility, self cares and moving around in the ILF with using elevator to go to activities at the facility. She has help with homemaking and meal prep.  She goes to pulmonary rehab 3x/wk and loves it.    SUBJECTIVE  \"I just want to get better and get back home. \"    PHYSICAL THERAPY EXAMINATION     OBJECTIVE  Precautions: Limb alert - left;Bed/chair alarm  Fall Risk: Standard fall risk    WEIGHT BEARING RESTRICTION  Weight Bearing Restriction: None                PAIN ASSESSMENT  Ratin  Location: no complaints of pain  Management Techniques: Activity promotion    COGNITION  Overall Cognitive Status:  WFL - within functional limits and pt with poor medical literacy and needs education and reinforcement of managing SpO2    RANGE OF MOTION AND STRENGTH ASSESSMENT  Upper extremity ROM and strength are within functional limits   Lower extremity ROM is within functional limits   Lower extremity strength is within functional limits     BALANCE  Static Sitting: Good  Dynamic Sitting: Fair +  Static Standing: Fair -  Dynamic Standing: Poor +    ACTIVITY TOLERANCE  Pulse: 88  Heart Rate Source: Monitor     BP: 134/64  BP Location: Right arm  BP Method: Automatic  Patient Position: Semi-Ramirez    O2 WALK  Oxygen Therapy  SPO2% on Oxygen at Rest: 93  At rest oxygen flow (liters per minute): 3  SPO2% Ambulation on Oxygen: 79 (takes 4 min seated rest break to recover to 90%)  Ambulation oxygen flow (liters per minute): 3    AM-PAC '6-Clicks' INPATIENT SHORT FORM - BASIC MOBILITY  How much difficulty does the patient currently have. .. Patient Difficulty: Turning over in bed (including adjusting bedclothes, sheets and blankets)?: A Little   Patient Difficulty: Sitting down on and standing up from a chair with arms (e.g., wheelchair, bedside commode, etc.): A Little   Patient Difficulty: Moving from lying on back to sitting on the side of the bed?: A Little   How much help from another person does the patient currently need. ..    Help from Another: Moving to and from a bed to a chair (including a wheelchair)?: A Little   Help from Another: Need to walk in hospital room?: A Little   Help from Another: Climbing 3-5 steps with a railing?: A Little     AM-PAC Score:  Raw Score: 18   Approx Degree of Impairment: 46.58%   Standardized Score (AM-PAC Scale): 43.63   CMS Modifier (G-Code): CK    FUNCTIONAL ABILITY STATUS  Functional Mobility/Gait Assessment  Gait Assistance: Supervision (cues for PLB during gait, energy conservation, use of RW)  Distance (ft): 50'x3  Assistive Device: Rolling walker; Other (Comment) (3L O2)  Pattern: Within Functional Limits    Bed Mobility: supervision, reports she needs to sit a mintue before getting up from bed    Transfers: supervision  using RW    Exercise/Education Provided:  Bed mobility  Energy conservation  Functional activity tolerated  Gait training  Posture  Strengthening  Transfer training    Patient End of Session: Up in chair;Needs met;RN aware of session/findings;Call light within reach; All patient questions and concerns addressed; Alarm set; Discussed recommendations with /    CURRENT GOALS    Goals to be met by: 1/8/24  Patient Goal Patient's self-stated goal is: to go home   Goal #1 Patient is able to demonstrate supine - sit EOB @ level: independent     Goal #1   Current Status    Goal #2 Patient is able to demonstrate transfers Sit to/from Stand at assistance level: independent with walker - rolling     Goal #2  Current Status    Goal #3 Patient is able to ambulate 100 feet with assist device: walker - rolling at assistance level: modified independent and maintain SpO2 of 90% or better   Goal #3   Current Status    Goal #4 Patient will negotiate transfers bed to/from chair with RW and mod I   Goal #4   Current Status    Goal #5 Patient to demonstrate independence with home activity/exercise instructions provided to patient in preparation for discharge.    Goal #5   Current Status    Goal #6    Goal #6  Current Status      Patient Evaluation Complexity Level:  History High - 3 or more personal factors and/or co-morbidities   Examination of body systems Moderate - addressing a total of 3 or more elements   Clinical Presentation Moderate - Evolving   Clinical Decision Making Low Complexity       Gait Trainin minutes  Therapeutic Activity: 13 minutes

## 2023-12-27 NOTE — PLAN OF CARE
Yarely, resting ok overnight. Patient encouraged to ambulate with staff today. She is nervous because of \"leg swelling. \" This RN placed scd's overnight. Problem: Patient Centered Care  Goal: Patient preferences are identified and integrated in the patient's plan of care  Description: Interventions:  - What would you like us to know as we care for you? \"My skin starts to itch from the stickers for the heart monitor. \"  - Provide timely, complete, and accurate information to patient/family  - Incorporate patient and family knowledge, values, beliefs, and cultural backgrounds into the planning and delivery of care  - Encourage patient/family to participate in care and decision-making at the level they choose  - Honor patient and family perspectives and choices  Outcome: Progressing     Problem: Diabetes/Glucose Control  Goal: Glucose maintained within prescribed range  Description: INTERVENTIONS:  - Monitor Blood Glucose as ordered  - Assess for signs and symptoms of hyperglycemia and hypoglycemia  - Administer ordered medications to maintain glucose within target range  - Assess barriers to adequate nutritional intake and initiate nutrition consult as needed  - Instruct patient on self management of diabetes  Outcome: Progressing     Problem: Patient/Family Goals  Goal: Patient/Family Long Term Goal  Description: Patient's Long Term Goal:     Interventions:  -   - See additional Care Plan goals for specific interventions  Outcome: Progressing  Goal: Patient/Family Short Term Goal  Description: Patient's Short Term Goal:     Interventions:   -   - See additional Care Plan goals for specific interventions  Outcome: Progressing

## 2023-12-28 LAB
ANION GAP SERPL CALC-SCNC: 1 MMOL/L (ref 0–18)
BASOPHILS # BLD AUTO: 0.03 X10(3) UL (ref 0–0.2)
BASOPHILS NFR BLD AUTO: 0.3 %
BUN BLD-MCNC: 25 MG/DL (ref 9–23)
BUN/CREAT SERPL: 22.5 (ref 10–20)
CALCIUM BLD-MCNC: 10.4 MG/DL (ref 8.7–10.4)
CHLORIDE SERPL-SCNC: 102 MMOL/L (ref 98–112)
CO2 SERPL-SCNC: 35 MMOL/L (ref 21–32)
CREAT BLD-MCNC: 1.11 MG/DL
DEPRECATED RDW RBC AUTO: 46.4 FL (ref 35.1–46.3)
EGFRCR SERPLBLD CKD-EPI 2021: 51 ML/MIN/1.73M2 (ref 60–?)
EOSINOPHIL # BLD AUTO: 0.05 X10(3) UL (ref 0–0.7)
EOSINOPHIL NFR BLD AUTO: 0.5 %
ERYTHROCYTE [DISTWIDTH] IN BLOOD BY AUTOMATED COUNT: 13.2 % (ref 11–15)
GLUCOSE BLD-MCNC: 105 MG/DL (ref 70–99)
GLUCOSE BLDC GLUCOMTR-MCNC: 128 MG/DL (ref 70–99)
GLUCOSE BLDC GLUCOMTR-MCNC: 129 MG/DL (ref 70–99)
GLUCOSE BLDC GLUCOMTR-MCNC: 139 MG/DL (ref 70–99)
GLUCOSE BLDC GLUCOMTR-MCNC: 168 MG/DL (ref 70–99)
HCT VFR BLD AUTO: 40.5 %
HGB BLD-MCNC: 12.2 G/DL
IMM GRANULOCYTES # BLD AUTO: 0.08 X10(3) UL (ref 0–1)
IMM GRANULOCYTES NFR BLD: 0.8 %
LYMPHOCYTES # BLD AUTO: 1.18 X10(3) UL (ref 1–4)
LYMPHOCYTES NFR BLD AUTO: 12.4 %
MCH RBC QN AUTO: 28.9 PG (ref 26–34)
MCHC RBC AUTO-ENTMCNC: 30.1 G/DL (ref 31–37)
MCV RBC AUTO: 96 FL
MONOCYTES # BLD AUTO: 0.95 X10(3) UL (ref 0.1–1)
MONOCYTES NFR BLD AUTO: 10 %
NEUTROPHILS # BLD AUTO: 7.2 X10 (3) UL (ref 1.5–7.7)
NEUTROPHILS # BLD AUTO: 7.2 X10(3) UL (ref 1.5–7.7)
NEUTROPHILS NFR BLD AUTO: 76 %
OSMOLALITY SERPL CALC.SUM OF ELEC: 291 MOSM/KG (ref 275–295)
PLATELET # BLD AUTO: 233 10(3)UL (ref 150–450)
POTASSIUM SERPL-SCNC: 4.6 MMOL/L (ref 3.5–5.1)
RBC # BLD AUTO: 4.22 X10(6)UL
SODIUM SERPL-SCNC: 138 MMOL/L (ref 136–145)
WBC # BLD AUTO: 9.5 X10(3) UL (ref 4–11)

## 2023-12-28 PROCEDURE — 99233 SBSQ HOSP IP/OBS HIGH 50: CPT | Performed by: HOSPITALIST

## 2023-12-28 RX ORDER — AZITHROMYCIN 250 MG/1
500 TABLET, FILM COATED ORAL
Status: DISCONTINUED | OUTPATIENT
Start: 2023-12-29 | End: 2023-12-29

## 2023-12-28 NOTE — CM/SW NOTE
SW followed up on discharge planning. PT recommendation is home health care. SW left detailed VM for [facility] Neri so they can check her benefits for in-home PT.    1120:  SW received call from [facility] Neri- pt's Uma Sue will not cover [address] through their agency but will continue to cover caregiver services. JOHN PAUL sent [address] referrals in [place]. Face to face completed/uploaded to referral.    PLAN: DC home w/ HHC pending list/choice and home Oxygen therapy supplied by [facility]. Face to face entered/uploaded to referral.    / to remain available for support and/or discharge planning.      Elly Stevens MSW, [address]

## 2023-12-28 NOTE — PLAN OF CARE
Resting comfortably in bed. No complaints at this time. Educated on call light use. Problem: Patient Centered Care  Goal: Patient preferences are identified and integrated in the patient's plan of care  Description: Interventions:  - What would you like us to know as we care for you? \"My skin starts to itch from the stickers for the heart monitor. \"  - Provide timely, complete, and accurate information to patient/family  - Incorporate patient and family knowledge, values, beliefs, and cultural backgrounds into the planning and delivery of care  - Encourage patient/family to participate in care and decision-making at the level they choose  - Honor patient and family perspectives and choices  Outcome: Progressing     Problem: Diabetes/Glucose Control  Goal: Glucose maintained within prescribed range  Description: INTERVENTIONS:  - Monitor Blood Glucose as ordered  - Assess for signs and symptoms of hyperglycemia and hypoglycemia  - Administer ordered medications to maintain glucose within target range  - Assess barriers to adequate nutritional intake and initiate nutrition consult as needed  - Instruct patient on self management of diabetes  Outcome: Progressing     Problem: Patient/Family Goals  Goal: Patient/Family Long Term Goal  Description: Patient's Long Term Goal: Return Home    Interventions:  - Monitoring for home going needs  - Educate on any new medications for discharge  - Discuss follow-up care  - See additional Care Plan goals for specific interventions  Outcome: Progressing  Goal: Patient/Family Short Term Goal  Description: Patient's Short Term Goal: Breathe better    Interventions:   - Medications as prescribed  - Respiratory therapy  - Oxygen as needed  - See additional Care Plan goals for specific interventions  Outcome: Progressing     Problem: RESPIRATORY - ADULT  Goal: Achieves optimal ventilation and oxygenation  Description: INTERVENTIONS:  - Assess for changes in respiratory status  - Assess for changes in mentation and behavior  - Position to facilitate oxygenation and minimize respiratory effort  - Oxygen supplementation based on oxygen saturation or ABGs  - Provide Smoking Cessation handout, if applicable  - Encourage broncho-pulmonary hygiene including cough, deep breathe, Incentive Spirometry  - Assess the need for suctioning and perform as needed  - Assess and instruct to report SOB or any respiratory difficulty  - Respiratory Therapy support as indicated  - Manage/alleviate anxiety  - Monitor for signs/symptoms of CO2 retention  Outcome: Progressing

## 2023-12-28 NOTE — PROGRESS NOTES
[facility] Pharmacy Note: Route Optimization for Azithromycin [facility])    Patient is currently on Azithromycin (ZITHROMAX) 500 mg IV every 24 hours. The patient meets the criteria to convert to the oral equivalent as established by the IV to Oral conversion protocol approved by the P&T committee. Medication was changed from IV formulation to Azithromycin (ZITHROMAX)  500 mg PO every 24 hours per protocol.       Rosmery Muñiz, PharmD  12/28/2023,  4:05 PM

## 2023-12-28 NOTE — PLAN OF CARE
Problem: Patient Centered Care  Goal: Patient preferences are identified and integrated in the patient's plan of care  Description: Interventions:  - What would you like us to know as we care for you? \"My skin starts to itch from the stickers for the heart monitor. \"  - Provide timely, complete, and accurate information to patient/family  - Incorporate patient and family knowledge, values, beliefs, and cultural backgrounds into the planning and delivery of care  - Encourage patient/family to participate in care and decision-making at the level they choose  - Honor patient and family perspectives and choices  Outcome: Progressing     Problem: Diabetes/Glucose Control  Goal: Glucose maintained within prescribed range  Description: INTERVENTIONS:  - Monitor Blood Glucose as ordered  - Assess for signs and symptoms of hyperglycemia and hypoglycemia  - Administer ordered medications to maintain glucose within target range  - Assess barriers to adequate nutritional intake and initiate nutrition consult as needed  - Instruct patient on self management of diabetes  Outcome: Progressing     Problem: Patient/Family Goals  Goal: Patient/Family Long Term Goal  Description: Patient's Long Term Goal: Return Home    Interventions:  - Monitoring for home going needs  - Educate on any new medications for discharge  - Discuss follow-up care  - See additional Care Plan goals for specific interventions  Outcome: Progressing  Goal: Patient/Family Short Term Goal  Description: Patient's Short Term Goal: Breathe better    Interventions:   - Medications as prescribed  - Respiratory therapy  - Oxygen as needed  - See additional Care Plan goals for specific interventions  Outcome: Progressing     Problem: RESPIRATORY - ADULT  Goal: Achieves optimal ventilation and oxygenation  Description: INTERVENTIONS:  - Assess for changes in respiratory status  - Assess for changes in mentation and behavior  - Position to facilitate oxygenation and minimize respiratory effort  - Oxygen supplementation based on oxygen saturation or ABGs  - Provide Smoking Cessation handout, if applicable  - Encourage broncho-pulmonary hygiene including cough, deep breathe, Incentive Spirometry  - Assess the need for suctioning and perform as needed  - Assess and instruct to report SOB or any respiratory difficulty  - Respiratory Therapy support as indicated  - Manage/alleviate anxiety  - Monitor for signs/symptoms of CO2 retention  Outcome: Progressing

## 2023-12-29 ENCOUNTER — TELEPHONE (OUTPATIENT)
Dept: INTERNAL MEDICINE CLINIC | Facility: CLINIC | Age: 78
End: 2023-12-29

## 2023-12-29 VITALS
RESPIRATION RATE: 18 BRPM | DIASTOLIC BLOOD PRESSURE: 71 MMHG | TEMPERATURE: 98 F | WEIGHT: 175.19 LBS | OXYGEN SATURATION: 94 % | SYSTOLIC BLOOD PRESSURE: 155 MMHG | BODY MASS INDEX: 30 KG/M2 | HEART RATE: 80 BPM

## 2023-12-29 LAB
ANION GAP SERPL CALC-SCNC: 0 MMOL/L (ref 0–18)
BASOPHILS # BLD AUTO: 0.03 X10(3) UL (ref 0–0.2)
BASOPHILS NFR BLD AUTO: 0.3 %
BUN BLD-MCNC: 30 MG/DL (ref 9–23)
BUN/CREAT SERPL: 26.3 (ref 10–20)
CALCIUM BLD-MCNC: 11 MG/DL (ref 8.7–10.4)
CHLORIDE SERPL-SCNC: 101 MMOL/L (ref 98–112)
CO2 SERPL-SCNC: 37 MMOL/L (ref 21–32)
CREAT BLD-MCNC: 1.14 MG/DL
DEPRECATED RDW RBC AUTO: 44.7 FL (ref 35.1–46.3)
EGFRCR SERPLBLD CKD-EPI 2021: 50 ML/MIN/1.73M2 (ref 60–?)
EOSINOPHIL # BLD AUTO: 0.05 X10(3) UL (ref 0–0.7)
EOSINOPHIL NFR BLD AUTO: 0.5 %
ERYTHROCYTE [DISTWIDTH] IN BLOOD BY AUTOMATED COUNT: 13.2 % (ref 11–15)
GLUCOSE BLD-MCNC: 87 MG/DL (ref 70–99)
GLUCOSE BLDC GLUCOMTR-MCNC: 102 MG/DL (ref 70–99)
GLUCOSE BLDC GLUCOMTR-MCNC: 266 MG/DL (ref 70–99)
GLUCOSE BLDC GLUCOMTR-MCNC: 87 MG/DL (ref 70–99)
HCT VFR BLD AUTO: 39.5 %
HGB BLD-MCNC: 12.8 G/DL
IMM GRANULOCYTES # BLD AUTO: 0.15 X10(3) UL (ref 0–1)
IMM GRANULOCYTES NFR BLD: 1.6 %
LYMPHOCYTES # BLD AUTO: 1.56 X10(3) UL (ref 1–4)
LYMPHOCYTES NFR BLD AUTO: 16.9 %
MAGNESIUM SERPL-MCNC: 2 MG/DL (ref 1.6–2.6)
MCH RBC QN AUTO: 29.8 PG (ref 26–34)
MCHC RBC AUTO-ENTMCNC: 32.4 G/DL (ref 31–37)
MCV RBC AUTO: 92.1 FL
MONOCYTES # BLD AUTO: 1.04 X10(3) UL (ref 0.1–1)
MONOCYTES NFR BLD AUTO: 11.3 %
NEUTROPHILS # BLD AUTO: 6.38 X10 (3) UL (ref 1.5–7.7)
NEUTROPHILS # BLD AUTO: 6.38 X10(3) UL (ref 1.5–7.7)
NEUTROPHILS NFR BLD AUTO: 69.4 %
OSMOLALITY SERPL CALC.SUM OF ELEC: 292 MOSM/KG (ref 275–295)
PHOSPHATE SERPL-MCNC: 3.1 MG/DL (ref 2.4–5.1)
PLATELET # BLD AUTO: 256 10(3)UL (ref 150–450)
POTASSIUM SERPL-SCNC: 4.3 MMOL/L (ref 3.5–5.1)
RBC # BLD AUTO: 4.29 X10(6)UL
SODIUM SERPL-SCNC: 138 MMOL/L (ref 136–145)
WBC # BLD AUTO: 9.2 X10(3) UL (ref 4–11)

## 2023-12-29 PROCEDURE — 99239 HOSP IP/OBS DSCHRG MGMT >30: CPT | Performed by: HOSPITALIST

## 2023-12-29 RX ORDER — PREDNISONE 20 MG/1
40 TABLET ORAL DAILY
Qty: 10 TABLET | Refills: 0 | Status: SHIPPED | OUTPATIENT
Start: 2023-12-29 | End: 2024-01-03

## 2023-12-29 NOTE — PLAN OF CARE
Problem: Patient Centered Care  Goal: Patient preferences are identified and integrated in the patient's plan of care  Description: Interventions:  - What would you like us to know as we care for you? \"My skin starts to itch from the stickers for the heart monitor. \"  - Provide timely, complete, and accurate information to patient/family  - Incorporate patient and family knowledge, values, beliefs, and cultural backgrounds into the planning and delivery of care  - Encourage patient/family to participate in care and decision-making at the level they choose  - Honor patient and family perspectives and choices  Outcome: Adequate for Discharge     Problem: Diabetes/Glucose Control  Goal: Glucose maintained within prescribed range  Description: INTERVENTIONS:  - Monitor Blood Glucose as ordered  - Assess for signs and symptoms of hyperglycemia and hypoglycemia  - Administer ordered medications to maintain glucose within target range  - Assess barriers to adequate nutritional intake and initiate nutrition consult as needed  - Instruct patient on self management of diabetes  Outcome: Adequate for Discharge     Problem: Patient/Family Goals  Goal: Patient/Family Long Term Goal  Description: Patient's Long Term Goal: Return Home    Interventions:  - Monitoring for home going needs  - Educate on any new medications for discharge  - Discuss follow-up care  - See additional Care Plan goals for specific interventions  Outcome: Adequate for Discharge  Goal: Patient/Family Short Term Goal  Description: Patient's Short Term Goal: Breathe better    Interventions:   - Medications as prescribed  - Respiratory therapy  - Oxygen as needed  - See additional Care Plan goals for specific interventions  Outcome: Adequate for Discharge     Problem: RESPIRATORY - ADULT  Goal: Achieves optimal ventilation and oxygenation  Description: INTERVENTIONS:  - Assess for changes in respiratory status  - Assess for changes in mentation and behavior  - Position to facilitate oxygenation and minimize respiratory effort  - Oxygen supplementation based on oxygen saturation or ABGs  - Provide Smoking Cessation handout, if applicable  - Encourage broncho-pulmonary hygiene including cough, deep breathe, Incentive Spirometry  - Assess the need for suctioning and perform as needed  - Assess and instruct to report SOB or any respiratory difficulty  - Respiratory Therapy support as indicated  - Manage/alleviate anxiety  - Monitor for signs/symptoms of CO2 retention  Outcome: Adequate for Discharge     No acute changes during the shift. Plan for discharge back to home today. Patient wants discharge instruction to patient and son to the best of my ability. Remote tele and PIV removed. Patient belongings returned to patient. Patient will be going back to home with son on personal home oxygen.

## 2023-12-29 NOTE — DISCHARGE SUMMARY
[facility] HOSP - [facility]     Discharge Summary    Jacinta Osorio Patient Status:  Inpatient    1945 MRN [id number]   Location [address] [facility] Attending Mirella Lee MD   Hosp Day # 3 PCP John Calderon MD     Date of Admission: 2023    Date of Discharge: 2023    Admitting Diagnosis: COPD exacerbation ([address] [J44.1]    Discharge Diagnosis:   Patient Active Problem List   Diagnosis    History of breast cancer    Medicare annual wellness visit, subsequent    Chronic tophaceous gout    Impacted cerumen, bilateral    COPD, severe ([address]    Coronary artery disease    S/P CABG (coronary artery bypass graft)    At risk for falls    Stage 3b chronic kidney disease ([address]    Controlled type 2 diabetes mellitus with stage 3 chronic kidney disease, without long-term current use of insulin ([address]    Hypertension associated with type 2 diabetes mellitus  ([address]    Hyperlipidemia associated with type 2 diabetes mellitus  ([address]    S/P carotid endarterectomy    Chronic diastolic congestive heart failure ([address]    Bilateral hearing loss    Hyperparathyroidism ([address]    Glaucoma    Vitamin D deficiency    Carotid stenosis, non-symptomatic, bilateral    PAD (peripheral artery disease) (HCC)    Thoracic aorta atherosclerosis (HCC)    Osteopenia of hip    Right knee pain    Acute pain of right knee    Unable to walk    COPD exacerbation ([address]       Reason for Admission:     COPD exacerbation. Physical Exam:     General: mild distress, shortness of breath with speaking  HEENT: Normocephalic atraumatic. Dry mucous membranes. Neck: No lymphadenopathy. Respiratory: Restricted aeration diffusely, no wheezing  Cardiovascular: Regular rate and rhythm. Abdomen: Soft, nontender, nondistended. Positive bowel sounds. No rebound, guarding or organomegaly. Neurologic: No focal neurological deficits. Musculoskeletal: Moves all extremities. Extremities: No edema or cyanosis.   Integument: No rashes or lesions. Psychiatric: Appropriate mood and affect. Hospital Course:     COPD exacerbation  - pt has hx of chronic Asthma/COPD, on continuous 2L NC oxygen at home  - now requiring 3L NC to maintain o2 sats >92%   - No leukocytosis, subjective fevers at home  - CXR revealed no acute pulmonary process   - Resp viral panel normal  -On duonebs, prednisone and azithromycin  -On guaifenesin BID for symptomatic relief, pulmonary hygiene. -Vital signs and O2 Sat q4h. Pneumonia  - Seen in ED on 12/22 and diagnosed with community aquired pneumonia, discharged on doxycycline. Pt reports not tolerating antibiotic  - no leukocytosis or fevers  - switch to azithromycin      Chronic Medical Problems  #HTN- cont home Amlodipine, metoprolol, hydralazine   #DM- hold home glipizide   # Gout- cont home Febuxostat   #GERD- cont home PPI   #Breast Cancer- s/p mastectomy   #CAD s/p CABG         Quality:  DVT Prophylaxis: Heparin   CODE status: Full   Diet: Diabetic low salt   Dispo: admit for observation        History of Present Illness:     Per admitting attending:   Africa Garcia is a 69year old female with Pmhx of Asthma, Breast Cancer, CAD, HTN, HLD, DM, GERD, COPD on home 2L o2, presents with weakness, cough. She was recently seen at [address] [place] [address] ED 12/22/2023 for pneumonia, discharged on doxycycline, took 3-4 days of it but states she did not tolerate it, and that it caused LE swelling. At baseline wears continuous 2L NC for COPD, and had been getting more dyspnea on exertion, nonproductive cough, chills, and fever 101F today. No travel, no known sick contacts. Up to date on her vaccines. In the ED, CXR showed no acute pulmonary process.  Patient treated like COPD exacerbation, given duoneb and albuterol neb treatment, along with azithromy    Disposition: Home or Self Care    Discharge Condition: Good    Discharge Medications:   Current Discharge Medication List        CONTINUE these medications which have CHANGED Details   predniSONE 20 MG Oral Tab Take 2 tablets (40 mg total) by mouth daily for 5 days. Qty: 10 tablet, Refills: 0           CONTINUE these medications which have NOT CHANGED    Details   doxycycline 100 MG Oral Cap Take 1 capsule (100 mg total) by mouth 2 (two) times daily for 7 days. Qty: 14 capsule, Refills: 0      albuterol 108 (90 Base) MCG/ACT Inhalation Aero Soln Inhale 2 puffs into the lungs every 4 (four) hours as needed. Qty: 1 each, Refills: 0      benzonatate 100 MG Oral Cap Take 1 capsule (100 mg total) by mouth 3 (three) times daily as needed for cough. Qty: 30 capsule, Refills: 0      Doxycycline Monohydrate 100 MG Oral Tab Take 100 mg by mouth 2 (two) times daily. Qty: 14 tablet, Refills: 0      pantoprazole 20 MG Oral Tab EC Take 1 tablet (20 mg total) by mouth every morning before breakfast.      hydrALAZINE 25 MG Oral Tab TAKE 1/2 TABLET TWICE DAILY  Qty: 90 tablet, Refills: 1      !! metoprolol succinate ER 25 MG Oral Tablet 24 Hr Take 1 tablet (25 mg total) by mouth daily. Qty: 90 tablet, Refills: 3      AMLODIPINE 5 MG Oral Tab TAKE 1 TABLET EVERY DAY  Qty: 90 tablet, Refills: 1      febuxostat 40 MG Oral Tab Take 1 tablet (40 mg total) by mouth daily. Qty: 90 tablet, Refills: 1      omega-3 fatty acids 1000 MG Oral Cap Take 1,000 mg by mouth daily. Multiple Vitamin (MULTI-VITAMIN DAILY) Oral Tab Take 1 tablet by mouth daily. Cholecalciferol (VITAMIN D) 2000 units Oral Cap Take 1 capsule (2,000 Units total) by mouth daily. Alcohol Swabs (DROPSAFE ALCOHOL PREP) 70 % Does not apply Pads Take 1 Bottle by mouth As Directed. Qty: 400 each, Refills: 3      methylPREDNISolone (MEDROL) 4 MG Oral Tablet Therapy Pack As directed. Qty: 1 each, Refills: 0      cinacalcet 30 MG Oral Tab Take 1 tablet (30 mg total) by mouth every other day. Qty: 45 tablet, Refills: 0      TRUEplus Lancets 33G Does not apply Misc 1 each by In Vitro route daily.   Qty: 100 each, Refills: 3      !! metoprolol succinate ER 25 MG Oral Tablet 24 Hr Take 1 tablet (25 mg total) by mouth daily. Qty: 14 tablet, Refills: 0      colchicine 0.6 MG Oral Tab Take 1 tablet (0.6 mg total) by mouth as needed. Newt Petrin 500-50 MCG/ACT Inhalation Aerosol Powder, Breath Activated Inhale 1 puff into the lungs 2 (two) times daily. Qty: 3 each, Refills: 1      Glucose Blood (TRUE METRIX BLOOD GLUCOSE TEST) In Vitro Strip 1 strip by In Vitro route 4 (four) times daily. Qty: 400 strip, Refills: 3       !! - Potential duplicate medications found. Please discuss with provider. Other Discharge Instructions: total dc time > 30 min    Matt Walters MD  12/29/2023  12:06 PM     Hospital Discharge Diagnoses:  COPD exacerbation    Lace+ Score: 71  59-90 High Risk  29-58 Medium Risk  0-28   Low Risk. TCM Follow-Up Recommendation:  LACE > 58:  High Risk of readmission after discharge from the hospital.

## 2023-12-29 NOTE — TELEPHONE ENCOUNTER
Letty from [facility] from the Intake Dept is calling to advise the patient is being discharged from [facility] today, 12/29/23.  Requesting a verbal confirmation if PCP will sign home health orders.   Please advise.

## 2023-12-29 NOTE — DISCHARGE INSTRUCTIONS
Sometimes managing your health at home requires assistance. The [facility] team has recognized your preference to use [facility], Phone: [phone] or  Fax: [phone]. A representative from the home health agency will contact you or your family to schedule your first visit.

## 2023-12-29 NOTE — CM/SW NOTE
12/29/23 1200   Discharge disposition   Expected discharge disposition Home-Health   Post Acute Care Provider   ([facility])   DME/Infusion Providers [facility]  (Home O2)   Discharge transportation Private car     Pt discussed during nursing rounds. Pt is stable for dc today. MD dc order entered. List of accepting [facility] agencies provided to pt at bedside, [facility] is chosen agency at [employer]. Agency reserved in [address] and notified of [facility] home today. HME will resume home O2 services at [place]. Pt's son will provide transport at [place]. Plan: Home w/[facility] and HME for home O2 today. / to remain available for support and/or discharge planning.      SAVI Lackey    [phone]

## 2024-01-02 ENCOUNTER — PATIENT OUTREACH (OUTPATIENT)
Dept: CASE MANAGEMENT | Age: 79
End: 2024-01-02

## 2024-01-02 NOTE — PROGRESS NOTES
NCM attempted to reach the patient to complete a TCM/HFU call. Left message to call back. [facility] provided direct contact info at [phone]. The contact information to the [facility] was also left with the patient.

## 2024-01-02 NOTE — PAYOR COMM NOTE
--------------  DISCHARGE REVIEW    Payor: [facility]  Subscriber #:  [id number]  Authorization Number: [id number]    Admit date: 23  Admit time:   4:59 AM  Discharge Date: 2023  7:02 PM     Admitting Physician: Sharron Nieto MD  Attending Physician:  No att. providers found  Primary Care Physician: Enrique Braun MD          Discharge Summary Notes        Discharge Summary signed by Jamel Batista MD at 2023 12:08 PM       Author: Jamel Batista MD Specialty: HOSPITALIST, Internal Medicine Author Type: Physician    Filed: 2023 12:08 PM Date of Service: 2023 12:06 PM Status: Signed    : Jamel Batista MD (Physician)         [facility]     Discharge Summary    Arianamari Osorio Patient Status:  Inpatient    1945 MRN [id number]   Location [facility] Attending Jamel Batista MD   Hosp Day # 3 PCP Enrique Braun MD     Date of Admission: 2023    Date of Discharge: 2023    Admitting Diagnosis: COPD exacerbation (HCC) [J44.1]    Discharge Diagnosis:   Patient Active Problem List   Diagnosis    History of breast cancer    Medicare annual wellness visit, subsequent    Chronic tophaceous gout    Impacted cerumen, bilateral    COPD, severe (HCC)    Coronary artery disease    S/P CABG (coronary artery bypass graft)    At risk for falls    Stage 3b chronic kidney disease (HCC)    Controlled type 2 diabetes mellitus with stage 3 chronic kidney disease, without long-term current use of insulin (HCC)    Hypertension associated with type 2 diabetes mellitus  (HCC)    Hyperlipidemia associated with type 2 diabetes mellitus  (HCC)    S/P carotid endarterectomy    Chronic diastolic congestive heart failure (HCC)    Bilateral hearing loss    Hyperparathyroidism (HCC)    Glaucoma    Vitamin D deficiency    Carotid stenosis, non-symptomatic, bilateral    PAD (peripheral artery disease) (HCC)    Thoracic aorta  atherosclerosis (HCC)    Osteopenia of hip    Right knee pain    Acute pain of right knee    Unable to walk    COPD exacerbation (HCC)       Reason for Admission:     COPD exacerbation.    Physical Exam:     General: mild distress, shortness of breath with speaking  HEENT: Normocephalic atraumatic. Dry mucous membranes.   Neck: No lymphadenopathy.   Respiratory: Restricted aeration diffusely, no wheezing  Cardiovascular: Regular rate and rhythm.   Abdomen: Soft, nontender, nondistended.  Positive bowel sounds. No rebound, guarding or organomegaly.  Neurologic: No focal neurological deficits.   Musculoskeletal: Moves all extremities.  Extremities: No edema or cyanosis.  Integument: No rashes or lesions.   Psychiatric: Appropriate mood and affect.    Hospital Course:     COPD exacerbation  - pt has hx of chronic Asthma/COPD, on continuous 2L NC oxygen at home  - now requiring 3L NC to maintain o2 sats >92%   - No leukocytosis, subjective fevers at home  - CXR revealed no acute pulmonary process   - Resp viral panel normal  -On duonebs, prednisone and azithromycin  -On guaifenesin BID for symptomatic relief, pulmonary hygiene.  -Vital signs and O2 Sat q4h.     Pneumonia  - Seen in ED on 12/22 and diagnosed with community aquired pneumonia, discharged on doxycycline. Pt reports not tolerating antibiotic  - no leukocytosis or fevers  - switch to azithromycin      Chronic Medical Problems  #HTN- cont home Amlodipine, metoprolol, hydralazine   #DM- hold home glipizide   # Gout- cont home Febuxostat   #GERD- cont home PPI   #Breast Cancer- s/p mastectomy   #CAD s/p CABG         Quality:  DVT Prophylaxis: Heparin   CODE status: Full   Diet: Diabetic low salt   Dispo: admit for observation        History of Present Illness:     Per admitting attending:   Ariana Osorio is a 78 year old female with Pmhx of Asthma, Breast Cancer, CAD, HTN, HLD, DM, GERD, COPD on home 2L o2, presents with weakness, cough. She was recently  seen at [facility] ED 12/22/2023 for pneumonia, discharged on doxycycline, took 3-4 days of it but states she did not tolerate it, and that it caused LE swelling. At baseline wears continuous 2L NC for COPD, and had been getting more dyspnea on exertion, nonproductive cough, chills, and fever 101F today. No travel, no known sick contacts.  Up to date on her vaccines.  In the ED, CXR showed no acute pulmonary process. Patient treated like COPD exacerbation, given duoneb and albuterol neb treatment, along with azithromy    Disposition: Home or Self Care    Discharge Condition: Good    Discharge Medications:   Current Discharge Medication List        CONTINUE these medications which have CHANGED    Details   predniSONE 20 MG Oral Tab Take 2 tablets (40 mg total) by mouth daily for 5 days.  Qty: 10 tablet, Refills: 0           CONTINUE these medications which have NOT CHANGED    Details   doxycycline 100 MG Oral Cap Take 1 capsule (100 mg total) by mouth 2 (two) times daily for 7 days.  Qty: 14 capsule, Refills: 0      albuterol 108 (90 Base) MCG/ACT Inhalation Aero Soln Inhale 2 puffs into the lungs every 4 (four) hours as needed.  Qty: 1 each, Refills: 0      benzonatate 100 MG Oral Cap Take 1 capsule (100 mg total) by mouth 3 (three) times daily as needed for cough.  Qty: 30 capsule, Refills: 0      Doxycycline Monohydrate 100 MG Oral Tab Take 100 mg by mouth 2 (two) times daily.  Qty: 14 tablet, Refills: 0      pantoprazole 20 MG Oral Tab EC Take 1 tablet (20 mg total) by mouth every morning before breakfast.      hydrALAZINE 25 MG Oral Tab TAKE 1/2 TABLET TWICE DAILY  Qty: 90 tablet, Refills: 1      !! metoprolol succinate ER 25 MG Oral Tablet 24 Hr Take 1 tablet (25 mg total) by mouth daily.  Qty: 90 tablet, Refills: 3      AMLODIPINE 5 MG Oral Tab TAKE 1 TABLET EVERY DAY  Qty: 90 tablet, Refills: 1      febuxostat 40 MG Oral Tab Take 1 tablet (40 mg total) by mouth daily.  Qty: 90 tablet, Refills: 1      omega-3 fatty  acids 1000 MG Oral Cap Take 1,000 mg by mouth daily.      Multiple Vitamin (MULTI-VITAMIN DAILY) Oral Tab Take 1 tablet by mouth daily.      Cholecalciferol (VITAMIN D) 2000 units Oral Cap Take 1 capsule (2,000 Units total) by mouth daily.      Alcohol Swabs (DROPSAFE ALCOHOL PREP) 70 % Does not apply Pads Take 1 Bottle by mouth As Directed.  Qty: 400 each, Refills: 3      methylPREDNISolone (MEDROL) 4 MG Oral Tablet Therapy Pack As directed.  Qty: 1 each, Refills: 0      cinacalcet 30 MG Oral Tab Take 1 tablet (30 mg total) by mouth every other day.  Qty: 45 tablet, Refills: 0      TRUEplus Lancets 33G Does not apply Misc 1 each by In Vitro route daily.  Qty: 100 each, Refills: 3      !! metoprolol succinate ER 25 MG Oral Tablet 24 Hr Take 1 tablet (25 mg total) by mouth daily.  Qty: 14 tablet, Refills: 0      colchicine 0.6 MG Oral Tab Take 1 tablet (0.6 mg total) by mouth as needed.      WIXELA INHUB 500-50 MCG/ACT Inhalation Aerosol Powder, Breath Activated Inhale 1 puff into the lungs 2 (two) times daily.  Qty: 3 each, Refills: 1      Glucose Blood (TRUE METRIX BLOOD GLUCOSE TEST) In Vitro Strip 1 strip by In Vitro route 4 (four) times daily.  Qty: 400 strip, Refills: 3       !! - Potential duplicate medications found. Please discuss with provider.          Other Discharge Instructions: total dc time > 30 min    Jamel Batista MD  12/29/2023  12:06 PM     Hospital Discharge Diagnoses:  COPD exacerbation    Lace+ Score: 71  59-90 High Risk  29-58 Medium Risk  0-28   Low Risk.    TCM Follow-Up Recommendation:  LACE > 58: High Risk of readmission after discharge from the hospital.       Electronically signed by Jamel Batista MD on 12/29/2023 12:08 PM         REVIEWER COMMENTS

## 2024-01-03 ENCOUNTER — PATIENT OUTREACH (OUTPATIENT)
Dept: CASE MANAGEMENT | Age: 79
End: 2024-01-03

## 2024-01-03 NOTE — PROGRESS NOTES
SCC-COPD apt request (discharged 12/29/23)    [facility]  Specialty Care Clinic  [address]  [address]  [place] [address] [phone]  Spoke to pt, she was playing [employer] and would like a call back tomorrow

## 2024-01-05 NOTE — PROGRESS NOTES
SCC-COPD apt request (discharged 12/29/23)     [facility]  Specialty Care Clinic  [address]  [address]  [facility] [id number] [phone]  Spoke w/pt, she is playing [employer] and will call back to schedule apt  Closing encounter

## 2024-01-09 ENCOUNTER — PATIENT OUTREACH (OUTPATIENT)
Dept: CASE MANAGEMENT | Age: 79
End: 2024-01-09

## 2024-01-09 DIAGNOSIS — E11.22 CONTROLLED TYPE 2 DIABETES MELLITUS WITH STAGE 3 CHRONIC KIDNEY DISEASE, WITHOUT LONG-TERM CURRENT USE OF INSULIN (HCC): ICD-10-CM

## 2024-01-09 DIAGNOSIS — N18.32 STAGE 3B CHRONIC KIDNEY DISEASE (HCC): ICD-10-CM

## 2024-01-09 DIAGNOSIS — J44.9 COPD, SEVERE (HCC): ICD-10-CM

## 2024-01-09 DIAGNOSIS — I25.10 CORONARY ARTERY DISEASE INVOLVING NATIVE CORONARY ARTERY OF NATIVE HEART WITHOUT ANGINA PECTORIS: ICD-10-CM

## 2024-01-09 DIAGNOSIS — I73.9 PAD (PERIPHERAL ARTERY DISEASE) (HCC): ICD-10-CM

## 2024-01-09 DIAGNOSIS — J44.1 COPD EXACERBATION (HCC): ICD-10-CM

## 2024-01-09 DIAGNOSIS — H91.93 BILATERAL HEARING LOSS, UNSPECIFIED HEARING LOSS TYPE: ICD-10-CM

## 2024-01-09 DIAGNOSIS — E21.3 HYPERPARATHYROIDISM (HCC): ICD-10-CM

## 2024-01-09 DIAGNOSIS — Z95.1 S/P CABG (CORONARY ARTERY BYPASS GRAFT): ICD-10-CM

## 2024-01-09 DIAGNOSIS — M25.561 ACUTE PAIN OF RIGHT KNEE: ICD-10-CM

## 2024-01-09 DIAGNOSIS — E11.59 HYPERTENSION ASSOCIATED WITH TYPE 2 DIABETES MELLITUS  (HCC): ICD-10-CM

## 2024-01-09 DIAGNOSIS — Z91.81 AT RISK FOR FALLS: ICD-10-CM

## 2024-01-09 DIAGNOSIS — I15.2 HYPERTENSION ASSOCIATED WITH TYPE 2 DIABETES MELLITUS  (HCC): ICD-10-CM

## 2024-01-09 DIAGNOSIS — N18.30 CONTROLLED TYPE 2 DIABETES MELLITUS WITH STAGE 3 CHRONIC KIDNEY DISEASE, WITHOUT LONG-TERM CURRENT USE OF INSULIN (HCC): ICD-10-CM

## 2024-01-09 DIAGNOSIS — R26.2 UNABLE TO WALK: ICD-10-CM

## 2024-01-09 DIAGNOSIS — E78.5 HYPERLIPIDEMIA ASSOCIATED WITH TYPE 2 DIABETES MELLITUS  (HCC): ICD-10-CM

## 2024-01-09 DIAGNOSIS — Z98.890 S/P CAROTID ENDARTERECTOMY: ICD-10-CM

## 2024-01-09 DIAGNOSIS — Z85.3 HISTORY OF BREAST CANCER: Primary | ICD-10-CM

## 2024-01-09 DIAGNOSIS — E11.69 HYPERLIPIDEMIA ASSOCIATED WITH TYPE 2 DIABETES MELLITUS  (HCC): ICD-10-CM

## 2024-01-09 DIAGNOSIS — I65.23 CAROTID STENOSIS, NON-SYMPTOMATIC, BILATERAL: ICD-10-CM

## 2024-01-09 NOTE — PROGRESS NOTES
1/9/2024  Spoke to Ariana for CCM.      Updates to patient care team/ comments: UTD  Patient reported changes in medications: yes   Med Adherence  Comment: Taking as directed    Health Maintenance:  UTD, reviewed with patient.   Health Maintenance   Topic Date Due    Zoster Vaccines (1 of 2) Never done    Diabetes Care Dilated Eye Exam  10/13/2021    COVID-19 Vaccine (4 - 2023-24 season) 09/01/2023    MA Annual Health Assessment  01/01/2024    Annual Depression Screening  01/01/2024    Fall Risk Screening (Annual)  01/01/2024    Diabetes Care Foot Exam (Annual)  01/01/2024    Diabetes Care: Microalb/Creat Ratio  04/12/2024    Diabetes Care A1C  06/14/2024    Diabetes Care: GFR  12/29/2024    Influenza Vaccine  Completed    DEXA Scan  Completed    Pneumococcal Vaccine: 65+ Years  Completed    Mammogram  Discontinued    Colonoscopy  Discontinued       Patient current concerns:     Patient stated that she is doing ok. Mood is good/stable.     She stated that today she is feeling good.  She stated that she had pneumonia and it took long time for her to get better.  Patient stated that today is the first day to taste her food.  She lost her appetite and is not eating as much for breakfast she only had one egg and last night she had a banana and one apple.  She stated that she does not feel like eating.  Informed patient to make sure she eats small portion aim for 6-8 small meals and snacks per day.  Exercise lightly before meals if able. To stay hydrated as well.     Patient stated that her blood sugar reading today was 115 and yesterday was 110.  Patient continues to monitor her blood sugar daily.      Patient stated that she is no longer taking Benzonatate for cough her cough went away.  She only coughs when her throat is to dry.  She did mention that when she coughs that her throat hurst.  Advised patient that she has an upcoming appointment with Dr. Braun to have him look at her throat.  Pt verbal  understanding.      Patient denies any chest pain her breathing is good today oxygen level at 95.             Future Appointments   Date Time Provider Department Center   1/22/2024  1:30 PM Enrique Braun MD [facility]   2/6/2024 11:00 AM Riki Tran MD [email or web address]           Previous goal met: Continues working towards goal    Self Management Goals/Action Plan:    Active goal from previous outreach:                       DM/COPD    Patient reported progress toward goal: Progressing blood sugar today 115.  Breathing is stable.       Update to previous barriers: N/A    Patient Reported New Barriers And Concerns: Loss of appetite                    - Plan for overcoming all barriers: Advised pt to eat soups or yogurt with fruit to make sure she is getting nutrients until she gets her appetite back fully.             Patient agrees to goal action plan. yes  Self-Management Abilities (patient reported)             1= least confident in achieving goal, 5= very confident               - confidence: 4        Care Manager Follow Up: One month    Reason For Follow Up: review progress and or barriers towards patients goals.     Care managers interventions:     Reviewed healthy eating habits, regular exercise and preventative guidelines.    Reinforced healthy diet, lifestyle, and exercise.    Future Appointments   Date Time Provider Department Center   1/22/2024  1:30 PM Enrique Braun MD [facility]   2/6/2024 11:00 AM Riki Tran MD [email or web address]        Time Spent This Encounter Total: 15 min medical record review, telephone communication, care plan updates where needed, education, goals and action plan recreation/update. Provided acknowledgment and validation to patient's concerns.   Monthly Minute Total including today: 15  Physical assessment, complete health history, and care plan established by Enrique Braun MD, need for CCM determined from these assessments  and data.

## 2024-01-10 ENCOUNTER — TELEPHONE (OUTPATIENT)
Dept: INTERNAL MEDICINE CLINIC | Facility: CLINIC | Age: 79
End: 2024-01-10

## 2024-01-11 ENCOUNTER — TELEPHONE (OUTPATIENT)
Dept: INTERNAL MEDICINE CLINIC | Facility: CLINIC | Age: 79
End: 2024-01-11

## 2024-01-11 NOTE — TELEPHONE ENCOUNTER
FYI-- per telephone encounter 24 patient states she is taking medication and in need of refill, see below:  Vivi Bishop   24 12:00 PM Note  Patient calling to request refill for glipizide 5 MG, as old script is , stated only has 5 left.

## 2024-01-11 NOTE — TELEPHONE ENCOUNTER
Patient calling to request refill for glipizide 5 MG, as old script is , stated only has 5 left.

## 2024-01-12 NOTE — TELEPHONE ENCOUNTER
Dr. Braun - althought Glipizide is not listed last visit 12/14/23, patient says she continues to take this medication.   Please advise on refill     Please Review. Protocol Failed or has no protocol.       Requested Prescriptions   Pending Prescriptions Disp Refills    GLIPIZIDE 5 MG Oral Tab [Pharmacy Med Name: GLIPIZIDE 5 MG Tablet] 90 tablet 3     Sig: TAKE 1 TABLET EVERY DAY BEFORE BREAKFAST       Diabetes Medication Protocol Failed - 1/11/2024  1:00 PM        Failed - EGFRCR or GFRNAA > 50     GFR Evaluation  EGFRCR: 50 , resulted on 12/29/2023          Passed - Last A1C < 7.5 and within past 6 months     Lab Results   Component Value Date    A1C 6.0 (A) 12/14/2023             Passed - In person appointment or virtual visit in the past 6 mos or appointment in next 3 mos     Recent Outpatient Visits              4 weeks ago Controlled type 2 diabetes mellitus with stage 3 chronic kidney disease, without long-term current use of insulin (HCC)    [facility] Enrique Braun MD    Office Visit    2 months ago Verruca(e)    [facility] Demarco Garcia PA-C    Office Visit    3 months ago Nodule of finger, left    [facility] Enrique Braun MD    Office Visit    3 months ago Gastroesophageal reflux disease with esophagitis without hemorrhage    [facility] Abdiaziz Melendez MD    Office Visit    5 months ago Acute gout due to renal impairment involving right knee    [facility] Enrique Braun MD    Office Visit          Future Appointments         Provider Department Appt Notes    In 1 week Enrique Braun MD [facility] er follow up    In 3 weeks Riki Tran MD [facility], [facility] - Stage 3b chronic  kidney disease  (policy informed)    In 1 month Enrique Braun MD [facility] px, last px: 02/20/23, policy informed               Passed - GFR in the past 12 months           Recent Outpatient Visits              4 weeks ago Controlled type 2 diabetes mellitus with stage 3 chronic kidney disease, without long-term current use of insulin (HCC)    [facility] Enrique Braun MD    Office Visit    2 months ago Verruca(e)    [facility] Demarco Garcia PA-C    Office Visit    3 months ago Nodule of finger, left    [facility] Enrique Braun MD    Office Visit    3 months ago Gastroesophageal reflux disease with esophagitis without hemorrhage    [facility] Abdiaziz Melendez MD    Office Visit    5 months ago Acute gout due to renal impairment involving right knee    [facility] Enrique Braun MD    Office Visit          Future Appointments         Provider Department Appt Notes    In 1 week Enrique Braun MD [facility] er follow up    In 3 weeks Riki Tran MD [facility] - Stage 3b chronic kidney disease  (policy informed)    In 1 month Enrique Braun MD [facility] px, last px: 02/20/23, policy informed

## 2024-01-13 RX ORDER — GLIPIZIDE 5 MG/1
5 TABLET ORAL
Qty: 90 TABLET | Refills: 0 | Status: SHIPPED | OUTPATIENT
Start: 2024-01-13

## 2024-01-15 ENCOUNTER — NURSE TRIAGE (OUTPATIENT)
Dept: INTERNAL MEDICINE CLINIC | Facility: CLINIC | Age: 79
End: 2024-01-15

## 2024-01-15 NOTE — TELEPHONE ENCOUNTER
Action Requested: Summary for Provider     []  Critical Lab, Recommendations Needed  [x] Need Additional Advice  []   FYI    []   Need Orders  [x] Need Medications Sent to Pharmacy  []  Other     SUMMARY: Pt reports she has been coughing since being discharged from the hospital. Pt reports productive cough, denies shortness of breath, however, history of COPD. Pt states no fever, but had chills. Pt also reports bilateral leg swelling increased since discharge 12/29, up to ankles. Denies pain. Blood pressure yesterday 140's/80's.    Pt upset about her most recent hospital stay, states the providers were not good and that she kept getting injections to her stomach that gave her bruises. Daily blood draws. I explained to the Pt the reason for injection in the hospital is to prevent blood clots. Pt states she should have refused them like last time.    Please advise. Pt would like to know what Dr. Braun recommends for cough first.  Please advise if you want to see Pt sooner than 1/22/24 appointment.  Pt states she has multiple things to report regarding her stay.  Future Appointments   Date Time Provider Department Center   1/22/2024  1:30 PM Enrique Braun MD [facility]                       Reason for call: Cough  Onset: Data Unavailable                       Reason for Disposition   Cough with no complications    Protocols used: Cough-A-OH

## 2024-01-15 NOTE — TELEPHONE ENCOUNTER
Dr. Braun:    Patient is taking Wixela twice a day.   Patient doesn't remember taking Benzonatate.     Patient states she is not coughing anymore. She only coughs to clear her throat when she has phlegm. She has been drinking water which helps clear throat.   She is breathing good; as soon as she clears the phlegm she feels better.     She confirmed she did finish the prednisone.     Patient states she will see you Monday 22nd.

## 2024-01-15 NOTE — TELEPHONE ENCOUNTER
Spoke with pt and MD message below given.  Pt verb understanding  Pt states she does not have transportation and unable to come until 1/22/24.  Please advise

## 2024-01-16 ENCOUNTER — HOSPITAL ENCOUNTER (OUTPATIENT)
Dept: GENERAL RADIOLOGY | Age: 79
Discharge: HOME | End: 2024-01-16
Attending: INTERNAL MEDICINE
Payer: MEDICARE

## 2024-01-16 ENCOUNTER — OFFICE VISIT (OUTPATIENT)
Dept: INTERNAL MEDICINE CLINIC | Facility: CLINIC | Age: 79
End: 2024-01-16

## 2024-01-16 VITALS
HEART RATE: 81 BPM | HEIGHT: 64 IN | SYSTOLIC BLOOD PRESSURE: 132 MMHG | OXYGEN SATURATION: 93 % | WEIGHT: 173.63 LBS | DIASTOLIC BLOOD PRESSURE: 70 MMHG | BODY MASS INDEX: 29.64 KG/M2 | TEMPERATURE: 98 F

## 2024-01-16 DIAGNOSIS — E11.22 CONTROLLED TYPE 2 DIABETES MELLITUS WITH STAGE 3 CHRONIC KIDNEY DISEASE, WITHOUT LONG-TERM CURRENT USE OF INSULIN (HCC): ICD-10-CM

## 2024-01-16 DIAGNOSIS — N18.30 CONTROLLED TYPE 2 DIABETES MELLITUS WITH STAGE 3 CHRONIC KIDNEY DISEASE, WITHOUT LONG-TERM CURRENT USE OF INSULIN (HCC): ICD-10-CM

## 2024-01-16 DIAGNOSIS — J44.1 COPD WITH ACUTE EXACERBATION (HCC): Primary | ICD-10-CM

## 2024-01-16 DIAGNOSIS — J44.1 COPD WITH ACUTE EXACERBATION (HCC): ICD-10-CM

## 2024-01-16 PROCEDURE — 1126F AMNT PAIN NOTED NONE PRSNT: CPT | Performed by: INTERNAL MEDICINE

## 2024-01-16 PROCEDURE — 3075F SYST BP GE 130 - 139MM HG: CPT | Performed by: INTERNAL MEDICINE

## 2024-01-16 PROCEDURE — 71046 X-RAY EXAM CHEST 2 VIEWS: CPT | Performed by: INTERNAL MEDICINE

## 2024-01-16 PROCEDURE — 1159F MED LIST DOCD IN RCRD: CPT | Performed by: INTERNAL MEDICINE

## 2024-01-16 PROCEDURE — 1111F DSCHRG MED/CURRENT MED MERGE: CPT | Performed by: INTERNAL MEDICINE

## 2024-01-16 PROCEDURE — 3008F BODY MASS INDEX DOCD: CPT | Performed by: INTERNAL MEDICINE

## 2024-01-16 PROCEDURE — 1160F RVW MEDS BY RX/DR IN RCRD: CPT | Performed by: INTERNAL MEDICINE

## 2024-01-16 PROCEDURE — 99214 OFFICE O/P EST MOD 30 MIN: CPT | Performed by: INTERNAL MEDICINE

## 2024-01-16 PROCEDURE — 3078F DIAST BP <80 MM HG: CPT | Performed by: INTERNAL MEDICINE

## 2024-01-16 RX ORDER — DOXYCYCLINE 100 MG/1
100 TABLET ORAL 2 TIMES DAILY
Qty: 14 TABLET | Refills: 0 | Status: SHIPPED | OUTPATIENT
Start: 2024-01-16

## 2024-01-16 RX ORDER — PREDNISONE 10 MG/1
TABLET ORAL
Qty: 20 TABLET | Refills: 0 | Status: SHIPPED | OUTPATIENT
Start: 2024-01-16

## 2024-01-16 NOTE — PROGRESS NOTES
Subjective:     Patient ID: Ariana Osorio is a 78 year old female.    Cough  This is a new problem. The current episode started yesterday. The problem has been unchanged. The cough is Productive of sputum. Associated symptoms include shortness of breath. Pertinent negatives include no chest pain, fever, hemoptysis, myalgias, nasal congestion or wheezing. Nothing aggravates the symptoms. She has tried rest and steroid inhaler for the symptoms. The treatment provided no relief. Her past medical history is significant for COPD and pneumonia.       History/Other:   Review of Systems   Constitutional: Negative.  Negative for fever.   Eyes: Negative.    Respiratory:  Positive for cough and shortness of breath. Negative for hemoptysis and wheezing.    Cardiovascular: Negative.  Negative for chest pain.   Gastrointestinal: Negative.    Genitourinary: Negative.    Musculoskeletal:  Negative for myalgias.     Current Outpatient Medications   Medication Sig Dispense Refill    glipiZIDE 5 MG Oral Tab Take 1 tablet (5 mg total) by mouth before breakfast. (Patient taking differently: Take 1 tablet (5 mg total) by mouth before breakfast. prn) 90 tablet 0    albuterol 108 (90 Base) MCG/ACT Inhalation Aero Soln Inhale 2 puffs into the lungs every 4 (four) hours as needed. 1 each 0    benzonatate 100 MG Oral Cap Take 1 capsule (100 mg total) by mouth 3 (three) times daily as needed for cough. 30 capsule 0    methylPREDNISolone (MEDROL) 4 MG Oral Tablet Therapy Pack As directed. 1 each 0    Doxycycline Monohydrate 100 MG Oral Tab Take 100 mg by mouth 2 (two) times daily. 14 tablet 0    pantoprazole 20 MG Oral Tab EC Take 1 tablet (20 mg total) by mouth every morning before breakfast.      hydrALAZINE 25 MG Oral Tab TAKE 1/2 TABLET TWICE DAILY 90 tablet 1    cinacalcet 30 MG Oral Tab Take 1 tablet (30 mg total) by mouth every other day. (Patient taking differently: Take 1 tablet (30 mg total) by mouth every other day. Twice a  week) 45 tablet 0    metoprolol succinate ER 25 MG Oral Tablet 24 Hr Take 1 tablet (25 mg total) by mouth daily. 90 tablet 3    metoprolol succinate ER 25 MG Oral Tablet 24 Hr Take 1 tablet (25 mg total) by mouth daily. 14 tablet 0    AMLODIPINE 5 MG Oral Tab TAKE 1 TABLET EVERY DAY 90 tablet 1    colchicine 0.6 MG Oral Tab Take 1 tablet (0.6 mg total) by mouth as needed.      febuxostat 40 MG Oral Tab Take 1 tablet (40 mg total) by mouth daily. 90 tablet 1    omega-3 fatty acids 1000 MG Oral Cap Take 1,000 mg by mouth daily.      Multiple Vitamin (MULTI-VITAMIN DAILY) Oral Tab Take 1 tablet by mouth daily.      Cholecalciferol (VITAMIN D) 2000 units Oral Cap Take 1 capsule (2,000 Units total) by mouth daily.      Alcohol Swabs (DROPSAFE ALCOHOL PREP) 70 % Does not apply Pads Take 1 Bottle by mouth As Directed. 400 each 3    TRUEplus Lancets 33G Does not apply Misc 1 each by In Vitro route daily. 100 each 3    WIXELA INHUB 500-50 MCG/ACT Inhalation Aerosol Powder, Breath Activated Inhale 1 puff into the lungs 2 (two) times daily. 3 each 1    Glucose Blood (TRUE METRIX BLOOD GLUCOSE TEST) In Vitro Strip 1 strip by In Vitro route 4 (four) times daily. 400 strip 3     Allergies:  Allergies   Allergen Reactions    Allopurinol HIVES, SWELLING and SHORTNESS OF BREATH    Dog Epithelium SHORTNESS OF BREATH     Hair and saliva    Dust SHORTNESS OF BREATH    Smoke SHORTNESS OF BREATH    Adhesive Tape (Rosins) RASH    Montelukast HALLUCINATION    Statins MYALGIA     Pt had developed myalgia and myopathy    Xanax [Alprazolam] RESTLESSNESS    Adhesive Tape OTHER (SEE COMMENTS)    Other OTHER (SEE COMMENTS)    Sulfa Antibiotics OTHER (SEE COMMENTS)    Ace Inhibitors Coughing    Aspirin RASH       Past Medical History:   Diagnosis Date    Age-related cataract of left eye 05/10/2018    Age-related cataract of right eye 07/28/2022    Anxiety     Asthma     Atherosclerosis of coronary artery     Breast CA (HCC) 1999    lt mastectomy     Breast CA (HCC) 2014    lumpectomy, right    Cancer (HCC)     breast cancer    Carotid artery disease (HCC) 12/05/2016    Carotid stenosis     Closed fracture of multiple ribs of left side with routine healing, subsequent encounter 10/19/2018    Congestive heart disease (HCC)     COPD (chronic obstructive pulmonary disease) (HCC)     on O2 at 2 liters    Coronary atherosclerosis     Depression     Diabetes (HCC) 07/28/2022    takes insulin when hospitalized, takes oral meds at home    Diastolic dysfunction without heart failure     Esophageal reflux     Essential hypertension     Generalized anxiety disorder     Gout     Gout     High blood pressure     High cholesterol     History of pituitary adenoma 05/10/2018    Hyperlipidemia     Major depressive disorder, single episode, moderate (HCC)     Obesity       Past Surgical History:   Procedure Laterality Date    CABG      CAROTID ENDARTERECTOMY      CATARACT      COLONOSCOPY      2013    COLONOSCOPY  2013    COLONOSCOPY N/A 2/21/2023    Procedure: COLONOSCOPY;  Surgeon: Abdiaziz Melendez MD;  Location: [facility] ENDOSCOPY    HERNIA SURGERY      LUMPECTOMY RIGHT  2014    MASTECTOMY LEFT Left 1999    RADIATION RIGHT  2014      Family History   Problem Relation Age of Onset    Asthma Father     Hypertension Father     Heart Disorder Father     Other (Other) Mother         thyroid surgery    Asthma Sister     Asthma Brother     Other (Other) Brother         gout    Breast Cancer Self 43        rt breast 69      Social History:   Social History     Socioeconomic History    Marital status:    Tobacco Use    Smoking status: Never     Passive exposure: Never    Smokeless tobacco: Never   Vaping Use    Vaping Use: Never used   Substance and Sexual Activity    Alcohol use: No     Comment: rarely at weddings    Drug use: No   Other Topics Concern    Reaction to local anesthetic No    Pt has a pacemaker No    Pt has a defibrillator No     Social Determinants of Health      Financial Resource Strain: Low Risk  (7/7/2023)    Financial Resource Strain     Difficulty of Paying Living Expenses: Not very hard     Med Affordability: No   Food Insecurity: No Food Insecurity (12/26/2023)    Food Insecurity     Food Insecurity: Never true   Transportation Needs: No Transportation Needs (12/26/2023)    Transportation Needs     Lack of Transportation: No   Physical Activity: Insufficiently Active (7/13/2022)    Exercise Vital Sign     Days of Exercise per Week: 1 day     Minutes of Exercise per Session: 10 min   Stress: No Stress Concern Present (10/6/2023)    Stress     Feeling of Stress : No    Social Connections   Housing Stability: Low Risk  (12/26/2023)    Housing Stability     Housing Instability: No        Objective:   Physical Exam  Constitutional:       General: She is not in acute distress.     Appearance: She is well-developed. She is not ill-appearing, toxic-appearing or diaphoretic.   HENT:      Head: Normocephalic and atraumatic.      Right Ear: External ear normal.      Left Ear: External ear normal.      Nose: Nose normal.      Mouth/Throat:      Pharynx: No oropharyngeal exudate.   Eyes:      General:         Right eye: No discharge.         Left eye: No discharge.      Conjunctiva/sclera: Conjunctivae normal.      Pupils: Pupils are equal, round, and reactive to light.   Neck:      Vascular: No JVD.   Cardiovascular:      Rate and Rhythm: Normal rate and regular rhythm.      Heart sounds: Normal heart sounds. No murmur heard.  Pulmonary:      Effort: Pulmonary effort is normal. No respiratory distress.      Breath sounds: No stridor. Wheezing present. No rales.   Abdominal:      General: Bowel sounds are normal. There is no distension.      Palpations: Abdomen is soft. There is no mass.      Tenderness: There is no abdominal tenderness. There is no guarding or rebound.   Musculoskeletal:         General: No tenderness.      Cervical back: Normal range of motion and neck  supple. No rigidity or tenderness.      Right lower leg: Edema present.      Left lower leg: Edema present.      Comments: +1 edema both ankles   Lymphadenopathy:      Cervical: No cervical adenopathy.   Skin:     General: Skin is warm and dry.      Findings: No rash.   Neurological:      Mental Status: She is alert and oriented to person, place, and time.         Assessment & Plan:   (J44.1) COPD with acute exacerbation (HCC)  (primary encounter diagnosis)  Plan: XR CHEST PA + LAT CHEST (CPT=71046),         SARS-CoV-2/Flu A and B/RSV by PCR ([facility]) [E]        *Collect in Office!        The patient has some wheezing in the lung bases.  I suspect she does have acute exacerbation again of her COPD.  She lives in assisted living and last week apparently she played bingo with several people without wearing mask although she said they were not looking sick.  I told her she should get tested for COVID, RSV.  The lab was ready close so I told her she could get it done at least by tomorrow.  Patient also doing a home antigen test although the son wants to do the lab test instead.  Order was put in.  Her O2 sats are still at her baseline and she is not needing any supplemental oxygen as of now she does use it at home whenever she walks around which is her baseline also.  Will start the patient on doxycycline and also gave her prednisone.  I told the patient to watch her oxygen level and it symptoms worsens she is to call us back or go to ER.  Will also do a chest x-ray.    (E11.22,  N18.30) Controlled type 2 diabetes mellitus with stage 3 chronic kidney disease, without long-term current use of insulin ([facility])  Plan: Patient told to watch her sugars while she is on prednisone.  She does take glipizide if her sugars are elevated as she did before.         No orders of the defined types were placed in this encounter.      Meds This Visit:  Requested Prescriptions      No prescriptions requested or ordered in this encounter        Imaging & Referrals:  None

## 2024-01-16 NOTE — TELEPHONE ENCOUNTER
Per Dr. Braun Patient to be added to schedule today at 4:45.    Patient notified and appointment scheduled.   Pt agreeable and thankful.     Future Appointments   Date Time Provider Department Center   1/16/2024  4:45 PM Enrique Braun MD [facility]   1/22/2024  1:30 PM Enrique Braun MD [facility]   2/6/2024 11:00 AM Riki Tran MD [id number]  [facility]   2/19/2024  3:00 PM Enrique Braun MD [facility]

## 2024-01-16 NOTE — TELEPHONE ENCOUNTER
Patient calling with update - states she is Coughing - it was clear before and now is light green   Advised of need for in clinic visit but she does not drive and has called everyone she knows asking if they could bring her in without success. Patient states \"she wishes she could just talk with the doctor\". She feels she needs ABT and is asking if something can be called in.     Took benzonatate yesterday, helps a little bit.   Ankles are swelling a little bit - non pitting her patient, if she pushes on it, it does not leave indentation   No fever   Taking inhalers as prescribed.   No trouble breathing, patient says she \"just has a lot of phlegm and her chest in congested\".     Took b/p today   145/70 this morning checked again later and reading was 138/75     Please advise

## 2024-01-16 NOTE — TELEPHONE ENCOUNTER
Patient calling back and said her son can bring her today at 4:30 or 5PM. Are you able to see her today for visit?

## 2024-01-17 ENCOUNTER — LAB ENCOUNTER (OUTPATIENT)
Dept: LAB | Age: 79
End: 2024-01-17
Attending: INTERNAL MEDICINE
Payer: MEDICARE

## 2024-01-17 DIAGNOSIS — J44.1 COPD WITH ACUTE EXACERBATION (HCC): ICD-10-CM

## 2024-01-17 PROCEDURE — 87637 SARSCOV2&INF A&B&RSV AMP PRB: CPT

## 2024-01-18 LAB
FLUAV + FLUBV RNA SPEC NAA+PROBE: NOT DETECTED
FLUAV + FLUBV RNA SPEC NAA+PROBE: NOT DETECTED
RSV RNA SPEC NAA+PROBE: NOT DETECTED
SARS-COV-2 RNA RESP QL NAA+PROBE: DETECTED

## 2024-01-20 ENCOUNTER — TELEPHONE (OUTPATIENT)
Dept: INTERNAL MEDICINE CLINIC | Facility: CLINIC | Age: 79
End: 2024-01-20

## 2024-01-20 NOTE — TELEPHONE ENCOUNTER
She should stop her doxycycline as I had advised her 2 days ago and finish her molnupiravir for her covid infection.

## 2024-01-20 NOTE — TELEPHONE ENCOUNTER
Spoke to patient (verified Name and ) and relayed Dr. Braun's message below. Patient verbalized understanding and had no further questions at this time.

## 2024-01-20 NOTE — TELEPHONE ENCOUNTER
Patient called, verified Name and . States that she tested positive for Covid on  and was prescribed molnupiravir. She was also prescribed doxycycline when she was seen in the office on . She wants to clarify with PCP if she still needs to take the antibiotic along with the molnupiravir; she has 4 days' worth left as of today.     Dr. Braun please advise.

## 2024-01-25 ENCOUNTER — MED REC SCAN ONLY (OUTPATIENT)
Dept: INTERNAL MEDICINE CLINIC | Facility: CLINIC | Age: 79
End: 2024-01-25

## 2024-01-31 ENCOUNTER — TELEPHONE (OUTPATIENT)
Dept: INTERNAL MEDICINE CLINIC | Facility: CLINIC | Age: 79
End: 2024-01-31

## 2024-01-31 NOTE — TELEPHONE ENCOUNTER
Patient called requesting a letter stating she is taking Multi Vitamin daily and Vitamin D with starting dates Jan 1,2023-Dec 31,2023.     Letter is pending for review.    Completed letter needs to be faxed to Lara Thakur [facility] [phone]     Patient would like a [employer] message after letter is faxed.

## 2024-02-01 ENCOUNTER — PATIENT MESSAGE (OUTPATIENT)
Dept: INTERNAL MEDICINE CLINIC | Facility: CLINIC | Age: 79
End: 2024-02-01

## 2024-02-01 ENCOUNTER — TELEPHONE (OUTPATIENT)
Dept: INTERNAL MEDICINE CLINIC | Facility: CLINIC | Age: 79
End: 2024-02-01

## 2024-02-01 NOTE — TELEPHONE ENCOUNTER
Lara from [facility] called to advise she has received 4 faxes for medicare expense verification for patient. She states she only needs one fax and not multiple.

## 2024-02-01 NOTE — TELEPHONE ENCOUNTER
Letter faxed to chucky Carrillo at [phone], confirmation received. Pt notified via [employer].

## 2024-02-02 ENCOUNTER — PATIENT OUTREACH (OUTPATIENT)
Dept: CASE MANAGEMENT | Age: 79
End: 2024-02-02

## 2024-02-02 DIAGNOSIS — E78.5 HYPERLIPIDEMIA ASSOCIATED WITH TYPE 2 DIABETES MELLITUS  (HCC): ICD-10-CM

## 2024-02-02 DIAGNOSIS — M1A.9XX1 CHRONIC TOPHACEOUS GOUT: ICD-10-CM

## 2024-02-02 DIAGNOSIS — Z91.81 AT RISK FOR FALLS: ICD-10-CM

## 2024-02-02 DIAGNOSIS — I65.23 CAROTID STENOSIS, NON-SYMPTOMATIC, BILATERAL: ICD-10-CM

## 2024-02-02 DIAGNOSIS — E11.59 HYPERTENSION ASSOCIATED WITH TYPE 2 DIABETES MELLITUS  (HCC): ICD-10-CM

## 2024-02-02 DIAGNOSIS — H61.23 IMPACTED CERUMEN, BILATERAL: Primary | ICD-10-CM

## 2024-02-02 DIAGNOSIS — E21.3 HYPERPARATHYROIDISM (HCC): ICD-10-CM

## 2024-02-02 DIAGNOSIS — N18.32 STAGE 3B CHRONIC KIDNEY DISEASE (HCC): ICD-10-CM

## 2024-02-02 DIAGNOSIS — J44.1 COPD EXACERBATION (HCC): ICD-10-CM

## 2024-02-02 DIAGNOSIS — I73.9 PAD (PERIPHERAL ARTERY DISEASE) (HCC): ICD-10-CM

## 2024-02-02 DIAGNOSIS — J44.9 COPD, SEVERE (HCC): ICD-10-CM

## 2024-02-02 DIAGNOSIS — I70.0 THORACIC AORTA ATHEROSCLEROSIS (HCC): ICD-10-CM

## 2024-02-02 DIAGNOSIS — Z98.890 S/P CAROTID ENDARTERECTOMY: ICD-10-CM

## 2024-02-02 DIAGNOSIS — I15.2 HYPERTENSION ASSOCIATED WITH TYPE 2 DIABETES MELLITUS  (HCC): ICD-10-CM

## 2024-02-02 DIAGNOSIS — N18.30 CONTROLLED TYPE 2 DIABETES MELLITUS WITH STAGE 3 CHRONIC KIDNEY DISEASE, WITHOUT LONG-TERM CURRENT USE OF INSULIN (HCC): ICD-10-CM

## 2024-02-02 DIAGNOSIS — E11.22 CONTROLLED TYPE 2 DIABETES MELLITUS WITH STAGE 3 CHRONIC KIDNEY DISEASE, WITHOUT LONG-TERM CURRENT USE OF INSULIN (HCC): ICD-10-CM

## 2024-02-02 DIAGNOSIS — E11.69 HYPERLIPIDEMIA ASSOCIATED WITH TYPE 2 DIABETES MELLITUS  (HCC): ICD-10-CM

## 2024-02-02 DIAGNOSIS — Z85.3 HISTORY OF BREAST CANCER: ICD-10-CM

## 2024-02-02 NOTE — PROGRESS NOTES
Called patient regarding CCM monthly no answer mail box full.      Time Spent This Encounter Total: 3  min medical record review  Monthly Minute Total including today: 3 minutes    Fabiana Banerjee [facility]  Care Management  Phone: [phone]  [email or web address]

## 2024-02-02 NOTE — PROGRESS NOTES
Patient called back for [facility] monthly.  Patient stated that she is getting ready to go to [place] her uncle passed away and he was a doctor.  Per patient he was taking care of everybody.  Patient stated that her condition is the same.  Informed patient that her expense form was faxed to [facility].  Patient stated that they are raising her ret again once a year it keeps going up and up by $40 dollars. Patient gets meal on wheal.  She stated that her ankle swelling is better. Cough is gone she stated that she had the worst cough ever, but thank god the cough went away.     Patient stated that her blood sugar today was 90 and her blood pressure was 116/69.  She is very amazed how her numbers are good.    Patient stated that during office visit with Dr. Braun on 1/16/2024 her weight was 173 and 3 days latter her weight is 165 she did recheck her weight and it is again 165.  Patient to monitor her weight and when she comes back from [place] to see Dr. Braun again.      Listened and provided support.     Future Appointments   Date Time Provider Department Center   2/6/2024 11:20 AM Riki Tran MD [id number] [facility]   2/8/2024  1:15 PM [facility] PULM PHASE 2 [facility] CP REHAB EM [facility]   2/15/2024  1:15 PM [facility] PULM PHASE 2 [facility] CP REHAB EM [facility]   2/19/2024  3:00 PM Enrique Braun MD [facility]     Total time - 12 min  Total Monthly time- 15 min    Fabiana Banerjee [facility]  Care Management  Phone: [phone]  [facility]        .

## 2024-02-06 ENCOUNTER — LAB ENCOUNTER (OUTPATIENT)
Dept: LAB | Facility: HOSPITAL | Age: 79
End: 2024-02-06
Attending: INTERNAL MEDICINE
Payer: MEDICARE

## 2024-02-06 ENCOUNTER — OFFICE VISIT (OUTPATIENT)
Dept: NEPHROLOGY | Facility: CLINIC | Age: 79
End: 2024-02-06

## 2024-02-06 VITALS
BODY MASS INDEX: 29.53 KG/M2 | WEIGHT: 173 LBS | SYSTOLIC BLOOD PRESSURE: 124 MMHG | HEART RATE: 85 BPM | DIASTOLIC BLOOD PRESSURE: 65 MMHG | HEIGHT: 64 IN

## 2024-02-06 DIAGNOSIS — M1A.9XX1 CHRONIC TOPHACEOUS GOUT: ICD-10-CM

## 2024-02-06 DIAGNOSIS — E78.5 HYPERLIPIDEMIA ASSOCIATED WITH TYPE 2 DIABETES MELLITUS  (HCC): ICD-10-CM

## 2024-02-06 DIAGNOSIS — N18.31 STAGE 3A CHRONIC KIDNEY DISEASE (HCC): Primary | ICD-10-CM

## 2024-02-06 DIAGNOSIS — E11.69 HYPERLIPIDEMIA ASSOCIATED WITH TYPE 2 DIABETES MELLITUS  (HCC): ICD-10-CM

## 2024-02-06 DIAGNOSIS — N18.31 STAGE 3A CHRONIC KIDNEY DISEASE (HCC): ICD-10-CM

## 2024-02-06 LAB
ALBUMIN SERPL-MCNC: 3.9 G/DL (ref 3.2–4.8)
ALBUMIN/GLOB SERPL: 1.3 {RATIO} (ref 1–2)
ALP LIVER SERPL-CCNC: 76 U/L
ALT SERPL-CCNC: 18 U/L
ANION GAP SERPL CALC-SCNC: 5 MMOL/L (ref 0–18)
AST SERPL-CCNC: 21 U/L (ref ?–34)
BASOPHILS # BLD AUTO: 0.03 X10(3) UL (ref 0–0.2)
BASOPHILS NFR BLD AUTO: 0.5 %
BILIRUB SERPL-MCNC: 0.3 MG/DL (ref 0.2–1.1)
BUN BLD-MCNC: 18 MG/DL (ref 9–23)
BUN/CREAT SERPL: 14.9 (ref 10–20)
CALCIUM BLD-MCNC: 10.5 MG/DL (ref 8.7–10.4)
CHLORIDE SERPL-SCNC: 104 MMOL/L (ref 98–112)
CHOLEST SERPL-MCNC: 241 MG/DL (ref ?–200)
CO2 SERPL-SCNC: 31 MMOL/L (ref 21–32)
CREAT BLD-MCNC: 1.21 MG/DL
DEPRECATED RDW RBC AUTO: 46.7 FL (ref 35.1–46.3)
EGFRCR SERPLBLD CKD-EPI 2021: 46 ML/MIN/1.73M2 (ref 60–?)
EOSINOPHIL # BLD AUTO: 0.17 X10(3) UL (ref 0–0.7)
EOSINOPHIL NFR BLD AUTO: 2.6 %
ERYTHROCYTE [DISTWIDTH] IN BLOOD BY AUTOMATED COUNT: 13.8 % (ref 11–15)
FASTING PATIENT LIPID ANSWER: NO
FASTING STATUS PATIENT QL REPORTED: NO
GLOBULIN PLAS-MCNC: 2.9 G/DL (ref 2.8–4.4)
GLUCOSE BLD-MCNC: 137 MG/DL (ref 70–99)
HCT VFR BLD AUTO: 40.7 %
HDLC SERPL-MCNC: 41 MG/DL (ref 40–59)
HGB BLD-MCNC: 12.8 G/DL
IMM GRANULOCYTES # BLD AUTO: 0.03 X10(3) UL (ref 0–1)
IMM GRANULOCYTES NFR BLD: 0.5 %
LDLC SERPL CALC-MCNC: 146 MG/DL (ref ?–100)
LYMPHOCYTES # BLD AUTO: 1.19 X10(3) UL (ref 1–4)
LYMPHOCYTES NFR BLD AUTO: 17.9 %
MCH RBC QN AUTO: 28.8 PG (ref 26–34)
MCHC RBC AUTO-ENTMCNC: 31.4 G/DL (ref 31–37)
MCV RBC AUTO: 91.7 FL
MONOCYTES # BLD AUTO: 0.82 X10(3) UL (ref 0.1–1)
MONOCYTES NFR BLD AUTO: 12.3 %
NEUTROPHILS # BLD AUTO: 4.42 X10 (3) UL (ref 1.5–7.7)
NEUTROPHILS # BLD AUTO: 4.42 X10(3) UL (ref 1.5–7.7)
NEUTROPHILS NFR BLD AUTO: 66.2 %
NONHDLC SERPL-MCNC: 200 MG/DL (ref ?–130)
OSMOLALITY SERPL CALC.SUM OF ELEC: 294 MOSM/KG (ref 275–295)
PHOSPHATE SERPL-MCNC: 2.5 MG/DL (ref 2.4–5.1)
PLATELET # BLD AUTO: 208 10(3)UL (ref 150–450)
POTASSIUM SERPL-SCNC: 4.3 MMOL/L (ref 3.5–5.1)
PROT SERPL-MCNC: 6.8 G/DL (ref 5.7–8.2)
PTH-INTACT SERPL-MCNC: 88.4 PG/ML (ref 18.5–88)
RBC # BLD AUTO: 4.44 X10(6)UL
SODIUM SERPL-SCNC: 140 MMOL/L (ref 136–145)
TRIGL SERPL-MCNC: 293 MG/DL (ref 30–149)
URATE SERPL-MCNC: 6.2 MG/DL
VIT D+METAB SERPL-MCNC: 44.5 NG/ML (ref 30–100)
VLDLC SERPL CALC-MCNC: 56 MG/DL (ref 0–30)
WBC # BLD AUTO: 6.7 X10(3) UL (ref 4–11)

## 2024-02-06 PROCEDURE — 82306 VITAMIN D 25 HYDROXY: CPT

## 2024-02-06 PROCEDURE — 1159F MED LIST DOCD IN RCRD: CPT | Performed by: INTERNAL MEDICINE

## 2024-02-06 PROCEDURE — 99214 OFFICE O/P EST MOD 30 MIN: CPT | Performed by: INTERNAL MEDICINE

## 2024-02-06 PROCEDURE — 3074F SYST BP LT 130 MM HG: CPT | Performed by: INTERNAL MEDICINE

## 2024-02-06 PROCEDURE — 83970 ASSAY OF PARATHORMONE: CPT

## 2024-02-06 PROCEDURE — 3078F DIAST BP <80 MM HG: CPT | Performed by: INTERNAL MEDICINE

## 2024-02-06 PROCEDURE — 80061 LIPID PANEL: CPT

## 2024-02-06 PROCEDURE — 3008F BODY MASS INDEX DOCD: CPT | Performed by: INTERNAL MEDICINE

## 2024-02-06 PROCEDURE — 36415 COLL VENOUS BLD VENIPUNCTURE: CPT

## 2024-02-06 PROCEDURE — 85025 COMPLETE CBC W/AUTO DIFF WBC: CPT

## 2024-02-06 PROCEDURE — 84100 ASSAY OF PHOSPHORUS: CPT

## 2024-02-06 PROCEDURE — 80053 COMPREHEN METABOLIC PANEL: CPT

## 2024-02-06 PROCEDURE — 84550 ASSAY OF BLOOD/URIC ACID: CPT

## 2024-02-07 ENCOUNTER — TELEPHONE (OUTPATIENT)
Dept: NEPHROLOGY | Facility: CLINIC | Age: 79
End: 2024-02-07

## 2024-02-07 NOTE — PROGRESS NOTES
02/06/24        Patient: Ariana Osorio   YOB: 1945   Date of Visit: 2/6/2024       Dear  Dr. Kade MD,      Thank you for referring Ariana Osorio to my practice.  Please find my assessment and plan below.      As you know she is a 78-year-old female with a history hypertension, adult onset diabetes mellitus, primary hyperparathyroidism, coronary artery disease, status post CABG, gout, asthma/COPD who I now had the pleasure of seeing for follow-up of chronic kidney disease stage III.  The patient was recently hospitalized in December 2023 for acute exacerbation of COPD.  She did improve and was discharged home.  Unfortunately then she developed COVID she was treated with Paxlovid and again symptoms have improved.  Now overall is feeling well without any chest pain, shortness of breath, GI or urinary tract symptoms.  Has home oxygen but is uses it as needed.    On physical exam her blood pressure 1/24/1964 with a pulse 85 and she weighed 173 pounds.  Her neck was supple without JVD.  Lungs were clear.  Heart revealed a regular rate and rhythm with an S4 but no gallops, murmurs or rubs.  Abdomen soft, flat, nontender without again a megaly, masses or bruits.  Extremities revealed no clubbing, cyanosis or edema.    I reviewed recent laboratory studies.  Back on December 29, 2023 when she was in the hospital creatinine was 1.14 with an estimated GFR of 50 cc/min.  I repeated her laboratory studies today on February 6, 2024.  Creatinine overall stable at 1.21 with an estimated GFR 46 cc/min.  Calcium was mildly elevated at 10.5.  Vitamin D was acceptable but her PTH was just borderline elevated at 88.4.    Therefore reassured her that her renal function overall is stable.  Maintain adequate hydration.  Avoid nonsteroidals.  Blood pressures are under control.  She will continue to do CBC and renal panels quarterly.  I will see her again in 6 months for follow-up or sooner if clinically  indicated.    The patient had seen Dr. Leone in the past for primary hyperparathyroidism.  She was started on Sensipar.  The patient though discontinued this medication on her own a number of months ago secondary to GI side effects.  As her numbers are just borderline elevated I recommended that she see Dr. Leone for follow-up to review these findings.    Thank you again for allowing me to participate in the care of your patient.  If you have any questions please feel free to call.           Sincerely,   Riki Tran MD   [facility], [facility], [place]  [address]  [facility] [place] [address]    Document electronically generated by:  Riki Tran MD

## 2024-02-07 NOTE — TELEPHONE ENCOUNTER
Reviewed labs she did yesterday.  Kidney function overall stable.  Her calcium and parathyroid hormone levels are just borderline elevated.  For now she can hold off on taking Sensipar but she should follow-up with Dr. Leone to review these labs.  Otherwise have her repeat her kidney standing order in 3 months.  Orders are in the computer.

## 2024-02-07 NOTE — TELEPHONE ENCOUNTER
Riki Tran MD16 minutes ago (11:09 AM)       Reviewed labs she did yesterday.  Kidney function overall stable.  Her calcium and parathyroid hormone levels are just borderline elevated.  For now she can hold off on taking Sensipar but she should follow-up with Dr. Leone to review these labs.  Otherwise have her repeat her kidney standing order in 3 months.  Orders are in the computer.         Note

## 2024-02-07 NOTE — PATIENT INSTRUCTIONS
Repeat your kidney blood test in 3 months.  Orders on the computer.  See Dr. Leone for follow-up regarding your calcium and parathyroid hormone.

## 2024-02-08 ENCOUNTER — TELEPHONE (OUTPATIENT)
Dept: ENDOCRINOLOGY CLINIC | Facility: CLINIC | Age: 79
End: 2024-02-08

## 2024-02-08 ENCOUNTER — CARDPULM VISIT (OUTPATIENT)
Dept: CARDIAC REHAB | Facility: HOSPITAL | Age: 79
End: 2024-02-08
Attending: INTERNAL MEDICINE
Payer: MEDICARE

## 2024-02-08 DIAGNOSIS — E21.3 HYPERPARATHYROIDISM (HCC): Primary | ICD-10-CM

## 2024-02-08 PROCEDURE — 94625 PHY/QHP OP PULM RHB W/O MNTR: CPT

## 2024-02-08 NOTE — TELEPHONE ENCOUNTER
Pt states she needs to be seen as soon as possible after having recent blood test pt declined the next date on Feb 16th please follow up

## 2024-02-08 NOTE — TELEPHONE ENCOUNTER
Spoke to pt ( confirmed). Will speak with Dr. Leone regarding labs. Verbalized understanding about not needing sensipar yet. Will repeat labs in may.

## 2024-02-08 NOTE — TELEPHONE ENCOUNTER
Ok to offer Res24.     Is she taking MVI or calcium supplement?  If yes then please ask her to stop and recheck BMP, PTH prior to appointment. Thanks.

## 2024-02-08 NOTE — TELEPHONE ENCOUNTER
Dr. Leone, patient states she needs to be seen ASAP, due to labs results from Dr. Tran (\"calcium and parathyroid hormone levels are just borderline elevated\")    Ok to offer res24? LOV with you was 4/20/23.

## 2024-02-09 NOTE — TELEPHONE ENCOUNTER
Spoke with patient she she is taking a multivitamin so she will stop. Scheduled appt with Dr. Leone for 3/18/24. She will have labs done before appt. Orders placed.

## 2024-02-13 ENCOUNTER — LAB ENCOUNTER (OUTPATIENT)
Dept: LAB | Facility: HOSPITAL | Age: 79
End: 2024-02-13
Attending: INTERNAL MEDICINE
Payer: MEDICARE

## 2024-02-13 ENCOUNTER — TELEPHONE (OUTPATIENT)
Dept: NEPHROLOGY | Facility: CLINIC | Age: 79
End: 2024-02-13

## 2024-02-13 DIAGNOSIS — N18.31 STAGE 3A CHRONIC KIDNEY DISEASE (HCC): ICD-10-CM

## 2024-02-13 DIAGNOSIS — E21.3 HYPERPARATHYROIDISM (HCC): ICD-10-CM

## 2024-02-13 LAB
ALBUMIN SERPL-MCNC: 4.1 G/DL (ref 3.2–4.8)
ANION GAP SERPL CALC-SCNC: 2 MMOL/L (ref 0–18)
BASOPHILS # BLD AUTO: 0.05 X10(3) UL (ref 0–0.2)
BASOPHILS NFR BLD AUTO: 0.7 %
BUN BLD-MCNC: 16 MG/DL (ref 9–23)
BUN/CREAT SERPL: 12.4 (ref 10–20)
CALCIUM BLD-MCNC: 10.7 MG/DL (ref 8.7–10.4)
CHLORIDE SERPL-SCNC: 104 MMOL/L (ref 98–112)
CO2 SERPL-SCNC: 33 MMOL/L (ref 21–32)
CREAT BLD-MCNC: 1.29 MG/DL
DEPRECATED RDW RBC AUTO: 50.9 FL (ref 35.1–46.3)
EGFRCR SERPLBLD CKD-EPI 2021: 43 ML/MIN/1.73M2 (ref 60–?)
EOSINOPHIL # BLD AUTO: 0.29 X10(3) UL (ref 0–0.7)
EOSINOPHIL NFR BLD AUTO: 4 %
ERYTHROCYTE [DISTWIDTH] IN BLOOD BY AUTOMATED COUNT: 14.6 % (ref 11–15)
GLUCOSE BLD-MCNC: 114 MG/DL (ref 70–99)
HCT VFR BLD AUTO: 44.3 %
HGB BLD-MCNC: 13.4 G/DL
IMM GRANULOCYTES # BLD AUTO: 0.13 X10(3) UL (ref 0–1)
IMM GRANULOCYTES NFR BLD: 1.8 %
LYMPHOCYTES # BLD AUTO: 1.4 X10(3) UL (ref 1–4)
LYMPHOCYTES NFR BLD AUTO: 19.5 %
MCH RBC QN AUTO: 28.7 PG (ref 26–34)
MCHC RBC AUTO-ENTMCNC: 30.2 G/DL (ref 31–37)
MCV RBC AUTO: 94.9 FL
MONOCYTES # BLD AUTO: 0.79 X10(3) UL (ref 0.1–1)
MONOCYTES NFR BLD AUTO: 11 %
NEUTROPHILS # BLD AUTO: 4.52 X10 (3) UL (ref 1.5–7.7)
NEUTROPHILS # BLD AUTO: 4.52 X10(3) UL (ref 1.5–7.7)
NEUTROPHILS NFR BLD AUTO: 63 %
OSMOLALITY SERPL CALC.SUM OF ELEC: 290 MOSM/KG (ref 275–295)
PHOSPHATE SERPL-MCNC: 2.7 MG/DL (ref 2.4–5.1)
PLATELET # BLD AUTO: 284 10(3)UL (ref 150–450)
POTASSIUM SERPL-SCNC: 4.4 MMOL/L (ref 3.5–5.1)
PTH-INTACT SERPL-MCNC: 130.6 PG/ML (ref 18.5–88)
RBC # BLD AUTO: 4.67 X10(6)UL
SODIUM SERPL-SCNC: 139 MMOL/L (ref 136–145)
WBC # BLD AUTO: 7.2 X10(3) UL (ref 4–11)

## 2024-02-13 PROCEDURE — 85025 COMPLETE CBC W/AUTO DIFF WBC: CPT

## 2024-02-13 PROCEDURE — 36415 COLL VENOUS BLD VENIPUNCTURE: CPT

## 2024-02-13 PROCEDURE — 80069 RENAL FUNCTION PANEL: CPT

## 2024-02-13 PROCEDURE — 83970 ASSAY OF PARATHORMONE: CPT

## 2024-02-13 NOTE — TELEPHONE ENCOUNTER
Dr Tran Informed patient of note below , reported not sure why she went for blood test that Dr Leone ordered maybe lab happened to include it ,advised pt that due labs will be not until May to let the lab know if pt has another lab ordered from other doctor pt verbalized understanding.

## 2024-02-13 NOTE — TELEPHONE ENCOUNTER
Labs overall look stable.  But was there a reason why she did this test now.  She just did labs a week ago was supposed to repeat them in 3 months.  If doing well just repeat labs in early May.

## 2024-02-15 ENCOUNTER — APPOINTMENT (OUTPATIENT)
Dept: CARDIAC REHAB | Facility: HOSPITAL | Age: 79
End: 2024-02-15
Attending: INTERNAL MEDICINE
Payer: MEDICARE

## 2024-02-15 PROCEDURE — 94625 PHY/QHP OP PULM RHB W/O MNTR: CPT

## 2024-02-15 NOTE — TELEPHONE ENCOUNTER
Please review; protocol failed/ has no protocol    Requested Prescriptions   Pending Prescriptions Disp Refills    AMLODIPINE 5 MG Oral Tab [Pharmacy Med Name: AMLODIPINE BESYLATE 5 MG Tablet] 90 tablet 3     Sig: TAKE 1 TABLET EVERY DAY       Hypertension Medications Protocol Failed - 2/14/2024  3:30 AM        Failed - EGFRCR or GFRNAA > 50     GFR Evaluation  EGFRCR: 43 , resulted on 2/13/2024          Passed - CMP or BMP in past 12 months        Passed - Last BP reading less than 140/90     BP Readings from Last 1 Encounters:   02/06/24 124/65               Passed - In person appointment or virtual visit in the past 12 mos or appointment in next 3 mos     Recent Outpatient Visits              1 week ago Stage 3a chronic kidney disease (HCC)    [facility] Riki Tran MD    Office Visit    1 month ago COPD with acute exacerbation ([facility])    [facility] Enrique Braun MD    Office Visit    2 months ago Controlled type 2 diabetes mellitus with stage 3 chronic kidney disease, without long-term current use of insulin (Formerly [facility])    [facility] Enrique Braun MD    Office Visit    3 months ago Verruca(e)    [facility] Demarco Garcia PA-C    Office Visit    4 months ago Nodule of finger, left    [facility] Enrique Braun MD    Office Visit          Future Appointments         Provider Department Appt Notes    In 4 days Enrique Braun MD [facility] px, last px: 02/20/23, policy informed    In 1 month Aaliyah Leone MD [facility]     In 5 months Riki Tran MD [facility]                   Recent Outpatient Visits              1 week ago Stage 3a chronic  kidney disease (HCC)    Counts include 234 beds at the [facility] Riki Tran MD    Office Visit    1 month ago COPD with acute exacerbation ([facility]ly [facility])    [facility] Enrique Braun MD    Office Visit    2 months ago Controlled type 2 diabetes mellitus with stage 3 chronic kidney disease, without long-term current use of insulin ([facility])    [facility] Enrique Braun MD    Office Visit    3 months ago Verruca(e)    [facility] Demarco Garcia PA-C    Office Visit    4 months ago Nodule of finger, left    [facility] Enrique Braun MD    Office Visit          Future Appointments         Provider Department Appt Notes    In 4 days Enrique Braun MD [facility] px, last px: 02/20/23, policy informed    In 1 month Aaliyah Leone MD Counts include 234 beds at the [facility]     In 5 months Riki Tran MD Counts include 234 beds at the [facility]

## 2024-02-16 RX ORDER — AMLODIPINE BESYLATE 5 MG/1
5 TABLET ORAL DAILY
Qty: 90 TABLET | Refills: 1 | Status: SHIPPED | OUTPATIENT
Start: 2024-02-16

## 2024-02-19 ENCOUNTER — OFFICE VISIT (OUTPATIENT)
Dept: INTERNAL MEDICINE CLINIC | Facility: CLINIC | Age: 79
End: 2024-02-19

## 2024-02-19 VITALS
OXYGEN SATURATION: 95 % | HEIGHT: 64 IN | TEMPERATURE: 99 F | WEIGHT: 168.88 LBS | DIASTOLIC BLOOD PRESSURE: 70 MMHG | BODY MASS INDEX: 28.83 KG/M2 | HEART RATE: 84 BPM | SYSTOLIC BLOOD PRESSURE: 124 MMHG

## 2024-02-19 DIAGNOSIS — I15.2 HYPERTENSION ASSOCIATED WITH TYPE 2 DIABETES MELLITUS (HCC): ICD-10-CM

## 2024-02-19 DIAGNOSIS — J96.11 CHRONIC RESPIRATORY FAILURE WITH HYPOXIA (HCC): ICD-10-CM

## 2024-02-19 DIAGNOSIS — Z85.3 HISTORY OF BREAST CANCER: ICD-10-CM

## 2024-02-19 DIAGNOSIS — M1A.9XX1 CHRONIC TOPHACEOUS GOUT: ICD-10-CM

## 2024-02-19 DIAGNOSIS — I50.32 CHRONIC DIASTOLIC CONGESTIVE HEART FAILURE (HCC): ICD-10-CM

## 2024-02-19 DIAGNOSIS — I73.9 PAD (PERIPHERAL ARTERY DISEASE) (HCC): ICD-10-CM

## 2024-02-19 DIAGNOSIS — I65.23 CAROTID STENOSIS, NON-SYMPTOMATIC, BILATERAL: ICD-10-CM

## 2024-02-19 DIAGNOSIS — Z00.00 MEDICARE ANNUAL WELLNESS VISIT, SUBSEQUENT: Primary | ICD-10-CM

## 2024-02-19 DIAGNOSIS — N18.32 STAGE 3B CHRONIC KIDNEY DISEASE (HCC): ICD-10-CM

## 2024-02-19 DIAGNOSIS — Z91.81 AT RISK FOR FALLS: ICD-10-CM

## 2024-02-19 DIAGNOSIS — E11.22 CONTROLLED TYPE 2 DIABETES MELLITUS WITH STAGE 3 CHRONIC KIDNEY DISEASE, WITHOUT LONG-TERM CURRENT USE OF INSULIN (HCC): ICD-10-CM

## 2024-02-19 DIAGNOSIS — E78.5 HYPERLIPIDEMIA ASSOCIATED WITH TYPE 2 DIABETES MELLITUS (HCC): ICD-10-CM

## 2024-02-19 DIAGNOSIS — H91.93 BILATERAL HEARING LOSS, UNSPECIFIED HEARING LOSS TYPE: ICD-10-CM

## 2024-02-19 DIAGNOSIS — E55.9 VITAMIN D DEFICIENCY: ICD-10-CM

## 2024-02-19 DIAGNOSIS — J44.89 ASTHMA-COPD OVERLAP SYNDROME: ICD-10-CM

## 2024-02-19 DIAGNOSIS — Z95.1 S/P CABG (CORONARY ARTERY BYPASS GRAFT): ICD-10-CM

## 2024-02-19 DIAGNOSIS — M17.0 PRIMARY OSTEOARTHRITIS OF BOTH KNEES: ICD-10-CM

## 2024-02-19 DIAGNOSIS — N18.30 CONTROLLED TYPE 2 DIABETES MELLITUS WITH STAGE 3 CHRONIC KIDNEY DISEASE, WITHOUT LONG-TERM CURRENT USE OF INSULIN (HCC): ICD-10-CM

## 2024-02-19 DIAGNOSIS — I70.0 THORACIC AORTA ATHEROSCLEROSIS (HCC): ICD-10-CM

## 2024-02-19 DIAGNOSIS — H61.23 BILATERAL IMPACTED CERUMEN: ICD-10-CM

## 2024-02-19 DIAGNOSIS — E11.59 HYPERTENSION ASSOCIATED WITH TYPE 2 DIABETES MELLITUS (HCC): ICD-10-CM

## 2024-02-19 DIAGNOSIS — Z98.890 S/P CAROTID ENDARTERECTOMY: ICD-10-CM

## 2024-02-19 DIAGNOSIS — E11.69 HYPERLIPIDEMIA ASSOCIATED WITH TYPE 2 DIABETES MELLITUS (HCC): ICD-10-CM

## 2024-02-19 DIAGNOSIS — E21.3 HYPERPARATHYROIDISM (HCC): ICD-10-CM

## 2024-02-19 DIAGNOSIS — H40.9 GLAUCOMA, UNSPECIFIED GLAUCOMA TYPE, UNSPECIFIED LATERALITY: ICD-10-CM

## 2024-02-19 DIAGNOSIS — I25.10 CORONARY ARTERY DISEASE INVOLVING NATIVE CORONARY ARTERY OF NATIVE HEART WITHOUT ANGINA PECTORIS: ICD-10-CM

## 2024-02-19 DIAGNOSIS — M85.859 OSTEOPENIA OF HIP, UNSPECIFIED LATERALITY: ICD-10-CM

## 2024-02-19 PROBLEM — M25.561 ACUTE PAIN OF RIGHT KNEE: Status: RESOLVED | Noted: 2023-07-04 | Resolved: 2024-02-19

## 2024-02-19 PROBLEM — R26.2 UNABLE TO WALK: Status: RESOLVED | Noted: 2023-07-04 | Resolved: 2024-02-19

## 2024-02-19 RX ORDER — POTASSIUM CHLORIDE 1500 MG/1
TABLET, EXTENDED RELEASE ORAL
Status: ON HOLD | COMMUNITY
Start: 2024-02-12

## 2024-02-19 RX ORDER — FUROSEMIDE 20 MG/1
20 TABLET ORAL AS NEEDED
Status: ON HOLD | COMMUNITY
Start: 2024-02-12

## 2024-02-19 NOTE — PROGRESS NOTES
Subjective:   Ariana Osorio is a 79 year old female who presents for a Medicare Subsequent Annual Wellness visit (Pt already had Initial Annual Wellness) and scheduled follow up of multiple significant but stable problems.       History/Other:   Fall Risk Assessment:   She has been screened for Falls and is High Risk. Fall Prevention information provided to patient in After Visit Summary.    Do you feel unsteady when standing or walking?: Yes  Do you worry about falling?: Yes  Have you fallen in the past year?: No     Cognitive Assessment:   She had a completely normal cognitive assessment - see flowsheet entries     Functional Ability/Status:   Ariana Osorio has some abnormal functions as listed below:  She has Driving difficulties based on screening of functional status. She has difficulties Shopping for Groceries based on screening of functional status. She has Hearing problems based on screening of functional status.She has Vision problems based on screening of functional status. She has Walking problems based on screening of functional status.       Depression Screening (PHQ-2/PHQ-9): PHQ-2 SCORE: 0  , done 2/19/2024   If you checked off any problems, how difficult have these problems made it for you to do your work, take care of things at home, or get along with other people?: Not difficult at all             Advanced Directives:   She does have a Living Will but we do NOT have it on file in Epic.    She has a Power of  for Health Care on file in [employer].  Patient has Advance Care Planning documents present in EMR. Reviewed documents with patient (and family/surrogate if present).      Patient Active Problem List   Diagnosis    History of breast cancer    Medicare annual wellness visit, subsequent    Chronic tophaceous gout    Impacted cerumen, bilateral    COPD, severe (HCC)    Coronary artery disease    S/P CABG (coronary artery bypass graft)    At risk for falls    Stage 3b chronic kidney  disease (HCC)    Controlled type 2 diabetes mellitus with stage 3 chronic kidney disease, without long-term current use of insulin (HCC)    Hypertension associated with type 2 diabetes mellitus (HCC)    Hyperlipidemia associated with type 2 diabetes mellitus (HCC)    S/P carotid endarterectomy    Chronic diastolic congestive heart failure (HCC)    Bilateral hearing loss    Hyperparathyroidism (HCC)    Glaucoma    Vitamin D deficiency    Carotid stenosis, non-symptomatic, bilateral    PAD (peripheral artery disease) (HCC)    Thoracic aorta atherosclerosis (HCC)    Osteopenia of hip    Right knee pain    COPD exacerbation ([facility])     Allergies:  She is allergic to allopurinol, dog epithelium, dust, smoke, adhesive tape (rosins), montelukast, statins, xanax [alprazolam], adhesive tape, other, sulfa antibiotics, ace inhibitors, and aspirin.    Current Medications:  Outpatient Medications Marked as Taking for the 2/19/24 encounter (Office Visit) with Enrique Braun MD   Medication Sig    LASIX 20 MG Oral Tab Lasix 20 mg tablet, [RxNorm: 497616]    Potassium Chloride ER 20 MEQ Oral Tab CR potassium chloride ER 20 mEq tablet,extended release, [RxNorm: 477733]    amLODIPine 5 MG Oral Tab Take 1 tablet (5 mg total) by mouth daily.    glipiZIDE 5 MG Oral Tab Take 1 tablet (5 mg total) by mouth before breakfast. (Patient taking differently: Take 1 tablet (5 mg total) by mouth before breakfast. prn)    pantoprazole 20 MG Oral Tab EC Take 1 tablet (20 mg total) by mouth every morning before breakfast.    hydrALAZINE 25 MG Oral Tab TAKE 1/2 TABLET TWICE DAILY    metoprolol succinate ER 25 MG Oral Tablet 24 Hr Take 1 tablet (25 mg total) by mouth daily.    colchicine 0.6 MG Oral Tab Take 1 tablet (0.6 mg total) by mouth as needed.    febuxostat 40 MG Oral Tab Take 1 tablet (40 mg total) by mouth daily.    omega-3 fatty acids 1000 MG Oral Cap Take 1,000 mg by mouth daily.    Cholecalciferol (VITAMIN D) 2000 units Oral Cap Take  1 capsule (2,000 Units total) by mouth daily.       Medical History:  She  has a past medical history of Age-related cataract of left eye (05/10/2018), Age-related cataract of right eye (07/28/2022), Anxiety, Asthma ([facility]), Atherosclerosis of coronary artery, Breast CA (HCC) (1999), Breast CA (HCC) (2014), Cancer (HCC), Carotid artery disease (HCC) (12/05/2016), Carotid stenosis, Closed fracture of multiple ribs of left side with routine healing, subsequent encounter (10/19/2018), Congestive heart disease (HCC), COPD (chronic obstructive pulmonary disease) ([facility]), Coronary atherosclerosis, Depression, Diabetes ([facility]) (07/28/2022), Diastolic dysfunction without heart failure, Esophageal reflux, Essential hypertension, Generalized anxiety disorder, Gout, Gout, High blood pressure, High cholesterol, History of pituitary adenoma (05/10/2018), Hyperlipidemia, Major depressive disorder, single episode, moderate ([facility]), and Obesity.  Surgical History:  She  has a past surgical history that includes Carotid endarterectomy (Bilateral); colonoscopy; hernia surgery; cataract; radiation right (2014); lumpectomy right (2014); cabg; colonoscopy (2013); mastectomy left (Left, 1999); and colonoscopy (N/A, 02/21/2023).   Family History:  Her family history includes Asthma in her brother, father, and sister; Breast Cancer (age of onset: 43) in her self; Heart Disorder in her father; Hypertension in her father; Other in her brother and mother.  Social History:  She  reports that she has never smoked. She has never been exposed to tobacco smoke. She has never used smokeless tobacco. She reports that she does not drink alcohol and does not use drugs.    Tobacco:  She has never smoked tobacco.    CAGE Alcohol Screen:   CAGE screening score of 0 on 2/19/2024, showing low risk of alcohol abuse.      Patient Care Team:  Enrique Braun MD as PCP - General (Internal Medicine)  Ryan Costa MD (NEUROLOGY)  Easton Canela, Bhanu PIMENTEL, PT  (Physical Therapy)  Leatha Torres (Physical Therapy)  Frances Steen MD (PULMONARY DISEASES)  Sarbjit Nunn MD (Interventional, Cardiology)  Mitch Kerns MD (RHEUMATOLOGY)  Fabiana Banerjee CMA as [facility] Care Manager  Aaliyah Leone MD (ENDOCRINOLOGY)  Kristina Kapoor RN as [facility] TCM Care Manager (TCM Management)    Review of Systems  GENERAL: feels well otherwise  SKIN: denies any unusual skin lesions  EYES: denies blurred vision or double vision  HEENT: denies nasal congestion, sinus pain or ST  LUNGS: denies shortness of breath with exertion  CARDIOVASCULAR: denies chest pain on exertion  GI: denies abdominal pain, denies heartburn; no hematuria; no hematemesis  : denies dysuria, vaginal discharge or itching, no complaint of urinary incontinence; no hematuria   MUSCULOSKELETAL: denies back pain  NEURO: denies headaches  PSYCHE: denies depression or anxiety  HEMATOLOGIC: denies hx of anemia  ENDOCRINE: denies thyroid history  ALL/ASTHMA:  hx of allergy or asthma    Objective:   Physical Exam  General Appearance:  Alert, cooperative, no distress, appears stated age   Head:  Normocephalic, without obvious abnormality, atraumatic   Eyes:  PERRL, conjunctiva/corneas clear, EOM's intact both eyes   Ears:  Normal TM's and external ear canals, both ears   Nose: Nares normal, septum midline,mucosa normal, no drainage or sinus tenderness   Throat: Lips, mucosa, and tongue normal; teeth and gums normal   Neck: Supple, symmetrical, trachea midline, no adenopathy;  thyroid: not enlarged, symmetric, no tenderness/mass/nodules; no carotid bruit or JVD   Back:   Symmetric, no curvature, ROM normal, no CVA tenderness   Lungs:   Clear to auscultation bilaterally, respirations unlabored   Heart:  Regular rate and rhythm, S1 and S2 normal, no murmur, rub, or gallop   Abdomen:   Soft, non-tender, bowel sounds active all four quadrants,  no masses, no organomegaly   Pelvic: Deferred   Extremities: Extremities normal, atraumatic,  no cyanosis or edema;monofilament testing abnormal both feet; bunion both feet    Pulses: 1+ and symmetric   Skin: Skin color, texture, turgor normal, no rashes or lesions   Lymph nodes: Cervical, supraclavicular,  nodes normal   Neurologic: Normal       /70   Pulse 84   Temp 99.2 °F (37.3 °C) (Tympanic)   Ht 5' 4\" (1.626 m)   Wt 168 lb 14.4 oz (76.6 kg)   SpO2 95%   BMI 28.99 kg/m²  Estimated body mass index is 28.99 kg/m² as calculated from the following:    Height as of this encounter: 5' 4\" (1.626 m).    Weight as of this encounter: 168 lb 14.4 oz (76.6 kg).    Medicare Hearing Assessment:   Hearing Screening    Time taken: 2/19/2024  3:06 PM  Screening Method: Questionnaire  I have a problem hearing over the telephone: No I have trouble following the conversations when two or more people are talking at the same time: Sometimes   I have trouble understanding things on the TV: No I have to strain to understand conversations: No   I have to worry about missing the telephone ring or doorbell: Sometimes I have trouble hearing conversations in a noisy background such as a crowded room or restaurant: No   I get confused about where sounds come from: Sometimes I misunderstand some words in a sentence and need to ask people to repeat themselves: No   I especially have trouble understanding the speech of women and children: Sometimes I have trouble understanding the speaker in a large room such as at a meeting or place of [employer]: No   Many people I talk to seem to mumble (or don't speak clearly): No People get annoyed because I misunderstand what they say: No   I misunderstand what others are saying and make inappropriate responses: No I avoid social activities because I cannot hear well and fear I will reply improperly: No   Family members and friends have told me they think I may have hearing loss: Yes             Visual Acuity:   Right Eye Visual Acuity: Corrected Right Eye Chart Acuity: 20/50   Left Eye  Visual Acuity: Corrected Left Eye Chart Acuity: 20/50   Both Eyes Visual Acuity: Corrected Both Eyes Chart Acuity: 20/40   Able To Tolerate Visual Acuity: Yes        Assessment & Plan:   Ariana Osorio is a 79 year old female who presents for a Medicare Assessment.     (Z00.00) Medicare annual wellness visit, subsequent  (primary encounter diagnosis)  Plan: Patient advised to get RSV vaccine.  She can get her COVID booster after 90 days from her recent COVID infection.    (I25.10) Coronary artery disease involving native coronary artery of native heart without angina pectoris  Plan: Cardio Referral - Internal, Cardio Referral -         Internal        History of CABG before.  She is asymptomatic otherwise.  Currently on antiplatelet therapy but again unable to tolerate statins.  She will continue follow-up with cardiologist.    (E11.22,  N18.30) Controlled type 2 diabetes mellitus with stage 3 chronic kidney disease, without long-term current use of insulin ([facility])  Plan: Podiatry Referral - [facility] ([place]         Podiatry Referral - [facility])        She has been controlled with diet alone.  She will occasionally use glipizide if she is put on steroids whenever she has COPD/asthma exacerbation.  She does have also PAD and diabetic neuropathy and would be ideal for her to see podiatrist for diabetic footcare.    (E11.69,  E78.5) Hyperlipidemia associated with type 2 diabetes mellitus  (HCC)  Plan: Has been difficult to manage given the patient intolerance to statins as well as Zetia.  She is also hesitant to use PCSK9 inhibitor recommended by cardiologist.    (E11.59,  I15.2) Hypertension associated with type 2 diabetes mellitus  (HCC)  Plan: Blood pressure controlled with current blood pressure meds.  CPM.    (M1A.9XX1) Chronic tophaceous gout  Plan: Patient being managed by rheumatologist has been on Uloric which has helped significantly lower her uric acid and her tophi had been  getting smaller.    (N18.32) Stage 3b chronic kidney disease ([facility])  Plan: Patient been followed by allergy service.  Continued avoidance of NSAIDs, maintain control of hypertension as well as diabetes.    (I50.32) Chronic diastolic congestive heart failure ([facility])  Plan: Cardio Referral - Internal        Patient appears to be compensated currently.  The patient ready has upcoming visit with her cardiologist.    (I73.9) PAD (peripheral artery disease) ([facility])  Plan: Has been followed by cardiology.  He is antiplatelet therapy but unable to tolerate statin.    (H91.93) Bilateral hearing loss, unspecified hearing loss type  Plan: Patient does not use a hearing aid.  Attributes this mainly for episodes of impacted cerumen.    (Z95.1) S/P CABG (coronary artery bypass graft)  Plan: History of CABG.  See CAD.    (E21.3) Hyperparathyroidism ([facility])  Plan: Patient will be following up with her Endo Dr. Leone by next week.    (H40.9) Glaucoma, unspecified glaucoma type, unspecified laterality  Plan: Ophthalmology Referral - In Network,         Ophthalmology Referral - In Network        Patient been followed by her ophthalmologist.  Referral given.    (I70.0) Thoracic aorta atherosclerosis ([facility])  Plan: Patient unable to tolerate statins or even Zetia before.    (Z91.81) At risk for falls  Plan: Fall precautions.    (M85.859) Osteopenia of hip, unspecified laterality  Plan: XR DEXA BONE DENSITOMETRY (CPT=77080)        Patient due for DEXA scan.    (M17.0) Primary osteoarthritis of both knees  Plan: Patient uses walking cane walker.    (I65.23) Carotid stenosis, non-symptomatic, bilateral  Plan: She has had a history of bilateral carotid endarterectomy before.  Just had recent carotid Doppler studies done by cardio which she will discuss on her upcoming visit.  She is on antiplatelet therapy however unable to tolerate statins or even Zetia before and declined to use PCSK9 inhibitor.    (Z98.890) S/P carotid endarterectomy  Plan:  Patient history of bilateral carotid endarterectomy..    (E55.9) Vitamin D deficiency  Plan: Continue with vitamin D supplement.    (Z85.3) History of breast cancer  Plan: Remote history of bilateral breast CA first 1 treated with mastectomy and to 1 by lumpectomy.  She will be due for her mammogram and patient advised to do so.    (H61.23) Bilateral impacted cerumen  Plan: ENT Referral - In Network, ENT Referral - In         Network        Refer to ENT.    (J96.11) Chronic respiratory failure with hypoxia (HCC)  Plan: To her asthma COPD overlap.  She continues to use O2 supplement.  See below.    (J44.89) Asthma-COPD overlap syndrome  Plan: pt finishing her pulmonary rehab which the patient states really significantly helped her less than her dyspnea and able to walk more.  She also will continue her Wixela inhaler as well as albuterol inhaler as needed.  She uses her home O2     The patient indicates understanding of these issues and agrees to the plan.  Reinforced healthy diet, lifestyle, and exercise.      No follow-ups on file.     Enrique Braun MD, 2/19/2024     Supplementary Documentation:   General Health:  In the past six months, have you lost more than 10 pounds without trying?: 2 - No  Has your appetite been poor?: No  Type of Diet: Balanced;Low Salt  How does the patient maintain a good energy level?: Appropriate Exercise  How would you describe your daily physical activity?: Light  How would you describe your current health state?: Good  How do you maintain positive mental well-being?: Social Interaction;Visiting Friends;Visiting Family  On a scale of 0 to 10, with 0 being no pain and 10 being severe pain, what is your pain level?: 0 - (None)  In the past six months, have you experienced urine leakage?: 0-No  At any time do you feel concerned for the safety/well-being of yourself and/or your children, in your home or elsewhere?: No  Have you had any immunizations at another office such as  Influenza, Hepatitis B, Tetanus, or Pneumococcal?: No       Ariana Osorio's SCREENING SCHEDULE   Tests on this list are recommended by your physician but may not be covered, or covered at this frequency, by your insurer.   Please check with your insurance carrier before scheduling to verify coverage.   PREVENTATIVE SERVICES FREQUENCY &  COVERAGE DETAILS LAST COMPLETION DATE   Diabetes Screening    Fasting Blood Sugar /  Glucose    One screening every 12 months if never tested or if previously tested but not diagnosed with pre-diabetes   One screening every 6 months if diagnosed with pre-diabetes Lab Results   Component Value Date     (H) 02/13/2024        Cardiovascular Disease Screening    Lipid Panel  Cholesterol  Lipoprotein (HDL)  Triglycerides Covered every 5 years for all Medicare beneficiaries without apparent signs or symptoms of cardiovascular disease Lab Results   Component Value Date    CHOLEST 241 (H) 02/06/2024    HDL 41 02/06/2024     (H) 02/06/2024    TRIG 293 (H) 02/06/2024         Electrocardiogram (EKG)   Covered if needed at Welcome to Medicare, and non-screening if indicated for medical reasons 12/26/2023      Ultrasound Screening for Abdominal Aortic Aneurysm (AAA) Covered once in a lifetime for one of the following risk factors    Men who are 65-75 years old and have ever smoked    Anyone with a family history -     Colorectal Cancer Screening  Covered for ages 50-85; only need ONE of the following:    Colonoscopy   Covered every 10 years    Covered every 2 years if patient is at high risk or previous colonoscopy was abnormal 02/21/2023    No recommendations at this time    Flexible Sigmoidoscopy   Covered every 4 years -    Fecal Occult Blood Test Covered annually -   Bone Density Screening    Bone density screening    Covered every 2 years after age 65 if diagnosed with risk of osteoporosis or estrogen deficiency.    Covered yearly for long-term glucocorticoid medication  use (Steroids) Last Dexa Scan:    XR DEXA BONE DENSITOMETRY (CPT=77080) 04/22/2021      No recommendations at this time   Pap and Pelvic    Pap   Covered every 2 years for women at normal risk; Annually if at high risk -  No recommendations at this time    Chlamydia Annually if high risk -  No recommendations at this time   Screening Mammogram    Mammogram     Recommend annually for all female patients aged 40 and older    One baseline mammogram covered for patients aged 35-39 11/22/2021    Health Maintenance   Topic Date Due    Mammogram  Discontinued       Immunizations    Influenza Covered once per flu season  Please get every year 09/11/2023  No recommendations at this time    Pneumococcal Each vaccine ([id number] & [id number]) covered once after 65 Prevnar 13: 05/10/2018    Eqyuursth10: 07/23/2019     No recommendations at this time    Hepatitis B One screening covered for patients with certain risk factors   -  No recommendations at this time    Tetanus Toxoid Not covered by Medicare Part B unless medically necessary (cut with metal); may be covered with your pharmacy prescription benefits -    Tetanus, Diptheria and Pertusis TD and TDaP Not covered by Medicare Part B -  No recommendations at this time    Zoster Not covered by Medicare Part B; may be covered with your pharmacy  prescription benefits -  No recommendations at this time     Diabetes      Hemoglobin A1C Annually; if last result is elevated, may be asked to retest more frequently.    Medicare covers every 3 months Lab Results   Component Value Date     07/04/2023    A1C 6.0 (A) 12/14/2023       No recommendations at this time    Creat/alb ratio Annually Lab Results   Component Value Date    MICROALBCREA 10.8 04/12/2023       LDL Annually Lab Results   Component Value Date     (H) 02/06/2024       Dilated Eye Exam Annually Last Diabetic Eye Exam:  No data recorded  No data recorded       Annual Monitoring of Persistent Medications  (ACE/ARB, digoxin diuretics, anticonvulsants)    Potassium Annually Lab Results   Component Value Date    K 4.4 02/13/2024         Creatinine   Annually Lab Results   Component Value Date    CREATSERUM 1.29 (H) 02/13/2024         BUN Annually Lab Results   Component Value Date    BUN 16 02/13/2024       Drug Serum Conc Annually No results found for: \"DIGOXIN\", \"DIG\", \"VALP\"           Chronic Obstructive Pulmonary Disease (COPD)    Spirometry Annually Spirometry date: 02/25/2019

## 2024-02-22 ENCOUNTER — CARDPULM VISIT (OUTPATIENT)
Dept: CARDIAC REHAB | Facility: HOSPITAL | Age: 79
End: 2024-02-22
Attending: INTERNAL MEDICINE
Payer: MEDICARE

## 2024-02-22 ENCOUNTER — OFFICE VISIT (OUTPATIENT)
Dept: OTOLARYNGOLOGY | Facility: CLINIC | Age: 79
End: 2024-02-22

## 2024-02-22 DIAGNOSIS — H61.23 BILATERAL IMPACTED CERUMEN: Primary | ICD-10-CM

## 2024-02-22 PROCEDURE — 69210 REMOVE IMPACTED EAR WAX UNI: CPT | Performed by: OTOLARYNGOLOGY

## 2024-02-22 PROCEDURE — 1159F MED LIST DOCD IN RCRD: CPT | Performed by: OTOLARYNGOLOGY

## 2024-02-22 PROCEDURE — 1160F RVW MEDS BY RX/DR IN RCRD: CPT | Performed by: OTOLARYNGOLOGY

## 2024-02-22 NOTE — PROGRESS NOTES
Ariana Osorio is a 79 year old female.    Chief Complaint   Patient presents with    Ear Wax     Ear cleaning        HISTORY OF PRESENT ILLNESS  I have seen in the past for ear issues.  I last saw her several months ago and she states that she has had on and off discomfort in the left ear but always feels that there is something in there causing her to feel full.  Wax cleaned out at her last visit.     9/20/22 she has been having on and off pain which I suspect is mostly Musculoskeletal in nature.  Here for reexamination of her mastoid.  Last visit in June she was noted to have an area of missing skin with exposed bone with soft crusting around that.  No other new signs, symptoms or complaints.     3/7/23 Patient presents for cerumen removal. No other complaints or concerns at this time     2/22/24 Patient presents for cerumen removal. No other complaints or concerns at this time    Social History     Socioeconomic History    Marital status:    Tobacco Use    Smoking status: Never     Passive exposure: Never    Smokeless tobacco: Never   Vaping Use    Vaping Use: Never used   Substance and Sexual Activity    Alcohol use: No     Comment: rarely at weddings    Drug use: No   Other Topics Concern    Reaction to local anesthetic No    Pt has a pacemaker No    Pt has a defibrillator No       Family History   Problem Relation Age of Onset    Asthma Father     Hypertension Father     Heart Disorder Father     Other (Other) Mother         thyroid surgery    Asthma Sister     Asthma Brother     Other (Other) Brother         gout    Breast Cancer Self 43        rt breast 69       Past Medical History:   Diagnosis Date    Age-related cataract of left eye 05/10/2018    Age-related cataract of right eye 07/28/2022    Anxiety     Asthma (HCC)     Atherosclerosis of coronary artery     Breast CA (HCC) 1999    lt mastectomy    Breast CA (HCC) 2014    lumpectomy, right    Cancer (HCC)     breast cancer    Carotid artery  disease (HCC) 12/05/2016    Carotid stenosis     Closed fracture of multiple ribs of left side with routine healing, subsequent encounter 10/19/2018    Congestive heart disease (HCC)     COPD (chronic obstructive pulmonary disease) ([facility])     on O2 at 2 liters    Coronary atherosclerosis     Depression     Diabetes ([facility]) 07/28/2022    takes insulin when hospitalized, takes oral meds at home    Diastolic dysfunction without heart failure     Esophageal reflux     Essential hypertension     Generalized anxiety disorder     Gout     Gout     High blood pressure     High cholesterol     History of pituitary adenoma 05/10/2018    Hyperlipidemia     Major depressive disorder, single episode, moderate (HCC)     Obesity        Past Surgical History:   Procedure Laterality Date    CABG      CAROTID ENDARTERECTOMY Bilateral     CATARACT      COLONOSCOPY      2013    COLONOSCOPY  2013    COLONOSCOPY N/A 02/21/2023    Procedure: COLONOSCOPY;  Surgeon: Abdiaziz Melendez MD;  Location: [facility] ENDOSCOPY    HERNIA SURGERY      LUMPECTOMY RIGHT  2014    MASTECTOMY LEFT Left 1999    RADIATION RIGHT  2014       REVIEW OF SYSTEMS    System Neg/Pos Details   Constitutional Negative Fatigue, fever and weight loss.   ENMT Negative Drooling.   Eyes Negative Blurred vision and vision changes.   Respiratory Negative Dyspnea and wheezing.   Cardio Negative Chest pain, irregular heartbeat/palpitations and syncope.   GI Negative Abdominal pain and diarrhea.   Endocrine Negative Cold intolerance and heat intolerance.   Neuro Negative Tremors.   Psych Negative Anxiety and depression.   Integumentary Negative Frequent skin infections, pigment change and rash.   Hema/Lymph Negative Easy bleeding and easy bruising.           PHYSICAL EXAM    There were no vitals taken for this visit.       Constitutional Normal Overall appearance - Normal.        Neck Exam Normal Inspection - Normal. Palpation - Normal. Parotid gland - Normal. Thyroid gland -  Normal.             Head/Face Normal Facial features - Normal. Eyebrows - Normal. Skull - Normal.             Ears Normal Inspection - Right: Normal, Left: Normal. Canal - Right: Normal, Left: Normal. TM - Right: Normal, Left: Normal.   Skin Normal Inspection - Normal.                              Canals:  Right: Canal reveals cerumen impaction,   Left: Canal reveals cerumen impaction,     Tympanic Membranes:  Right: Normal tympanic membrane.   Left: Normal tympanic membrane.     TM Visualized Method:   Right TM examined via otomicroscopy.    Left TM examined via otomicroscopy.      PROCEDURE:    Removal of cerumen impaction   The cerumen impaction was completely removed using microscopy.   Removal was completed by using acurette and/or suction.   Comments: Return to clinic as needed.  Avoid q-tips, water precautions and use over the counter wax remedies as needed.      Current Outpatient Medications:     LASIX 20 MG Oral Tab, Lasix 20 mg tablet, [RxNorm: 120022], Disp: , Rfl:     Potassium Chloride ER 20 MEQ Oral Tab CR, potassium chloride ER 20 mEq tablet,extended release, [RxNorm: 914968], Disp: , Rfl:     amLODIPine 5 MG Oral Tab, Take 1 tablet (5 mg total) by mouth daily., Disp: 90 tablet, Rfl: 1    glipiZIDE 5 MG Oral Tab, Take 1 tablet (5 mg total) by mouth before breakfast. (Patient taking differently: Take 1 tablet (5 mg total) by mouth before breakfast. prn), Disp: 90 tablet, Rfl: 0    Alcohol Swabs (DROPSAFE ALCOHOL PREP) 70 % Does not apply Pads, Take 1 Bottle by mouth As Directed., Disp: 400 each, Rfl: 3    methylPREDNISolone (MEDROL) 4 MG Oral Tablet Therapy Pack, As directed., Disp: 1 each, Rfl: 0    pantoprazole 20 MG Oral Tab EC, Take 1 tablet (20 mg total) by mouth every morning before breakfast., Disp: , Rfl:     hydrALAZINE 25 MG Oral Tab, TAKE 1/2 TABLET TWICE DAILY, Disp: 90 tablet, Rfl: 1    TRUEplus Lancets 33G Does not apply Misc, 1 each by In Vitro route daily., Disp: 100 each, Rfl: 3     metoprolol succinate ER 25 MG Oral Tablet 24 Hr, Take 1 tablet (25 mg total) by mouth daily., Disp: 90 tablet, Rfl: 3    colchicine 0.6 MG Oral Tab, Take 1 tablet (0.6 mg total) by mouth as needed., Disp: , Rfl:     Glucose Blood (TRUE METRIX BLOOD GLUCOSE TEST) In Vitro Strip, 1 strip by In Vitro route 4 (four) times daily., Disp: 400 strip, Rfl: 3    febuxostat 40 MG Oral Tab, Take 1 tablet (40 mg total) by mouth daily., Disp: 90 tablet, Rfl: 1    omega-3 fatty acids 1000 MG Oral Cap, Take 1,000 mg by mouth daily., Disp: , Rfl:     Multiple Vitamin (MULTI-VITAMIN DAILY) Oral Tab, Take 1 tablet by mouth daily., Disp: , Rfl:     Cholecalciferol (VITAMIN D) 2000 units Oral Cap, Take 1 capsule (2,000 Units total) by mouth daily., Disp: , Rfl:     WIXELA INHUB 500-50 MCG/ACT Inhalation Aerosol Powder, Breath Activated, Inhale 1 puff into the lungs 2 (two) times daily., Disp: 3 each, Rfl: 1  ASSESSMENT AND PLAN    1. Bilateral impacted cerumen        All cerumen was removed using microscopy. I have asked the patient to return to see me as needed for repeat cerumen removal in the future.      Heri Miller MD    2/22/2024    4:38 PM

## 2024-02-25 ENCOUNTER — APPOINTMENT (OUTPATIENT)
Dept: GENERAL RADIOLOGY | Facility: HOSPITAL | Age: 79
End: 2024-02-25
Attending: EMERGENCY MEDICINE
Payer: MEDICARE

## 2024-02-25 ENCOUNTER — HOSPITAL ENCOUNTER (INPATIENT)
Facility: HOSPITAL | Age: 79
LOS: 2 days | Discharge: HOME | End: 2024-02-27
Attending: EMERGENCY MEDICINE | Admitting: HOSPITALIST
Payer: MEDICARE

## 2024-02-25 DIAGNOSIS — J44.1 COPD EXACERBATION (HCC): Primary | ICD-10-CM

## 2024-02-25 LAB
ADENOVIRUS PCR:: NOT DETECTED
ANION GAP SERPL CALC-SCNC: 2 MMOL/L (ref 0–18)
ATRIAL RATE: 96 BPM
B PARAPERT DNA SPEC QL NAA+PROBE: NOT DETECTED
B PERT DNA SPEC QL NAA+PROBE: NOT DETECTED
BASOPHILS # BLD AUTO: 0.02 X10(3) UL (ref 0–0.2)
BASOPHILS NFR BLD AUTO: 0.2 %
BNP SERPL-MCNC: 82 PG/ML
BUN BLD-MCNC: 18 MG/DL (ref 9–23)
BUN/CREAT SERPL: 13.6 (ref 10–20)
C PNEUM DNA SPEC QL NAA+PROBE: NOT DETECTED
CALCIUM BLD-MCNC: 10.6 MG/DL (ref 8.7–10.4)
CHLORIDE SERPL-SCNC: 103 MMOL/L (ref 98–112)
CHOLEST SERPL-MCNC: 265 MG/DL (ref ?–200)
CO2 SERPL-SCNC: 33 MMOL/L (ref 21–32)
CORONAVIRUS 229E PCR:: NOT DETECTED
CORONAVIRUS HKU1 PCR:: NOT DETECTED
CORONAVIRUS NL63 PCR:: NOT DETECTED
CORONAVIRUS OC43 PCR:: NOT DETECTED
CREAT BLD-MCNC: 1.32 MG/DL
DEPRECATED RDW RBC AUTO: 51.3 FL (ref 35.1–46.3)
EGFRCR SERPLBLD CKD-EPI 2021: 41 ML/MIN/1.73M2 (ref 60–?)
EOSINOPHIL # BLD AUTO: 0.17 X10(3) UL (ref 0–0.7)
EOSINOPHIL NFR BLD AUTO: 1.9 %
ERYTHROCYTE [DISTWIDTH] IN BLOOD BY AUTOMATED COUNT: 14.8 % (ref 11–15)
FLUAV H3 RNA SPEC QL NAA+PROBE: DETECTED
FLUBV RNA SPEC QL NAA+PROBE: NOT DETECTED
GLUCOSE BLD-MCNC: 98 MG/DL (ref 70–99)
GLUCOSE BLDC GLUCOMTR-MCNC: 131 MG/DL (ref 70–99)
GLUCOSE BLDC GLUCOMTR-MCNC: 158 MG/DL (ref 70–99)
GLUCOSE BLDC GLUCOMTR-MCNC: 163 MG/DL (ref 70–99)
GLUCOSE BLDC GLUCOMTR-MCNC: 259 MG/DL (ref 70–99)
GLUCOSE BLDC GLUCOMTR-MCNC: 295 MG/DL (ref 70–99)
HCT VFR BLD AUTO: 46.8 %
HDLC SERPL-MCNC: 47 MG/DL (ref 40–59)
HGB BLD-MCNC: 14.2 G/DL
IMM GRANULOCYTES # BLD AUTO: 0.06 X10(3) UL (ref 0–1)
IMM GRANULOCYTES NFR BLD: 0.7 %
LDLC SERPL CALC-MCNC: 190 MG/DL (ref ?–100)
LYMPHOCYTES # BLD AUTO: 1.43 X10(3) UL (ref 1–4)
LYMPHOCYTES NFR BLD AUTO: 16.4 %
MCH RBC QN AUTO: 28.8 PG (ref 26–34)
MCHC RBC AUTO-ENTMCNC: 30.3 G/DL (ref 31–37)
MCV RBC AUTO: 94.9 FL
METAPNEUMOVIRUS PCR:: NOT DETECTED
MONOCYTES # BLD AUTO: 1.28 X10(3) UL (ref 0.1–1)
MONOCYTES NFR BLD AUTO: 14.7 %
MRSA DNA SPEC QL NAA+PROBE: NEGATIVE
MYCOPLASMA PNEUMONIA PCR:: NOT DETECTED
NEUTROPHILS # BLD AUTO: 5.76 X10 (3) UL (ref 1.5–7.7)
NEUTROPHILS # BLD AUTO: 5.76 X10(3) UL (ref 1.5–7.7)
NEUTROPHILS NFR BLD AUTO: 66.1 %
NONHDLC SERPL-MCNC: 218 MG/DL (ref ?–130)
OSMOLALITY SERPL CALC.SUM OF ELEC: 288 MOSM/KG (ref 275–295)
P AXIS: 55 DEGREES
P-R INTERVAL: 174 MS
PARAINFLUENZA 1 PCR:: NOT DETECTED
PARAINFLUENZA 2 PCR:: NOT DETECTED
PARAINFLUENZA 3 PCR:: NOT DETECTED
PARAINFLUENZA 4 PCR:: NOT DETECTED
PLATELET # BLD AUTO: 196 10(3)UL (ref 150–450)
POTASSIUM SERPL-SCNC: 4.2 MMOL/L (ref 3.5–5.1)
PROCALCITONIN SERPL-MCNC: 0.12 NG/ML (ref ?–0.05)
Q-T INTERVAL: 342 MS
QRS DURATION: 82 MS
QTC CALCULATION (BEZET): 432 MS
R AXIS: 49 DEGREES
RBC # BLD AUTO: 4.93 X10(6)UL
RHINOVIRUS/ENTERO PCR:: NOT DETECTED
RSV RNA SPEC QL NAA+PROBE: NOT DETECTED
SARS-COV-2 RNA NPH QL NAA+NON-PROBE: NOT DETECTED
SODIUM SERPL-SCNC: 138 MMOL/L (ref 136–145)
T AXIS: 55 DEGREES
TRIGL SERPL-MCNC: 151 MG/DL (ref 30–149)
TROPONIN I SERPL HS-MCNC: 45 NG/L
TROPONIN I SERPL HS-MCNC: 45 NG/L
VENTRICULAR RATE: 96 BPM
VLDLC SERPL CALC-MCNC: 32 MG/DL (ref 0–30)
WBC # BLD AUTO: 8.7 X10(3) UL (ref 4–11)

## 2024-02-25 PROCEDURE — 71045 X-RAY EXAM CHEST 1 VIEW: CPT | Performed by: EMERGENCY MEDICINE

## 2024-02-25 PROCEDURE — 99223 1ST HOSP IP/OBS HIGH 75: CPT | Performed by: HOSPITALIST

## 2024-02-25 RX ORDER — METHYLPREDNISOLONE SODIUM SUCCINATE 125 MG/2ML
60 INJECTION, POWDER, LYOPHILIZED, FOR SOLUTION INTRAMUSCULAR; INTRAVENOUS EVERY 8 HOURS
Status: DISCONTINUED | OUTPATIENT
Start: 2024-02-25 | End: 2024-02-26

## 2024-02-25 RX ORDER — IPRATROPIUM BROMIDE AND ALBUTEROL SULFATE 2.5; .5 MG/3ML; MG/3ML
3 SOLUTION RESPIRATORY (INHALATION)
Status: DISCONTINUED | OUTPATIENT
Start: 2024-02-25 | End: 2024-02-27

## 2024-02-25 RX ORDER — ACETAMINOPHEN 325 MG/1
650 TABLET ORAL EVERY 4 HOURS PRN
Status: DISCONTINUED | OUTPATIENT
Start: 2024-02-25 | End: 2024-02-27

## 2024-02-25 RX ORDER — HYDRALAZINE HYDROCHLORIDE 25 MG/1
12.5 TABLET, FILM COATED ORAL 2 TIMES DAILY
Status: DISCONTINUED | OUTPATIENT
Start: 2024-02-25 | End: 2024-02-27

## 2024-02-25 RX ORDER — NICOTINE POLACRILEX 4 MG
15 LOZENGE BUCCAL
Status: DISCONTINUED | OUTPATIENT
Start: 2024-02-25 | End: 2024-02-27

## 2024-02-25 RX ORDER — METOPROLOL SUCCINATE 25 MG/1
25 TABLET, EXTENDED RELEASE ORAL DAILY
Status: DISCONTINUED | OUTPATIENT
Start: 2024-02-26 | End: 2024-02-27

## 2024-02-25 RX ORDER — IPRATROPIUM BROMIDE AND ALBUTEROL SULFATE 2.5; .5 MG/3ML; MG/3ML
3 SOLUTION RESPIRATORY (INHALATION) ONCE
Status: DISCONTINUED | OUTPATIENT
Start: 2024-02-25 | End: 2024-02-25

## 2024-02-25 RX ORDER — OSELTAMIVIR PHOSPHATE 75 MG/1
75 CAPSULE ORAL 2 TIMES DAILY
Status: DISCONTINUED | OUTPATIENT
Start: 2024-02-25 | End: 2024-02-25

## 2024-02-25 RX ORDER — FLUTICASONE FUROATE AND VILANTEROL 200; 25 UG/1; UG/1
1 POWDER RESPIRATORY (INHALATION) DAILY
Status: DISCONTINUED | OUTPATIENT
Start: 2024-02-25 | End: 2024-02-26

## 2024-02-25 RX ORDER — METOCLOPRAMIDE HYDROCHLORIDE 5 MG/ML
5 INJECTION INTRAMUSCULAR; INTRAVENOUS EVERY 8 HOURS PRN
Status: DISCONTINUED | OUTPATIENT
Start: 2024-02-25 | End: 2024-02-27

## 2024-02-25 RX ORDER — CODEINE PHOSPHATE AND GUAIFENESIN 10; 100 MG/5ML; MG/5ML
5 SOLUTION ORAL EVERY 4 HOURS PRN
Status: DISCONTINUED | OUTPATIENT
Start: 2024-02-25 | End: 2024-02-27

## 2024-02-25 RX ORDER — IPRATROPIUM BROMIDE AND ALBUTEROL SULFATE 2.5; .5 MG/3ML; MG/3ML
3 SOLUTION RESPIRATORY (INHALATION) EVERY 6 HOURS
Status: DISCONTINUED | OUTPATIENT
Start: 2024-02-25 | End: 2024-02-26

## 2024-02-25 RX ORDER — ECHINACEA PURPUREA EXTRACT 125 MG
1 TABLET ORAL
Status: DISCONTINUED | OUTPATIENT
Start: 2024-02-25 | End: 2024-02-27

## 2024-02-25 RX ORDER — OSELTAMIVIR PHOSPHATE 30 MG/1
30 CAPSULE ORAL EVERY 24 HOURS
Status: DISCONTINUED | OUTPATIENT
Start: 2024-02-25 | End: 2024-02-27

## 2024-02-25 RX ORDER — FEBUXOSTAT 40 MG/1
40 TABLET, FILM COATED ORAL DAILY
Status: DISCONTINUED | OUTPATIENT
Start: 2024-02-26 | End: 2024-02-27

## 2024-02-25 RX ORDER — METHYLPREDNISOLONE SODIUM SUCCINATE 125 MG/2ML
60 INJECTION, POWDER, LYOPHILIZED, FOR SOLUTION INTRAMUSCULAR; INTRAVENOUS ONCE
Status: COMPLETED | OUTPATIENT
Start: 2024-02-25 | End: 2024-02-25

## 2024-02-25 RX ORDER — HYDROCODONE BITARTRATE AND ACETAMINOPHEN 5; 325 MG/1; MG/1
1 TABLET ORAL EVERY 4 HOURS PRN
Status: DISCONTINUED | OUTPATIENT
Start: 2024-02-25 | End: 2024-02-27

## 2024-02-25 RX ORDER — NICOTINE POLACRILEX 4 MG
30 LOZENGE BUCCAL
Status: DISCONTINUED | OUTPATIENT
Start: 2024-02-25 | End: 2024-02-27

## 2024-02-25 RX ORDER — DEXTROSE MONOHYDRATE 25 G/50ML
50 INJECTION, SOLUTION INTRAVENOUS
Status: DISCONTINUED | OUTPATIENT
Start: 2024-02-25 | End: 2024-02-27

## 2024-02-25 RX ORDER — AMLODIPINE BESYLATE 5 MG/1
5 TABLET ORAL DAILY
Status: DISCONTINUED | OUTPATIENT
Start: 2024-02-26 | End: 2024-02-27

## 2024-02-25 RX ORDER — HYDRALAZINE HYDROCHLORIDE 20 MG/ML
10 INJECTION INTRAMUSCULAR; INTRAVENOUS ONCE
Status: COMPLETED | OUTPATIENT
Start: 2024-02-25 | End: 2024-02-25

## 2024-02-25 RX ORDER — FLUTICASONE FUROATE AND VILANTEROL 200; 25 UG/1; UG/1
1 POWDER RESPIRATORY (INHALATION) DAILY
Status: DISCONTINUED | OUTPATIENT
Start: 2024-02-25 | End: 2024-02-27

## 2024-02-25 RX ORDER — BENZONATATE 200 MG/1
200 CAPSULE ORAL 3 TIMES DAILY PRN
Status: DISCONTINUED | OUTPATIENT
Start: 2024-02-25 | End: 2024-02-27

## 2024-02-25 RX ORDER — HEPARIN SODIUM 5000 [USP'U]/ML
5000 INJECTION, SOLUTION INTRAVENOUS; SUBCUTANEOUS EVERY 12 HOURS SCHEDULED
Status: DISCONTINUED | OUTPATIENT
Start: 2024-02-25 | End: 2024-02-27

## 2024-02-25 RX ORDER — HYDROCODONE BITARTRATE AND ACETAMINOPHEN 5; 325 MG/1; MG/1
2 TABLET ORAL EVERY 4 HOURS PRN
Status: DISCONTINUED | OUTPATIENT
Start: 2024-02-25 | End: 2024-02-27

## 2024-02-25 RX ORDER — PANTOPRAZOLE SODIUM 20 MG/1
20 TABLET, DELAYED RELEASE ORAL
Status: DISCONTINUED | OUTPATIENT
Start: 2024-02-26 | End: 2024-02-27

## 2024-02-25 RX ORDER — ONDANSETRON 2 MG/ML
4 INJECTION INTRAMUSCULAR; INTRAVENOUS EVERY 6 HOURS PRN
Status: DISCONTINUED | OUTPATIENT
Start: 2024-02-25 | End: 2024-02-27

## 2024-02-25 RX ORDER — FUROSEMIDE 10 MG/ML
40 INJECTION INTRAMUSCULAR; INTRAVENOUS ONCE
Status: COMPLETED | OUTPATIENT
Start: 2024-02-25 | End: 2024-02-25

## 2024-02-25 RX ORDER — METHYLPREDNISOLONE SODIUM SUCCINATE 125 MG/2ML
60 INJECTION, POWDER, LYOPHILIZED, FOR SOLUTION INTRAMUSCULAR; INTRAVENOUS EVERY 8 HOURS
Status: DISCONTINUED | OUTPATIENT
Start: 2024-02-25 | End: 2024-02-25

## 2024-02-25 RX ORDER — GLIPIZIDE 5 MG/1
5 TABLET ORAL
Status: DISCONTINUED | OUTPATIENT
Start: 2024-02-26 | End: 2024-02-27

## 2024-02-25 NOTE — ED QUICK NOTES
Orders for admission, patient is aware of plan and ready to go upstairs. Any questions, please call ED RN Jurate at extension [phone].     Patient Covid vaccination status: Fully vaccinated     COVID Test Ordered in ED: None    COVID Suspicion at Admission: N/A    Running Infusions:  None    Mental Status/LOC at time of transport: axox4    Other pertinent information:   CIWA score: N/A   NIH score:  N/A

## 2024-02-25 NOTE — ED INITIAL ASSESSMENT (HPI)
Pt to ED for difficulty breathing for the past day and a half. Pt has COPD, is normally on 2L by NC, states when standing, spo2 will drop into the 80's. Pt denies chest pain, has dizziness. Pt also has had a new cough since last night. Pt satting 94% in triage.

## 2024-02-25 NOTE — PLAN OF CARE
Problem: RESPIRATORY - ADULT  Goal: Achieves optimal ventilation and oxygenation  Description: INTERVENTIONS:  - Assess for changes in respiratory status  - Assess for changes in mentation and behavior  - Position to facilitate oxygenation and minimize respiratory effort  - Oxygen supplementation based on oxygen saturation or ABGs  - Provide Smoking Cessation handout, if applicable  - Encourage broncho-pulmonary hygiene including cough, deep breathe, Incentive Spirometry  - Assess the need for suctioning and perform as needed  - Assess and instruct to report SOB or any respiratory difficulty  - Respiratory Therapy support as indicated  - Manage/alleviate anxiety  - Monitor for signs/symptoms of CO2 retention  Outcome: Progressing   Admitted from home with difficulty breathing, she is on home 02 2 L NC, 02 saturation 95%. She is coherent and conversant. Noted with bilateral wheezing.  She is able to make conversations  and moves in bed with minimal assistance. She is on Solumedrol. Noted with fever 101.2, Dr Apodaca aware  thru a secure chat and she ordered blood cultures. Her Troponin is elevated at 45, she has no c/o chest pain at all. Will continue to monitor.  Dr Apodaca made aware that only one set of blood culture was obtained because  she has history of  L breast cancer with surgery and can't draw any blood on left.  said they are not allowed to draw 2 sets of blood culture in the same arm.  Her fever is resolved.   Noted that her right knuckles are swollen, they look like there are small balls.  She says they are gout. The L hand look normal.

## 2024-02-25 NOTE — ED PROVIDER NOTES
Patient Seen in: [facility] Emergency Department    History     Chief Complaint   Patient presents with    Difficulty Breathing     Stated Complaint: BRUNO    HPI    Patient complains of shortness of breath that began to get worse last night.  + cough.  Very tight and wheezy, denies chest pain.   no fever or chills.  no calf pain or swelling.  no recent long car ride or plane ride.   History of copd/asthma on 2l nc at home.    Symptoms worse with any activity.    Symptoms improved with rest.     Past Medical History:   Diagnosis Date    Age-related cataract of left eye 05/10/2018    Age-related cataract of right eye 07/28/2022    Anxiety     Asthma (HCC)     Atherosclerosis of coronary artery     Breast CA (HCC) 1999    lt mastectomy    Breast CA (HCC) 2014    lumpectomy, right    Cancer (HCC)     breast cancer    Carotid artery disease (HCC) 12/05/2016    Carotid stenosis     Closed fracture of multiple ribs of left side with routine healing, subsequent encounter 10/19/2018    Congestive heart disease (HCC)     COPD (chronic obstructive pulmonary disease) (HCC)     on O2 at 2 liters    Coronary atherosclerosis     Depression     Diabetes (HCC) 07/28/2022    takes insulin when hospitalized, takes oral meds at home    Diastolic dysfunction without heart failure     Esophageal reflux     Essential hypertension     Generalized anxiety disorder     Gout     Gout     High blood pressure     High cholesterol     History of pituitary adenoma 05/10/2018    Hyperlipidemia     Major depressive disorder, single episode, moderate (HCC)     Obesity        Past Surgical History:   Procedure Laterality Date    CABG      CAROTID ENDARTERECTOMY Bilateral     CATARACT      COLONOSCOPY      2013    COLONOSCOPY  2013    COLONOSCOPY N/A 02/21/2023    Procedure: COLONOSCOPY;  Surgeon: Abdiaziz Melendez MD;  Location: [facility] ENDOSCOPY    HERNIA SURGERY      LUMPECTOMY RIGHT  2014    MASTECTOMY LEFT Left 1999    RADIATION RIGHT   2014            Family History   Problem Relation Age of Onset    Asthma Father     Hypertension Father     Heart Disorder Father     Other (Other) Mother         thyroid surgery    Asthma Sister     Asthma Brother     Other (Other) Brother         gout    Breast Cancer Self 43        rt breast 69       Social History     Socioeconomic History    Marital status:    Tobacco Use    Smoking status: Never     Passive exposure: Never    Smokeless tobacco: Never   Vaping Use    Vaping Use: Never used   Substance and Sexual Activity    Alcohol use: No     Comment: rarely at weddings    Drug use: No   Other Topics Concern    Reaction to local anesthetic No    Pt has a pacemaker No    Pt has a defibrillator No     Social Determinants of Health     Financial Resource Strain: Low Risk  (7/7/2023)    Financial Resource Strain     Difficulty of Paying Living Expenses: Not very hard     Med Affordability: No   Food Insecurity: No Food Insecurity (12/26/2023)    Food Insecurity     Food Insecurity: Never true   Transportation Needs: No Transportation Needs (12/26/2023)    Transportation Needs     Lack of Transportation: No   Physical Activity: Insufficiently Active (7/13/2022)    Exercise Vital Sign     Days of Exercise per Week: 1 day     Minutes of Exercise per Session: 10 min   Stress: No Stress Concern Present (10/6/2023)    Stress     Feeling of Stress : No    Social Connections   Housing Stability: Low Risk  (12/26/2023)    Housing Stability     Housing Instability: No       Review of Systems    Positive for stated complaint: BRUNO  Other systems are as noted in HPI.  Constitutional and vital signs reviewed.      All other systems reviewed and negative except as noted above.    PSFH elements reviewed from today and agreed except as otherwise stated in HPI.    Physical Exam     ED Triage Vitals   BP 02/25/24 0610 (!) 193/83   Pulse 02/25/24 0608 94   Resp 02/25/24 0608 (!) 28   Temp 02/25/24 0608 99.1 °F (37.3 °C)   Temp  src 02/25/24 0608 Temporal   SpO2 02/25/24 0608 94 %   O2 Device 02/25/24 0608 Nasal cannula       Current:BP (!) 174/81   Pulse 98   Temp 99.1 °F (37.3 °C) (Temporal)   Resp 21   SpO2 99%   PULSE OX hypoxic 84% room air  GENERAL: patient with 2 word dyspnea, audible wheezing   HEAD: normocephalic, atraumatic,   EYES: PERRLA, EOMI,  THROAT: mm dry, no lesions  NECK: supple, no meningeal signs  LUNGS:acc use noted audible wheezing, tachypnea and accessory use  CARDIO: rr   GI: abdomen is soft and non tender, no masses, nl bowel sounds   EXTREMITIES: from, 5/5 strength in all 4 ext,pedal edema  NEURO: alert and oiented *3, 2-12 intact, no focal deficit noted  SKIN: good skin turgor, no  rashes  PSYCH: calm, cooperative,    Differential includes: pneumonia vs. CHF vs. bronchitis vs. PE      ED Course     Labs Reviewed   BASIC METABOLIC PANEL (8) - Abnormal; Notable for the following components:       Result Value    CO2 33.0 (*)     Creatinine 1.32 (*)     Calcium, Total 10.6 (*)     eGFR-Cr 41 (*)     All other components within normal limits   TROPONIN I HIGH SENSITIVITY - Abnormal; Notable for the following components:    Troponin I (High Sensitivity) 45 (*)     All other components within normal limits   LIPID PANEL - Abnormal; Notable for the following components:    Cholesterol, Total 265 (*)     Triglycerides 151 (*)     LDL Cholesterol 190 (*)     VLDL 32 (*)     Non HDL Chol 218 (*)     All other components within normal limits   CBC W/ DIFFERENTIAL - Abnormal; Notable for the following components:    MCHC 30.3 (*)     RDW-SD 51.3 (*)     Monocyte Absolute 1.28 (*)     All other components within normal limits   BNP (B TYPE NATRIURETIC PEPTIDE) - Normal   CBC WITH DIFFERENTIAL WITH PLATELET    Narrative:     The following orders were created for panel order CBC With Differential With Platelet.  Procedure                               Abnormality         Status                     ---------                                -----------         ------                     CBC W/ DIFFERENTIAL[[id number]]          Abnormal            Final result                 Please view results for these tests on the individual orders.   ED/MRSA SCREEN BY PCR-CC     EKG    Rate, intervals and axes as noted on EKG Report.  Rate: 96  Rhythm: Sinus Rhythm  Reading: nsr with non spec st changes           MDM     Monitor Interpretation:  Nsr 98     Radiology Interpretation:  XR CHEST AP PORTABLE  (CPT=71045)    Result Date: 2/25/2024  PROCEDURE: XR CHEST AP PORTABLE  (CPT=71045) TIME: 714  COMPARISON: [facility], XR CHEST AP PORTABLE (CPT=71045), 12/26/2023, 0:44 AM.  [facility], XR CHEST PA + LAT CHEST (CPT=71046), 1/16/2024, 5:43 PM.  INDICATIONS: BRUNO  TECHNIQUE:   Single view.   Findings and impression:  Prior CABG.  No edema  Normal lung volumes with discoid atelectasis or scarring in the lingula.  No bacterial pneumonia  Old left rib fractures  Normal pleura       Dictated by (CST): Harpreet Gunter MD on 2/25/2024 at 8:48 AM     Finalized by (CST): Harpreet Gunter MD on 2/25/2024 at 8:49 AM           I reviewed xray noted no infiltrates no pneumothorax      Medical Decision Making  Patient given continuous hour-long neb post neb improved work of breathing though still bilateral wheezing, additional continuous neb ordered, IV steroids given.  Spoke with pulmonary Dr. Woodard in the ER evaluating the patient.    Problems Addressed:  COPD exacerbation (HCC): acute illness or injury with systemic symptoms that poses a threat to life or bodily functions    Amount and/or Complexity of Data Reviewed  Labs: ordered. Decision-making details documented in ED Course.  Radiology: ordered and independent interpretation performed. Decision-making details documented in ED Course.  ECG/medicine tests: ordered and independent interpretation performed. Decision-making details documented in ED Course.  Discussion of management or  test interpretation with external provider(s): I spent a total of 40 minutes of critical care time in obtaining history, performing a physical exam, bedside monitoring of interventions, collecting and interpreting tests and discussion with consultants but not including time spent performing procedures.      Risk  Drug therapy requiring intensive monitoring for toxicity.  Decision regarding hospitalization.          Disposition and Plan     Clinical Impression:  1. COPD exacerbation (HCC)        Disposition:  Admit    Follow-up:  No follow-up provider specified.    Medications Prescribed:  Current Discharge Medication List          Hospital Problems       Present on Admission  Date Reviewed: 2/22/2024            ICD-10-CM Noted POA    * (Principal) COPD exacerbation (HCC) J44.1 12/26/2023 Unknown

## 2024-02-25 NOTE — CONSULTS
Critical Care H&P/Consult     NAME: Ariana Osorio - ROOM:  - MRN: [id number] - Age: 79 year old - :  1945    Date of Admission: 2024  6:29 AM  Admission Diagnosis: No admission diagnoses are documented for this encounter.      Assessment/Plan:  Acute on chronic hypoxic respiratory failure  - wean O2 as able, chronically on 2lpm  - IS and OOB  Asthma-COPD overlap  - IV steroids  - Breo for home Wixela  - bd scheuduled  HTN  - per IM  Dispo  - will follow    Annika Damon M.D.  Pulmonary and Critical Care Medicine  [facility]       History of Present Illness:   Ariana Osorio is a 79 year old former-smoker with asthma-COPD c/b chronic hypoxic respiratory failure on 2lpm supplemental oxygen chronically, CHF, GERD, and anxiety who is admitted with dyspnea. Symptoms started around two days ago with dyspnea and cough and wheezing. She noted her SpO2 in 80% despite being on her normal 2lpm supplemental oxygen. No cough, wheeze or chest tightness. Denies recent travel or sick contacts. No fevers, chills, night sweats. No n/v or diarrhea.     Past Medical History:   Diagnosis Date    Age-related cataract of left eye 05/10/2018    Age-related cataract of right eye 2022    Anxiety     Asthma (HCC)     Atherosclerosis of coronary artery     Breast CA ([facility])     lt mastectomy    Breast CA ([facility])     lumpectomy, right    Cancer (HCC)     breast cancer    Carotid artery disease ([facility]) 2016    Carotid stenosis     Closed fracture of multiple ribs of left side with routine healing, subsequent encounter 10/19/2018    Congestive heart disease (HCC)     COPD (chronic obstructive pulmonary disease) (HCC)     on O2 at 2 liters    Coronary atherosclerosis     Depression     Diabetes ([facility]) 2022    takes insulin when hospitalized, takes oral meds at home    Diastolic dysfunction without heart failure     Esophageal reflux     Essential hypertension     Generalized anxiety  disorder     Gout     Gout     High blood pressure     High cholesterol     History of pituitary adenoma 05/10/2018    Hyperlipidemia     Major depressive disorder, single episode, moderate (HCC)     Obesity      Past Surgical History:   Procedure Laterality Date    CABG      CAROTID ENDARTERECTOMY Bilateral     CATARACT      COLONOSCOPY          COLONOSCOPY      COLONOSCOPY N/A 2023    Procedure: COLONOSCOPY;  Surgeon: Abdiaziz Melendez MD;  Location: [facility] ENDOSCOPY    HERNIA SURGERY      LUMPECTOMY RIGHT  2014    MASTECTOMY LEFT Left 1999    RADIATION RIGHT  2014     Social History:  Social History     Tobacco Use    Smoking status: Never     Passive exposure: Never    Smokeless tobacco: Never   Substance Use Topics    Alcohol use: No     Comment: rarely at weddings     Family History:  She indicated that her mother is . She indicated that her father is . She indicated that her sister is alive. She indicated that her brother is alive. She indicated that the status of her self is unknown.      Allergies:  Allergies   Allergen Reactions    Allopurinol HIVES, SWELLING and SHORTNESS OF BREATH    Dog Epithelium SHORTNESS OF BREATH     Hair and saliva    Dust SHORTNESS OF BREATH    Smoke SHORTNESS OF BREATH    Adhesive Tape (Rosins) RASH    Montelukast HALLUCINATION    Statins MYALGIA     Pt had developed myalgia and myopathy    Xanax [Alprazolam] RESTLESSNESS    Adhesive Tape OTHER (SEE COMMENTS)    Other OTHER (SEE COMMENTS)    Sulfa Antibiotics OTHER (SEE COMMENTS)    Ace Inhibitors Coughing    Aspirin RASH       Current Outpatient Medications:     LASIX 20 MG Oral Tab, Lasix 20 mg tablet, [RxNorm: 117017], Disp: , Rfl:     Potassium Chloride ER 20 MEQ Oral Tab CR, potassium chloride ER 20 mEq tablet,extended release, [RxNorm: 292367], Disp: , Rfl:     amLODIPine 5 MG Oral Tab, Take 1 tablet (5 mg total) by mouth daily., Disp: 90 tablet, Rfl: 1    glipiZIDE 5 MG Oral Tab, Take 1  tablet (5 mg total) by mouth before breakfast. (Patient taking differently: Take 1 tablet (5 mg total) by mouth before breakfast. prn), Disp: 90 tablet, Rfl: 0    Alcohol Swabs (DROPSAFE ALCOHOL PREP) 70 % Does not apply Pads, Take 1 Bottle by mouth As Directed., Disp: 400 each, Rfl: 3    methylPREDNISolone (MEDROL) 4 MG Oral Tablet Therapy Pack, As directed., Disp: 1 each, Rfl: 0    pantoprazole 20 MG Oral Tab EC, Take 1 tablet (20 mg total) by mouth every morning before breakfast., Disp: , Rfl:     hydrALAZINE 25 MG Oral Tab, TAKE 1/2 TABLET TWICE DAILY, Disp: 90 tablet, Rfl: 1    TRUEplus Lancets 33G Does not apply Misc, 1 each by In Vitro route daily., Disp: 100 each, Rfl: 3    metoprolol succinate ER 25 MG Oral Tablet 24 Hr, Take 1 tablet (25 mg total) by mouth daily., Disp: 90 tablet, Rfl: 3    colchicine 0.6 MG Oral Tab, Take 1 tablet (0.6 mg total) by mouth as needed., Disp: , Rfl:     WIXELA INHUB 500-50 MCG/ACT Inhalation Aerosol Powder, Breath Activated, Inhale 1 puff into the lungs 2 (two) times daily., Disp: 3 each, Rfl: 1    Glucose Blood (TRUE METRIX BLOOD GLUCOSE TEST) In Vitro Strip, 1 strip by In Vitro route 4 (four) times daily., Disp: 400 strip, Rfl: 3    febuxostat 40 MG Oral Tab, Take 1 tablet (40 mg total) by mouth daily., Disp: 90 tablet, Rfl: 1    omega-3 fatty acids 1000 MG Oral Cap, Take 1,000 mg by mouth daily., Disp: , Rfl:     Multiple Vitamin (MULTI-VITAMIN DAILY) Oral Tab, Take 1 tablet by mouth daily., Disp: , Rfl:     Cholecalciferol (VITAMIN D) 2000 units Oral Cap, Take 1 capsule (2,000 Units total) by mouth daily., Disp: , Rfl:    [COMPLETED] furosemide (Lasix) 10 mg/mL injection 40 mg  40 mg Intravenous Once    [COMPLETED] albuterol (Ventolin) (5 MG/ML) 0.5% nebulizer solution 10 mg  10 mg Nebulization Once    [COMPLETED] ipratropium (Atrovent) 0.02 % nebulizer solution 0.5 mg  0.5 mg Nebulization Once    [COMPLETED] methylPREDNISolone sodium succinate (Solu-MEDROL) injection 60  mg  60 mg Intravenous Once    hydrALAzine (Apresoline) 20 mg/mL injection 10 mg  10 mg Intravenous Once    albuterol (Ventolin) (5 MG/ML) 0.5% nebulizer solution 10 mg  10 mg Nebulization Once       Review of systems:  12 systems reviewed, negative except as stated in HPI    Objective:No intake or output data in the 24 hours ending 02/25/24 0837   /87   Pulse 108   Temp 99.1 °F (37.3 °C) (Temporal)   Resp 21   SpO2 99%   Physical Exam:   General: alert in bed in NAD   HEENT: Normocephalic, without obvious abnormality, atraumatic. Moist oral mucosa   Lungs: b/l wheeze   Chest wall: No tenderness or deformity.   Heart: Regular rate and rhythm, normal S1S2, no murmur.   Abdomen: soft, non-tender, non-distended, positive BS.   Extremity: No clubbing or cyanosis. no edema   Skin: No rashes or lesions.   Neuro: no focal weakness    Recent Labs   Lab 02/25/24  0700   RBC 4.93   HGB 14.2   HCT 46.8   MCV 94.9   MCH 28.8   MCHC 30.3*   RDW 14.8   NEPRELIM 5.76   WBC 8.7   .0     Recent Labs   Lab 02/25/24  0700   GLU 98   BUN 18   CREATSERUM 1.32*   CA 10.6*      K 4.2      CO2 33.0*     No results for input(s): \"ABGPHT\", \"ONPKNY9U\", \"PMZRA7H\", \"ABGHCO3\", \"ABGBE\", \"TEMP\", \"HERMILO\", \"SITE\", \"DEV\", \"THGB\" in the last 168 hours.    Invalid input(s): \"EGF07JXG\", \"CHOB\"  Recent Labs   Lab 02/25/24  0700   GLU 98   BUN 18   CREATSERUM 1.32*   CA 10.6*      K 4.2      CO2 33.0*     No results for input(s): \"TROP\", \"CK\" in the last 168 hours.    Imaging: I independently visualized all relevant chest imaging in PACS, agree with radiology interpretation except where noted.

## 2024-02-25 NOTE — PLAN OF CARE
Problem: RISK FOR INFECTION - ADULT  Goal: Absence of fever/infection during anticipated neutropenic period  Description: INTERVENTIONS  - Monitor WBC  - Administer growth factors as ordered  - Implement neutropenic guidelines  Outcome: Progressing   Dr Apodaca made aware that she is positive for  Influenza A.

## 2024-02-25 NOTE — PLAN OF CARE
Problem: METABOLIC/FLUID AND ELECTROLYTES - ADULT  Goal: Glucose maintained within prescribed range  Description: INTERVENTIONS:  - Monitor Blood Glucose as ordered  - Assess for signs and symptoms of hyperglycemia and hypoglycemia  - Administer ordered medications to maintain glucose within target range  - Assess barriers to adequate nutritional intake and initiate nutrition consult as needed  - Instruct patient on self management of diabetes  Outcome: Not Progressing   Patient's blood sugar was elevated at 163 at lunch and then 295 at dinner. She adamantly refused the Insulin coverage saying that she does not want to be given another disease. She will only take Glipizide.  She was informed that she is on Solumedrol and  it will make her sugar rise, Insulin will help manage it. She refused and said she knows exactly because she comes from a family of doctors, she will only take Glipizide and will take to her doctors tomorrow when they come to see her. Informed Dr Apodaca in a secure chat.  Problem: RISK FOR INFECTION - ADULT  Goal: Absence of fever/infection during anticipated neutropenic period  Description: INTERVENTIONS  - Monitor WBC  - Administer growth factors as ordered  - Implement neutropenic guidelines  2/25/2024 1746 by Gallo Winston, RN  Outcome: Progressing  2/25/2024 1722 by Gallo Winston, RN  Outcome: Progressing   Dr Apodaca is aware that patient is positive for Influenza A and she ordered Tamiflu.

## 2024-02-26 LAB
ANION GAP SERPL CALC-SCNC: 4 MMOL/L (ref 0–18)
BUN BLD-MCNC: 38 MG/DL (ref 9–23)
BUN/CREAT SERPL: 25.7 (ref 10–20)
CALCIUM BLD-MCNC: 10.1 MG/DL (ref 8.7–10.4)
CHLORIDE SERPL-SCNC: 99 MMOL/L (ref 98–112)
CO2 SERPL-SCNC: 31 MMOL/L (ref 21–32)
CREAT BLD-MCNC: 1.48 MG/DL
DEPRECATED RDW RBC AUTO: 50 FL (ref 35.1–46.3)
EGFRCR SERPLBLD CKD-EPI 2021: 36 ML/MIN/1.73M2 (ref 60–?)
ERYTHROCYTE [DISTWIDTH] IN BLOOD BY AUTOMATED COUNT: 14.5 % (ref 11–15)
GLUCOSE BLD-MCNC: 188 MG/DL (ref 70–99)
GLUCOSE BLDC GLUCOMTR-MCNC: 173 MG/DL (ref 70–99)
GLUCOSE BLDC GLUCOMTR-MCNC: 179 MG/DL (ref 70–99)
GLUCOSE BLDC GLUCOMTR-MCNC: 243 MG/DL (ref 70–99)
GLUCOSE BLDC GLUCOMTR-MCNC: 56 MG/DL (ref 70–99)
GLUCOSE BLDC GLUCOMTR-MCNC: 62 MG/DL (ref 70–99)
GLUCOSE BLDC GLUCOMTR-MCNC: 89 MG/DL (ref 70–99)
HCT VFR BLD AUTO: 41.3 %
HGB BLD-MCNC: 13.2 G/DL
MAGNESIUM SERPL-MCNC: 2.1 MG/DL (ref 1.6–2.6)
MCH RBC QN AUTO: 29.9 PG (ref 26–34)
MCHC RBC AUTO-ENTMCNC: 32 G/DL (ref 31–37)
MCV RBC AUTO: 93.4 FL
OSMOLALITY SERPL CALC.SUM OF ELEC: 292 MOSM/KG (ref 275–295)
PHOSPHATE SERPL-MCNC: 5.4 MG/DL (ref 2.4–5.1)
PLATELET # BLD AUTO: 178 10(3)UL (ref 150–450)
POTASSIUM SERPL-SCNC: 4.4 MMOL/L (ref 3.5–5.1)
RBC # BLD AUTO: 4.42 X10(6)UL
SODIUM SERPL-SCNC: 134 MMOL/L (ref 136–145)
WBC # BLD AUTO: 5 X10(3) UL (ref 4–11)

## 2024-02-26 PROCEDURE — 99233 SBSQ HOSP IP/OBS HIGH 50: CPT | Performed by: HOSPITALIST

## 2024-02-26 RX ORDER — METHYLPREDNISOLONE SODIUM SUCCINATE 40 MG/ML
40 INJECTION, POWDER, LYOPHILIZED, FOR SOLUTION INTRAMUSCULAR; INTRAVENOUS EVERY 12 HOURS
Status: DISCONTINUED | OUTPATIENT
Start: 2024-02-26 | End: 2024-02-26

## 2024-02-26 RX ORDER — METHYLPREDNISOLONE SODIUM SUCCINATE 40 MG/ML
40 INJECTION, POWDER, LYOPHILIZED, FOR SOLUTION INTRAMUSCULAR; INTRAVENOUS EVERY 8 HOURS
Status: DISCONTINUED | OUTPATIENT
Start: 2024-02-26 | End: 2024-02-26

## 2024-02-26 RX ORDER — PREDNISONE 20 MG/1
40 TABLET ORAL
Status: DISCONTINUED | OUTPATIENT
Start: 2024-02-27 | End: 2024-02-27

## 2024-02-26 NOTE — PLAN OF CARE
Pt A&Ox4, utilizing walker for weakness which is not baseline, pt refusing insulin, heparin, and refused solumed overnight, refusing heparin for a hx of adverse effects. Pt is on baseline O2 of 2L. Rounded hourly overnight, pt calls appropriately, fall precautions in place.       Problem: Patient Centered Care  Goal: Patient preferences are identified and integrated in the patient's plan of care  Description: Interventions:  - What would you like us to know as we care for you?   - Provide timely, complete, and accurate information to patient/family  - Incorporate patient and family knowledge, values, beliefs, and cultural backgrounds into the planning and delivery of care  - Encourage patient/family to participate in care and decision-making at the level they choose  - Honor patient and family perspectives and choices  Outcome: Progressing     Problem: Patient/Family Goals  Goal: Patient/Family Long Term Goal  Description: Patient's Long Term Goal:     Interventions:  -   - See additional Care Plan goals for specific interventions  Outcome: Progressing  Goal: Patient/Family Short Term Goal  Description: Patient's Short Term Goal:     Interventions:   -   - See additional Care Plan goals for specific interventions  Outcome: Progressing     Problem: RESPIRATORY - ADULT  Goal: Achieves optimal ventilation and oxygenation  Description: INTERVENTIONS:  - Assess for changes in respiratory status  - Assess for changes in mentation and behavior  - Position to facilitate oxygenation and minimize respiratory effort  - Oxygen supplementation based on oxygen saturation or ABGs  - Provide Smoking Cessation handout, if applicable  - Encourage broncho-pulmonary hygiene including cough, deep breathe, Incentive Spirometry  - Assess the need for suctioning and perform as needed  - Assess and instruct to report SOB or any respiratory difficulty  - Respiratory Therapy support as indicated  - Manage/alleviate anxiety  - Monitor for  signs/symptoms of CO2 retention  Outcome: Progressing     Problem: RISK FOR INFECTION - ADULT  Goal: Absence of fever/infection during anticipated neutropenic period  Description: INTERVENTIONS  - Monitor WBC  - Administer growth factors as ordered  - Implement neutropenic guidelines  Outcome: Progressing     Problem: METABOLIC/FLUID AND ELECTROLYTES - ADULT  Goal: Glucose maintained within prescribed range  Description: INTERVENTIONS:  - Monitor Blood Glucose as ordered  - Assess for signs and symptoms of hyperglycemia and hypoglycemia  - Administer ordered medications to maintain glucose within target range  - Assess barriers to adequate nutritional intake and initiate nutrition consult as needed  - Instruct patient on self management of diabetes  Outcome: Progressing     Problem: Diabetes/Glucose Control  Goal: Glucose maintained within prescribed range  Description: INTERVENTIONS:  - Monitor Blood Glucose as ordered  - Assess for signs and symptoms of hyperglycemia and hypoglycemia  - Administer ordered medications to maintain glucose within target range  - Assess barriers to adequate nutritional intake and initiate nutrition consult as needed  - Instruct patient on self management of diabetes  Outcome: Progressing

## 2024-02-26 NOTE — PROGRESS NOTES
Pulmonary Progress Note     Assessment / Plan:  Acute on chronic hypoxic respiratory failure - due to influenza and ACOS  - wean O2 as able, chronically on 2 LPM  - IS and OOB  Influenza - no signs of bacterial pneumonia on chest x-ray  - tamiflu for 5 days  Asthma-COPD overlap - with exacerbation due to influenza  - IV to PO steroids tomorrow  - Breo for home Wixela  - bronchodilator protocol  HTN  - per IM  Dispo  - monitor today and plan for discharge tomorrow    Discussed with patient.    Arvin Ford MD  Pulmonary & Critical Care Medicine  [facility]      Subjective:  Viral panel with influenza  Breathing feels better      Objective:  Vitals:    02/25/24 2132 02/26/24 0034 02/26/24 0639 02/26/24 0845   BP: 142/66 120/57 137/67 111/44   BP Location: Right arm Right arm Right arm Right arm   Pulse: 89 89 89 88   Resp: 20 20 18 18   Temp: 98.4 °F (36.9 °C) 98.3 °F (36.8 °C) 98.4 °F (36.9 °C) 98 °F (36.7 °C)   TempSrc: Oral Oral Oral Oral   SpO2:  96% 97% 91%   Weight:         Physical Exam:  General: no distress, comfortable and conversant  Respiratory: expiratory wheezes bilaterally, normal effort  Cardiovascular: regular rate and rhythm, no m/r/g  Extremities: no LE edema  Mental status: interactive, answering questions appropriately    Medications:  Reviewed in EMR    Lab Data:  Reviewed in EMR    Imaging:  I independently visualized all relevant chest imaging in PACS and agree with radiology interpretation except where noted.

## 2024-02-26 NOTE — PLAN OF CARE
Problem: Patient Centered Care  Goal: Patient preferences are identified and integrated in the patient's plan of care  Description: Interventions:  - What would you like us to know as we care for you? Home alone  - Provide timely, complete, and accurate information to patient/family  - Incorporate patient and family knowledge, values, beliefs, and cultural backgrounds into the planning and delivery of care  - Encourage patient/family to participate in care and decision-making at the level they choose  - Honor patient and family perspectives and choices  Outcome: Progressing      Problem: RESPIRATORY - ADULT  Goal: Achieves optimal ventilation and oxygenation  Description: INTERVENTIONS:  - Assess for changes in respiratory status  - Assess for changes in mentation and behavior  - Position to facilitate oxygenation and minimize respiratory effort  - Oxygen supplementation based on oxygen saturation or ABGs  - Provide Smoking Cessation handout, if applicable  - Encourage broncho-pulmonary hygiene including cough, deep breathe, Incentive Spirometry  - Assess the need for suctioning and perform as needed  - Assess and instruct to report SOB or any respiratory difficulty  - Respiratory Therapy support as indicated  - Manage/alleviate anxiety  - Monitor for signs/symptoms of CO2 retention  Outcome: Progressing     Problem: RISK FOR INFECTION - ADULT  Goal: Absence of fever/infection during anticipated neutropenic period  Description: INTERVENTIONS  - Monitor WBC  - Administer growth factors as ordered  - Implement neutropenic guidelines  Outcome: Progressing     Problem: METABOLIC/FLUID AND ELECTROLYTES - ADULT  Goal: Glucose maintained within prescribed range  Description: INTERVENTIONS:  - Monitor Blood Glucose as ordered  - Assess for signs and symptoms of hyperglycemia and hypoglycemia  - Administer ordered medications to maintain glucose within target range  - Assess barriers to adequate nutritional intake and  initiate nutrition consult as needed  - Instruct patient on self management of diabetes  Outcome: Not Progressing     Problem: Diabetes/Glucose Control  Goal: Glucose maintained within prescribed range  Description: INTERVENTIONS:  - Monitor Blood Glucose as ordered  - Assess for signs and symptoms of hyperglycemia and hypoglycemia  - Administer ordered medications to maintain glucose within target range  - Assess barriers to adequate nutritional intake and initiate nutrition consult as needed  - Instruct patient on self management of diabetes  Outcome: Not Progressing     Vital signs are stable. Denies pain or discomfort.  On 2L of oxygen, which is at baseline.  Pt had hypoglycemia at lunch. MD notified. Glipizide placed on hold. Received IV solumedrol. Safety precautions in place.

## 2024-02-26 NOTE — PROGRESS NOTES
[facility]    Progress Note    Ariana Q Comprado Patient Status:  Inpatient    1945 MRN [id number]   Location [facility]5W Attending Cherri Apodaca MD   Hosp Day # 1 PCP Enrique Braun MD     Chief Complaint:   Chief Complaint   Patient presents with    Difficulty Breathing       Subjective:   Ariana Q Jo is feeling much better. SOB improved. On 2LNC o2 Spo2 is 95% on RA 85%. Minimal cough. No F/C. Had fevers yesterday 101.2F on arrival to floor.   Refusing to take insulin, heparin     Objective:   Objective:    Blood pressure 111/44, pulse 88, temperature 98 °F (36.7 °C), temperature source Oral, resp. rate 18, weight 169 lb (76.7 kg), SpO2 91%.    Physical Exam:    General: No acute distress.   Respiratory: Clear to auscultation bilaterally. No wheezes. No rhonchi.  Cardiovascular: S1, S2. Regular rate and rhythm. No murmurs, rubs or gallops.   Abdomen: Soft, nontender, nondistended.  Positive bowel sounds. No rebound or guarding.  Neurologic: No focal neurological deficits.   Musculoskeletal: Moves all extremities.  Extremities: No edema.      Results:   Results:    Labs:  Recent Labs   Lab 24  0700 24  0506   WBC 8.7 5.0   HGB 14.2 13.2   MCV 94.9 93.4   .0 178.0       Recent Labs   Lab 24  0700 24  0506   GLU 98 188*   BUN 18 38*   CREATSERUM 1.32* 1.48*   CA 10.6* 10.1    134*   K 4.2 4.4    99   CO2 33.0* 31.0       Estimated Creatinine Clearance: 27.1 mL/min (A) (based on SCr of 1.48 mg/dL (H)).    No results for input(s): \"PTP\", \"INR\" in the last 168 hours.         Culture:  No results found for this visit on 24.    Cardiac  No results for input(s): \"TROP\", \"PBNP\" in the last 168 hours.      Imaging: Imaging data reviewed in [facility].  XR CHEST AP PORTABLE  (CPT=71045)    Result Date: 2024  PROCEDURE: XR CHEST AP PORTABLE  (CPT=71045) TIME: 714  COMPARISON: [facility], XR CHEST AP PORTABLE  (CPT=71045), 12/26/2023, 0:44 AM.  [facility], XR CHEST PA + LAT CHEST (CPT=71046), 1/16/2024, 5:43 PM.  INDICATIONS: BRUNO  TECHNIQUE:   Single view.   Findings and impression:  Prior CABG.  No edema  Normal lung volumes with discoid atelectasis or scarring in the lingula.  No bacterial pneumonia  Old left rib fractures  Normal pleura       Dictated by (CST): Harpreet Gunter MD on 2/25/2024 at 8:48 AM     Finalized by (CST): Harpreet Gunter MD on 2/25/2024 at 8:49 AM           Medications:    heparin  5,000 Units Subcutaneous 2 times per day    ipratropium-albuterol  3 mL Nebulization Q6H    fluticasone furoate-vilanterol  1 puff Inhalation Daily    methylPREDNISolone  60 mg Intravenous Q8H    insulin aspart  1-7 Units Subcutaneous TID CC    fluticasone furoate-vilanterol  1 puff Inhalation Daily    ipratropium-albuterol  3 mL Nebulization Q6H WA    oseltamivir  30 mg Oral Q24H    amLODIPine  5 mg Oral Daily    febuxostat  40 mg Oral Daily    glipiZIDE  5 mg Oral Before breakfast    hydrALAZINE  12.5 mg Oral BID    metoprolol succinate ER  25 mg Oral Daily    pantoprazole  20 mg Oral QAM AC         Assessment and Plan:   Assessment & Plan:      Acute on chronic hypoxic respiratory failure   Asthma- COPD overlap exacerbation   Acute influenza A  - back down to 2L NC.   - genexpert swab + Influenza A.   - CXR no infiltrate  - stop solumedrol. Prednsione starting tomorrow   - Breo ellipta.   - IS/Flutter.   - Duonebs scheduled.   - pulmonary on consult.   - tamiflux 5 days      Essential HTN   -norvasc, metoprolol, hydralazine       DM II  - A1c 6.0  - ISS.   - pt refusing insulin.   - hold glipizide as pt with hypoglycemic episodes.      Tophus Gout   - cont febuxostat      GERD  H/o PUD  - cont PPI      H/o CAD s/p CABG   - metoprolol, not on statin due to allergy to it.      H/o breast CA s/p masectomy       Dispo: home tomorrow     >55min spent, >50% spent counseling and coordinating care in the form of  educating pt/family and d/w consultants and staff. Most of the time spent discussing the above plan.        Plan of care discussed with patient or family at bedside.    Cherri Apodaca MD  Hospitalist          Supplementary Documentation:     Quality:  DVT Prophylaxis: refused heparin. SCD  CODE status: Full  Dispo: per clinical course           Estimated date of discharge: TBD  Discharge is dependent on: clinical stability  At this point Ms. Osorio is expected to be discharge to: home         **Certification      PHYSICIAN Certification of Need for Inpatient Hospitalization - Initial Certification    Patient will require inpatient services that will reasonably be expected to span two midnight's based on the clinical documentation in H+P.   Based on patients current state of illness, I anticipate that, after discharge, patient will require TBD.

## 2024-02-26 NOTE — PAYOR COMM NOTE
--------------  ADMISSION REVIEW     Payor: ARNOLD DE LA O O  Subscriber #:  [id number]  Authorization Number: [id number]    Admit date: 2/25/24  Admit time: 11:01 AM       Patient Seen in: [facility] Emergency Department    History   Stated Complaint: BRUNO    Patient complains of shortness of breath that began to get worse last night.  + cough.  Very tight and wheezy, denies chest pain.   no fever or chills.  no calf pain or swelling.  no recent long car ride or plane ride.   History of copd/asthma on 2l nc at home.    Symptoms worse with any activity.    Symptoms improved with rest.     Past Medical History:   Diagnosis Date    Age-related cataract of left eye 05/10/2018    Age-related cataract of right eye 07/28/2022    Anxiety     Asthma (HCC)     Atherosclerosis of coronary artery     Breast CA (HCC) 1999    lt mastectomy    Breast CA (HCC) 2014    lumpectomy, right    Cancer (HCC)     breast cancer    Carotid artery disease (HCC) 12/05/2016    Carotid stenosis     Closed fracture of multiple ribs of left side with routine healing, subsequent encounter 10/19/2018    Congestive heart disease (HCC)     COPD (chronic obstructive pulmonary disease) (HCC)     on O2 at 2 liters    Coronary atherosclerosis     Depression     Diabetes (HCC) 07/28/2022    takes insulin when hospitalized, takes oral meds at home    Diastolic dysfunction without heart failure     Esophageal reflux     Essential hypertension     Generalized anxiety disorder     Gout     Gout     High blood pressure     High cholesterol     History of pituitary adenoma 05/10/2018    Hyperlipidemia     Major depressive disorder, single episode, moderate (HCC)     Obesity      Past Surgical History:   Procedure Laterality Date    CABG      CAROTID ENDARTERECTOMY Bilateral     CATARACT      COLONOSCOPY      2013    COLONOSCOPY  2013    COLONOSCOPY N/A 02/21/2023    Procedure: COLONOSCOPY;  Surgeon: Abdiaziz Melendez MD;  Location: [facility] ENDOSCOPY    HERNIA  SURGERY      LUMPECTOMY RIGHT  2014    MASTECTOMY LEFT Left 1999    RADIATION RIGHT  2014       Physical Exam     ED Triage Vitals   BP 02/25/24 0610 (!) 193/83   Pulse 02/25/24 0608 94   Resp 02/25/24 0608 (!) 28   Temp 02/25/24 0608 99.1 °F (37.3 °C)   Temp src 02/25/24 0608 Temporal   SpO2 02/25/24 0608 94 %   O2 Device 02/25/24 0608 Nasal cannula     Current:BP (!) 174/81   Pulse 98   Temp 99.1 °F (37.3 °C) (Temporal)   Resp 21   SpO2 99%   PULSE OX hypoxic 84% room air  GENERAL: patient with 2 word dyspnea, audible wheezing   HEAD: normocephalic, atraumatic,   EYES: PERRLA, EOMI,  THROAT: mm dry, no lesions  NECK: supple, no meningeal signs  LUNGS:acc use noted audible wheezing, tachypnea and accessory use  CARDIO: rr   GI: abdomen is soft and non tender, no masses, nl bowel sounds   EXTREMITIES: from, 5/5 strength in all 4 ext,pedal edema      Labs Reviewed   BASIC METABOLIC PANEL (8) - Abnormal; Notable for the following components:       Result Value    CO2 33.0 (*)     Creatinine 1.32 (*)     Calcium, Total 10.6 (*)     eGFR-Cr 41 (*)     All other components within normal limits   TROPONIN I HIGH SENSITIVITY - Abnormal; Notable for the following components:    Troponin I (High Sensitivity) 45 (*)     All other components within normal limits   LIPID PANEL - Abnormal; Notable for the following components:    Cholesterol, Total 265 (*)     Triglycerides 151 (*)     LDL Cholesterol 190 (*)     VLDL 32 (*)     Non HDL Chol 218 (*)     All other components within normal limits   CBC W/ DIFFERENTIAL - Abnormal; Notable for the following components:    MCHC 30.3 (*)     RDW-SD 51.3 (*)     Monocyte Absolute 1.28 (*)     All other components within normal limits   BNP (B TYPE NATRIURETIC PEPTIDE) - Normal     EKG 96  nsr with non spec st changes    XR CHEST AP PORTABLE    Prior CABG.  No edema  Normal lung volumes with discoid atelectasis or scarring in the lingula.  No bacterial pneumonia  Old left rib  fractures  Normal pleura           Medical Decision Making  Patient given continuous hour-long neb post neb improved work of breathing though still bilateral wheezing, additional continuous neb ordered, IV steroids given.  Spoke with pulmonary Dr. Woodard in the ER evaluating the patient.    Disposition and Plan     Clinical Impression:  1. COPD exacerbation (HCC)          History and Physical        Arianablaine Osorio is a(n) 79 year old female, PMH COPD/Asthma, chronic resp failure on 2L at home, h/o DM II, gout, CAD who presents to the ER with c/o worsening SOB. Her symptoms started 2 days PTA and worsened overnight. +productive cough. No rhinorrhea or sore throat. She was having fevers at home as high as 101F which was noted on Thursday. She was wheezing more at home but did not use her nebulizer. She was around 2 seniors 3 days ago who were dx with PNA.      On arrival to ED pt tachypneic, tachycardic, afebrile.   Labs: WBC normal, Trop 45, creat 1.32, Ca 10.6, CXR no infiltrates. Given 2 nebs, Lasix 40mg IV x 1, hydralazine      Physical Exam:  Temp:  [99.1 °F (37.3 °C)] 99.1 °F (37.3 °C)  Pulse:  [] 106  Resp:  [19-28] 19  BP: (157-193)/(81-87) 161/81  SpO2:  [94 %-99 %] 94 %     Respiratory:  wheezing B/L     Lab Results   Component Value Date     WBC 8.7 02/25/2024     HGB 14.2 02/25/2024     HCT 46.8 02/25/2024     .0 02/25/2024     CREATSERUM 1.32 02/25/2024     BUN 18 02/25/2024      02/25/2024     K 4.2 02/25/2024      02/25/2024     CO2 33.0 02/25/2024     GLU 98 02/25/2024     CA 10.6 02/25/2024       Assessment and Plan:     Acute on chronic hypoxic respiratory failure   Asthma- COPD overlap exacerbation   - Admit to PMU  - wean o2. Chronically on 2LPM   - genexpert swab   - CXR no infiltrate  - solumedrol 60mg IV q8hrs.   - Breo ellipta.   - IS/Flutter.   - Freedom scheduled.   - pulmonary on consult.      Essential HTN   -norvasc, metoprolol, hydralazine       DM II  -  A1c       Critical Care H&P/Consult      Assessment/Plan:  Acute on chronic hypoxic respiratory failure  - wean O2 as able, chronically on 2lpm  - IS and OOB  Asthma-COPD overlap  - IV steroids  - Breo for home Wixela  - bd scheuduled  HTN  - per IM  Dispo  - will follow     - ISS.   - pt refusing insulin.   - will resume home glipizide      Tophus Gout   - cont febuxostat      GERD  H/o PUD  - cont PPI      H/o CAD s/p CABG   - metoprolol, not on statin due to allergy to it.      H/o breast CA s/p masectomy       Prophylaxis  Subcutaneous heparin    History of Present Illness:   Ariana Osorio is a 79 year old former-smoker with asthma-COPD c/b chronic hypoxic respiratory failure on 2lpm supplemental oxygen chronically, CHF, GERD, and anxiety who is admitted with dyspnea. Symptoms started around two days ago with dyspnea and cough and wheezing. She noted her SpO2 in 80% despite being on her normal 2lpm supplemental oxygen. No cough, wheeze or chest tightness. Denies recent travel or sick contacts. No fevers, chills, night sweats. No n/v or diarrhea.       2/26:    PULM:      Assessment / Plan:  Acute on chronic hypoxic respiratory failure - due to influenza and ACOS  - wean O2 as able, chronically on 2 LPM  - IS and OOB  Influenza - no signs of bacterial pneumonia on chest x-ray  - tamiflu for 5 days  Asthma-COPD overlap - with exacerbation due to influenza  - IV to PO steroids tomorrow  - Breo for home Wixela  - bronchodilator protocol  HTN  - per IM      Subjective:  Viral panel with influenza  Breathing feels better        Objective:  Vitals          Vitals:     02/25/24 2132 02/26/24 0034 02/26/24 0639 02/26/24 0845   BP: 142/66 120/57 137/67 111/44   BP Location: Right arm Right arm Right arm Right arm   Pulse: 89 89 89 88   Resp: 20 20 18 18   Temp: 98.4 °F (36.9 °C) 98.3 °F (36.8 °C) 98.4 °F (36.9 °C) 98 °F (36.7 °C)   TempSrc: Oral Oral Oral Oral   SpO2:   96% 97% 91%   Weight:                 Physical  Exam:  General: no distress, comfortable and conversant  Respiratory: expiratory wheezes bilaterally, normal effort  Cardiovascular: regular rate and rhythm, no m/r/g  Extremities: no LE edema  Mental status: interactive, answering questions appropriately    MEDICATIONS ADMINISTERED IN LAST 1 DAY:  amLODIPine (Norvasc) tab 5 mg       Date Action Dose Route User    2/26/2024 0855 Given 5 mg Oral Laurie Ferguson RN          glucose (Dex4) 15 GM/59ML oral liquid 15 g       Date Action Dose Route User    2/26/2024 1247 Given 15 g Oral Laurie Ferguson RN    2/26/2024 1227 Given 15 g Oral Laurie Ferguson RN          febuxostat (Uloric) tab 40 mg       Date Action Dose Route User    2/26/2024 0856 Given 40 mg Oral Laurie Ferguson RN          fluticasone furoate-vilanterol (Breo Ellipta) 200-25 MCG/ACT inhaler 1 puff       Date Action Dose Route User    2/26/2024 0857 Given 1 puff Inhalation Laurie Ferguson RN          glipiZIDE (Glucotrol) tab 5 mg       Date Action Dose Route User    2/26/2024 0637 Given 5 mg Oral Sandra Jones RN          hydrALAZINE (Apresoline) tab 12.5 mg       Date Action Dose Route User    2/26/2024 0856 Given 12.5 mg Oral Laurie Ferguson RN    2/25/2024 2142 Given 12.5 mg Oral Sandra Jones RN          ipratropium-albuterol (Duoneb) 0.5-2.5 (3) MG/3ML inhalation solution 3 mL       Date Action Dose Route User    2/26/2024 0128 Given 3 mL Nebulization Sheridan Mistry RCP    2/25/2024 1848 Given 3 mL Nebulization Kiki Denney RCP          ipratropium-albuterol (Duoneb) 0.5-2.5 (3) MG/3ML inhalation solution 3 mL       Date Action Dose Route User    2/26/2024 1341 Given 3 mL Nebulization India Dumont RCP          methylPREDNISolone sodium succinate (Solu-MEDROL) injection 60 mg       Date Action Dose Route User    2/25/2024 2143 Given 60 mg Intravenous Kuswik, Sandra, RN          methylPREDNISolone sodium succinate (Solu-MEDROL) injection 40 mg       Date Action Dose Route User    2/26/2024 1050 Given 40  mg Intravenous Laurie Ferguson RN          metoprolol succinate ER (Toprol XL) 24 hr tab 25 mg       Date Action Dose Route User    2/26/2024 0856 Given 25 mg Oral Laurie Ferguson RN          oseltamivir (Tamiflu) cap 30 mg       Date Action Dose Route User    2/25/2024 1850 Given 30 mg Oral Gallo Winston RN          pantoprazole (Protonix) DR tab 20 mg       Date Action Dose Route User    2/26/2024 0637 Given 20 mg Oral Sandra Jones RN            Vitals (last day)       Date/Time Temp Pulse Resp BP SpO2 Weight O2 Device O2 Flow Rate (L/min) [facility]    02/26/24 1346 97.7 °F (36.5 °C) 82 22 132/65 94 % -- Nasal cannula 2 L/min SL    02/26/24 0845 98 °F (36.7 °C) 88 18 111/44 91 % -- Nasal cannula 2 L/min     02/26/24 0639 98.4 °F (36.9 °C) 89 18 137/67 97 % -- Nasal cannula 2 L/min TK    02/26/24 0034 98.3 °F (36.8 °C) 89 20 120/57 96 % -- Nasal cannula 2 L/min TK    02/25/24 2132 98.4 °F (36.9 °C) 89 20 142/66 94 % -- Nasal cannula 2 L/min TK    02/25/24 1800 -- -- -- -- -- 169 lb -- -- JR    02/25/24 1600 98.8 °F (37.1 °C) 95 21 141/63 92 % -- Nasal cannula 2 L/min JR    02/25/24 1152 101.2 °F (38.4 °C) 94 -- 150/57 95 % -- Nasal cannula 2 L/min JR    02/25/24 1102 99.2 °F (37.3 °C) 100 24 151/50 94 % -- Nasal cannula 2 L/min SC    02/25/24 1000 -- 101 23 130/51 95 % -- Nasal cannula 2 L/min JN    02/25/24 0930 -- 101 20 131/68 98 % -- -- -- JN    02/25/24 0915 -- 108 23 125/68 98 % -- -- -- JN    02/25/24 0900 -- 105 20 153/78 100 % -- -- -- JN    02/25/24 0845 -- 98 21 174/81 99 % -- -- -- JN    02/25/24 0800 -- 106 19 161/81 94 % -- -- -- JN    02/25/24 0745 -- 108 21 157/87 99 % -- -- -- JN    02/25/24 0652 -- -- -- -- -- -- Aerosol mask -- BR    02/25/24 0610 -- -- -- 193/83 -- -- -- -- AI    02/25/24 0608 99.1 °F (37.3 °C) 94 28 -- 94 % -- Nasal cannula 2 L/min AI

## 2024-02-26 NOTE — PROGRESS NOTES
[facility] Pharmacy Note:  Renal Adjustment for oseltamivir (TAMIFLU)    Ariana Osorio is a 79 year old patient who has been prescribed oseltamivir (TAMIFLU) 75 mg every 12 hrs.  The estimated creatinine clearance is 30.3 mL/min (A) (based on SCr of 1.32 mg/dL (H)). The dose has been adjusted to oseltamivir (TAMIFLU) 30 mg every 24 hrs per hospital renal dose adjustment protocol for treatment of  influenza .  Pharmacy will follow and adjust dose as warranted for additional renal function changes.    Thank you,    Yomi Gonzalez, PharmD  2/25/2024  6:19 PM

## 2024-02-27 ENCOUNTER — PATIENT OUTREACH (OUTPATIENT)
Dept: CASE MANAGEMENT | Age: 79
End: 2024-02-27

## 2024-02-27 VITALS
TEMPERATURE: 99 F | HEART RATE: 78 BPM | OXYGEN SATURATION: 96 % | WEIGHT: 169 LBS | DIASTOLIC BLOOD PRESSURE: 54 MMHG | BODY MASS INDEX: 29 KG/M2 | SYSTOLIC BLOOD PRESSURE: 109 MMHG | RESPIRATION RATE: 18 BRPM

## 2024-02-27 LAB
ANION GAP SERPL CALC-SCNC: 4 MMOL/L (ref 0–18)
BUN BLD-MCNC: 41 MG/DL (ref 9–23)
BUN/CREAT SERPL: 31.3 (ref 10–20)
CALCIUM BLD-MCNC: 10.2 MG/DL (ref 8.7–10.4)
CHLORIDE SERPL-SCNC: 101 MMOL/L (ref 98–112)
CO2 SERPL-SCNC: 32 MMOL/L (ref 21–32)
CREAT BLD-MCNC: 1.31 MG/DL
DEPRECATED RDW RBC AUTO: 51.7 FL (ref 35.1–46.3)
EGFRCR SERPLBLD CKD-EPI 2021: 42 ML/MIN/1.73M2 (ref 60–?)
ERYTHROCYTE [DISTWIDTH] IN BLOOD BY AUTOMATED COUNT: 14.9 % (ref 11–15)
GLUCOSE BLD-MCNC: 86 MG/DL (ref 70–99)
GLUCOSE BLDC GLUCOMTR-MCNC: 76 MG/DL (ref 70–99)
GLUCOSE BLDC GLUCOMTR-MCNC: 92 MG/DL (ref 70–99)
HCT VFR BLD AUTO: 46 %
HGB BLD-MCNC: 14.3 G/DL
MCH RBC QN AUTO: 28.9 PG (ref 26–34)
MCHC RBC AUTO-ENTMCNC: 31.1 G/DL (ref 31–37)
MCV RBC AUTO: 93.1 FL
OSMOLALITY SERPL CALC.SUM OF ELEC: 293 MOSM/KG (ref 275–295)
PLATELET # BLD AUTO: 201 10(3)UL (ref 150–450)
POTASSIUM SERPL-SCNC: 4.8 MMOL/L (ref 3.5–5.1)
RBC # BLD AUTO: 4.94 X10(6)UL
SODIUM SERPL-SCNC: 137 MMOL/L (ref 136–145)
WBC # BLD AUTO: 8 X10(3) UL (ref 4–11)

## 2024-02-27 PROCEDURE — 99239 HOSP IP/OBS DSCHRG MGMT >30: CPT | Performed by: HOSPITALIST

## 2024-02-27 RX ORDER — PREDNISONE 10 MG/1
TABLET ORAL
Qty: 20 TABLET | Refills: 0 | Status: SHIPPED | OUTPATIENT
Start: 2024-02-27 | End: 2024-03-06

## 2024-02-27 RX ORDER — OSELTAMIVIR PHOSPHATE 30 MG/1
30 CAPSULE ORAL EVERY 24 HOURS
Qty: 2 CAPSULE | Refills: 0 | Status: SHIPPED | OUTPATIENT
Start: 2024-02-27 | End: 2024-02-29

## 2024-02-27 RX ORDER — ALBUTEROL SULFATE 90 UG/1
2 AEROSOL, METERED RESPIRATORY (INHALATION) EVERY 4 HOURS PRN
Qty: 1 EACH | Refills: 0 | Status: SHIPPED | OUTPATIENT
Start: 2024-02-27 | End: 2024-02-27

## 2024-02-27 RX ORDER — PREDNISONE 10 MG/1
TABLET ORAL
Qty: 20 TABLET | Refills: 0 | Status: SHIPPED | OUTPATIENT
Start: 2024-02-27 | End: 2024-02-27

## 2024-02-27 RX ORDER — ALBUTEROL SULFATE 90 UG/1
2 AEROSOL, METERED RESPIRATORY (INHALATION) EVERY 4 HOURS PRN
Qty: 1 EACH | Refills: 0 | Status: SHIPPED | OUTPATIENT
Start: 2024-02-27 | End: 2024-03-28

## 2024-02-27 RX ORDER — OSELTAMIVIR PHOSPHATE 30 MG/1
30 CAPSULE ORAL EVERY 24 HOURS
Qty: 2 CAPSULE | Refills: 0 | Status: SHIPPED | OUTPATIENT
Start: 2024-02-27 | End: 2024-02-27

## 2024-02-27 RX ORDER — IPRATROPIUM BROMIDE AND ALBUTEROL SULFATE 2.5; .5 MG/3ML; MG/3ML
3 SOLUTION RESPIRATORY (INHALATION) EVERY 6 HOURS PRN
Status: DISCONTINUED | OUTPATIENT
Start: 2024-02-27 | End: 2024-02-27

## 2024-02-27 NOTE — PROGRESS NOTES
Called pt to check up on her.  She stated that she is in hospital she had influenza she is getting better.  She is getting breathing treatments at hospital.  Will call pt back when she gets discharged.      Future Appointments   Date Time Provider Department Center   3/18/2024  3:00 PM Aaliyah Leone MD [email or web address]  [facility]   8/6/2024 11:00 AM Riki Tran MD [id number]     Total time - 2 min  Total Monthly time- 2 min    Fabiana Banerjee [facility]  Care Management  Phone: [phone]  [facility]

## 2024-02-27 NOTE — PLAN OF CARE
Patient ambulating well and maintaining O2 sat above 90s. No c/o pain/difficulty breathing.       Problem: Patient Centered Care  Goal: Patient preferences are identified and integrated in the patient's plan of care  Description: Interventions:  - What would you like us to know as we care for you?   - Provide timely, complete, and accurate information to patient/family  - Incorporate patient and family knowledge, values, beliefs, and cultural backgrounds into the planning and delivery of care  - Encourage patient/family to participate in care and decision-making at the level they choose  - Honor patient and family perspectives and choices  Outcome: Adequate for Discharge     Problem: Patient/Family Goals  Goal: Patient/Family Long Term Goal  Description: Patient's Long Term Goal:     Interventions:  -   - See additional Care Plan goals for specific interventions  Outcome: Adequate for Discharge  Goal: Patient/Family Short Term Goal  Description: Patient's Short Term Goal:     Interventions:   -   - See additional Care Plan goals for specific interventions  Outcome: Adequate for Discharge     Problem: RESPIRATORY - ADULT  Goal: Achieves optimal ventilation and oxygenation  Description: INTERVENTIONS:  - Assess for changes in respiratory status  - Assess for changes in mentation and behavior  - Position to facilitate oxygenation and minimize respiratory effort  - Oxygen supplementation based on oxygen saturation or ABGs  - Provide Smoking Cessation handout, if applicable  - Encourage broncho-pulmonary hygiene including cough, deep breathe, Incentive Spirometry  - Assess the need for suctioning and perform as needed  - Assess and instruct to report SOB or any respiratory difficulty  - Respiratory Therapy support as indicated  - Manage/alleviate anxiety  - Monitor for signs/symptoms of CO2 retention  Outcome: Adequate for Discharge     Problem: RISK FOR INFECTION - ADULT  Goal: Absence of fever/infection during  anticipated neutropenic period  Description: INTERVENTIONS  - Monitor WBC  - Administer growth factors as ordered  - Implement neutropenic guidelines  Outcome: Adequate for Discharge     Problem: METABOLIC/FLUID AND ELECTROLYTES - ADULT  Goal: Glucose maintained within prescribed range  Description: INTERVENTIONS:  - Monitor Blood Glucose as ordered  - Assess for signs and symptoms of hyperglycemia and hypoglycemia  - Administer ordered medications to maintain glucose within target range  - Assess barriers to adequate nutritional intake and initiate nutrition consult as needed  - Instruct patient on self management of diabetes  Outcome: Adequate for Discharge     Problem: Diabetes/Glucose Control  Goal: Glucose maintained within prescribed range  Description: INTERVENTIONS:  - Monitor Blood Glucose as ordered  - Assess for signs and symptoms of hyperglycemia and hypoglycemia  - Administer ordered medications to maintain glucose within target range  - Assess barriers to adequate nutritional intake and initiate nutrition consult as needed  - Instruct patient on self management of diabetes  Outcome: Adequate for Discharge

## 2024-02-27 NOTE — DISCHARGE SUMMARY
[facility] Hospitalist Discharge Summary   Patient ID:  Ariana Osorio  [id number]  79 year old  2/14/1945    Admit date: 2/25/2024  Discharge date: 2/27/2024  Primary Care Physician: Enrique Braun MD   Attending Physician: Cherri Apodaca MD   Consults:   Consultants         Provider   Role Specialty     Darryn Heath MD  Consulting Physician PULMONARY DISEASES            Discharge Diagnoses:   COPD exacerbation (HCC)    Reason for admission  Copied from admission H&P: Ariana Osorio is a(n) 79 year old female, PMH COPD/Asthma, chronic resp failure on 2L at home, h/o DM II, gout, CAD who presents to the ER with c/o worsening SOB. Her symptoms started 2 days PTA and worsened overnight. +productive cough. No rhinorrhea or sore throat. She was having fevers at home as high as 101F which was noted on Thursday. She was wheezing more at home but did not use her nebulizer. She was around 2 seniors 3 days ago who were dx with PNA.      On arrival to ED pt tachypneic, tachycardic, afebrile.   Labs: WBC normal, Trop 45, creat 1.32, Ca 10.6, CXR no infiltrates. Given 2 nebs, Lasix 40mg IV x 1, hydralazine     Hospital Course:  Acute on chronic hypoxic respiratory failure   Asthma- COPD overlap exacerbation   Acute influenza A  - back down to 2L NC.   - genexpert swab + Influenza A.   - CXR no infiltrate  - stop solumedrol. Prednsione taper over 8 days on discharge  - Breo ellipta.   - IS/Flutter.   - Duonebs scheduled.   - pulmonary on consult.   - tamiflux 5 days completed 3 doses while inpt.      Essential HTN   -norvasc, metoprolol, hydralazine       DM II  - A1c 6.0  - ISS.   - pt refusing insulin.   - Glipizide PRN      Tophus Gout   - cont febuxostat      GERD  H/o PUD  - cont PPI      H/o CAD s/p CABG   - metoprolol, not on statin due to allergy to it.      H/o breast CA s/p masectomy        EXAM:   GENERAL: no apparent distress, comfortable  NEURO: A/A Ox3, no focal deficits  RESP: non labored,  CTAB/L  CARDIO: Regular, no murmur  ABD: soft, NT, ND  EXTREMITIES: no edema, no calf tenderness    Operative Procedures:     Discharge Instructions     Medication List        START taking these medications      oseltamivir 30 MG Caps  Commonly known as: Tamiflu  Take 1 capsule (30 mg total) by mouth daily for 2 days.            CHANGE how you take these medications      albuterol 108 (90 Base) MCG/ACT Aers  Commonly known as: Ventolin HFA  Inhale 2 puffs into the lungs every 4 (four) hours as needed.  What changed: reasons to take this     predniSONE 10 MG Tabs  Commonly known as: Deltasone  Take 4 tablets (40 mg total) by mouth daily for 2 days, THEN 3 tablets (30 mg total) daily for 2 days, THEN 2 tablets (20 mg total) daily for 2 days, THEN 1 tablet (10 mg total) daily for 2 days.  Start taking on: February 27, 2024  What changed:   medication strength  See the new instructions.            CONTINUE taking these medications      amLODIPine 5 MG Tabs  Commonly known as: Norvasc  Take 1 tablet (5 mg total) by mouth daily.     colchicine 0.6 MG Tabs     DropSafe Alcohol Prep 70 % Pads  Take 1 Bottle by mouth As Directed.     febuxostat 40 MG Tabs  Commonly known as: Uloric  Take 1 tablet (40 mg total) by mouth daily.     glipiZIDE 5 MG Tabs  Commonly known as: Glucotrol  Take 1 tablet (5 mg total) by mouth before breakfast.     hydrALAZINE 25 MG Tabs  Commonly known as: Apresoline  TAKE 1/2 TABLET TWICE DAILY     Lasix 20 MG Tabs  Generic drug: furosemide     metoprolol succinate ER 25 MG Tb24  Commonly known as: Toprol XL  Take 1 tablet (25 mg total) by mouth daily.     Multi-Vitamin Daily Tabs     omega-3 fatty acids 1000 MG Caps  Commonly known as: Fish Oil     pantoprazole 20 MG Tbec  Commonly known as: Protonix     Potassium Chloride ER 20 MEQ Tbcr     True Metrix Blood Glucose Test Strp  1 strip by In Vitro route 4 (four) times daily.     TRUEplus Lancets 33G Misc  1 each by In Vitro route daily.     Vitamin D  50 MCG (2000 UT) Caps     Wixela Inhub 500-50 MCG/ACT Aepb  Generic drug: fluticasone-salmeterol  Inhale 1 puff into the lungs 2 (two) times daily.            STOP taking these medications      benzonatate 100 MG Caps  Commonly known as: Tessalon     doxycycline 100 MG Caps  Commonly known as: Vibramycin     methylPREDNISolone 4 MG Tbpk  Commonly known as: Medrol     molnupiravir 200 MG capsule               Where to Get Your Medications        These medications were sent to [facility] - [facility] [address] [phone], [phone] [address], [facility] [address]      Phone: [phone]   albuterol 108 (90 Base) MCG/ACT Aers  oseltamivir 30 MG Caps  predniSONE 10 MG Tabs         Activity: activity as tolerated  Diet: regular diet  Wound Care: NA  Code Status: Full Code        Discharge Instructions         Complete course of steroids and tamiflu           Important follow up:   Follow-up Information       Enrique Braun MD Follow up in 2 week(s).    Specialty: Internal Medicine  Contact information:  [address]  [address]  [place] [address]  [phone]               Darryn Heath MD Follow up in 2 week(s).    Specialty: PULMONARY DISEASES  Contact information:  [address]  [address]  [place] [facility] [place] [id number] [phone]                             -PCP in [] within 7 days [] within 14 days [] other     Disposition: home  Discharged Condition: good    Hospital Discharge Diagnoses:  COPD exac flu    Lace+ Score: 78  59-90 High Risk  29-58 Medium Risk  0-28   Low Risk.    TCM Follow-Up Recommendation:  LACE > 58: High Risk of readmission after discharge from the hospital.            Total Time Coordinating Care: Greater than 30 minutes    Patient had opportunity to ask questions, state understanding, and agree with therapeutic plan as outlined    Cherri Apodaca MD  Hospitalist  2/27/2024

## 2024-02-27 NOTE — PHYSICAL THERAPY NOTE
PHYSICAL THERAPY EVALUATION - INPATIENT     Room Number: 513/513-A  Evaluation Date: 2/27/2024  Type of Evaluation: Initial   Physician Order: PT Eval and Treat    Presenting Problem: COPD  Co-Morbidities : CHF, Anxiety, dyspnea  Reason for Therapy: Mobility Dysfunction and Discharge Planning    PHYSICAL THERAPY ASSESSMENT   Patient is a 79 year old female admitted 2/25/2024 for COPD exacerbation.  Prior to admission, patient's baseline is Mod indep at home with all mobility and ADL's son lives in area.  Patient is currently functioning below baseline with bed mobility, transfers, gait, standing prolonged periods, and performing household tasks.  Patient is requiring minimal assist as a result of the following impairments: decreased functional strength, decreased endurance/aerobic capacity, impaired standing balance, decreased muscular endurance, and medical status.  Physical Therapy will continue to follow for duration of hospitalization.    Patient will benefit from continued skilled PT Services at discharge to promote functional independence in home.  Anticipate patient will return home with home health PT.    PLAN  PT Treatment Plan: Bed mobility;Body mechanics;Endurance;Energy conservation;Patient education;Gait training;Strengthening;Stoop training;Transfer training;Balance training  Rehab Potential : Good  Frequency (Obs): 3-5x/week    PHYSICAL THERAPY MEDICAL/SOCIAL HISTORY   History related to current admission: Patient complains of shortness of breath that began to get worse last night.  + cough.  Very tight and wheezy, denies chest pain.   no fever or chills.  no calf pain or swelling.  no recent long car ride or plane ride.   History of copd/asthma on 2l nc at home.    Symptoms worse with any activity.    Symptoms improved with rest.      Problem List  Principal Problem:    COPD exacerbation (HCC)      HOME SITUATION  Home Situation  Type of Home: House  Home Layout: Able to live on main level  Lives  With: Alone  Drives: No  Patient Owned Equipment: Rolling walker     Prior Level of [place]: Mod indep with RW for all mobility and ADL's. Pt is retired and son lives in area.     SUBJECTIVE  I feel good walking and I definitely need rehab. I lives a lone and no family is able to help me they work.     PHYSICAL THERAPY EXAMINATION   OBJECTIVE  Precautions: Bed/chair alarm  Fall Risk: Standard fall risk    WEIGHT BEARING RESTRICTION  Weight Bearing Restriction: None                PAIN ASSESSMENT  Ratin          COGNITION  Overall Cognitive Status:  WFL - within functional limits    RANGE OF MOTION AND STRENGTH ASSESSMENT  Upper extremity ROM and strength are within functional limits   Lower extremity ROM is within functional limits   Lower extremity strength is within functional limits     BALANCE  Static Sitting: Fair +  Dynamic Sitting: Fair  Static Standing: Fair -  Dynamic Standing: Fair -       ACTIVITY TOLERANCE  Pulse: 78  Heart Rate Source: Monitor  Resp: 18  BP: 109/54  BP Location: Right arm  BP Method: Automatic  Patient Position: Sitting    O2 WALK  Oxygen Therapy  SPO2% on Room Air at Rest: 100  SPO2% on Oxygen at Rest: 1.5  SPO2% Ambulation on Oxygen: 94  Ambulation oxygen flow (liters per minute): 2    AM-PAC '6-Clicks' INPATIENT SHORT FORM - BASIC MOBILITY  How much difficulty does the patient currently have...  Patient Difficulty: Turning over in bed (including adjusting bedclothes, sheets and blankets)?: None   Patient Difficulty: Sitting down on and standing up from a chair with arms (e.g., wheelchair, bedside commode, etc.): None   Patient Difficulty: Moving from lying on back to sitting on the side of the bed?: A Little   How much help from another person does the patient currently need...   Help from Another: Moving to and from a bed to a chair (including a wheelchair)?: A Little   Help from Another: Need to walk in hospital room?: A Little   Help from Another: Climbing 3-5 steps with  a railing?: A Little     AM-PAC Score:  Raw Score: 20   Approx Degree of Impairment: 35.83%   Standardized Score (AM-PAC Scale): 47.67   CMS Modifier (G-Code): CJ    FUNCTIONAL ABILITY STATUS  Functional Mobility/Gait Assessment  Gait Assistance: Contact guard assist  Distance (ft): 350  Pattern:  (decreased step lenggth and cadnece)  Rolling: supervision  Supine to Sit: supervision  Sit to Supine: supervision  Sit to Stand: contact guard assist    Exercise/Education Provided:  Bed mobility  Body mechanics  Energy conservation  Functional activity tolerated  Gait training  Posture  Strengthening  Lower therapeutic exercise:  Alternating marching  Ankle pumps  LAQ  Transfer training    The patient's Approx Degree of Impairment: 35.83% has been calculated based on documentation in the [facility] '6 clicks' Inpatient Basic Mobility Short Form.  Research supports that patients with this level of impairment may benefit from Home PT.  Final disposition will be made by interdisciplinary medical team.    Patient End of Session: Up in chair;With  staff;Needs met;Call light within reach;RN aware of session/findings;All patient questions and concerns addressed;Alarm set    CURRENT GOALS  Goals to be met by: 3/15/69041  Patient Goal Patient's self-stated goal is: Return home    Goal #1 Patient is able to demonstrate supine - sit EOB @ level: independent     Goal #1   Current Status    Goal #2 Patient is able to demonstrate transfers Sit to/from Stand at assistance level: modified independent with walker - rolling     Goal #2  Current Status    Goal #3 Patient is able to ambulate 300 feet with assist device: walker - rolling at assistance level: modified independent   Goal #3   Current Status    Goal #4 Patient will negotiate 2 stairs/one curb w/ assistive device and supervision   Goal #4   Current Status    Goal #5 Patient to demonstrate independence with home activity/exercise instructions provided to patient in preparation for  discharge.   Goal #5   Current Status    Goal #6    Goal #6  Current Status      Patient Evaluation Complexity Level:  History Low - no personal factors and/or co-morbidities   Examination of body systems Low -  addressing 1-2 elements   Clinical Presentation Low- Stable   Clinical Decision Making  Low Complexity     Gait Training: 15 minutes

## 2024-02-27 NOTE — PROGRESS NOTES
Pulmonary Progress Note     Assessment / Plan:  Acute on chronic hypoxic respiratory failure - due to influenza and ACOS  - oxygen at baseline 2 LPM  - IS and OOB  Influenza - no signs of bacterial pneumonia on chest x-ray  - tamiflu for 5 days  Asthma-COPD overlap - with exacerbation due to influenza  - PO steroids, taper on discharge  - Breo for home Wixela  - bronchodilator protocol  HTN  - per IM  Dispo  - ok for discharge today from pulmonary standpoint  - follow up with Dr. Heath or APN 1-2 weeks after discharge    Discussed with patient and Dr. Apodaca.    Arvin Ford MD  Pulmonary & Critical Care Medicine  [facility]      Subjective:  No acute events overnight  Breathing doing well today  Feeling ready for discharge      Objective:  Vitals:    02/27/24 0836 02/27/24 0910 02/27/24 0912 02/27/24 1025   BP: 109/54   109/54   BP Location: Right arm   Right arm   Pulse: 77   78   Resp: 18   18   Temp: 98.7 °F (37.1 °C)      TempSrc: Oral      SpO2: 93% 96% 96%    Weight:         Physical Exam:  General: no distress, comfortable and conversant  Respiratory: clear bilaterally, normal effort  Cardiovascular: regular rate and rhythm, no m/r/g  Extremities: no LE edema  Mental status: interactive, answering questions appropriately    Medications:  Reviewed in EMR    Lab Data:  Reviewed in EMR    Imaging:  I independently visualized all relevant chest imaging in PACS and agree with radiology interpretation except where noted.

## 2024-02-27 NOTE — PLAN OF CARE
Patient alert and oriented. VS stable. Denies pain. Call light within reach. Safety precaution maintained. Needs attended. Denies pain. Not in distress.   Problem: Patient Centered Care  Goal: Patient preferences are identified and integrated in the patient's plan of care  Description: Interventions:    Problem: Patient/Family Goals  Goal: Patient/Family Long Term Goal  Description: Patient's Long Term Goal:   Problem: RESPIRATORY - ADULT  Goal: Achieves optimal ventilation and oxygenation  Description: INTERVENTIONS:  - Assess for changes in respiratory status  - Assess for changes in mentation and behavior  - Position to facilitate oxygenation and minimize respiratory effort  - Oxygen supplementation based on oxygen saturation or ABGs  - Provide Smoking Cessation handout, if applicable  - Encourage broncho-pulmonary hygiene including cough, deep breathe, Incentive Spirometry  - Assess the need for suctioning and perform as needed  - Assess and instruct to report SOB or any respiratory difficulty  - Respiratory Therapy support as indicated  - Manage/alleviate anxiety  - Monitor for signs/symptoms of CO2 retention  Outcome: Progressing     Problem: RISK FOR INFECTION - ADULT  Goal: Absence of fever/infection during anticipated neutropenic period  Description: INTERVENTIONS  - Monitor WBC  - Administer growth factors as ordered  - Implement neutropenic guidelines  Outcome: Progressing     Problem: METABOLIC/FLUID AND ELECTROLYTES - ADULT  Goal: Glucose maintained within prescribed range  Description: INTERVENTIONS:  - Monitor Blood Glucose as ordered  - Assess for signs and symptoms of hyperglycemia and hypoglycemia  - Administer ordered medications to maintain glucose within target range  - Assess barriers to adequate nutritional intake and initiate nutrition consult as needed  - Instruct patient on self management of diabetes  Outcome: Progressing     Problem: Diabetes/Glucose Control  Goal: Glucose maintained  within prescribed range  Description: INTERVENTIONS:  - Monitor Blood Glucose as ordered  - Assess for signs and symptoms of hyperglycemia and hypoglycemia  - Administer ordered medications to maintain glucose within target range  - Assess barriers to adequate nutritional intake and initiate nutrition consult as needed  - Instruct patient on self management of diabetes  Outcome: Progressing       Interventions  - See additional Care Plan goals for specific interventions  Outcome: Progressing   - Provide timely, complete, and accurate information to patient/family  - Incorporate patient and family knowledge, values, beliefs, and cultural backgrounds into the planning and delivery of care  - Encourage patient/family to participate in care and decision-making at the level they choose  - Honor patient and family perspectives and choices  Outcome: Progressing

## 2024-02-28 ENCOUNTER — HOSPITAL ENCOUNTER (EMERGENCY)
Facility: HOSPITAL | Age: 79
Discharge: HOME | End: 2024-02-28
Attending: EMERGENCY MEDICINE
Payer: MEDICARE

## 2024-02-28 ENCOUNTER — APPOINTMENT (OUTPATIENT)
Dept: GENERAL RADIOLOGY | Facility: HOSPITAL | Age: 79
End: 2024-02-28
Attending: EMERGENCY MEDICINE
Payer: MEDICARE

## 2024-02-28 ENCOUNTER — APPOINTMENT (OUTPATIENT)
Dept: ULTRASOUND IMAGING | Facility: HOSPITAL | Age: 79
End: 2024-02-28
Attending: EMERGENCY MEDICINE
Payer: MEDICARE

## 2024-02-28 ENCOUNTER — APPOINTMENT (OUTPATIENT)
Dept: CT IMAGING | Facility: HOSPITAL | Age: 79
End: 2024-02-28
Attending: EMERGENCY MEDICINE
Payer: MEDICARE

## 2024-02-28 ENCOUNTER — PATIENT OUTREACH (OUTPATIENT)
Dept: CASE MANAGEMENT | Age: 79
End: 2024-02-28

## 2024-02-28 ENCOUNTER — APPOINTMENT (OUTPATIENT)
Dept: GENERAL RADIOLOGY | Facility: HOSPITAL | Age: 79
End: 2024-02-28
Payer: MEDICARE

## 2024-02-28 ENCOUNTER — TELEPHONE (OUTPATIENT)
Dept: INTERNAL MEDICINE CLINIC | Facility: CLINIC | Age: 79
End: 2024-02-28

## 2024-02-28 VITALS
OXYGEN SATURATION: 99 % | BODY MASS INDEX: 29 KG/M2 | DIASTOLIC BLOOD PRESSURE: 73 MMHG | RESPIRATION RATE: 18 BRPM | SYSTOLIC BLOOD PRESSURE: 158 MMHG | WEIGHT: 169.75 LBS | HEART RATE: 77 BPM | TEMPERATURE: 98 F

## 2024-02-28 DIAGNOSIS — J40 BRONCHITIS: Primary | ICD-10-CM

## 2024-02-28 DIAGNOSIS — J44.1 COPD EXACERBATION (HCC): ICD-10-CM

## 2024-02-28 LAB
ANION GAP SERPL CALC-SCNC: 2 MMOL/L (ref 0–18)
BASOPHILS # BLD AUTO: 0.01 X10(3) UL (ref 0–0.2)
BASOPHILS NFR BLD AUTO: 0.1 %
BNP SERPL-MCNC: 63 PG/ML
BUN BLD-MCNC: 40 MG/DL (ref 9–23)
BUN/CREAT SERPL: 28.6 (ref 10–20)
CALCIUM BLD-MCNC: 10 MG/DL (ref 8.7–10.4)
CHLORIDE SERPL-SCNC: 104 MMOL/L (ref 98–112)
CO2 SERPL-SCNC: 30 MMOL/L (ref 21–32)
CREAT BLD-MCNC: 1.4 MG/DL
D DIMER PPP FEU-MCNC: 0.82 UG/ML FEU (ref ?–0.78)
DEPRECATED RDW RBC AUTO: 51.8 FL (ref 35.1–46.3)
EGFRCR SERPLBLD CKD-EPI 2021: 39 ML/MIN/1.73M2 (ref 60–?)
EOSINOPHIL # BLD AUTO: 0 X10(3) UL (ref 0–0.7)
EOSINOPHIL NFR BLD AUTO: 0 %
ERYTHROCYTE [DISTWIDTH] IN BLOOD BY AUTOMATED COUNT: 14.9 % (ref 11–15)
GLUCOSE BLD-MCNC: 202 MG/DL (ref 70–99)
HCT VFR BLD AUTO: 43.3 %
HGB BLD-MCNC: 13.9 G/DL
IMM GRANULOCYTES # BLD AUTO: 0.05 X10(3) UL (ref 0–1)
IMM GRANULOCYTES NFR BLD: 0.7 %
LYMPHOCYTES # BLD AUTO: 0.44 X10(3) UL (ref 1–4)
LYMPHOCYTES NFR BLD AUTO: 5.9 %
MCH RBC QN AUTO: 30.2 PG (ref 26–34)
MCHC RBC AUTO-ENTMCNC: 32.1 G/DL (ref 31–37)
MCV RBC AUTO: 94.1 FL
MONOCYTES # BLD AUTO: 0.36 X10(3) UL (ref 0.1–1)
MONOCYTES NFR BLD AUTO: 4.8 %
NEUTROPHILS # BLD AUTO: 6.59 X10 (3) UL (ref 1.5–7.7)
NEUTROPHILS # BLD AUTO: 6.59 X10(3) UL (ref 1.5–7.7)
NEUTROPHILS NFR BLD AUTO: 88.5 %
OSMOLALITY SERPL CALC.SUM OF ELEC: 298 MOSM/KG (ref 275–295)
PLATELET # BLD AUTO: 194 10(3)UL (ref 150–450)
POTASSIUM SERPL-SCNC: 5.1 MMOL/L (ref 3.5–5.1)
RBC # BLD AUTO: 4.6 X10(6)UL
SODIUM SERPL-SCNC: 136 MMOL/L (ref 136–145)
TROPONIN I SERPL HS-MCNC: 31 NG/L
WBC # BLD AUTO: 7.5 X10(3) UL (ref 4–11)

## 2024-02-28 PROCEDURE — 83880 ASSAY OF NATRIURETIC PEPTIDE: CPT | Performed by: EMERGENCY MEDICINE

## 2024-02-28 PROCEDURE — 93010 ELECTROCARDIOGRAM REPORT: CPT

## 2024-02-28 PROCEDURE — 71045 X-RAY EXAM CHEST 1 VIEW: CPT | Performed by: EMERGENCY MEDICINE

## 2024-02-28 PROCEDURE — 80048 BASIC METABOLIC PNL TOTAL CA: CPT | Performed by: EMERGENCY MEDICINE

## 2024-02-28 PROCEDURE — 93971 EXTREMITY STUDY: CPT | Performed by: EMERGENCY MEDICINE

## 2024-02-28 PROCEDURE — 99285 EMERGENCY DEPT VISIT HI MDM: CPT

## 2024-02-28 PROCEDURE — 93005 ELECTROCARDIOGRAM TRACING: CPT

## 2024-02-28 PROCEDURE — 36415 COLL VENOUS BLD VENIPUNCTURE: CPT

## 2024-02-28 PROCEDURE — 71260 CT THORAX DX C+: CPT | Performed by: EMERGENCY MEDICINE

## 2024-02-28 PROCEDURE — 84484 ASSAY OF TROPONIN QUANT: CPT | Performed by: EMERGENCY MEDICINE

## 2024-02-28 PROCEDURE — 85379 FIBRIN DEGRADATION QUANT: CPT | Performed by: EMERGENCY MEDICINE

## 2024-02-28 PROCEDURE — 94640 AIRWAY INHALATION TREATMENT: CPT

## 2024-02-28 PROCEDURE — 85025 COMPLETE CBC W/AUTO DIFF WBC: CPT | Performed by: EMERGENCY MEDICINE

## 2024-02-28 RX ORDER — AZITHROMYCIN 250 MG/1
250 TABLET, FILM COATED ORAL DAILY
Qty: 4 TABLET | Refills: 0 | Status: SHIPPED | OUTPATIENT
Start: 2024-02-28 | End: 2024-03-03

## 2024-02-28 RX ORDER — AZITHROMYCIN 250 MG/1
500 TABLET, FILM COATED ORAL ONCE
Status: COMPLETED | OUTPATIENT
Start: 2024-02-28 | End: 2024-02-28

## 2024-02-28 RX ORDER — IPRATROPIUM BROMIDE AND ALBUTEROL SULFATE 2.5; .5 MG/3ML; MG/3ML
3 SOLUTION RESPIRATORY (INHALATION) ONCE
Status: COMPLETED | OUTPATIENT
Start: 2024-02-28 | End: 2024-02-28

## 2024-02-28 NOTE — PAYOR COMM NOTE
--------------  DISCHARGE REVIEW    Payor: [facility]  Subscriber #:  [id number]  Authorization Number: [id number]    Admit date: 2/25/24  Admit time:  11:01 AM  Discharge Date: 2/27/2024  2:51 PM     Admitting Physician: Cherri Apodaca MD  Attending Physician:  No att. providers found  Primary Care Physician: Enrique Braun MD          Discharge Summary Notes        Discharge Summary signed by Cherri Apodaca MD at 2/27/2024 10:45 AM       Author: Cherri Apodaca MD Specialty: HOSPITALIST, Internal Medicine Author Type: Physician    Filed: 2/27/2024 10:45 AM Date of Service: 2/27/2024 10:44 AM Status: Signed    : Cherri Apodaca MD (Physician)           [facility] Hospitalist Discharge Summary   Patient ID:  Ariana Osorio  [id number]  79 year old  2/14/1945    Admit date: 2/25/2024  Discharge date: 2/27/2024  Primary Care Physician: Enrique Braun MD   Attending Physician: Cherri Apodaca MD   Consults:   Consultants         Provider   Role Specialty     Darryn Heath MD  Consulting Physician PULMONARY DISEASES            Discharge Diagnoses:   COPD exacerbation (HCC)    Reason for admission  Copied from admission H&P: Ariana Osorio is a(n) 79 year old female, PMH COPD/Asthma, chronic resp failure on 2L at home, h/o DM II, gout, CAD who presents to the ER with c/o worsening SOB. Her symptoms started 2 days PTA and worsened overnight. +productive cough. No rhinorrhea or sore throat. She was having fevers at home as high as 101F which was noted on Thursday. She was wheezing more at home but did not use her nebulizer. She was around 2 seniors 3 days ago who were dx with PNA.      On arrival to ED pt tachypneic, tachycardic, afebrile.   Labs: WBC normal, Trop 45, creat 1.32, Ca 10.6, CXR no infiltrates. Given 2 nebs, Lasix 40mg IV x 1, hydralazine     Hospital Course:  Acute on chronic hypoxic respiratory failure   Asthma- COPD overlap exacerbation   Acute influenza A  - back  down to 2L NC.   - genexpert swab + Influenza A.   - CXR no infiltrate  - stop solumedrol. Prednsione taper over 8 days on discharge  - Breo ellipta.   - IS/Flutter.   - Duonebs scheduled.   - pulmonary on consult.   - tamiflux 5 days completed 3 doses while inpt.      Essential HTN   -norvasc, metoprolol, hydralazine       DM II  - A1c 6.0  - ISS.   - pt refusing insulin.   - Glipizide PRN      Tophus Gout   - cont febuxostat      GERD  H/o PUD  - cont PPI      H/o CAD s/p CABG   - metoprolol, not on statin due to allergy to it.      H/o breast CA s/p masectomy        EXAM:   GENERAL: no apparent distress, comfortable  NEURO: A/A Ox3, no focal deficits  RESP: non labored, CTAB/L  CARDIO: Regular, no murmur  ABD: soft, NT, ND  EXTREMITIES: no edema, no calf tenderness    Operative Procedures:     Discharge Instructions     Medication List        START taking these medications      oseltamivir 30 MG Caps  Commonly known as: Tamiflu  Take 1 capsule (30 mg total) by mouth daily for 2 days.            CHANGE how you take these medications      albuterol 108 (90 Base) MCG/ACT Aers  Commonly known as: Ventolin HFA  Inhale 2 puffs into the lungs every 4 (four) hours as needed.  What changed: reasons to take this     predniSONE 10 MG Tabs  Commonly known as: Deltasone  Take 4 tablets (40 mg total) by mouth daily for 2 days, THEN 3 tablets (30 mg total) daily for 2 days, THEN 2 tablets (20 mg total) daily for 2 days, THEN 1 tablet (10 mg total) daily for 2 days.  Start taking on: February 27, 2024  What changed:   medication strength  See the new instructions.            CONTINUE taking these medications      amLODIPine 5 MG Tabs  Commonly known as: Norvasc  Take 1 tablet (5 mg total) by mouth daily.     colchicine 0.6 MG Tabs     DropSafe Alcohol Prep 70 % Pads  Take 1 Bottle by mouth As Directed.     febuxostat 40 MG Tabs  Commonly known as: Uloric  Take 1 tablet (40 mg total) by mouth daily.     glipiZIDE 5 MG  Tabs  Commonly known as: Glucotrol  Take 1 tablet (5 mg total) by mouth before breakfast.     hydrALAZINE 25 MG Tabs  Commonly known as: Apresoline  TAKE 1/2 TABLET TWICE DAILY     Lasix 20 MG Tabs  Generic drug: furosemide     metoprolol succinate ER 25 MG Tb24  Commonly known as: Toprol XL  Take 1 tablet (25 mg total) by mouth daily.     Multi-Vitamin Daily Tabs     omega-3 fatty acids 1000 MG Caps  Commonly known as: Fish Oil     pantoprazole 20 MG Tbec  Commonly known as: Protonix     Potassium Chloride ER 20 MEQ Tbcr     True Metrix Blood Glucose Test Strp  1 strip by In Vitro route 4 (four) times daily.     TRUEplus Lancets 33G Misc  1 each by In Vitro route daily.     Vitamin D 50 MCG (2000 UT) Caps     Wixela Inhub 500-50 MCG/ACT Aepb  Generic drug: fluticasone-salmeterol  Inhale 1 puff into the lungs 2 (two) times daily.            STOP taking these medications      benzonatate 100 MG Caps  Commonly known as: Tessalon     doxycycline 100 MG Caps  Commonly known as: Vibramycin     methylPREDNISolone 4 MG Tbpk  Commonly known as: Medrol     molnupiravir 200 MG capsule               Where to Get Your Medications        These medications were sent to [facility] - [place], [place] - [address] [phone], [phone] [address] [facility] [address]      Phone: [phone]   albuterol 108 (90 Base) MCG/ACT Aers  oseltamivir 30 MG Caps  predniSONE 10 MG Tabs         Activity: activity as tolerated  Diet: regular diet  Wound Care: NA  Code Status: Full Code        Discharge Instructions         Complete course of steroids and tamiflu           Important follow up:   Follow-up Information       Enrique Braun MD Follow up in 2 week(s).    Specialty: Internal Medicine  Contact information:  [address] [place] [id number]  [phone]               Darryn Heath MD Follow up in 2 week(s).    Specialty: PULMONARY DISEASES  Contact information:  [address]  [facility] [address] [facility]  [address]  [facility] [address]  [phone]                             -PCP in [] within 7 days [] within 14 days [] other     Disposition: home  Discharged Condition: good    Hospital Discharge Diagnoses:  COPD exac flu    Lace+ Score: 78  59-90 High Risk  29-58 Medium Risk  0-28   Low Risk.    TCM Follow-Up Recommendation:  LACE > 58: High Risk of readmission after discharge from the hospital.            Total Time Coordinating Care: Greater than 30 minutes    Patient had opportunity to ask questions, state understanding, and agree with therapeutic plan as outlined    Cherri Apodaca MD  Hospitalist  2/27/2024        Electronically signed by Cherri Apodaca MD on 2/27/2024 10:45 AM         REVIEWER COMMENTS

## 2024-02-28 NOTE — TELEPHONE ENCOUNTER
Spoke to patient for TCM today. The patient was recently hospitalized for a COPD exacerbation.      FYI:   Pt states she feels her breathing is worse since being home. Pt states she could not sleep good last night. She states she feels the Prednisone keeps her up at night. She also felt SOB last night while laying down despite wearing her oxygen. This is new for her. She feels SOB with activity. Pt reports she is on 2 L of oxygen NC. Pt reports her O2 level currently is at 95-96%. I can hear patient having to stop speaking to catch her breath towards the end of her sentences. No wheezing. She denies chest pain/ chest tightness, dizziness, lightheadedness, syncope/near syncope. She denies fever, chills, nausea, vomiting. KATRINA instructed patient at this time to return to the ER for further evaluation and patient declined stating she will not go back to the ER. She states she has been receiving too many hospital bills because of her ER visits. KATRINA did advise patient there is financial assistance/resources available through the hospital- and RN recommended ER. KATRINA contacted [facility]- and RN with Dr. Heath's office contacted the patient and advised her to go to the ER and she was in agreement.

## 2024-02-28 NOTE — PROGRESS NOTES
Initial Post Discharge Follow Up   Discharge Date: 2/27/24  Contact Date: 2/28/2024    Consent Verification:  Assessment Completed With: Patient  HIPAA Verified?  Yes    Discharge Dx:     Acute on chronic hypoxic respiratory failure   Asthma- COPD overlap exacerbation   Acute influenza A    General:   How have you been since your discharge from the hospital?   Patient reports doing ok. Pt states she feels her breathing is worse since being home. Pt states she could not sleep good last night. She states she feels the Prednisone keeps her up at night. She also felt SOB last night while laying down despite wearing her oxygen. This is new for her. She feels SOB with activity. Pt reports she is on 2 L of oxygen NC. Pt reports her O2 level currently is at 95-96%. I can hear patient having to stop speaking to catch her breath towards the end of her sentences. No wheezing. She denies chest pain/ chest tightness, dizziness, lightheadedness, syncope/near syncope. She denies fever, chills, nausea, vomiting. She states she does not use her Albuterol inhaler because \"it doesn't work\" . NCM instructed patient at this time to return to the ER for further evaluation and patient declined stating she will not go back to the ER. She states she has been receiving too many hospital bills because of her ER visits. NCM did advise patient there is financial assistance/resources available through the hospital- and RN recommended ER. Patient continues to decline. NCM advised patient will contact her Pulmonologist Dr. Heath.     KATRINA called [facility]- Pulmonary group and spoke with SHOSHANA Fitzgerald and provided a condition update. Per Lia ANNA she will contact patient directly regarding this.     A condition update was sent to the PCP office as well.

## 2024-02-28 NOTE — PROGRESS NOTES
UPDATE:     KATRINA received vm from [facility] (Office of Dr. Heath) Nicolasa ANNA called the patient and advised her to return to the ER for further eval and patient was in agreement.

## 2024-02-28 NOTE — ED PROVIDER NOTES
Patient Seen in: [facility] Emergency Department      History     Chief Complaint   Patient presents with    Difficulty Breathing     Stated Complaint: Influenza +; breathing problem    Subjective:   HPI    Patient presents the emergency department with shortness of breath.  She states that she was discharged from the hospital yesterday after being hospitalized for influenza and states that last night she had difficulty sleeping she thinks partly because of the steroids she is on.  She is continuing to need to use her oxygen at all times which prior to hospitalization was only as needed.  She has some right calf pain.  At the same time she also is very adverse to having any testing done today.  She states that she does not want any blood work done because the nurses here \"are not good at it\".  She declined a chest x-ray stating \"it is not things are worse it is just that they are not better\".    Objective:   Past Medical History:   Diagnosis Date    Age-related cataract of left eye 05/10/2018    Age-related cataract of right eye 07/28/2022    Anxiety     Asthma (HCC)     Atherosclerosis of coronary artery     Breast CA ([facility]) 1999    lt mastectomy    Breast CA ([facility]) 2014    lumpectomy, right    Cancer (HCC)     breast cancer    Carotid artery disease ([facility]) 12/05/2016    Carotid stenosis     Closed fracture of multiple ribs of left side with routine healing, subsequent encounter 10/19/2018    Congestive heart disease (HCC)     COPD (chronic obstructive pulmonary disease) ([facility])     on O2 at 2 liters    Coronary atherosclerosis     Depression     Diabetes ([facility]) 07/28/2022    takes insulin when hospitalized, takes oral meds at home    Diastolic dysfunction without heart failure     Esophageal reflux     Essential hypertension     Generalized anxiety disorder     Gout     Gout     High blood pressure     High cholesterol     History of pituitary adenoma 05/10/2018    Hyperlipidemia     Major depressive disorder,  single episode, moderate (HCC)     Obesity               No pertinent past surgical history.              No pertinent social history.            Review of Systems    Positive for stated complaint: Influenza +; breathing problem  Other systems are as noted in HPI.  Constitutional and vital signs reviewed.      All other systems reviewed and negative except as noted above.    Physical Exam     ED Triage Vitals [02/28/24 1637]   /57   Pulse 85   Resp 18   Temp 98.1 °F (36.7 °C)   Temp src Oral   SpO2 94 %   O2 Device Nasal cannula       Current:/73   Pulse 77   Temp 98.1 °F (36.7 °C) (Oral)   Resp 18   Wt 77 kg   SpO2 99%   BMI 29.14 kg/m²         Physical Exam  Vitals and nursing note reviewed.   Constitutional:       General: She is not in acute distress.     Appearance: She is well-developed.   HENT:      Head: Normocephalic.      Nose: Nose normal.      Mouth/Throat:      Mouth: Mucous membranes are moist.   Eyes:      Conjunctiva/sclera: Conjunctivae normal.   Cardiovascular:      Rate and Rhythm: Normal rate and regular rhythm.      Heart sounds: No murmur heard.  Pulmonary:      Effort: Pulmonary effort is normal. No respiratory distress.      Comments: Faint scattered rhonchi are present.  Abdominal:      General: There is no distension.      Palpations: Abdomen is soft.      Tenderness: There is no abdominal tenderness.   Musculoskeletal:         General: No tenderness.      Cervical back: Normal range of motion and neck supple.      Comments: Right calf is mildly tender to palpation.   Skin:     General: Skin is warm and dry.      Capillary Refill: Capillary refill takes less than 2 seconds.      Findings: No rash.   Neurological:      General: No focal deficit present.      Mental Status: She is alert and oriented to person, place, and time.               ED Course     Labs Reviewed   BASIC METABOLIC PANEL (8) - Abnormal; Notable for the following components:       Result Value    Glucose  202 (*)     BUN 40 (*)     Creatinine 1.40 (*)     BUN/CREA Ratio 28.6 (*)     Calculated Osmolality 298 (*)     eGFR-Cr 39 (*)     All other components within normal limits   D-DIMER - Abnormal; Notable for the following components:    D-Dimer 0.82 (*)     All other components within normal limits   CBC W/ DIFFERENTIAL - Abnormal; Notable for the following components:    RDW-SD 51.8 (*)     Lymphocyte Absolute 0.44 (*)     All other components within normal limits   TROPONIN I HIGH SENSITIVITY - Normal   BNP (B TYPE NATRIURETIC PEPTIDE) - Normal   CBC WITH DIFFERENTIAL WITH PLATELET    Narrative:     The following orders were created for panel order CBC With Differential With Platelet.  Procedure                               Abnormality         Status                     ---------                               -----------         ------                     CBC W/ DIFFERENTIAL[[id number]]          Abnormal            Final result                 Please view results for these tests on the individual orders.     EKG    Rate, intervals and axes as noted on EKG Report.  Rate: 80 bpm  Rhythm: Sinus Rhythm  Reading: Abnormal, no acute injury pattern.                          MDM                                         Medical Decision Making  Differential diagnosis considered for DVT, PE, pneumonia, bronchitis, COPD exacerbation    Problems Addressed:  Bronchitis: acute illness or injury  COPD exacerbation (HCC): acute illness or injury    Amount and/or Complexity of Data Reviewed  Labs: ordered. Decision-making details documented in ED Course.     Details: Elevated D-dimer.  Normal troponin and BNP  Radiology: ordered and independent interpretation performed. Decision-making details documented in ED Course.     Details: Chest x-ray shows no acute findings.  CT scan of the chest shows no PE but there is mucous plugging and bronchitic changes.  Ultrasound shows no evidence of DVT.  ECG/medicine tests: ordered and  independent interpretation performed. Decision-making details documented in ED Course.  Discussion of management or test interpretation with external provider(s): Patient has a pulse ox between 96 and 98% on her normal 2 L of oxygen that she is on at home.  I discussed this case with pulmonary covering for her pulmonologist.  He recommended adding Zithromax to her medications for bronchitis flare in the setting of COPD and patient is comfortable being discharged home.  Her son will come and pick her up.  She will follow-up with Dr. Potter in the office.    Risk  Prescription drug management.        Disposition and Plan     Clinical Impression:  1. Bronchitis    2. COPD exacerbation (HCC)         Disposition:  Discharge  2/28/2024  9:06 pm    Follow-up:  Darryn Heath MD  [facility]  [address]  [facility] [address]  [phone]    Schedule an appointment as soon as possible for a visit      We recommend that you schedule follow up care with a primary care provider within the next three months to obtain basic health screening including reassessment of your blood pressure.      Medications Prescribed:  Discharge Medication List as of 2/28/2024  9:16 PM        START taking these medications    Details   azithromycin 250 MG Oral Tab Take 1 tablet (250 mg total) by mouth daily for 4 days., Print, Disp-4 tablet, R-0

## 2024-02-28 NOTE — ED INITIAL ASSESSMENT (HPI)
Pt presents to the ED with c/o BRUNO. Pt diagnosed with influenza on 2/25 and discharged yesterday on oxygen (2L). Pt reports worsening shortness of breath since being discharged. Denies chest pain.

## 2024-02-29 ENCOUNTER — TELEPHONE (OUTPATIENT)
Dept: INTERNAL MEDICINE CLINIC | Facility: CLINIC | Age: 79
End: 2024-02-29

## 2024-02-29 LAB
ATRIAL RATE: 80 BPM
P AXIS: 58 DEGREES
P-R INTERVAL: 170 MS
Q-T INTERVAL: 346 MS
QRS DURATION: 74 MS
QTC CALCULATION (BEZET): 399 MS
R AXIS: 37 DEGREES
T AXIS: 57 DEGREES
VENTRICULAR RATE: 80 BPM

## 2024-02-29 NOTE — TELEPHONE ENCOUNTER
Per patient she would like her referral fax to Dr John Siddiqui Fax# [phone]. Patient appointment is on 03/09/2024.

## 2024-02-29 NOTE — TELEPHONE ENCOUNTER
Patient called and is asking if her ophthalmologist referral can be faxed over to  office, she said they are not seeing at their end. Patient will call back to provide fax number.

## 2024-02-29 NOTE — TELEPHONE ENCOUNTER
Referral faxed to Dr John Siddiqui at [phone], confirmation received. Spoke with pt, verified , informed pt of above information, pt verbalized understanding.

## 2024-02-29 NOTE — ED QUICK NOTES
Discharge instructions reviewed with patient. Verbalized understanding. Pt out to waiting room with wheelchair to meet son.

## 2024-02-29 NOTE — PROGRESS NOTES
NCM placed call to patient for TCM, LM requesting a call back to [phone] with a condition update.    Discharge Dx:   Acute on chronic hypoxic respiratory failure   Asthma- COPD overlap exacerbation   Acute influenza A  2/28/24 ED Discharge DX: Bronchitis, COPD exacerbation

## 2024-03-01 ENCOUNTER — NURSE TRIAGE (OUTPATIENT)
Dept: INTERNAL MEDICINE CLINIC | Facility: CLINIC | Age: 79
End: 2024-03-01

## 2024-03-01 ENCOUNTER — PATIENT OUTREACH (OUTPATIENT)
Dept: CASE MANAGEMENT | Age: 79
End: 2024-03-01

## 2024-03-01 RX ORDER — DOXYCYCLINE HYCLATE 100 MG/1
100 CAPSULE ORAL 2 TIMES DAILY
Qty: 14 CAPSULE | Refills: 0 | Status: SHIPPED | OUTPATIENT
Start: 2024-03-01

## 2024-03-01 NOTE — TELEPHONE ENCOUNTER
Patient states she is having a hard time breathing and her pulse is racing. Currently HR 88. O2 is 92%on room air.     RN asked patient if she has oxygen on hand. She states she does but only uses oxygen as needed.     RN expressed concern that patient is speaking in phrases, RN can hear her gasping and her oxygen is decreasing from earlier and heart rate is increasing. Explained to patient these symptoms could indicate she does need the oxygen right now.     Patient started screaming over RN stating \"YOU LISTEN TO ME. YOUR JUST LIKE THE LAST NURSE TELLING ME TO GO TO THE ER. I DONT NEED TO GO TO THE ER.\" RN clarified I am encouraging her to use the oxygen so we can see if that helps so she does not have to go to the ER. Patient again became very agitated stated \"YOUR JUST LIKE THE OTHER DOCTORS AT THE ER. YOU DONT KNOW WHAT I NEED. I KNOW MY BODY AND I DO NOT NEED OXYGEN RIGHT NOW.\"      Unable to complete triage as patient was not cooperative or willing to participate in call. Told RN to \"just do what I say and ask  For a new antibiotic because I am having a reaction to this one\"     Patient asked that message be forwarded to Dr. Weiss. Sending on high priority and direct message sent.

## 2024-03-01 NOTE — TELEPHONE ENCOUNTER
Ok to change antibiotic to doxycycline 100mg po bif for one week.  If symptoms persist/worsens, then go back to ER.

## 2024-03-01 NOTE — TELEPHONE ENCOUNTER
Dr. Braun:  patient states she feels still has phlegmy cough. Patient feels ER /hospital is not giving her the \"right medication\".   Please advise recommendations for chest congestion? Patient also asking if she should be on more days of antibiotic?    Has No fever.     Pulse ox 96%  Patient was in ER 2/28/24 was \"Finally given azythromycin 250mg for 4 days.   She started yesterday.   She feels albuterol inhaler doesn't help.   Patient states \"breathing is not issue for me, its the phlegm in my chest\"    Was in hospital 2/25/24- 27th Was given prednisone, tamiflu.  Hx of copd

## 2024-03-04 ENCOUNTER — HOSPITAL ENCOUNTER (INPATIENT)
Facility: HOSPITAL | Age: 79
LOS: 6 days | Discharge: HOME | End: 2024-03-11
Attending: STUDENT IN AN ORGANIZED HEALTH CARE EDUCATION/TRAINING PROGRAM | Admitting: INTERNAL MEDICINE
Payer: MEDICARE

## 2024-03-04 ENCOUNTER — TELEPHONE (OUTPATIENT)
Dept: INTERNAL MEDICINE CLINIC | Facility: CLINIC | Age: 79
End: 2024-03-04

## 2024-03-04 ENCOUNTER — APPOINTMENT (OUTPATIENT)
Dept: GENERAL RADIOLOGY | Facility: HOSPITAL | Age: 79
End: 2024-03-04
Attending: STUDENT IN AN ORGANIZED HEALTH CARE EDUCATION/TRAINING PROGRAM
Payer: MEDICARE

## 2024-03-04 ENCOUNTER — APPOINTMENT (OUTPATIENT)
Dept: GENERAL RADIOLOGY | Facility: HOSPITAL | Age: 79
End: 2024-03-04
Payer: MEDICARE

## 2024-03-04 ENCOUNTER — NURSE TRIAGE (OUTPATIENT)
Dept: INTERNAL MEDICINE CLINIC | Facility: CLINIC | Age: 79
End: 2024-03-04

## 2024-03-04 DIAGNOSIS — J11.1 INFLUENZA: Primary | ICD-10-CM

## 2024-03-04 DIAGNOSIS — J44.1 COPD WITH ACUTE EXACERBATION (HCC): Primary | ICD-10-CM

## 2024-03-04 DIAGNOSIS — I50.9 ACUTE ON CHRONIC CONGESTIVE HEART FAILURE, UNSPECIFIED HEART FAILURE TYPE (HCC): ICD-10-CM

## 2024-03-04 DIAGNOSIS — J44.1 COPD EXACERBATION (HCC): ICD-10-CM

## 2024-03-04 DIAGNOSIS — I48.0 PAROXYSMAL ATRIAL FIBRILLATION (HCC): ICD-10-CM

## 2024-03-04 LAB
ANION GAP SERPL CALC-SCNC: 2 MMOL/L (ref 0–18)
BASOPHILS # BLD AUTO: 0.04 X10(3) UL (ref 0–0.2)
BASOPHILS NFR BLD AUTO: 0.4 %
BNP SERPL-MCNC: 45 PG/ML
BUN BLD-MCNC: 33 MG/DL (ref 9–23)
BUN/CREAT SERPL: 22.1 (ref 10–20)
CALCIUM BLD-MCNC: 10.7 MG/DL (ref 8.7–10.4)
CHLORIDE SERPL-SCNC: 103 MMOL/L (ref 98–112)
CO2 SERPL-SCNC: 34 MMOL/L (ref 21–32)
CREAT BLD-MCNC: 1.49 MG/DL
DEPRECATED RDW RBC AUTO: 50.5 FL (ref 35.1–46.3)
EGFRCR SERPLBLD CKD-EPI 2021: 36 ML/MIN/1.73M2 (ref 60–?)
EOSINOPHIL # BLD AUTO: 0.05 X10(3) UL (ref 0–0.7)
EOSINOPHIL NFR BLD AUTO: 0.5 %
ERYTHROCYTE [DISTWIDTH] IN BLOOD BY AUTOMATED COUNT: 14.6 % (ref 11–15)
FLUAV + FLUBV RNA SPEC NAA+PROBE: NEGATIVE
FLUAV + FLUBV RNA SPEC NAA+PROBE: POSITIVE
GLUCOSE BLD-MCNC: 93 MG/DL (ref 70–99)
HCT VFR BLD AUTO: 45.1 %
HGB BLD-MCNC: 14 G/DL
IMM GRANULOCYTES # BLD AUTO: 0.21 X10(3) UL (ref 0–1)
IMM GRANULOCYTES NFR BLD: 1.9 %
LYMPHOCYTES # BLD AUTO: 1.75 X10(3) UL (ref 1–4)
LYMPHOCYTES NFR BLD AUTO: 16.2 %
MCH RBC QN AUTO: 28.9 PG (ref 26–34)
MCHC RBC AUTO-ENTMCNC: 31 G/DL (ref 31–37)
MCV RBC AUTO: 93 FL
MONOCYTES # BLD AUTO: 1.02 X10(3) UL (ref 0.1–1)
MONOCYTES NFR BLD AUTO: 9.4 %
NEUTROPHILS # BLD AUTO: 7.75 X10 (3) UL (ref 1.5–7.7)
NEUTROPHILS # BLD AUTO: 7.75 X10(3) UL (ref 1.5–7.7)
NEUTROPHILS NFR BLD AUTO: 71.6 %
OSMOLALITY SERPL CALC.SUM OF ELEC: 295 MOSM/KG (ref 275–295)
PLATELET # BLD AUTO: 272 10(3)UL (ref 150–450)
POTASSIUM SERPL-SCNC: 4.5 MMOL/L (ref 3.5–5.1)
RBC # BLD AUTO: 4.85 X10(6)UL
RSV RNA SPEC NAA+PROBE: NEGATIVE
SARS-COV-2 RNA RESP QL NAA+PROBE: NOT DETECTED
SODIUM SERPL-SCNC: 139 MMOL/L (ref 136–145)
TROPONIN I SERPL HS-MCNC: 34 NG/L
WBC # BLD AUTO: 10.8 X10(3) UL (ref 4–11)

## 2024-03-04 PROCEDURE — 71045 X-RAY EXAM CHEST 1 VIEW: CPT | Performed by: STUDENT IN AN ORGANIZED HEALTH CARE EDUCATION/TRAINING PROGRAM

## 2024-03-04 PROCEDURE — 99223 1ST HOSP IP/OBS HIGH 75: CPT | Performed by: HOSPITALIST

## 2024-03-04 RX ORDER — FUROSEMIDE 10 MG/ML
40 INJECTION INTRAMUSCULAR; INTRAVENOUS ONCE
Status: COMPLETED | OUTPATIENT
Start: 2024-03-04 | End: 2024-03-04

## 2024-03-04 RX ORDER — ALBUTEROL SULFATE 2.5 MG/3ML
10 SOLUTION RESPIRATORY (INHALATION) CONTINUOUS
Status: DISCONTINUED | OUTPATIENT
Start: 2024-03-04 | End: 2024-03-05

## 2024-03-04 RX ORDER — METHYLPREDNISOLONE SODIUM SUCCINATE 40 MG/ML
40 INJECTION, POWDER, LYOPHILIZED, FOR SOLUTION INTRAMUSCULAR; INTRAVENOUS ONCE
Status: COMPLETED | OUTPATIENT
Start: 2024-03-04 | End: 2024-03-04

## 2024-03-04 NOTE — TELEPHONE ENCOUNTER
please see Siobhan Mayen RN message below.   Pt stated that someone called her but she missed the call. I inform her we are still waiting for Dr. Braun to reply.  Pt was inform if she is not feeling well she should go to ER. Pt will wait for Dr. Braun reply.

## 2024-03-04 NOTE — TELEPHONE ENCOUNTER
On-call MD was paged March 4 at 1:20 AM regards for patient listed.  Complaining of dyspnea, shortness of breath and nasal congestion.    Patient with extensive past medical history of severe COPD, stage IIIb kidney disease, diabetes, hypothyroidism, CHF, PAD, CAD status post CABG, vitamin D def,   Whom was ill since March 24 subsequently went to the ER on the 25th was found to be positive for flu completed Tamiflu however has had ongoing and on and worsening shortness of breath and congestion at this time she was seen in the ER again and was given azithromycin however she had allergy she has multiple medication allergies called her established PCP on March 1 who put her on doxycycline.  Patient been on top doxycycline for 4 doses thus far however she is has chest tightness pain shortness of breath and congestion at this time.     I am concerned given patient's medical comorbidities that she has a MI/and or COPD exacerbation could be due from a atypical pneumonia at this time given the duration of her symptoms.  Despite being treated with doxycycline 100 twice daily.  I advised patient to go to the ER for further evaluation this evening, and patient agreed.  Patient would benefit from repeat imaging, and possibly intravenous antibiotics and consider inpatient criteria given her comorbidities.  Once discharged from the ER/hospital patient should follow-up with either Dr. Braun or myself for a TCM visit

## 2024-03-04 NOTE — TELEPHONE ENCOUNTER
DANYEL Santos pt was called and inform of your message below and she stated that she will call her son or sister to take her to ER. Pt did not want me to call 911 for her.

## 2024-03-04 NOTE — PROGRESS NOTES
Hospital follow up.    SCC - COPD  Tuesday 3/19 @9    Confirmed with patient.    Closing encounter.

## 2024-03-04 NOTE — TELEPHONE ENCOUNTER
Tried calling pt back and no answer, left message for pt to go to  ER as had been advised by Dr Fuentes last night since her symptoms had been persistent inspite of change of abx last week.      Pls call pt back and tell pt should go to ER for treatment since symptoms are not getting better .

## 2024-03-04 NOTE — TELEPHONE ENCOUNTER
Patient called back. She states she would like to speak with you directly. She would like for you to call her at [phone].  Patient is still reporting a hard time breathing but states it is because her chest is congested. SaO2 92% on room air currently. HR 83. Patient is also coughing. Patient states she took robitussin and that helped temporarily. She believes that she is having a reaction to the Doxycycline as well. (Feels restless/pulse racing)     Noted patient spoke with Dr. Fuentes this morning on call and was advised to go to the ER.        Reji Fuentes MD   Physician  Specialty: Internal Medicine     Telephone Encounter  Signed     Encounter Date: 3/4/2024     Signed         On-call MD was paged March 4 at 1:20 AM regards for patient listed.  Complaining of dyspnea, shortness of breath and nasal congestion.     Patient with extensive past medical history of severe COPD, stage IIIb kidney disease, diabetes, hypothyroidism, CHF, PAD, CAD status post CABG, vitamin D def,   Whom was ill since March 24 subsequently went to the ER on the 25th was found to be positive for flu completed Tamiflu however has had ongoing and on and worsening shortness of breath and congestion at this time she was seen in the ER again and was given azithromycin however she had allergy she has multiple medication allergies called her established PCP on March 1 who put her on doxycycline.  Patient been on top doxycycline for 4 doses thus far however she is has chest tightness pain shortness of breath and congestion at this time.      I am concerned given patient's medical comorbidities that she has a MI/and or COPD exacerbation could be due from a atypical pneumonia at this time given the duration of her symptoms.  Despite being treated with doxycycline 100 twice daily.  I advised patient to go to the ER for further evaluation this evening, and patient agreed.  Patient would benefit from repeat imaging, and possibly intravenous  antibiotics and consider inpatient criteria given her comorbidities.  Once discharged from the ER/hospital patient should follow-up with either Dr. Braun or myself for a TCM visit            Electronically signed by Reji Fuentes MD at 3/4/2024  2:46 AM

## 2024-03-04 NOTE — TELEPHONE ENCOUNTER
Patient contacted on call provider.   She states she already talked with Dr Braun's nurse and was advised to go to the ER.

## 2024-03-05 ENCOUNTER — PATIENT OUTREACH (OUTPATIENT)
Dept: CASE MANAGEMENT | Age: 79
End: 2024-03-05

## 2024-03-05 ENCOUNTER — MED REC SCAN ONLY (OUTPATIENT)
Dept: INTERNAL MEDICINE CLINIC | Facility: CLINIC | Age: 79
End: 2024-03-05

## 2024-03-05 PROBLEM — I50.9 ACUTE ON CHRONIC CONGESTIVE HEART FAILURE, UNSPECIFIED HEART FAILURE TYPE (HCC): Status: ACTIVE | Noted: 2024-03-05

## 2024-03-05 LAB
ANION GAP SERPL CALC-SCNC: 6 MMOL/L (ref 0–18)
ATRIAL RATE: 83 BPM
BASOPHILS # BLD AUTO: 0.03 X10(3) UL (ref 0–0.2)
BASOPHILS NFR BLD AUTO: 0.3 %
BUN BLD-MCNC: 31 MG/DL (ref 9–23)
BUN/CREAT SERPL: 22 (ref 10–20)
CALCIUM BLD-MCNC: 10.9 MG/DL (ref 8.7–10.4)
CHLORIDE SERPL-SCNC: 98 MMOL/L (ref 98–112)
CO2 SERPL-SCNC: 33 MMOL/L (ref 21–32)
CREAT BLD-MCNC: 1.41 MG/DL
DEPRECATED RDW RBC AUTO: 48.6 FL (ref 35.1–46.3)
EGFRCR SERPLBLD CKD-EPI 2021: 38 ML/MIN/1.73M2 (ref 60–?)
EOSINOPHIL # BLD AUTO: 0 X10(3) UL (ref 0–0.7)
EOSINOPHIL NFR BLD AUTO: 0 %
ERYTHROCYTE [DISTWIDTH] IN BLOOD BY AUTOMATED COUNT: 14.4 % (ref 11–15)
GLUCOSE BLD-MCNC: 183 MG/DL (ref 70–99)
GLUCOSE BLDC GLUCOMTR-MCNC: 154 MG/DL (ref 70–99)
GLUCOSE BLDC GLUCOMTR-MCNC: 160 MG/DL (ref 70–99)
GLUCOSE BLDC GLUCOMTR-MCNC: 192 MG/DL (ref 70–99)
HCT VFR BLD AUTO: 44.6 %
HGB BLD-MCNC: 14.4 G/DL
IMM GRANULOCYTES # BLD AUTO: 0.21 X10(3) UL (ref 0–1)
IMM GRANULOCYTES NFR BLD: 2.4 %
LYMPHOCYTES # BLD AUTO: 0.73 X10(3) UL (ref 1–4)
LYMPHOCYTES NFR BLD AUTO: 8.2 %
MCH RBC QN AUTO: 29.5 PG (ref 26–34)
MCHC RBC AUTO-ENTMCNC: 32.3 G/DL (ref 31–37)
MCV RBC AUTO: 91.4 FL
MONOCYTES # BLD AUTO: 0.24 X10(3) UL (ref 0.1–1)
MONOCYTES NFR BLD AUTO: 2.7 %
NEUTROPHILS # BLD AUTO: 7.66 X10 (3) UL (ref 1.5–7.7)
NEUTROPHILS # BLD AUTO: 7.66 X10(3) UL (ref 1.5–7.7)
NEUTROPHILS NFR BLD AUTO: 86.4 %
OSMOLALITY SERPL CALC.SUM OF ELEC: 295 MOSM/KG (ref 275–295)
P AXIS: 50 DEGREES
P-R INTERVAL: 152 MS
PLATELET # BLD AUTO: 256 10(3)UL (ref 150–450)
POTASSIUM SERPL-SCNC: 4.2 MMOL/L (ref 3.5–5.1)
Q-T INTERVAL: 370 MS
QRS DURATION: 82 MS
QTC CALCULATION (BEZET): 434 MS
R AXIS: -28 DEGREES
RBC # BLD AUTO: 4.88 X10(6)UL
SODIUM SERPL-SCNC: 137 MMOL/L (ref 136–145)
T AXIS: 74 DEGREES
VENTRICULAR RATE: 83 BPM
WBC # BLD AUTO: 8.9 X10(3) UL (ref 4–11)

## 2024-03-05 PROCEDURE — 99233 SBSQ HOSP IP/OBS HIGH 50: CPT | Performed by: HOSPITALIST

## 2024-03-05 RX ORDER — ZOLPIDEM TARTRATE 5 MG/1
5 TABLET ORAL NIGHTLY PRN
Status: DISCONTINUED | OUTPATIENT
Start: 2024-03-05 | End: 2024-03-11

## 2024-03-05 RX ORDER — AMLODIPINE BESYLATE 5 MG/1
5 TABLET ORAL DAILY
Status: DISCONTINUED | OUTPATIENT
Start: 2024-03-05 | End: 2024-03-11

## 2024-03-05 RX ORDER — CODEINE PHOSPHATE AND GUAIFENESIN 10; 100 MG/5ML; MG/5ML
5 SOLUTION ORAL EVERY 4 HOURS PRN
Status: DISCONTINUED | OUTPATIENT
Start: 2024-03-05 | End: 2024-03-09

## 2024-03-05 RX ORDER — FEBUXOSTAT 40 MG/1
40 TABLET, FILM COATED ORAL DAILY
Status: DISCONTINUED | OUTPATIENT
Start: 2024-03-05 | End: 2024-03-11

## 2024-03-05 RX ORDER — NICOTINE POLACRILEX 4 MG
30 LOZENGE BUCCAL
Status: DISCONTINUED | OUTPATIENT
Start: 2024-03-05 | End: 2024-03-11

## 2024-03-05 RX ORDER — MELATONIN
2000 DAILY
Status: DISCONTINUED | OUTPATIENT
Start: 2024-03-05 | End: 2024-03-11

## 2024-03-05 RX ORDER — METHYLPREDNISOLONE SODIUM SUCCINATE 40 MG/ML
40 INJECTION, POWDER, LYOPHILIZED, FOR SOLUTION INTRAMUSCULAR; INTRAVENOUS EVERY 8 HOURS
Status: DISCONTINUED | OUTPATIENT
Start: 2024-03-05 | End: 2024-03-07

## 2024-03-05 RX ORDER — FLUTICASONE FUROATE AND VILANTEROL 200; 25 UG/1; UG/1
1 POWDER RESPIRATORY (INHALATION) DAILY
Status: DISCONTINUED | OUTPATIENT
Start: 2024-03-05 | End: 2024-03-09

## 2024-03-05 RX ORDER — HYDRALAZINE HYDROCHLORIDE 20 MG/ML
10 INJECTION INTRAMUSCULAR; INTRAVENOUS EVERY 4 HOURS PRN
Status: DISCONTINUED | OUTPATIENT
Start: 2024-03-05 | End: 2024-03-10

## 2024-03-05 RX ORDER — ACETAMINOPHEN 325 MG/1
650 TABLET ORAL EVERY 6 HOURS PRN
Status: DISCONTINUED | OUTPATIENT
Start: 2024-03-05 | End: 2024-03-11

## 2024-03-05 RX ORDER — NICOTINE POLACRILEX 4 MG
15 LOZENGE BUCCAL
Status: DISCONTINUED | OUTPATIENT
Start: 2024-03-05 | End: 2024-03-11

## 2024-03-05 RX ORDER — IPRATROPIUM BROMIDE AND ALBUTEROL SULFATE 2.5; .5 MG/3ML; MG/3ML
3 SOLUTION RESPIRATORY (INHALATION)
Status: DISCONTINUED | OUTPATIENT
Start: 2024-03-05 | End: 2024-03-05

## 2024-03-05 RX ORDER — MAGNESIUM HYDROXIDE/ALUMINUM HYDROXICE/SIMETHICONE 120; 1200; 1200 MG/30ML; MG/30ML; MG/30ML
30 SUSPENSION ORAL 4 TIMES DAILY PRN
Status: DISCONTINUED | OUTPATIENT
Start: 2024-03-05 | End: 2024-03-11

## 2024-03-05 RX ORDER — METOCLOPRAMIDE HYDROCHLORIDE 5 MG/ML
10 INJECTION INTRAMUSCULAR; INTRAVENOUS EVERY 6 HOURS PRN
Status: DISCONTINUED | OUTPATIENT
Start: 2024-03-05 | End: 2024-03-11

## 2024-03-05 RX ORDER — PANTOPRAZOLE SODIUM 40 MG/1
40 TABLET, DELAYED RELEASE ORAL
Status: DISCONTINUED | OUTPATIENT
Start: 2024-03-05 | End: 2024-03-11

## 2024-03-05 RX ORDER — DEXTROSE MONOHYDRATE 25 G/50ML
50 INJECTION, SOLUTION INTRAVENOUS
Status: DISCONTINUED | OUTPATIENT
Start: 2024-03-05 | End: 2024-03-11

## 2024-03-05 RX ORDER — ONDANSETRON 2 MG/ML
4 INJECTION INTRAMUSCULAR; INTRAVENOUS EVERY 6 HOURS PRN
Status: DISCONTINUED | OUTPATIENT
Start: 2024-03-05 | End: 2024-03-11

## 2024-03-05 NOTE — PROGRESS NOTES
[facility]  part of [facility]  Hospitalist Progress Note     Ariana Osorio Patient Status:  Inpatient    1945  79 year old CSN [id number]   Location 336/336-A Attending Jean Carlos Blood MD   Hosp Day # 0 PCP Enrique Braun MD     Assessment & Plan:   ----------------------------------  Acute COPD exacerbation.  Due to recent viral infx.  Patient with minimal hypoxia.  Patient feeling only slightly better. Wheezing present.  -Pulmonology consult pend  -Solu-Medrol 40q8  -Nebulizers: duoneb  -Supplemental oxygen as needed, titrate down as tolerated  -Antibiotics: none  -Repeat imaging if clinical change    Other problems  Recent influenza A infection  Chronic respiratory acidosis/metabolic alkalosis  Hypertension  Diabetes    dvt prophylaxis: scd, heparin allergy  code status: full code  dispo: home upon medical clearance    I personally reviewed the available laboratories, imaging including cxr. I discussed/will discuss the case with pulm. I ordered laboratories, studies including cbc. I adjusted medications including solumedrol. Medical decision making high, risk is high.    Subjective:   ----------------------------------  Breathing is slightly better, does have congestion.      Objective:   Chief Complaint:   Chief Complaint   Patient presents with    Difficulty Breathing     ----------------------------------  Temp:  [98.3 °F (36.8 °C)-99.2 °F (37.3 °C)] 99.2 °F (37.3 °C)  Pulse:  [75-94] 94  Resp:  [15-25] 18  BP: (133-146)/(60-71) 142/61  SpO2:  [92 %-98 %] 93 %  Gen: A+Ox3.  No distress.   HEENT: NCAT, neck supple, no carotid bruit.  CV: RRR, S1S2, and intact distal pulses. No gallop, rub, murmur.  Pulm: Diffuse expiratory wheezes, coarse breath sounds.  Able to speak in very long sentences without difficulty.  Abd: Soft, NTND, BS normal, no mass, no HSM, no rebound/guarding.   Neuro: Normal reflexes, CN. Sensory/motor exams grossly normal deficit.   MS: No joint  effusions.  No peripheral edema.  Skin: Skin is warm and dry. No rashes, erythema, diaphoresis.   Psych: Normal mood and affect. Calm, cooperative    Labs:  Lab Results   Component Value Date    HGB 14.4 03/05/2024    WBC 8.9 03/05/2024    .0 03/05/2024     03/05/2024    K 4.2 03/05/2024    CREATSERUM 1.41 (H) 03/05/2024    INR 1.02 03/28/2019    AST 21 02/06/2024    ALT 18 02/06/2024    TROP 0.133 (HH) 01/26/2021          amLODIPine  5 mg Oral Daily    cholecalciferol  2,000 Units Oral Daily    febuxostat  40 mg Oral Daily    pantoprazole  40 mg Oral QAM AC    fluticasone furoate-vilanterol  1 puff Inhalation Daily    insulin aspart  1-5 Units Subcutaneous TID CC    methylPREDNISolone  40 mg Intravenous Q8H    ipratropium-albuterol  3 mL Nebulization 6 times per day     glucose **OR** glucose **OR** glucose-vitamin C **OR** dextrose **OR** glucose **OR** glucose **OR** glucose-vitamin C, ondansetron, acetaminophen, hydrALAzine, zolpidem, alum-mag hydroxide-simethicone, metoclopramide, guaiFENesin-codeine  **Certification      PHYSICIAN Certification of Need for Inpatient Hospitalization - Initial Certification    Patient will require inpatient services that will reasonably be expected to span two midnight's based on the clinical documentation in H+P.   Based on patients current state of illness, I anticipate that, after discharge, patient will require TBD.

## 2024-03-05 NOTE — H&P
[facility]  part of [facility]    History & Physical    Ariana Osorio Patient Status:  Emergency    1945 MRN [id number]   Location [facility] EMERGENCY DEPARTMENT Attending Leatha Coburn MD   Hosp Day # 0 PCP Enrique Braun MD     Date:  3/4/2024  Date of Admission:  3/4/2024    Chief Complaint:  Chief Complaint   Patient presents with    Difficulty Breathing       History of Present Illness:  Ariana Osorio is a(n) 79 year old female, with a past medical history significant for coronary artery disease, CHF, COPD breast cancer status post mastectomy presented with complaint of shortness of breath now ongoing for the past 2 weeks.  Describes the onset as gradual progressive worsening aggravated by exertion and no relieving factors.  She was seen by her primary care physician and started on antibiotics and prednisone with no improvement.  Denies any fever or chills.  Her shortness of breath associated with a dry cough, claims she has a feeling of phlegm stuck in her chest that does not want,.    History:  Past Medical History:   Diagnosis Date    Age-related cataract of left eye 05/10/2018    Age-related cataract of right eye 2022    Anxiety     Asthma (HCC)     Atherosclerosis of coronary artery     Breast CA (HCC)     lt mastectomy    Breast CA (HCC)     lumpectomy, right    Cancer (HCC)     breast cancer    Carotid artery disease (HCC) 2016    Carotid stenosis     Closed fracture of multiple ribs of left side with routine healing, subsequent encounter 10/19/2018    Congestive heart disease (HCC)     COPD (chronic obstructive pulmonary disease) (HCC)     on O2 at 2 liters    Coronary atherosclerosis     Depression     Diabetes (HCC) 2022    takes insulin when hospitalized, takes oral meds at home    Diastolic dysfunction without heart failure     Esophageal reflux     Essential hypertension     Generalized anxiety disorder     Gout     Gout      High blood pressure     High cholesterol     History of pituitary adenoma 05/10/2018    Hyperlipidemia     Major depressive disorder, single episode, moderate (HCC)     Obesity      Past Surgical History:   Procedure Laterality Date    CABG      CAROTID ENDARTERECTOMY Bilateral     CATARACT      COLONOSCOPY      2013    COLONOSCOPY  2013    COLONOSCOPY N/A 02/21/2023    Procedure: COLONOSCOPY;  Surgeon: Abdiaziz Melendez MD;  Location: [facility] ENDOSCOPY    HERNIA SURGERY      LUMPECTOMY RIGHT  2014    MASTECTOMY LEFT Left 1999    RADIATION RIGHT  2014     Family History   Problem Relation Age of Onset    Asthma Father     Hypertension Father     Heart Disorder Father     Other (Other) Mother         thyroid surgery    Asthma Sister     Asthma Brother     Other (Other) Brother         gout    Breast Cancer Self 43        rt breast 69      reports that she has never smoked. She has never been exposed to tobacco smoke. She has never used smokeless tobacco. She reports that she does not drink alcohol and does not use drugs.    Allergies:  Allergies   Allergen Reactions    Allopurinol HIVES, SWELLING and SHORTNESS OF BREATH    Dog Epithelium SHORTNESS OF BREATH     Hair and saliva    Dust SHORTNESS OF BREATH    Heparin SWELLING    Smoke SHORTNESS OF BREATH    Adhesive Tape (Rosins) RASH    Montelukast HALLUCINATION    Statins MYALGIA     Pt had developed myalgia and myopathy    Xanax [Alprazolam] RESTLESSNESS    Adhesive Tape OTHER (SEE COMMENTS)    Other OTHER (SEE COMMENTS)    Sulfa Antibiotics OTHER (SEE COMMENTS)    Ace Inhibitors Coughing    Aspirin RASH       Home Medications:  Prior to Admission Medications   Prescriptions Last Dose Informant Patient Reported? Taking?   Alcohol Swabs (DROPSAFE ALCOHOL PREP) 70 % Does not apply Pads   No No   Sig: Take 1 Bottle by mouth As Directed.   Cholecalciferol (VITAMIN D) 2000 units Oral Cap   Yes No   Sig: Take 1 capsule (2,000 Units total) by mouth daily.   Glucose  Blood (TRUE METRIX BLOOD GLUCOSE TEST) In Vitro Strip   No No   Si strip by In Vitro route 4 (four) times daily.   LASIX 20 MG Oral Tab   Yes No   Sig: Take 1 tablet (20 mg total) by mouth as needed (FOR SWELLING).   Multiple Vitamin (MULTI-VITAMIN DAILY) Oral Tab   Yes No   Sig: Take 1 tablet by mouth daily.   Potassium Chloride ER 20 MEQ Oral Tab CR   Yes No   Sig: potassium chloride ER 20 mEq tablet,extended release, [RxNorm: 259130]   TRUEplus Lancets 33G Does not apply Misc   No No   Si each by In Vitro route daily.   WIXELA INHUB 500-50 MCG/ACT Inhalation Aerosol Powder, Breath Activated   No No   Sig: Inhale 1 puff into the lungs 2 (two) times daily.   albuterol 108 (90 Base) MCG/ACT Inhalation Aero Soln   No No   Sig: Inhale 2 puffs into the lungs every 4 (four) hours as needed.   Patient not taking: Reported on 2024   amLODIPine 5 MG Oral Tab   No No   Sig: Take 1 tablet (5 mg total) by mouth daily.   azithromycin 250 MG Oral Tab   No No   Sig: Take 1 tablet (250 mg total) by mouth daily for 4 days.   colchicine 0.6 MG Oral Tab   Yes No   Sig: Take 1 tablet (0.6 mg total) by mouth as needed.   doxycycline 100 MG Oral Cap   No No   Sig: Take 1 capsule (100 mg total) by mouth 2 (two) times daily.   febuxostat 40 MG Oral Tab   No No   Sig: Take 1 tablet (40 mg total) by mouth daily.   glipiZIDE 5 MG Oral Tab   No No   Sig: Take 1 tablet (5 mg total) by mouth before breakfast.   Patient taking differently: Take 1 tablet (5 mg total) by mouth before breakfast. prn   hydrALAZINE 25 MG Oral Tab   No No   Sig: TAKE 1/2 TABLET TWICE DAILY   metoprolol succinate ER 25 MG Oral Tablet 24 Hr   No No   Sig: Take 1 tablet (25 mg total) by mouth daily.   omega-3 fatty acids 1000 MG Oral Cap   Yes No   Sig: Take 1,000 mg by mouth daily.   oseltamivir 30 MG Oral Cap   No No   Sig: Take 1 capsule (30 mg total) by mouth daily for 2 days.   pantoprazole 20 MG Oral Tab EC   Yes No   Sig: Take 1 tablet (20 mg total)  by mouth every morning before breakfast.   predniSONE 10 MG Oral Tab   No No   Sig: Take 4 tablets (40 mg total) by mouth daily for 2 days, THEN 3 tablets (30 mg total) daily for 2 days, THEN 2 tablets (20 mg total) daily for 2 days, THEN 1 tablet (10 mg total) daily for 2 days.      Facility-Administered Medications: None       Review of Systems:  Constitutional:  Weakness, Fatigue.  Eye:  Negative.  Ear/Nose/Mouth/Throat:  Negative.  Respiratory:  SOB, cough  Cardiovascular: Negative  Gastrointestinal:  Negative.  Genitourinary:  Negative  Endocrine:  Negative.  Immunologic:  Negative.  Musculoskeletal:  Negative.  Integumentary:  Negative.  Neurologic:  Negative.  Psychiatric:  Negative.  ROS reviewed as documented in chart    Physical Exam:  Temp:  [98.4 °F (36.9 °C)] 98.4 °F (36.9 °C)  Pulse:  [75-82] 75  Resp:  [19-25] 22  BP: (133)/(60) 133/60  SpO2:  [92 %-98 %] 98 %    General:  Alert and oriented.  Diffuse skin problem:  None.  Eye:  Pupils are equal, round and reactive to light, extraocular movements are intact, Normal conjunctiva.  HENT:  Normocephalic, oral mucosa is moist.  Head:  Normocephalic, atraumatic.  Neck:  Supple, non-tender, no carotid bruit, no jugular venous distention, no lymphadenopathy, no thyromegaly.  Respiratory: Significant bilateral wheezing with poor air entry, respirations are mild to moderately labored, breath sounds are equal, symmetrical chest wall expansion.  Cardiovascular:  Normal rate, regular rhythm, no murmur, no edema.  Gastrointestinal:  Soft, non-tender, non-distended, normal bowel sounds, no organomegaly.  Lymphatics:  No lymphadenopathy neck, axilla, groin.  Musculoskeletal: Normal range of motion.  normal strength.  Feet:  Normal pulses.  Neurologic:  Alert, oriented, no focal deficits, cranial nerves II-XII are grossly intact.  Cognition and Speech:  Oriented, speech clear and coherent.  Psychiatric:  Cooperative, appropriate mood & affect.      Laboratory Data:    Lab Results   Component Value Date    WBC 10.8 03/04/2024    HGB 14.0 03/04/2024    HCT 45.1 03/04/2024    .0 03/04/2024    CREATSERUM 1.49 03/04/2024    BUN 33 03/04/2024     03/04/2024    K 4.5 03/04/2024     03/04/2024    CO2 34.0 03/04/2024    GLU 93 03/04/2024    CA 10.7 03/04/2024       Imaging:  XR CHEST AP PORTABLE  (CPT=71045)    Result Date: 3/4/2024  CONCLUSION: Mild interstitial edema.     Dictated by (CST): Silvestre Rendon MD on 3/04/2024 at 9:38 PM     Finalized by (CST): Silvestre Rendon MD on 3/04/2024 at 9:39 PM            Assessment and Plan:    Acute, chronic respiratory failure with hypoxia  Increasing O2 requirements, will initiate O2 protocol.  Wean as tolerated.  If no improvement consider pulmonary consult.    Acute exacerbation of COPD  Patient started on DuoNebs every 4 hours and as needed along with Solu-Medrol 40 mg IV every 8 hours, continue to monitor respiratory status.  Continue home inhalers    Influenza A  Positive recently, completed course of Tamiflu, continue supportive care.    Likely acute on chronic respiratory acidosis with compensatory metabolic alkalosis  Continue treatment for COPD exacerbation and hypoxia, will continue to monitor.    Essential hypertension  Blood pressure well-controlled resume home medication.    Well-controlled diabetes associated nephropathy  Hold oral hypoglycemics for now, will use sliding scale coverage as needed.  Last A1c 6.0    Prophylaxis  Subcutaneous heparin    CODE STATUS  Full    Primary care physician  Enrique Braun MD    MDM: High, acute and severe exacerbation of chronic illness posing threat to life.  IV medications requiring close inpatient monitoring  75 minutes spent on this admission - examining patient, obtaining history, reviewing previous medical records, going over test results/imaging and discussing plan of care. All questions answered.     Disposition  Clinical course will dictate outcome      DESTIN TERRY  MD TA  3/4/2024  11:53 PM  Acute CHF exacerbation  Possible asthma exacerbation

## 2024-03-05 NOTE — ED PROVIDER NOTES
[place] Emergency Department Note  Patient: Ariana Osorio Age: 79 year old Sex: female      MRN: [id number]  : 1945    Patient Seen in: [facility] Emergency Department    History     Chief Complaint   Patient presents with    Difficulty Breathing     Stated Complaint: Congested Chest    History obtained from: Patient    Patient is a 79-year-old female with past medical history of hypertension, hyperlipidemia, diabetes, CAD status post CABG, CHF, COPD on 2 L of home O2, breast cancer status postmastectomy presenting today for evaluation of shortness of breath.  She states that over the past 2 weeks, she has been having increasingly worsening shortness of breath.  States that she was hospitalized for the symptoms 2 weeks ago and treated for influenza and COPD exacerbation.  She states that she has been on prednisone as well as several course of antibiotics.  She states that she was previously on azithromycin and doxycycline with no improvement of symptoms.  She states that she is otherwise not had any chest pain.  Denies any fevers.  States that she is saturating well on her home oxygen.    Review of Systems:  Review of Systems  Positive for stated complaint: Congested Chest. Constitutional and vital signs reviewed. All other systems reviewed and negative except as noted above.    Patient History:  Past Medical History:   Diagnosis Date    Age-related cataract of left eye 05/10/2018    Age-related cataract of right eye 2022    Anxiety     Asthma (HCC)     Atherosclerosis of coronary artery     Breast CA (HCC)     lt mastectomy    Breast CA (HCC)     lumpectomy, right    Cancer (HCC)     breast cancer    Carotid artery disease (HCC) 2016    Carotid stenosis     Closed fracture of multiple ribs of left side with routine healing, subsequent encounter 10/19/2018    Congestive heart disease (HCC)     COPD (chronic obstructive pulmonary disease) (HCC)     on O2 at 2 liters     Coronary atherosclerosis     Depression     Diabetes (HCC) 07/28/2022    takes insulin when hospitalized, takes oral meds at home    Diastolic dysfunction without heart failure     Esophageal reflux     Essential hypertension     Generalized anxiety disorder     Gout     Gout     High blood pressure     High cholesterol     History of pituitary adenoma 05/10/2018    Hyperlipidemia     Major depressive disorder, single episode, moderate (HCC)     Obesity        Past Surgical History:   Procedure Laterality Date    CABG      CAROTID ENDARTERECTOMY Bilateral     CATARACT      COLONOSCOPY      2013    COLONOSCOPY  2013    COLONOSCOPY N/A 02/21/2023    Procedure: COLONOSCOPY;  Surgeon: Abdiaziz Melendez MD;  Location: [facility] ENDOSCOPY    HERNIA SURGERY      LUMPECTOMY RIGHT  2014    MASTECTOMY LEFT Left 1999    RADIATION RIGHT  2014        Family History   Problem Relation Age of Onset    Asthma Father     Hypertension Father     Heart Disorder Father     Other (Other) Mother         thyroid surgery    Asthma Sister     Asthma Brother     Other (Other) Brother         gout    Breast Cancer Self 43        rt breast 69       Specific Social Determinants of Health:   Social History     Socioeconomic History    Marital status:    Tobacco Use    Smoking status: Never     Passive exposure: Never    Smokeless tobacco: Never   Vaping Use    Vaping Use: Never used   Substance and Sexual Activity    Alcohol use: No     Comment: rarely at weddings    Drug use: No   Other Topics Concern    Reaction to local anesthetic No    Pt has a pacemaker No    Pt has a defibrillator No     Social Determinants of Health     Financial Resource Strain: Low Risk  (7/7/2023)    Financial Resource Strain     Difficulty of Paying Living Expenses: Not very hard     Med Affordability: No   Food Insecurity: No Food Insecurity (2/26/2024)    Food Insecurity     Food Insecurity: Never true   Recent Concern: Food Insecurity - Food Insecurity Present  (2/25/2024)    Food Insecurity     Food Insecurity: Sometimes true   Transportation Needs: No Transportation Needs (2/26/2024)    Transportation Needs     Lack of Transportation: No   Physical Activity: Insufficiently Active (7/13/2022)    Exercise Vital Sign     Days of Exercise per Week: 1 day     Minutes of Exercise per Session: 10 min   Stress: No Stress Concern Present (10/6/2023)    Stress     Feeling of Stress : No    Social Connections   Housing Stability: Low Risk  (2/26/2024)    Housing Stability     Housing Instability: No           PSFH elements reviewed from today and agreed except as otherwise stated in HPI.    Physical Exam     ED Triage Vitals   BP 03/04/24 1910 133/60   Pulse 03/04/24 1909 82   Resp 03/04/24 1909 25   Temp 03/04/24 1909 98.4 °F (36.9 °C)   Temp src 03/04/24 1909 Oral   SpO2 03/04/24 1909 92 %   O2 Device 03/04/24 1909 Nasal cannula       Current:/60   Pulse 75   Temp 98.4 °F (36.9 °C) (Oral)   Resp 22   SpO2 98%         Physical Exam  Constitutional:       General: She is not in acute distress.  HENT:      Head: Normocephalic and atraumatic.      Mouth/Throat:      Mouth: Mucous membranes are moist.   Eyes:      Extraocular Movements: Extraocular movements intact.   Cardiovascular:      Rate and Rhythm: Normal rate and regular rhythm.      Heart sounds: Normal heart sounds.   Pulmonary:      Effort: Pulmonary effort is normal. No respiratory distress.      Breath sounds: Examination of the right-lower field reveals rales. Examination of the left-lower field reveals rales. Wheezing and rales present.   Abdominal:      Palpations: Abdomen is soft.      Tenderness: There is no abdominal tenderness.   Skin:     General: Skin is warm and dry.      Capillary Refill: Capillary refill takes less than 2 seconds.      Findings: No rash.   Neurological:      General: No focal deficit present.      Mental Status: She is alert and oriented to person, place, and time.   Psychiatric:          Mood and Affect: Mood normal.         Behavior: Behavior normal.         ED Course   Labs:   Labs Reviewed   BASIC METABOLIC PANEL (8) - Abnormal; Notable for the following components:       Result Value    CO2 34.0 (*)     BUN 33 (*)     Creatinine 1.49 (*)     BUN/CREA Ratio 22.1 (*)     Calcium, Total 10.7 (*)     eGFR-Cr 36 (*)     All other components within normal limits   SARS-COV-2/FLU A AND B/RSV BY PCR (GENEXPERT) - Abnormal; Notable for the following components:    Influenza A by PCR Positive (*)     All other components within normal limits    Narrative:     This test is intended for the qualitative detection and differentiation of SARS-CoV-2, influenza A, influenza B, and respiratory syncytial virus (RSV) viral RNA in nasopharyngeal or nares swabs from individuals suspected of respiratory viral infection consistent with COVID-19 by their healthcare provider. Signs and symptoms of respiratory viral infection due to SARS-CoV-2, influenza, and RSV can be similar.    Test performed using the Xpert Xpress SARS-CoV-2/FLU/RSV (real time RT-PCR)  assay on the [employer] instrument, [employer], [place] [address].   This test is being used under the Food and Drug Administration's Emergency Use Authorization.    The authorized Fact Sheet for Healthcare Providers for this assay is available upon request from the laboratory.   CBC W/ DIFFERENTIAL - Abnormal; Notable for the following components:    RDW-SD 50.5 (*)     Neutrophil Absolute Prelim 7.75 (*)     Neutrophil Absolute 7.75 (*)     Monocyte Absolute 1.02 (*)     All other components within normal limits   TROPONIN I HIGH SENSITIVITY - Normal   BNP (B TYPE NATRIURETIC PEPTIDE) - Normal   CBC WITH DIFFERENTIAL WITH PLATELET    Narrative:     The following orders were created for panel order CBC With Differential With Platelet.  Procedure                               Abnormality         Status                     ---------                                -----------         ------                     CBC W/ DIFFERENTIAL[[id number]]          Abnormal            Final result                 Please view results for these tests on the individual orders.     Radiology findings:  I personally reviewed the images.   XR CHEST AP PORTABLE  (CPT=71045)    Result Date: 3/4/2024  CONCLUSION: Mild interstitial edema.     Dictated by (CST): Silvestre Rendon MD on 3/04/2024 at 9:38 PM     Finalized by (CST): Silvestre Rendon MD on 3/04/2024 at 9:39 PM           EKG as interpreted by me: Ventricular rate 83, normal sinus rhythm, normal axis, no parable prolongation, narrow QRS, QTc 434 ms, no ST segment elevations or depressions, no abnormal T wave inversion, interpretation limited secondary to motion artifact  Cardiac Monitor: Interpreted by me.   Pulse Readings from Last 1 Encounters:   03/04/24 75   , sinus,     External non-ED records reviewed independently by me: Echocardiogram from 1/27/2021 reviewed showing ejection fraction of 60 to 65% with no regional wall motion abnormalities.    MDM   79-year-old female with a past medical history of hypertension, hyperlipidemia, diabetes, CAD status post CABG, CHF, COPD on 2 L of home O2, breast cancer currently in remission presenting for evaluation of shortness of breath over the past 2 weeks.  Upon arrival to the emergency department, saturating well on her home O2.  Diffuse wheezing and bibasilar rales on examination.  No lower extremity edema.    Differential diagnoses considered includes, but is not limited to: CHF exacerbation, COPD exacerbation, bronchitis, pneumonia, pleural effusions    Will obtain the following tests: CBC, BMP, troponin, BNP, COVID/flu/RSV panel, chest x-ray, EKG  Please see ED course for my independent review of these tests/imaging results.    Initial Medications/Therapeutics administered: Albuterol, Atrovent, Solu-Medrol, Lasix    Chronic conditions affecting care: hypertension, hyperlipidemia, diabetes, CAD status  post CABG, CHF, COPD on 2 L of home O2, breast cancer currently in remission    Workup and medications considered but not ordered: None    Social Determinants of Health that impacted care: None    ED Course as of 03/04/24 2352  ------------------------------------------------------------  Time: 03/04 2345  Comment: Labs overall stable with no significant leukocytosis.  Renal function at baseline.  Troponin and proBNP are not elevated.  Influenza a positive which is underlying etiology of patient's symptoms.  Independently reviewed her chest x-ray images that show evidence of mild pulmonary edema.  Lasix given for diuresis.  Upon reevaluation following hour-long DuoNeb treatment, patient noted to have improvement of symptoms but remains with significant wheezing.  Will plan to admit for further treatment of likely COPD exacerbation secondary to underlying influenza.  The patient's case to the [facility] hospitalist, Dr. Felix, who accepted admission.           Disposition and Plan     Clinical Impression:  1. Influenza    2. COPD exacerbation (HCC)    3. Acute on chronic congestive heart failure, unspecified heart failure type (HCC)        Disposition:  Admit    Follow-up:  No follow-up provider specified.    Medications Prescribed:  Current Discharge Medication List          Hospital Problems       Present on Admission  Date Reviewed: 2/22/2024            ICD-10-CM Noted POA    * (Principal) Influenza J11.1 3/4/2024 Unknown        This note may have been created using voice dictation technology and may include inadvertent errors.      Leatha Coburn MD  Emergency Medicine

## 2024-03-05 NOTE — ED QUICK NOTES
Orders for admission, patient is aware of plan and ready to go upstairs. Any questions, please call ED SHOSHANA Deleon  at extension [phone].     Patient Covid vaccination status: Fully vaccinated     COVID Test Ordered in ED: SARS-CoV-2/Flu A and B/RSV by PCR (GeneXpert)    COVID Suspicion at Admission: N/A    Running Infusions:    albuterol      ipratropium          Mental Status/LOC at time of transport: A&ox4    Other pertinent information:   CIWA score:   NIH score:  N/A

## 2024-03-05 NOTE — RESPIRATORY THERAPY NOTE
Patient refused nebulizer therapy. [facility] has educated the patient on the purpose of and need for this therapy as well as potential negative outcomes associated with deferring treatment. Despite education, patient maintains refusal.

## 2024-03-05 NOTE — PLAN OF CARE
Patient affirms cramps jimbo lower ext, refuses pain med, requesting SCDs stating legs need exercise.Dr Felix notified.IV steriods, Nebs treatments. Safety measures in place call light within reach.  Problem: Patient Centered Care  Goal: Patient preferences are identified and integrated in the patient's plan of care  Description: Interventions:  - What would you like us to know as we care for you? Patient has a son  - Provide timely, complete, and accurate information to patient/family  - Incorporate patient and family knowledge, values, beliefs, and cultural backgrounds into the planning and delivery of care  - Encourage patient/family to participate in care and decision-making at the level they choose  - Honor patient and family perspectives and choices  Outcome: Progressing     Problem: Patient/Family Goals  Goal: Patient/Family Long Term Goal  Description: Patient's Long Term Goal: Go home    Interventions:  - See MD plan of care  - See additional Care Plan goals for specific interventions  Outcome: Progressing  Goal: Patient/Family Short Term Goal  Description: Patient's Short Term Goal: Feel better    Interventions:   - See MD plan of care  - See additional Care Plan goals for specific interventions  Outcome: Progressing     Problem: Diabetes/Glucose Control  Goal: Glucose maintained within prescribed range  Description: INTERVENTIONS:  - Monitor Blood Glucose as ordered  - Assess for signs and symptoms of hyperglycemia and hypoglycemia  - Administer ordered medications to maintain glucose within target range  - Assess barriers to adequate nutritional intake and initiate nutrition consult as needed  - Instruct patient on self management of diabetes  Outcome: Progressing     Problem: RESPIRATORY - ADULT  Goal: Achieves optimal ventilation and oxygenation  Description: INTERVENTIONS:  - Assess for changes in respiratory status  - Assess for changes in mentation and behavior  - Position to facilitate oxygenation  and minimize respiratory effort  - Oxygen supplementation based on oxygen saturation or ABGs  - Provide Smoking Cessation handout, if applicable  - Encourage broncho-pulmonary hygiene including cough, deep breathe, Incentive Spirometry  - Assess the need for suctioning and perform as needed  - Assess and instruct to report SOB or any respiratory difficulty  - Respiratory Therapy support as indicated  - Manage/alleviate anxiety  - Monitor for signs/symptoms of CO2 retention  Outcome: Progressing

## 2024-03-05 NOTE — ED INITIAL ASSESSMENT (HPI)
Pt to ED for difficulty breathing and chest congestion for the past two weeks. Per pt, she was hospitalized before for this issue. Pt has productive cough and wheezing. Pt currently satting at 92% on 2L of o2. Pt only has chest pain when coughing.

## 2024-03-05 NOTE — PLAN OF CARE
Patient is a/ox4. 2L O2. Up with assist x1. Accucheck ACHS. IV solumedrol continued.    Problem: Patient Centered Care  Goal: Patient preferences are identified and integrated in the patient's plan of care  Description: Interventions:  - What would you like us to know as we care for you? \"I want to make sure this congestion gets better before going home\"  - Provide timely, complete, and accurate information to patient/family  - Incorporate patient and family knowledge, values, beliefs, and cultural backgrounds into the planning and delivery of care  - Encourage patient/family to participate in care and decision-making at the level they choose  - Honor patient and family perspectives and choices  Outcome: Progressing     Problem: Patient/Family Goals  Goal: Patient/Family Long Term Goal  Description: Patient's Long Term Goal: go home    Interventions:  - steroids, O2 PRN, comfort measures, PT/OT  - See additional Care Plan goals for specific interventions  Outcome: Progressing  Goal: Patient/Family Short Term Goal  Description: Patient's Short Term Goal: breathe easier    Interventions:   - O2 PRN, comfort measures, IV steroids  - See additional Care Plan goals for specific interventions  Outcome: Progressing     Problem: Diabetes/Glucose Control  Goal: Glucose maintained within prescribed range  Description: INTERVENTIONS:  - Monitor Blood Glucose as ordered  - Assess for signs and symptoms of hyperglycemia and hypoglycemia  - Administer ordered medications to maintain glucose within target range  - Assess barriers to adequate nutritional intake and initiate nutrition consult as needed  - Instruct patient on self management of diabetes  Outcome: Progressing     Problem: RESPIRATORY - ADULT  Goal: Achieves optimal ventilation and oxygenation  Description: INTERVENTIONS:  - Assess for changes in respiratory status  - Assess for changes in mentation and behavior  - Position to facilitate oxygenation and minimize  respiratory effort  - Oxygen supplementation based on oxygen saturation or ABGs  - Provide Smoking Cessation handout, if applicable  - Encourage broncho-pulmonary hygiene including cough, deep breathe, Incentive Spirometry  - Assess the need for suctioning and perform as needed  - Assess and instruct to report SOB or any respiratory difficulty  - Respiratory Therapy support as indicated  - Manage/alleviate anxiety  - Monitor for signs/symptoms of CO2 retention  Outcome: Progressing

## 2024-03-05 NOTE — PROGRESS NOTES
Called patient to see how she is feeling she stated that she went back to ER and admitted at  [facility] on 3/4/2024.  Patient stated that she is having miles time breathing even tho her oxygen level is 94 and 95.  Patient that she was having hard time sleeping due to shortness of breath.  She has chest congestion.  She had reaction to antibiotic that she was given.  Legs are swelling.  She was coughing over the phone and wheezing.  Patient stated that she was playing bingo with her friends and she found out that couple of them were also admitted due to pneumonia.      Listened to pt and provided support.  Will follow up when she gets out of the hospital.      Future Appointments   Date Time Provider Department Center   3/11/2024  2:00 PM Caridad Flores DPM ECADOPOD EC ADO   3/18/2024  3:00 PM Aaliyah Leone MD ECWMOENDO  [facility]   3/19/2024  9:00 AM Olivia Ward APRN [facility] SPCIALTY [facility]   8/6/2024 11:00 AM Riki Tran MD [id number] [facility]     Total time - 11 min  Total Monthly time- 11 min    Fabiana Banerjee CMA Health  Care Management  Phone: [phone]  [email or web address]

## 2024-03-06 ENCOUNTER — APPOINTMENT (OUTPATIENT)
Dept: CV DIAGNOSTICS | Facility: HOSPITAL | Age: 79
End: 2024-03-06
Attending: HOSPITALIST
Payer: MEDICARE

## 2024-03-06 LAB
ANION GAP SERPL CALC-SCNC: 2 MMOL/L (ref 0–18)
BASOPHILS # BLD AUTO: 0.06 X10(3) UL (ref 0–0.2)
BASOPHILS NFR BLD AUTO: 0.3 %
BUN BLD-MCNC: 38 MG/DL (ref 9–23)
BUN/CREAT SERPL: 28.4 (ref 10–20)
CALCIUM BLD-MCNC: 10.6 MG/DL (ref 8.7–10.4)
CHLORIDE SERPL-SCNC: 102 MMOL/L (ref 98–112)
CO2 SERPL-SCNC: 33 MMOL/L (ref 21–32)
CREAT BLD-MCNC: 1.34 MG/DL
DEPRECATED RDW RBC AUTO: 49.4 FL (ref 35.1–46.3)
EGFRCR SERPLBLD CKD-EPI 2021: 41 ML/MIN/1.73M2 (ref 60–?)
EOSINOPHIL # BLD AUTO: 0 X10(3) UL (ref 0–0.7)
EOSINOPHIL NFR BLD AUTO: 0 %
ERYTHROCYTE [DISTWIDTH] IN BLOOD BY AUTOMATED COUNT: 14.7 % (ref 11–15)
GLUCOSE BLD-MCNC: 166 MG/DL (ref 70–99)
GLUCOSE BLDC GLUCOMTR-MCNC: 157 MG/DL (ref 70–99)
GLUCOSE BLDC GLUCOMTR-MCNC: 159 MG/DL (ref 70–99)
GLUCOSE BLDC GLUCOMTR-MCNC: 194 MG/DL (ref 70–99)
GLUCOSE BLDC GLUCOMTR-MCNC: 290 MG/DL (ref 70–99)
HCT VFR BLD AUTO: 42.1 %
HGB BLD-MCNC: 13.8 G/DL
IMM GRANULOCYTES # BLD AUTO: 0.42 X10(3) UL (ref 0–1)
IMM GRANULOCYTES NFR BLD: 2.3 %
LYMPHOCYTES # BLD AUTO: 0.82 X10(3) UL (ref 1–4)
LYMPHOCYTES NFR BLD AUTO: 4.5 %
MCH RBC QN AUTO: 29.9 PG (ref 26–34)
MCHC RBC AUTO-ENTMCNC: 32.8 G/DL (ref 31–37)
MCV RBC AUTO: 91.1 FL
MONOCYTES # BLD AUTO: 0.91 X10(3) UL (ref 0.1–1)
MONOCYTES NFR BLD AUTO: 5 %
NEUTROPHILS # BLD AUTO: 16.1 X10 (3) UL (ref 1.5–7.7)
NEUTROPHILS # BLD AUTO: 16.1 X10(3) UL (ref 1.5–7.7)
NEUTROPHILS NFR BLD AUTO: 87.9 %
OSMOLALITY SERPL CALC.SUM OF ELEC: 297 MOSM/KG (ref 275–295)
PLATELET # BLD AUTO: 264 10(3)UL (ref 150–450)
POTASSIUM SERPL-SCNC: 4.7 MMOL/L (ref 3.5–5.1)
RBC # BLD AUTO: 4.62 X10(6)UL
SODIUM SERPL-SCNC: 137 MMOL/L (ref 136–145)
WBC # BLD AUTO: 18.3 X10(3) UL (ref 4–11)

## 2024-03-06 PROCEDURE — 99233 SBSQ HOSP IP/OBS HIGH 50: CPT | Performed by: HOSPITALIST

## 2024-03-06 PROCEDURE — 93306 TTE W/DOPPLER COMPLETE: CPT | Performed by: HOSPITALIST

## 2024-03-06 RX ORDER — DOXYCYCLINE HYCLATE 100 MG/1
100 CAPSULE ORAL EVERY 12 HOURS SCHEDULED
Status: DISCONTINUED | OUTPATIENT
Start: 2024-03-06 | End: 2024-03-06

## 2024-03-06 NOTE — CONSULTS
Pulmonary Consult     Assessment / Plan:  Chronic respiratory failure - typically on 1-2L supplemental oxygen as outpatient  - Wean oxygen as tolerated to baseline  - Chest Xray with mild edema but no acute consolidations  Asthma/COPD overlap w/ acute exacerbation  - Breo 200 for home Wixela 500/50 (did not tolerate trelegy in the past)  - BD protocol  - IV steroids  - Add doxycycline for worsening cough and congestion (cannot tolerate azithromycin)  Influenza A   - Supportive care  - Outside of window for tamiflu  Dispo  - Pulmonary to follow with you, call with questions    Tip Matias,   Pulmonary and Critical Care Medicine      History of Present Illness:   Ms. Osorio is a 79 year old female, born on 2/14/1945 with history of asthma/COPD overlap syndrome who presents for worsening shortness of breath after being diagnosed with influenza A as an outpatient.  She reports feeling mildly improved today but still with shortness of breath and wheezes.    12 point ROS negative except per HPI.    Past Medical History:   Diagnosis Date    Age-related cataract of left eye 05/10/2018    Age-related cataract of right eye 07/28/2022    Anxiety     Asthma (HCC)     Atherosclerosis of coronary artery     Breast CA (HCC) 1999    lt mastectomy    Breast CA (HCC) 2014    lumpectomy, right    Cancer (HCC)     breast cancer    Carotid artery disease (HCC) 12/05/2016    Carotid stenosis     Closed fracture of multiple ribs of left side with routine healing, subsequent encounter 10/19/2018    Congestive heart disease (HCC)     COPD (chronic obstructive pulmonary disease) (HCC)     on O2 at 2 liters    Coronary atherosclerosis     Depression     Diabetes (HCC) 07/28/2022    takes insulin when hospitalized, takes oral meds at home    Diastolic dysfunction without heart failure     Esophageal reflux     Essential hypertension     Generalized anxiety disorder     Gout     Gout     High blood pressure     High cholesterol      History of pituitary adenoma 05/10/2018    Hyperlipidemia     Major depressive disorder, single episode, moderate (HCC)     Obesity        Past Surgical History:   Procedure Laterality Date    CABG      CAROTID ENDARTERECTOMY Bilateral     CATARACT      COLONOSCOPY          COLONOSCOPY      COLONOSCOPY N/A 2023    Procedure: COLONOSCOPY;  Surgeon: Abdiaziz Melendez MD;  Location: [facility] ENDOSCOPY    HERNIA SURGERY      LUMPECTOMY RIGHT  2014    MASTECTOMY LEFT Left 1999    RADIATION RIGHT  2014       Medications Prior to Admission   Medication Sig Dispense Refill Last Dose    doxycycline 100 MG Oral Cap Take 1 capsule (100 mg total) by mouth 2 (two) times daily. 14 capsule 0     [] azithromycin 250 MG Oral Tab Take 1 tablet (250 mg total) by mouth daily for 4 days. 4 tablet 0     albuterol 108 (90 Base) MCG/ACT Inhalation Aero Soln Inhale 2 puffs into the lungs every 4 (four) hours as needed. (Patient not taking: Reported on 2024) 1 each 0     predniSONE 10 MG Oral Tab Take 4 tablets (40 mg total) by mouth daily for 2 days, THEN 3 tablets (30 mg total) daily for 2 days, THEN 2 tablets (20 mg total) daily for 2 days, THEN 1 tablet (10 mg total) daily for 2 days. 20 tablet 0     [] oseltamivir 30 MG Oral Cap Take 1 capsule (30 mg total) by mouth daily for 2 days. 2 capsule 0     LASIX 20 MG Oral Tab Take 1 tablet (20 mg total) by mouth as needed (FOR SWELLING).       Potassium Chloride ER 20 MEQ Oral Tab CR potassium chloride ER 20 mEq tablet,extended release, [RxNorm: 220937]       amLODIPine 5 MG Oral Tab Take 1 tablet (5 mg total) by mouth daily. 90 tablet 1     glipiZIDE 5 MG Oral Tab Take 1 tablet (5 mg total) by mouth before breakfast. (Patient taking differently: Take 1 tablet (5 mg total) by mouth before breakfast. prn) 90 tablet 0     Alcohol Swabs (DROPSAFE ALCOHOL PREP) 70 % Does not apply Pads Take 1 Bottle by mouth As Directed. 400 each 3     pantoprazole 20 MG Oral Tab  EC Take 1 tablet (20 mg total) by mouth every morning before breakfast.       hydrALAZINE 25 MG Oral Tab TAKE 1/2 TABLET TWICE DAILY 90 tablet 1     TRUEplus Lancets 33G Does not apply Misc 1 each by In Vitro route daily. 100 each 3     metoprolol succinate ER 25 MG Oral Tablet 24 Hr Take 1 tablet (25 mg total) by mouth daily. 90 tablet 3     colchicine 0.6 MG Oral Tab Take 1 tablet (0.6 mg total) by mouth as needed.       WIXELA INHUB 500-50 MCG/ACT Inhalation Aerosol Powder, Breath Activated Inhale 1 puff into the lungs 2 (two) times daily. 3 each 1     Glucose Blood (TRUE METRIX BLOOD GLUCOSE TEST) In Vitro Strip 1 strip by In Vitro route 4 (four) times daily. 400 strip 3     febuxostat 40 MG Oral Tab Take 1 tablet (40 mg total) by mouth daily. 90 tablet 1     omega-3 fatty acids 1000 MG Oral Cap Take 1,000 mg by mouth daily.       Multiple Vitamin (MULTI-VITAMIN DAILY) Oral Tab Take 1 tablet by mouth daily.       Cholecalciferol (VITAMIN D) 2000 units Oral Cap Take 1 capsule (2,000 Units total) by mouth daily.        No outpatient medications have been marked as taking for the 3/4/24 encounter (Hospital Encounter).      Allergies   Allergen Reactions    Allopurinol HIVES, SWELLING and SHORTNESS OF BREATH    Dog Epithelium SHORTNESS OF BREATH     Hair and saliva    Dust SHORTNESS OF BREATH    Heparin SWELLING    Smoke SHORTNESS OF BREATH    Adhesive Tape (Rosins) RASH    Montelukast HALLUCINATION    Statins MYALGIA     Pt had developed myalgia and myopathy    Xanax [Alprazolam] RESTLESSNESS    Adhesive Tape OTHER (SEE COMMENTS)    Other OTHER (SEE COMMENTS)    Sulfa Antibiotics OTHER (SEE COMMENTS)    Ace Inhibitors Coughing    Aspirin RASH      Social History     Socioeconomic History    Marital status:    Tobacco Use    Smoking status: Never     Passive exposure: Never    Smokeless tobacco: Never   Vaping Use    Vaping Use: Never used   Substance and Sexual Activity    Alcohol use: No     Comment: rarely  at weddings    Drug use: No   Other Topics Concern    Reaction to local anesthetic No    Pt has a pacemaker No    Pt has a defibrillator No     Social Determinants of Health     Financial Resource Strain: Low Risk  (7/7/2023)    Financial Resource Strain     Difficulty of Paying Living Expenses: Not very hard     Med Affordability: No   Food Insecurity: No Food Insecurity (3/5/2024)    Food Insecurity     Food Insecurity: Never true   Recent Concern: Food Insecurity - Food Insecurity Present (2/25/2024)    Food Insecurity     Food Insecurity: Sometimes true   Transportation Needs: No Transportation Needs (3/5/2024)    Transportation Needs     Lack of Transportation: No   Physical Activity: Insufficiently Active (7/13/2022)    Exercise Vital Sign     Days of Exercise per Week: 1 day     Minutes of Exercise per Session: 10 min   Stress: No Stress Concern Present (10/6/2023)    Stress     Feeling of Stress : No    Social Connections   Housing Stability: Low Risk  (3/5/2024)    Housing Stability     Housing Instability: No       Family History   Problem Relation Age of Onset    Asthma Father     Hypertension Father     Heart Disorder Father     Other (Other) Mother         thyroid surgery    Asthma Sister     Asthma Brother     Other (Other) Brother         gout    Breast Cancer Self 43        rt breast 69         Exam:  Vitals:    03/05/24 2000 03/05/24 2155 03/06/24 0426 03/06/24 0543   BP:  146/74  156/68   BP Location:  Right arm  Right arm   Pulse: 86      Resp:  18  16   Temp:  98.6 °F (37 °C)  98.2 °F (36.8 °C)   TempSrc:  Oral  Oral   SpO2:  94%  94%   Weight:   167 lb 11.2 oz (76.1 kg)      General: no apparent distress, conversant  Skin: no rash, ulcers or subcutaneous nodules  Eyes: anicteric sclerae, moist conjunctivae  Head, ears, nose, throat: atraumatic, oropharynx clear with moist mucous membranes  Neck: trachea midline with no thyromegaly  Heart: regular rate and rhythm, no murmurs / rubs /  gallops  Lungs: bilat wheezes  Extremities: no edema or cyanosis  Psych: interactive, answering questions appropriately, appropriate affect    Labs:  Reviewed in EMR    Inpatient Medications:  Reviewed in EMR    Imaging:   Chest imaging reviewed

## 2024-03-06 NOTE — OCCUPATIONAL THERAPY NOTE
OCCUPATIONAL THERAPY EVALUATION - INPATIENT     Room Number: 336/336-A  Evaluation Date: 3/6/2024  Type of Evaluation: Initial       Physician Order: IP Consult to Occupational Therapy  Reason for Therapy: ADL/IADL Dysfunction and Discharge Planning    OCCUPATIONAL THERAPY ASSESSMENT     Patient is a 79 year old female admitted 3/4/2024 for influenza.  Prior to admission, pt was independent. Pt is currently functioning at baseline physically but requiring 2L of O2. O2 WNL. OT to sign off.    RN cleared pt for participation in OT session, which was completed in collaboration with PT. Upon arrival, pt was seated in bedside chair  and agreeable to activity. No visitors present during session. Gait belt used. Pt was left in bathroom. Call light and all needs left in reach. Handoff given to RN.    Education provided  Educated pt about role of OT and hospital therapy process as well as energy conservation and proper safety techniques. Pt verbalized/demonstrated proper carryover.         PLAN  DC OT      OCCUPATIONAL THERAPY MEDICAL/SOCIAL HISTORY     Problem List  Principal Problem:    Influenza  Active Problems:    COPD exacerbation (HCC)    Acute on chronic congestive heart failure, unspecified heart failure type (HCC)      Past Medical History  Past Medical History:   Diagnosis Date    Age-related cataract of left eye 05/10/2018    Age-related cataract of right eye 07/28/2022    Anxiety     Asthma (HCC)     Atherosclerosis of coronary artery     Breast CA (HCC) 1999    lt mastectomy    Breast CA (HCC) 2014    lumpectomy, right    Cancer (HCC)     breast cancer    Carotid artery disease (HCC) 12/05/2016    Carotid stenosis     Closed fracture of multiple ribs of left side with routine healing, subsequent encounter 10/19/2018    Congestive heart disease (HCC)     COPD (chronic obstructive pulmonary disease) (HCC)     on O2 at 2 liters    Coronary atherosclerosis     Depression     Diabetes (HCC) 07/28/2022    takes  insulin when hospitalized, takes oral meds at home    Diastolic dysfunction without heart failure     Esophageal reflux     Essential hypertension     Generalized anxiety disorder     Gout     Gout     High blood pressure     High cholesterol     History of pituitary adenoma 05/10/2018    Hyperlipidemia     Major depressive disorder, single episode, moderate (HCC)     Obesity        Past Surgical History  Past Surgical History:   Procedure Laterality Date    CABG      CAROTID ENDARTERECTOMY Bilateral     CATARACT      COLONOSCOPY          COLONOSCOPY      COLONOSCOPY N/A 2023    Procedure: COLONOSCOPY;  Surgeon: Abdiaziz Melendez MD;  Location: [facility] ENDOSCOPY    HERNIA SURGERY      LUMPECTOMY RIGHT  2014    MASTECTOMY LEFT Left 1999    RADIATION RIGHT  2014       HOME SITUATION  Type of Home: Independent living facility  Home Layout: One level  Lives With: Alone                      Drives: No  Patient Regularly Uses: None    SUBJECTIVE  \"No I can move just fine. It's my breathing.\"    OCCUPATIONAL THERAPY EXAMINATION      OBJECTIVE  Precautions: None  Fall Risk: Standard fall risk    PAIN ASSESSMENT  Ratin          O2 SATURATIONS  Oxygen Therapy  SPO2% Ambulation on Oxygen: 93  Ambulation oxygen flow (liters per minute): 3     RANGE OF MOTION   Upper extremity ROM is within functional limits     STRENGTH ASSESSMENT  Upper extremity strength is within functional limits     COORDINATION  Gross Motor: WFL   Fine Motor: WFL     ACTIVITIES OF DAILY LIVING ASSESSMENT  AM-[facility] ‘6-Clicks’ Inpatient Daily Activity Short Form  How much help from another person does the patient currently need…  -   Putting on and taking off regular lower body clothing?: None  -   Bathing (including washing, rinsing, drying)?: None  -   Toileting, which includes using toilet, bedpan or urinal? : None  -   Putting on and taking off regular upper body clothing?: None  -   Taking care of personal grooming such as brushing  teeth?: None  -   Eating meals?: None    AM-PAC Score:  Score: 24  Approx Degree of Impairment: 0%  Standardized Score (AM-PAC Scale): 57.54  CMS Modifier (G-Code): CH      BED MOBILITY  Supine to Sit : Independent      FUNCTIONAL TRANSFER ASSESSMENT  Supine to Sit : Independent     Sit to stand: independent  Toilet Transfer: independent  Chair Transfer: independent    FUNCTIONAL ADL ASSESSMENT  independent    The patient's Approx Degree of Impairment: 0% has been calculated based on documentation in the [facility] '6 clicks' Inpatient Daily Activity Short Form.  Research supports that patients with this level of impairment may benefit from home. Final disposition will be made by interdisciplinary medical team.        Patient Evaluation Complexity Level:   Occupational Profile/Medical History LOW   Specific performance deficits impacting engagement in ADL/IADL LOW  1 - 3 performance deficits    Client Assessment/Performance Deficits LOW - No comorbidities nor modifications of tasks    Clinical Decision Making LOW - Analysis of occupational profile, problem-focused assessments, limited treatment options    Overall Complexity LOW     OT Session Time: 20 minutes  Self-Care Home Management: 10 minutes           Ondina Garcia OT  [facility]  Inpatient Rehabilitation  Occupational Therapy  [phone]

## 2024-03-06 NOTE — PHYSICAL THERAPY NOTE
PHYSICAL THERAPY EVALUATION - INPATIENT     Room Number: 336/336-A  Evaluation Date: 3/6/2024  Type of Evaluation: Initial   Physician Order: PT Eval and Treat    Presenting Problem: influenza     Reason for Therapy: Mobility Dysfunction and Discharge Planning    PHYSICAL THERAPY ASSESSMENT   Patient is a 79 year old female admitted 3/4/2024 for influenza.  Prior to admission, patient's baseline is independent in ADL' s and ambulation with rolling walker.  Patient is currently functioning at baseline with bed mobility, transfers, and gait.  Patient is requiring modified independent and supervision as a result of the following impairments: medical status.  Physical Therapy will continue to follow for duration of hospitalization.    Patient will not benefit from continued skilled PT Services while in the hospital and can be discharged.    PHYSICAL THERAPY MEDICAL/SOCIAL HISTORY   Problem List  Principal Problem:    Influenza  Active Problems:    COPD exacerbation (HCC)    Acute on chronic congestive heart failure, unspecified heart failure type (HCC)    HOME SITUATION  Home Situation  Type of Home: Independent living facility  Home Layout: One level  Stairs to Enter : 0  Railing: No  Stairs to Bedroom: 0  Railing: No  Lives With: Alone  Drives: No  Patient Owned Equipment: Rolling walker  Patient Regularly Uses: None     SUBJECTIVE  \"I am doing fine\"    PHYSICAL THERAPY EXAMINATION   OBJECTIVE  Precautions: None  Fall Risk: Standard fall risk    WEIGHT BEARING RESTRICTION  Weight Bearing Restriction: None                PAIN ASSESSMENT  Ratin          COGNITION  Overall Cognitive Status:  WFL - within functional limits    RANGE OF MOTION AND STRENGTH ASSESSMENT  Lower extremity ROM is within functional limits  BLE WNL  Lower extremity strength is within functional limits  BLE WNL    BALANCE  Static Sitting: Good  Dynamic Sitting: Fair +  Static Standing: Fair  Dynamic Standing: Fair -    AM-PAC '6-Clicks'  INPATIENT SHORT FORM - BASIC MOBILITY  How much difficulty does the patient currently have...  Patient Difficulty: Turning over in bed (including adjusting bedclothes, sheets and blankets)?: None   Patient Difficulty: Sitting down on and standing up from a chair with arms (e.g., wheelchair, bedside commode, etc.): None   Patient Difficulty: Moving from lying on back to sitting on the side of the bed?: None   How much help from another person does the patient currently need...   Help from Another: Moving to and from a bed to a chair (including a wheelchair)?: None   Help from Another: Need to walk in hospital room?: A Little   Help from Another: Climbing 3-5 steps with a railing?: A Little     AM-PAC Score:  Raw Score: 22   Approx Degree of Impairment: 20.91%   Standardized Score (AM-PAC Scale): 53.28   CMS Modifier (G-Code): CJ    FUNCTIONAL ABILITY STATUS  Functional Mobility/Gait Assessment  Gait Assistance: Supervision (for hospital setting)  Distance (ft): 45ft  Assistive Device: Rolling walker  Rolling:  not tested   Supine to Sit: independent  Sit to Supine: independent  Sit to Stand: modified independent    Exercise/Education Provided:  Bed mobility  Body mechanics  Gait training  Transfer training    The patient's Approx Degree of Impairment: 20.91% has been calculated based on documentation in the [facility] '6 clicks' Inpatient Basic Mobility Short Form.  Research supports that patients with this level of impairment may benefit from home. Patient received supine in bed, agreeable to physical therapy evaluation. Vital signs monitored as noted above, no adverse symptoms and patient stable during session. Education with patient provided verbally on physical therapy plan of care and physiological benefits of out of bed mobility. Patient on 2 liters of oxygen during the session, oxygen sat 93% and heart rate 99. Patient history and/or personal factors that may impact the plan of care include home accessibility  concerns. Based on the physical therapy examination of the noted systems and functional activity/participation limitations, the patient presentation is stable given the patient demonstrates no significant barriers to meeting therapy goals. Final disposition will be made by interdisciplinary medical team.    Patient End of Session: Up in chair;Needs met;Call light within reach;RN aware of session/findings;All patient questions and concerns addressed    Patient Evaluation Complexity Level:  History Low - no personal factors and/or co-morbidities   Examination of body systems Low -  addressing 1-2 elements   Clinical Presentation Low- Stable   Clinical Decision Making  Low Complexity     Gait Training: 10 minutes  Therapeutic Activity:  13 minutes

## 2024-03-06 NOTE — PLAN OF CARE
Problem: Patient Centered Care  Goal: Patient preferences are identified and integrated in the patient's plan of care  Description: Interventions:  - What would you like us to know as we care for you? From home alone  - Provide timely, complete, and accurate information to patient/family  - Incorporate patient and family knowledge, values, beliefs, and cultural backgrounds into the planning and delivery of care  - Encourage patient/family to participate in care and decision-making at the level they choose  - Honor patient and family perspectives and choices  Outcome: Progressing     Problem: Patient/Family Goals  Goal: Patient/Family Long Term Goal  Description: Patient's Long Term Goal: go home    Interventions:  - steroids, O2 PRN, comfort measures, PT/OT  - See additional Care Plan goals for specific interventions  Outcome: Progressing  Goal: Patient/Family Short Term Goal  Description: Patient's Short Term Goal: breathe easier    Interventions:   - O2 PRN, comfort measures, IV steroids  - See additional Care Plan goals for specific interventions  Outcome: Progressing     Problem: Diabetes/Glucose Control  Goal: Glucose maintained within prescribed range  Description: INTERVENTIONS:  - Monitor Blood Glucose as ordered  - Assess for signs and symptoms of hyperglycemia and hypoglycemia  - Administer ordered medications to maintain glucose within target range  - Assess barriers to adequate nutritional intake and initiate nutrition consult as needed  - Instruct patient on self management of diabetes  Outcome: Progressing     Problem: RESPIRATORY - ADULT  Goal: Achieves optimal ventilation and oxygenation  Description: INTERVENTIONS:  - Assess for changes in respiratory status  - Assess for changes in mentation and behavior  - Position to facilitate oxygenation and minimize respiratory effort  - Oxygen supplementation based on oxygen saturation or ABGs  - Provide Smoking Cessation handout, if applicable  - Encourage  broncho-pulmonary hygiene including cough, deep breathe, Incentive Spirometry  - Assess the need for suctioning and perform as needed  - Assess and instruct to report SOB or any respiratory difficulty  - Respiratory Therapy support as indicated  - Manage/alleviate anxiety  - Monitor for signs/symptoms of CO2 retention  Outcome: Progressing     Alert and oriented x4, anxious at times. Denies reports of pain. + cough, congestion. IV steroids continued. IV abx started. Oxygen saturation stable on 2 L via nasal cannula. Discharge plans pending medical clearance.

## 2024-03-06 NOTE — PLAN OF CARE
Pt up ambulating to bathroom standby assist. Complaints of cough, PRN robitussin given. Safety precautions maintained and in place. Plan for PT/OT to see.    Problem: Patient Centered Care  Goal: Patient preferences are identified and integrated in the patient's plan of care  Description: Interventions:  - What would you like us to know as we care for you? From home alone  - Provide timely, complete, and accurate information to patient/family  - Incorporate patient and family knowledge, values, beliefs, and cultural backgrounds into the planning and delivery of care  - Encourage patient/family to participate in care and decision-making at the level they choose  - Honor patient and family perspectives and choices  Outcome: Progressing     Problem: Patient/Family Goals  Goal: Patient/Family Long Term Goal  Description: Patient's Long Term Goal: go home    Interventions:  - steroids, O2 PRN, comfort measures, PT/OT  - See additional Care Plan goals for specific interventions  Outcome: Progressing  Goal: Patient/Family Short Term Goal  Description: Patient's Short Term Goal: breathe easier    Interventions:   - O2 PRN, comfort measures, IV steroids  - See additional Care Plan goals for specific interventions  Outcome: Progressing     Problem: Diabetes/Glucose Control  Goal: Glucose maintained within prescribed range  Description: INTERVENTIONS:  - Monitor Blood Glucose as ordered  - Assess for signs and symptoms of hyperglycemia and hypoglycemia  - Administer ordered medications to maintain glucose within target range  - Assess barriers to adequate nutritional intake and initiate nutrition consult as needed  - Instruct patient on self management of diabetes  Outcome: Progressing     Problem: RESPIRATORY - ADULT  Goal: Achieves optimal ventilation and oxygenation  Description: INTERVENTIONS:  - Assess for changes in respiratory status  - Assess for changes in mentation and behavior  - Position to facilitate oxygenation  and minimize respiratory effort  - Oxygen supplementation based on oxygen saturation or ABGs  - Provide Smoking Cessation handout, if applicable  - Encourage broncho-pulmonary hygiene including cough, deep breathe, Incentive Spirometry  - Assess the need for suctioning and perform as needed  - Assess and instruct to report SOB or any respiratory difficulty  - Respiratory Therapy support as indicated  - Manage/alleviate anxiety  - Monitor for signs/symptoms of CO2 retention  Outcome: Progressing

## 2024-03-06 NOTE — PAYOR COMM NOTE
--------------  ADMISSION REVIEW     [place] [facility] [place]:  [id number]  Authorization Number: [id number]    Admit date: 3/5/24  Admit time:  3:55 AM       REVIEW DOCUMENTATION:     ED Provider Notes        ED Provider Notes signed by Leatha Coburn MD at 3/4/2024 11:52 PM       Author: Leatha Coburn MD Service: -- Author Type: Physician    Filed: 3/4/2024 11:52 PM Date of Service: 3/4/2024 10:48 PM Status: Signed    : Leatha Coburn MD (Physician)         [place] [facility] Department Note  Patient: Ariana Osorio Age: 79 year old Sex: female      MRN: [id number]  : 1945    Patient Seen in: [facility] Emergency Department    History     Chief Complaint   Patient presents with    Difficulty Breathing     Stated Complaint: Congested Chest    History obtained from: Patient    Patient is a 79-year-old female with past medical history of hypertension, hyperlipidemia, diabetes, CAD status post CABG, CHF, COPD on 2 L of home O2, breast cancer status postmastectomy presenting today for evaluation of shortness of breath.  She states that over the past 2 weeks, she has been having increasingly worsening shortness of breath.  States that she was hospitalized for the symptoms 2 weeks ago and treated for influenza and COPD exacerbation.  She states that she has been on prednisone as well as several course of antibiotics.  She states that she was previously on azithromycin and doxycycline with no improvement of symptoms.  She states that she is otherwise not had any chest pain.  Denies any fevers.  States that she is saturating well on her home oxygen.    Patient History:  Past Medical History:   Diagnosis Date    Age-related cataract of left eye 05/10/2018    Age-related cataract of right eye 2022    Anxiety     Asthma (HCC)     Atherosclerosis of coronary artery     Breast CA (HCC)     lt mastectomy    Breast CA (HCC)     lumpectomy, right    Cancer (HCC)     breast cancer    Carotid  artery disease (HCC) 12/05/2016    Carotid stenosis     Closed fracture of multiple ribs of left side with routine healing, subsequent encounter 10/19/2018    Congestive heart disease (HCC)     COPD (chronic obstructive pulmonary disease) (HCC)     on O2 at 2 liters    Coronary atherosclerosis     Depression     Diabetes ([facility]) 07/28/2022    takes insulin when hospitalized, takes oral meds at home    Diastolic dysfunction without heart failure     Esophageal reflux     Essential hypertension     Generalized anxiety disorder     Gout     Gout     High blood pressure     High cholesterol     History of pituitary adenoma 05/10/2018    Hyperlipidemia     Major depressive disorder, single episode, moderate (HCC)     Obesity        Past Surgical History:   Procedure Laterality Date    CABG      CAROTID ENDARTERECTOMY Bilateral     CATARACT      COLONOSCOPY      2013    COLONOSCOPY  2013    COLONOSCOPY N/A 02/21/2023    Procedure: COLONOSCOPY;  Surgeon: Abdiaziz Melendez MD;  Location: [facility] ENDOSCOPY    HERNIA SURGERY      LUMPECTOMY RIGHT  2014    MASTECTOMY LEFT Left 1999    RADIATION RIGHT  2014     Physical Exam     ED Triage Vitals   BP 03/04/24 1910 133/60   Pulse 03/04/24 1909 82   Resp 03/04/24 1909 25   Temp 03/04/24 1909 98.4 °F (36.9 °C)   Temp src 03/04/24 1909 Oral   SpO2 03/04/24 1909 92 %   O2 Device 03/04/24 1909 Nasal cannula       Current:/60   Pulse 75   Temp 98.4 °F (36.9 °C) (Oral)   Resp 22   SpO2 98%         Physical Exam  Constitutional:       General: She is not in acute distress.  HENT:      Head: Normocephalic and atraumatic.      Mouth/Throat:      Mouth: Mucous membranes are moist.   Eyes:      Extraocular Movements: Extraocular movements intact.   Cardiovascular:      Rate and Rhythm: Normal rate and regular rhythm.      Heart sounds: Normal heart sounds.   Pulmonary:      Effort: Pulmonary effort is normal. No respiratory distress.      Breath sounds: Examination of the  right-lower field reveals rales. Examination of the left-lower field reveals rales. Wheezing and rales present.   Abdominal:      Palpations: Abdomen is soft.      Tenderness: There is no abdominal tenderness.   Skin:     General: Skin is warm and dry.      Capillary Refill: Capillary refill takes less than 2 seconds.      Findings: No rash.   Neurological:      General: No focal deficit present.      Mental Status: She is alert and oriented to person, place, and time.   Psychiatric:         Mood and Affect: Mood normal.         Behavior: Behavior normal.         ED Course   Labs:   Labs Reviewed   BASIC METABOLIC PANEL (8) - Abnormal; Notable for the following components:       Result Value    CO2 34.0 (*)     BUN 33 (*)     Creatinine 1.49 (*)     BUN/CREA Ratio 22.1 (*)     Calcium, Total 10.7 (*)     eGFR-Cr 36 (*)     All other components within normal limits   SARS-COV-2/FLU A AND B/RSV BY PCR (GENEXPERT) - Abnormal; Notable for the following components:    Influenza A by PCR Positive (*)      Radiology findings:  I personally reviewed the images.   XR CHEST AP PORTABLE  (CPT=71045)    Result Date: 3/4/2024  CONCLUSION: Mild interstitial edema.     Dictated by (CST): Silvestre Rendon MD on 3/04/2024 at 9:38 PM     Finalized by (CST): Silvestre Rendon MD on 3/04/2024 at 9:39 PM           EKG as interpreted by me: Ventricular rate 83, normal sinus rhythm, normal axis, no parable prolongation, narrow QRS, QTc 434 ms, no ST segment elevations or depressions, no abnormal T wave inversion, interpretation limited secondary to motion artifact  Cardiac Monitor: Interpreted by me.   Pulse Readings from Last 1 Encounters:   03/04/24 75   , sinus,     Disposition and Plan     Clinical Impression:  1. Influenza    2. COPD exacerbation (HCC)    3. Acute on chronic congestive heart failure, unspecified heart failure type (HCC)        Disposition:  Admit     Leatha Coburn MD  Emergency Medicine    Signed by Leatha Coburn MD on 3/4/2024  11:52 PM         HISTORY AND PHYSICAL      ssessment and Plan:     Acute, chronic respiratory failure with hypoxia  Increasing O2 requirements, will initiate O2 protocol.  Wean as tolerated.  If no improvement consider pulmonary consult.     Acute exacerbation of COPD  Patient started on DuoNebs every 4 hours and as needed along with Solu-Medrol 40 mg IV every 8 hours, continue to monitor respiratory status.  Continue home inhalers     Influenza A  Positive recently, completed course of Tamiflu, continue supportive care.     Likely acute on chronic respiratory acidosis with compensatory metabolic alkalosis  Continue treatment for COPD exacerbation and hypoxia, will continue to monitor.     Essential hypertension  Blood pressure well-controlled resume home medication.     Well-controlled diabetes associated nephropathy  Hold oral hypoglycemics for now, will use sliding scale coverage as needed.  Last A1c 6.0     Prophylaxis  Subcutaneous heparin      PULMONOLOGY CONSULT  3-6-24     Pulmonary Consult      Assessment / Plan:  Chronic respiratory failure - typically on 1-2L supplemental oxygen as outpatient  - Wean oxygen as tolerated to baseline  - Chest Xray with mild edema but no acute consolidations  Asthma/COPD overlap w/ acute exacerbation  - Breo 200 for home Wixela 500/50 (did not tolerate trelegy in the past)  - BD protocol  - IV steroids  - Add doxycycline for worsening cough and congestion (cannot tolerate azithromycin)  Influenza A   - Supportive care  - Tamiflu    Component  Ref Range & Units 3/6/24 0827   WBC  4.0 - 11.0 x10(3) uL 18.3 High    RBC  3.80 - 5.30 x10(6)uL 4.62   HGB  12.0 - 16.0 g/dL 13.8   HCT  35.0 - 48.0 % 42.1   MCV  80.0 - 100.0 fL 91.1   MCH  26.0 - 34.0 pg 29.9   MCHC  31.0 - 37.0 g/dL 32.8   RDW-SD  35.1 - 46.3 fL 49.4 High    RDW  11.0 - 15.0 % 14.7   PLT  150.0 - 450.0 10(3)uL 264.0     Component  Ref Range & Units 3/6/24 0827   Glucose  70 - 99 mg/dL 166 High    Sodium  136 - 145  mmol/L 137   Potassium  3.5 - 5.1 mmol/L 4.7   Chloride  98 - 112 mmol/L 102   CO2  21.0 - 32.0 mmol/L 33.0 High    Anion Gap  0 - 18 mmol/L 2   BUN  9 - 23 mg/dL 38 High    Creatinine  0.55 - 1.02 mg/dL 1.34 High    BUN/CREA Ratio  10.0 - 20.0 28.4 High    Calcium, Total  8.7 - 10.4 mg/dL 10.6 High    Calculated Osmolality  275 - 295 mOsm/kg 297 High    eGFR-Cr  >=60 mL/min/1.73m2 41 Low          MEDICATIONS ADMINISTERED IN LAST 1 DAY:  amLODIPine (Norvasc) tab 5 mg       Date Action Dose Route User    3/5/2024 2157 Given 5 mg Oral Ángela Bowen RN          cefTRIAXone (Rocephin) 1 g in D5W 100 mL IVPB-ADD       Date Action Dose Route User    3/6/2024 1034 New Bag 1 g Intravenous [address] RN          febuxostat (Uloric) tab 40 mg       Date Action Dose Route User    3/6/2024 0922 Given 40 mg Oral [address] RN          fluticasone furoate-vilanterol (Breo Ellipta) 200-25 MCG/ACT inhaler 1 puff       Date Action Dose Route User    3/6/2024 1034 Given 1 puff Inhalation [address] RN          guaiFENesin-codeine (Robitussin AC) 100-10 MG/5ML oral solution 5 mL       Date Action Dose Route User    3/5/2024 2245 Given 5 mL Oral Ángela Bowen RN          methylPREDNISolone sodium succinate (Solu-MEDROL) injection 40 mg       Date Action Dose Route User    3/6/2024 0545 Given 40 mg Intravenous Ángela Bowen RN          pantoprazole (Protonix) DR tab 40 mg       Date Action Dose Route User    3/6/2024 0545 Given 40 mg Oral Ángela Bowen RN          cholecalciferol (VITAMIN D3) tab 2,000 Units       Date Action Dose Route User    3/6/2024 0922 Given 2,000 Units Oral [address] RN            Vitals (last day)       Date/Time Temp Pulse Resp BP SpO2 Weight O2 Device O2 Flow Rate (L/min) [facility]    03/06/24 0930 -- 100 -- -- -- -- -- -- GF    03/06/24 0543 98.2 °F (36.8 °C) -- 16 156/68 94 % -- Nasal cannula 2 L/min SD    03/06/24 0426 -- -- -- -- -- 167 lb 11.2 oz -- -- TB    03/05/24 2155 98.6 °F (37 °C) -- 18 146/74 94 %  -- Nasal cannula 2 L/min SD    03/05/24 2000 -- 86 -- -- -- -- -- -- KD    03/05/24 1348 98.3 °F (36.8 °C) 90 18 134/63 90 % -- Nasal cannula 2 L/min AB    03/05/24 0853 99.2 °F (37.3 °C) 94 18 142/61 93 % -- Nasal cannula 2 L/min AB    03/05/24 0616 98.3 °F (36.8 °C) -- -- -- -- -- -- -- MI    03/05/24 0416 -- 79 -- -- -- -- -- -- KD    03/05/24 0412 -- -- -- -- -- 165 lb 14.4 oz -- -- TB    03/05/24 0407 98.3 °F (36.8 °C) 79 18 146/71 94 % -- -- -- MI    03/05/24 0330 -- 75 15 136/67 93 % -- -- -- AI    03/05/24 0200 -- 82 18 -- 94 % -- None (Room air) 3 L/min AN

## 2024-03-06 NOTE — PROGRESS NOTES
[facility]  part of [facility]  Hospitalist Progress Note     Ariana Osorio Patient Status:  Inpatient    1945  79 year old CSN [id number]   Location 336/336-A Attending Jean Carlos Blood MD   Hosp Day # 1 PCP Enrique Braun MD     Assessment & Plan:   ----------------------------------  Acute COPD exacerbation.  Due to recent viral infx.  Patient with minimal hypoxia.  Patient feeling only slightly better. Wheezing present.  -Pulmonology consult appreciated  -Solu-Medrol 40q8  -Nebulizers: duoneb  -Supplemental oxygen as needed, titrate down as tolerated  -Antibiotics: none  -Repeat imaging if clinical change    Other problems  Recent influenza A infection  Chronic respiratory acidosis/metabolic alkalosis  Hypertension  Diabetes    dvt prophylaxis: scd, heparin allergy  code status: full code  dispo: home upon medical clearance    I personally reviewed the available laboratories, imaging including cxr. I discussed/will discuss the case with pulm. I ordered laboratories, studies including cbc. I adjusted medications including solumedrol. Medical decision making high, risk is high.    Subjective:   ----------------------------------  Breathing is slightly better, does have congestion.  Patient only intermittently compliant with taking IV Solu-Medrol      Objective:   Chief Complaint:   Chief Complaint   Patient presents with    Difficulty Breathing     ----------------------------------  Temp:  [98.2 °F (36.8 °C)-98.6 °F (37 °C)] 98.2 °F (36.8 °C)  Pulse:  [] 100  Resp:  [16-18] 16  BP: (146-156)/(68-74) 156/68  SpO2:  [94 %] 94 %  Gen: A+Ox3.  No distress.   HEENT: NCAT, neck supple, no carotid bruit.  CV: RRR, S1S2, and intact distal pulses. No gallop, rub, murmur.  Pulm: Diffuse expiratory wheezes, coarse breath sounds.  Able to speak in very long sentences without difficulty.  Abd: Soft, NTND, BS normal, no mass, no HSM, no rebound/guarding.   Neuro: Normal reflexes, CN.  Sensory/motor exams grossly normal deficit.   MS: No joint effusions.  No peripheral edema.  Skin: Skin is warm and dry. No rashes, erythema, diaphoresis.   Psych: Normal mood and affect. Calm, cooperative    Labs:  Lab Results   Component Value Date    HGB 13.8 03/06/2024    WBC 18.3 (H) 03/06/2024    .0 03/06/2024     03/06/2024    K 4.7 03/06/2024    CREATSERUM 1.34 (H) 03/06/2024    INR 1.02 03/28/2019    AST 21 02/06/2024    ALT 18 02/06/2024    TROP 0.133 (HH) 01/26/2021          cefTRIAXone  1 g Intravenous Q24H    amLODIPine  5 mg Oral Daily    cholecalciferol  2,000 Units Oral Daily    febuxostat  40 mg Oral Daily    pantoprazole  40 mg Oral QAM AC    fluticasone furoate-vilanterol  1 puff Inhalation Daily    insulin aspart  1-5 Units Subcutaneous TID CC    methylPREDNISolone  40 mg Intravenous Q8H     glucose **OR** glucose **OR** glucose-vitamin C **OR** dextrose **OR** glucose **OR** glucose **OR** glucose-vitamin C, ondansetron, acetaminophen, hydrALAzine, zolpidem, alum-mag hydroxide-simethicone, metoclopramide, guaiFENesin-codeine  **Certification      PHYSICIAN Certification of Need for Inpatient Hospitalization - Initial Certification    Patient will require inpatient services that will reasonably be expected to span two midnight's based on the clinical documentation in H+P.   Based on patients current state of illness, I anticipate that, after discharge, patient will require TBD.

## 2024-03-07 LAB
GLUCOSE BLDC GLUCOMTR-MCNC: 113 MG/DL (ref 70–99)
GLUCOSE BLDC GLUCOMTR-MCNC: 131 MG/DL (ref 70–99)
GLUCOSE BLDC GLUCOMTR-MCNC: 206 MG/DL (ref 70–99)

## 2024-03-07 PROCEDURE — 99233 SBSQ HOSP IP/OBS HIGH 50: CPT | Performed by: HOSPITALIST

## 2024-03-07 RX ORDER — METHYLPREDNISOLONE SODIUM SUCCINATE 40 MG/ML
40 INJECTION, POWDER, LYOPHILIZED, FOR SOLUTION INTRAMUSCULAR; INTRAVENOUS EVERY 6 HOURS
Status: DISCONTINUED | OUTPATIENT
Start: 2024-03-07 | End: 2024-03-08

## 2024-03-07 RX ORDER — HYDRALAZINE HYDROCHLORIDE 25 MG/1
25 TABLET, FILM COATED ORAL 2 TIMES DAILY
Status: DISCONTINUED | OUTPATIENT
Start: 2024-03-07 | End: 2024-03-10

## 2024-03-07 NOTE — PLAN OF CARE
Patient is A&Ox4, 2Lo2, up ad luis felipe. Continuing IV antibiotics and IV solumedrol. Call light wtithin reach and fall precautions in place.    Problem: Patient Centered Care  Goal: Patient preferences are identified and integrated in the patient's plan of care  Description: Interventions:  - What would you like us to know as we care for you? From home alone  - Provide timely, complete, and accurate information to patient/family  - Incorporate patient and family knowledge, values, beliefs, and cultural backgrounds into the planning and delivery of care  - Encourage patient/family to participate in care and decision-making at the level they choose  - Honor patient and family perspectives and choices  Outcome: Progressing     Problem: Patient/Family Goals  Goal: Patient/Family Long Term Goal  Description: Patient's Long Term Goal: go home    Interventions:  - steroids, O2 PRN, comfort measures, PT/OT  - See additional Care Plan goals for specific interventions  Outcome: Progressing  Goal: Patient/Family Short Term Goal  Description: Patient's Short Term Goal: breathe easier    Interventions:   - O2 PRN, comfort measures, IV steroids  - See additional Care Plan goals for specific interventions  Outcome: Progressing     Problem: Diabetes/Glucose Control  Goal: Glucose maintained within prescribed range  Description: INTERVENTIONS:  - Monitor Blood Glucose as ordered  - Assess for signs and symptoms of hyperglycemia and hypoglycemia  - Administer ordered medications to maintain glucose within target range  - Assess barriers to adequate nutritional intake and initiate nutrition consult as needed  - Instruct patient on self management of diabetes  Outcome: Progressing     Problem: RESPIRATORY - ADULT  Goal: Achieves optimal ventilation and oxygenation  Description: INTERVENTIONS:  - Assess for changes in respiratory status  - Assess for changes in mentation and behavior  - Position to facilitate oxygenation and minimize  respiratory effort  - Oxygen supplementation based on oxygen saturation or ABGs  - Provide Smoking Cessation handout, if applicable  - Encourage broncho-pulmonary hygiene including cough, deep breathe, Incentive Spirometry  - Assess the need for suctioning and perform as needed  - Assess and instruct to report SOB or any respiratory difficulty  - Respiratory Therapy support as indicated  - Manage/alleviate anxiety  - Monitor for signs/symptoms of CO2 retention  Outcome: Progressing

## 2024-03-07 NOTE — DIETARY NOTE
BRIEF DIETITIAN NOTE     Pt screened for MST. Pt reports she has had weight loss, but it was desirable. She has been reducing her meat/protein portions d/t her gout. She is eating until full. Appetite is good. No concerns with her intake. Found to be at no nutrition risk at this time. Good PO intakes, % so far. Please consult RD if further nutrition intervention is needed. F/u on LOS    Percent Meals Eaten (last 6 days)       Date/Time Percent Meals Eaten (%)    03/05/24 0839 100 %    03/05/24 1820 75 %    03/06/24 0804 100 %    03/06/24 1320 75 %    03/07/24 1037 100 %             Patient Weight(s) for the past 336 hrs:   Weight   03/07/24 0700 75 kg (165 lb 4.8 oz)   03/06/24 0426 76.1 kg (167 lb 11.2 oz)   03/05/24 0412 75.3 kg (165 lb 14.4 oz)        Wt Readings from Last 6 Encounters:   03/07/24 75 kg (165 lb 4.8 oz)   02/28/24 77 kg (169 lb 12.1 oz)   02/25/24 76.7 kg (169 lb)   02/19/24 76.6 kg (168 lb 14.4 oz)   02/06/24 78.5 kg (173 lb)   01/16/24 78.7 kg (173 lb 9.6 oz)        F/u per protocol or as appropriate.       Jackelyn Lara RD, LDN  Clinical Dietitian  P: [phone]

## 2024-03-07 NOTE — PROGRESS NOTES
Pulmonary Progress Note     Assessment / Plan:  Chronic respiratory failure - typically on 1-2L supplemental oxygen as outpatient  - Wean oxygen as tolerated to baseline  - Chest Xray with mild edema but no acute consolidations  Asthma/COPD overlap w/ acute exacerbation  - Breo 200 for home Wixela 500/50 (did not tolerate trelegy in the past)  - BD protocol  - IV steroids increase to q6h  - Add doxycycline for worsening cough and congestion (cannot tolerate azithromycin)  Influenza A   - Supportive care  - Outside of window for tamiflu  Dispo  - Pulmonary to follow with you, call with questions      Subjective:  States she wants less prednisone but more steroids.  Very concerned about trying multiple different antibiotics today to find the right one    Objective:  Vitals:    03/06/24 1618 03/06/24 2028 03/07/24 0617 03/07/24 0700   BP: 143/65 155/64 155/64    BP Location: Right arm Right arm Right arm    Pulse:       Resp: 20 18     Temp: 97.4 °F (36.3 °C) 98.1 °F (36.7 °C)     TempSrc: Oral Oral     SpO2: 94% 91%     Weight:    165 lb 4.8 oz (75 kg)     Physical Exam:  General: no apparent distress, conversant  Skin: no rash, ulcers or subcutaneous nodules  Eyes: anicteric sclerae, moist conjunctivae  Head, ears, nose, throat: atraumatic, oropharynx clear with moist mucous membranes  Neck: trachea midline with no thyromegaly  Heart: regular rate and rhythm, no murmurs / rubs / gallops  Lungs: expiratory wheezes and rales bilaterally, no distress  Extremities: no edema or cyanosis  Psych: interactive, answering questions appropriately, appropriate affect    Medications:  Reviewed in EMR    Lab Data:  Reviewed in EMR    Imaging:  I independently visualized all relevant chest imaging in PACS and agree with radiology interpretation except where noted.

## 2024-03-07 NOTE — PLAN OF CARE
Pt up ambulating in room by self. No complaints of pain. PRN robitussin given for cough. Safety precautions maintained and in place. Plan for home pending medical clearance.    Problem: Patient Centered Care  Goal: Patient preferences are identified and integrated in the patient's plan of care  Description: Interventions:  - What would you like us to know as we care for you? From home alone  - Provide timely, complete, and accurate information to patient/family  - Incorporate patient and family knowledge, values, beliefs, and cultural backgrounds into the planning and delivery of care  - Encourage patient/family to participate in care and decision-making at the level they choose  - Honor patient and family perspectives and choices  Outcome: Progressing     Problem: Patient/Family Goals  Goal: Patient/Family Long Term Goal  Description: Patient's Long Term Goal: go home    Interventions:  - steroids, O2 PRN, comfort measures, PT/OT  - See additional Care Plan goals for specific interventions  Outcome: Progressing  Goal: Patient/Family Short Term Goal  Description: Patient's Short Term Goal: breathe easier    Interventions:   - O2 PRN, comfort measures, IV steroids  - See additional Care Plan goals for specific interventions  Outcome: Progressing     Problem: Diabetes/Glucose Control  Goal: Glucose maintained within prescribed range  Description: INTERVENTIONS:  - Monitor Blood Glucose as ordered  - Assess for signs and symptoms of hyperglycemia and hypoglycemia  - Administer ordered medications to maintain glucose within target range  - Assess barriers to adequate nutritional intake and initiate nutrition consult as needed  - Instruct patient on self management of diabetes  Outcome: Progressing     Problem: RESPIRATORY - ADULT  Goal: Achieves optimal ventilation and oxygenation  Description: INTERVENTIONS:  - Assess for changes in respiratory status  - Assess for changes in mentation and behavior  - Position to  facilitate oxygenation and minimize respiratory effort  - Oxygen supplementation based on oxygen saturation or ABGs  - Provide Smoking Cessation handout, if applicable  - Encourage broncho-pulmonary hygiene including cough, deep breathe, Incentive Spirometry  - Assess the need for suctioning and perform as needed  - Assess and instruct to report SOB or any respiratory difficulty  - Respiratory Therapy support as indicated  - Manage/alleviate anxiety  - Monitor for signs/symptoms of CO2 retention  Outcome: Progressing

## 2024-03-07 NOTE — PROGRESS NOTES
[facility]  part of [facility]  Hospitalist Progress Note     Ariana Osorio Patient Status:  Inpatient    1945  79 year old CSN [id number]   Location 336/336-A Attending Jean Carlos Blood MD   Hosp Day # 2 PCP Enrique Braun MD     Assessment & Plan:   ----------------------------------  Acute COPD exacerbation.  Due to recent viral infx.  Patient with minimal hypoxia.  Patient feeling only slightly better.  Frustrated she is not getting antibiotics though she is on ceftriaxone.  -Pulmonology consult appreciated  -Solu-Medrol 40q8  -Nebulizers: duoneb  -Supplemental oxygen as needed, titrate down as tolerated  -Antibiotics: none  -Repeat imaging if clinical change    Other problems  Recent influenza A infection  Chronic respiratory acidosis/metabolic alkalosis  Hypertension  Diabetes    dvt prophylaxis: scd, heparin allergy  code status: full code  dispo: home upon medical clearance    I personally reviewed the available laboratories, imaging including CBC. I discussed/will discuss the case with pulm. I ordered laboratories, studies including BMP. I adjusted medications including ceftriaxone. Medical decision making high, risk is high.    Subjective:   ----------------------------------  Patient very frustrated that we do not have her on strong antibiotics.  She is on ceftriaxone.  She does not have objective evidence of pneumonia though feels that we need to find the right antibiotic and then her lungs will do better.  She asked for more steroids, but less prednisone.  She is not on prednisone      Objective:   Chief Complaint:   Chief Complaint   Patient presents with    Difficulty Breathing     ----------------------------------  Temp:  [97.4 °F (36.3 °C)-98.1 °F (36.7 °C)] 98.1 °F (36.7 °C)  Pulse:  [100] 100  Resp:  [18-20] 18  BP: (143-155)/(64-65) 155/64  SpO2:  [91 %-94 %] 91 %  Gen: A+Ox3.  No distress.   HEENT: NCAT, neck supple, no carotid bruit.  CV: RRR, S1S2, and  intact distal pulses. No gallop, rub, murmur.  Pulm: Diffuse expiratory wheezes, coarse breath sounds.  Able to speak in very long sentences without difficulty.  Abd: Soft, NTND, BS normal, no mass, no HSM, no rebound/guarding.   Neuro: Normal reflexes, CN. Sensory/motor exams grossly normal deficit.   MS: No joint effusions.  No peripheral edema.  Skin: Skin is warm and dry. No rashes, erythema, diaphoresis.   Psych: Normal mood and affect. Calm, cooperative    Labs:  Lab Results   Component Value Date    HGB 13.8 03/06/2024    WBC 18.3 (H) 03/06/2024    .0 03/06/2024     03/06/2024    K 4.7 03/06/2024    CREATSERUM 1.34 (H) 03/06/2024    INR 1.02 03/28/2019    AST 21 02/06/2024    ALT 18 02/06/2024    TROP 0.133 (HH) 01/26/2021          methylPREDNISolone  40 mg Intravenous Q6H    cefTRIAXone  1 g Intravenous Q24H    amLODIPine  5 mg Oral Daily    cholecalciferol  2,000 Units Oral Daily    febuxostat  40 mg Oral Daily    pantoprazole  40 mg Oral QAM AC    fluticasone furoate-vilanterol  1 puff Inhalation Daily    insulin aspart  1-5 Units Subcutaneous TID CC     glucose **OR** glucose **OR** glucose-vitamin C **OR** dextrose **OR** glucose **OR** glucose **OR** glucose-vitamin C, ondansetron, acetaminophen, hydrALAzine, zolpidem, alum-mag hydroxide-simethicone, metoclopramide, guaiFENesin-codeine  **Certification      PHYSICIAN Certification of Need for Inpatient Hospitalization - Initial Certification    Patient will require inpatient services that will reasonably be expected to span two midnight's based on the clinical documentation in H+P.   Based on patients current state of illness, I anticipate that, after discharge, patient will require TBD.     none

## 2024-03-07 NOTE — PAYOR COMM NOTE
--------------CLINICAL FOR RECONSIDERATION OF DENIAL        CONTINUED STAY REVIEW    Payor: [employer]  Subscriber #:  [id number]  Authorization Number: [id number]    Admit date: 3/5/24  Admit time:  3:55 AM    Admitting Physician: Daria Felix MD  Attending Physician:  Jean Carlos Blood MD  Primary Care Physician: Enrique Barun MD     REVIEW DOCUMENTATION:   3-6-24     PULMONOLOGY CONSULT       Pulmonary Consult      Assessment / Plan:  Chronic respiratory failure - typically on 1-2L supplemental oxygen as outpatient  - Wean oxygen as tolerated to baseline  - Chest Xray with mild edema but no acute consolidations  Asthma/COPD overlap w/ acute exacerbation  - Breo 200 for home Wixela 500/50 (did not tolerate trelegy in the past)  - BD protocol  - IV steroids  - Add doxycycline for worsening cough and congestion (cannot tolerate azithromycin)  Influenza A   - Supportive care  - Outside of window for tamiflu      General: no apparent distress, conversant  Skin: no rash, ulcers or subcutaneous nodules  Eyes: anicteric sclerae, moist conjunctivae  Head, ears, nose, throat: atraumatic, oropharynx clear with moist mucous membranes  Neck: trachea midline with no thyromegaly  Heart: regular rate and rhythm, no murmurs / rubs / gallops  Lungs: bilat wheezes  Extremities: no edema or cyanosis  Psych: interactive, answering questions appropriately, appropriate affect    PER ATTENDING      Assessment & Plan:   ----------------------------------  Acute COPD exacerbation.  Due to recent viral infx.  Patient with minimal hypoxia.  Patient feeling only slightly better. Wheezing present.  -Pulmonology consult appreciated  -Solu-Medrol 40q8  -Nebulizers: duoneb  -Supplemental oxygen as needed, titrate down as tolerated  -Antibiotics: none  -Repeat imaging if clinical change    Pulm: Diffuse expiratory wheezes, coarse breath sounds.           MEDICATIONS ADMINISTERED IN LAST 1 DAY:  amLODIPine (Norvasc) tab 5 mg       Date  Action Dose Route User    3/6/2024 2031 Given 5 mg Oral Ángela Bowen RN          cefTRIAXone (Rocephin) 1 g in D5W 100 mL IVPB-ADD       Date Action Dose Route User    3/7/2024 1029 New Bag 1 g Intravenous Pascale Landers RN          febuxostat (Uloric) tab 40 mg       Date Action Dose Route User    3/7/2024 1033 Given 40 mg Oral Pascale Landers RN          fluticasone furoate-vilanterol (Breo Ellipta) 200-25 MCG/ACT inhaler 1 puff       Date Action Dose Route User    3/7/2024 1034 Given 1 puff Inhalation Pascale Landers RN          guaiFENesin-codeine (Robitussin AC) 100-10 MG/5ML oral solution 5 mL       Date Action Dose Route User    3/6/2024 2140 Given 5 mL Oral Ángela Bowen RN          methylPREDNISolone sodium succinate (Solu-MEDROL) injection 40 mg       Date Action Dose Route User    3/7/2024 0616 Given 40 mg Intravenous Ángela Bowen RN    3/6/2024 1527 Given 40 mg Intravenous [address] RN          pantoprazole (Protonix) DR tab 40 mg       Date Action Dose Route User    3/7/2024 0616 Given 40 mg Oral Ángela Bowen RN          Perflutren Lipid Microsphere (DEFINITY) 6.52 MG/ML injection 1.5 mL       Date Action Dose Route User    3/6/2024 1418 Given 1.5 mL Intravenous Jaden Erickson          cholecalciferol (VITAMIN D3) tab 2,000 Units       Date Action Dose Route User    3/7/2024 1033 Given 2,000 Units Oral Pascale Landers RN            Vitals (last day)       Date/Time Temp Pulse Resp BP SpO2 Weight O2 Device O2 Flow Rate (L/min) [facility]    03/07/24 1032 98.3 °F (36.8 °C) 87 18 146/68 92 % -- Nasal cannula 2 L/min LD    03/07/24 0700 -- -- -- -- -- 165 lb 4.8 oz -- -- BK    03/07/24 0617 -- -- -- 155/64 -- -- -- -- SD    03/06/24 2028 98.1 °F (36.7 °C) -- 18 155/64 91 % -- Nasal cannula 2 L/min SD    03/06/24 1618 97.4 °F (36.3 °C) -- 20 143/65 94 % -- Nasal cannula 2 L/min AK    03/06/24 0930 -- 100 -- -- -- -- -- -- GF    03/06/24 0543 98.2 °F (36.8 °C) -- 16 156/68 94 % -- Nasal cannula 2 L/min SD     03/06/24 0426 -- -- -- -- -- 167 lb 11.2 oz -- -- TB

## 2024-03-08 LAB
ANION GAP SERPL CALC-SCNC: 1 MMOL/L (ref 0–18)
BASOPHILS # BLD AUTO: 0.07 X10(3) UL (ref 0–0.2)
BASOPHILS NFR BLD AUTO: 0.4 %
BUN BLD-MCNC: 42 MG/DL (ref 9–23)
BUN/CREAT SERPL: 33.1 (ref 10–20)
CALCIUM BLD-MCNC: 10.7 MG/DL (ref 8.7–10.4)
CHLORIDE SERPL-SCNC: 103 MMOL/L (ref 98–112)
CO2 SERPL-SCNC: 33 MMOL/L (ref 21–32)
CREAT BLD-MCNC: 1.27 MG/DL
DEPRECATED RDW RBC AUTO: 50.4 FL (ref 35.1–46.3)
EGFRCR SERPLBLD CKD-EPI 2021: 43 ML/MIN/1.73M2 (ref 60–?)
EOSINOPHIL # BLD AUTO: 0 X10(3) UL (ref 0–0.7)
EOSINOPHIL NFR BLD AUTO: 0 %
ERYTHROCYTE [DISTWIDTH] IN BLOOD BY AUTOMATED COUNT: 14.8 % (ref 11–15)
GLUCOSE BLD-MCNC: 123 MG/DL (ref 70–99)
GLUCOSE BLDC GLUCOMTR-MCNC: 108 MG/DL (ref 70–99)
GLUCOSE BLDC GLUCOMTR-MCNC: 129 MG/DL (ref 70–99)
GLUCOSE BLDC GLUCOMTR-MCNC: 130 MG/DL (ref 70–99)
GLUCOSE BLDC GLUCOMTR-MCNC: 195 MG/DL (ref 70–99)
HCT VFR BLD AUTO: 40.9 %
HGB BLD-MCNC: 13.4 G/DL
IMM GRANULOCYTES # BLD AUTO: 0.42 X10(3) UL (ref 0–1)
IMM GRANULOCYTES NFR BLD: 2.6 %
LYMPHOCYTES # BLD AUTO: 1.05 X10(3) UL (ref 1–4)
LYMPHOCYTES NFR BLD AUTO: 6.5 %
MCH RBC QN AUTO: 30.3 PG (ref 26–34)
MCHC RBC AUTO-ENTMCNC: 32.8 G/DL (ref 31–37)
MCV RBC AUTO: 92.5 FL
MONOCYTES # BLD AUTO: 1.3 X10(3) UL (ref 0.1–1)
MONOCYTES NFR BLD AUTO: 8 %
NEUTROPHILS # BLD AUTO: 13.39 X10 (3) UL (ref 1.5–7.7)
NEUTROPHILS # BLD AUTO: 13.39 X10(3) UL (ref 1.5–7.7)
NEUTROPHILS NFR BLD AUTO: 82.5 %
OSMOLALITY SERPL CALC.SUM OF ELEC: 296 MOSM/KG (ref 275–295)
PLATELET # BLD AUTO: 240 10(3)UL (ref 150–450)
POTASSIUM SERPL-SCNC: 4.7 MMOL/L (ref 3.5–5.1)
RBC # BLD AUTO: 4.42 X10(6)UL
SODIUM SERPL-SCNC: 137 MMOL/L (ref 136–145)
WBC # BLD AUTO: 16.2 X10(3) UL (ref 4–11)

## 2024-03-08 PROCEDURE — 99233 SBSQ HOSP IP/OBS HIGH 50: CPT | Performed by: HOSPITALIST

## 2024-03-08 NOTE — PROGRESS NOTES
Pulmonary Progress Note     Assessment / Plan:  Chronic respiratory failure - typically on 1-2L supplemental oxygen as outpatient  - Wean oxygen as tolerated to baseline  - Chest Xray with mild edema but no acute consolidations  Asthma/COPD overlap w/ acute exacerbation  - Breo 200 for home Wixela 500/50 (did not tolerate trelegy in the past)  - BD protocol  - IV steroids increase to q6h; pt has been refusing doses of steroids intermittently  - Rocephin for possible pna; pt refuses azithromycin and doxycycline for unclear reasons  Influenza A   - Supportive care  - Outside of window for tamiflu  Dispo  - Pulmonary to follow with you, call with questions      Subjective:  No changes    Objective:  Vitals:    03/07/24 2032 03/07/24 2307 03/08/24 0202 03/08/24 0546   BP: (!) 172/78 148/71 159/70    BP Location: Right arm Right arm Right arm    Pulse:       Resp: 18  20    Temp: 98.4 °F (36.9 °C)  99.1 °F (37.3 °C)    TempSrc: Oral  Oral    SpO2: 93%  95%    Weight:    167 lb (75.8 kg)     Physical Exam:  General: no apparent distress, conversant  Skin: no rash, ulcers or subcutaneous nodules  Eyes: anicteric sclerae, moist conjunctivae  Head, ears, nose, throat: atraumatic, oropharynx clear with moist mucous membranes  Neck: trachea midline with no thyromegaly  Heart: regular rate and rhythm, no murmurs / rubs / gallops  Lungs: expiratory wheezes and rales bilaterally, no distress  Extremities: no edema or cyanosis  Psych: interactive, answering questions appropriately, appropriate affect    Medications:  Reviewed in EMR    Lab Data:  Reviewed in EMR    Imaging:  I independently visualized all relevant chest imaging in PACS and agree with radiology interpretation except where noted.

## 2024-03-08 NOTE — PROGRESS NOTES
[facility]  part of [facility]  Hospitalist Progress Note     Ariana Osorio Patient Status:  Inpatient    1945  79 year old CSN [id number]   Location 336/336-A Attending Jean Carlos Blood MD   Hosp Day # 3 PCP Enrique Braun MD     Assessment & Plan:   ----------------------------------  Acute COPD exacerbation.  Due to recent viral infx.  Patient with minimal hypoxia.  Patient feeling much better.  Feels that the antibiotics are finally kicking in.  -Pulmonology consult appreciated  -Solu-Medrol, patient refusing further doses  -Nebulizers: duoneb  -Supplemental oxygen as needed, titrate down as tolerated  -Antibiotics: none  -Repeat imaging if clinical change    Other problems  Recent influenza A infection  Chronic respiratory acidosis/metabolic alkalosis  Hypertension  Diabetes    dvt prophylaxis: scd, heparin allergy  code status: full code  dispo: home upon medical clearance, hopefully tomorrow    I personally reviewed the available laboratories, imaging including CBC. I discussed/will discuss the case with pulm. I ordered laboratories, studies including BMP. I adjusted medications including ceftriaxone. Medical decision making high, risk is high.    Subjective:   ----------------------------------  Patient very frustrated that we do not have her on strong antibiotics.  She is on ceftriaxone.  She does not have objective evidence of pneumonia though feels that we need to find the right antibiotic and then her lungs will do better.  She asked for more steroids, but less prednisone.  She is not on prednisone      Objective:   Chief Complaint:   Chief Complaint   Patient presents with    Difficulty Breathing     ----------------------------------  Temp:  [97.8 °F (36.6 °C)-99.1 °F (37.3 °C)] 97.8 °F (36.6 °C)  Pulse:  [87-95] 87  Resp:  [18-20] 20  BP: (148-181)/(52-78) 158/52  SpO2:  [90 %-95 %] 90 %  Gen: A+Ox3.  No distress.   HEENT: NCAT, neck supple, no carotid bruit.  CV:  RRR, S1S2, and intact distal pulses. No gallop, rub, murmur.  Pulm: Coarse breath sounds and diffuse wheezing improved.  Able to speak in very long sentences without difficulty.  Abd: Soft, NTND, BS normal, no mass, no HSM, no rebound/guarding.   Neuro: Normal reflexes, CN. Sensory/motor exams grossly normal deficit.   MS: No joint effusions.  No peripheral edema.  Skin: Skin is warm and dry. No rashes, erythema, diaphoresis.   Psych: Normal mood and affect. Calm, cooperative    Labs:  Lab Results   Component Value Date    HGB 13.4 03/08/2024    WBC 16.2 (H) 03/08/2024    .0 03/08/2024     03/08/2024    K 4.7 03/08/2024    CREATSERUM 1.27 (H) 03/08/2024    INR 1.02 03/28/2019    AST 21 02/06/2024    ALT 18 02/06/2024    TROP 0.133 (HH) 01/26/2021          hydrALAZINE  25 mg Oral BID    cefTRIAXone  1 g Intravenous Q24H    amLODIPine  5 mg Oral Daily    cholecalciferol  2,000 Units Oral Daily    febuxostat  40 mg Oral Daily    pantoprazole  40 mg Oral QAM AC    fluticasone furoate-vilanterol  1 puff Inhalation Daily    insulin aspart  1-5 Units Subcutaneous TID CC     glucose **OR** glucose **OR** glucose-vitamin C **OR** dextrose **OR** glucose **OR** glucose **OR** glucose-vitamin C, ondansetron, acetaminophen, hydrALAzine, zolpidem, alum-mag hydroxide-simethicone, metoclopramide, guaiFENesin-codeine  **Certification      PHYSICIAN Certification of Need for Inpatient Hospitalization - Initial Certification    Patient will require inpatient services that will reasonably be expected to span two midnight's based on the clinical documentation in H+P.   Based on patients current state of illness, I anticipate that, after discharge, patient will require TBD.

## 2024-03-09 LAB
ANION GAP SERPL CALC-SCNC: 2 MMOL/L (ref 0–18)
ATRIAL RATE: 109 BPM
BASOPHILS # BLD AUTO: 0.05 X10(3) UL (ref 0–0.2)
BASOPHILS NFR BLD AUTO: 0.4 %
BUN BLD-MCNC: 33 MG/DL (ref 9–23)
BUN/CREAT SERPL: 26.2 (ref 10–20)
CALCIUM BLD-MCNC: 10.6 MG/DL (ref 8.7–10.4)
CHLORIDE SERPL-SCNC: 104 MMOL/L (ref 98–112)
CO2 SERPL-SCNC: 34 MMOL/L (ref 21–32)
CREAT BLD-MCNC: 1.26 MG/DL
DEPRECATED RDW RBC AUTO: 51.8 FL (ref 35.1–46.3)
EGFRCR SERPLBLD CKD-EPI 2021: 44 ML/MIN/1.73M2 (ref 60–?)
EOSINOPHIL # BLD AUTO: 0.1 X10(3) UL (ref 0–0.7)
EOSINOPHIL NFR BLD AUTO: 0.8 %
ERYTHROCYTE [DISTWIDTH] IN BLOOD BY AUTOMATED COUNT: 15 % (ref 11–15)
GLUCOSE BLD-MCNC: 111 MG/DL (ref 70–99)
HCT VFR BLD AUTO: 45.5 %
HGB BLD-MCNC: 14.3 G/DL
IMM GRANULOCYTES # BLD AUTO: 0.26 X10(3) UL (ref 0–1)
IMM GRANULOCYTES NFR BLD: 2.1 %
LYMPHOCYTES # BLD AUTO: 1.42 X10(3) UL (ref 1–4)
LYMPHOCYTES NFR BLD AUTO: 11.3 %
MAGNESIUM SERPL-MCNC: 2.1 MG/DL (ref 1.6–2.6)
MCH RBC QN AUTO: 29.3 PG (ref 26–34)
MCHC RBC AUTO-ENTMCNC: 31.4 G/DL (ref 31–37)
MCV RBC AUTO: 93.2 FL
MONOCYTES # BLD AUTO: 1.71 X10(3) UL (ref 0.1–1)
MONOCYTES NFR BLD AUTO: 13.6 %
NEUTROPHILS # BLD AUTO: 8.99 X10 (3) UL (ref 1.5–7.7)
NEUTROPHILS # BLD AUTO: 8.99 X10(3) UL (ref 1.5–7.7)
NEUTROPHILS NFR BLD AUTO: 71.8 %
OSMOLALITY SERPL CALC.SUM OF ELEC: 298 MOSM/KG (ref 275–295)
P AXIS: 85 DEGREES
P-R INTERVAL: 148 MS
PLATELET # BLD AUTO: 235 10(3)UL (ref 150–450)
POTASSIUM SERPL-SCNC: 4.6 MMOL/L (ref 3.5–5.1)
Q-T INTERVAL: 234 MS
QRS DURATION: 80 MS
QTC CALCULATION (BEZET): 315 MS
R AXIS: -24 DEGREES
RBC # BLD AUTO: 4.88 X10(6)UL
SODIUM SERPL-SCNC: 140 MMOL/L (ref 136–145)
T AXIS: 85 DEGREES
VENTRICULAR RATE: 109 BPM
WBC # BLD AUTO: 12.5 X10(3) UL (ref 4–11)

## 2024-03-09 PROCEDURE — 99233 SBSQ HOSP IP/OBS HIGH 50: CPT | Performed by: HOSPITALIST

## 2024-03-09 RX ORDER — SODIUM CHLORIDE FOR INHALATION 3 %
3 VIAL, NEBULIZER (ML) INHALATION
Status: DISCONTINUED | OUTPATIENT
Start: 2024-03-09 | End: 2024-03-11

## 2024-03-09 RX ORDER — BUDESONIDE 0.5 MG/2ML
0.5 INHALANT ORAL 2 TIMES DAILY
Status: DISCONTINUED | OUTPATIENT
Start: 2024-03-09 | End: 2024-03-11

## 2024-03-09 RX ORDER — ARFORMOTEROL TARTRATE 15 UG/2ML
15 SOLUTION RESPIRATORY (INHALATION)
Status: DISCONTINUED | OUTPATIENT
Start: 2024-03-09 | End: 2024-03-11

## 2024-03-09 NOTE — PLAN OF CARE
Patient is A&Ox4, 1Lo2, self. Continuing IV antibiotics. Plan to discharge tomorrow. Call light within reach and fall precautions in place.     Problem: Patient Centered Care  Goal: Patient preferences are identified and integrated in the patient's plan of care  Description: Interventions:  - What would you like us to know as we care for you? From home alone  - Provide timely, complete, and accurate information to patient/family  - Incorporate patient and family knowledge, values, beliefs, and cultural backgrounds into the planning and delivery of care  - Encourage patient/family to participate in care and decision-making at the level they choose  - Honor patient and family perspectives and choices  Outcome: Progressing     Problem: Patient/Family Goals  Goal: Patient/Family Long Term Goal  Description: Patient's Long Term Goal: go home    Interventions:  - steroids, O2 PRN, comfort measures, PT/OT  - See additional Care Plan goals for specific interventions  Outcome: Progressing  Goal: Patient/Family Short Term Goal  Description: Patient's Short Term Goal: breathe easier    Interventions:   - O2 PRN, comfort measures, IV steroids  - See additional Care Plan goals for specific interventions  Outcome: Progressing     Problem: Diabetes/Glucose Control  Goal: Glucose maintained within prescribed range  Description: INTERVENTIONS:  - Monitor Blood Glucose as ordered  - Assess for signs and symptoms of hyperglycemia and hypoglycemia  - Administer ordered medications to maintain glucose within target range  - Assess barriers to adequate nutritional intake and initiate nutrition consult as needed  - Instruct patient on self management of diabetes  Outcome: Progressing     Problem: RESPIRATORY - ADULT  Goal: Achieves optimal ventilation and oxygenation  Description: INTERVENTIONS:  - Assess for changes in respiratory status  - Assess for changes in mentation and behavior  - Position to facilitate oxygenation and minimize  respiratory effort  - Oxygen supplementation based on oxygen saturation or ABGs  - Provide Smoking Cessation handout, if applicable  - Encourage broncho-pulmonary hygiene including cough, deep breathe, Incentive Spirometry  - Assess the need for suctioning and perform as needed  - Assess and instruct to report SOB or any respiratory difficulty  - Respiratory Therapy support as indicated  - Manage/alleviate anxiety  - Monitor for signs/symptoms of CO2 retention  Outcome: Progressing

## 2024-03-09 NOTE — PLAN OF CARE
Patient is alert and oriented x4, on 2L oxygen. Up with standby assist. Pulmonary adjusting medications/nebulizers. Possible discharge tomorrow.    Problem: Patient Centered Care  Goal: Patient preferences are identified and integrated in the patient's plan of care  Description: Interventions:  - What would you like us to know as we care for you? From home alone  - Provide timely, complete, and accurate information to patient/family  - Incorporate patient and family knowledge, values, beliefs, and cultural backgrounds into the planning and delivery of care  - Encourage patient/family to participate in care and decision-making at the level they choose  - Honor patient and family perspectives and choices  Outcome: Progressing     Problem: Patient/Family Goals  Goal: Patient/Family Long Term Goal  Description: Patient's Long Term Goal: go home    Interventions:  - steroids, O2 PRN, comfort measures, PT/OT  - See additional Care Plan goals for specific interventions  Outcome: Progressing  Goal: Patient/Family Short Term Goal  Description: Patient's Short Term Goal: breathe easier    Interventions:   - O2 PRN, comfort measures, IV steroids  - See additional Care Plan goals for specific interventions  Outcome: Progressing     Problem: Diabetes/Glucose Control  Goal: Glucose maintained within prescribed range  Description: INTERVENTIONS:  - Monitor Blood Glucose as ordered  - Assess for signs and symptoms of hyperglycemia and hypoglycemia  - Administer ordered medications to maintain glucose within target range  - Assess barriers to adequate nutritional intake and initiate nutrition consult as needed  - Instruct patient on self management of diabetes  Outcome: Progressing     Problem: RESPIRATORY - ADULT  Goal: Achieves optimal ventilation and oxygenation  Description: INTERVENTIONS:  - Assess for changes in respiratory status  - Assess for changes in mentation and behavior  - Position to facilitate oxygenation and  minimize respiratory effort  - Oxygen supplementation based on oxygen saturation or ABGs  - Provide Smoking Cessation handout, if applicable  - Encourage broncho-pulmonary hygiene including cough, deep breathe, Incentive Spirometry  - Assess the need for suctioning and perform as needed  - Assess and instruct to report SOB or any respiratory difficulty  - Respiratory Therapy support as indicated  - Manage/alleviate anxiety  - Monitor for signs/symptoms of CO2 retention  Outcome: Progressing

## 2024-03-09 NOTE — PLAN OF CARE
EKG done overnight for changes in tele monitoring; MD aware. Denies any pain. Currently on 2L O2.  Problem: Patient Centered Care  Goal: Patient preferences are identified and integrated in the patient's plan of care  Description: Interventions:  - What would you like us to know as we care for you? From home alone  - Provide timely, complete, and accurate information to patient/family  - Incorporate patient and family knowledge, values, beliefs, and cultural backgrounds into the planning and delivery of care  - Encourage patient/family to participate in care and decision-making at the level they choose  - Honor patient and family perspectives and choices  3/9/2024 0613 by Aixa Simpson RN  Outcome: Progressing  3/9/2024 0432 by Aixa Simpson RN  Outcome: Progressing     Problem: Patient/Family Goals  Goal: Patient/Family Long Term Goal  Description: Patient's Long Term Goal: go home    Interventions:  - steroids, O2 PRN, comfort measures, PT/OT  - See additional Care Plan goals for specific interventions  3/9/2024 0613 by Aixa Simpson RN  Outcome: Progressing  3/9/2024 0432 by Aixa Simpson RN  Outcome: Progressing  Goal: Patient/Family Short Term Goal  Description: Patient's Short Term Goal: breathe easier    Interventions:   - O2 PRN, comfort measures, IV steroids  - See additional Care Plan goals for specific interventions  3/9/2024 0613 by Aixa Simpson RN  Outcome: Progressing  3/9/2024 0432 by Aixa Simpson RN  Outcome: Progressing     Problem: Diabetes/Glucose Control  Goal: Glucose maintained within prescribed range  Description: INTERVENTIONS:  - Monitor Blood Glucose as ordered  - Assess for signs and symptoms of hyperglycemia and hypoglycemia  - Administer ordered medications to maintain glucose within target range  - Assess barriers to adequate nutritional intake and initiate nutrition consult as needed  - Instruct patient on self management of  diabetes  3/9/2024 0613 by Aixa Simpson, RN  Outcome: Progressing  3/9/2024 0432 by Aixa Simpson RN  Outcome: Progressing     Problem: RESPIRATORY - ADULT  Goal: Achieves optimal ventilation and oxygenation  Description: INTERVENTIONS:  - Assess for changes in respiratory status  - Assess for changes in mentation and behavior  - Position to facilitate oxygenation and minimize respiratory effort  - Oxygen supplementation based on oxygen saturation or ABGs  - Provide Smoking Cessation handout, if applicable  - Encourage broncho-pulmonary hygiene including cough, deep breathe, Incentive Spirometry  - Assess the need for suctioning and perform as needed  - Assess and instruct to report SOB or any respiratory difficulty  - Respiratory Therapy support as indicated  - Manage/alleviate anxiety  - Monitor for signs/symptoms of CO2 retention  3/9/2024 0613 by Aixa Simpson, RN  Outcome: Progressing  3/9/2024 0432 by Aixa Simpson, RN  Outcome: Progressing

## 2024-03-09 NOTE — PROGRESS NOTES
Pulmonary Progress Note      NAME: Ariana Osorio - ROOM: 336/[facility] - MRN: [id number] - Age: 79 year old - : 1945    Assessment/Plan:  Chronic hypoxic resp failure - typically on 1-2L supplemental oxygen as outpatient  - now on 2 LPM  - Chest Xray with mild edema but no acute consolidations  Asthma/COPD overlap w/ acute exacerbation  - Breo 200 for home Wixela 500/50 (did not tolerate trelegy in the past) - hold this for now and go to [facility] / ICS via neb  - BD protocol  - patient refusing systemic steroids - make her feel worse  - Rocephin for possible pna; pt refuses azithromycin and doxycycline for unclear reasons  - says main complaint is congestion and phloegm - try regular dosing of mucinex and TID saline nebs with metaneb  Influenza A   - Supportive care  - Outside of window for tamiflu  Dispo  - Pulmonary to follow with you, call with questions      Wolfgang Mares M.D.  Pulmonary and Critical Care Medicine  [facility]      Subjective:  No acute events overnight. Still with lots of cougha nd congestion    Medications:   sodium chloride (hypertonic)  3 mL Nebulization TID    dextromethorphan-guaifenesin ER  1 tablet Oral BID    hydrALAZINE  25 mg Oral BID    cefTRIAXone  1 g Intravenous Q24H    amLODIPine  5 mg Oral Daily    cholecalciferol  2,000 Units Oral Daily    febuxostat  40 mg Oral Daily    pantoprazole  40 mg Oral QAM AC    fluticasone furoate-vilanterol  1 puff Inhalation Daily    insulin aspart  1-5 Units Subcutaneous TID CC     Intake/Output Summary (Last 24 hours) at 3/9/2024 0846  Last data filed at 3/8/2024 2300  Gross per 24 hour   Intake 713 ml   Output --   Net 713 ml       Physical Exam:  /83 (BP Location: Right arm)   Pulse 75   Temp 98.2 °F (36.8 °C) (Oral)   Resp 18   Wt 167 lb (75.8 kg)   SpO2 91%   BMI 28.67 kg/m²   Physical Exam:   General: alert, cooperative, no respiratory distress.   HEENT: Normocephalic atraumatic.Lips, mucosa, and tongue normal.   No thrush noted.   Neck: supple without mass   Lungs: diffuse ronchi and wheezing   Chest wall: No tenderness or deformity.   Heart: Regular rate and rhythm, normal S1S2, no murmur.   Abdomen: soft, non-tender, non-distended, positive BS.   Extremity: no edema   Skin: no new rash   Neuro: normal    Recent Labs   Lab 03/09/24  0735   RBC 4.88   HGB 14.3   HCT 45.5   MCV 93.2   MCH 29.3   MCHC 31.4   RDW 15.0   NEPRELIM 8.99*   WBC 12.5*   .0     Recent Labs   Lab 03/06/24  0827 03/08/24  0707 03/09/24  0735   * 123* 111*   BUN 38* 42* 33*   CREATSERUM 1.34* 1.27* 1.26*   CA 10.6* 10.7* 10.6*    137 140   K 4.7 4.7 4.6    103 104   CO2 33.0* 33.0* 34.0*       Imaging: I independently visualized all relevant chest imaging in PACS, agree with radiology interpretation except where noted.

## 2024-03-09 NOTE — PROGRESS NOTES
[facility]  part of [facility]  Hospitalist Progress Note     Ariana Osorio Patient Status:  Inpatient    1945  79 year old CSN [id number]   Location 336/336-A Attending Jean Carlos Blood MD   Hosp Day # 4 PCP Enrique Braun MD     Assessment & Plan:   ----------------------------------  Acute COPD exacerbation.  Due to recent viral infx.  Patient with minimal hypoxia.  Patient feeling about the same as 3/8.  Still bothered by phlegm  -Pulmonology consult appreciated  -Solu-Medrol, patient refusing further doses  -Nebulizers: duoneb  -Supplemental oxygen as needed, titrate down as tolerated  -Antibiotics: none  -Repeat imaging if clinical change    Other problems  Recent influenza A infection  Chronic respiratory acidosis/metabolic alkalosis  Hypertension  Diabetes    dvt prophylaxis: scd, heparin allergy  code status: full code  dispo: home upon medical clearance, hopefully tomorrow    I personally reviewed the available laboratories, imaging including CBC. I discussed/will discuss the case with pulm. I ordered laboratories, studies including chest x-ray. I adjusted medications including ceftriaxone. Medical decision making high, risk is high.    Refusing further steroids, blood draws    Subjective:   ----------------------------------  Still fixated on her phlegm.  Still has a cough.  No chest pain.      Objective:   Chief Complaint:   Chief Complaint   Patient presents with    Difficulty Breathing     ----------------------------------  Temp:  [97.8 °F (36.6 °C)-98.4 °F (36.9 °C)] 98.2 °F (36.8 °C)  Pulse:  [75-87] 75  Resp:  [18-20] 18  BP: (141-174)/(52-83) 156/83  SpO2:  [89 %-96 %] 91 %  Gen: A+Ox3.  No distress.   HEENT: NCAT, neck supple, no carotid bruit.  CV: RRR, S1S2, and intact distal pulses. No gallop, rub, murmur.  Pulm: Coarse breath sounds and diffuse wheezing improved.  Able to speak in very long sentences without difficulty.  Abd: Soft, NTND, BS normal, no  mass, no HSM, no rebound/guarding.   Neuro: Normal reflexes, CN. Sensory/motor exams grossly normal deficit.   MS: No joint effusions.  No peripheral edema.  Skin: Skin is warm and dry. No rashes, erythema, diaphoresis.   Psych: Normal mood and affect. Calm, cooperative    Labs:  Lab Results   Component Value Date    HGB 14.3 03/09/2024    WBC 12.5 (H) 03/09/2024    .0 03/09/2024     03/09/2024    K 4.6 03/09/2024    CREATSERUM 1.26 (H) 03/09/2024    INR 1.02 03/28/2019    AST 21 02/06/2024    ALT 18 02/06/2024    TROP 0.133 (HH) 01/26/2021          sodium chloride (hypertonic)  3 mL Nebulization TID    dextromethorphan-guaifenesin ER  1 tablet Oral BID    arformoterol  15 mcg Nebulization 2 times daily    budesonide  0.5 mg Nebulization BID    hydrALAZINE  25 mg Oral BID    cefTRIAXone  1 g Intravenous Q24H    amLODIPine  5 mg Oral Daily    cholecalciferol  2,000 Units Oral Daily    febuxostat  40 mg Oral Daily    pantoprazole  40 mg Oral QAM AC    insulin aspart  1-5 Units Subcutaneous TID CC     glucose **OR** glucose **OR** glucose-vitamin C **OR** dextrose **OR** glucose **OR** glucose **OR** glucose-vitamin C, ondansetron, acetaminophen, hydrALAzine, zolpidem, alum-mag hydroxide-simethicone, metoclopramide  **Certification      PHYSICIAN Certification of Need for Inpatient Hospitalization - Initial Certification    Patient will require inpatient services that will reasonably be expected to span two midnight's based on the clinical documentation in H+P.   Based on patients current state of illness, I anticipate that, after discharge, patient will require TBD.

## 2024-03-10 ENCOUNTER — APPOINTMENT (OUTPATIENT)
Dept: GENERAL RADIOLOGY | Facility: HOSPITAL | Age: 79
End: 2024-03-10
Attending: HOSPITALIST
Payer: MEDICARE

## 2024-03-10 LAB
MAGNESIUM SERPL-MCNC: 2 MG/DL (ref 1.6–2.6)
POTASSIUM SERPL-SCNC: 4.2 MMOL/L (ref 3.5–5.1)
TSI SER-ACNC: 0.91 MIU/ML (ref 0.55–4.78)

## 2024-03-10 PROCEDURE — 99233 SBSQ HOSP IP/OBS HIGH 50: CPT | Performed by: HOSPITALIST

## 2024-03-10 PROCEDURE — 71045 X-RAY EXAM CHEST 1 VIEW: CPT | Performed by: HOSPITALIST

## 2024-03-10 RX ORDER — IPRATROPIUM BROMIDE AND ALBUTEROL SULFATE 2.5; .5 MG/3ML; MG/3ML
3 SOLUTION RESPIRATORY (INHALATION) EVERY 6 HOURS PRN
Status: DISCONTINUED | OUTPATIENT
Start: 2024-03-10 | End: 2024-03-10

## 2024-03-10 RX ORDER — DILTIAZEM HYDROCHLORIDE 5 MG/ML
10 INJECTION INTRAVENOUS ONCE
Status: DISCONTINUED | OUTPATIENT
Start: 2024-03-10 | End: 2024-03-10

## 2024-03-10 RX ORDER — METOPROLOL SUCCINATE 25 MG/1
25 TABLET, EXTENDED RELEASE ORAL
Status: DISCONTINUED | OUTPATIENT
Start: 2024-03-10 | End: 2024-03-11

## 2024-03-10 NOTE — HISTORICAL OFFICE NOTE
Facility [facility]  [address], [address], [place] [address]  [phone]      Ariana Osorio  Progress Note  Demographics:  Name: Ariana Osorio YOB: 1945  Age: 78, Female Medical Record No: [id number]  Visited Date/Time: 02/12/2024 02:00 PM    Chief Complaints  follow up  History of Present Illness  78-year-old female with extensive cardiac medical history severe COPD, CKD stage III hypertension hyperlipidemia.  Also history of CAD three-vessel bypass 2017.  Prior history of carotid stenosis status post endarterectomy on both sides left side 8 years ago, right side 3 years ago here for follow-up visit.  Has dyspnea on exertion after 1 block which she attributes to her COPD.  Was recently discharged from the hospitalist today reports that since the hospital discharge had swelling mostly on her left leg.  No chest pain  Currently has CCS Class 2 symptoms.  Cardiac risk factors Diabetes, Hypertension, Dyslipidemia, Obesity, Physical inactivity and Never smoked  Risk equivalent CVA/Carotid Disease  Past Medical History  1.History of breast cancer  2.Medicare annual wellness visit, subsequent  3.Chronic tophaceous gout  4.COPD, severe  5.Coronary artery disease  6.S/P CABG (coronary artery bypass graft)  7.At risk for falls  8.Stage 3 chronic kidney disease  9.Controlled type 2 diabetes mellitus with stage 3 chronic kidney disease, without long-term current use of insulin  10.Hypertension associated with type 2 diabetes mellitus  11.Hyperlipidemia associated with type 2 diabetes mellitus  12.S/P carotid endarterectomy  13.Chronic diastolic congestive heart failure  14.Cervical stenosis of spinal canal  15.Bilateral hearing loss  16.Hyperparathyroidism  17.Coronary artery disease involving native coronary artery of native heart without angina pectoris  Past Surgical History  1.S/P CABG (coronary artery bypass graft)  Family History  No significant family  history noted.  Social History  Smoking status Never smoked  Tobacco usage - No (Non-smoker for personal reasons (finding))  Review of systems  Constitutional No history of Anorexia  Eyes No history of Blurry vision  ENT No history of Bloody nose (epistaxis)  Gastrointestinal No history of Abdominal pain  Cardiovascular Edema  No history of Chest pain, MARTINEZ, Palpitations, Syncope, PND, Orthopnea and Claudication  Respiratory No history of SOB, Wheezing and Sputum  Hem/Lymphatic No history of Easy bruising, Blood clots, Hx of blood transfusion, Anemia and Bleeding problems  Physical Examination  Constitutional 90%o2  Vitals Left Arm Sitting  / 64 mmHg, Pulse rate 84 bpm, Regular, Height in 5' 4\", BMI: 29.7, Weight in 173 lbs (or) 78 kgs and BSA : 1.91 cc/m²  General Appearance No Acute Distress and Obese  Head/Eyes/Ears/Nose/Mouth/Throat Sclera Clear, EOMI, PERRLA and No pallor  Neck Normal carotid pulsations, No carotid bruits and No JVD  Respiratory Lungs clear with normal breath sounds and Equal bilaterally  Cardiovascular Regular rhythm.    Gastrointestinal Abdomen soft, Non-tender, Normoactive bowel sounds and No masses  Upper Extremities No cyanosis and No edema  Lower Extremities Pulses 2+ and equal bilaterally and Edema 1+  Allergies  1.Allopurinol(Reaction:, Severity:Mild)  2.Alprazolam(Reaction:, Severity:Mild)  3.Dog Epithelium(Reaction:, Severity:Mild)  4.Statins(Reaction:, Severity:Mild)  Medications (Info obtained by: Verbal)  1.albuterol sulfate (2.5 MG/3ML) 0.083% Inhalation Nebu Soln, Take 3 mL (2.5 mg total) by nebulization every 6 (six) hours as needed for Wheezing or Shortness of Breath.  2.amLODIPine 5 MG Oral Tab, Take 1 tablet (5 mg total) by mouth daily.  3.Cholecalciferol (VITAMIN D) 2000 units Oral Cap, Take 1 capsule by mouth daily.  4.colchicine 0.6 MG Oral Tab, TAKE ONE TABLET BY MOUTH DAILY  5.febuxostat 40 MG Oral Tab, Take 1 tablet (40 mg total) by mouth daily.  6.glipiZIDE 5 MG  Oral Tab, Take 1 tablet (5 mg total) by mouth before breakfast.  7.hydrALAZINE 25 MG Oral Tab, 1/2 tab BID  8.metoprolol succinate ER 25 MG Oral Tablet 24 Hr, Take 1 tablet (25 mg total) by mouth daily.  9.Multiple Vitamin (MULTI-VITAMIN DAILY) Oral Tab, Take 1 tablet by mouth daily.  10.omega-3 fatty acids 1000 MG Oral Cap, Take 1,000 mg by mouth daily.  Impression  1.Claudication  2.SOB (shortness of breath)  3.COPD, severe  4.Coronary artery disease  5.S/P CABG (coronary artery bypass graft)  6.Stage 3 chronic kidney disease  7.Controlled type 2 diabetes mellitus with stage 3 chronic kidney disease, without long-term current use of insulin  8.S/P carotid endarterectomy  Assessment & Plan  1.  Hypertension  Well-controlled on amlodipine 5 mg daily, hydralazine 12.5 mg twice a day, metoprolol 25 mg daily continue  2.  History of CAD with shortness of breath appears to be secondary to COPD follows with Dr. Rod for this  Normal nuclear stress test patient is reassured.  3.  Hyperlipidemia  Managed by her primary care physician  4.  Diabetes mellitus  On glipizide  5.  History of carotid stenosis with bilateral endarterectomies  Check carotid ultrasound to evaluate for carotid disease.  6.  Shortness of breath  Management as per pulmonary.  7.  Edema  Lasix 20 mg with potassium supplements as needed edema.         Plan  Carotid ultrasound next available, start Lasix 20 times and potassium chloride 20 mEq daily as needed for edema 30 tablets no refills.  Follow-up after ultrasound  Medications Ordered  1.Lasix 20 mg tablet, Take 1 tablet orally once a day.  2.potassium chloride ER 20 mEq tablet,extended release, Take 1 tablet orally once a day.  Labs and Diagnostics ordered  1.Carotid Ultrasound (bilateral) (Schedule next available)  Future appointments  1.Referral Visit - Enrique Braun ([email or web address]) : (Today)  Miscellaneous  1.Weight monitoring (regime/therapy)  2.Reviewed myocardial  perfusion imaging, trans thoracic echocardiogram with the patient.  Nurses documentation  Upcoming surgeries: None  Refills: None  EKG: None  Use of assistive devices(s): None     Patient instructions  Plan  Carotid ultrasound next available, start Lasix 20 times and potassium chloride 20 mEq daily as needed for edema 30 tablets no refills.  Follow-up after ultrasound  Diagnostics Details  Exercise Myocardial Perfusion Imaging 12/27/2022  1.Stress EKG is normal.    2.No arrhythmias    3.No chest pain    1.This is a normal perfusion study, no perfusion defects noted.    2.The left ventricular cavity is noted to be normal on the stress study. The left ventricular ejection fraction was calculated to be 60% and left ventricular global function is normal.    3.The study quality is good.    Trans Thoracic Echocardiogram 11/16/2022  1.The study quality is average.    2.Global left ventricular systolic function is normal. The left ventricle is normal in size, wall thickness and wall motion. Global left ventricular systolic function is normal. The left ventricular ejection fraction is 67%. The left ventricle diastolic function is impaired (Grade I) with normal left atrial pressure.    3.The left atrial diameter is moderately increased.    4.Mild calcification of the aortic valve is noted with adequate cuspal excursion.    5.Mild calcification of the mitral valve is noted. Mitral stenosis is mild.    6.Mild tricuspid regurgitation.    7.Mild pulmonary hypertension is noted.    CPOE Orders carried out by: [place] PAREKH  Care Providers: Clay Mtz MD, [place] PAREKH and Soco Sanchez  Electronically Authenticated by  Clay Mtz MD  02/12/2024 04:31:44 PM  Disclaimer: Components of this note were documented using voice recognition system and are subject to errors not corrected at proofreading. Contact the author of this note for any clarifications.

## 2024-03-10 NOTE — PLAN OF CARE
Patient is alert and oriented times 4. On 1L NC which is her baseline. Complains of no pain at this time. Had a conversion of Afib lasting 17 minutes. Home metoprolol resumed. Plan to return home once medically cleared.   Problem: Patient Centered Care  Goal: Patient preferences are identified and integrated in the patient's plan of care  Description: Interventions:  - What would you like us to know as we care for you? From home alone  - Provide timely, complete, and accurate information to patient/family  - Incorporate patient and family knowledge, values, beliefs, and cultural backgrounds into the planning and delivery of care  - Encourage patient/family to participate in care and decision-making at the level they choose  - Honor patient and family perspectives and choices  Outcome: Progressing     Problem: Patient/Family Goals  Goal: Patient/Family Short Term Goal  Description: Patient's Short Term Goal: breathe easier    Interventions:   - O2 PRN, comfort measures, IV steroids  - See additional Care Plan goals for specific interventions  Outcome: Progressing     Problem: Diabetes/Glucose Control  Goal: Glucose maintained within prescribed range  Description: INTERVENTIONS:  - Monitor Blood Glucose as ordered  - Assess for signs and symptoms of hyperglycemia and hypoglycemia  - Administer ordered medications to maintain glucose within target range  - Assess barriers to adequate nutritional intake and initiate nutrition consult as needed  - Instruct patient on self management of diabetes  Outcome: Progressing

## 2024-03-10 NOTE — PLAN OF CARE
Problem: Patient Centered Care  Goal: Patient preferences are identified and integrated in the patient's plan of care  Description: Interventions:  - What would you like us to know as we care for you? From home alone  - Provide timely, complete, and accurate information to patient/family  - Incorporate patient and family knowledge, values, beliefs, and cultural backgrounds into the planning and delivery of care  - Encourage patient/family to participate in care and decision-making at the level they choose  - Honor patient and family perspectives and choices  Outcome: Progressing     Problem: Patient/Family Goals  Goal: Patient/Family Long Term Goal  Description: Patient's Long Term Goal: go home    Interventions:  - steroids, O2 PRN, comfort measures, PT/OT  - See additional Care Plan goals for specific interventions  Outcome: Progressing  Goal: Patient/Family Short Term Goal  Description: Patient's Short Term Goal: breathe easier    Interventions:   - O2 PRN, comfort measures, IV steroids  - See additional Care Plan goals for specific interventions  Outcome: Progressing     Problem: Diabetes/Glucose Control  Goal: Glucose maintained within prescribed range  Description: INTERVENTIONS:  - Monitor Blood Glucose as ordered  - Assess for signs and symptoms of hyperglycemia and hypoglycemia  - Administer ordered medications to maintain glucose within target range  - Assess barriers to adequate nutritional intake and initiate nutrition consult as needed  - Instruct patient on self management of diabetes  Outcome: Progressing     Problem: RESPIRATORY - ADULT  Goal: Achieves optimal ventilation and oxygenation  Description: INTERVENTIONS:  - Assess for changes in respiratory status  - Assess for changes in mentation and behavior  - Position to facilitate oxygenation and minimize respiratory effort  - Oxygen supplementation based on oxygen saturation or ABGs  - Provide Smoking Cessation handout, if applicable  - Encourage  broncho-pulmonary hygiene including cough, deep breathe, Incentive Spirometry  - Assess the need for suctioning and perform as needed  - Assess and instruct to report SOB or any respiratory difficulty  - Respiratory Therapy support as indicated  - Manage/alleviate anxiety  - Monitor for signs/symptoms of CO2 retention  Outcome: Progressing

## 2024-03-10 NOTE — PROGRESS NOTES
[facility]  part of [facility]  Hospitalist Progress Note     Ariana Osorio Patient Status:  Inpatient    1945  79 year old CSN [id number]   Location 336/336-A Attending Jean Carlos Blood MD   Hosp Day # 5 PCP Enrique Braun MD     Assessment & Plan:   ----------------------------------  Acute COPD exacerbation.  Due to recent viral infx.  Patient with minimal hypoxia.  Patient feeling about the same as 3/8.  Still bothered by phlegm  -Pulmonology consult appreciated  -Solu-Medrol, patient refusing further doses  -Nebulizers: duoneb  -Supplemental oxygen as needed, titrate down as tolerated  -Antibiotics: none  -Repeat imaging if clinical change    A. Fib with RVR  -had an episode of A. Fib in am on 3/9  -called for a. Fib with rates of ~150's, lasted 17 mins.   -I came up to see her and while I was in the room she converted to NSR  -home toprol xl 25mg daily was not restarted  -she attributes this to hydralazine, we will stop due to patient request  -she sees Dr. Mtz as op, will ask cardiology to evaluate  -echocardiogram this admission hows ef 60-65%, grade 1 DD    Other problems  Recent influenza A infection  Chronic respiratory acidosis/metabolic alkalosis  Hypertension  Diabetes    dvt prophylaxis: scd, heparin allergy  code status: full code  dispo: home upon medical clearance, hopefully tomorrow    I personally reviewed the available laboratories, imaging including CBC. I discussed/will discuss the case with pulm. I ordered laboratories, studies including chest x-ray. I adjusted medications including ceftriaxone. Medical decision making high, risk is high.    Refusing further steroids, blood draws    Subjective:   ----------------------------------  Still fixated on hydralazine causing her a. Fib.   She still has a cough.  No chest pain. She states wheezing is improving  She does not feel ready to discharge.     Objective:   Chief Complaint:   Chief Complaint   Patient  presents with    Difficulty Breathing     ----------------------------------  Temp:  [98 °F (36.7 °C)-98.4 °F (36.9 °C)] 98.4 °F (36.9 °C)  Pulse:  [] 101  Resp:  [18-20] 18  BP: (130-156)/(52-60) 130/52  SpO2:  [88 %-96 %] 92 %  Gen: A+Ox3.  No distress.   HEENT: NCAT, neck supple, no carotid bruit.  CV: RRR, S1S2, and intact distal pulses. No gallop, rub, murmur.  Pulm: Coarse breath sounds and diffuse wheezing improved.  Able to speak in very long sentences without difficulty.  Abd: Soft, NTND, BS normal, no mass, no HSM, no rebound/guarding.   Neuro: Normal reflexes, CN. Sensory/motor exams grossly normal deficit.   MS: No joint effusions.  No peripheral edema.  Skin: Skin is warm and dry. No rashes, erythema, diaphoresis.   Psych: Normal mood and affect. Calm, cooperative    Labs:  Lab Results   Component Value Date    HGB 14.3 03/09/2024    WBC 12.5 (H) 03/09/2024    .0 03/09/2024     03/09/2024    K 4.6 03/09/2024    CREATSERUM 1.26 (H) 03/09/2024    INR 1.02 03/28/2019    AST 21 02/06/2024    ALT 18 02/06/2024    TROP 0.133 (HH) 01/26/2021          dilTIAZem  10 mg Intravenous Once    dilTIAZem  30 mg Oral 4 times per day    sodium chloride (hypertonic)  3 mL Nebulization TID    dextromethorphan-guaifenesin ER  1 tablet Oral BID    arformoterol  15 mcg Nebulization 2 times daily    budesonide  0.5 mg Nebulization BID    hydrALAZINE  25 mg Oral BID    cefTRIAXone  1 g Intravenous Q24H    amLODIPine  5 mg Oral Daily    cholecalciferol  2,000 Units Oral Daily    febuxostat  40 mg Oral Daily    pantoprazole  40 mg Oral QAM AC     dilTIAZem, glucose **OR** glucose **OR** glucose-vitamin C **OR** dextrose **OR** glucose **OR** glucose **OR** glucose-vitamin C, ondansetron, acetaminophen, hydrALAzine, zolpidem, alum-mag hydroxide-simethicone, metoclopramide  **Certification      PHYSICIAN Certification of Need for Inpatient Hospitalization - Initial Certification    Patient will require inpatient  services that will reasonably be expected to span two midnight's based on the clinical documentation in H+P.   Based on patients current state of illness, I anticipate that, after discharge, patient will require TBD.  JH ALVARADO MD

## 2024-03-10 NOTE — PROGRESS NOTES
Pulmonary Progress Note      NAME: Ariana Osorio - ROOM: 336/[facility]-A - MRN: [id number] - Age: 79 year old - : 1945    Assessment/Plan:  Chronic hypoxic resp failure - typically on 1-2L supplemental oxygen as outpatient  - now on 1 LPM  Pneumonia - RLL  -on ceftriaxone  -bronchial clerance - doing well with saline metanebs and adding flutter  Asthma/COPD overlap w/ acute exacerbation  - Breo 200 for home Wixela 500/50 (did not tolerate trelegy in the past) - hold this for now and go to LABA / ICS via neb  - BD protocol  - patient refusing systemic steroids - make her feel worse  Influenza A   - preceding above  Dispo  - will follow  - may need home neb and meds for prn use      Wolfgang Mares M.D.  Pulmonary and Critical Care Medicine  [facility]      Subjective:  No acute events overnight.     Medications:   sodium chloride (hypertonic)  3 mL Nebulization TID    dextromethorphan-guaifenesin ER  1 tablet Oral BID    arformoterol  15 mcg Nebulization 2 times daily    budesonide  0.5 mg Nebulization BID    hydrALAZINE  25 mg Oral BID    cefTRIAXone  1 g Intravenous Q24H    amLODIPine  5 mg Oral Daily    cholecalciferol  2,000 Units Oral Daily    febuxostat  40 mg Oral Daily    pantoprazole  40 mg Oral QAM AC     Intake/Output Summary (Last 24 hours) at 3/10/2024 0851  Last data filed at 3/10/2024 0529  Gross per 24 hour   Intake 976 ml   Output 1075 ml   Net -99 ml       Physical Exam:  /60 (BP Location: Right arm)   Pulse 96   Temp 98 °F (36.7 °C) (Oral)   Resp 18   Wt 167 lb (75.8 kg)   SpO2 96%   BMI 28.67 kg/m²   Physical Exam:   General: alert, cooperative, no respiratory distress.   HEENT: Normocephalic atraumatic.Lips, mucosa, and tongue normal.  No thrush noted.   Neck: supple without mass   Lungs: still mild ronchi but better   Chest wall: No tenderness or deformity.   Heart: Regular rate and rhythm, normal S1S2, no murmur.   Abdomen: soft, non-tender, non-distended, positive  BS.   Extremity: no edema   Skin: no new rash   Neuro: alert    Recent Labs   Lab 03/09/24  0735   RBC 4.88   HGB 14.3   HCT 45.5   MCV 93.2   MCH 29.3   MCHC 31.4   RDW 15.0   NEPRELIM 8.99*   WBC 12.5*   .0     Recent Labs   Lab 03/06/24  0827 03/08/24  0707 03/09/24  0735   * 123* 111*   BUN 38* 42* 33*   CREATSERUM 1.34* 1.27* 1.26*   CA 10.6* 10.7* 10.6*    137 140   K 4.7 4.7 4.6    103 104   CO2 33.0* 33.0* 34.0*       Imaging: I independently visualized all relevant chest imaging in PACS, agree with radiology interpretation except where noted.

## 2024-03-10 NOTE — CONSULTS
[facility]  Cardiology Consultation    Ariana Osorio Patient Status:  Inpatient    1945 MRN [id number]   Location [facility] 3W/SW Attending Chin Chavis MD   Hosp Day # 5 PCP Enrique Braun MD     Date of Admission:  3/4/2024  Date of Consult:  3/10/2024  Reason for Consultation:     Atrial Fibrillation with Rapid Ventricular Rates  History of Present Illness:   Patient is a 79 year old female who was admitted to the hospital for Influenza: She has a past medical history of COPD, history of recurrent pleural effusions with thoracentesis and Pleurx catheter. Cardiac history is significant for coronary artery disease, status post three-vessel bypass in 2017.  History of carotid stenosis status post carotid endarterectomy, left side was around ~, right side .    She presented to the emergency room with progressive dyspnea without alleviating or relieving factors.  She was recently diagnosed with influenza A.  She was hypoxic on admission, chest x-ray at that time was revealing for mild interstitial edema and mild atelectasis seen by pulmonology who has been treating her with nebulizers, steroids, antibiotics, and supplemental oxygen for acute COPD exacerbation and right lower lobe pneumonia..  She is receiving supportive care for influenza A. Recently treated with Tamiflu a few weeks ago.   Of note this is her fourth visit to the hospital over the past month with the same symptoms.     Cardiology was consulted this morning after patient converted to atrial fibrillation with rapid rates sustaining in the 150s.  This went on for around 20 minutes before she spontaneously converted back to sinus rhythm.  Per epic review it appears that atrial fibrillation is a new diagnosis for the patient.    ER Workup  - EKG without gross acute changes though +artifact  - Troponin negative x1  - No D-dimer  - BNP 45  - Influenza A +  - Cr 1.49/ GFR 36      Past Medical  History  Past Medical History:   Diagnosis Date    Age-related cataract of left eye 05/10/2018    Age-related cataract of right eye 07/28/2022    Anxiety     Asthma (HCC)     Atherosclerosis of coronary artery     Breast CA (HCC) 1999    lt mastectomy    Breast CA (HCC) 2014    lumpectomy, right    Cancer (HCC)     breast cancer    Carotid artery disease (HCC) 12/05/2016    Carotid stenosis     Closed fracture of multiple ribs of left side with routine healing, subsequent encounter 10/19/2018    Congestive heart disease (HCC)     COPD (chronic obstructive pulmonary disease) (HCC)     on O2 at 2 liters    Coronary atherosclerosis     Depression     Diabetes ([facility]) 07/28/2022    takes insulin when hospitalized, takes oral meds at home    Diastolic dysfunction without heart failure     Esophageal reflux     Essential hypertension     Generalized anxiety disorder     Gout     Gout     High blood pressure     High cholesterol     History of pituitary adenoma 05/10/2018    Hyperlipidemia     Major depressive disorder, single episode, moderate (HCC)     Obesity      Past Surgical History  Past Surgical History:   Procedure Laterality Date    CABG      CAROTID ENDARTERECTOMY Bilateral     CATARACT      COLONOSCOPY      2013    COLONOSCOPY  2013    COLONOSCOPY N/A 02/21/2023    Procedure: COLONOSCOPY;  Surgeon: Abdiaziz Melendez MD;  Location: [facility] ENDOSCOPY    HERNIA SURGERY      LUMPECTOMY RIGHT  2014    MASTECTOMY LEFT Left 1999    RADIATION RIGHT  2014     Family History  Family History   Problem Relation Age of Onset    Asthma Father     Hypertension Father     Heart Disorder Father     Other (Other) Mother         thyroid surgery    Asthma Sister     Asthma Brother     Other (Other) Brother         gout    Breast Cancer Self 43        rt breast 69     Social History  Pediatric History   Patient Parents    Not on file     Other Topics Concern    Caffeine Concern Not Asked    Exercise Not Asked    Seat Belt Not Asked     Special Diet Not Asked    Stress Concern Not Asked    Weight Concern Not Asked    Grew up on a farm Not Asked    History of tanning Not Asked    Outdoor occupation Not Asked    Breast feeding Not Asked    Reaction to local anesthetic No    Pt has a pacemaker No    Pt has a defibrillator No   Social History Narrative    Not on file         Current Medications:  Current Facility-Administered Medications   Medication Dose Route Frequency    metoprolol succinate ER (Toprol XL) 24 hr tab 25 mg  25 mg Oral Daily Beta Blocker    sodium chloride (hypertonic) 3 % nebulizer solution 3 mL  3 mL Nebulization TID    dextromethorphan-guaifenesin ER (Mucinex DM)  MG per 12 hr tab 1 tablet  1 tablet Oral BID    arformoterol (Brovana) 15 MCG/2ML nebulizer solution 15 mcg  15 mcg Nebulization 2 times daily    budesonide (Pulmicort) 0.5 MG/2ML nebulizer suspension 0.5 mg  0.5 mg Nebulization BID    cefTRIAXone (Rocephin) 1 g in D5W 100 mL IVPB-ADD  1 g Intravenous Q24H    amLODIPine (Norvasc) tab 5 mg  5 mg Oral Daily    cholecalciferol (VITAMIN D3) tab 2,000 Units  2,000 Units Oral Daily    febuxostat (Uloric) tab 40 mg  40 mg Oral Daily    pantoprazole (Protonix) DR tab 40 mg  40 mg Oral QAM AC    glucose (Dex4) 15 GM/59ML oral liquid 15 g  15 g Oral Q15 Min PRN    Or    glucose (Glutose) 40% oral gel 15 g  15 g Oral Q15 Min PRN    Or    glucose-vitamin C (Dex-4) chewable tab 4 tablet  4 tablet Oral Q15 Min PRN    Or    dextrose 50% injection 50 mL  50 mL Intravenous Q15 Min PRN    Or    glucose (Dex4) 15 GM/59ML oral liquid 30 g  30 g Oral Q15 Min PRN    Or    glucose (Glutose) 40% oral gel 30 g  30 g Oral Q15 Min PRN    Or    glucose-vitamin C (Dex-4) chewable tab 8 tablet  8 tablet Oral Q15 Min PRN    ondansetron (Zofran) 4 MG/2ML injection 4 mg  4 mg Intravenous Q6H PRN    acetaminophen (Tylenol) tab 650 mg  650 mg Oral Q6H PRN    zolpidem (Ambien) tab 5 mg  5 mg Oral Nightly PRN    alum-mag hydroxide-simethicone  (Maalox) 200-200-20 MG/5ML oral suspension 30 mL  30 mL Oral QID PRN    metoclopramide (Reglan) 5 mg/mL injection 10 mg  10 mg Intravenous Q6H PRN     Medications Prior to Admission   Medication Sig    doxycycline 100 MG Oral Cap Take 1 capsule (100 mg total) by mouth 2 (two) times daily.    [] azithromycin 250 MG Oral Tab Take 1 tablet (250 mg total) by mouth daily for 4 days.    albuterol 108 (90 Base) MCG/ACT Inhalation Aero Soln Inhale 2 puffs into the lungs every 4 (four) hours as needed. (Patient not taking: Reported on 2024)    [] predniSONE 10 MG Oral Tab Take 4 tablets (40 mg total) by mouth daily for 2 days, THEN 3 tablets (30 mg total) daily for 2 days, THEN 2 tablets (20 mg total) daily for 2 days, THEN 1 tablet (10 mg total) daily for 2 days.    [] oseltamivir 30 MG Oral Cap Take 1 capsule (30 mg total) by mouth daily for 2 days.    LASIX 20 MG Oral Tab Take 1 tablet (20 mg total) by mouth as needed (FOR SWELLING).    Potassium Chloride ER 20 MEQ Oral Tab CR potassium chloride ER 20 mEq tablet,extended release, [RxNorm: 410490]    amLODIPine 5 MG Oral Tab Take 1 tablet (5 mg total) by mouth daily.    glipiZIDE 5 MG Oral Tab Take 1 tablet (5 mg total) by mouth before breakfast. (Patient taking differently: Take 1 tablet (5 mg total) by mouth before breakfast. prn)    Alcohol Swabs (DROPSAFE ALCOHOL PREP) 70 % Does not apply Pads Take 1 Bottle by mouth As Directed.    pantoprazole 20 MG Oral Tab EC Take 1 tablet (20 mg total) by mouth every morning before breakfast.    hydrALAZINE 25 MG Oral Tab TAKE 1/2 TABLET TWICE DAILY (Patient taking differently: Take 1 tablet (25 mg total) by mouth in the morning and 1 tablet (25 mg total) before bedtime. TAKE 1/2 TABLET TWICE DAILY.)    TRUEplus Lancets 33G Does not apply Misc 1 each by In Vitro route daily.    metoprolol succinate ER 25 MG Oral Tablet 24 Hr Take 1 tablet (25 mg total) by mouth daily.    colchicine 0.6 MG Oral Tab Take 1  tablet (0.6 mg total) by mouth as needed.    WIXELA INHUB 500-50 MCG/ACT Inhalation Aerosol Powder, Breath Activated Inhale 1 puff into the lungs 2 (two) times daily.    Glucose Blood (TRUE METRIX BLOOD GLUCOSE TEST) In Vitro Strip 1 strip by In Vitro route 4 (four) times daily.    febuxostat 40 MG Oral Tab Take 1 tablet (40 mg total) by mouth daily.    omega-3 fatty acids 1000 MG Oral Cap Take 1,000 mg by mouth daily.    Multiple Vitamin (MULTI-VITAMIN DAILY) Oral Tab Take 1 tablet by mouth daily.    Cholecalciferol (VITAMIN D) 2000 units Oral Cap Take 1 capsule (2,000 Units total) by mouth daily.     Allergies  Allergies   Allergen Reactions    Allopurinol HIVES, SWELLING and SHORTNESS OF BREATH    Dog Epithelium SHORTNESS OF BREATH     Hair and saliva    Dust SHORTNESS OF BREATH    Heparin SWELLING    Smoke SHORTNESS OF BREATH    Adhesive Tape (Rosins) RASH    Montelukast HALLUCINATION    Statins MYALGIA     Pt had developed myalgia and myopathy    Xanax [Alprazolam] RESTLESSNESS    Adhesive Tape OTHER (SEE COMMENTS)    Other OTHER (SEE COMMENTS)    Sulfa Antibiotics OTHER (SEE COMMENTS)    Ace Inhibitors Coughing    Aspirin RASH       Review of Systems:   Review of Systems   Constitutional: Negative.    HENT: Negative.     Eyes: Negative.    Respiratory:  Positive for cough, sputum production and wheezing. Negative for hemoptysis and shortness of breath.    Cardiovascular: Negative.    Gastrointestinal: Negative.    Genitourinary: Negative.    Musculoskeletal: Negative.    Skin: Negative.    Neurological: Negative.    Endo/Heme/Allergies: Negative.    Psychiatric/Behavioral: Negative.       10 point review of systems completed and negative except as noted.    Physical Exam:   Patient Vitals for the past 24 hrs:   BP Temp Temp src Pulse Resp SpO2 Weight   03/10/24 1045 127/58 -- -- 100 -- 96 % --   03/10/24 0918 130/52 98.4 °F (36.9 °C) Oral 101 18 92 % --   03/10/24 0723 -- -- -- -- -- 96 % --   03/10/24 0529  156/60 98 °F (36.7 °C) Oral -- 18 91 % 167 lb (75.8 kg)   03/09/24 2102 139/54 98 °F (36.7 °C) Oral -- 18 92 % --   03/09/24 1900 -- -- -- 96 -- -- --   03/09/24 1539 -- -- -- -- -- 93 % --   03/09/24 1537 139/57 98.1 °F (36.7 °C) Oral 94 20 (!) 88 % --     Intake/Output:   Last 3 shifts:   Intake/Output                   03/08/24 0700 - 03/09/24 0659 03/09/24 0700 - 03/10/24 0659 03/10/24 0700 - 03/11/24 0659       Intake    P.O.  713  870  --    P.O. 713 870 --    I.V.  --  6  --    I.V. -- 6 --    IV PIGGYBACK  --  100  --    Volume (mL) (cefTRIAXone (Rocephin) 1 g in D5W 100 mL IVPB-ADD) -- 100 --    Total Intake 713 976 --       Output    Urine  --  1075  500    Urine Occurrence 3 x 4 x --    Output (mL) (External Urinary Catheter) -- 1075 500    Stool  --  --  --    Stool Count Calculated for I/O 1 x 1 x --    Total Output -- 1075 500       Net I/O     713 -99 -500          Vent Settings:    Hemodynamic parameters (last 24 hours):    Scheduled Meds:    metoprolol succinate ER  25 mg Oral Daily Beta Blocker    sodium chloride (hypertonic)  3 mL Nebulization TID    dextromethorphan-guaifenesin ER  1 tablet Oral BID    arformoterol  15 mcg Nebulization 2 times daily    budesonide  0.5 mg Nebulization BID    cefTRIAXone  1 g Intravenous Q24H    amLODIPine  5 mg Oral Daily    cholecalciferol  2,000 Units Oral Daily    febuxostat  40 mg Oral Daily    pantoprazole  40 mg Oral QAM AC     Continuous Infusions:   Physical Exam:  General: Alert and oriented x 3. No apparent distress.   HEENT: Normocephalic, anicteric sclera, neck supple, no thyromegaly or adenopathy.  Neck: No JVD, carotids 2+, no bruits.  Cardiac: Regular rate and rhythm. S1, S2 normal. No murmur, pericardial rub, S3, or extra cardiac sounds.  Lungs: +Expiratory wheezing. Normal excursions and effort. 1L NC  Abdomen: Soft, non-tender. No organosplenomegally, mass or rebound, BS-present.  Extremities: Without clubbing or cyanosis. No edema.     Neurologic: Alert and oriented, normal affect. No focal defects  Skin: Warm and dry.     Results:   Laboratory Data:  Lab Results   Component Value Date    WBC 12.5 (H) 03/09/2024    HGB 14.3 03/09/2024    HCT 45.5 03/09/2024    .0 03/09/2024    CREATSERUM 1.26 (H) 03/09/2024    BUN 33 (H) 03/09/2024     03/09/2024    K 4.6 03/09/2024     03/09/2024    CO2 34.0 (H) 03/09/2024     (H) 03/09/2024    CA 10.6 (H) 03/09/2024    ALB 4.1 02/13/2024    ALKPHO 76 02/06/2024    TP 6.8 02/06/2024    AST 21 02/06/2024    ALT 18 02/06/2024    PTT 28.7 07/27/2022    INR 1.02 03/28/2019    PTP 13.2 03/28/2019    T4F 1.3 06/10/2019    TSH 1.810 06/10/2019    DDIMER 0.82 (H) 02/28/2024    ESRML 57 (H) 07/04/2023    CRP 0.91 (H) 07/04/2023    MG 2.1 03/09/2024    PHOS 5.4 (H) 02/26/2024    TROP 0.133 (HH) 01/26/2021     05/16/2020         Recent Labs   Lab 03/06/24  0827 03/08/24  0707 03/09/24  0735   * 123* 111*   BUN 38* 42* 33*   CREATSERUM 1.34* 1.27* 1.26*   CA 10.6* 10.7* 10.6*    137 140   K 4.7 4.7 4.6    103 104   CO2 33.0* 33.0* 34.0*     Recent Labs   Lab 03/06/24  0827 03/08/24  0707 03/09/24  0735   RBC 4.62 4.42 4.88   HGB 13.8 13.4 14.3   HCT 42.1 40.9 45.5   MCV 91.1 92.5 93.2   MCH 29.9 30.3 29.3   MCHC 32.8 32.8 31.4   RDW 14.7 14.8 15.0   NEPRELIM 16.10* 13.39* 8.99*   WBC 18.3* 16.2* 12.5*   .0 240.0 235.0       Recent Labs   Lab 03/04/24  2131   BNPML 45       No results for input(s): \"TROP\", \"CK\" in the last 168 hours.    Imaging:  XR CHEST AP PORTABLE  (CPT=71045)    Result Date: 3/10/2024  CONCLUSION:  1. Scattered interstitial opacities are demonstrated bilaterally may reflect pulmonary interstitial edema or other acute interstitial lung disease.  2. Postthoracotomy chest demonstrating cardiomegaly with borderline pulmonary vascular congestion.    Dictated by (CST): Fred Lopes MD on 3/10/2024 at 9:10 AM     Finalized by (CST): Fred Lopes,  MD on 3/10/2024 at 9:13 AM           Impression:     Acute on chronic hypoxic respiratory failure  Acute COPD exacerbation/Influenza A/ RLL PNA  -Pulmonology following  -Nebulizers, antibiotics, steroids  -Oxygen requirements decreasing    New onset A-fib  -Short duration lasting about 20 minutes with spontaneous conversion to normal sinus rhythm  -Echo 3/6/2024 with preserved ejection fraction, grade 1 diastolic dysfunction, mild mitral stenosis, normal-sized IVC  -Last TSH in 2019 was normal, will repeat, electrolytes have been unremarkable this stay, none today.  Check TSH, potassium, mag  -Suspect this is secondary to acute illness, she has not also not been on her home metoprolol, will monitor bedside telemetry for recurrence, [id number].  Will hold off on starting anticoagulation at this time unless she has a recurrence of A-fib and it is sustained ,can also consider outpatient MCT  -Resume Toprol 25 mg daily    PIA  -Creatinine slightly above baseline on admission, now stable    CAD, CABG x3 in 2019 (SVG-LAD, SVG-RCA, SVG-OM)  -Currently stable, no ischemic symptoms   - BB resumed   - Not historically on ACE/ARB for unclear reasons  - Not historically on aspirin due to come plaints of rash  - Not historically on statins due to history of myalgias    HTN  -Controlled on amlodipine.  She reports that she was told by her primary care doctor to take hydralazine 12.5 mg in the morning and 12.5 mg in the evening.  While here she has been on 25 mg daily which has now been on hold.  Now that she is back on her Toprol XL we will hold off on resuming hydralazine unless needed    Dyslipidemia  - Lipid Panel 2/25/2024: Chol 265, HDL 47, Tri 151,   - Not on Statin as above      History of bilateral carotid stenosis, S/p bilateral endarterectomy, Right 2019, Left ~2015  History of gout- No acute flare    Thank you for allowing me to participate in the care of your patient.    LIZ Minor  [place]  [facility] [place]  3/10/2024    I saw and examined patient agree with attached findings.  Patient initially admitted with influenza A and COPD exacerbation.  Earlier this morning had brief episode of atrial fibrillation with RVR, around 10 minutes with mild palpitations.  Has since remained in sinus rhythm.  May be secondary to ongoing COPD exacerbation from influenza with use of nebulizers, will continue monitor telemetry.  Will monitor for evidence of recurrence at which point may need to strongly consider anticoagulation, however patient tells me she has not very happy with this idea.  Resume home metoprolol.    Mike Ochoa MD  [facility] [place]

## 2024-03-11 VITALS
SYSTOLIC BLOOD PRESSURE: 143 MMHG | BODY MASS INDEX: 29 KG/M2 | WEIGHT: 171.19 LBS | RESPIRATION RATE: 18 BRPM | HEART RATE: 91 BPM | DIASTOLIC BLOOD PRESSURE: 49 MMHG | TEMPERATURE: 98 F | OXYGEN SATURATION: 92 %

## 2024-03-11 LAB
ANION GAP SERPL CALC-SCNC: 1 MMOL/L (ref 0–18)
BASOPHILS # BLD AUTO: 0.05 X10(3) UL (ref 0–0.2)
BASOPHILS NFR BLD AUTO: 0.4 %
BUN BLD-MCNC: 25 MG/DL (ref 9–23)
BUN/CREAT SERPL: 21.2 (ref 10–20)
CALCIUM BLD-MCNC: 10.2 MG/DL (ref 8.7–10.4)
CHLORIDE SERPL-SCNC: 104 MMOL/L (ref 98–112)
CO2 SERPL-SCNC: 31 MMOL/L (ref 21–32)
CREAT BLD-MCNC: 1.18 MG/DL
DEPRECATED RDW RBC AUTO: 52.4 FL (ref 35.1–46.3)
EGFRCR SERPLBLD CKD-EPI 2021: 47 ML/MIN/1.73M2 (ref 60–?)
EOSINOPHIL # BLD AUTO: 0.24 X10(3) UL (ref 0–0.7)
EOSINOPHIL NFR BLD AUTO: 1.8 %
ERYTHROCYTE [DISTWIDTH] IN BLOOD BY AUTOMATED COUNT: 15.2 % (ref 11–15)
GLUCOSE BLD-MCNC: 159 MG/DL (ref 70–99)
HCT VFR BLD AUTO: 40.3 %
HGB BLD-MCNC: 12.7 G/DL
IMM GRANULOCYTES # BLD AUTO: 0.19 X10(3) UL (ref 0–1)
IMM GRANULOCYTES NFR BLD: 1.4 %
LYMPHOCYTES # BLD AUTO: 0.97 X10(3) UL (ref 1–4)
LYMPHOCYTES NFR BLD AUTO: 7.1 %
MAGNESIUM SERPL-MCNC: 1.9 MG/DL (ref 1.6–2.6)
MCH RBC QN AUTO: 29.7 PG (ref 26–34)
MCHC RBC AUTO-ENTMCNC: 31.5 G/DL (ref 31–37)
MCV RBC AUTO: 94.2 FL
MONOCYTES # BLD AUTO: 1.85 X10(3) UL (ref 0.1–1)
MONOCYTES NFR BLD AUTO: 13.5 %
NEUTROPHILS # BLD AUTO: 10.4 X10 (3) UL (ref 1.5–7.7)
NEUTROPHILS # BLD AUTO: 10.4 X10(3) UL (ref 1.5–7.7)
NEUTROPHILS NFR BLD AUTO: 75.8 %
OSMOLALITY SERPL CALC.SUM OF ELEC: 290 MOSM/KG (ref 275–295)
PLATELET # BLD AUTO: 187 10(3)UL (ref 150–450)
POTASSIUM SERPL-SCNC: 4.2 MMOL/L (ref 3.5–5.1)
RBC # BLD AUTO: 4.28 X10(6)UL
SODIUM SERPL-SCNC: 136 MMOL/L (ref 136–145)
WBC # BLD AUTO: 13.7 X10(3) UL (ref 4–11)

## 2024-03-11 PROCEDURE — 99239 HOSP IP/OBS DSCHRG MGMT >30: CPT | Performed by: HOSPITALIST

## 2024-03-11 RX ORDER — AMLODIPINE BESYLATE 10 MG/1
10 TABLET ORAL DAILY
Status: DISCONTINUED | OUTPATIENT
Start: 2024-03-11 | End: 2024-03-11

## 2024-03-11 RX ORDER — ARFORMOTEROL TARTRATE 15 UG/2ML
15 SOLUTION RESPIRATORY (INHALATION) 2 TIMES DAILY
Qty: 180 EACH | Refills: 0 | Status: SHIPPED | OUTPATIENT
Start: 2024-03-11

## 2024-03-11 RX ORDER — AMLODIPINE BESYLATE 2.5 MG/1
7.5 TABLET ORAL DAILY
Qty: 90 TABLET | Refills: 0 | Status: SHIPPED | OUTPATIENT
Start: 2024-03-11 | End: 2024-04-10

## 2024-03-11 RX ORDER — BUDESONIDE 0.5 MG/2ML
0.5 INHALANT ORAL 2 TIMES DAILY
Qty: 180 EACH | Refills: 0 | Status: SHIPPED | OUTPATIENT
Start: 2024-03-11

## 2024-03-11 NOTE — PROGRESS NOTES
Progress Note  Ariana Osorio Patient Status:  Inpatient    1945 MRN [id number]   Location [facility] 3W/SW Attending Chin Chavis MD   Hosp Day # 6 PCP Enrique Braun MD     Subjective:  Sitting up in chair  Denies any chest pain, pt stated her breathing has been improving. On 1L of O2 NC    Objective:  /49 (BP Location: Right arm)   Pulse 85   Temp 98.4 °F (36.9 °C) (Oral)   Resp 18   Wt 171 lb 3.2 oz (77.7 kg)   SpO2 92%   BMI 29.39 kg/m²     Telemetry: NSR       Intake/Output:    Intake/Output Summary (Last 24 hours) at 3/11/2024 0845  Last data filed at 3/11/2024 0518  Gross per 24 hour   Intake 665 ml   Output 1100 ml   Net -435 ml       Last 3 Weights   24 0418 171 lb 3.2 oz (77.7 kg)   03/10/24 0529 167 lb (75.8 kg)   24 0915 166 lb 9.6 oz (75.6 kg)   24 0546 167 lb (75.8 kg)   24 0700 165 lb 4.8 oz (75 kg)   24 0426 167 lb 11.2 oz (76.1 kg)   24 0412 165 lb 14.4 oz (75.3 kg)   24 1637 169 lb 12.1 oz (77 kg)   24 1800 169 lb (76.7 kg)       Labs:  Recent Labs   Lab 24  0707 24  0735 03/10/24  1137 24  0652   * 111*  --  159*   BUN 42* 33*  --  25*   CREATSERUM 1.27* 1.26*  --  1.18*   EGFRCR 43* 44*  --  47*   CA 10.7* 10.6*  --  10.2    140  --  136   K 4.7 4.6 4.2 4.2    104  --  104   CO2 33.0* 34.0*  --  31.0     Recent Labs   Lab 24  0707 24  0735 24  0652   RBC 4.42 4.88 4.28   HGB 13.4 14.3 12.7   HCT 40.9 45.5 40.3   MCV 92.5 93.2 94.2   MCH 30.3 29.3 29.7   MCHC 32.8 31.4 31.5   RDW 14.8 15.0 15.2*   NEPRELIM 13.39* 8.99* 10.40*   WBC 16.2* 12.5* 13.7*   .0 235.0 187.0         Recent Labs   Lab 24  2131   TROPHS 34     Lab Results   Component Value Date    INR 1.02 2019    INR 1.12 2019    INR 2.1 (H) 2019       Diagnostics:       Review of Systems   Respiratory:  Positive for cough, sputum production and shortness of  breath. Negative for hemoptysis and wheezing.    Cardiovascular:  Negative for chest pain, palpitations, orthopnea, claudication, leg swelling and PND.       Physical Exam:    Physical Exam  Vitals reviewed.   Constitutional:       General: She is not in acute distress.     Appearance: She is obese.   HENT:      Head: Normocephalic.   Neck:      Vascular: No JVD.   Cardiovascular:      Rate and Rhythm: Normal rate and regular rhythm.      Heart sounds: No murmur heard.     No friction rub. No gallop.   Pulmonary:      Effort: Pulmonary effort is normal.      Breath sounds: Rhonchi present.   Musculoskeletal:      Cervical back: Normal range of motion.   Neurological:      Mental Status: She is alert.         Medications:   metoprolol succinate ER  25 mg Oral Daily Beta Blocker    sodium chloride (hypertonic)  3 mL Nebulization TID    dextromethorphan-guaifenesin ER  1 tablet Oral BID    arformoterol  15 mcg Nebulization 2 times daily    budesonide  0.5 mg Nebulization BID    cefTRIAXone  1 g Intravenous Q24H    amLODIPine  5 mg Oral Daily    cholecalciferol  2,000 Units Oral Daily    febuxostat  40 mg Oral Daily    pantoprazole  40 mg Oral QAM AC         Assessment/Plan:    Chronic hypoxic respiratory failure  - chest xray showing pulmonary interstitial edema or acute interstitial lung disease   - on 1L of O2 via NC  - pulmonary following    Pneumonia  - on antibiotics per pulmonary    New onset afib  - short run of afib, now back in NSR  - echo with LVEF 60-65%, Grade I DDD, mild mitral stenosis  - TSH normal range  - suspect the occurrence secondary to acute illness. [id number]  score of 5. Will hold off anticoagulation unless the reoccurrence of afib.   - continue toprol 25mg daily    PIA on CKD  - creatinine improving, creatinine of 1.18    CAD s/p CABG x3  - no complaints of CP  - not on ACE, Aspirin, or statin due to allergy   - continue on BB    HLD  LDL of 190  - not on statin due to muscle pain and   weakness,  and stated to have tried 3 different statins in the past.  - doesn't want to try PCSK9 inhibitors   - had myalgias with zetia     HTN  - on amlodipine, metoprolol   - SBP mildly elevated    H/o carotid stenosis with bilateral endarterectomies       Plan;  - continue on metoprolol succinate 25mg daily  - SBP mildly elevated, will increase the amlodipine to 7.5mg daily.  - continue to monitor in tele while in hospital for any reoccurrence, may need to start on anticoagulation if reoccurrence of afib.   - MCT as outpatient on discharge to check for any re-occurrence.      LIZ Duque  [facility]  3/11/2024  8:45 AM    Chart reviewed, case discussed with APN and RN.  Rhythm and rate appears stable.  Clinically improved.  Okay for discharge.  Outpatient MCT then follow-up in the clinic.

## 2024-03-11 NOTE — PAYOR COMM NOTE
--------------ADDITIONAL CLINICAL FOR RECONSIDERATION OF DENIAL         CONTINUED STAY REVIEW    Payor: [employer]  Subscriber #:  [id number]  Authorization Number: [id number]    Admit date: 3/5/24  Admit time:  3:55 AM    Admitting Physician: Daria Felix MD  Attending Physician:  Chin Chavis MD  Primary Care Physician: Enrique Braun MD    REVIEW DOCUMENTATION:    CARDS CONSULT      ate of Admission:  3/4/2024  Date of Consult:  3/10/2024  Reason for Consultation:      Atrial Fibrillation with Rapid Ventricular Rates  History of Present Illness:   Patient is a 79 year old female who was admitted to the hospital for Influenza: She has a past medical history of COPD, history of recurrent pleural effusions with thoracentesis and Pleurx catheter. Cardiac history is significant for coronary artery disease, status post three-vessel bypass in 2017.  History of carotid stenosis status post carotid endarterectomy, left side was around ~2015, right side 2019.     She presented to the emergency room with progressive dyspnea without alleviating or relieving factors.  She was recently diagnosed with influenza A.  She was hypoxic on admission, chest x-ray at that time was revealing for mild interstitial edema and mild atelectasis seen by pulmonology who has been treating her with nebulizers, steroids, antibiotics, and supplemental oxygen for acute COPD exacerbation and right lower lobe pneumonia..  She is receiving supportive care for influenza A. Recently treated with Tamiflu a few weeks ago.     Cardiology was consulted this morning after patient converted to atrial fibrillation with rapid rates sustaining in the 150s.  This went on for around 20 minutes before she spontaneously converted back to sinus rhythm.  Per epic review it appears that atrial fibrillation is a new diagnosis for the patient.     ER Workup  - EKG without gross acute changes though +artifact  - Troponin negative x1  - No D-dimer  - BNP  45  - Influenza A +  - Cr 1.49/ GFR 36    Physical Exam:  General: Alert and oriented x 3. No apparent distress.   HEENT: Normocephalic, anicteric sclera, neck supple, no thyromegaly or adenopathy.  Neck: No JVD, carotids 2+, no bruits.  Cardiac: Regular rate and rhythm. S1, S2 normal. No murmur, pericardial rub, S3, or extra cardiac sounds.  Lungs: +Expiratory wheezing. Normal excursions and effort. 1L NC  Abdomen: Soft, non-tender. No organosplenomegally, mass or rebound, BS-present.  Extremities: Without clubbing or cyanosis. No edema.    Neurologic: Alert and oriented, normal affect. No focal defects  Skin: Warm and dry.     XR CHEST AP PORTABLE  (CPT=71045)     Result Date: 3/10/2024  CONCLUSION:         1. Scattered interstitial opacities are demonstrated bilaterally may reflect pulmonary interstitial edema or other acute interstitial lung disease.  2. Postthoracotomy chest demonstrating cardiomegaly with borderline pulmonary vascular congestion.     mpression:      Acute on chronic hypoxic respiratory failure  Acute COPD exacerbation/Influenza A/ RLL PNA  -Pulmonology following  -Nebulizers, antibiotics, steroids  -Oxygen requirements decreasing     New onset A-fib  -Short duration lasting about 20 minutes with spontaneous conversion to normal sinus rhythm  -Echo 3/6/2024 with preserved ejection fraction, grade 1 diastolic dysfunction, mild mitral stenosis, normal-sized IVC  -Last TSH in 2019 was normal, will repeat, electrolytes have been unremarkable this stay, none today.  Check TSH, potassium, mag  -Suspect this is secondary to acute illness, she has not also not been on her home metoprolol, will monitor bedside telemetry for recurrence, [id number] 5.  Will hold off on starting anticoagulation at this time unless she has a recurrence of A-fib and it is sustained ,can also consider outpatient MCT  -Resume Toprol 25 mg daily     PIA  -Creatinine slightly above baseline on admission, now stable     CAD, CABG  x3 in 2019 (SVG-LAD, SVG-RCA, SVG-OM)  -Currently stable, no ischemic symptoms   - BB resumed   - Not historically on ACE/ARB for unclear reasons  - Not historically on aspirin due to come plaints of rash  - Not historically on statins due to history of myalgias     HTN  -Controlled on amlodipine.  She reports that she was told by her primary care doctor to take hydralazine 12.5 mg in the morning and 12.5 mg in the evening.  While here she has been on 25 mg daily which has now been on hold.  Now that she is back on her Toprol XL we will hold off on resuming hydralazine unless needed     Dyslipidemia  - Lipid Panel 2/25/2024: Chol 265, HDL 47, Tri 151,   - Not on Statin as above        History of bilateral carotid stenosis, S/p bilateral endarterectomy, Right 2019, Left ~2015  History of gout- No acute flare                   MEDICATIONS ADMINISTERED IN LAST 1 DAY:  amLODIPine (Norvasc) tab 5 mg       Date Action Dose Route User    3/10/2024 2108 Given by Other 5 mg Oral Kayla Ross RN          arformoterol (Brovana) 15 MCG/2ML nebulizer solution 15 mcg       Date Action Dose Route User    3/10/2024 2028 Given by Other 15 mcg Nebulization Laurent Pozo RCP          budesonide (Pulmicort) 0.5 MG/2ML nebulizer suspension 0.5 mg       Date Action Dose Route User    3/10/2024 2028 Given by Other 0.5 mg Nebulization Laurent Pozo RCP    3/10/2024 0742 Given 0.5 mg Nebulization Hernán Santacruz          cefTRIAXone (Rocephin) 1 g in D5W 100 mL IVPB-ADD       Date Action Dose Route User    3/10/2024 0921 New Bag 1 g Intravenous Reyes Soto, Oscar, RN          dextromethorphan-guaifenesin ER (Mucinex DM)  MG per 12 hr tab 1 tablet       Date Action Dose Route User    3/10/2024 0921 Given 1 tablet Oral Reyes Soto, Oscar, RN          febuxostat (Uloric) tab 40 mg       Date Action Dose Route User    3/10/2024 0921 Given 40 mg Oral Reyes Soto, Oscar, RN          hydrALAZINE (Apresoline) tab 25 mg       Date  Action Dose Route User    3/10/2024 0921 Given 25 mg Oral Reyes Soto, Oscar, RN          metoprolol succinate ER (Toprol XL) 24 hr tab 25 mg       Date Action Dose Route User    3/11/2024 0548 Given 25 mg Oral Amada Sandoval RN    3/10/2024 1045 Given 25 mg Oral Reyes Soto, Oscar, RN          pantoprazole (Protonix) DR tab 40 mg       Date Action Dose Route User    3/11/2024 0550 Given 40 mg Oral Amada Sandoval RN    3/10/2024 0921 Given 40 mg Oral Reyes Soto, Oscar, RN          sodium chloride (hypertonic) 3 % nebulizer solution 3 mL       Date Action Dose Route User    3/10/2024 2027 Given 3 mL Nebulization Laurent Pozo RCP    3/10/2024 1429 Given 3 mL Nebulization Hernán Santacruz          cholecalciferol (VITAMIN D3) tab 2,000 Units       Date Action Dose Route User    3/10/2024 0921 Given 2,000 Units Oral Reyes Soto, Oscar, RN            Vitals (last day)       Date/Time Temp Pulse Resp BP SpO2 Weight O2 Device O2 Flow Rate (L/min) Who    03/11/24 0544 100.2 °F (37.9 °C) 95 17 141/66 96 % -- Nasal cannula 1.5 L/min AM    03/11/24 0418 -- -- -- -- -- 171 lb 3.2 oz -- -- JL    03/11/24 0100 -- 86 18 121/62 94 % -- Nasal cannula 1.5 L/min AM    03/10/24 2107 99.4 °F (37.4 °C) 97 18 154/55 95 % -- Nasal cannula 1 L/min TQ    03/10/24 1900 -- 95 -- -- -- -- -- -- CY    03/10/24 1814 98.3 °F (36.8 °C) 90 18 109/82 94 % -- Nasal cannula 1 L/min OR    03/10/24 1429 -- -- -- -- 94 % -- Nasal cannula 1 L/min EB    03/10/24 1045 -- 100 -- 127/58 96 % -- Nasal cannula 1 L/min OR    03/10/24 0918 98.4 °F (36.9 °C) 101 18 130/52 92 % -- Nasal cannula 1 L/min OR    03/10/24 0723 -- -- -- -- 96 % -- Nasal cannula 1.5 L/min EB    03/10/24 0529 98 °F (36.7 °C) -- 18 156/60 91 % -- None (Room air) 1 L/min FW    03/10/24 0529 -- -- -- -- -- 167 lb -- -- SH

## 2024-03-11 NOTE — PLAN OF CARE
Pt did not report any CP, or any discomfort. Pt remained SR overnight. Droplet isolation maintained for Flu. Still with occasional moist cough but did not want Mucinex. Slightly febrile but pt reported that she is feeling much better and breathing better. On IV antibiotics for possible PNA. Possible DC today.    Problem: Patient Centered Care  Goal: Patient preferences are identified and integrated in the patient's plan of care  Description: Interventions:  - What would you like us to know as we care for you? From home alone  - Provide timely, complete, and accurate information to patient/family  - Incorporate patient and family knowledge, values, beliefs, and cultural backgrounds into the planning and delivery of care  - Encourage patient/family to participate in care and decision-making at the level they choose  - Honor patient and family perspectives and choices  Outcome: Progressing     Problem: Patient/Family Goals  Goal: Patient/Family Long Term Goal  Description: Patient's Long Term Goal: go home    Interventions:  - steroids, O2 PRN, comfort measures, PT/OT  - See additional Care Plan goals for specific interventions  Outcome: Progressing  Goal: Patient/Family Short Term Goal  Description: Patient's Short Term Goal: breathe easier    Interventions:   - O2 PRN, comfort measures, IV steroids  - See additional Care Plan goals for specific interventions  Outcome: Progressing     Problem: Diabetes/Glucose Control  Goal: Glucose maintained within prescribed range  Description: INTERVENTIONS:  - Monitor Blood Glucose as ordered  - Assess for signs and symptoms of hyperglycemia and hypoglycemia  - Administer ordered medications to maintain glucose within target range  - Assess barriers to adequate nutritional intake and initiate nutrition consult as needed  - Instruct patient on self management of diabetes  Outcome: Progressing     Problem: RESPIRATORY - ADULT  Goal: Achieves optimal ventilation and  oxygenation  Description: INTERVENTIONS:  - Assess for changes in respiratory status  - Assess for changes in mentation and behavior  - Position to facilitate oxygenation and minimize respiratory effort  - Oxygen supplementation based on oxygen saturation or ABGs  - Provide Smoking Cessation handout, if applicable  - Encourage broncho-pulmonary hygiene including cough, deep breathe, Incentive Spirometry  - Assess the need for suctioning and perform as needed  - Assess and instruct to report SOB or any respiratory difficulty  - Respiratory Therapy support as indicated  - Manage/alleviate anxiety  - Monitor for signs/symptoms of CO2 retention  Outcome: Progressing

## 2024-03-11 NOTE — PROGRESS NOTES
Pulmonary Progress Note     Assessment / Plan:  Chronic hypoxic resp failure - due to pulmonary edema, pneumonia, recent influenza, and ACOS with exacerbation  - oxygen as needed, currently 1 LPM, baseline is 1-2 LPM  Pneumonia - RLL  - stop ceftriaxone today (3/6-3/11)  - flutter valve  Asthma/COPD overlap w/ acute exacerbation  - brovana/pulmicort nebs for home Wixela 500/50, continue nebulized ICS/LABA on discharge as she had a significant benefit with this, social work consulted for home nebulizer  - BD protocol  - she declined systemic steroids this admission  Influenza A   - previously completed course of tamiflu  Afib - echo showed normal LVEF and G1DD  - self terminated and now in normal sinus rhythm  - per cardiology  Dispo  - ok for discharge from pulmonary standpoint  - follow up with Dr. Heath or APN 1-2 weeks after discharge    Discussed with patient and Henry ANNA.    Arvin Ford MD  Pulmonary & Critical Care Medicine  [facility]      Subjective:  No acute events overnight  She reports her breathing is doing very well today  The best it has felt in a while  Still some green phlegm  Has been up walking to the bathroom without problems  She would like to go home today    Objective:  Vitals:    03/11/24 0100 03/11/24 0418 03/11/24 0544 03/11/24 0841   BP: 121/62  141/66 143/49   BP Location: Right arm  Right arm Right arm   Pulse: 86  95 85   Resp: 18  17 18   Temp:   100.2 °F (37.9 °C) 98.4 °F (36.9 °C)   TempSrc:   Oral Oral   SpO2: 94%  96% 92%   Weight:  171 lb 3.2 oz (77.7 kg)       Physical Exam:  General: no distress, comfortable and conversant  Respiratory: mild bibasilar rhonchi, normal effort  Cardiovascular: regular rate and rhythm, no m/r/g  Extremities: no LE edema  Mental status: interactive, answering questions appropriately    Medications:  Reviewed in EMR    Lab Data:  Reviewed in EMR    Imaging:  I independently visualized all relevant chest imaging in PACS and agree  with radiology interpretation except where noted.

## 2024-03-11 NOTE — CM/SW NOTE
03/11/24 1400   Discharge disposition   Expected discharge disposition Home or Self   DME/Infusion Providers [facility]   Discharge transportation Private car     JOHN PAUL confirmed with Kayla from [facility] that order was processed and nebulizer machine was delivered to pt bedside.  Pt has a discharge order.    JOHN PAUL confirmed with SHOSHANA Figueroa that pt has transportation home from family.    PLAN: DC home w/DME from [facility]    / to remain available for support and/or discharge planning.     Mikala Ren MSW, LSW [id number]

## 2024-03-11 NOTE — PROGRESS NOTES
Patient provided with discharge instructions. Informed of MCT as outpatient and all other follow ups. Portable O2 in place. Removed IV and Tele box. Explained medications to be picked up at pharmacy. Patient wheeled down and picked up by family. .

## 2024-03-11 NOTE — PROGRESS NOTES
Care endorsed to SHOSHANA Ybarra due to staffing availability. This RN gave report and patient aware.

## 2024-03-11 NOTE — DISCHARGE INSTRUCTIONS
Follow-up with PCP in 1-2 weeks   Follow-up with cardiology as instructed  Follow-up with pulmonologist as instructed

## 2024-03-11 NOTE — CM/SW NOTE
Per RN Henry, pulmonology would like pt to go home with a nebulizer machine.    Pt current with [facility] for home O2- SW sent Aidin referral to [facility].    Pulmonology verbiage noted- uploaded to referral.    JOHN PAUL left VM for Kayla from [facility] notifying of referral.    Per Kayla,verbiage is correct.      JOHN PAUL entered order for MD co-sign and sent PS to Dr. Ford.    PLAN: DC home w/home nebulizer/O2 from [facility]    / to remain available for support and/or discharge planning.     Mikala Ren MSW, LSW [id number]

## 2024-03-11 NOTE — PLAN OF CARE
Patient is alert and oriented times 4. On 1L NC which is her baseline at home. No complains of pain. No elevated temperature this morning. Patient received neb treatment this morning. IV antibiotic running. Plan for possible DC.     Problem: Patient Centered Care  Goal: Patient preferences are identified and integrated in the patient's plan of care  Description: Interventions:  - What would you like us to know as we care for you? From home alone  - Provide timely, complete, and accurate information to patient/family  - Incorporate patient and family knowledge, values, beliefs, and cultural backgrounds into the planning and delivery of care  - Encourage patient/family to participate in care and decision-making at the level they choose  - Honor patient and family perspectives and choices  Outcome: Progressing     Problem: Patient/Family Goals  Goal: Patient/Family Short Term Goal  Description: Patient's Short Term Goal: breathe easier    Interventions:   - O2 PRN, comfort measures, IV steroids  - See additional Care Plan goals for specific interventions  Outcome: Progressing     Problem: RESPIRATORY - ADULT  Goal: Achieves optimal ventilation and oxygenation  Description: INTERVENTIONS:  - Assess for changes in respiratory status  - Assess for changes in mentation and behavior  - Position to facilitate oxygenation and minimize respiratory effort  - Oxygen supplementation based on oxygen saturation or ABGs  - Provide Smoking Cessation handout, if applicable  - Encourage broncho-pulmonary hygiene including cough, deep breathe, Incentive Spirometry  - Assess the need for suctioning and perform as needed  - Assess and instruct to report SOB or any respiratory difficulty  - Respiratory Therapy support as indicated  - Manage/alleviate anxiety  - Monitor for signs/symptoms of CO2 retention  Outcome: Progressing

## 2024-03-11 NOTE — DISCHARGE SUMMARY
[facility]  part of [facility]    Discharge Summary    Ariana Q Marknasim Patient Status:  Inpatient    1945 MRN [id number]   Location [facility] 3W/SW Attending Chin Chavis MD   Hosp Day # 6 PCP Enrique Braun MD     Date of Admission: 3/4/2024 Disposition: Home or Self Care     Date of Discharge: 24      Admitting Diagnosis: Influenza [J11.1]  COPD exacerbation (HCC) [J44.1]  Acute on chronic congestive heart failure, unspecified heart failure type (HCC) [I50.9]    Hospital Discharge Diagnoses:  Acute COPD with Exacerbation    Lace+ Score: 82  59-90 High Risk  29-58 Medium Risk  0-28   Low Risk.    TCM Follow-Up Recommendation:  LACE > 58: High Risk of readmission after discharge from the hospital.      Problem List:   Patient Active Problem List   Diagnosis    History of breast cancer    Medicare annual wellness visit, subsequent    Chronic tophaceous gout    Impacted cerumen, bilateral    COPD, severe (HCC)    Coronary artery disease    S/P CABG (coronary artery bypass graft)    At risk for falls    Stage 3b chronic kidney disease (HCC)    Controlled type 2 diabetes mellitus with stage 3 chronic kidney disease, without long-term current use of insulin (HCC)    Hypertension associated with type 2 diabetes mellitus (HCC)    Hyperlipidemia associated with type 2 diabetes mellitus (HCC)    S/P carotid endarterectomy    Chronic diastolic congestive heart failure (HCC)    Bilateral hearing loss    Hyperparathyroidism (HCC)    Glaucoma    Vitamin D deficiency    Carotid stenosis, non-symptomatic, bilateral    PAD (peripheral artery disease) (HCC)    Thoracic aorta atherosclerosis (HCC)    Osteopenia of hip    Right knee pain    COPD exacerbation (HCC)    Influenza    Acute on chronic congestive heart failure, unspecified heart failure type (HCC)       Reason for Admission:   Acute COPD exacerbation  A. Fib with RVR  Recent influenza A infection  Chronic respiratory  acidosis/metabolic alkalosis  Hypertension  Diabetes       Physical Exam:   General appearance: alert, appears stated age and cooperative  Pulmonary:  clear to auscultation bilaterally  Cardiovascular: S1, S2 normal, no murmur, click, rub or gallop, regular rate and rhythm  Abdominal: soft, non-tender; bowel sounds normal; no masses,  no organomegaly  Extremities: extremities normal, atraumatic, no cyanosis or edema  Psychiatric: calm      History of Present Illness:   Per Dr. Cordelia Lane JOSE A Osorio is a(n) 79 year old female, with a past medical history significant for coronary artery disease, CHF, COPD breast cancer status post mastectomy presented with complaint of shortness of breath now ongoing for the past 2 weeks.  Describes the onset as gradual progressive worsening aggravated by exertion and no relieving factors.  She was seen by her primary care physician and started on antibiotics and prednisone with no improvement.  Denies any fever or chills.  Her shortness of breath associated with a dry cough, claims she has a feeling of phlegm stuck in her chest that does not want,.    Hospital Course:   Acute COPD exacerbation.  Due to recent viral infx.  Patient with minimal hypoxia.  Patient feeling about the same as 3/8.  Still bothered by phlegm  -Pulmonology consult appreciated  -Solu-Medrol, patient refusing further doses  -Nebulizers: duoneb, sw working on  home nebs  -Supplemental oxygen as needed, titrate down as tolerated-->off oxygen  -resume home inhalers  -Antibiotics: none  -Repeat imaging if clinical change     A. Fib with RVR  -had an episode of A. Fib in am on 3/9  -called for a. Fib with rates of ~150's, lasted 17 mins.   -I came up to see her and while I was in the room she converted to NSR  -home toprol xl 25mg daily restarted  -she attributes this to hydralazine, we will stop due to patient request  -she sees Dr. Mtz as op, will ask cardiology to evaluate  -echocardiogram this admission  hows ef 60-65%, grade 1 DD  -op follow-up for event monitor       Other problems  Recent influenza A infection  Chronic respiratory acidosis/metabolic alkalosis  Hypertension--> increased norvasc to 7.5mg daily  Diabetes     Consultations: Pulmonology  Cardiology    Procedures: n/a    Complications: na/    Discharge Condition: Good    Discharge Medications:      Discharge Medications        START taking these medications        Instructions Prescription details   arformoterol 15 MCG/2ML Nebu  Commonly known as: Brovana      Take 2 mL (15 mcg total) by nebulization 2 (two) times daily.   Quantity: 180 each  Refills: 0     budesonide 0.5 MG/2ML Susp  Commonly known as: Pulmicort      Take 2 mL (0.5 mg total) by nebulization 2 (two) times daily.   Quantity: 180 each  Refills: 0     dextromethorphan-guaifenesin ER  MG Tb12  Commonly known as: Mucinex DM      Take 1 tablet by mouth 2 (two) times daily for 10 days.   Stop taking on: March 21, 2024  Quantity: 20 tablet  Refills: 0            CHANGE how you take these medications        Instructions Prescription details   amLODIPine 2.5 MG Tabs  Commonly known as: Norvasc  What changed:   medication strength  how much to take      Take 3 tablets (7.5 mg total) by mouth daily.   Stop taking on: April 10, 2024  Quantity: 90 tablet  Refills: 0            CONTINUE taking these medications        Instructions Prescription details   colchicine 0.6 MG Tabs      Take 1 tablet (0.6 mg total) by mouth as needed.   Refills: 0     DropSafe Alcohol Prep 70 % Pads      Take 1 Bottle by mouth As Directed.   Quantity: 400 each  Refills: 3     febuxostat 40 MG Tabs  Commonly known as: Uloric      Take 1 tablet (40 mg total) by mouth daily.   Quantity: 90 tablet  Refills: 1     glipiZIDE 5 MG Tabs  Commonly known as: Glucotrol      Take 1 tablet (5 mg total) by mouth before breakfast.   Quantity: 90 tablet  Refills: 0     Lasix 20 MG Tabs  Generic drug: furosemide      Take 1 tablet  (20 mg total) by mouth as needed (FOR SWELLING).   Refills: 0     metoprolol succinate ER 25 MG Tb24  Commonly known as: Toprol XL      Take 1 tablet (25 mg total) by mouth daily.   Quantity: 90 tablet  Refills: 3     Multi-Vitamin Daily Tabs      Take 1 tablet by mouth daily.   Refills: 0     omega-3 fatty acids 1000 MG Caps  Commonly known as: Fish Oil      Take 1,000 mg by mouth daily.   Refills: 0     pantoprazole 20 MG Tbec  Commonly known as: Protonix      Take 1 tablet (20 mg total) by mouth every morning before breakfast.   Refills: 0     Potassium Chloride ER 20 MEQ Tbcr      potassium chloride ER 20 mEq tablet,extended release, [RxNorm: 884904]   Refills: 0     True Metrix Blood Glucose Test Strp      1 strip by In Vitro route 4 (four) times daily.   Quantity: 400 strip  Refills: 3     TRUEplus Lancets 33G Misc      1 each by In Vitro route daily.   Quantity: 100 each  Refills: 3     Vitamin D 50 MCG (2000 UT) Caps      Take 1 capsule (2,000 Units total) by mouth daily.   Refills: 0            STOP taking these medications      albuterol 108 (90 Base) MCG/ACT Aers  Commonly known as: Ventolin HFA        azithromycin 250 MG Tabs  Commonly known as: Zithromax        doxycycline 100 MG Caps  Commonly known as: Vibramycin        hydrALAZINE 25 MG Tabs  Commonly known as: Apresoline        oseltamivir 30 MG Caps  Commonly known as: Tamiflu        predniSONE 10 MG Tabs  Commonly known as: Deltasone        Wixela Inhub 500-50 MCG/ACT Aepb  Generic drug: fluticasone-salmeterol                  Where to Get Your Medications        These medications were sent to [facility] - [place], [place] - [address] [phone], [phone] [address], [facility] [address]      Phone: [phone]   amLODIPine 2.5 MG Tabs  dextromethorphan-guaifenesin ER  MG Tb12       Please  your prescriptions at the location directed by your doctor or nurse    Bring a paper prescription for  each of these medications  arformoterol 15 MCG/2ML Nebu  budesonide 0.5 MG/2ML Susp         Follow up Visits: Follow-up with pcp in 1 week    Follow up Labs: n/a     Other Discharge Instructions: follow-up with cardiology and pulmonology as instructed    JH ALVARADO MD  3/11/2024  1:52 PM    > 35 min

## 2024-03-12 ENCOUNTER — PATIENT OUTREACH (OUTPATIENT)
Dept: CASE MANAGEMENT | Age: 79
End: 2024-03-12

## 2024-03-12 DIAGNOSIS — Z02.9 ENCOUNTERS FOR UNSPECIFIED ADMINISTRATIVE PURPOSE: ICD-10-CM

## 2024-03-12 DIAGNOSIS — J44.1 COPD WITH ACUTE EXACERBATION (HCC): Primary | ICD-10-CM

## 2024-03-12 PROCEDURE — 1159F MED LIST DOCD IN RCRD: CPT

## 2024-03-12 PROCEDURE — 1111F DSCHRG MED/CURRENT MED MERGE: CPT

## 2024-03-12 NOTE — PROGRESS NOTES
Left message on mailbox for pt to call NCM back for TCM.  NCM contact information [phone] included in message.  Pt already has [facility] appt 3/19/2024.      Discharge Dx:    Acute COPD with Exacerbation     Follow up appointments:    MCT length of study: MCT 30 days  Testing should be scheduled on or before: 3/18/2024    Follow-up Information    Follow up With Specialties Details Why Contact Info   Jocelyn Keating, MALINA Nurse Practitioner Follow up in 2 week(s)  [address]  [address]  [place] [address]  [phone]   Enrique Braun MD Internal Medicine Follow up  [address]  [address]  [facility] [facility] [address]  [phone]   Darryn Heath MD PULMONARY DISEASES Follow up in 2 day(s)  [address]  [address]  [place] [address]  [phone]   Clay Mtz MD CARDIOLOGY Follow up in 1 week(s) Office will call you to schedule the appointment [address]  [address]  [facility] [facility] [address]  [phone]     Future Appointments   Date Time Provider Department Center   3/18/2024  3:00 PM Aaliyah Leone MD ECWMOENDO [facility]   3/19/2024  9:00 AM Olivia Ward APRN [facility] SPCIALTY EM [facility]   4/1/2024  1:30 PM Caridad Flores DPM ECADOPOVANDA [facility] AD[facility]   8/6/2024 11:00 AM Riki Tran MD [id number] [facility]

## 2024-03-12 NOTE — PROGRESS NOTES
Patient called and stated that she could not find her script for her neb tx. RN called pulmonology, Dr. Heath and asked if he could send medications to updated pharmacy. If not patient to call pulmonology office and have scripts reprinted. Patient aware.

## 2024-03-13 ENCOUNTER — PATIENT OUTREACH (OUTPATIENT)
Dept: CASE MANAGEMENT | Age: 79
End: 2024-03-13

## 2024-03-13 ENCOUNTER — TELEPHONE (OUTPATIENT)
Dept: INTERNAL MEDICINE CLINIC | Facility: CLINIC | Age: 79
End: 2024-03-13

## 2024-03-13 NOTE — PROGRESS NOTES
Initial Post Discharge Follow Up   Discharge Date: 3/11/24  Contact Date: 3/13/2024    Consent Verification:  Assessment Completed With: Patient  HIPAA Verified?  Yes    Discharge Dx:   Acute COPD with Exacerbation     General:   How have you been since your discharge from the hospital? Pt reports: \"My breathing is fine, but my feet are swollen and I can barely walk--I am barefoot, right now. I can get to the bathroom with a walker.\" O2 sat 94% on 2LNC. Patient denies fever, chills, headache, vision changes, dizziness, nausea, vomiting, diarrhea, LE redness or drainage, chest pain or shortness of breath at this time. Pt using walker at home, will take her Colchicine this morning, \"as soon as I make some tea and eat. I did not get my new medication--I cannot find the paper prescriptions (Brovana and Pulmocort ordered by pulmo Dr. Ford 3/11/2024). I do not want to go back to the ER.  I am waiting for my sister to come and help me.\"  Do you have any pain since discharge?  Yes  Where: bilateral feet   Rating on pain scale 1-10, 10 being the worst pain you have ever experienced, what is current pain: 8  Alleviating factors: rest, elevation, Colchicine  Aggravating factors: ambulation  Is the pain manageable at home? Yes  How well was your pain managed while in the hospital?   On a scale of 1-5   1- Very Poor and 5- Very well   4  When you were leaving the hospital were your discharge instructions reviewed with you? Yes  How well were your discharge instructions explained to you?   On a scale of 1-5   1- Very Poor and 5- Very well   Very Well  Do you have any questions about your discharge instructions?  No  Before leaving the hospital was your diagnoses explained to you? Yes  Do you have any questions about your diagnoses? No  Are you able to perform normal daily activities of living as you have prior to your hospital stay (dressing, bathing, ambulating to the bathroom, etc)? no  If No, What are some barriers or  concerns?  difficult to ambulate with walker d/t swollen feet  (NCM) Was patient given a different diet per AVS? no    Medications:   Current Outpatient Medications   Medication Sig Dispense Refill    arformoterol 15 MCG/2ML Inhalation Nebu Soln Take 2 mL (15 mcg total) by nebulization 2 (two) times daily. 180 each 0    budesonide 0.5 MG/2ML Inhalation Suspension Take 2 mL (0.5 mg total) by nebulization 2 (two) times daily. 180 each 0    amLODIPine 2.5 MG Oral Tab Take 3 tablets (7.5 mg total) by mouth daily. 90 tablet 0    dextromethorphan-guaifenesin ER  MG Oral Tablet 12 Hr Take 1 tablet by mouth 2 (two) times daily for 10 days. 20 tablet 0    LASIX 20 MG Oral Tab Take 1 tablet (20 mg total) by mouth as needed (FOR SWELLING).      Potassium Chloride ER 20 MEQ Oral Tab CR potassium chloride ER 20 mEq tablet,extended release, [RxNorm: 248648]      glipiZIDE 5 MG Oral Tab Take 1 tablet (5 mg total) by mouth before breakfast. (Patient taking differently: Take 1 tablet (5 mg total) by mouth before breakfast. prn) 90 tablet 0    Alcohol Swabs (DROPSAFE ALCOHOL PREP) 70 % Does not apply Pads Take 1 Bottle by mouth As Directed. 400 each 3    pantoprazole 20 MG Oral Tab EC Take 1 tablet (20 mg total) by mouth every morning before breakfast.      TRUEplus Lancets 33G Does not apply Misc 1 each by In Vitro route daily. 100 each 3    metoprolol succinate ER 25 MG Oral Tablet 24 Hr Take 1 tablet (25 mg total) by mouth daily. 90 tablet 3    colchicine 0.6 MG Oral Tab Take 1 tablet (0.6 mg total) by mouth as needed.      Glucose Blood (TRUE METRIX BLOOD GLUCOSE TEST) In Vitro Strip 1 strip by In Vitro route 4 (four) times daily. 400 strip 3    febuxostat 40 MG Oral Tab Take 1 tablet (40 mg total) by mouth daily. 90 tablet 1    omega-3 fatty acids 1000 MG Oral Cap Take 1,000 mg by mouth daily.      Multiple Vitamin (MULTI-VITAMIN DAILY) Oral Tab Take 1 tablet by mouth daily.      Cholecalciferol (VITAMIN D) 2000 units Oral  Cap Take 1 capsule (2,000 Units total) by mouth daily.       Were there any changes to your current medication(s) noted on the AVS? Yes  START taking:  arformoterol (Brovana)  budesonide (Pulmicort)  dextromethorphan-guaifenesin ER (Mucinex DM)  CHANGE how you take:  amLODIPine (Norvasc)  STOP taking:  albuterol 108 (90 Base) MCG/ACT Aers (Ventolin HFA)  azithromycin 250 MG Tabs (Zithromax)  doxycycline 100 MG Caps (Vibramycin)  hydrALAZINE 25 MG Tabs (Apresoline)  oseltamivir 30 MG Caps (Tamiflu)  predniSONE 10 MG Tabs (Deltasone)  Wixela Inhub 500-50 MCG/ACT Aepb (fluticasonesalmeterol)  If so, were these medication changes discussed with you prior to leaving the hospital? Yes  If a new medication was prescribed:    Was the new medication's purpose & side effects reviewed? Yes  Do you have any questions about your new medication? Yes  Did you  your discharge medications when you left the hospital? Pt lost paper Rx for Brovana and Pulmicort  Let's go over your medications together to make sure we are not missing anything. Medications Reviewed  Are there any reasons that keep you from taking your medication as prescribed? Yes  Are you having any concerns with constipation? No  Did patient receive their flu shot (Sept-March)? Yes--9/11/2023    Discharge medications reviewed/discussed/and reconciled against outpatient medications with patient.  Any changes or updates to medications sent to PCP.  Patient Acknowledged     Referrals/orders at D/C:  Referrals/orders placed at D/C? no  DME ordered at D/C? Yes  What? nebulizer  From where? [facility]  Have you received your (DME)? yes    Discharge orders, AVS reviewed and discussed with patient. Any changes or updates to orders sent to PCP.  Patient Acknowledged      SDOH:   Transportation:   Transportation Needs: No Transportation Needs (3/13/2024)    Transportation Needs     Lack of Transportation: No     Financial Strain:    Financial Resource Strain: Low Risk   (3/13/2024)    Financial Resource Strain     Difficulty of Paying Living Expenses: Not very hard     Med Affordability: No       Diagnosis specifics:   CHF:  .[email or web address]  COPD: COPD:  Have you participated in a pulmonary rehab program?   yes   Would you like more information?  no  We reviewed your medications/inhalers, how often are you using your inhaler? Needs new e-script to [facility] pharmacy from Dr. Heath's office  Are you currently on oxygen?yes   How many Liters? 2L NC  Do you have any questions about Oxygen use?  no  Are you familiar with the signs and symptoms of worsening COPD?Yes       Who do you call with worsening COPD symptoms? Dr. Heath   Do you know when to call with COPD symptoms? Yes   Do you have any of the following potential risk factors for COPD in your home environment?  Primary or secondary tobacco smoke   no  Occupational dusts and chemicals organic and inorganic    no  Indoor air pollution from heating and cooking with poor ventilation   no    Mold      no    Pets        no    Extreme heat no      Re-admit: Re-Admit:  Tell me what led up to your readmission:   Pt to ED for difficulty breathing and chest congestion for the past two weeks. Per pt, she was hospitalized before for this issue. Pt has productive cough and wheezing. Pt currently satting at 92% on 2L of o2. Pt only has chest pain when coughing.            Electronically signed by Amarjit Marrufo RN at 3/4/2024  7:11 PM  Did you call your PCP office first? yes  Did you attempt to get in with your PCP?  yes  Do you feel this could have been prevented? no        Follow up appointments:    Follow-up with PCP in 1-2 weeks  Follow-up with cardiology as instructed  Follow-up with pulmonologist as instructed    MCT length of study: MCT 30 days  Testing should be scheduled on or before: 3/18/2024    Start   Ordered   03/05/24 0916  Follow up for COPD/Pneumonia  (Post-Discharge Follow-Up Orders)  Once     Completed     Comments: COPD    Priority: Routine   Question: Where to follow-up for COPD/Pneumonia? Answer: [place] [facility]    03/05/24 0915         Follow-up Information    Follow up With Specialties Details Why Contact Info   Jocelyn Keating NP Nurse Practitioner Follow up in 2 week(s)  [address]  [address]  [place] [address]  [phone]   Enrique Braun MD Internal Medicine Follow up  [address]  [address]  [facility] [facility] [address]  [phone]   Darryn Heath MD PULMONARY DISEASES Follow up in 2 day(s)  [address]  [address]  [place] [address]  [phone]   Clay Mtz MD CARDIOLOGY Follow up in 1 week(s) Office will call you to schedule the appointment [address]  [address]  [facility] [address]  [phone]     Your appointments       Date & Time Appointment Department (Center)    Mar 18, 2024  3:00 PM CDT Follow Up Visit with Aaliyah Leone MD [facility] ([facility])        Mar 19, 2024  9:00 AM CDT [facility] with Olivia Ward APRN [facility] ([facility])        Apr 01, 2024  1:30 PM CDT Consult with Caridad Flores DPM [facility] ([facility])        Aug 06, 2024 11:00 AM CDT Follow Up Visit with Riki Tran MD [facility] ([facility])              [facility]  [facility]  [address]  [place] [address]  [phone] [facility]  [address]  [place] [id number] [phone] [facility]  [address]  [place] [id number] [phone]    [facility]  [address]  [facility] [address] [phone]            TCC  Was TCC ordered: No    PCP (If no TCC appointment)  Does patient already have a PCP appointment scheduled? No  NCM Attempted to schedule PCP office TCM appointment with patient   If no appointment scheduled: Explain: pt declines video or in office appt at this time    Specialist    Does the patient have any other follow up appointment(s) needing to be scheduled? Yes  If yes: NCM reviewed upcoming specialist appointment with patient: Yes  Does the patient need assistance scheduling appointment(s): Yes, message sent to TST team    Is there any reason as to why you cannot make your appointment(s)?  \"I can't hardly walk right now--my feet are swollen\"     Needs post D/C:   Now that you are home, are there any needs or concerns you need addressed before your next visit with your PCP?  (DME, meds, questions, etc.): Yes    Interventions by NCM:   Discussed diet, activity, medications and need for f/u visits. Pt has not yet made cardiology or pulmonology f/u appts--sent appt request to [facility] for cardiology and pulmonology appt assist. This NCM spoke with Davina at Dr. Heath's office--requested e-scripts for Brovana and Pulmocort to [facility] pharmacy in [place]--she will send message to [facility] clinical staff. Offered video or in office visit with Dr. Braun--pt declines. \"I can't make any appointments, right now. I have to go now--goodbye.\" This NCM also spoke with son Romulo (INDIO verified) and updated him RE: appts and Rx to pharmacy. He will f/u with [facility] later today for nebs . He will call pt today to follow up with her. Son aware when to contact PCP/specialists and when to seek emergency care. No further questions/concerns at this time.     Sent condition update to RN triage for PCP recommendations.      Overall Rating:   How would you rate the care you received while in the hospital? good    CCM referral placed:    No--currently enrolled    BOOK BY DATE:  3/25/2024

## 2024-03-13 NOTE — PROGRESS NOTES
TCM request (pt discharged 03/11)    Dr Darryn Heath  PULMONARY DISEASES  [facility]  [address]  [address]  [facility] [address]  [phone]  Follow up 2 days  Apt made:  [place] 03/19 @10:30am    Dr Clay Mtz  CARDIOLOGY  [facility]  [address]  [address]  [facility] [address]  [phone]  Follow up 1 week  Apt made:  [place] 03/19 @1:30pm  Confirmed w/pt sonRomulo  Closing encounter

## 2024-03-13 NOTE — TELEPHONE ENCOUNTER
Spoke with pt today for TCM--COPD exacerbation, O2 sat 94% on 2LNC. \"My breathing is fine, but my feet are swollen and I can barely walk--I am barefoot, right now. I can get to the bathroom with a walker.\"    Pt using walker at home, will take her Colchicine this morning, \"as soon as I make some tea and eat. I did not get my new medication--I cannot find the paper prescriptions (Brovana and Pulmocort ordered by pulmo Dr. Ford 3/11/2024). I do not want to go back to the ER.  I am waiting for my sister to come and help me.\"    Pt has not yet made cardiology or pulmonology f/u appts--sent appt request to [facility] for cardiology and pulmonology appt assist.    Offered video or in office visit with Dr. Braun--pt declines. \"I can't make any appointments, right now. I have to go now--goodbye.\"    This NCM spoke with Davina at Dr. Heath's office--requested e-scripts for Brovana and Pulmocort to [facility] pharmacy in [place]--she will send message to [facility] clinical staff.     This NCM also spoke with son Romulo (INDIO verified) and updated him RE: appts and Rx to pharmacy. He will f/u with [facility] later today for nebs . He will call pt today to follow up with her.      TCM appointment recommended by 3/18/2024, as patient is a High risk for readmission.  Please advise.    BOOK BY DATE (last date for TCM): 3/25/2024    Clinical staff:  Please discuss next steps with PCP and follow-up with patient and son (per son's request), accordingly. Thank you!       Future Appointments   Date Time Provider Department Center   3/18/2024  3:00 PM Aaliyah Leone MD [id number]   3/19/2024  9:00 AM Olivia Ward APRN [facility] SPCIALTY EM [facility]   4/1/2024  1:30 PM Caridad Flores DPM [facility]OPOD [facility]   8/6/2024 11:00 AM Riki Tran MD [id number] EC [facility]            Follow up appointments:    Follow-up with PCP in 1-2 weeks  Follow-up with cardiology as instructed  Follow-up with pulmonologist as instructed    MCT  length of study: MCT 30 days  Testing should be scheduled on or before: 3/18/2024    Start   Ordered   03/05/24 0916  Follow up for COPD/Pneumonia  (Post-Discharge Follow-Up Orders)  Once     Completed     Comments: COPD   Priority: Routine   Question: Where to follow-up for COPD/Pneumonia? Answer: [facility]    03/05/24 0915         Follow-up Information    Follow up With Specialties Details Why Contact Info   Jocelyn Keating, NP Nurse Practitioner Follow up in 2 week(s)  [address]  [address]  [place] [facility] [address]  [phone]   Enrique Braun MD Internal Medicine Follow up  [address]  [address]  [facility] [address]  [phone]   Darryn Heath MD PULMONARY DISEASES Follow up in 2 day(s)  [address]   [address]  [facility] [address]  [phone]   Clay Mtz MD CARDIOLOGY Follow up in 1 week(s) Office will call you to schedule the appointment [address]  [facility] [address]  [phone]

## 2024-03-13 NOTE — PAYOR COMM NOTE
--------------  DISCHARGE REVIEW    Payor: [employer]  Subscriber #:  [id number]  Authorization Number: [id number]    Admit date: 3/5/24  Admit time:   3:55 AM  Discharge Date: 3/11/2024  5:34 PM     Admitting Physician: Daria Felix MD  Attending Physician:  No att. providers found  Primary Care Physician: Enrique Braun MD          Discharge Summary Notes        Discharge Summary signed by Chin Chavis MD at 3/11/2024  1:58 PM       Author: Chin Chavis MD Specialty: HOSPITALIST Author Type: Physician    Filed: 3/11/2024  1:58 PM Date of Service: 3/11/2024  1:52 PM Status: Signed    : Chin Chavis MD (Physician)         [facility]  [facility] of [facility]    Discharge Summary    Ariana Osorio Patient Status:  Inpatient    1945 MRN [id number]   Location [facility] 3W/ Attending Chin Chavis MD   Hosp Day # 6 PCP Enrique Braun MD     Date of Admission: 3/4/2024 Disposition: Home or Self Care     Date of Discharge: 24      Admitting Diagnosis: Influenza [J11.1]  COPD exacerbation (HCC) [J44.1]  Acute on chronic congestive heart failure, unspecified heart failure type (HCC) [I50.9]    Hospital Discharge Diagnoses:  Acute COPD with Exacerbation    Lace+ Score: 82  59-90 High Risk  29-58 Medium Risk  0-28   Low Risk.    TCM Follow-Up Recommendation:  LACE > 58: High Risk of readmission after discharge from the hospital.      Problem List:   Patient Active Problem List   Diagnosis    History of breast cancer    Medicare annual wellness visit, subsequent    Chronic tophaceous gout    Impacted cerumen, bilateral    COPD, severe (HCC)    Coronary artery disease    S/P CABG (coronary artery bypass graft)    At risk for falls    Stage 3b chronic kidney disease (HCC)    Controlled type 2 diabetes mellitus with stage 3 chronic kidney disease, without long-term current use of insulin (HCC)    Hypertension associated with type 2 diabetes  mellitus (HCC)    Hyperlipidemia associated with type 2 diabetes mellitus (HCC)    S/P carotid endarterectomy    Chronic diastolic congestive heart failure (HCC)    Bilateral hearing loss    Hyperparathyroidism (HCC)    Glaucoma    Vitamin D deficiency    Carotid stenosis, non-symptomatic, bilateral    PAD (peripheral artery disease) (HCC)    Thoracic aorta atherosclerosis (HCC)    Osteopenia of hip    Right knee pain    COPD exacerbation (HCC)    Influenza    Acute on chronic congestive heart failure, unspecified heart failure type ([facility])       Reason for Admission:   Acute COPD exacerbation  A. Fib with RVR  Recent influenza A infection  Chronic respiratory acidosis/metabolic alkalosis  Hypertension  Diabetes       Physical Exam:   General appearance: alert, appears stated age and cooperative  Pulmonary:  clear to auscultation bilaterally  Cardiovascular: S1, S2 normal, no murmur, click, rub or gallop, regular rate and rhythm  Abdominal: soft, non-tender; bowel sounds normal; no masses,  no organomegaly  Extremities: extremities normal, atraumatic, no cyanosis or edema  Psychiatric: calm      History of Present Illness:   Per Dr. Cordelia Lane JOSE A Osorio is a(n) 79 year old female, with a past medical history significant for coronary artery disease, CHF, COPD breast cancer status post mastectomy presented with complaint of shortness of breath now ongoing for the past 2 weeks.  Describes the onset as gradual progressive worsening aggravated by exertion and no relieving factors.  She was seen by her primary care physician and started on antibiotics and prednisone with no improvement.  Denies any fever or chills.  Her shortness of breath associated with a dry cough, claims she has a feeling of phlegm stuck in her chest that does not want,.    Hospital Course:   Acute COPD exacerbation.  Due to recent viral infx.  Patient with minimal hypoxia.  Patient feeling about the same as 3/8.  Still bothered by  phlegm  -Pulmonology consult appreciated  -Solu-Medrol, patient refusing further doses  -Nebulizers: duoneb, sw working on  home nebs  -Supplemental oxygen as needed, titrate down as tolerated-->off oxygen  -resume home inhalers  -Antibiotics: none  -Repeat imaging if clinical change     A. Fib with RVR  -had an episode of A. Fib in am on 3/9  -called for a. Fib with rates of ~150's, lasted 17 mins.   -I came up to see her and while I was in the room she converted to NSR  -home toprol xl 25mg daily restarted  -she attributes this to hydralazine, we will stop due to patient request  -she sees Dr. Mtz as op, will ask cardiology to evaluate  -echocardiogram this admission hows ef 60-65%, grade 1 DD  -op follow-up for event monitor       Other problems  Recent influenza A infection  Chronic respiratory acidosis/metabolic alkalosis  Hypertension--> increased norvasc to 7.5mg daily  Diabetes     Consultations: Pulmonology  Cardiology    Procedures: n/a    Complications: na/    Discharge Condition: Good    Discharge Medications:      Discharge Medications        START taking these medications        Instructions Prescription details   arformoterol 15 MCG/2ML Nebu  Commonly known as: Brovana      Take 2 mL (15 mcg total) by nebulization 2 (two) times daily.   Quantity: 180 each  Refills: 0     budesonide 0.5 MG/2ML Susp  Commonly known as: Pulmicort      Take 2 mL (0.5 mg total) by nebulization 2 (two) times daily.   Quantity: 180 each  Refills: 0     dextromethorphan-guaifenesin ER  MG Tb12  Commonly known as: Mucinex DM      Take 1 tablet by mouth 2 (two) times daily for 10 days.   Stop taking on: March 21, 2024  Quantity: 20 tablet  Refills: 0            CHANGE how you take these medications        Instructions Prescription details   amLODIPine 2.5 MG Tabs  Commonly known as: Norvasc  What changed:   medication strength  how much to take      Take 3 tablets (7.5 mg total) by mouth daily.   Stop taking on: April  10, 2024  Quantity: 90 tablet  Refills: 0            CONTINUE taking these medications        Instructions Prescription details   colchicine 0.6 MG Tabs      Take 1 tablet (0.6 mg total) by mouth as needed.   Refills: 0     DropSafe Alcohol Prep 70 % Pads      Take 1 Bottle by mouth As Directed.   Quantity: 400 each  Refills: 3     febuxostat 40 MG Tabs  Commonly known as: Uloric      Take 1 tablet (40 mg total) by mouth daily.   Quantity: 90 tablet  Refills: 1     glipiZIDE 5 MG Tabs  Commonly known as: Glucotrol      Take 1 tablet (5 mg total) by mouth before breakfast.   Quantity: 90 tablet  Refills: 0     Lasix 20 MG Tabs  Generic drug: furosemide      Take 1 tablet (20 mg total) by mouth as needed (FOR SWELLING).   Refills: 0     metoprolol succinate ER 25 MG Tb24  Commonly known as: Toprol XL      Take 1 tablet (25 mg total) by mouth daily.   Quantity: 90 tablet  Refills: 3     Multi-Vitamin Daily Tabs      Take 1 tablet by mouth daily.   Refills: 0     omega-3 fatty acids 1000 MG Caps  Commonly known as: Fish Oil      Take 1,000 mg by mouth daily.   Refills: 0     pantoprazole 20 MG Tbec  Commonly known as: Protonix      Take 1 tablet (20 mg total) by mouth every morning before breakfast.   Refills: 0     Potassium Chloride ER 20 MEQ Tbcr      potassium chloride ER 20 mEq tablet,extended release, [RxNorm: 742752]   Refills: 0     True Metrix Blood Glucose Test Strp      1 strip by In Vitro route 4 (four) times daily.   Quantity: 400 strip  Refills: 3     TRUEplus Lancets 33G Misc      1 each by In Vitro route daily.   Quantity: 100 each  Refills: 3     Vitamin D 50 MCG (2000 UT) Caps      Take 1 capsule (2,000 Units total) by mouth daily.   Refills: 0            STOP taking these medications      albuterol 108 (90 Base) MCG/ACT Aers  Commonly known as: Ventolin HFA        azithromycin 250 MG Tabs  Commonly known as: Zithromax        doxycycline 100 MG Caps  Commonly known as: Vibramycin        hydrALAZINE 25  MG Tabs  Commonly known as: Apresoline        oseltamivir 30 MG Caps  Commonly known as: Tamiflu        predniSONE 10 MG Tabs  Commonly known as: Deltasone        Wixela Inhub 500-50 MCG/ACT Aepb  Generic drug: fluticasone-salmeterol                  Where to Get Your Medications        These medications were sent to [facility] - [place], [place] - [address] [phone], [phone] [address], [facility] [address]      Phone: [phone]   amLODIPine 2.5 MG Tabs  dextromethorphan-guaifenesin ER  MG Tb12       Please  your prescriptions at the location directed by your doctor or nurse    Bring a paper prescription for each of these medications  arformoterol 15 MCG/2ML Nebu  budesonide 0.5 MG/2ML Susp         Follow up Visits: Follow-up with pcp in 1 week    Follow up Labs: n/a     Other Discharge Instructions: follow-up with cardiology and pulmonology as instructed    JH ALVARADO MD  3/11/2024  1:52 PM    > 35 min       Electronically signed by Jh Alvarado MD on 3/11/2024  1:58 PM         REVIEWER COMMENTS

## 2024-03-13 NOTE — PROGRESS NOTES
Called patient to follow up/monthly and left a message for patient to call back when they can. Reviewed patient chart. Left contact my contact number [phone].       Time Spent This Encounter Total: 3  min medical record review  Monthly Minute Total including today: 3 minutes    Fabiana Banerjee [facility]  Care Management  Phone: [phone]  [email or web address]

## 2024-03-13 NOTE — TELEPHONE ENCOUNTER
Spoke with Romulo and patient. Patient scheduled per Dr. Braun for Saturday  Romulo will be getting patient a medicar for her as she will need assistance with a wheelchair

## 2024-03-13 NOTE — PROGRESS NOTES
TCM has contacted patient and patient needs additional appointments.  Please contact patient's son Romulo (INDIO verified), per his request, for assistance with scheduling a follow-up appointment for Cardiology and Pulmonology .  Thank you!            Follow up appointments:    Follow-up with PCP in 1-2 weeks  Follow-up with cardiology as instructed  Follow-up with pulmonologist as instructed    MCT length of study: MCT 30 days  Testing should be scheduled on or before: 3/18/2024  Follow-up Information    Follow up With Specialties Details Why Contact Info   Jocelyn Keating NP Nurse Practitioner Follow up in 2 week(s)  [address]  [address]  [facility] [address]  [phone]   Enrique Braun MD Internal Medicine Follow up  [address]  [address]  [facility] [address]  [phone]   Darryn Heath MD PULMONARY DISEASES Follow up in 2 day(s)  [address]  [address]  [place] [address]  [phone]   Clay Mtz MD CARDIOLOGY Follow up in 1 week(s) Office will call you to schedule the appointment [address]  [address]  [facility] [address]  [phone]       Future Appointments   Date Time Provider Department Center   3/18/2024  3:00 PM Aaliyah Leone MD ECWMOENDO [facility]   3/19/2024  9:00 AM Olivia Ward APRN [facility] SPCIALTY EM [facility]   4/1/2024  1:30 PM Caridad Flores DPM ECADOPOD [facility]   8/6/2024 11:00 AM Riki Tran MD [id number] [facility]

## 2024-03-13 NOTE — TELEPHONE ENCOUNTER
Dr. Braun please advise if okay to schedule patient on earliest TCM appointment on 3/21, or needs to be seen sooner? See note below.

## 2024-03-16 ENCOUNTER — OFFICE VISIT (OUTPATIENT)
Dept: INTERNAL MEDICINE CLINIC | Facility: CLINIC | Age: 79
End: 2024-03-16

## 2024-03-16 VITALS
BODY MASS INDEX: 29 KG/M2 | TEMPERATURE: 99 F | HEART RATE: 86 BPM | SYSTOLIC BLOOD PRESSURE: 125 MMHG | OXYGEN SATURATION: 91 % | WEIGHT: 167.31 LBS | DIASTOLIC BLOOD PRESSURE: 66 MMHG

## 2024-03-16 DIAGNOSIS — E78.5 HYPERLIPIDEMIA ASSOCIATED WITH TYPE 2 DIABETES MELLITUS (HCC): ICD-10-CM

## 2024-03-16 DIAGNOSIS — E11.22 CONTROLLED TYPE 2 DIABETES MELLITUS WITH STAGE 3 CHRONIC KIDNEY DISEASE, WITHOUT LONG-TERM CURRENT USE OF INSULIN (HCC): ICD-10-CM

## 2024-03-16 DIAGNOSIS — I25.10 CORONARY ARTERY DISEASE INVOLVING NATIVE CORONARY ARTERY OF NATIVE HEART WITHOUT ANGINA PECTORIS: ICD-10-CM

## 2024-03-16 DIAGNOSIS — N18.32 STAGE 3B CHRONIC KIDNEY DISEASE (HCC): ICD-10-CM

## 2024-03-16 DIAGNOSIS — M1A.9XX1 CHRONIC TOPHACEOUS GOUT: ICD-10-CM

## 2024-03-16 DIAGNOSIS — J44.1 COPD WITH ACUTE EXACERBATION (HCC): Primary | ICD-10-CM

## 2024-03-16 DIAGNOSIS — I50.32 CHRONIC DIASTOLIC CONGESTIVE HEART FAILURE (HCC): ICD-10-CM

## 2024-03-16 DIAGNOSIS — E11.69 HYPERLIPIDEMIA ASSOCIATED WITH TYPE 2 DIABETES MELLITUS (HCC): ICD-10-CM

## 2024-03-16 DIAGNOSIS — N18.30 CONTROLLED TYPE 2 DIABETES MELLITUS WITH STAGE 3 CHRONIC KIDNEY DISEASE, WITHOUT LONG-TERM CURRENT USE OF INSULIN (HCC): ICD-10-CM

## 2024-03-16 DIAGNOSIS — E11.59 HYPERTENSION ASSOCIATED WITH TYPE 2 DIABETES MELLITUS (HCC): ICD-10-CM

## 2024-03-16 DIAGNOSIS — I48.91 ATRIAL FIBRILLATION, UNSPECIFIED TYPE (HCC): ICD-10-CM

## 2024-03-16 DIAGNOSIS — I15.2 HYPERTENSION ASSOCIATED WITH TYPE 2 DIABETES MELLITUS (HCC): ICD-10-CM

## 2024-03-16 PROCEDURE — 3078F DIAST BP <80 MM HG: CPT | Performed by: INTERNAL MEDICINE

## 2024-03-16 PROCEDURE — 1160F RVW MEDS BY RX/DR IN RCRD: CPT | Performed by: INTERNAL MEDICINE

## 2024-03-16 PROCEDURE — 1111F DSCHRG MED/CURRENT MED MERGE: CPT | Performed by: INTERNAL MEDICINE

## 2024-03-16 PROCEDURE — 1159F MED LIST DOCD IN RCRD: CPT | Performed by: INTERNAL MEDICINE

## 2024-03-16 PROCEDURE — 99495 TRANSJ CARE MGMT MOD F2F 14D: CPT | Performed by: INTERNAL MEDICINE

## 2024-03-16 PROCEDURE — 1126F AMNT PAIN NOTED NONE PRSNT: CPT | Performed by: INTERNAL MEDICINE

## 2024-03-16 PROCEDURE — 3074F SYST BP LT 130 MM HG: CPT | Performed by: INTERNAL MEDICINE

## 2024-03-16 RX ORDER — METHYLPREDNISOLONE 4 MG/1
TABLET ORAL
Qty: 1 EACH | Refills: 0 | Status: SHIPPED | OUTPATIENT
Start: 2024-03-16

## 2024-03-17 NOTE — PROGRESS NOTES
Subjective:   Ariana Osorio is a 79 year old female who presents for hospital follow up.   She was discharged from [facility] to Home or Self Care  Admission Date: 3/4/24   Discharge Date: 3/11/24  Hospital Discharge Diagnosis: AECOPD, brief episode of afib,  chronic diastolic heart faillure,CAD, hypertension, hyperlipidemia. gout    Interactive contact within 2 business days post discharge first initiated on Date: 3/12/2024    During the visit, the following was completed:  Obtained and reviewed discharge summary, continuity of care documents, Hospitalist notes, Cardiology notes, and Pulmonology notes  Reviewed Labs (CBC, CMP), Labs (Cardiac markers), CT radiology results, X-Ray radiology results, and EKG    HPI: pt admitted again for copd exacerbation; had flu on last admission about 1 week before current admission, just didn't do well since discharge so had to be readmitted. Pt also found to have afib RVR but short lived and resolved. Was seen by cardio and didn't recommend OAC.   Pt ff by pulmo, given iv steroid, nebs, eventually was also given mucolytic nebs due to mucus pluggings in her airway which pt said made the big difference. She was discharge now on alfoterernol nebs and pulmicort nebs instead. Pt states doing better since going home from [facility] .    History/Other:   Current Medications:  Medication Reconciliation:  I am aware of an inpatient discharge within the last 30 days.  The discharge medication list has been reconciled with the patient's current medication list and reviewed by me.  See medication list for additions of new medication, and changes to current doses of medications and discontinued medications.  Outpatient Medications Marked as Taking for the 3/16/24 encounter (Office Visit) with Enrique Braun MD   Medication Sig    methylPREDNISolone (MEDROL) 4 MG Oral Tablet Therapy Pack As directed.    arformoterol 15 MCG/2ML Inhalation Nebu Soln Take 2 mL (15 mcg total) by  nebulization 2 (two) times daily.    budesonide 0.5 MG/2ML Inhalation Suspension Take 2 mL (0.5 mg total) by nebulization 2 (two) times daily.    amLODIPine 2.5 MG Oral Tab Take 3 tablets (7.5 mg total) by mouth daily.    glipiZIDE 5 MG Oral Tab Take 1 tablet (5 mg total) by mouth before breakfast. (Patient taking differently: Take 1 tablet (5 mg total) by mouth before breakfast. prn)    pantoprazole 20 MG Oral Tab EC Take 1 tablet (20 mg total) by mouth every morning before breakfast.    metoprolol succinate ER 25 MG Oral Tablet 24 Hr Take 1 tablet (25 mg total) by mouth daily.    colchicine 0.6 MG Oral Tab Take 1 tablet (0.6 mg total) by mouth as needed.    febuxostat 40 MG Oral Tab Take 1 tablet (40 mg total) by mouth daily.    omega-3 fatty acids 1000 MG Oral Cap Take 1,000 mg by mouth daily.    Multiple Vitamin (MULTI-VITAMIN DAILY) Oral Tab Take 1 tablet by mouth daily.    Cholecalciferol (VITAMIN D) 2000 units Oral Cap Take 1 capsule (2,000 Units total) by mouth daily.       Review of Systems:  GENERAL: weight stable, energy stable, no sweating  SKIN: denies any unusual skin lesions  EYES: denies blurred vision or double vision  HEENT: denies nasal congestion, sinus pain or ST  LUNGS: denies shortness of breath with exertion  CARDIOVASCULAR: denies chest pain on exertion or palpitations  GI: denies abdominal pain, denies heartburn, denies diarrhea  MUSCULOSKELETAL: denies pain, normal range of motion of extremities  NEURO: denies headaches, denies dizziness, denies weakness  PSYCHE: denies depression or anxiety  HEMATOLOGIC: denies hx of anemia, or bruising, denies bleeding  ENDOCRINE: denies thyroid history  ALL/ASTHMA:  hx of allergy or asthma    Objective:   No LMP recorded. Patient is postmenopausal.  Estimated body mass index is 28.72 kg/m² as calculated from the following:    Height as of 2/19/24: 5' 4\" (1.626 m).    Weight as of this encounter: 167 lb 4.8 oz (75.9 kg).   /66 (BP Location: Right  arm, Patient Position: Sitting, Cuff Size: large)   Pulse 86   Temp 98.6 °F (37 °C) (Tympanic)   Wt 167 lb 4.8 oz (75.9 kg)   SpO2 91%   BMI 28.72 kg/m²    GENERAL: well developed, well nourished, in no apparent distress  SKIN: no rashes, no suspicious lesions  HEENT: atraumatic, normocephalic, ears and throat are clear  EYES: PERRLA, EOMI, conjunctiva are clear  NECK: supple, no adenopathy, no bruits  CHEST: no chest tenderness  LUNGS: clear to auscultation  CARDIO: RRR without murmur  GI: good BS's, no masses, HSM or tenderness  MUSCULOSKELETAL: back is not tender, FROM of the extremities  EXTREMITIES: no cyanosis, clubbing or edema  NEURO: Oriented times three, cranial nerves are intact, motor and sensory are grossly intact    Assessment & Plan:   (J44.1) COPD with acute exacerbation (HCC)  (primary encounter diagnosis)  Plan: Pulmonary Referral - In Network        Improved since; now on pulmicort nebs and alfoteronol nebs. Pt rqeuest to ffup with pulmo who saw her in hospital.     (I48.91) Atrial fibrillation, unspecified type (HCC)  Plan: shortlived likely related to her copd exacerbation per cardio. No further recurrence since. Pt to ffup with her cardio.     (I25.10) Coronary artery disease involving native coronary artery of native heart without angina pectoris  Plan: pt had with no chest pains. Ff by cardio.     (E11.69,  E78.5) Hyperlipidemia associated with type 2 diabetes mellitus (HCC)  Plan: intolerant of statin and zetia; pt also had declined PCSK9 inh    (E11.59,  I15.2) Hypertension associated with type 2 diabetes mellitus (HCC)  Plan: bp controlled. Cpm.    (I50.32) Chronic diastolic congestive heart failure (HCC)  Plan: currently compensated. Pt states unable to tolerate lasix. She will ffup with cardio.     (M1A.9XX1) Chronic tophaceous gout  Plan: pt has been on uloric given by her rheumatoloigst;   Pt has flare up currently on the left ankle/foot, will give her short course of steroid.      (N18.32) Stage 3b chronic kidney disease (HCC)  Plan: continued avoidance of nsaids.     (E11.22,  N18.30) Controlled type 2 diabetes mellitus with stage 3 chronic kidney disease, without long-term current use of insulin (HCC)  Plan: pt told to monitor glucose and start glipizide once sugar goes up with medrol dose tameka given for her acute gout flare up .         No follow-ups on file.

## 2024-03-18 ENCOUNTER — OFFICE VISIT (OUTPATIENT)
Dept: ENDOCRINOLOGY CLINIC | Facility: CLINIC | Age: 79
End: 2024-03-18

## 2024-03-18 VITALS
SYSTOLIC BLOOD PRESSURE: 162 MMHG | BODY MASS INDEX: 30 KG/M2 | HEART RATE: 82 BPM | DIASTOLIC BLOOD PRESSURE: 87 MMHG | WEIGHT: 172.38 LBS

## 2024-03-18 DIAGNOSIS — I50.9 ACUTE ON CHRONIC CONGESTIVE HEART FAILURE, UNSPECIFIED HEART FAILURE TYPE (HCC): Primary | ICD-10-CM

## 2024-03-18 DIAGNOSIS — E55.9 VITAMIN D DEFICIENCY: ICD-10-CM

## 2024-03-18 DIAGNOSIS — E21.0 PRIMARY HYPERPARATHYROIDISM (HCC): Primary | ICD-10-CM

## 2024-03-18 PROCEDURE — 99213 OFFICE O/P EST LOW 20 MIN: CPT | Performed by: INTERNAL MEDICINE

## 2024-03-18 PROCEDURE — 1159F MED LIST DOCD IN RCRD: CPT | Performed by: INTERNAL MEDICINE

## 2024-03-18 PROCEDURE — 1160F RVW MEDS BY RX/DR IN RCRD: CPT | Performed by: INTERNAL MEDICINE

## 2024-03-18 PROCEDURE — 3077F SYST BP >= 140 MM HG: CPT | Performed by: INTERNAL MEDICINE

## 2024-03-18 PROCEDURE — 1111F DSCHRG MED/CURRENT MED MERGE: CPT | Performed by: INTERNAL MEDICINE

## 2024-03-18 PROCEDURE — 3079F DIAST BP 80-89 MM HG: CPT | Performed by: INTERNAL MEDICINE
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
## 2024-03-18 NOTE — PROGRESS NOTES
S/w Ariana at great length and she declines visit. States she has appt with cardiology and pulmonary and just saw DR. Leone. States she knows her body and she is doing okay.

## 2024-03-21 ENCOUNTER — PATIENT OUTREACH (OUTPATIENT)
Dept: CASE MANAGEMENT | Age: 79
End: 2024-03-21

## 2024-03-21 DIAGNOSIS — E78.5 HYPERLIPIDEMIA ASSOCIATED WITH TYPE 2 DIABETES MELLITUS (HCC): ICD-10-CM

## 2024-03-21 DIAGNOSIS — N18.32 STAGE 3B CHRONIC KIDNEY DISEASE (HCC): ICD-10-CM

## 2024-03-21 DIAGNOSIS — J44.9 COPD, SEVERE (HCC): ICD-10-CM

## 2024-03-21 DIAGNOSIS — H61.23 IMPACTED CERUMEN, BILATERAL: ICD-10-CM

## 2024-03-21 DIAGNOSIS — I25.10 CORONARY ARTERY DISEASE INVOLVING NATIVE CORONARY ARTERY OF NATIVE HEART WITHOUT ANGINA PECTORIS: ICD-10-CM

## 2024-03-21 DIAGNOSIS — E11.22 CONTROLLED TYPE 2 DIABETES MELLITUS WITH STAGE 3 CHRONIC KIDNEY DISEASE, WITHOUT LONG-TERM CURRENT USE OF INSULIN (HCC): ICD-10-CM

## 2024-03-21 DIAGNOSIS — Z91.81 AT RISK FOR FALLS: ICD-10-CM

## 2024-03-21 DIAGNOSIS — E11.59 HYPERTENSION ASSOCIATED WITH TYPE 2 DIABETES MELLITUS (HCC): ICD-10-CM

## 2024-03-21 DIAGNOSIS — M85.859 OSTEOPENIA OF HIP, UNSPECIFIED LATERALITY: ICD-10-CM

## 2024-03-21 DIAGNOSIS — E21.3 HYPERPARATHYROIDISM (HCC): ICD-10-CM

## 2024-03-21 DIAGNOSIS — I73.9 PAD (PERIPHERAL ARTERY DISEASE) (HCC): ICD-10-CM

## 2024-03-21 DIAGNOSIS — Z95.1 S/P CABG (CORONARY ARTERY BYPASS GRAFT): ICD-10-CM

## 2024-03-21 DIAGNOSIS — I50.9 ACUTE ON CHRONIC CONGESTIVE HEART FAILURE, UNSPECIFIED HEART FAILURE TYPE (HCC): ICD-10-CM

## 2024-03-21 DIAGNOSIS — N18.30 CONTROLLED TYPE 2 DIABETES MELLITUS WITH STAGE 3 CHRONIC KIDNEY DISEASE, WITHOUT LONG-TERM CURRENT USE OF INSULIN (HCC): ICD-10-CM

## 2024-03-21 DIAGNOSIS — J44.1 COPD EXACERBATION (HCC): Primary | ICD-10-CM

## 2024-03-21 DIAGNOSIS — E11.69 HYPERLIPIDEMIA ASSOCIATED WITH TYPE 2 DIABETES MELLITUS (HCC): ICD-10-CM

## 2024-03-21 DIAGNOSIS — I15.2 HYPERTENSION ASSOCIATED WITH TYPE 2 DIABETES MELLITUS (HCC): ICD-10-CM

## 2024-03-21 NOTE — PROGRESS NOTES
Patient called and stated that she is doing ok.  Mood is good/stable.   Patient stated that she has been using a nebulizer treatments, but she stated that yesterday she was little dizzy and she could not sleep she was setting up and trying to fall a sleep.  Patient called her aunt who lives in [place] to get advice from her she was a good doctor.  Patient stated that she is taking her last Prednisone tablet today and she was very surprised that her blood sugar did not go up.  She report her blood sugar today was 105 and her blood pressure 135/69.  Informed patient if her symptoms get worse to call Dr. Braun's office for appointment.  Patient verbal understanding.     Future Appointments   Date Time Provider Department Center   4/1/2024  1:30 PM Caridad Flores DPM [facility]   8/6/2024 11:00 AM Riki Tran MD [id number]     Total time - 8 min  Total Monthly time- 23  min    Fabiana Banerjee [facility]  Care Management  Phone: [phone]  [facility]

## 2024-03-28 ENCOUNTER — TELEPHONE (OUTPATIENT)
Dept: INTERNAL MEDICINE CLINIC | Facility: CLINIC | Age: 79
End: 2024-03-28

## 2024-03-28 NOTE — TELEPHONE ENCOUNTER
Patient called (identified name and ),   Saw Rheumatologist Dr Tati Owen.  Asking if she has order for uric acid?  Advised she needs to call Dr Owen's office ([place]) for orders.  Gave her phone number.  She stated understanding.

## 2024-03-30 RX ORDER — GLIPIZIDE 5 MG/1
5 TABLET ORAL
Qty: 90 TABLET | Refills: 0 | Status: SHIPPED | OUTPATIENT
Start: 2024-03-30

## 2024-03-30 NOTE — TELEPHONE ENCOUNTER
Please review. Protocol failed / No protocol.    Requested Prescriptions   Pending Prescriptions Disp Refills    GLIPIZIDE 5 MG Oral Tab [Pharmacy Med Name: GLIPIZIDE 5 MG Tablet] 90 tablet 3     Sig: TAKE 1 TABLET EVERY DAY BEFORE BREAKFAST       Diabetes Medication Protocol Failed - 3/28/2024  1:54 AM        Failed - EGFRCR or GFRNAA > 50     GFR Evaluation  EGFRCR: 47 , resulted on 3/11/2024          Passed - Last A1C < 7.5 and within past 6 months     Lab Results   Component Value Date    A1C 6.0 (A) 12/14/2023             Passed - In person appointment or virtual visit in the past 6 mos or appointment in next 3 mos     Recent Outpatient Visits              1 week ago Primary hyperparathyroidism (HCC)    [facility] Aaliyah Leone MD    Office Visit    1 week ago COPD with acute exacerbation ([facility])    [facility] Enrique Braun MD    Office Visit    1 month ago Bilateral impacted cerumen    [facility] Heri Miller MD    Office Visit    1 month ago Medicare annual wellness visit, subsequent    [facility] Enrique Braun MD    Office Visit    1 month ago Stage 3a chronic kidney disease (HCC)    [facility] Riki Tran MD    Office Visit          Future Appointments         Provider Department Appt Notes    In 3 days Caridad Flores DPM [facility] diabetic foot care    In 4 months Riki Tran MD [facility]                Passed - Microalbumin procedure in past 12 months or taking ACE/ARB        Passed - GFR in the past 12 months             Recent Outpatient Visits              1 week ago Primary hyperparathyroidism (HCC)    [facility] Vasu  MD Aaliyah    Office Visit    1 week ago COPD with acute exacerbation (HCC)    [facility] Enrique Braun MD    Office Visit    1 month ago Bilateral impacted cerumen    [facility] Heri Miller MD    Office Visit    1 month ago Medicare annual wellness visit, subsequent    [facility] Enrique Braun MD    Office Visit    1 month ago Stage 3a chronic kidney disease (HCC)    [facility] Riki Tran MD    Office Visit            Future Appointments         Provider Department Appt Notes    In 3 days Caridad Flores DPM [facility] diabetic foot care    In 4 months Riki Tran MD [facility]

## 2024-04-01 ENCOUNTER — OFFICE VISIT (OUTPATIENT)
Dept: PODIATRY CLINIC | Facility: CLINIC | Age: 79
End: 2024-04-01

## 2024-04-01 DIAGNOSIS — E11.9 DIET-CONTROLLED DIABETES MELLITUS (HCC): Primary | ICD-10-CM

## 2024-04-01 DIAGNOSIS — L60.3 NAIL DYSTROPHY: ICD-10-CM

## 2024-04-01 PROCEDURE — 1159F MED LIST DOCD IN RCRD: CPT | Performed by: STUDENT IN AN ORGANIZED HEALTH CARE EDUCATION/TRAINING PROGRAM

## 2024-04-01 PROCEDURE — 1111F DSCHRG MED/CURRENT MED MERGE: CPT | Performed by: STUDENT IN AN ORGANIZED HEALTH CARE EDUCATION/TRAINING PROGRAM

## 2024-04-01 PROCEDURE — 99203 OFFICE O/P NEW LOW 30 MIN: CPT | Performed by: STUDENT IN AN ORGANIZED HEALTH CARE EDUCATION/TRAINING PROGRAM

## 2024-04-01 RX ORDER — TIOTROPIUM BROMIDE INHALATION SPRAY 3.12 UG/1
2 SPRAY, METERED RESPIRATORY (INHALATION) DAILY
COMMUNITY
Start: 2024-03-29

## 2024-04-01 RX ORDER — SODIUM CHLORIDE FOR INHALATION 3 %
3 VIAL, NEBULIZER (ML) INHALATION
COMMUNITY
Start: 2024-03-29

## 2024-04-01 NOTE — PROGRESS NOTES
[facility]  Progress Note      Ariana Osorio is a 79 year old female.   Chief Complaint   Patient presents with    Diabetic Foot Care     Consult Diabetic Foot Care. Cramp Pain 0-10/10.   this AM, on 12/14/23 A1C=6.0,              HPI:     Patient is a pleasant 72-year-old diet-controlled diabetic female who presents to clinic for bilateral foot evaluation.  Patient admits that she recently had a heart surgery that her aunt who is visiting is present here with her.  Patient also has history of gout and is currently taking febuxostat.  Patient admits to difficulty trimming her own toenails because she cannot reach them.  She has bilateral foot numbness but denies any pain.    Allergies: Allopurinol, Dog epithelium, Dust, Heparin, Smoke, Adhesive tape (rosins), Montelukast, Statins, Xanax [alprazolam], Adhesive tape, Amoxicillin, Other, Sulfa antibiotics, Ace inhibitors, and Aspirin    Current Outpatient Medications   Medication Sig Dispense Refill    sodium chloride, hypertonic, 3 % Inhalation Nebu Soln 3 mL.      Tiotropium Bromide Monohydrate (SPIRIVA RESPIMAT) 2.5 MCG/ACT Inhalation Aero Soln Inhale 2 puffs into the lungs daily.      glipiZIDE 5 MG Oral Tab Take 1 tablet (5 mg total) by mouth before breakfast. 90 tablet 0    methylPREDNISolone (MEDROL) 4 MG Oral Tablet Therapy Pack As directed. 1 each 0    arformoterol 15 MCG/2ML Inhalation Nebu Soln Take 2 mL (15 mcg total) by nebulization 2 (two) times daily. 180 each 0    budesonide 0.5 MG/2ML Inhalation Suspension Take 2 mL (0.5 mg total) by nebulization 2 (two) times daily. 180 each 0    amLODIPine 2.5 MG Oral Tab Take 3 tablets (7.5 mg total) by mouth daily. 90 tablet 0    LASIX 20 MG Oral Tab Take 1 tablet (20 mg total) by mouth as needed (FOR SWELLING).      Potassium Chloride ER 20 MEQ Oral Tab CR       Alcohol Swabs (DROPSAFE ALCOHOL PREP) 70 % Does not apply Pads Take 1 Bottle by mouth As Directed. 400 each 3    pantoprazole  20 MG Oral Tab EC Take 1 tablet (20 mg total) by mouth every morning before breakfast.      TRUEplus Lancets 33G Does not apply Misc 1 each by In Vitro route daily. 100 each 3    metoprolol succinate ER 25 MG Oral Tablet 24 Hr Take 1 tablet (25 mg total) by mouth daily. 90 tablet 3    colchicine 0.6 MG Oral Tab Take 1 tablet (0.6 mg total) by mouth as needed.      Glucose Blood (TRUE METRIX BLOOD GLUCOSE TEST) In Vitro Strip 1 strip by In Vitro route 4 (four) times daily. 400 strip 3    febuxostat 40 MG Oral Tab Take 1 tablet (40 mg total) by mouth daily. 90 tablet 1    omega-3 fatty acids 1000 MG Oral Cap Take 1,000 mg by mouth daily.      Multiple Vitamin (MULTI-VITAMIN DAILY) Oral Tab Take 1 tablet by mouth daily.      Cholecalciferol (VITAMIN D) 2000 units Oral Cap Take 1 capsule (2,000 Units total) by mouth daily.        Past Medical History:   Diagnosis Date    Age-related cataract of left eye 05/10/2018    Age-related cataract of right eye 07/28/2022    Anxiety     Asthma (HCC)     Atherosclerosis of coronary artery     Breast CA ([facility]) 1999    lt mastectomy    Breast CA ([facility]) 2014    lumpectomy, right    Cancer (HCC)     breast cancer    Carotid artery disease ([facility]) 12/05/2016    Carotid stenosis     Closed fracture of multiple ribs of left side with routine healing, subsequent encounter 10/19/2018    Congestive heart disease (HCC)     COPD (chronic obstructive pulmonary disease) ([facility])     on O2 at 2 liters    Coronary atherosclerosis     Depression     Diabetes ([facility]) 07/28/2022    takes insulin when hospitalized, takes oral meds at home    Diastolic dysfunction without heart failure     Esophageal reflux     Essential hypertension     Generalized anxiety disorder     Gout     Gout     High blood pressure     High cholesterol     History of pituitary adenoma 05/10/2018    Hyperlipidemia     Major depressive disorder, single episode, moderate ([facility])     Obesity       Past Surgical History:   Procedure  Laterality Date    CABG      CAROTID ENDARTERECTOMY Bilateral     CATARACT      COLONOSCOPY      2013    COLONOSCOPY  2013    COLONOSCOPY N/A 02/21/2023    Procedure: COLONOSCOPY;  Surgeon: Abdiaziz Melendez MD;  Location: [facility] ENDOSCOPY    HERNIA SURGERY      LUMPECTOMY RIGHT  2014    MASTECTOMY LEFT Left 1999    RADIATION RIGHT  2014      Family History   Problem Relation Age of Onset    Asthma Father     Hypertension Father     Heart Disorder Father     Other (Other) Mother         thyroid surgery    Asthma Sister     Asthma Brother     Other (Other) Brother         gout    Breast Cancer Self 43        rt breast 69      Social History     Socioeconomic History    Marital status:    Tobacco Use    Smoking status: Never     Passive exposure: Never    Smokeless tobacco: Never   Vaping Use    Vaping Use: Never used   Substance and Sexual Activity    Alcohol use: No     Comment: rarely at weddings    Drug use: No   Other Topics Concern    Reaction to local anesthetic No    Pt has a pacemaker No    Pt has a defibrillator No           REVIEW OF SYSTEMS:     Denies nause, fever, chills  No calf pain  Denies chest pain or SOB      EXAM:   There were no vitals taken for this visit.  GENERAL: well developed, well nourished, in no apparent distress  EXTREMITIES:   1. Integument: Normal skin temperature and turgor.  Toenails x 10 areElongated and thickened.  2. Vascular: Dorsalis pedis two out of four bilateral and posterior tibial pulses two out of   four bilateral, capillary refill normal.   3. Musculoskeletal: All muscle groups are graded 5 out of 5 in the foot and ankle.  Lateral deviation to bilateral hallux with prominent first metatarsal prominence without any pain with palpation.   4. Neurological: Normal sharp dull sensation; reflexes normal.             ASSESSMENT AND PLAN:   Diagnoses and all orders for this visit:    Diet-controlled diabetes mellitus (HCC)    Nail dystrophy        Plan:       -Patient  examined, chart history reviewed.  -Discussed importance of proper pedal hygiene, regular foot checks, and tight glucose control.  -Sharply debrided nails x10 with a sterile nail nipper achieving a 20% reduction in thickness and length, without incident.   -Ambulate with supportive shoes and inserts and avoid walking barefoot.  -Educated patient on acute signs of infection advised patient to seek immediate medical attention if symptoms arise.    RTC in 3 months for nail care      The patient indicates understanding of these issues and agrees to the plan.        Caridad Flores DPM

## 2024-04-15 ENCOUNTER — PATIENT OUTREACH (OUTPATIENT)
Dept: CASE MANAGEMENT | Age: 79
End: 2024-04-15

## 2024-04-15 ENCOUNTER — TELEPHONE (OUTPATIENT)
Dept: INTERNAL MEDICINE CLINIC | Facility: CLINIC | Age: 79
End: 2024-04-15

## 2024-04-15 RX ORDER — PREDNISONE 10 MG/1
TABLET ORAL
Qty: 20 TABLET | Refills: 0 | Status: SHIPPED | OUTPATIENT
Start: 2024-04-15

## 2024-04-15 NOTE — TELEPHONE ENCOUNTER
Patient asking if Dr Braun could prescribe prednisone, for COPD.     Patient is at home , currently on 2 liters of oxygen. When at rest O2 saturation is  95-96%  , when walking O2 sat goes to 84-88.     Patient states she is feeling very weak and prednisone usually helps. Patient denies chest pain cough or fever

## 2024-04-15 NOTE — TELEPHONE ENCOUNTER
We only had been giving her prednisone when she has either flare up of her gout or her copd. It's not given for just general weakness.   Would recommend ov for eval. Can she ccome in tomorrow, we can add her in may at 11:30am ?

## 2024-04-15 NOTE — TELEPHONE ENCOUNTER
Have pt see me this Wednesday 4/17/24 at 1:30pm'  I will send erx for prednisone for now since she is having desaturation when she is moving; watch her glucose while on prednisone; call back or ER if worsens/persist

## 2024-04-15 NOTE — TELEPHONE ENCOUNTER
Spoke with patient.  She states she only has transportation on Monday, Wednesday and Friday.  She is wearing her O2 all the time, 2 LPM, 95% at rest but desats to the 80s and sometimes 70s with activity.  RN advised that if this is happening she needs to go to the ER.  She continues to inquire on prednisone and appointment when she has transportation available.

## 2024-04-15 NOTE — TELEPHONE ENCOUNTER
Patient notified, verbalized understanding.       Dr. Braun: Patient states she has limited hours on Wednesday with caregiver, she would like to know if there is a time closer to  she can see you ?

## 2024-04-15 NOTE — PROGRESS NOTES
Pt called and left message on main line for care management.    Left message with new ccm contact information and instruction to call back at pt earilest confinience.

## 2024-04-16 NOTE — TELEPHONE ENCOUNTER
Spoke with the patient and notified of plan.     Scheduled as per below:  Future Appointments   Date Time Provider Department Center   4/17/2024 11:00 AM Enrique Braun MD [facility]   7/1/2024  2:00 PM Caridad Flores DPM [facility]   8/6/2024 11:00 AM Riki Tran MD [id number]

## 2024-04-17 ENCOUNTER — OFFICE VISIT (OUTPATIENT)
Dept: INTERNAL MEDICINE CLINIC | Facility: CLINIC | Age: 79
End: 2024-04-17

## 2024-04-17 VITALS
SYSTOLIC BLOOD PRESSURE: 122 MMHG | HEIGHT: 64.5 IN | OXYGEN SATURATION: 93 % | HEART RATE: 84 BPM | TEMPERATURE: 99 F | DIASTOLIC BLOOD PRESSURE: 67 MMHG | WEIGHT: 171.31 LBS | BODY MASS INDEX: 28.89 KG/M2

## 2024-04-17 DIAGNOSIS — J44.1 COPD WITH ACUTE EXACERBATION (HCC): Primary | ICD-10-CM

## 2024-04-17 DIAGNOSIS — E11.22 CONTROLLED TYPE 2 DIABETES MELLITUS WITH STAGE 3 CHRONIC KIDNEY DISEASE, WITHOUT LONG-TERM CURRENT USE OF INSULIN (HCC): ICD-10-CM

## 2024-04-17 DIAGNOSIS — N18.30 CONTROLLED TYPE 2 DIABETES MELLITUS WITH STAGE 3 CHRONIC KIDNEY DISEASE, WITHOUT LONG-TERM CURRENT USE OF INSULIN (HCC): ICD-10-CM

## 2024-04-17 PROCEDURE — 3074F SYST BP LT 130 MM HG: CPT | Performed by: INTERNAL MEDICINE

## 2024-04-17 PROCEDURE — 1126F AMNT PAIN NOTED NONE PRSNT: CPT | Performed by: INTERNAL MEDICINE

## 2024-04-17 PROCEDURE — 99214 OFFICE O/P EST MOD 30 MIN: CPT | Performed by: INTERNAL MEDICINE

## 2024-04-17 PROCEDURE — 3078F DIAST BP <80 MM HG: CPT | Performed by: INTERNAL MEDICINE

## 2024-04-17 PROCEDURE — 3008F BODY MASS INDEX DOCD: CPT | Performed by: INTERNAL MEDICINE

## 2024-04-17 PROCEDURE — 1160F RVW MEDS BY RX/DR IN RCRD: CPT | Performed by: INTERNAL MEDICINE

## 2024-04-17 PROCEDURE — 1159F MED LIST DOCD IN RCRD: CPT | Performed by: INTERNAL MEDICINE

## 2024-04-17 RX ORDER — PREDNISONE 10 MG/1
TABLET ORAL
Qty: 20 TABLET | Refills: 0 | Status: CANCELLED | OUTPATIENT
Start: 2024-04-17

## 2024-04-17 NOTE — PROGRESS NOTES
Subjective:     Patient ID: Ariana Osorio is a 79 year old female.    COPD  This is a chronic problem. The current episode started more than 1 year ago. Episode frequency: had flare up starting this week with pt having desaturation and coughing. The problem has been gradually improving. Associated symptoms include coughing. Pertinent negatives include no chest pain or fever. Nothing aggravates the symptoms. She has tried rest (pt did start prdnisone 3 days ago as per my recommendtaionand continued her nebs) for the symptoms. The treatment provided significant relief.       History/Other:   Review of Systems   Constitutional: Negative.  Negative for fever.   Respiratory:  Positive for cough and shortness of breath.    Cardiovascular:  Negative for chest pain and palpitations.   Gastrointestinal: Negative.    Genitourinary: Negative.      Current Outpatient Medications   Medication Sig Dispense Refill    predniSONE 10 MG Oral Tab Take 4 tabs po x2d, 3 tabs x2d, 2 tabs x2d, 1 tab x2d 20 tablet 0    sodium chloride, hypertonic, 3 % Inhalation Nebu Soln 3 mL.      Tiotropium Bromide Monohydrate (SPIRIVA RESPIMAT) 2.5 MCG/ACT Inhalation Aero Soln Inhale 2 puffs into the lungs daily.      glipiZIDE 5 MG Oral Tab Take 1 tablet (5 mg total) by mouth before breakfast. 90 tablet 0    arformoterol 15 MCG/2ML Inhalation Nebu Soln Take 2 mL (15 mcg total) by nebulization 2 (two) times daily. 180 each 0    budesonide 0.5 MG/2ML Inhalation Suspension Take 2 mL (0.5 mg total) by nebulization 2 (two) times daily. 180 each 0    Potassium Chloride ER 20 MEQ Oral Tab CR       pantoprazole 20 MG Oral Tab EC Take 1 tablet (20 mg total) by mouth every morning before breakfast.      metoprolol succinate ER 25 MG Oral Tablet 24 Hr Take 1 tablet (25 mg total) by mouth daily. 90 tablet 3    colchicine 0.6 MG Oral Tab Take 1 tablet (0.6 mg total) by mouth as needed.      febuxostat 40 MG Oral Tab Take 1 tablet (40 mg total) by mouth  daily. 90 tablet 1    omega-3 fatty acids 1000 MG Oral Cap Take 1,000 mg by mouth daily.      Multiple Vitamin (MULTI-VITAMIN DAILY) Oral Tab Take 1 tablet by mouth daily.      Cholecalciferol (VITAMIN D) 2000 units Oral Cap Take 1 capsule (2,000 Units total) by mouth daily.      methylPREDNISolone (MEDROL) 4 MG Oral Tablet Therapy Pack As directed. (Patient not taking: Reported on 4/17/2024) 1 each 0    LASIX 20 MG Oral Tab Take 1 tablet (20 mg total) by mouth as needed (FOR SWELLING). (Patient not taking: Reported on 4/17/2024)      Alcohol Swabs (DROPSAFE ALCOHOL PREP) 70 % Does not apply Pads Take 1 Bottle by mouth As Directed. 400 each 3    TRUEplus Lancets 33G Does not apply Misc 1 each by In Vitro route daily. 100 each 3    Glucose Blood (TRUE METRIX BLOOD GLUCOSE TEST) In Vitro Strip 1 strip by In Vitro route 4 (four) times daily. 400 strip 3     Allergies:  Allergies   Allergen Reactions    Allopurinol HIVES, SWELLING and SHORTNESS OF BREATH    Dog Epithelium SHORTNESS OF BREATH     Hair and saliva    Dust SHORTNESS OF BREATH    Heparin SWELLING    Smoke SHORTNESS OF BREATH    Adhesive Tape (Rosins) RASH    Montelukast HALLUCINATION    Statins MYALGIA     Pt had developed myalgia and myopathy    Xanax [Alprazolam] RESTLESSNESS    Adhesive Tape OTHER (SEE COMMENTS)    Amoxicillin OTHER (SEE COMMENTS)    Other OTHER (SEE COMMENTS)    Sulfa Antibiotics OTHER (SEE COMMENTS)    Ace Inhibitors Coughing    Aspirin RASH       Past Medical History:    Age-related cataract of left eye    Age-related cataract of right eye    Anxiety    Asthma (HCC)    Atherosclerosis of coronary artery    Breast CA (HCC)    lt mastectomy    Breast CA (HCC)    lumpectomy, right    Cancer (HCC)    breast cancer    Carotid artery disease (HCC)    Carotid stenosis    Closed fracture of multiple ribs of left side with routine healing, subsequent encounter    Congestive heart disease (HCC)    COPD (chronic obstructive pulmonary disease)  (HCC)    on O2 at 2 liters    Coronary atherosclerosis    Depression    Diabetes (HCC)    takes insulin when hospitalized, takes oral meds at home    Diastolic dysfunction without heart failure    Esophageal reflux    Essential hypertension    Generalized anxiety disorder    Gout    Gout    High blood pressure    High cholesterol    History of pituitary adenoma    Hyperlipidemia    Major depressive disorder, single episode, moderate (HCC)    Obesity      Past Surgical History:   Procedure Laterality Date    Cabg      Carotid endarterectomy Bilateral     Cataract      Colonoscopy      2013    Colonoscopy  2013    Colonoscopy N/A 02/21/2023    Procedure: COLONOSCOPY;  Surgeon: Abdiaziz Melendez MD;  Location: [facility] ENDOSCOPY    Hernia surgery      Lumpectomy right  2014    Mastectomy left Left 1999    Radiation right  2014      Family History   Problem Relation Age of Onset    Asthma Father     Hypertension Father     Heart Disorder Father     Other (Other) Mother         thyroid surgery    Asthma Sister     Asthma Brother     Other (Other) Brother         gout    Breast Cancer Self 43        rt breast 69      Social History:   Social History     Socioeconomic History    Marital status:    Tobacco Use    Smoking status: Never     Passive exposure: Never    Smokeless tobacco: Never   Vaping Use    Vaping status: Never Used   Substance and Sexual Activity    Alcohol use: No     Comment: rarely at weddings    Drug use: No   Other Topics Concern    Reaction to local anesthetic No    Pt has a pacemaker No    Pt has a defibrillator No     Social Determinants of Health     Financial Resource Strain: Low Risk  (3/13/2024)    Financial Resource Strain     Difficulty of Paying Living Expenses: Not very hard     Med Affordability: No   Food Insecurity: No Food Insecurity (3/5/2024)    Food Insecurity     Food Insecurity: Never true   Recent Concern: Food Insecurity - Food Insecurity Present (2/25/2024)    Food  Insecurity     Food Insecurity: Sometimes true   Transportation Needs: No Transportation Needs (3/13/2024)    Transportation Needs     Lack of Transportation: No   Physical Activity: Insufficiently Active (7/13/2022)    Exercise Vital Sign     Days of Exercise per Week: 1 day     Minutes of Exercise per Session: 10 min   Stress: No Stress Concern Present (10/6/2023)    Stress     Feeling of Stress : No    Social Connections   Housing Stability: Low Risk  (3/5/2024)    Housing Stability     Housing Instability: No        Objective:   Physical Exam  Constitutional:       General: She is not in acute distress.     Appearance: She is not ill-appearing, toxic-appearing or diaphoretic.      Comments: Was off her O2 at the time she was in exam room   Eyes:      General: No scleral icterus.        Right eye: No discharge.         Left eye: No discharge.      Conjunctiva/sclera: Conjunctivae normal.   Cardiovascular:      Rate and Rhythm: Normal rate and regular rhythm.      Pulses: Normal pulses.      Heart sounds: Normal heart sounds. No murmur heard.     No gallop.   Pulmonary:      Effort: Pulmonary effort is normal. No respiratory distress.      Breath sounds: Normal breath sounds. No stridor. No wheezing or rales.   Abdominal:      General: Bowel sounds are normal. There is no distension.      Palpations: Abdomen is soft.      Tenderness: There is no abdominal tenderness. There is no guarding.   Musculoskeletal:      Cervical back: Normal range of motion and neck supple. No rigidity.      Right lower leg: Edema present.      Left lower leg: Edema present.   Lymphadenopathy:      Cervical: No cervical adenopathy.   Neurological:      Mental Status: She is alert.         Assessment & Plan:   (J44.1) COPD with acute exacerbation (HCC)  (primary encounter diagnosis)  Plan: Patient already started prednisone 2 days ago as per recommendation and she is actually feeling so much better.  She has noted improvement of her O2  saturation which is checking it at home.  She was actually able to see me inside the exam room without even using her oxygen and maintain good high oxygen saturation at this time.  She will continue to taper down her prednisone as prescribed.  She is still using her nebulizer which she will continue.  Call us back if there is any worsening otherwise we will see her back in 3 months.    (E11.22,  N18.30) Controlled type 2 diabetes mellitus with stage 3 chronic kidney disease, without long-term current use of insulin ([facility])  Plan: She has been monitoring her glucose particularly now that she is taking prednisone.  Her glucose readings have been less than 120.       No orders of the defined types were placed in this encounter.      Meds This Visit:  Requested Prescriptions      No prescriptions requested or ordered in this encounter       Imaging & Referrals:  None

## 2024-04-22 ENCOUNTER — PATIENT OUTREACH (OUTPATIENT)
Dept: CASE MANAGEMENT | Age: 79
End: 2024-04-22

## 2024-04-22 NOTE — PROGRESS NOTES
Spoke to pt and shared contact information. Pt verbalized understanding and wishes to continue with monthly calls. Pt was at lunch during call and stated she will f/u with ccm to complete outreach

## 2024-05-03 ENCOUNTER — HOSPITAL ENCOUNTER (OUTPATIENT)
Age: 79
Discharge: ANOTHER HOSPITAL | End: 2024-05-03
Payer: MEDICARE

## 2024-05-03 ENCOUNTER — TELEPHONE (OUTPATIENT)
Dept: INTERNAL MEDICINE CLINIC | Facility: CLINIC | Age: 79
End: 2024-05-03

## 2024-05-03 VITALS
DIASTOLIC BLOOD PRESSURE: 74 MMHG | OXYGEN SATURATION: 90 % | HEART RATE: 81 BPM | RESPIRATION RATE: 22 BRPM | SYSTOLIC BLOOD PRESSURE: 130 MMHG | TEMPERATURE: 99 F

## 2024-05-03 DIAGNOSIS — R06.02 SOB (SHORTNESS OF BREATH): Primary | ICD-10-CM

## 2024-05-03 PROCEDURE — 99215 OFFICE O/P EST HI 40 MIN: CPT

## 2024-05-03 NOTE — ED INITIAL ASSESSMENT (HPI)
Pt presents to the IC with c/o SOB on exertion. Pt was inpatient at [facility] approx 1 month ago for the same and dx with chest congestion and sent home on home oxygen 2L via NC. Pt states her symptoms improve when she's not talking or moving. Denies chest pain.

## 2024-05-03 NOTE — TELEPHONE ENCOUNTER
Pt calling stated she has problems breathing, using nebulizer prn , stated she was told by a nurse at [facility] office - she need an xray , pt was calling to see if order was written  Pt advised , nurse meant for her to go to [facility] to be evaluated for an xray, pt verbalized understanding, will go to UC, pt did not sound SOB

## 2024-05-03 NOTE — ED PROVIDER NOTES
Patient Seen in: [facility]      History     Chief Complaint   Patient presents with    Difficulty Breathing     Stated Complaint: SOB    Subjective:   Ariana is a 79-year-old female with a history of COPD, CHF, hypertension, diabetes, A-fib and high cholesterol presents to the immediate care complaining of shortness of breath.  Patient was admitted to the hospital in February and again at the beginning of March for shortness of breath.  Patient has been on home oxygen for the past couple of months due to the shortness of breath and COPD.  She states that for the past 2 weeks she has had increasing shortness of breath at home both at rest and with activity.  She states that she has had instances where she will get up to go to the bathroom with her oxygen on and her pulse ox will drop to 70-80% at times.  Patient denies any chest pain, fever, abdominal pain or vomiting.  She states that she is taking her furosemide as prescribed but has had increased swelling to both of her legs.  States that she uses the budesonide nebulizer at home with minimal relief.  Patient has no other complaints or concerns.          Objective:   Past Medical History:    Age-related cataract of left eye    Age-related cataract of right eye    Anxiety    Asthma (HCC)    Atherosclerosis of coronary artery    Breast CA (HCC)    lt mastectomy    Breast CA (HCC)    lumpectomy, right    Cancer (HCC)    breast cancer    Carotid artery disease (HCC)    Carotid stenosis    Closed fracture of multiple ribs of left side with routine healing, subsequent encounter    Congestive heart disease (HCC)    COPD (chronic obstructive pulmonary disease) (HCC)    on O2 at 2 liters    Coronary atherosclerosis    Depression    Diabetes (HCC)    takes insulin when hospitalized, takes oral meds at home    Diastolic dysfunction without heart failure    Esophageal reflux    Essential hypertension    Generalized anxiety disorder    Gout    Gout    High  blood pressure    High cholesterol    History of pituitary adenoma    Hyperlipidemia    Major depressive disorder, single episode, moderate (HCC)    Obesity              Past Surgical History:   Procedure Laterality Date    Cabg      Carotid endarterectomy Bilateral     Cataract      Colonoscopy      2013    Colonoscopy  2013    Colonoscopy N/A 02/21/2023    Procedure: COLONOSCOPY;  Surgeon: Abdiaziz Melendez MD;  Location: [facility] ENDOSCOPY    Hernia surgery      Lumpectomy right  2014    Mastectomy left Left 1999    Radiation right  2014                Social History     Socioeconomic History    Marital status:    Tobacco Use    Smoking status: Never     Passive exposure: Never    Smokeless tobacco: Never   Vaping Use    Vaping status: Never Used   Substance and Sexual Activity    Alcohol use: No     Comment: rarely at weddings    Drug use: No   Other Topics Concern    Reaction to local anesthetic No    Pt has a pacemaker No    Pt has a defibrillator No     Social Determinants of Health     Financial Resource Strain: Low Risk  (3/13/2024)    Financial Resource Strain     Difficulty of Paying Living Expenses: Not very hard     Med Affordability: No   Food Insecurity: No Food Insecurity (3/5/2024)    Food Insecurity     Food Insecurity: Never true   Recent Concern: Food Insecurity - Food Insecurity Present (2/25/2024)    Food Insecurity     Food Insecurity: Sometimes true   Transportation Needs: No Transportation Needs (3/13/2024)    Transportation Needs     Lack of Transportation: No   Physical Activity: Insufficiently Active (7/13/2022)    Exercise Vital Sign     Days of Exercise per Week: 1 day     Minutes of Exercise per Session: 10 min   Stress: No Stress Concern Present (10/6/2023)    Stress     Feeling of Stress : No    Social Connections   Housing Stability: Low Risk  (3/5/2024)    Housing Stability     Housing Instability: No              Review of Systems    Positive for stated complaint: SOB  Other  systems are as noted in HPI.  Constitutional and vital signs reviewed.      All other systems reviewed and negative except as noted above.    Physical Exam     ED Triage Vitals [05/03/24 1632]   /74   Pulse 81   Resp 22   Temp 98.7 °F (37.1 °C)   Temp src Temporal   SpO2 90 %   O2 Device Nasal cannula       Current:/74   Pulse 81   Temp 98.7 °F (37.1 °C) (Temporal)   Resp 22   SpO2 90%         Physical Exam  Vitals and nursing note reviewed.   Constitutional:       General: She is not in acute distress.     Appearance: Normal appearance. She is not ill-appearing, toxic-appearing or diaphoretic.   HENT:      Head: Normocephalic.   Cardiovascular:      Rate and Rhythm: Normal rate.   Pulmonary:      Effort: Pulmonary effort is normal. No tachypnea, bradypnea, accessory muscle usage or respiratory distress.      Breath sounds: No stridor. Decreased breath sounds present. No wheezing, rhonchi or rales.   Musculoskeletal:         General: Normal range of motion.      Cervical back: Normal range of motion.   Skin:     General: Skin is warm and dry.      Capillary Refill: Capillary refill takes less than 2 seconds.   Neurological:      General: No focal deficit present.      Mental Status: She is alert and oriented to person, place, and time.   Psychiatric:         Mood and Affect: Mood normal.         Behavior: Behavior normal.         Thought Content: Thought content normal.         Judgment: Judgment normal.              ED Course   Labs Reviewed - No data to display       MDM                 Medical Decision Making  Multiple medical diagnoses were considered including but not limited to the COPD exacerbation, CHF, PE, pneumonia.  Patient is well appearing, non-toxic and in no acute distress.  Vital signs are stable.   79-year-old female with a history of COPD, CHF, hypertension, atrial fibrillation, diabetes presents to the immediate care complaining of increasing shortness of breath over the past 2  weeks.  Patient wears home O2 and reports episodes where her oxygen level will drop to the 70s to 80s wearing her oxygen.  Also reports shortness of breath at rest.  Takes Lasix and has continued leg swelling.  Takes budesonide and has continued shortness of breath.  Patient is wearing oxygen during the immediate care visit and lacks is between 94 and 96% at rest.  Patient was 90% on arrival on oxygen.  She is speaking in full sentences and does not appear to be in acute distress.  Given the limitations of the immediate care and the likely need for advanced testing unavailable here due to the patient's significant medical history she will be sent to the emergency department for further evaluation and management.  Patient initially did not want to go to the ER and states that she just needs a chest x-ray however I did talk to the patient at length about the significant history that she has and she is agreeable to go to the emergency department.  She is here with a caregiver who will take her to the [place] emergency department.  Patient discussed with Dr. Rivas on the phone who agrees with this plan.          Disposition and Plan     Clinical Impression:  1. SOB (shortness of breath)         Disposition:  Ic to ed  5/3/2024  5:02 pm    Follow-up:  No follow-up provider specified.        Medications Prescribed:  Discharge Medication List as of 5/3/2024  5:02 PM

## 2024-05-05 ENCOUNTER — TELEPHONE (OUTPATIENT)
Dept: INTERNAL MEDICINE CLINIC | Facility: CLINIC | Age: 79
End: 2024-05-05

## 2024-05-06 ENCOUNTER — TELEPHONE (OUTPATIENT)
Dept: INTERNAL MEDICINE CLINIC | Facility: CLINIC | Age: 79
End: 2024-05-06

## 2024-05-06 DIAGNOSIS — N18.32 STAGE 3B CHRONIC KIDNEY DISEASE (HCC): Primary | ICD-10-CM

## 2024-05-06 NOTE — TELEPHONE ENCOUNTER
On-call MD was paged at approximately 7:13 PM on Zen, May 5 for reported dyspnea on exertion.  Patient paged myself back in March for similar issues and was a told go to ER and that she was found to have positive flu with acute on chronic heart failure exacerbation.  And she was subsequently discharged she says that she is having worsening shortness of breath and that her echo was normal and I reviewed this and confirmed.  But likely patient has worsening end-stage COPD.  Her pulmonologist is part of [facility] records I can review but she is on a budesonide nebs and hypertonic saline for with copious secretions.  Patient reports that she desaturates to the mid to low 80s while on 2 L nasal cannula on ambulation.  I advised patient that she should most likely go to the ER.  She went to the urgent care on the third and they told to go to the ER but she did not go.  I strongly encouraged her to go to rule out any PE or other acute on chronic heart failure.  Patient is hesitant to go to the ER.  I told her that at least she should follow-up with either myself or her established primary care tomorrow in the clinic.

## 2024-05-06 NOTE — TELEPHONE ENCOUNTER
Patient is requesting referral with multiple visits. Current referral expires before patient's appointment     Name of specialist and specialty department:  Dr. Riki Tran / Nephrologist     Reason for visit with the specialist: follow up     Address of the specialist office:  [facility] [address]   [address]   [place] [address]     Appointment date: 8/9     Call was disconnected before turnaround timeframe could be provided to patient.

## 2024-05-07 ENCOUNTER — TELEPHONE (OUTPATIENT)
Dept: FAMILY MEDICINE CLINIC | Facility: CLINIC | Age: 79
End: 2024-05-07

## 2024-05-07 NOTE — TELEPHONE ENCOUNTER
Dr. Braun,     Patient called requesting referral to Dr. Tran.     Pended referral please review diagnosis and sign off if you agree.    Thank you.  Fina Garcia  [facility]

## 2024-05-07 NOTE — TELEPHONE ENCOUNTER
Patient calling ( name and  of patient verified )  has questions about her medication     Was instructed to  take  increase her  to Amlodipine 2.5mg   to Three times a day  and her Hydralazine was discontinued  for the past month     Recent b/p 125/70, 130/65, 128/70, 135/65  She notes her feet are swelling  since taking the increased dose  of amlodipine     Patient asking if she can take Amlodipine 2.5mg in the morning and 2.5 mg  in the evening instead of three times  a day       Please advise and thank you.            Best call back number: Ariana  at [phone]

## 2024-05-08 RX ORDER — AMLODIPINE BESYLATE 2.5 MG/1
2.5 TABLET ORAL 2 TIMES DAILY
Qty: 180 TABLET | Refills: 1 | Status: SHIPPED | OUTPATIENT
Start: 2024-05-08

## 2024-05-08 NOTE — TELEPHONE ENCOUNTER
Patient was given Dr Braun's instructions, stated understanding.    She states in future, if we call and she does not  on first ring, call back within 5 minutes.

## 2024-05-08 NOTE — TELEPHONE ENCOUNTER
Patient indicated that needed a script for the amlodipine 2.5mg 2 times daily sent to [facility] mail order. Rx pended.

## 2024-05-13 ENCOUNTER — TELEPHONE (OUTPATIENT)
Dept: INTERNAL MEDICINE CLINIC | Facility: CLINIC | Age: 79
End: 2024-05-13

## 2024-05-13 NOTE — TELEPHONE ENCOUNTER
Condition update:  Patient asking if she could speak with Dr Braun, her phone is [phone].    Stated when she calls Dr Mares's office to report her breathing problems, they always tell her to go to the ER.  States today she is on 2 liters of oxygen and saturations running 96-97%  But after walking to the bathroom and doing personal care, it can go down to 82 liters.  States already had 3 xrays done during recent hospital stay.  Please see Dr Fuentes's note below.  There are no open slots in Dr Braun's schedule this week.  Dr Braun, please advise?        Reji Fuentes MD         5/5/24  8:01 PM  Note  On-call MD was paged at approximately 7:13 PM on Sunday, May 5 for reported dyspnea on exertion.  Patient paged myself back in March for similar issues and was a told go to ER and that she was found to have positive flu with acute on chronic heart failure exacerbation.  And she was subsequently discharged she says that she is having worsening shortness of breath and that her echo was normal and I reviewed this and confirmed.  But likely patient has worsening end-stage COPD.  Her pulmonologist is part of [facility] limited records I can review but she is on a budesonide nebs and hypertonic saline for with copious secretions.  Patient reports that she desaturates to the mid to low 80s while on 2 L nasal cannula on ambulation.  I advised patient that she should most likely go to the ER.  She went to the urgent care on the third and they told to go to the ER but she did not go.  I strongly encouraged her to go to rule out any PE or other acute on chronic heart failure.  Patient is hesitant to go to the ER.  I told her that at least she should follow-up with either myself or her established primary care tomorrow in the clinic.

## 2024-05-14 NOTE — TELEPHONE ENCOUNTER
Late entry;   I had talked to patient yesteday at 5pm;  she complained about being sent to ER all the time she calls for her breathing; currrently speaking long sentences and not sound to be in resp distress. She states O2 sats were in the 95 to 98% when she is at rest and only goes down when she starts going to walk to her bathroom. She called her pulmo office and was told to go to ER. She is using her inhaler and nebs as prescribed. She just wants to be seen in the clinic. I told her I can see her tomorrow (Tuesday) but she has not way of coming in since no . I told her then to come in on Wednesday 5/15/24 but she can only come in the PM. So I told her I will extent my clinic to see her at 3:30pm Wednesday; I told her to call us back if any worsening of symptom. Pt very thankful and will comply.

## 2024-05-15 ENCOUNTER — TELEPHONE (OUTPATIENT)
Dept: INTERNAL MEDICINE CLINIC | Facility: CLINIC | Age: 79
End: 2024-05-15

## 2024-05-15 NOTE — PLAN OF CARE
Problem: Patient Centered Care  Goal: Patient preferences are identified and integrated in the patient's plan of care  Description  Interventions:  - What would you like us to know as we care for you?  I live at home, I was at [facility] after open h Pt called back and stated that she did not need anything.    pre-medicate as appropriate  Outcome: Progressing     Problem: RISK FOR INFECTION - ADULT  Goal: Absence of fever/infection during anticipated neutropenic period  Description  INTERVENTIONS  - Monitor WBC  - Administer growth factors as ordered  - Ninfa Monitor Blood Glucose as ordered  - Assess for signs and symptoms of hyperglycemia and hypoglycemia  - Administer ordered medications to maintain glucose within target range  - Assess barriers to adequate nutritional intake and initiate nutrition consult a

## 2024-05-15 NOTE — TELEPHONE ENCOUNTER
Pt called radha , stated  she had an appointment with Dr Braun at 3:30, not sure where her son is to take her, she's worried about him  Asking if  can see her tomorrow

## 2024-05-15 NOTE — TELEPHONE ENCOUNTER
Dr freitas , pt added on Friday, very grateful and calmer , stated her son car quit nd he was unable to call her

## 2024-05-17 ENCOUNTER — LAB ENCOUNTER (OUTPATIENT)
Dept: LAB | Age: 79
End: 2024-05-17
Attending: INTERNAL MEDICINE

## 2024-05-17 ENCOUNTER — HOSPITAL ENCOUNTER (OUTPATIENT)
Dept: GENERAL RADIOLOGY | Age: 79
Discharge: HOME | End: 2024-05-17
Attending: INTERNAL MEDICINE

## 2024-05-17 ENCOUNTER — OFFICE VISIT (OUTPATIENT)
Dept: INTERNAL MEDICINE CLINIC | Facility: CLINIC | Age: 79
End: 2024-05-17

## 2024-05-17 VITALS
DIASTOLIC BLOOD PRESSURE: 76 MMHG | TEMPERATURE: 100 F | OXYGEN SATURATION: 94 % | SYSTOLIC BLOOD PRESSURE: 114 MMHG | HEIGHT: 64.5 IN | BODY MASS INDEX: 28.03 KG/M2 | HEART RATE: 80 BPM | WEIGHT: 166.19 LBS

## 2024-05-17 DIAGNOSIS — N18.30 CONTROLLED TYPE 2 DIABETES MELLITUS WITH STAGE 3 CHRONIC KIDNEY DISEASE, WITHOUT LONG-TERM CURRENT USE OF INSULIN (HCC): ICD-10-CM

## 2024-05-17 DIAGNOSIS — E11.22 CONTROLLED TYPE 2 DIABETES MELLITUS WITH STAGE 3 CHRONIC KIDNEY DISEASE, WITHOUT LONG-TERM CURRENT USE OF INSULIN (HCC): ICD-10-CM

## 2024-05-17 DIAGNOSIS — M1A.9XX1 CHRONIC TOPHACEOUS GOUT: ICD-10-CM

## 2024-05-17 DIAGNOSIS — E55.9 VITAMIN D DEFICIENCY: ICD-10-CM

## 2024-05-17 DIAGNOSIS — J44.89 ASTHMA-COPD OVERLAP SYNDROME (HCC): Primary | ICD-10-CM

## 2024-05-17 DIAGNOSIS — J44.89 ASTHMA-COPD OVERLAP SYNDROME (HCC): ICD-10-CM

## 2024-05-17 DIAGNOSIS — E21.0 PRIMARY HYPERPARATHYROIDISM (HCC): ICD-10-CM

## 2024-05-17 LAB
ANION GAP SERPL CALC-SCNC: 6 MMOL/L (ref 0–18)
BUN BLD-MCNC: 22 MG/DL (ref 9–23)
BUN/CREAT SERPL: 17.6 (ref 10–20)
CALCIUM BLD-MCNC: 10.2 MG/DL (ref 8.7–10.4)
CHLORIDE SERPL-SCNC: 103 MMOL/L (ref 98–112)
CO2 SERPL-SCNC: 33 MMOL/L (ref 21–32)
CREAT BLD-MCNC: 1.25 MG/DL
CREAT UR-SCNC: 87.9 MG/DL
EGFRCR SERPLBLD CKD-EPI 2021: 44 ML/MIN/1.73M2 (ref 60–?)
EST. AVERAGE GLUCOSE BLD GHB EST-MCNC: 126 MG/DL (ref 68–126)
FASTING STATUS PATIENT QL REPORTED: NO
GLUCOSE BLD-MCNC: 55 MG/DL (ref 70–99)
HBA1C MFR BLD: 6 % (ref ?–5.7)
MICROALBUMIN UR-MCNC: <0.3 MG/DL
OSMOLALITY SERPL CALC.SUM OF ELEC: 295 MOSM/KG (ref 275–295)
POTASSIUM SERPL-SCNC: 4.4 MMOL/L (ref 3.5–5.1)
PTH-INTACT SERPL-MCNC: 98 PG/ML (ref 18.5–88)
SODIUM SERPL-SCNC: 142 MMOL/L (ref 136–145)
URATE SERPL-MCNC: 6.2 MG/DL
VIT D+METAB SERPL-MCNC: 38.9 NG/ML (ref 30–100)

## 2024-05-17 PROCEDURE — 99214 OFFICE O/P EST MOD 30 MIN: CPT | Performed by: INTERNAL MEDICINE

## 2024-05-17 PROCEDURE — 3078F DIAST BP <80 MM HG: CPT | Performed by: INTERNAL MEDICINE

## 2024-05-17 PROCEDURE — 82570 ASSAY OF URINE CREATININE: CPT

## 2024-05-17 PROCEDURE — 80048 BASIC METABOLIC PNL TOTAL CA: CPT

## 2024-05-17 PROCEDURE — 83970 ASSAY OF PARATHORMONE: CPT

## 2024-05-17 PROCEDURE — 83036 HEMOGLOBIN GLYCOSYLATED A1C: CPT

## 2024-05-17 PROCEDURE — 1159F MED LIST DOCD IN RCRD: CPT | Performed by: INTERNAL MEDICINE

## 2024-05-17 PROCEDURE — 84550 ASSAY OF BLOOD/URIC ACID: CPT

## 2024-05-17 PROCEDURE — 71046 X-RAY EXAM CHEST 2 VIEWS: CPT | Performed by: INTERNAL MEDICINE

## 2024-05-17 PROCEDURE — 1160F RVW MEDS BY RX/DR IN RCRD: CPT | Performed by: INTERNAL MEDICINE

## 2024-05-17 PROCEDURE — 3008F BODY MASS INDEX DOCD: CPT | Performed by: INTERNAL MEDICINE

## 2024-05-17 PROCEDURE — 3074F SYST BP LT 130 MM HG: CPT | Performed by: INTERNAL MEDICINE

## 2024-05-17 PROCEDURE — 82043 UR ALBUMIN QUANTITATIVE: CPT

## 2024-05-17 PROCEDURE — 82306 VITAMIN D 25 HYDROXY: CPT

## 2024-05-17 PROCEDURE — 36415 COLL VENOUS BLD VENIPUNCTURE: CPT

## 2024-05-17 PROCEDURE — 1126F AMNT PAIN NOTED NONE PRSNT: CPT | Performed by: INTERNAL MEDICINE

## 2024-05-17 NOTE — PROGRESS NOTES
Subjective:     Patient ID: Ariana Osorio is a 79 year old female.    Pt here for ffup for her copd/asthma.  Pt states is having issues when she goes to walking to her bathroom at home  with increasing SOB and desaturation but states breathing is fine whenever she is sitting and resting with O2 sats noted to be in the 90s.  She doesn't even use her O2 when she is resting. No fevers, no chest pains noted.  She had called her pulmo clnic, was told then to go to IC or ER but pt decided not to and called our office instead.  She states she uses her nebs as prescribed. Had stopped taking her furosemide since glucose increasing though weight also had been stable with no noted increase in her bilateral leg edema. Pt had been told before to restart her lasix and just take her glipizide if sugars does goes up.         History/Other:   Review of Systems   Constitutional: Negative.    Respiratory:  Positive for shortness of breath. Negative for cough and wheezing.         Exertional SOB   Cardiovascular:  Positive for leg swelling. Negative for chest pain and palpitations.   Gastrointestinal: Negative.    Genitourinary: Negative.      Current Outpatient Medications   Medication Sig Dispense Refill    amLODIPine 2.5 MG Oral Tab Take 1 tablet (2.5 mg total) by mouth in the morning and 1 tablet (2.5 mg total) before bedtime. 180 tablet 1    predniSONE 10 MG Oral Tab Take 4 tabs po x2d, 3 tabs x2d, 2 tabs x2d, 1 tab x2d 20 tablet 0    sodium chloride, hypertonic, 3 % Inhalation Nebu Soln 3 mL.      Tiotropium Bromide Monohydrate (SPIRIVA RESPIMAT) 2.5 MCG/ACT Inhalation Aero Soln Inhale 2 puffs into the lungs daily.      glipiZIDE 5 MG Oral Tab Take 1 tablet (5 mg total) by mouth before breakfast. 90 tablet 0    budesonide 0.5 MG/2ML Inhalation Suspension Take 2 mL (0.5 mg total) by nebulization 2 (two) times daily. 180 each 0    pantoprazole 20 MG Oral Tab EC Take 1 tablet (20 mg total) by mouth every morning before  breakfast.      metoprolol succinate ER 25 MG Oral Tablet 24 Hr Take 1 tablet (25 mg total) by mouth daily. 90 tablet 3    colchicine 0.6 MG Oral Tab Take 1 tablet (0.6 mg total) by mouth as needed.      febuxostat 40 MG Oral Tab Take 1 tablet (40 mg total) by mouth daily. 90 tablet 1    omega-3 fatty acids 1000 MG Oral Cap Take 1,000 mg by mouth daily.      Multiple Vitamin (MULTI-VITAMIN DAILY) Oral Tab Take 1 tablet by mouth daily.      Cholecalciferol (VITAMIN D) 2000 units Oral Cap Take 1 capsule (2,000 Units total) by mouth daily.      methylPREDNISolone (MEDROL) 4 MG Oral Tablet Therapy Pack As directed. (Patient not taking: Reported on 4/17/2024) 1 each 0    arformoterol 15 MCG/2ML Inhalation Nebu Soln Take 2 mL (15 mcg total) by nebulization 2 (two) times daily. (Patient not taking: Reported on 5/17/2024) 180 each 0    LASIX 20 MG Oral Tab Take 1 tablet (20 mg total) by mouth as needed (FOR SWELLING). (Patient not taking: Reported on 4/17/2024)      Potassium Chloride ER 20 MEQ Oral Tab CR  (Patient not taking: Reported on 5/17/2024)      Alcohol Swabs (DROPSAFE ALCOHOL PREP) 70 % Does not apply Pads Take 1 Bottle by mouth As Directed. 400 each 3    TRUEplus Lancets 33G Does not apply Misc 1 each by In Vitro route daily. 100 each 3    Glucose Blood (TRUE METRIX BLOOD GLUCOSE TEST) In Vitro Strip 1 strip by In Vitro route 4 (four) times daily. 400 strip 3     Allergies:  Allergies   Allergen Reactions    Allopurinol HIVES, SWELLING and SHORTNESS OF BREATH    Dog Epithelium SHORTNESS OF BREATH     Hair and saliva    Dust SHORTNESS OF BREATH    Heparin SWELLING    Smoke SHORTNESS OF BREATH    Adhesive Tape (Rosins) RASH    Montelukast HALLUCINATION    Statins MYALGIA     Pt had developed myalgia and myopathy    Xanax [Alprazolam] RESTLESSNESS    Adhesive Tape OTHER (SEE COMMENTS)    Amoxicillin OTHER (SEE COMMENTS)    Other OTHER (SEE COMMENTS)    Sulfa Antibiotics OTHER (SEE COMMENTS)    Ace Inhibitors  Coughing    Aspirin RASH       Past Medical History:    Age-related cataract of left eye    Age-related cataract of right eye    Anxiety    Asthma (HCC)    Atherosclerosis of coronary artery    Breast CA (HCC)    lt mastectomy    Breast CA (HCC)    lumpectomy, right    Cancer (HCC)    breast cancer    Carotid artery disease (HCC)    Carotid stenosis    Closed fracture of multiple ribs of left side with routine healing, subsequent encounter    Congestive heart disease (HCC)    COPD (chronic obstructive pulmonary disease) (HCC)    on O2 at 2 liters    Coronary atherosclerosis    Depression    Diabetes (HCC)    takes insulin when hospitalized, takes oral meds at home    Diastolic dysfunction without heart failure    Esophageal reflux    Essential hypertension    Generalized anxiety disorder    Gout    Gout    High blood pressure    High cholesterol    History of pituitary adenoma    Hyperlipidemia    Major depressive disorder, single episode, moderate (HCC)    Obesity      Past Surgical History:   Procedure Laterality Date    Cabg      Carotid endarterectomy Bilateral     Cataract      Colonoscopy      2013    Colonoscopy  2013    Colonoscopy N/A 02/21/2023    Procedure: COLONOSCOPY;  Surgeon: Abdiaziz Melendez MD;  Location: [facility] ENDOSCOPY    Hernia surgery      Lumpectomy right  2014    Mastectomy left Left 1999    Radiation right  2014      Family History   Problem Relation Age of Onset    Asthma Father     Hypertension Father     Heart Disorder Father     Other (Other) Mother         thyroid surgery    Asthma Sister     Asthma Brother     Other (Other) Brother         gout    Breast Cancer Self 43        rt breast 69      Social History:   Social History     Socioeconomic History    Marital status:    Tobacco Use    Smoking status: Never     Passive exposure: Never    Smokeless tobacco: Never   Vaping Use    Vaping status: Never Used   Substance and Sexual Activity    Alcohol use: No     Comment: rarely  at weddings    Drug use: No   Other Topics Concern    Reaction to local anesthetic No    Pt has a pacemaker No    Pt has a defibrillator No     Social Determinants of Health     Financial Resource Strain: Low Risk  (3/13/2024)    Financial Resource Strain     Difficulty of Paying Living Expenses: Not very hard     Med Affordability: No   Food Insecurity: No Food Insecurity (3/5/2024)    Food Insecurity     Food Insecurity: Never true   Recent Concern: Food Insecurity - Food Insecurity Present (2/25/2024)    Food Insecurity     Food Insecurity: Sometimes true   Transportation Needs: No Transportation Needs (3/13/2024)    Transportation Needs     Lack of Transportation: No   Physical Activity: Insufficiently Active (7/13/2022)    Exercise Vital Sign     Days of Exercise per Week: 1 day     Minutes of Exercise per Session: 10 min   Stress: No Stress Concern Present (10/6/2023)    Stress     Feeling of Stress : No    Social Connections   Housing Stability: Low Risk  (3/5/2024)    Housing Stability     Housing Instability: No        Objective:   Physical Exam  Constitutional:       General: She is not in acute distress.     Appearance: She is well-developed. She is not ill-appearing, toxic-appearing or diaphoretic.      Comments: Pt able to talk in long sentences    HENT:      Head: Normocephalic and atraumatic.      Right Ear: External ear normal.      Left Ear: External ear normal.      Nose: Nose normal.      Mouth/Throat:      Pharynx: No oropharyngeal exudate.   Eyes:      General:         Right eye: No discharge.         Left eye: No discharge.      Conjunctiva/sclera: Conjunctivae normal.      Pupils: Pupils are equal, round, and reactive to light.   Neck:      Vascular: No JVD.   Cardiovascular:      Rate and Rhythm: Normal rate and regular rhythm.      Heart sounds: Normal heart sounds. No murmur heard.  Pulmonary:      Effort: Pulmonary effort is normal. No respiratory distress.      Breath sounds: Normal  breath sounds. No stridor. No wheezing, rhonchi or rales.   Abdominal:      General: Bowel sounds are normal. There is no distension.      Palpations: Abdomen is soft. There is no mass.      Tenderness: There is no abdominal tenderness. There is no guarding or rebound.   Musculoskeletal:         General: No tenderness. Normal range of motion.      Cervical back: Normal range of motion and neck supple. No rigidity or tenderness.      Right lower leg: Edema present.      Left lower leg: Edema present.   Lymphadenopathy:      Cervical: No cervical adenopathy.   Skin:     General: Skin is warm and dry.      Findings: No rash.   Neurological:      Mental Status: She is alert and oriented to person, place, and time.         Assessment & Plan:   (J44.89) Asthma-COPD overlap syndrome (HCC)  (primary encounter diagnosis)  Plan: XR CHEST PA + LAT CHEST (CPT=71046)        She only gets dyspnea/desaturates  whenever she starts to ambulation; she is already using her nebs as prescribed by pulmo, pt also using supplemental O2.   Her weight stable and no increase weight.  Will do get cxr.  Pt may benefit again from pulmonary rehab to reduce her air hunger/dyspnea which did improved before when she did have pulmo rehab last year. Pt to ffup with pulmo and possibly get order for pulmo rehab .    (M1A.9XX1) Chronic tophaceous gout  Plan: Uric Acid        On uloric; had been improving with her tophi getting smaller and softer. No recent flare up.  Check uric acid.     (E11.22,  N18.30) Controlled type 2 diabetes mellitus with stage 3 chronic kidney disease, without long-term current use of insulin ([facility])  Plan: Hemoglobin A1C, Microalb/Creat Ratio, Random         Urine        Check A1c and urine microalbumin; continue with diet and glipizide.        No orders of the defined types were placed in this encounter.      Meds This Visit:  Requested Prescriptions      No prescriptions requested or ordered in this encounter       Imaging &  Referrals:  None

## 2024-06-05 ENCOUNTER — TELEPHONE (OUTPATIENT)
Facility: CLINIC | Age: 79
End: 2024-06-05

## 2024-06-05 RX ORDER — PANTOPRAZOLE SODIUM 20 MG/1
20 TABLET, DELAYED RELEASE ORAL
Qty: 90 TABLET | Refills: 0 | Status: SHIPPED | OUTPATIENT
Start: 2024-06-05

## 2024-06-05 NOTE — TELEPHONE ENCOUNTER
PPI (Proton Pump Inhibitors): Nexium (Esomeprazole), Protonix (Pantoprazole), Dexilant (Dexlansoprazole), Prevacid (Lansoprazole), Aciphex (Rabeprazole), Omperazole      Protocol Criteria  Review last office visit note(s), plan of care, for any change     Appointment scheduled within the past 12 months or in the next 3 months or had a procedure (if applicable) and is up to date with their recall     Date of Last Office Visit: 9/27/2023    Date of next scheduled appointment: 7/22/2024    Date of last procedure (if applicable):2/16/2022      Criteria for refill met. Pantoprazole refilled.    Called and spoke to the patient, date of birth and name verified.

## 2024-06-05 NOTE — TELEPHONE ENCOUNTER
Patient called for refill:       Current Outpatient Medications:     pantoprazole 20 MG Oral Tab EC, Take 1 tablet (20 mg total) by mouth every morning before breakfast., Disp: , Rfl:     Please send to [facility] Annamaria Fox.  Patient is almost out of medication.

## 2024-06-06 ENCOUNTER — PATIENT OUTREACH (OUTPATIENT)
Dept: CASE MANAGEMENT | Age: 79
End: 2024-06-06

## 2024-06-06 DIAGNOSIS — H40.9 GLAUCOMA, UNSPECIFIED GLAUCOMA TYPE, UNSPECIFIED LATERALITY: ICD-10-CM

## 2024-06-06 DIAGNOSIS — J44.9 COPD, SEVERE (HCC): ICD-10-CM

## 2024-06-06 DIAGNOSIS — E11.22 CONTROLLED TYPE 2 DIABETES MELLITUS WITH STAGE 3 CHRONIC KIDNEY DISEASE, WITHOUT LONG-TERM CURRENT USE OF INSULIN (HCC): ICD-10-CM

## 2024-06-06 DIAGNOSIS — E11.69 HYPERLIPIDEMIA ASSOCIATED WITH TYPE 2 DIABETES MELLITUS (HCC): ICD-10-CM

## 2024-06-06 DIAGNOSIS — E55.9 VITAMIN D DEFICIENCY: ICD-10-CM

## 2024-06-06 DIAGNOSIS — I73.9 PAD (PERIPHERAL ARTERY DISEASE) (HCC): ICD-10-CM

## 2024-06-06 DIAGNOSIS — I25.10 CORONARY ARTERY DISEASE INVOLVING NATIVE CORONARY ARTERY OF NATIVE HEART WITHOUT ANGINA PECTORIS: ICD-10-CM

## 2024-06-06 DIAGNOSIS — N18.30 CONTROLLED TYPE 2 DIABETES MELLITUS WITH STAGE 3 CHRONIC KIDNEY DISEASE, WITHOUT LONG-TERM CURRENT USE OF INSULIN (HCC): ICD-10-CM

## 2024-06-06 DIAGNOSIS — E78.5 HYPERLIPIDEMIA ASSOCIATED WITH TYPE 2 DIABETES MELLITUS (HCC): ICD-10-CM

## 2024-06-06 DIAGNOSIS — N18.32 STAGE 3B CHRONIC KIDNEY DISEASE (HCC): Primary | ICD-10-CM

## 2024-06-06 DIAGNOSIS — J44.1 COPD EXACERBATION (HCC): ICD-10-CM

## 2024-06-06 DIAGNOSIS — I65.23 CAROTID STENOSIS, NON-SYMPTOMATIC, BILATERAL: ICD-10-CM

## 2024-06-06 DIAGNOSIS — I50.32 CHRONIC DIASTOLIC CONGESTIVE HEART FAILURE (HCC): ICD-10-CM

## 2024-06-06 DIAGNOSIS — Z85.3 HISTORY OF BREAST CANCER: ICD-10-CM

## 2024-06-06 NOTE — PROGRESS NOTES
Spoke to Ariana for CCM.      Updates to patient care team/comments: UTD  Patient reported changes in medications: UTD  Med Adherence  Comment: pt taking medications as prescribed     Health Maintenance:   Health Maintenance   Topic Date Due    Zoster Vaccines (1 of 2) Never done    COVID-19 Vaccine (4 - 2023-24 season) 09/01/2023    Diabetes Care Dilated Eye Exam  11/30/2024 (Originally 10/13/2021)    Diabetes Care Foot Exam  02/19/2025 (Originally 7/23/2020)    Diabetes Care A1C  11/17/2024    Diabetes Care: GFR  05/17/2025    Diabetes Care: Microalb/Creat Ratio  05/17/2025    Influenza Vaccine  Completed    DEXA Scan  Completed    MA Annual Health Assessment  Completed    Annual Depression Screening  Completed    Fall Risk Screening (Annual)  Completed    Pneumococcal Vaccine: 65+ Years  Completed    Mammogram  Discontinued    Colonoscopy  Discontinued       Patient updates/concerns:    Discussed with pt change in her health coaches and shared new ccm contact information with pt. Pt wishes to continue with monthly ccm outreach.   Pt states that her medication list has not changed and she understands the medications and their intended results.    Pt discussed her recent visit to [facility] pulmonology. Pt had been seeing dr garcia and following a series of visits to er/uc for breathing issues she sought a second opinion from dr boone. Dr boone indicated that he believed she should be using nebulizer daily indefinitely. Pt does not feel that nebulizer is as effective as her previous manage of condition with inhalers. Pt will f/u with specialist office to discuss returning to the care of dr james. Pt will f/u with ccm when this is resolved.      Pt continues to take blood pressure and tracks her sugars. Pt is aware eye exam due at the end of the year  Goals/Action Plan:    Active goal from previous outreach: managing. healthcare    Patient reported progress towards goals: pt continues to see providers as  needed and takes medications as prescribed               - What: breathing issues           - Where/When/How: pt uses nebulizer daily she f/u with [facility] to discuss returning to the care of her previous pulmonologist  Patient Reported Barriers and Concerns: n/a                   - Plan for overcoming barriers: none    Care Managers Interventions: continue to provide encouragement and education for healthy coping and dx    Future Appointments:   Future Appointments   Date Time Provider Department Center   7/1/2024  2:00 PM Caridad Flores DPM [facility]   7/22/2024  2:20 PM Abdiaziz Melendez MD [id number] [place]   8/9/2024  2:00 PM Riki Tran MD [id number]         Next Care Manager Follow Up Date: one month  Reason For Follow Up: review progress and or barriers towards patient's goals.     Time Spent This Encounter Total: 20 min medical record review, telephone communication, care plan updates where needed, education, goals, and action plan recreation/update. Provided acknowledgment and validation to patient's concerns.   Monthly Minute Total including today: 20  Physical assessment, complete health history, and need for CCM established by Enrique Braun MD.

## 2024-06-13 ENCOUNTER — NURSE TRIAGE (OUTPATIENT)
Dept: INTERNAL MEDICINE CLINIC | Facility: CLINIC | Age: 79
End: 2024-06-13

## 2024-06-13 RX ORDER — PREDNISONE 10 MG/1
TABLET ORAL
Qty: 20 TABLET | Refills: 0 | Status: SHIPPED | OUTPATIENT
Start: 2024-06-13 | End: 2024-08-22 | Stop reason: CLARIF

## 2024-06-13 NOTE — TELEPHONE ENCOUNTER
Action Requested: Summary for Provider     []  Critical Lab, Recommendations Needed  [x] Need Additional Advice  []   FYI    []   Need Orders  [] Need Medications Sent to Pharmacy  []  Other     SUMMARY: Patient called asking for  to call her, needs prednisone, COPD worsening. Patient states she will not go to the ER.    Patient states when she uses her oxygen at  2  liters  O2 sats are 97-98-99    In past 1-2 months she can't go to [facility] without oxygen.     Every time she takes off the oxygen goes to 80's , went to the bathroom forgot to take oxygen and went back O2 was 72    Feeling weak again, not feeling good, normally  gives prednisone . Deneis chest pain and cough    Gets out of breath making her bed with oxygen on, this is new.    Wants to go back to her old pulmonologist, , but  appointment not until 6/21/24       Reason for call: Difficulty Breathing  Onset: worsening in past 1-2 months.     Reason for Disposition   Longstanding difficulty breathing and not responding to usual therapy    Protocols used: Breathing Difficulty-A-OH

## 2024-06-13 NOTE — TELEPHONE ENCOUNTER
Talked to pt; no fevers; no leg edema or sudden increased sweight, no wheezing noted just her SOB and need for continued use of her O2 for the past week. She does use her nebs as prescribed.  She felt same way last month and given prednisone with good relief. I told her then will refill prednisone. I also told her should see her pulmonologist whch she said she will do.  I also told her if symptoms persist/worsens inspite of takng prednisone, she will need to go to ER which she agreed to do .

## 2024-06-13 NOTE — TELEPHONE ENCOUNTER
Patient called wanting to speak with . Per patient she would like to speak to him regarding her pulmonologist. Per patient she has been having difficulty breathing and her oximeter has been dropping down to 70. At the time of the call patient stated she was having difficulty breathing. Patient stated she did not want to go to er, transferred call to RN triage to further assist the patient.

## 2024-06-21 RX ORDER — METOPROLOL SUCCINATE 25 MG/1
25 TABLET, EXTENDED RELEASE ORAL DAILY
Qty: 90 TABLET | Refills: 1 | Status: SHIPPED | OUTPATIENT
Start: 2024-06-21

## 2024-06-21 NOTE — TELEPHONE ENCOUNTER
Please review; protocol failed/No Protocol    Requested Prescriptions   Pending Prescriptions Disp Refills    METOPROLOL SUCCINATE ER 25 MG Oral Tablet 24 Hr [Pharmacy Med Name: METOPROLOL SUCCINATE ER 25 MG Tablet Extended Release 24 Hour] 90 tablet 3     Sig: TAKE 1 TABLET EVERY DAY       Hypertension Medications Protocol Failed - 6/19/2024  7:39 PM        Failed - EGFRCR or GFRNAA > 50     GFR Evaluation  EGFRCR: 44 , resulted on 5/17/2024          Passed - CMP or BMP in past 12 months        Passed - Last BP reading less than 140/90     BP Readings from Last 1 Encounters:   05/17/24 114/76               Passed - In person appointment or virtual visit in the past 12 mos or appointment in next 3 mos     Recent Outpatient Visits              1 month ago Asthma-COPD overlap syndrome (HCC)    [facility] Enrique Braun MD    Office Visit    2 months ago COPD with acute exacerbation ([facility])    [facility] Enrique Braun MD    Office Visit    2 months ago Diet-controlled diabetes mellitus (HCC)    [facility] Caridad Flores DPM    Office Visit    3 months ago Primary hyperparathyroidism ([facility])    [facility] Aaliyah Leone MD    Office Visit    3 months ago COPD with acute exacerbation ([facility])    [facility] Enrique Braun MD    Office Visit          Future Appointments         Provider Department Appt Notes    In 1 week Caridad Flores DPM [facility] 3 month fu    In 1 month Abdiaziz Melendez MD [facility] dst/ yearly follow up, Hx GERD    In 1 month Riki Tran MD [facility] 6 mo follow up  ([facility] Informed)                       Future  Appointments         Provider Department Appt Notes    In 1 week Caridad Flores DPM [facility] 3 month fu    In 1 month Abdiaziz Melendez MD [facility] dst/ yearly follow up, Hx GERD    In 1 month Riki Tran MD [facility] 6 mo follow up  ([facility] Informed)          Recent Outpatient Visits              1 month ago Asthma-COPD overlap syndrome (HCC)    [facility] [place] Enrique Braun MD    Office Visit    2 months ago COPD with acute exacerbation (HCC)    [facility] Enrique Braun MD    Office Visit    2 months ago Diet-controlled diabetes mellitus (HCC)    [facility] Caridad Flores DPM    Office Visit    3 months ago Primary hyperparathyroidism (HCC)    [facility] Aaliyah Leone MD    Office Visit    3 months ago COPD with acute exacerbation (HCC)    [facility] Enrique Braun MD    Office Visit

## 2024-07-01 ENCOUNTER — OFFICE VISIT (OUTPATIENT)
Dept: PODIATRY CLINIC | Facility: CLINIC | Age: 79
End: 2024-07-01
Payer: MEDICARE

## 2024-07-01 DIAGNOSIS — E11.9 DIET-CONTROLLED DIABETES MELLITUS (HCC): Primary | ICD-10-CM

## 2024-07-01 DIAGNOSIS — L60.3 NAIL DYSTROPHY: ICD-10-CM

## 2024-07-01 PROCEDURE — 99213 OFFICE O/P EST LOW 20 MIN: CPT | Performed by: STUDENT IN AN ORGANIZED HEALTH CARE EDUCATION/TRAINING PROGRAM

## 2024-07-01 PROCEDURE — 1159F MED LIST DOCD IN RCRD: CPT | Performed by: STUDENT IN AN ORGANIZED HEALTH CARE EDUCATION/TRAINING PROGRAM

## 2024-07-01 PROCEDURE — 1126F AMNT PAIN NOTED NONE PRSNT: CPT | Performed by: STUDENT IN AN ORGANIZED HEALTH CARE EDUCATION/TRAINING PROGRAM

## 2024-07-01 NOTE — PROGRESS NOTES
[facility] Podiatry  Progress Note      Ariana Osorio is a 79 year old female.   Chief Complaint   Patient presents with    Diabetic Foot Care     3 mo f/u - last DoB1M=9.0 from 5/17/24 - has color changing in her feet - no pain - this AM her KA=419- needs to have her toenails trimmed and feet checked              HPI:     Patient is a pleasant 79-year-old diabetic female presents to clinic for bilateral foot evaluation.    Allergies: Allopurinol, Dog epithelium, Dust, Heparin, Smoke, Adhesive tape (rosins), Montelukast, Statins, Xanax [alprazolam], Adhesive tape, Amoxicillin, Other, Sulfa antibiotics, Ace inhibitors, and Aspirin    Current Outpatient Medications   Medication Sig Dispense Refill    metoprolol succinate ER 25 MG Oral Tablet 24 Hr Take 1 tablet (25 mg total) by mouth daily. 90 tablet 1    predniSONE 10 MG Oral Tab Take 4 tabs po x2d, 3 tabs x2d, 2 tabs x2d, 1 tab x2d 20 tablet 0    pantoprazole 20 MG Oral Tab EC Take 1 tablet (20 mg total) by mouth every morning before breakfast. 90 tablet 0    amLODIPine 2.5 MG Oral Tab Take 1 tablet (2.5 mg total) by mouth in the morning and 1 tablet (2.5 mg total) before bedtime. 180 tablet 1    sodium chloride, hypertonic, 3 % Inhalation Nebu Soln 3 mL.      Tiotropium Bromide Monohydrate (SPIRIVA RESPIMAT) 2.5 MCG/ACT Inhalation Aero Soln Inhale 2 puffs into the lungs daily.      glipiZIDE 5 MG Oral Tab Take 1 tablet (5 mg total) by mouth before breakfast. 90 tablet 0    methylPREDNISolone (MEDROL) 4 MG Oral Tablet Therapy Pack As directed. (Patient not taking: Reported on 4/17/2024) 1 each 0    arformoterol 15 MCG/2ML Inhalation Nebu Soln Take 2 mL (15 mcg total) by nebulization 2 (two) times daily. (Patient not taking: Reported on 5/17/2024) 180 each 0    budesonide 0.5 MG/2ML Inhalation Suspension Take 2 mL (0.5 mg total) by nebulization 2 (two) times daily. 180 each 0    LASIX 20 MG Oral Tab Take 1 tablet (20 mg total) by mouth as needed (FOR  SWELLING). (Patient not taking: Reported on 4/17/2024)      Potassium Chloride ER 20 MEQ Oral Tab CR  (Patient not taking: Reported on 5/17/2024)      Alcohol Swabs (DROPSAFE ALCOHOL PREP) 70 % Does not apply Pads Take 1 Bottle by mouth As Directed. 400 each 3    TRUEplus Lancets 33G Does not apply Misc 1 each by In Vitro route daily. 100 each 3    colchicine 0.6 MG Oral Tab Take 1 tablet (0.6 mg total) by mouth as needed.      Glucose Blood (TRUE METRIX BLOOD GLUCOSE TEST) In Vitro Strip 1 strip by In Vitro route 4 (four) times daily. 400 strip 3    febuxostat 40 MG Oral Tab Take 1 tablet (40 mg total) by mouth daily. 90 tablet 1    omega-3 fatty acids 1000 MG Oral Cap Take 1,000 mg by mouth daily.      Multiple Vitamin (MULTI-VITAMIN DAILY) Oral Tab Take 1 tablet by mouth daily.      Cholecalciferol (VITAMIN D) 2000 units Oral Cap Take 1 capsule (2,000 Units total) by mouth daily.        Past Medical History:    Age-related cataract of left eye    Age-related cataract of right eye    Anxiety    Asthma (HCC)    Atherosclerosis of coronary artery    Breast CA (HCC)    lt mastectomy    Breast CA (HCC)    lumpectomy, right    Cancer (HCC)    breast cancer    Carotid artery disease (HCC)    Carotid stenosis    Closed fracture of multiple ribs of left side with routine healing, subsequent encounter    Congestive heart disease (HCC)    COPD (chronic obstructive pulmonary disease) (HCC)    on O2 at 2 liters    Coronary atherosclerosis    Depression    Diabetes (HCC)    takes insulin when hospitalized, takes oral meds at home    Diastolic dysfunction without heart failure    Esophageal reflux    Essential hypertension    Generalized anxiety disorder    Gout    Gout    High blood pressure    High cholesterol    History of pituitary adenoma    Hyperlipidemia    Major depressive disorder, single episode, moderate (HCC)    Obesity      Past Surgical History:   Procedure Laterality Date    Cabg      Carotid endarterectomy  Bilateral     Cataract      Colonoscopy      2013    Colonoscopy  2013    Colonoscopy N/A 02/21/2023    Procedure: COLONOSCOPY;  Surgeon: Abdiaziz Melendez MD;  Location: [facility] ENDOSCOPY    Hernia surgery      Lumpectomy right  2014    Mastectomy left Left 1999    Radiation right  2014      Family History   Problem Relation Age of Onset    Asthma Father     Hypertension Father     Heart Disorder Father     Other (Other) Mother         thyroid surgery    Asthma Sister     Asthma Brother     Other (Other) Brother         gout    Breast Cancer Self 43        rt breast 69      Social History     Socioeconomic History    Marital status:    Tobacco Use    Smoking status: Never     Passive exposure: Never    Smokeless tobacco: Never   Vaping Use    Vaping status: Never Used   Substance and Sexual Activity    Alcohol use: No     Comment: rarely at weddings    Drug use: No   Other Topics Concern    Reaction to local anesthetic No    Pt has a pacemaker No    Pt has a defibrillator No           REVIEW OF SYSTEMS:     Denies nause, fever, chills  No calf pain  Denies chest pain or SOB      EXAM:   There were no vitals taken for this visit.  GENERAL: well developed, well nourished, in no apparent distress  EXTREMITIES:   1. Integument: Normal skin temperature and turgor. Toenails x10 are elongated, thickened and discolored with subungal derbi.     2. Vascular: Dorsalis pedis two out of four bilateral and posterior tibial pulses two out of   four bilateral, capillary refill normal.   3. Musculoskeletal: All muscle groups are graded 5 out of 5 in the foot and ankle.   4. Neurological: Normal sharp dull sensation; reflexes normal.             ASSESSMENT AND PLAN:   Diagnoses and all orders for this visit:    Diet-controlled diabetes mellitus (HCC)    Nail dystrophy        Plan:       -Patient examined, chart history reviewed.  -Discussed importance of proper pedal hygiene, regular foot checks, and tight glucose  control.  -Sharply debrided nails x10 with a sterile nail nipper achieving a 20% reduction in thickness and length, without incident.   -Ambulate with supportive shoes and inserts and avoid walking barefoot.  -Educated patient on acute signs of infection advised patient to seek immediate medical attention if symptoms arise.      The patient indicates understanding of these issues and agrees to the plan.        Caridad Flores DPM

## 2024-07-02 ENCOUNTER — TELEPHONE (OUTPATIENT)
Dept: PODIATRY CLINIC | Facility: CLINIC | Age: 79
End: 2024-07-02

## 2024-07-02 NOTE — TELEPHONE ENCOUNTER
Patient had appointment on 7/1 and states she can not remember how long she is supposed to wait to follow up. Patient states she thinks it is 8 weeks but would like confirmation. Please call.

## 2024-07-02 NOTE — TELEPHONE ENCOUNTER
When do you want this patient to follow up with you? There is no specific notation in last office note. Patient thinks 8 weeks but is DM nail and foot care

## 2024-07-05 NOTE — TELEPHONE ENCOUNTER
Called patient back. She was playing [employer] and could not schedule an appointment. She will call back to schedule appointment.     Phone room. Per below, please schedule patient for appointment during the first week of September, 2024. Week of September 2.

## 2024-07-05 NOTE — TELEPHONE ENCOUNTER
Called patient. Left messsage to call back.     If patient would like to schedule an appointment she can be scheduled the first week of September with Dr. Flores.

## 2024-07-10 ENCOUNTER — ORDER TRANSCRIPTION (OUTPATIENT)
Dept: CARDIAC REHAB | Facility: HOSPITAL | Age: 79
End: 2024-07-10

## 2024-07-10 DIAGNOSIS — J45.909 ASTHMA (HCC): Primary | ICD-10-CM

## 2024-07-15 ENCOUNTER — CARDPULM VISIT (OUTPATIENT)
Dept: CARDIAC REHAB | Facility: HOSPITAL | Age: 79
End: 2024-07-15
Attending: INTERNAL MEDICINE
Payer: MEDICARE

## 2024-07-15 DIAGNOSIS — J45.909 ASTHMA (HCC): Primary | ICD-10-CM

## 2024-07-16 ENCOUNTER — PATIENT OUTREACH (OUTPATIENT)
Dept: CASE MANAGEMENT | Age: 79
End: 2024-07-16

## 2024-07-16 DIAGNOSIS — N18.30 CONTROLLED TYPE 2 DIABETES MELLITUS WITH STAGE 3 CHRONIC KIDNEY DISEASE, WITHOUT LONG-TERM CURRENT USE OF INSULIN (HCC): ICD-10-CM

## 2024-07-16 DIAGNOSIS — I50.32 CHRONIC DIASTOLIC CONGESTIVE HEART FAILURE (HCC): ICD-10-CM

## 2024-07-16 DIAGNOSIS — E11.59 HYPERTENSION ASSOCIATED WITH TYPE 2 DIABETES MELLITUS (HCC): ICD-10-CM

## 2024-07-16 DIAGNOSIS — Z85.3 HISTORY OF BREAST CANCER: ICD-10-CM

## 2024-07-16 DIAGNOSIS — E55.9 VITAMIN D DEFICIENCY: ICD-10-CM

## 2024-07-16 DIAGNOSIS — E11.22 CONTROLLED TYPE 2 DIABETES MELLITUS WITH STAGE 3 CHRONIC KIDNEY DISEASE, WITHOUT LONG-TERM CURRENT USE OF INSULIN (HCC): ICD-10-CM

## 2024-07-16 DIAGNOSIS — J44.1 COPD EXACERBATION (HCC): ICD-10-CM

## 2024-07-16 DIAGNOSIS — N18.32 STAGE 3B CHRONIC KIDNEY DISEASE (HCC): Primary | ICD-10-CM

## 2024-07-16 DIAGNOSIS — I15.2 HYPERTENSION ASSOCIATED WITH TYPE 2 DIABETES MELLITUS (HCC): ICD-10-CM

## 2024-07-16 DIAGNOSIS — H40.9 GLAUCOMA, UNSPECIFIED GLAUCOMA TYPE, UNSPECIFIED LATERALITY: ICD-10-CM

## 2024-07-16 DIAGNOSIS — I73.9 PAD (PERIPHERAL ARTERY DISEASE) (HCC): ICD-10-CM

## 2024-07-16 DIAGNOSIS — I25.10 CORONARY ARTERY DISEASE INVOLVING NATIVE CORONARY ARTERY OF NATIVE HEART WITHOUT ANGINA PECTORIS: ICD-10-CM

## 2024-07-16 DIAGNOSIS — J44.9 COPD, SEVERE (HCC): ICD-10-CM

## 2024-07-16 NOTE — PROGRESS NOTES
Spoke to Ariana for Chronic Care Management.      Updates to patient care team/comments: UTD  Patient reported changes in medications: UTD  Med Adherence  Comment: pt taking medications as prescribed     Health Maintenance:   Health Maintenance   Topic Date Due    Zoster Vaccines (1 of 2) Never done    COVID-19 Vaccine (4 - 2023-24 season) 09/01/2023    Diabetes Care Dilated Eye Exam  11/30/2024 (Originally 10/13/2021)    Diabetes Care Foot Exam  02/19/2025 (Originally 7/23/2020)    Influenza Vaccine (1) 10/01/2024    Diabetes Care A1C  11/17/2024    Diabetes Care: GFR  05/17/2025    Diabetes Care: Microalb/Creat Ratio  05/17/2025    DEXA Scan  Completed    MA Annual Health Assessment  Completed    Annual Depression Screening  Completed    Fall Risk Screening (Annual)  Completed    Pneumococcal Vaccine: 65+ Years  Completed    Mammogram  Discontinued    Colonoscopy  Discontinued       Patient updates/concerns:    Pt requested [facility] assistance in determining how she can obtain a travel cannister of O2 to use when flying. Pt states she is unable to vacation using the oxygen tank that she has.  Pt states she is currently renting the one she has and would like to determine if it is possible to own a travel cannister outright or have one covered by her insurance.  CCM will f/u with pt when a resolution as been  determined.    Pt requested assistance in determining if she was still under coverage of medicaid.  [facility] called  to verify coverage and determine how to include proof of coverage in her chart.  [facility] was unable to verify coverage without first providing consent from pt, confirmed with  that pt can call verification number herself or ccm can 3 way call with pt to provide consent.  Ccm f/u with pt and left message for call back to complete this request.    Pt requested [facility] assistance in following up with her  from [facility] services. Amira moyer /[phone].  Pt was not  certain if senior services required additional proof or confirmation of medicaid benefits.  Pt is currently relying on [facility] for meals on wheels and 3 x week caregiver and she wants to be sure no disruption of services will take place. She also requested assistance in determining if any other services may be available to her as a medicaid enrollee, like help with utility bills that have become prohibitive.    Pt has scheduled her pulmonary rehab. She will continue her specialty care with dr james. Pt has not yet been over the plan of care or what is expected in between visits like breathing exercises. Pt will review the plan at upcoming visit. She has already insured reliable transportation with caregiver.   Pt is taking wixela twice daily.      Goals/Action Plan:    Active goal from previous outreach: managing healthcare    Patient reported progress towards goals: pt continues to see providers as needed and follows up with appropriate specialists                - What: breathing issues           - Where/When/How: pt has scheduled cardiac rehab and uses o2. Pt requesting assistance in obtaining travel o2  Patient Reported Barriers and Concerns: n/a                   - Plan for overcoming barriers: none    Care Managers Interventions: continue to provide encouragement and education for healthy coping and dx    Future Appointments:   Future Appointments   Date Time Provider Department Center   7/22/2024  2:20 PM Abdiaziz Melendez MD [id number] [facility]   7/23/2024  1:15 PM [facility] PULM PHASE 2 [facility] CP REHAB EM [facility]   7/25/2024  1:15 PM [facility] PULM PHASE 2 [facility] CP REHAB EM [facility]   7/30/2024  1:15 PM [facility] PULM PHASE 2 [facility] CP REHAB EM [facility]   8/1/2024  1:15 PM [facility] PULM PHASE 2 [facility] CP REHAB EM [facility]   8/6/2024  1:15 PM [facility] PULM PHASE 2 [facility] CP REHAB EM [facility]   8/8/2024  1:15 PM [facility] PULM PHASE 2 [facility] CP REHAB EM [facility]   8/9/2024  2:00 PM Riki Tran MD [id number]   8/13/2024  1:15 PM [facility] PULM PHASE 2 [facility] CP  REHAB EM [facility]   8/15/2024  1:15 PM [facility] PULM PHASE 2 [facility] CP REHAB [facility]   8/20/2024  1:15 PM [facility] PULM PHASE 2 [facility] CP REHAB [facility]   8/22/2024  1:15 PM [facility] PULM PHASE 2 [facility] CP REHAB [facility]   8/27/2024  1:15 PM [facility] PULM PHASE 2 [facility] CP REHAB [facility]   8/29/2024  1:15 PM [facility] PULM PHASE 2 [facility] CP REHAB [facility]   8/30/2024  2:00 PM Heri Miller MD ECCFHENT EC [facility]   8/30/2024  2:20 PM EC AUDIO ECCFHAUD EC [facility]   9/3/2024  1:15 PM [facility] PULM PHASE 2 [facility] CP REHAB [facility]   9/5/2024  1:15 PM [facility] PULM PHASE 2 [facility] CP REHAB [facility]   9/6/2024 11:30 AM Caridad Flores DPM ECADOPOD EC ADO   9/10/2024  1:15 PM [facility] PULM PHASE 2 [facility] CP REHAB [facility]   9/12/2024  1:15 PM [facility] PULM PHASE 2 [facility] CP REHAB [facility]   9/17/2024  1:15 PM [facility] PULM PHASE 2 [facility] CP REHAB [facility]   9/19/2024  1:15 PM [facility] PULM PHASE 2 [facility] CP REHAB [facility]   9/24/2024  1:15 PM [facility] PULM PHASE 2 [facility] CP REHAB [facility]   9/26/2024  1:15 PM [facility] PULM PHASE 2 [facility] CP REHAB [facility]   10/1/2024  1:15 PM [facility] PULM PHASE 2 [facility] CP REHAB [facility]   10/3/2024  1:15 PM [facility] PULM PHASE 2 [facility] CP REHAB [facility]   10/8/2024  1:15 PM [facility] PULM PHASE 2 [facility] CP REHAB [facility]   10/10/2024  1:15 PM [facility] PULM PHASE 2 [facility] CP REHAB EM [facility]   10/15/2024  1:15 PM [facility] PULM PHASE 2 [facility] CP REHAB EM [facility]   10/17/2024  1:15 PM [facility] PULM PHASE 2 [facility] CP REHAB [facility]   10/22/2024  1:15 PM [facility] PULM PHASE 2 [facility] CP REHAB [facility]   10/24/2024  1:15 PM [facility] PULM PHASE 2 [facility] CP REHAB [facility]   10/29/2024  1:15 PM [facility] PULM PHASE 2 [facility] CP REHAB [facility]   10/31/2024  1:15 PM [facility] PULM PHASE 2 [facility] CP REHAB EM [facility]   11/5/2024  1:15 PM [facility] PULM PHASE 2 [facility] CP REHAB [facility]   11/7/2024  1:15 PM [facility] PULM PHASE 2 [facility] CP REHAB [facility]   11/12/2024  1:15 PM [facility] PULM PHASE 2 [facility] CP REHAB [facility]   11/14/2024  1:15 PM [facility] PULM PHASE 2 [facility] CP REHAB [facility]   11/19/2024  1:15 PM [facility] PULM PHASE 2 [facility] CP REHAB [facility]   11/21/2024  1:15 PM [facility] PULM PHASE 2 [facility] CP REHAB EM [facility]   11/26/2024  1:15 PM [facility] PULM PHASE 2 [facility] CP REHAB EM [facility]   11/28/2024  1:15  PM [facility] PULM PHASE 2 [facility] CP REHAB EM [facility]   12/3/2024  1:15 PM [facility] PULM PHASE 2 [facility] CP REHAB EM [facility]   12/5/2024  1:15 PM [facility] PULM PHASE 2 [facility] CP REHAB EM [facility]   12/10/2024  1:15 PM [facility] PULM PHASE 2 [facility] CP REHAB [facility]   12/12/2024  1:15 PM [facility] PULM PHASE 2 [facility] CP REHAB EM [facility]   12/17/2024  1:15 PM [facility] PULM PHASE 2 [facility] CP REHAB EM [facility]   12/19/2024  1:15 PM [facility] PULM PHASE 2 [facility] CP REHAB EM [facility]   12/24/2024  1:15 PM [facility] PULM PHASE 2 [facility] CP REHAB EM [facility]         Next Care Manager Follow Up Date: one month    Reason For Follow Up: review progress and or barriers towards patient's goals.     Time Spent This Encounter Total: 32 min medical record review, telephone communication, care plan updates where needed, education, goals, and action plan recreation/update. Provided acknowledgment and validation to patient's concerns.   Monthly Minute Total including today: 32  Physical assessment, complete health history, and need for CCM established by Enrique Braun MD.

## 2024-07-16 NOTE — PROGRESS NOTES
Pt followed up with ccm to provide ccm with medicaid and DME info that will make it easier to help manage her care.    Her Medicaid RIN [id number] can be used to verify benefits coverage.    She uses [employer]   Acct # [id number] for her O2.  She requested ccm assistance in determining how the details of rental agreement and how she can get portable O2    Pt welcomed ccm assistance in identifying Medicare Benefits counselor in the area to help her with any ongoing questions about benefits, coverage and future changes in plan and how medicaid might influence her choices    Time with pt 11 min     Total monthly time 43 min

## 2024-07-17 NOTE — PROGRESS NOTES
F/u with pt     Pt was advised that [employer] company has no record of medicaid coverage and encouraged her to call [facility] to update them on the information. Pt verbalized understanding.  Confirmed pt is clear to travel with current portable o2, pt is seeking advise on her options for portable o2 that can be flown internationally. Ccm will f/u with pt at next outreach if options have been determined.      Pt welcomed [facility] resources for SHIP counselor contact number in her area. [facility] mailed pt information and encouraged her to call for more specific questions about coverage and benefits. Pt verbalized understanding.    Pt will f/u with  to update them with [facility] contact information to allow release of information to health  care manager.

## 2024-07-22 ENCOUNTER — OFFICE VISIT (OUTPATIENT)
Facility: CLINIC | Age: 79
End: 2024-07-22

## 2024-07-22 VITALS
WEIGHT: 177 LBS | SYSTOLIC BLOOD PRESSURE: 129 MMHG | DIASTOLIC BLOOD PRESSURE: 68 MMHG | HEART RATE: 73 BPM | BODY MASS INDEX: 30.22 KG/M2 | HEIGHT: 64 IN

## 2024-07-22 DIAGNOSIS — Z86.010 HISTORY OF COLON POLYPS: ICD-10-CM

## 2024-07-22 DIAGNOSIS — K21.00 GASTROESOPHAGEAL REFLUX DISEASE WITH ESOPHAGITIS WITHOUT HEMORRHAGE: Primary | ICD-10-CM

## 2024-07-22 PROCEDURE — 3074F SYST BP LT 130 MM HG: CPT | Performed by: INTERNAL MEDICINE

## 2024-07-22 PROCEDURE — 99213 OFFICE O/P EST LOW 20 MIN: CPT | Performed by: INTERNAL MEDICINE

## 2024-07-22 PROCEDURE — 1126F AMNT PAIN NOTED NONE PRSNT: CPT | Performed by: INTERNAL MEDICINE

## 2024-07-22 PROCEDURE — 3008F BODY MASS INDEX DOCD: CPT | Performed by: INTERNAL MEDICINE

## 2024-07-22 PROCEDURE — 1159F MED LIST DOCD IN RCRD: CPT | Performed by: INTERNAL MEDICINE

## 2024-07-22 PROCEDURE — 3078F DIAST BP <80 MM HG: CPT | Performed by: INTERNAL MEDICINE

## 2024-07-22 RX ORDER — PANTOPRAZOLE SODIUM 20 MG/1
20 TABLET, DELAYED RELEASE ORAL
Qty: 90 TABLET | Refills: 3 | Status: SHIPPED | OUTPATIENT
Start: 2024-07-22

## 2024-07-22 NOTE — PROGRESS NOTES
Subjective:   Patient ID: Ariana Osorio is a 79 year old female.    HPI  The patient returns in follow-up.  She was last seen in September 2023.     As per previous notes the patient was admitted to the hospital in February 2022 with coffee-ground emesis and black stools on full dose aspirin therapy prescribed for a history of cardiovascular disease (CABG and left carotid endarterectomy).  Upper endoscopy revealed LA grade B esophagitis and H. pylori negative gastroduodenitis with antral erosions.  No dominant ulceration was seen.  Baseline hemoglobin was 12.8 which decreased to a fiordaliza value of 7.6 on 2/18/2022.  The patient was discharged on pantoprazole.      The patient was advised to continue pantoprazole maintenance (once daily).     The patient underwent a screening colonoscopy in February 2023.  #6 subcentimeter polyps were removed of which #3 were traditional or serrated adenomas.  Uncomplicated diverticulosis was present.  A surveillance colonoscopy in 3 years was advised.     At the time of the patient's last visit she was experiencing constipation which she felt was due to the pantoprazole.  Discontinuing the pantoprazole resulted in recurrent reflux symptoms (epigastric discomfort, bloating, belching).  She was advised to resume pantoprazole at least 3 times weekly in an effort to control symptoms.  We discussed other alternatives including a different PPI or H2 antagonist therapy.    Current history:  The patient recently contracted influenza and required a few ED visits/brief hospital admissions for exacerbation of COPD.  She had been feeling better, however, over the past few days she has noted some wheezing, chest tightness and dyspnea on exertion.  She is normally able to go without her oxygen, however, minimal exertion without oxygen now causes shortness of breath.  She recalls nebulizer therapy helping her significantly while in the hospital.    The patient has no difficulty swallowing  food.  She will have occasional difficulty swallowing saliva depending on head position.  She had tried to stop the pantoprazole but noted recurrent heartburn.  She requests a refill.  She denies abdominal pain.  She has a tendency towards constipation which has improved with eating cherries over the summer.  She has noted no bleeding.    Her subjective wellbeing from a gastrointestinal tract standpoint is good.       History/Other:   Review of Systems  See above    Wt Readings from Last 6 Encounters:   07/22/24 177 lb (80.3 kg)   05/17/24 166 lb 3.2 oz (75.4 kg)   04/17/24 171 lb 4.8 oz (77.7 kg)   03/18/24 172 lb 6.4 oz (78.2 kg)   03/16/24 167 lb 4.8 oz (75.9 kg)   03/11/24 171 lb 3.2 oz (77.7 kg)       Current Outpatient Medications   Medication Sig Dispense Refill    metoprolol succinate ER 25 MG Oral Tablet 24 Hr Take 1 tablet (25 mg total) by mouth daily. 90 tablet 1    predniSONE 10 MG Oral Tab Take 4 tabs po x2d, 3 tabs x2d, 2 tabs x2d, 1 tab x2d 20 tablet 0    pantoprazole 20 MG Oral Tab EC Take 1 tablet (20 mg total) by mouth every morning before breakfast. 90 tablet 0    amLODIPine 2.5 MG Oral Tab Take 1 tablet (2.5 mg total) by mouth in the morning and 1 tablet (2.5 mg total) before bedtime. 180 tablet 1    sodium chloride, hypertonic, 3 % Inhalation Nebu Soln 3 mL.      glipiZIDE 5 MG Oral Tab Take 1 tablet (5 mg total) by mouth before breakfast. 90 tablet 0    Alcohol Swabs (DROPSAFE ALCOHOL PREP) 70 % Does not apply Pads Take 1 Bottle by mouth As Directed. 400 each 3    TRUEplus Lancets 33G Does not apply Misc 1 each by In Vitro route daily. 100 each 3    colchicine 0.6 MG Oral Tab Take 1 tablet (0.6 mg total) by mouth as needed.      Glucose Blood (TRUE METRIX BLOOD GLUCOSE TEST) In Vitro Strip 1 strip by In Vitro route 4 (four) times daily. 400 strip 3    febuxostat 40 MG Oral Tab Take 1 tablet (40 mg total) by mouth daily. 90 tablet 1    omega-3 fatty acids 1000 MG Oral Cap Take 1,000 mg by mouth  daily.      Multiple Vitamin (MULTI-VITAMIN DAILY) Oral Tab Take 1 tablet by mouth daily.      Cholecalciferol (VITAMIN D) 2000 units Oral Cap Take 1 capsule (2,000 Units total) by mouth daily.      Tiotropium Bromide Monohydrate (SPIRIVA RESPIMAT) 2.5 MCG/ACT Inhalation Aero Soln Inhale 2 puffs into the lungs daily. (Patient not taking: Reported on 7/22/2024)      methylPREDNISolone (MEDROL) 4 MG Oral Tablet Therapy Pack As directed. (Patient not taking: Reported on 7/22/2024) 1 each 0    arformoterol 15 MCG/2ML Inhalation Nebu Soln Take 2 mL (15 mcg total) by nebulization 2 (two) times daily. (Patient not taking: Reported on 7/22/2024) 180 each 0    budesonide 0.5 MG/2ML Inhalation Suspension Take 2 mL (0.5 mg total) by nebulization 2 (two) times daily. (Patient not taking: Reported on 7/22/2024) 180 each 0    LASIX 20 MG Oral Tab Take 1 tablet (20 mg total) by mouth as needed (FOR SWELLING). (Patient not taking: Reported on 7/22/2024)      Potassium Chloride ER 20 MEQ Oral Tab CR  (Patient not taking: Reported on 7/22/2024)       Allergies:  Allergies   Allergen Reactions    Allopurinol HIVES, SWELLING and SHORTNESS OF BREATH    Dog Epithelium SHORTNESS OF BREATH     Hair and saliva    Dust SHORTNESS OF BREATH    Heparin SWELLING    Smoke SHORTNESS OF BREATH    Adhesive Tape (Rosins) RASH    Montelukast HALLUCINATION    Statins MYALGIA     Pt had developed myalgia and myopathy    Xanax [Alprazolam] RESTLESSNESS    Adhesive Tape OTHER (SEE COMMENTS)    Amoxicillin OTHER (SEE COMMENTS)    Other OTHER (SEE COMMENTS)    Sulfa Antibiotics OTHER (SEE COMMENTS)    Ace Inhibitors Coughing    Aspirin RASH       Objective:   Physical Exam  Vitals and nursing note reviewed.   Constitutional:       General: She is not in acute distress.     Appearance: She is well-developed. She is not ill-appearing, toxic-appearing or diaphoretic.      Comments: Supplemental oxygen in place.  Dyspnea on exertion especially without  oxygen  No dyspnea at rest   HENT:      Head: Normocephalic and atraumatic.      Mouth/Throat:      Pharynx: No oropharyngeal exudate.   Eyes:      General: No scleral icterus.     Conjunctiva/sclera: Conjunctivae normal.   Neck:      Thyroid: No thyromegaly.   Cardiovascular:      Rate and Rhythm: Normal rate and regular rhythm.      Heart sounds: Normal heart sounds.   Pulmonary:      Effort: Pulmonary effort is normal. No respiratory distress.      Breath sounds: Wheezing present. No rales.   Abdominal:      General: Bowel sounds are normal. There is no distension.      Palpations: Abdomen is soft. There is no mass.      Tenderness: There is no abdominal tenderness. There is no guarding or rebound.   Musculoskeletal:      Cervical back: Neck supple.   Lymphadenopathy:      Cervical: No cervical adenopathy.   Neurological:      Mental Status: She is alert and oriented to person, place, and time.   Psychiatric:         Behavior: Behavior normal.       Component      Latest Ref Rng 3/9/2024 3/11/2024 5/17/2024   WBC      4.0 - 11.0 x10(3) uL 12.5 (H)  13.7 (H)     RBC      3.80 - 5.30 x10(6)uL 4.88  4.28     Hemoglobin      12.0 - 16.0 g/dL 14.3  12.7     Hematocrit      35.0 - 48.0 % 45.5  40.3     MCV      80.0 - 100.0 fL 93.2  94.2     MCH      26.0 - 34.0 pg 29.3  29.7     MCHC      31.0 - 37.0 g/dL 31.4  31.5     RDW-SD      35.1 - 46.3 fL 51.8 (H)  52.4 (H)     RDW      11.0 - 15.0 % 15.0  15.2 (H)     Platelet Count      150.0 - 450.0 10(3)uL 235.0  187.0     Prelim Neutrophil Abs      1.50 - 7.70 x10 (3) uL 8.99 (H)  10.40 (H)     Neutrophils Absolute      1.50 - 7.70 x10(3) uL 8.99 (H)  10.40 (H)     Lymphocytes Absolute      1.00 - 4.00 x10(3) uL 1.42  0.97 (L)     Monocytes Absolute      0.10 - 1.00 x10(3) uL 1.71 (H)  1.85 (H)     Eosinophils Absolute      0.00 - 0.70 x10(3) uL 0.10  0.24     Basophils Absolute      0.00 - 0.20 x10(3) uL 0.05  0.05     Immature Granulocyte Absolute      0.00 - 1.00  x10(3) uL 0.26  0.19     Neutrophils %      % 71.8  75.8     Lymphocytes %      % 11.3  7.1     Monocytes %      % 13.6  13.5     Eosinophils %      % 0.8  1.8     Basophils %      % 0.4  0.4     Immature Granulocyte %      % 2.1  1.4     Glucose      70 - 99 mg/dL  159 (H)  55 (L)    Sodium      136 - 145 mmol/L  136  142    Potassium      3.5 - 5.1 mmol/L  4.2  4.4    Chloride      98 - 112 mmol/L  104  103    Carbon Dioxide, Total      21.0 - 32.0 mmol/L  31.0  33.0 (H)    ANION GAP      0 - 18 mmol/L  1  6    BUN      9 - 23 mg/dL  25 (H)  22    CREATININE      0.55 - 1.02 mg/dL  1.18 (H)  1.25 (H)    BUN/CREATININE RATIO      10.0 - 20.0   21.2 (H)  17.6    CALCIUM      8.7 - 10.4 mg/dL  10.2  10.2    CALCULATED OSMOLALITY      275 - 295 mOsm/kg  290  295    EGFR      >=60 mL/min/1.73m2  47 (L)  44 (L)    Patient Fasting for BMP?   No    Magnesium, Serum      1.6 - 2.6 mg/dL  1.9        Legend:  (H) High  (L) Low    Assessment & Plan:   1. Gastroesophageal reflux disease with esophagitis without hemorrhage    2. History of colon polyps    The patient has a history of complicated gastroesophageal reflux with erosive esophagitis and H. pylori negative gastroduodenal erosions (with bleeding).  I am recommending that the patient continue pantoprazole or equivalent at least 3 times weekly.  Either the 20 or 40 mg dose depending on symptom response is reasonable.  The patient is wheezing today with more respiratory symptoms.  I have advised her to contact her pulmonologist and to take a nebulizer treatment at home.  She states that she will do so.  With regards to the history of adenomatous colon polyps the patient would be due for a surveillance colonoscopy in February 2026, however, observation in light of age would not be unreasonable.  The patient will contact me with any ongoing GI symptoms.        Meds This Visit:  Requested Prescriptions      No prescriptions requested or ordered in this encounter        Imaging & Referrals:  None

## 2024-07-22 NOTE — TELEPHONE ENCOUNTER
Dr Freed    Called and spoke to the patient, date of birth and name verified.    She stated she takes pantoprazole 20 mg daily. She has about 30 tablets left.    Pharmacy verified, order pended.    Thank you

## 2024-07-22 NOTE — PATIENT INSTRUCTIONS
1.  Please take a nebulizer treatment upon returning home.  2.  Please contact your pulmonologist today regarding your increasing shortness of breath and wheezing.  3.  Continue pantoprazole at previous dose.  4.  Please contact me if you have any ongoing digestive symptoms.  5.  Follow-up office visit in 6-12 months.

## 2024-07-22 NOTE — TELEPHONE ENCOUNTER
GI RNs: Please contact the patient.  Has she been taking 20 mg or 40 mg of pantoprazole daily?  Does she wish me to send the refill to the [facility] pharmacy?

## 2024-07-23 ENCOUNTER — TELEPHONE (OUTPATIENT)
Dept: INTERNAL MEDICINE CLINIC | Facility: CLINIC | Age: 79
End: 2024-07-23

## 2024-07-23 RX ORDER — POTASSIUM CHLORIDE 1500 MG/1
1 TABLET, EXTENDED RELEASE ORAL
Qty: 30 TABLET | Refills: 1 | Status: SHIPPED | OUTPATIENT
Start: 2024-07-23

## 2024-07-23 RX ORDER — FUROSEMIDE 20 MG/1
20 TABLET ORAL AS NEEDED
Qty: 30 TABLET | Refills: 1 | Status: SHIPPED | OUTPATIENT
Start: 2024-07-23 | End: 2024-07-23

## 2024-07-23 RX ORDER — FUROSEMIDE 20 MG/1
20 TABLET ORAL AS NEEDED
Qty: 30 TABLET | Refills: 1 | Status: SHIPPED | OUTPATIENT
Start: 2024-07-23

## 2024-07-23 RX ORDER — POTASSIUM CHLORIDE 1500 MG/1
1 TABLET, EXTENDED RELEASE ORAL
Qty: 30 TABLET | Refills: 1 | Status: SHIPPED | OUTPATIENT
Start: 2024-07-23 | End: 2024-07-23

## 2024-07-23 NOTE — TELEPHONE ENCOUNTER
Patient reports pedal edema x one week. States that it is hard to get her shoes on and she does have some shortness of breath with exertion (is using O2 in the house).  States that she did have a Lasix 20mg and K+ prescription to take prn for edema.  Please advise on possible refills. Last refilled 2/2024. Please advise.

## 2024-07-23 NOTE — TELEPHONE ENCOUNTER
Spoke to patient (verified Name and ) and relayed Dr. Braun's message below. Patient verbalized understanding. Prescription sent to [facility] and patient needs the medications sent to local pharmacy instead. Scripts transferred to [facility] in [place]. No further questions or concerns at this time.

## 2024-07-30 NOTE — TELEPHONE ENCOUNTER
NewRx:    Lasix 20Mg Oral Tablet - 30 Tablets - Take 1 tablet (20Mg total) by mouth as needed (for swelling)    Rx request came via fax with no Pharmacy name, address, or phone number attached, unsure if meant for [facility] in [place].    Please Advise

## 2024-08-02 ENCOUNTER — TELEPHONE (OUTPATIENT)
Dept: INTERNAL MEDICINE CLINIC | Facility: CLINIC | Age: 79
End: 2024-08-02

## 2024-08-02 RX ORDER — FUROSEMIDE 20 MG/1
20 TABLET ORAL AS NEEDED
Qty: 30 TABLET | Refills: 1 | OUTPATIENT
Start: 2024-08-02

## 2024-08-02 NOTE — TELEPHONE ENCOUNTER
Pt returned call, confirmed currently has supply of  furosemide 20 mg and potassium 20 mg at home. No reaction from these medications just concerned about side effects and potassium decreasing with the diuretic.  Reviewed Dr Braun 7/23/24 prescriptions on these two  medications to take only as needed for swelling. Currently has feet swelling and instructed to elevated legs on pillows when sitting or laying in bed to decrease leg swelling. Avoid wearing tight shoes.   Pt stated [facility] pharmacy miss understood her.     Please reply to pool: EM RN TRIAGE - spoke with pharmacist

## 2024-08-02 NOTE — TELEPHONE ENCOUNTER
Dr. Braun- [facility] pharmacy calling stating that patient has an allergy to Furosemide that was added in May. It is not listed on her current allergy list in her chart. You recently prescribed her Lasix 20 mg. How would you like to proceed. There is no type of reaction listed.    *would you like Furosemide added to her allergy list?  *upon cart review, she has taken lasix in the past    Thank you

## 2024-08-02 NOTE — TELEPHONE ENCOUNTER
She has no allergy to furosemide/lasix. She had been taking it already before and no reaction. We can verify with pt also. thanks

## 2024-08-02 NOTE — TELEPHONE ENCOUNTER
I spoke with Paola WU RPH at [facility] and informed pt has no allergies to furosemide/lasix. Update their files and dispense as needed. Dr Braun approved generic furosemide. This is covered by pts' insurance per pharmacist.     Please reply to pool: EM RN TRIAGE

## 2024-08-02 NOTE — TELEPHONE ENCOUNTER
Left Voicemail  for patient to call back our office. Office phone number provided with office telephone hours.     Staff--Please verify if patient is  allergic to lasix or not

## 2024-08-08 ENCOUNTER — TELEPHONE (OUTPATIENT)
Dept: FAMILY MEDICINE CLINIC | Facility: CLINIC | Age: 79
End: 2024-08-08

## 2024-08-08 ENCOUNTER — TELEPHONE (OUTPATIENT)
Dept: NEPHROLOGY | Facility: CLINIC | Age: 79
End: 2024-08-08

## 2024-08-08 ENCOUNTER — APPOINTMENT (OUTPATIENT)
Dept: CARDIAC REHAB | Facility: HOSPITAL | Age: 79
End: 2024-08-08
Attending: INTERNAL MEDICINE
Payer: MEDICARE

## 2024-08-08 NOTE — TELEPHONE ENCOUNTER
Patient was informed by Medicare to contact our office to request order for a walker. Patient needs assistance with walking. Please call at [phone],thanks.

## 2024-08-08 NOTE — TELEPHONE ENCOUNTER
Informed patient that lab are already in the system ordered last Feb stated has appt tomorrow advised can still do the lab today or tomorrow morning since appt is not until 2pm,pt verbalized understanding reported might do today did not have breakfast yet.

## 2024-08-09 ENCOUNTER — LAB ENCOUNTER (OUTPATIENT)
Dept: LAB | Age: 79
End: 2024-08-09
Attending: INTERNAL MEDICINE
Payer: MEDICARE

## 2024-08-09 ENCOUNTER — OFFICE VISIT (OUTPATIENT)
Dept: NEPHROLOGY | Facility: CLINIC | Age: 79
End: 2024-08-09

## 2024-08-09 VITALS
HEART RATE: 73 BPM | SYSTOLIC BLOOD PRESSURE: 146 MMHG | HEIGHT: 64 IN | DIASTOLIC BLOOD PRESSURE: 65 MMHG | BODY MASS INDEX: 30 KG/M2

## 2024-08-09 DIAGNOSIS — N18.32 STAGE 3B CHRONIC KIDNEY DISEASE (HCC): Primary | ICD-10-CM

## 2024-08-09 DIAGNOSIS — N18.31 STAGE 3A CHRONIC KIDNEY DISEASE (HCC): ICD-10-CM

## 2024-08-09 LAB
ALBUMIN SERPL-MCNC: 4.2 G/DL (ref 3.2–4.8)
ANION GAP SERPL CALC-SCNC: 6 MMOL/L (ref 0–18)
BASOPHILS # BLD AUTO: 0.04 X10(3) UL (ref 0–0.2)
BASOPHILS NFR BLD AUTO: 0.6 %
BUN BLD-MCNC: 21 MG/DL (ref 9–23)
BUN/CREAT SERPL: 16.7 (ref 10–20)
CALCIUM BLD-MCNC: 10.8 MG/DL (ref 8.7–10.4)
CHLORIDE SERPL-SCNC: 104 MMOL/L (ref 98–112)
CO2 SERPL-SCNC: 31 MMOL/L (ref 21–32)
CREAT BLD-MCNC: 1.26 MG/DL
DEPRECATED RDW RBC AUTO: 48.9 FL (ref 35.1–46.3)
EGFRCR SERPLBLD CKD-EPI 2021: 44 ML/MIN/1.73M2 (ref 60–?)
EOSINOPHIL # BLD AUTO: 0.24 X10(3) UL (ref 0–0.7)
EOSINOPHIL NFR BLD AUTO: 3.5 %
ERYTHROCYTE [DISTWIDTH] IN BLOOD BY AUTOMATED COUNT: 13.6 % (ref 11–15)
GLUCOSE BLD-MCNC: 105 MG/DL (ref 70–99)
HCT VFR BLD AUTO: 43.6 %
HGB BLD-MCNC: 13.4 G/DL
IMM GRANULOCYTES # BLD AUTO: 0.04 X10(3) UL (ref 0–1)
IMM GRANULOCYTES NFR BLD: 0.6 %
LYMPHOCYTES # BLD AUTO: 1.48 X10(3) UL (ref 1–4)
LYMPHOCYTES NFR BLD AUTO: 21.7 %
MCH RBC QN AUTO: 29.7 PG (ref 26–34)
MCHC RBC AUTO-ENTMCNC: 30.7 G/DL (ref 31–37)
MCV RBC AUTO: 96.7 FL
MONOCYTES # BLD AUTO: 0.85 X10(3) UL (ref 0.1–1)
MONOCYTES NFR BLD AUTO: 12.5 %
NEUTROPHILS # BLD AUTO: 4.16 X10 (3) UL (ref 1.5–7.7)
NEUTROPHILS # BLD AUTO: 4.16 X10(3) UL (ref 1.5–7.7)
NEUTROPHILS NFR BLD AUTO: 61.1 %
OSMOLALITY SERPL CALC.SUM OF ELEC: 295 MOSM/KG (ref 275–295)
PHOSPHATE SERPL-MCNC: 3 MG/DL (ref 2.4–5.1)
PLATELET # BLD AUTO: 217 10(3)UL (ref 150–450)
POTASSIUM SERPL-SCNC: 4.5 MMOL/L (ref 3.5–5.1)
RBC # BLD AUTO: 4.51 X10(6)UL
SODIUM SERPL-SCNC: 141 MMOL/L (ref 136–145)
WBC # BLD AUTO: 6.8 X10(3) UL (ref 4–11)

## 2024-08-09 PROCEDURE — 36415 COLL VENOUS BLD VENIPUNCTURE: CPT

## 2024-08-09 PROCEDURE — 3077F SYST BP >= 140 MM HG: CPT | Performed by: INTERNAL MEDICINE

## 2024-08-09 PROCEDURE — 3008F BODY MASS INDEX DOCD: CPT | Performed by: INTERNAL MEDICINE

## 2024-08-09 PROCEDURE — 1159F MED LIST DOCD IN RCRD: CPT | Performed by: INTERNAL MEDICINE

## 2024-08-09 PROCEDURE — 80069 RENAL FUNCTION PANEL: CPT

## 2024-08-09 PROCEDURE — 99214 OFFICE O/P EST MOD 30 MIN: CPT | Performed by: INTERNAL MEDICINE

## 2024-08-09 PROCEDURE — 85025 COMPLETE CBC W/AUTO DIFF WBC: CPT

## 2024-08-09 PROCEDURE — 3078F DIAST BP <80 MM HG: CPT | Performed by: INTERNAL MEDICINE

## 2024-08-09 NOTE — PROGRESS NOTES
08/09/24        Patient: Ariana Osorio   YOB: 1945   Date of Visit: 8/9/2024       Dear  Dr. Kade MD,      Thank you for referring Ariana Osorio to my practice.  Please find my assessment and plan below.      As you know she is a 79-year-old female with a history of hypertension, adult-onset diabetes mellitus, GERD, primary hyperparathyroidism, coronary artery disease, status post CABG, gout, asthma/COPD who I now had the pleasure of seeing for follow-up of chronic kidney disease stage III.  Overall the patient states she is doing well without any chest pain, GI or urinary tract symptoms.  She thinks her breathing is a bit worse and she will be seeing her pulmonary doctor shortly.  No recent URI symptoms.    On physical exam her blood pressure was 146/65.  Repeat was 132/70 with a pulse of 73.  Her neck was supple without JVD.  Lungs are clear.  Heart revealed a regular rate and rhythm with an S4 but no gallops, murmurs or rubs.  Abdomen was soft, flat, nontender without organomegaly, masses or bruits.  Extremities revealed trace edema bilaterally.    I reviewed her most recent laboratory studies done on August 9, 2024.  Creatinine overall stable 1.26 with an estimated GFR of 44 cc/min.  Electrolytes were good except calcium was higher at 10.8.  She states she is not on any calcium supplements but will hold vitamin D.  Blood pressure seem to be under adequate control.  Otherwise she knows to maintain adequate hydration.  Avoid nonsteroidals.  Will continue to do CBC and renal panels quarterly.  I will see her again in 6 months for follow-up or sooner if clinically indicated.    Thank you again for allowing me to participate in the care of your patient.  If you have any questions please feel free to call.               Sincerely,   Riki Tran MD   [facility], [facility], [place]  [address]  [place]  [address]    Document electronically generated by:  Riki Tran MD

## 2024-08-15 ENCOUNTER — OFFICE VISIT (OUTPATIENT)
Dept: INTERNAL MEDICINE CLINIC | Facility: CLINIC | Age: 79
End: 2024-08-15

## 2024-08-15 VITALS
HEART RATE: 71 BPM | SYSTOLIC BLOOD PRESSURE: 128 MMHG | OXYGEN SATURATION: 95 % | DIASTOLIC BLOOD PRESSURE: 76 MMHG | TEMPERATURE: 99 F | HEIGHT: 64 IN | WEIGHT: 177.38 LBS | BODY MASS INDEX: 30.28 KG/M2

## 2024-08-15 DIAGNOSIS — Z91.81 AT RISK FOR FALLS: ICD-10-CM

## 2024-08-15 DIAGNOSIS — J44.9 COPD, SEVERE (HCC): ICD-10-CM

## 2024-08-15 DIAGNOSIS — I50.32 CHRONIC DIASTOLIC CONGESTIVE HEART FAILURE (HCC): ICD-10-CM

## 2024-08-15 DIAGNOSIS — M17.0 PRIMARY OSTEOARTHRITIS OF BOTH KNEES: Primary | ICD-10-CM

## 2024-08-15 PROCEDURE — 99213 OFFICE O/P EST LOW 20 MIN: CPT | Performed by: INTERNAL MEDICINE

## 2024-08-15 PROCEDURE — 3078F DIAST BP <80 MM HG: CPT | Performed by: INTERNAL MEDICINE

## 2024-08-15 PROCEDURE — 1159F MED LIST DOCD IN RCRD: CPT | Performed by: INTERNAL MEDICINE

## 2024-08-15 PROCEDURE — 3008F BODY MASS INDEX DOCD: CPT | Performed by: INTERNAL MEDICINE

## 2024-08-15 PROCEDURE — 3074F SYST BP LT 130 MM HG: CPT | Performed by: INTERNAL MEDICINE

## 2024-08-15 PROCEDURE — 1160F RVW MEDS BY RX/DR IN RCRD: CPT | Performed by: INTERNAL MEDICINE

## 2024-08-15 PROCEDURE — 1126F AMNT PAIN NOTED NONE PRSNT: CPT | Performed by: INTERNAL MEDICINE

## 2024-08-15 RX ORDER — POTASSIUM CHLORIDE 20 MEQ/1
TABLET, EXTENDED RELEASE ORAL
COMMUNITY
Start: 2024-07-24

## 2024-08-15 RX ORDER — REVEFENACIN 175 UG/3ML
175 SOLUTION RESPIRATORY (INHALATION) DAILY
COMMUNITY
Start: 2024-06-03 | End: 2025-01-29

## 2024-08-15 NOTE — TELEPHONE ENCOUNTER
Comments    Walker with seat     Please provide a qualifying diagnosis    Current diagnosis is:    Associated Diagnoses    Asthma-COPD overlap syndrome (HCC)  - Primary

## 2024-08-16 NOTE — TELEPHONE ENCOUNTER
Awaiting chart notes. Please advise, the diagnosis may need to be related to difficulty walking due to aliment, other than asthma/COPD.   Medicare guidelines may need another qualifying diagnosis  Include the following criteria:    Walker criteria   Beneficiary has a mobility limitation that significantly impairs his/her ability to participate in one or joie MRADLS in the home  The beneficiary is able to safely use the walker  The functional mobility deficit can be sufficiently resolved with the use of walker

## 2024-08-18 NOTE — PROGRESS NOTES
Subjective:     Patient ID: Ariana Osorio is a 79 year old female.    Patient presents today for ffup of her COPD. She states she believes her COPD is getting worse. She had noted that when she is doing any physical activity, she had noted a drop on her oxygen saturation. When at rest her saturation are in the low 90s to mid 90s.  She said she uses her inhalers and nebs prescribed by her pulmonologist and also uses her O2 supplement.  She finds it difficult now to walk and needs to use walker with a seat since she needs to sit down when she feels SOB.  She has osteoarthritis of knees making it also diffiicult to walk/ambulate, stand and affects stability of her legs and also her daily MRADL inside her home. Her chronic diastolic heart failure also adding on to her exertional dyspnea as well as generalized fatigue and weakness. She is able to use regular walker safely but needs a walker with seat now.     Osteoarthritis  This is a chronic problem. The current episode started more than 1 year ago. The problem occurs constantly. The problem has been unchanged. Associated symptoms comments: Bilateral knee pain. The symptoms are aggravated by standing and walking. She has tried nothing for the symptoms. The treatment provided no relief.       History/Other:   Review of Systems  Current Outpatient Medications   Medication Sig Dispense Refill    pantoprazole 20 MG Oral Tab EC Take 1 tablet (20 mg total) by mouth every morning before breakfast. 90 tablet 3    metoprolol succinate ER 25 MG Oral Tablet 24 Hr Take 1 tablet (25 mg total) by mouth daily. 90 tablet 1    amLODIPine 2.5 MG Oral Tab Take 1 tablet (2.5 mg total) by mouth in the morning and 1 tablet (2.5 mg total) before bedtime. 180 tablet 1    glipiZIDE 5 MG Oral Tab Take 1 tablet (5 mg total) by mouth before breakfast. (Patient taking differently: Take 1 tablet (5 mg total) by mouth before breakfast. PRN) 90 tablet 0    arformoterol 15 MCG/2ML Inhalation Nebu  Soln Take 2 mL (15 mcg total) by nebulization 2 (two) times daily. 180 each 0    budesonide 0.5 MG/2ML Inhalation Suspension Take 2 mL (0.5 mg total) by nebulization 2 (two) times daily. 180 each 0    Alcohol Swabs (DROPSAFE ALCOHOL PREP) 70 % Does not apply Pads Take 1 Bottle by mouth As Directed. 400 each 3    TRUEplus Lancets 33G Does not apply Misc 1 each by In Vitro route daily. 100 each 3    Glucose Blood (TRUE METRIX BLOOD GLUCOSE TEST) In Vitro Strip 1 strip by In Vitro route 4 (four) times daily. 400 strip 3    febuxostat 40 MG Oral Tab Take 1 tablet (40 mg total) by mouth daily. 90 tablet 1    omega-3 fatty acids 1000 MG Oral Cap Take 1,000 mg by mouth daily.      Multiple Vitamin (MULTI-VITAMIN DAILY) Oral Tab Take 1 tablet by mouth daily.      Cholecalciferol (VITAMIN D) 2000 units Oral Cap Take 1 capsule (2,000 Units total) by mouth daily.      potassium chloride 20 MEQ Oral Tab CR  (Patient not taking: Reported on 8/15/2024)      revefenacin (YUPELRI) 175 MCG/3ML Inhalation Solution nebulizer solution Take 175 mcg by nebulization daily. (Patient not taking: Reported on 8/15/2024)      LASIX 20 MG Oral Tab Take 1 tablet (20 mg total) by mouth as needed (FOR SWELLING). (Patient not taking: Reported on 8/15/2024) 30 tablet 1    Potassium Chloride ER 20 MEQ Oral Tab CR Take 1 tablet by mouth daily as needed. (Patient not taking: Reported on 8/15/2024) 30 tablet 1    predniSONE 10 MG Oral Tab Take 4 tabs po x2d, 3 tabs x2d, 2 tabs x2d, 1 tab x2d (Patient not taking: Reported on 8/15/2024) 20 tablet 0    sodium chloride, hypertonic, 3 % Inhalation Nebu Soln 3 mL. (Patient not taking: Reported on 8/15/2024)      Tiotropium Bromide Monohydrate (SPIRIVA RESPIMAT) 2.5 MCG/ACT Inhalation Aero Soln Inhale 2 puffs into the lungs daily. (Patient not taking: Reported on 7/22/2024)      colchicine 0.6 MG Oral Tab Take 1 tablet (0.6 mg total) by mouth as needed. (Patient not taking: Reported on 8/15/2024)        Allergies:  Allergies   Allergen Reactions    Allopurinol HIVES, SWELLING and SHORTNESS OF BREATH    Dog Epithelium SHORTNESS OF BREATH     Hair and saliva    Dust SHORTNESS OF BREATH    Heparin SWELLING    Smoke SHORTNESS OF BREATH    Adhesive Tape (Rosins) RASH    Montelukast HALLUCINATION    Statins MYALGIA     Pt had developed myalgia and myopathy    Xanax [Alprazolam] RESTLESSNESS    Adhesive Tape OTHER (SEE COMMENTS)    Amoxicillin OTHER (SEE COMMENTS)    Other OTHER (SEE COMMENTS)    Sulfa Antibiotics OTHER (SEE COMMENTS)    Ace Inhibitors Coughing    Aspirin RASH       Past Medical History:    Age-related cataract of left eye    Age-related cataract of right eye    Anxiety    Asthma (HCC)    Atherosclerosis of coronary artery    Breast CA (HCC)    lt mastectomy    Breast CA (HCC)    lumpectomy, right    Cancer (HCC)    breast cancer    Carotid artery disease (HCC)    Carotid stenosis    Closed fracture of multiple ribs of left side with routine healing, subsequent encounter    Congestive heart disease (HCC)    COPD (chronic obstructive pulmonary disease) (HCC)    on O2 at 2 liters    Coronary atherosclerosis    Depression    Diabetes (HCC)    takes insulin when hospitalized, takes oral meds at home    Diastolic dysfunction without heart failure    Esophageal reflux    Essential hypertension    Generalized anxiety disorder    Gout    Gout    High blood pressure    High cholesterol    History of pituitary adenoma    Hyperlipidemia    Major depressive disorder, single episode, moderate (HCC)    Obesity      Past Surgical History:   Procedure Laterality Date    Cabg      Carotid endarterectomy Bilateral     Cataract      Colonoscopy      2013    Colonoscopy  2013    Colonoscopy N/A 02/21/2023    Procedure: COLONOSCOPY;  Surgeon: Abdiaziz Melendez MD;  Location: [facility] ENDOSCOPY    Hernia surgery      Lumpectomy right  2014    Mastectomy left Left 1999    Radiation right  2014      Family History   Problem  Relation Age of Onset    Asthma Father     Hypertension Father     Heart Disorder Father     Other (Other) Mother         thyroid surgery    Asthma Sister     Asthma Brother     Other (Other) Brother         gout    Breast Cancer Self 43        rt breast 69      Social History:   Social History     Socioeconomic History    Marital status:    Tobacco Use    Smoking status: Never     Passive exposure: Never    Smokeless tobacco: Never   Vaping Use    Vaping status: Never Used   Substance and Sexual Activity    Alcohol use: No     Comment: rarely at weddings    Drug use: No   Other Topics Concern    Reaction to local anesthetic No    Pt has a pacemaker No    Pt has a defibrillator No     Social Determinants of Health     Financial Resource Strain: Low Risk  (3/13/2024)    Financial Resource Strain     Difficulty of Paying Living Expenses: Not very hard     Med Affordability: No   Food Insecurity: No Food Insecurity (3/5/2024)    Food Insecurity     Food Insecurity: Never true   Recent Concern: Food Insecurity - Food Insecurity Present (2/25/2024)    Food Insecurity     Food Insecurity: Sometimes true   Transportation Needs: No Transportation Needs (3/13/2024)    Transportation Needs     Lack of Transportation: No   Physical Activity: Insufficiently Active (7/13/2022)    Exercise Vital Sign     Days of Exercise per Week: 1 day     Minutes of Exercise per Session: 10 min   Stress: No Stress Concern Present (10/6/2023)    Stress     Feeling of Stress : No    Social Connections   Housing Stability: Low Risk  (3/5/2024)    Housing Stability     Housing Instability: No        Objective:   Physical Exam  Constitutional:       General: She is not in acute distress.     Appearance: She is obese. She is not toxic-appearing.   Cardiovascular:      Rate and Rhythm: Normal rate and regular rhythm.      Heart sounds: No murmur heard.  Musculoskeletal:      Cervical back: Normal range of motion and neck supple. No rigidity or  tenderness.      Right lower leg: Edema present.      Left lower leg: Edema present.   Lymphadenopathy:      Cervical: No cervical adenopathy.   Skin:     Coloration: Skin is not jaundiced or pale.   Neurological:      Mental Status: She is alert.         Assessment & Plan:   (M17.0) Primary osteoarthritis of both knees  (primary encounter diagnosis)  Plan: pt has bilateral osteoarthritis of knees causing pain whenever she walking and standing, thus will be helped by rollator with seat.   Her osteoathritis knees impairs her ability to participate in MRADL activities inside her home and use of walker with seat and the use of walker with seat can sufficiently resolved her functional mobility deficit.  She is able to use  the walker with seat safely.     (J44.9) COPD, severe (HCC)  Plan: pt has severe COPD, as well as diastolic heart failure, both of which are causing her exertional dyspnea and desaturation, needing her to use rollator with seat so she is able to seat down as needed.     (I50.32) Chronic diastolic congestive heart failure (HCC)  Plan: see above. Continue with her lasix and bp med. She is ff by cardio.       (Z91.81) At risk for falls  Plan: use of rollator with seat will decrease her risk of falls.        No orders of the defined types were placed in this encounter.      Meds This Visit:  Requested Prescriptions      No prescriptions requested or ordered in this encounter       Imaging & Referrals:  DME - EXTERNAL

## 2024-08-19 NOTE — TELEPHONE ENCOUNTER
Please call patient and advise of updated order. Also find out where she would like this DME order to be faxed.

## 2024-08-19 NOTE — TELEPHONE ENCOUNTER
Called patient to inform    Submitted order for Rollator to [facility] via [place]  Rollator    Rollator Not Specific Color -     Seat Attachment -

## 2024-08-19 NOTE — ED QUICK NOTES
Orders for admission, patient is aware of plan and ready to go upstairs. Any questions, please call ED RN Beth Aquino  at extension [phone].    Type of COVID test sent:rapid  COVID Suspicion level: neg    Titratable drug(s) infusing:n/a  Rate:n/a    LOC at time of transport:A&O X4    Other pertinent information    CIWA score=n/a  NIH score=n/a [FreeTextEntry1] : ASAF JOHN is a 82 year old female with a past medical history of HTN, DM, HLD, active smoker, AF no AC, PPM s/p gen change April 2020, frequent falls, significant anxiety. She has very unstable gait at baseline. She has limited functional status.  Pt is extremely disorganized with her thoughts and a poor historian. Her friend Nemesio helps to manage her care.  *** Device check new onset AF several episodes noted May 2023. Several episodes of SVT noted <5 mins duration c/w history. On beta blocker.  Last seen 8/18/23 with chest pains. [facility]. Symptomatic and also noted to have SVT on device check when withdrawing from klonopin. Testing as noted below. She signed herself out AMA. She reports chest pains, SOB, palpitations, headaches, feet swelling, and claudication. Denies dizziness, lightheadedness, and syncope. Active smoker.  Testing:  Labs 8/18/23: WBC 8.57, Hgb 11.3, HCT 34.1, plt 245, Trop 16, , K 4.6, Cr 1.1, Ca 9.9, Na 138, CK 60, Mag 2.2  EKG 8/18/23: A paced at 80 bpm, CT interval 234 ms, QTc 463 ms, Q waves in lead III, PRWP, nonspecific ST-T wave abnormalities   US Duplex upper ext veins LTD 8/18/23: No evidence of left upper extremity DVT.  CXR 8/18/23: No acute pulm dz.  EKG 8/18/23 normal sinus rhythm  Echo 2021: EF 55-60%. Mild MR. Minimal AR. Mildly dilated LA. Endocardium NWV; grossly normal LVSF. Mild CT. A color doppler signal is seen along the atrial septum c/w PFO or small atrial septal defect.   Carotid u/s 2021: Mild nonobstructive carotid dz seen BL.  Abd u/s 2021: No AAA. Mild to mod plaque.  Nuclear stress test Nov 2018 no significant ischemia.

## 2024-08-19 NOTE — TELEPHONE ENCOUNTER
Refill request:    Furosemide 20mg tablet - Take 1 tablet (20mg total) by mouth as needed (for swelling)  Qty: 90    Please advise

## 2024-08-20 ENCOUNTER — APPOINTMENT (OUTPATIENT)
Dept: CARDIAC REHAB | Facility: HOSPITAL | Age: 79
End: 2024-08-20
Attending: INTERNAL MEDICINE
Payer: MEDICARE

## 2024-08-20 ENCOUNTER — PATIENT OUTREACH (OUTPATIENT)
Dept: CASE MANAGEMENT | Age: 79
End: 2024-08-20

## 2024-08-20 DIAGNOSIS — I73.9 PAD (PERIPHERAL ARTERY DISEASE) (HCC): ICD-10-CM

## 2024-08-20 DIAGNOSIS — N18.30 CONTROLLED TYPE 2 DIABETES MELLITUS WITH STAGE 3 CHRONIC KIDNEY DISEASE, WITHOUT LONG-TERM CURRENT USE OF INSULIN (HCC): ICD-10-CM

## 2024-08-20 DIAGNOSIS — H91.93 BILATERAL HEARING LOSS, UNSPECIFIED HEARING LOSS TYPE: ICD-10-CM

## 2024-08-20 DIAGNOSIS — I50.32 CHRONIC DIASTOLIC CONGESTIVE HEART FAILURE (HCC): ICD-10-CM

## 2024-08-20 DIAGNOSIS — E11.69 HYPERLIPIDEMIA ASSOCIATED WITH TYPE 2 DIABETES MELLITUS (HCC): ICD-10-CM

## 2024-08-20 DIAGNOSIS — E55.9 VITAMIN D DEFICIENCY: ICD-10-CM

## 2024-08-20 DIAGNOSIS — I15.2 HYPERTENSION ASSOCIATED WITH TYPE 2 DIABETES MELLITUS (HCC): ICD-10-CM

## 2024-08-20 DIAGNOSIS — E78.5 HYPERLIPIDEMIA ASSOCIATED WITH TYPE 2 DIABETES MELLITUS (HCC): ICD-10-CM

## 2024-08-20 DIAGNOSIS — E11.59 HYPERTENSION ASSOCIATED WITH TYPE 2 DIABETES MELLITUS (HCC): ICD-10-CM

## 2024-08-20 DIAGNOSIS — E11.22 CONTROLLED TYPE 2 DIABETES MELLITUS WITH STAGE 3 CHRONIC KIDNEY DISEASE, WITHOUT LONG-TERM CURRENT USE OF INSULIN (HCC): ICD-10-CM

## 2024-08-20 DIAGNOSIS — J44.9 COPD, SEVERE (HCC): ICD-10-CM

## 2024-08-20 DIAGNOSIS — J44.1 COPD EXACERBATION (HCC): Primary | ICD-10-CM

## 2024-08-20 DIAGNOSIS — N18.32 STAGE 3B CHRONIC KIDNEY DISEASE (HCC): ICD-10-CM

## 2024-08-20 NOTE — PROGRESS NOTES
Spoke to Ariana for Chronic Care Management.      Updates to patient care team/comments: UTD  Patient reported changes in medications: UTD  Med Adherence  Comment: pt taking medications as prescribed     Health Maintenance:   Health Maintenance   Topic Date Due    Zoster Vaccines (1 of 2) Never done    COVID-19 Vaccine (4 - 2023-24 season) 09/01/2023    Diabetes Care Dilated Eye Exam  11/30/2024 (Originally 10/13/2021)    Diabetes Care Foot Exam  02/19/2025 (Originally 7/23/2020)    Influenza Vaccine (1) 10/01/2024    Diabetes Care A1C  11/17/2024    Diabetes Care: Microalb/Creat Ratio  05/17/2025    Diabetes Care: GFR  08/09/2025    DEXA Scan  Completed    MA Annual Health Assessment  Completed    Annual Depression Screening  Completed    Fall Risk Screening (Annual)  Completed    Pneumococcal Vaccine: 65+ Years  Completed    Mammogram  Discontinued    Colonoscopy  Discontinued       Patient updates/concerns:    Spoke to pt for monthly outreach   Pt has successfully provided medicaid information and her membership is visible in epic.    Pt requested [facility] assistance in determining if new caregiver could be provided through senior services. Pt states that she is satisfied with the work caregiver does for her, but too often she is unable to effectively communicate specific wishes because of language barrier.  Pt states that typically this can be overcome quickly but it becomes frustrating when they cannot resolve their confusion in public. Pt requested [facility] assistance in contacting pt  shahram horowitz  to discuss how much flexibility she has in finding a new caregiver that can communicate more efficiently or what the process would be initiate a change and how long would that take or if she would suffer from any gaps in coverage.  CCM called and left message for  to call ccm back or f/u with pt.    Pt is very satisfied with the benefits she receives from senior services and  continues to participate in meals on wheels program.   Pt tracks her o2 and has been concerned with the trending of lower numbers.  Pt has visit scheduled for cardiology and was instructed to complete blood work. Pt is completing the lab work this afternoon.    Pt has been able to rely on her sister when needed for transportation.      Goals/Action Plan:    Active goal from previous outreach: managing healthcare    Patient reported progress towards goals: pt continues to take meds as prescribed           - Where/When/How: breathing issues   Pt is participating in pulmonary rehab and is seeing cardiology and starting cardiac rehab  Patient Reported Barriers and Concerns: n/a                   - Plan for overcoming barriers: none    Care Managers Interventions: call and left message for shahram continue to provide encouragement and education for healthy coping and dx    Future Appointments:   Future Appointments   Date Time Provider Department Center   8/20/2024  1:15 PM [facility] PULM PHASE 2 [facility] CP REHAB [facility]   8/22/2024  1:15 PM [facility] PULM PHASE 2 [facility] CP REHAB [facility]   8/27/2024  1:15 PM [facility] PULM PHASE 2 [facility] CP REHAB [facility]   8/29/2024  1:15 PM [facility] PULM PHASE 2 [facility] CP REHAB [facility]   8/30/2024  2:00 PM Heri Miller MD ECCFHENT [facility]   8/30/2024  2:20 PM EC AUDIO E[facility]FHAUD [facility]   9/3/2024  1:15 PM [facility] PULM PHASE 2 [facility] CP REHAB [facility]   9/5/2024  1:15 PM [facility] PULM PHASE 2 [facility] CP REHAB [facility]   9/6/2024 11:30 AM Caridad Flores DPM ECADOPOD [facility]   9/10/2024  1:15 PM [facility] PULM PHASE 2 [facility] CP REHAB [facility]   9/12/2024  1:15 PM [facility] PULM PHASE 2 [facility] CP REHAB [facility]   9/17/2024  1:15 PM [facility] PULM PHASE 2 [facility] CP REHAB [facility]   9/19/2024  1:15 PM [facility] PULM PHASE 2 [facility] CP REHAB [facility]   9/24/2024  1:15 PM [facility] PULM PHASE 2 [facility] CP REHAB [facility]   9/26/2024  1:15 PM [facility] PULM PHASE 2 [facility] CP REHAB [facility]   10/1/2024  1:15 PM [facility] PULM PHASE 2 [facility] REHAB [facility]   10/3/2024  1:15 PM [facility] PULM PHASE 2 [facility] CP REHAB [facility]    10/8/2024  1:15 PM [facility] PULM PHASE 2 [facility] REHAB [facility]   10/10/2024  1:15 PM [facility] PULM PHASE 2 [facility] REHAB [facility]   10/15/2024  1:15 PM [facility] PULM PHASE 2 [facility] REHAB [facility]   10/17/2024  1:15 PM [facility] PULM PHASE 2 [facility] CP REHAB [facility]   10/22/2024  1:15 PM [facility] PULM PHASE 2 [facility] CP REHAB [facility]   10/24/2024  1:15 PM [facility] PULM PHASE 2 [facility] CP REHAB [facility]   10/29/2024  1:15 PM [facility] PULM PHASE 2 [facility] REHAB [facility]   10/31/2024  1:15 PM [facility] PULM PHASE 2 [facility] REHAB [facility]   11/5/2024  1:15 PM [facility] PULM PHASE 2 [facility] REHAB [facility]   11/7/2024  1:15 PM [facility] PULM PHASE 2 [facility] CP REHAB [facility]   11/11/2024  3:00 PM Riki Tran MD [id number] [facility]   11/12/2024  1:15 PM [facility] PULM PHASE 2 [facility] CP REHAB [facility]   11/14/2024  1:15 PM [facility] PULM PHASE 2 [facility] CP REHAB [facility]   11/19/2024  1:15 PM [facility] PULM PHASE 2 [facility]HAB [facility]   11/21/2024  1:15 PM [facility] PULM PHASE 2 [facility] REHAB [facility]   11/26/2024  1:15 PM [facility] PULM PHASE 2 [facility] REHAB [facility]   11/28/2024  1:15 PM [facility] PULM PHASE 2 [facility] REHAB [facility]   12/3/2024  1:15 PM [facility] PULM PHASE 2 [facility] REHAB [facility]   12/5/2024  1:15 PM [facility] PULM PHASE 2 [facility] CP REHAB [facility]   12/10/2024  1:15 PM [facility] PULM PHASE 2 [facility] CP REHAB [facility]   12/12/2024  1:15 PM [facility] PULM PHASE 2 [facility] REHAB [facility]   12/17/2024  1:15 PM [facility] PULM PHASE 2 [facility] CP REHAB EM [facility]   12/19/2024  1:15 PM [facility] PULM PHASE 2 [facility] CP REHAB EM [facility]   12/24/2024  1:15 PM [facility] PULM PHASE 2 [facility] CP REHAB EM [facility]         Next Care Manager Follow Up Date: one month    Reason For Follow Up: review progress and or barriers towards patient's goals.     Time Spent This Encounter Total: 20 min medical record review, telephone communication, care plan updates where needed, education, goals, and action plan recreation/update. Provided acknowledgment and validation to patient's concerns.   Monthly Minute Total including today: 20  Physical assessment, complete health history, and need for CCM established by Enrique Braun MD.

## 2024-08-21 RX ORDER — GLIPIZIDE 5 MG/1
5 TABLET ORAL
Qty: 90 TABLET | Refills: 1 | Status: ON HOLD | OUTPATIENT
Start: 2024-08-21

## 2024-08-21 NOTE — TELEPHONE ENCOUNTER
90 Day prescription request -    FUROSEMIDE 20MG TABLET  QTY:90    Current Outpatient Medications   Medication Sig Dispense Refill                  LASIX 20 MG Oral Tab Take 1 tablet (20 mg total) by mouth as needed (FOR SWELLING). 30 tablet 1

## 2024-08-21 NOTE — TELEPHONE ENCOUNTER
Please Review. Protocol Failed; No Protocol     EGFRCR: 44 , resulted on 8/9/2024   Requested Prescriptions   Pending Prescriptions Disp Refills    GLIPIZIDE 5 MG Oral Tab [Pharmacy Med Name: GLIPIZIDE 5 MG Tablet] 90 tablet 3     Sig: TAKE 1 TABLET BEFORE BREAKFAST       Diabetes Medication Protocol Failed - 8/19/2024 10:35 AM        Failed - EGFRCR or GFRNAA > 50     GFR Evaluation  EGFRCR: 44 , resulted on 8/9/2024          Passed - Last A1C < 7.5 and within past 6 months     Lab Results   Component Value Date    A1C 6.0 (H) 05/17/2024             Passed - In person appointment or virtual visit in the past 6 mos or appointment in next 3 mos     Recent Outpatient Visits              6 days ago Primary osteoarthritis of both knees    [facility] [place] Enrique Braun MD    Office Visit    1 week ago Stage 3b chronic kidney disease (HCC)    [facility] Riki Tran MD    Office Visit    1 month ago Gastroesophageal reflux disease with esophagitis without hemorrhage    [facility] Abdiaziz Melendez MD    Office Visit    1 month ago Diet-controlled diabetes mellitus ([facility])    [facility] Caridad Flores DPM    Office Visit    3 months ago Asthma-COPD overlap syndrome ([facility])    [facility] Enrique Mann MD    Office Visit          Future Appointments         Provider Department Appt Notes    Tomorrow [facility] PULM PHASE 2 [facility] Cardiopulmonary Rehab [place] COPD    In 6 days [facility] PULM PHASE 2 [facility] Cardiopulmonary Rehab [place] COPD    In 1 week [facility] PULM PHASE 2 [facility] Cardiopulmonary Rehab [place] COPD    In 1 week Heri Miller MD [facility] left ear hearing loss    In 1 week  AUDIO  [facility] left ear hearing loss    In 1 week CF PULM PHASE 2 [facility] Cardiopulmonary Rehab [place] COPD    In 2 weeks [facility] PULM PHASE 2 [facility] Cardiopulmonary Rehab [place] COPD    In 2 weeks Caridad Flores, [facility] follow up    In 2 weeks CF PULM PHASE 2 [facility] Cardiopulmonary Rehab [place] COPD    In 3 weeks [facility] PULM PHASE 2 [facility] Cardiopulmonary Rehab [place] COPD    In 3 weeks [facility] PULM PHASE 2 [facility] Card[facility] Rehab [place] COPD    In 4 weeks [facility] PULM PHASE 2 [facility] Cardiopulmonary Rehab [place] COPD    In 1 month [facility] PULM PHASE 2 [facility] Card[facility] Rehab [place] COPD    In 1 month [facility] PULM PHASE 2 [facility] Cardiopulmonary Rehab [place] COPD    In 1 month [facility] PULM PHASE 2 [facility] Cardiopulmonary Rehab [place] COPD    In 1 month [facility] PULM PHASE 2 [facility] Card[facility] Re[facility] [place] COPD    In 1 month [facility] PULM PHASE 2 [facility] Card[facility] Re[facility] [place] COPD    In 1 month [facility] PULM PHASE 2 [facility] Card[facility] Rehab [place] COPD    In 1 month [facility] PULM PHASE 2 [facility] Card[facility] Re[facility] [place] COPD    In 1 month [facility] PULM PHASE 2 [facility] Cardiopulmonary Rehab [place] COPD    In 2 months [facility] PULM PHASE 2 [facility] Card[facility] Re[facility] [place] COPD    In 2 months [facility] PULM PHASE 2 [facility] Card[facility]ulmonary Re[facility] [place] COPD    In 2 months [facility] PULM PHASE 2 [facility] Card[facility] Re[facility] [place] COPD    In 2 months [facility] PULM PHASE 2 [facility] Cardiopulmonary Rehab [place] COPD    In 2 months [facility] PULM PHASE 2 [facility]  [facility] Cardiopulmonary Rehab [place] COPD    In 2 months [facility] PUL PHASE 2 [facility] Cardiopulmonary Rehab [place] COPD    In 2 months Riki Tran MD [facility] 3 months    In 2 months [facility] PUL PHASE 2 [facility] Cardiopulmonary Rehab [place] COPD    In 2 months [facility] PUL PHASE 2 [facility] Cardiopulmonary Rehab [place] COPD    In 3 months [facility] PUL PHASE 2 [facility] Cardiopulmonary Rehab [place] COPD    In 3 months [facility] PUL PHASE 2 [facility] Cardiopulmonary Rehab [place] COPD    In 3 months [facility] PUL PHASE 2 [facility] Cardiopulmonary Rehab [place] COPD    In 3 months [facility] PUL PHASE 2 [facility] Cardiopulmonary Rehab [place] COPD    In 3 months [facility] PUL PHASE 2 [facility] Cardiopulmonary Rehab [place] COPD    In 3 months [facility] PUL PHASE 2 [facility] Cardiopulmonary Rehab [place] COPD    In 3 months [facility] PUL PHASE 2 [facility] Cardiopulmonary Rehab [place] COPD    In 3 months [facility] PUL PHASE 2 [facility] Cardiopulmonary Rehab [place] COPD    In 3 months [facility] PUL PHASE 2 [facility] Cardiopulmonary Rehab [place] COPD    In 4 months [facility] PUL PHASE 2 [facility] Cardiopulmonary Rehab [place] COPD    In 4 months [facility] PUL PHASE 2 [facility] Cardiopulmonary Rehab [place] COPD                    Passed - Microalbumin procedure in past 12 months or taking ACE/ARB        Passed - GFR in the past 12 months               Future Appointments         Provider Department Appt Notes    Tomorrow [facility] PUL PHASE 2 [facility] Cardiopulmonary Rehab [place] COPD    In 6 days [facility] PUL PHASE 2 [facility] Cardiopulmonary Rehab [place] COPD    In 1 week [facility] PUL PHASE 2 [facility]  Cardiopulmonary Rehab [place] COPD    In 1 week Heri Miller MD [facility] [place] left ear hearing loss    In 1 week EC AUDIO [facility] [place] left ear hearing loss    In 1 week [facility] PUL PHASE 2 [facility] Cardiopulmonary Rehab [place] COPD    In 2 weeks [facility] PUL PHASE 2 [facility] Cardiopulmonary Rehab [place] COPD    In 2 weeks Caridad Flores DPM [facility] follow up    In 2 weeks [facility] PUL PHASE 2 [facility] Cardiopulmonary Rehab [place] COPD    In 3 weeks [facility] PUL PHASE 2 [facility] Cardiopulmonary Rehab [place] COPD    In 3 weeks [facility] PUL PHASE 2 [facility] Cardiopulmonary Rehab [place] COPD    In 4 weeks [facility] PUL PHASE 2 [facility] Cardiopulmonary Rehab [place] COPD    In 1 month [facility] PUL PHASE 2 [facility] Cardiopulmonary Rehab [place] COPD    In 1 month [facility] PUL PHASE 2 [facility] Cardiopulmonary Rehab [place] COPD    In 1 month [facility] PUL PHASE 2 [facility] Cardiopulmonary Rehab [place] COPD    In 1 month [facility] PUL PHASE 2 [facility] Cardiopulmonary Rehab [place] COPD    In 1 month [facility] PUL PHASE 2 [facility] Cardiopulmonary Rehab [place] COPD    In 1 month [facility] PUL PHASE 2 [facility] Cardiopulmonary Rehab [place] COPD    In 1 month [facility] PUL PHASE 2 [facility] Cardiopulmonary Rehab [place] COPD    In 1 month [facility] PUL PHASE 2 [facility] Cardiopulmonary Rehab [place] COPD    In 2 months [facility] PUL PHASE 2 [facility] Cardiopulmonary Rehab [place] COPD    In 2 months [facility] PUL PHASE 2 [facility] Cardiopulmonary Rehab [place] COPD    In 2 months [facility] PUL PHASE 2 [facility] Cardiopulmonary Rehab  [place] COPD    In 2 months CF PULM PHASE 2 [facility] Cardiopulmonary Rehab [place] COPD    In 2 months [facility] PULM PHASE 2 [facility] Cardiopulmonary Rehab [place] COPD    In 2 months [facility] PULM PHASE 2 [facility] Cardiopulmonary Rehab [place] COPD    In 2 months Riki Tran MD [facility] 3 months    In 2 months CF PULM PHASE 2 [facility] Cardiopulmonary Rehab [place] COPD    In 2 months [facility] PUL PHASE 2 [facility] Cardiopulmonary Rehab [place] COPD    In 3 months [facility] PUL PHASE 2 [facility] Cardiopulmonary Rehab [place] COPD    In 3 months [facility] PUL PHASE 2 [facility] Cardiopulmonary Rehab [place] COPD    In 3 months [facility] PUL PHASE 2 [facility] Cardiopulmonary Rehab [place] COPD    In 3 months [facility] PUL PHASE 2 [facility] Cardiopulmonary Rehab [place] COPD    In 3 months [facility] PUL PHASE 2 [facility] Cardiopulmonary Rehab [place] COPD    In 3 months [facility] PUL PHASE 2 [facility] Cardiopulmonary Rehab [place] COPD    In 3 months [facility] PUL PHASE 2 [facility] Cardiopulmonary Rehab [place] COPD    In 3 months [facility] PUL PHASE 2 [facility] Cardiopulmonary Rehab [place] COPD    In 3 months [facility] PUL PHASE 2 [facility] Cardiopulmonary Rehab [place] COPD    In 4 months [facility] PUL PHASE 2 [facility] Cardiopulmonary Rehab [place] COPD    In 4 months [facility] PUL PHASE 2 [facility] Cardiopulmonary Rehab [place] COPD          Recent Outpatient Visits              6 days ago Primary osteoarthritis of both knees    [facility] Enrique Braun MD    Office Visit    1 week ago Stage 3b chronic kidney disease (HCC)    [facility]  Riki Naylor MD    Office Visit    1 month ago Gastroesophageal reflux disease with esophagitis without hemorrhage    [facility] Abdiaziz Jennings MD    Office Visit    1 month ago Diet-controlled diabetes mellitus (HCC)    [facility] Caridad Flores DPM    Office Visit    3 months ago Asthma-COPD overlap syndrome (HCC)    [facility] Enrique Braun MD    Office Visit

## 2024-08-22 ENCOUNTER — APPOINTMENT (OUTPATIENT)
Dept: CARDIAC REHAB | Facility: HOSPITAL | Age: 79
End: 2024-08-22
Attending: INTERNAL MEDICINE
Payer: MEDICARE

## 2024-08-22 ENCOUNTER — HOSPITAL ENCOUNTER (INPATIENT)
Facility: HOSPITAL | Age: 79
LOS: 5 days | Discharge: HOME | End: 2024-08-27
Attending: EMERGENCY MEDICINE | Admitting: HOSPITALIST
Payer: MEDICARE

## 2024-08-22 ENCOUNTER — APPOINTMENT (OUTPATIENT)
Dept: GENERAL RADIOLOGY | Facility: HOSPITAL | Age: 79
End: 2024-08-22
Attending: EMERGENCY MEDICINE
Payer: MEDICARE

## 2024-08-22 ENCOUNTER — APPOINTMENT (OUTPATIENT)
Dept: CT IMAGING | Facility: HOSPITAL | Age: 79
End: 2024-08-22
Attending: EMERGENCY MEDICINE
Payer: MEDICARE

## 2024-08-22 DIAGNOSIS — I50.30 DIASTOLIC HEART FAILURE, UNSPECIFIED HF CHRONICITY (HCC): Primary | ICD-10-CM

## 2024-08-22 DIAGNOSIS — I50.33 ACUTE ON CHRONIC DIASTOLIC (CONGESTIVE) HEART FAILURE (HCC): ICD-10-CM

## 2024-08-22 LAB
ALBUMIN SERPL-MCNC: 4.3 G/DL (ref 3.2–4.8)
ALP LIVER SERPL-CCNC: 98 U/L
ALT SERPL-CCNC: 26 U/L
ANION GAP SERPL CALC-SCNC: 3 MMOL/L (ref 0–18)
AST SERPL-CCNC: 29 U/L (ref ?–34)
BASOPHILS # BLD AUTO: 0.03 X10(3) UL (ref 0–0.2)
BASOPHILS NFR BLD AUTO: 0.4 %
BILIRUB DIRECT SERPL-MCNC: <0.1 MG/DL (ref ?–0.3)
BILIRUB SERPL-MCNC: 0.3 MG/DL (ref 0.2–1.1)
BUN BLD-MCNC: 22 MG/DL (ref 9–23)
BUN/CREAT SERPL: 17.6 (ref 10–20)
CALCIUM BLD-MCNC: 10.8 MG/DL (ref 8.7–10.4)
CHLORIDE SERPL-SCNC: 104 MMOL/L (ref 98–112)
CHOLEST SERPL-MCNC: 275 MG/DL (ref ?–200)
CO2 SERPL-SCNC: 33 MMOL/L (ref 21–32)
CREAT BLD-MCNC: 1.25 MG/DL
D DIMER PPP FEU-MCNC: 0.83 UG/ML FEU (ref ?–0.78)
DEPRECATED RDW RBC AUTO: 49.3 FL (ref 35.1–46.3)
EGFRCR SERPLBLD CKD-EPI 2021: 44 ML/MIN/1.73M2 (ref 60–?)
EOSINOPHIL # BLD AUTO: 0.25 X10(3) UL (ref 0–0.7)
EOSINOPHIL NFR BLD AUTO: 3.6 %
ERYTHROCYTE [DISTWIDTH] IN BLOOD BY AUTOMATED COUNT: 13.8 % (ref 11–15)
GLUCOSE BLD-MCNC: 141 MG/DL (ref 70–99)
HCT VFR BLD AUTO: 43 %
HDLC SERPL-MCNC: 45 MG/DL (ref 40–59)
HGB BLD-MCNC: 13 G/DL
IMM GRANULOCYTES # BLD AUTO: 0.03 X10(3) UL (ref 0–1)
IMM GRANULOCYTES NFR BLD: 0.4 %
LDLC SERPL CALC-MCNC: 152 MG/DL (ref ?–100)
LYMPHOCYTES # BLD AUTO: 1.18 X10(3) UL (ref 1–4)
LYMPHOCYTES NFR BLD AUTO: 17.1 %
MCH RBC QN AUTO: 29.5 PG (ref 26–34)
MCHC RBC AUTO-ENTMCNC: 30.2 G/DL (ref 31–37)
MCV RBC AUTO: 97.7 FL
MONOCYTES # BLD AUTO: 0.91 X10(3) UL (ref 0.1–1)
MONOCYTES NFR BLD AUTO: 13.2 %
NEUTROPHILS # BLD AUTO: 4.52 X10 (3) UL (ref 1.5–7.7)
NEUTROPHILS # BLD AUTO: 4.52 X10(3) UL (ref 1.5–7.7)
NEUTROPHILS NFR BLD AUTO: 65.3 %
NONHDLC SERPL-MCNC: 230 MG/DL (ref ?–130)
NT-PROBNP SERPL-MCNC: 453 PG/ML (ref ?–450)
OSMOLALITY SERPL CALC.SUM OF ELEC: 296 MOSM/KG (ref 275–295)
PLATELET # BLD AUTO: 200 10(3)UL (ref 150–450)
POTASSIUM SERPL-SCNC: 4.4 MMOL/L (ref 3.5–5.1)
PROT SERPL-MCNC: 7.5 G/DL (ref 5.7–8.2)
RBC # BLD AUTO: 4.4 X10(6)UL
SODIUM SERPL-SCNC: 140 MMOL/L (ref 136–145)
TRIGL SERPL-MCNC: 416 MG/DL (ref 30–149)
TROPONIN I SERPL HS-MCNC: 60 NG/L
TROPONIN I SERPL HS-MCNC: 63 NG/L
VLDLC SERPL CALC-MCNC: 83 MG/DL (ref 0–30)
WBC # BLD AUTO: 6.9 X10(3) UL (ref 4–11)

## 2024-08-22 PROCEDURE — 71260 CT THORAX DX C+: CPT | Performed by: EMERGENCY MEDICINE

## 2024-08-22 PROCEDURE — 99223 1ST HOSP IP/OBS HIGH 75: CPT | Performed by: HOSPITALIST

## 2024-08-22 PROCEDURE — 71045 X-RAY EXAM CHEST 1 VIEW: CPT | Performed by: EMERGENCY MEDICINE

## 2024-08-22 RX ORDER — NICOTINE POLACRILEX 4 MG
15 LOZENGE BUCCAL
Status: DISCONTINUED | OUTPATIENT
Start: 2024-08-22 | End: 2024-08-27

## 2024-08-22 RX ORDER — ASPIRIN 81 MG/1
324 TABLET, CHEWABLE ORAL ONCE
Status: DISCONTINUED | OUTPATIENT
Start: 2024-08-22 | End: 2024-08-26

## 2024-08-22 RX ORDER — FUROSEMIDE 10 MG/ML
40 INJECTION INTRAMUSCULAR; INTRAVENOUS
Status: DISCONTINUED | OUTPATIENT
Start: 2024-08-23 | End: 2024-08-24

## 2024-08-22 RX ORDER — METOCLOPRAMIDE HYDROCHLORIDE 5 MG/ML
5 INJECTION INTRAMUSCULAR; INTRAVENOUS EVERY 8 HOURS PRN
Status: DISCONTINUED | OUTPATIENT
Start: 2024-08-22 | End: 2024-08-27

## 2024-08-22 RX ORDER — FLUTICASONE PROPIONATE AND SALMETEROL 500; 50 UG/1; UG/1
1 POWDER RESPIRATORY (INHALATION) 2 TIMES DAILY
COMMUNITY

## 2024-08-22 RX ORDER — DEXTROSE MONOHYDRATE 25 G/50ML
50 INJECTION, SOLUTION INTRAVENOUS
Status: DISCONTINUED | OUTPATIENT
Start: 2024-08-22 | End: 2024-08-27

## 2024-08-22 RX ORDER — ACETAMINOPHEN 500 MG
500 TABLET ORAL EVERY 4 HOURS PRN
Status: DISCONTINUED | OUTPATIENT
Start: 2024-08-22 | End: 2024-08-27

## 2024-08-22 RX ORDER — ONDANSETRON 2 MG/ML
4 INJECTION INTRAMUSCULAR; INTRAVENOUS EVERY 6 HOURS PRN
Status: DISCONTINUED | OUTPATIENT
Start: 2024-08-22 | End: 2024-08-27

## 2024-08-22 RX ORDER — NICOTINE POLACRILEX 4 MG
30 LOZENGE BUCCAL
Status: DISCONTINUED | OUTPATIENT
Start: 2024-08-22 | End: 2024-08-27

## 2024-08-22 RX ORDER — FUROSEMIDE 20 MG/1
20 TABLET ORAL AS NEEDED
Qty: 90 TABLET | Refills: 0 | OUTPATIENT
Start: 2024-08-22

## 2024-08-22 NOTE — ED QUICK NOTES
Orders for admission, patient is aware of plan and ready to go upstairs. Any questions, please call ED RN Maynor at extension [phone].     Patient Covid vaccination status: Fully vaccinated     COVID Test Ordered in ED: None    COVID Suspicion at Admission: N/A    Running Infusions:  None    Mental Status/LOC at time of transport: A&Ox4    Other pertinent information: ASA not given in ED. Chronic elevated troponins. History of GI bleeding. Suspicion of ACS low per ED Physician Shannan.    CIWA score: N/A   NIH score:  N/A

## 2024-08-22 NOTE — ED INITIAL ASSESSMENT (HPI)
Pt to ER c/o worsening difficulty breathing with exertion x approximately 2 weeks. Pt on 2L by NC at home.

## 2024-08-22 NOTE — TELEPHONE ENCOUNTER
Please review. Protocol Failed; No Protocol    Requested Prescriptions   Pending Prescriptions Disp Refills    LASIX 20 MG Oral Tab 30 tablet 1     Sig: Take 1 tablet (20 mg total) by mouth as needed (FOR SWELLING).       Hypertension Medications Protocol Failed - 8/21/2024  2:17 PM        Failed - EGFRCR or GFRNAA > 50     GFR Evaluation  EGFRCR: 44 , resulted on 8/9/2024          Passed - CMP or BMP in past 12 months        Passed - Last BP reading less than 140/90     BP Readings from Last 1 Encounters:   08/15/24 128/76               Passed - In person appointment or virtual visit in the past 12 mos or appointment in next 3 mos     Recent Outpatient Visits              1 week ago Primary osteoarthritis of both knees    [facility] [place] Enrique Braun MD    Office Visit    1 week ago Stage 3b chronic kidney disease (HCC)    [facility] Riki Tran MD    Office Visit    1 month ago Gastroesophageal reflux disease with esophagitis without hemorrhage    [facility] Abdiaziz Melendez MD    Office Visit    1 month ago Diet-controlled diabetes mellitus (HCC)    [facility] [place] Caridad Flores DPM    Office Visit    3 months ago Asthma-COPD overlap syndrome ([facility])    [facility] [place]    Office Visit          Future Appointments         Provider Department Appt Notes    In 5 days [facility] PUL PHASE 2 [facility] Cardiopulmonary Rehab [place] COPD    In 1 week [facility] PUL PHASE 2 [facility] Cardiopulmonary Rehab [place] COPD    In 1 week Heri Miller MD [facility] left ear hearing loss    In 1 week EC AUDIO [facility] left ear hearing loss    In 1 week [facility] PULM PHASE 2  [facility] Cardiopulmonary Rehab [place] COPD    In 2 weeks CF PULM PHASE 2 [facility] Cardiopulmonary Rehab [place] COPD    In 2 weeks Caridad Flores DPM [facility] follow up    In 2 weeks CF PULM PHASE 2 [facility] Cardiopulmonary Rehab [place] COPD    In 3 weeks CF PULM PHASE 2 [facility] Cardiopulmonary Rehab [place] COPD    In 3 weeks [facility] PULM PHASE 2 [facility] Cardiopulmonary Rehab [place] COPD    In 4 weeks [facility] PULM PHASE 2 [facility] Cardiopulmonary Rehab [place] COPD    In 1 month [facility] PULM PHASE 2 [facility] Cardiopulmonary Rehab [place] COPD    In 1 month [facility] PULM PHASE 2 [facility] Cardiopulmonary Rehab [place] COPD    In 1 month [facility] PULM PHASE 2 [facility] Cardiopulmonary Rehab [place] COPD    In 1 month [facility] PULM PHASE 2 [facility] Cardiopulmonary Rehab [place] COPD    In 1 month [facility] PULM PHASE 2 [facility] Cardiopulmonary Rehab [place] COPD    In 1 month [facility] PULM PHASE 2 [facility] Cardiopulmonary Rehab [place] COPD    In 1 month [facility] PULM PHASE 2 [facility] Cardiopulmonary Rehab [place] COPD    In 1 month [facility] PULM PHASE 2 [facility] Cardiopulmonary Rehab [place] COPD    In 2 months [facility] PULM PHASE 2 [facility] Cardiopulmonary Rehab [place] COPD    In 2 months [facility] PULM PHASE 2 [facility] Cardiopulmonary Rehab [place] COPD    In 2 months [facility] PULM PHASE 2 [facility] Cardiopulmonary Rehab [place] COPD    In 2 months [facility] PULM PHASE 2 [facility] Cardiopulmonary Rehab [place] COPD    In 2 months [facility] PULM PHASE 2 [facility] Cardiopulmonary Rehab [place] COPD    In 2 months [facility] PULM PHASE 2 [facility]  Cardiopulmonary Rehab [place] COPD    In 2 months Riki Tran MD [facility] 3 months    In 2 months [facility] PUL PHASE 2 [facility] Cardiopulmonary Rehab [place] COPD    In 2 months [facility] PUL PHASE 2 [facility] Cardiopulmonary Rehab [place] COPD    In 2 months [facility] PUL PHASE 2 [facility] Cardiopulmonary Rehab [place] COPD    In 3 months [facility] PUL PHASE 2 [facility] Cardiopulmonary Rehab [place] COPD    In 3 months [facility] PUL PHASE 2 [facility] Cardiopulmonary Rehab [place] COPD    In 3 months [facility] PUL PHASE 2 [facility] Cardiopulmonary Rehab [place] COPD    In 3 months [facility] PUL PHASE 2 [facility] Cardiopulmonary Rehab [place] COPD    In 3 months [facility] PUL PHASE 2 [facility] Cardiopulmonary Rehab [place] COPD    In 3 months [facility] PUL PHASE 2 [facility] Cardiopulmonary Rehab [place] COPD    In 3 months [facility] PUL PHASE 2 [facility] Cardiopulmonary Rehab [place] COPD    In 3 months [facility] PUL PHASE 2 [facility] Cardiopulmonary Rehab [place] COPD    In 3 months [facility] PUL PHASE 2 [facility] Cardiopulmonary Rehab [place] COPD    In 4 months [facility] PUL PHASE 2 [facility] Cardiopulmonary Rehab [place] COPD                           Future Appointments         Provider Department Appt Notes    In 5 days [facility] PUL PHASE 2 [facility] Cardiopulmonary Rehab [place] COPD    In 1 week [facility] PUL PHASE 2 [facility] Cardiopulmonary Rehab [place] COPD    In 1 week [facility] Miller [facility]    In 1 week  AUDIO [facility]    In 1 week [facility] PUL PHASE 2 [facility] Cardiopulmonary Rehab  [place] COPD    In 2 weeks CF PULM PHASE 2 [facility] Cardiopulmonary Rehab [place] COPD    In 2 weeks Caridad Flores DPM [place] follow up    In 2 weeks CF PULM PHASE 2 [facility] Cardiop[facility] Rehab [place] COPD    In 3 weeks CF PULM PHASE 2 [facility] Cardiopulmonary Rehab [place] COPD    In 3 weeks CF PULM PHASE 2 [facility] Cardiopulmonary Rehab [place] COPD    In 4 weeks [facility] PULM PHASE 2 [facility] Cardiopulmonary Rehab [place] COPD    In 1 month [facility] PULM PHASE 2 [facility] Cardiopulmonary Rehab [place] COPD    In 1 month [facility] PULM PHASE 2 [facility] Cardiopulmonary Rehab [place] COPD    In 1 month [facility] PULM PHASE 2 [facility] Cardiopulmonary Rehab [place] COPD    In 1 month [facility] PULM PHASE 2 [facility] Cardiopulmonary Rehab [place] COPD    In 1 month [facility] PULM PHASE 2 [facility] Cardiopulmonary Rehab [place] COPD    In 1 month [facility] PULM PHASE 2 [facility] Cardiopulmonary Rehab [place] COPD    In 1 month [facility] PULM PHASE 2 [facility] Cardiopulmonary Rehab [place] COPD    In 1 month [facility] PULM PHASE 2 [facility] Cardiopulmonary Rehab [place] COPD    In 2 months [facility] PULM PHASE 2 [facility] Cardiopulmonary Rehab [place] COPD    In 2 months [facility] PULM PHASE 2 [facility] Cardiopulmonary Rehab [place] COPD    In 2 months [facility] PULM PHASE 2 [facility] Cardiopulmonary Rehab [place] COPD    In 2 months [facility] PULM PHASE 2 [facility] Cardiopulmonary Rehab [place] COPD    In 2 months [facility] PULM PHASE 2 [facility] Cardiopulmonary Rehab [place] COPD    In 2 months [facility] PULM PHASE 2 [facility] Cardiopulmonary Rehab [place] COPD    In 2 months  Riki Tran MD [facility] 3 months    In 2 months [facility] PUL PHASE 2 [facility] Card[facility] Rehab [place] COPD    In 2 months [facility] PUL PHASE 2 [facility] Cardiopulmonary Rehab [place] COPD    In 2 months [facility] PUL PHASE 2 [facility] Cardiopulmonary Rehab [place] COPD    In 3 months [facility] PUL PHASE 2 [facility] Cardiopulmonary Rehab [place] COPD    In 3 months [facility] PUL PHASE 2 [facility] Cardiopulmonary Rehab [place] COPD    In 3 months [facility] PUL PHASE 2 [facility] Cardiopulmonary Rehab [place] COPD    In 3 months [facility] PUL PHASE 2 [facility] Cardiopulmonary Rehab [place] COPD    In 3 months [facility] PUL PHASE 2 [facility] Cardiopulmonary Rehab [place] COPD    In 3 months [facility] PUL PHASE 2 [facility] Cardiopulmonary Rehab [place] COPD    In 3 months [facility] PUL PHASE 2 [facility] Cardiopulmonary Rehab [place] COPD    In 3 months [facility] PUL PHASE 2 [facility] Cardiopulmonary Rehab [place] COPD    In 3 months [facility] PUL PHASE 2 [facility] Cardiopulmonary Rehab [place] COPD    In 4 months [facility] PUL PHASE 2 [facility] Cardiopulmonary Rehab [place] COPD          Recent Outpatient Visits              1 week ago Primary osteoarthritis of both knees    [facility] Enrique Braun MD    Office Visit    1 week ago Stage 3b chronic kidney disease (HCC)    [facility] Riki Tran MD    Office Visit    1 month ago Gastroesophageal reflux disease with esophagitis without hemorrhage    [facility] Abdiaziz Melendez MD    Office Visit    1 month ago Diet-controlled diabetes mellitus (HCC)    [facility]  Chance Garcia Lillian, DPM    Office Visit    3 months ago Asthma-COPD overlap syndrome (HCC)    [facility] Emmanuel, MD    Office Visit

## 2024-08-23 ENCOUNTER — APPOINTMENT (OUTPATIENT)
Dept: CV DIAGNOSTICS | Facility: HOSPITAL | Age: 79
End: 2024-08-23
Attending: HOSPITALIST
Payer: MEDICARE

## 2024-08-23 LAB
ANION GAP SERPL CALC-SCNC: 3 MMOL/L (ref 0–18)
ATRIAL RATE: 74 BPM
BASOPHILS # BLD AUTO: 0.02 X10(3) UL (ref 0–0.2)
BASOPHILS NFR BLD AUTO: 0.3 %
BUN BLD-MCNC: 17 MG/DL (ref 9–23)
BUN/CREAT SERPL: 16.3 (ref 10–20)
CALCIUM BLD-MCNC: 10.4 MG/DL (ref 8.7–10.4)
CHLORIDE SERPL-SCNC: 104 MMOL/L (ref 98–112)
CO2 SERPL-SCNC: 35 MMOL/L (ref 21–32)
CREAT BLD-MCNC: 1.04 MG/DL
DEPRECATED RDW RBC AUTO: 47.6 FL (ref 35.1–46.3)
EGFRCR SERPLBLD CKD-EPI 2021: 55 ML/MIN/1.73M2 (ref 60–?)
EOSINOPHIL # BLD AUTO: 0.28 X10(3) UL (ref 0–0.7)
EOSINOPHIL NFR BLD AUTO: 3.7 %
ERYTHROCYTE [DISTWIDTH] IN BLOOD BY AUTOMATED COUNT: 13.6 % (ref 11–15)
GLUCOSE BLD-MCNC: 117 MG/DL (ref 70–99)
GLUCOSE BLDC GLUCOMTR-MCNC: 105 MG/DL (ref 70–99)
GLUCOSE BLDC GLUCOMTR-MCNC: 111 MG/DL (ref 70–99)
GLUCOSE BLDC GLUCOMTR-MCNC: 123 MG/DL (ref 70–99)
GLUCOSE BLDC GLUCOMTR-MCNC: 132 MG/DL (ref 70–99)
GLUCOSE BLDC GLUCOMTR-MCNC: 92 MG/DL (ref 70–99)
HCT VFR BLD AUTO: 40.8 %
HGB BLD-MCNC: 12.7 G/DL
IMM GRANULOCYTES # BLD AUTO: 0.05 X10(3) UL (ref 0–1)
IMM GRANULOCYTES NFR BLD: 0.7 %
LYMPHOCYTES # BLD AUTO: 1.07 X10(3) UL (ref 1–4)
LYMPHOCYTES NFR BLD AUTO: 14.1 %
MCH RBC QN AUTO: 29.7 PG (ref 26–34)
MCHC RBC AUTO-ENTMCNC: 31.1 G/DL (ref 31–37)
MCV RBC AUTO: 95.3 FL
MONOCYTES # BLD AUTO: 1 X10(3) UL (ref 0.1–1)
MONOCYTES NFR BLD AUTO: 13.2 %
NEUTROPHILS # BLD AUTO: 5.18 X10 (3) UL (ref 1.5–7.7)
NEUTROPHILS # BLD AUTO: 5.18 X10(3) UL (ref 1.5–7.7)
NEUTROPHILS NFR BLD AUTO: 68 %
OSMOLALITY SERPL CALC.SUM OF ELEC: 297 MOSM/KG (ref 275–295)
P AXIS: 60 DEGREES
P-R INTERVAL: 176 MS
PLATELET # BLD AUTO: 171 10(3)UL (ref 150–450)
POTASSIUM SERPL-SCNC: 4.3 MMOL/L (ref 3.5–5.1)
Q-T INTERVAL: 378 MS
QRS DURATION: 80 MS
QTC CALCULATION (BEZET): 419 MS
R AXIS: 84 DEGREES
RBC # BLD AUTO: 4.28 X10(6)UL
SODIUM SERPL-SCNC: 142 MMOL/L (ref 136–145)
T AXIS: 55 DEGREES
VENTRICULAR RATE: 74 BPM
WBC # BLD AUTO: 7.6 X10(3) UL (ref 4–11)

## 2024-08-23 PROCEDURE — 93306 TTE W/DOPPLER COMPLETE: CPT | Performed by: HOSPITALIST

## 2024-08-23 PROCEDURE — 99233 SBSQ HOSP IP/OBS HIGH 50: CPT | Performed by: INTERNAL MEDICINE

## 2024-08-23 RX ORDER — FLUTICASONE PROPIONATE AND SALMETEROL 500; 50 UG/1; UG/1
1 POWDER RESPIRATORY (INHALATION) 2 TIMES DAILY
Status: DISCONTINUED | OUTPATIENT
Start: 2024-08-23 | End: 2024-08-27

## 2024-08-23 RX ORDER — FLUTICASONE PROPIONATE AND SALMETEROL 500; 50 UG/1; UG/1
1 POWDER RESPIRATORY (INHALATION) 2 TIMES DAILY
Status: DISCONTINUED | OUTPATIENT
Start: 2024-08-23 | End: 2024-08-23

## 2024-08-23 RX ORDER — AMLODIPINE BESYLATE 2.5 MG/1
2.5 TABLET ORAL 2 TIMES DAILY
Status: DISCONTINUED | OUTPATIENT
Start: 2024-08-23 | End: 2024-08-27

## 2024-08-23 RX ORDER — BUDESONIDE 0.5 MG/2ML
0.5 INHALANT ORAL 2 TIMES DAILY
Status: DISCONTINUED | OUTPATIENT
Start: 2024-08-23 | End: 2024-08-27

## 2024-08-23 RX ORDER — FUROSEMIDE 20 MG/1
20 TABLET ORAL AS NEEDED
Qty: 30 TABLET | Refills: 1 | Status: SHIPPED | OUTPATIENT
Start: 2024-08-23

## 2024-08-23 RX ORDER — METOPROLOL SUCCINATE 25 MG/1
25 TABLET, EXTENDED RELEASE ORAL DAILY
Status: DISCONTINUED | OUTPATIENT
Start: 2024-08-23 | End: 2024-08-27

## 2024-08-23 RX ORDER — HYDRALAZINE HYDROCHLORIDE 20 MG/ML
10 INJECTION INTRAMUSCULAR; INTRAVENOUS EVERY 4 HOURS PRN
Status: DISCONTINUED | OUTPATIENT
Start: 2024-08-23 | End: 2024-08-27

## 2024-08-23 RX ORDER — HEPARIN SODIUM 5000 [USP'U]/ML
5000 INJECTION, SOLUTION INTRAVENOUS; SUBCUTANEOUS EVERY 12 HOURS SCHEDULED
Status: DISCONTINUED | OUTPATIENT
Start: 2024-08-23 | End: 2024-08-23

## 2024-08-23 NOTE — PAYOR COMM NOTE
--------------  ADMISSION REVIEW     [place]or: [facility] [place] [facility] [place]  Subscriber #:  [id number]  Authorization Number: [id number]    Admit date: 8/22/24  Admit time: 11:43 PM       History   HPI  79-year-old female with CAD status post CABG, COPD on 2 L nasal cannula oxygen, breast cancer status post left mastectomy, GERD, hypertension, dyslipidemia, who presents for evaluation of difficulty breathing.  Difficulty breathing has gradually worsened over the past 2 weeks.  Now she can only walk a few steps at a time before becoming winded.  Yesterday she had a brief episode of chest pain.  She feels that her legs are also swollen.  No fever, cough or congestion.  She reports that her nephrologist discontinued her furosemide about a week ago.  She also admits to frequent dining at buffets and was drinking about 2 L of water daily.    ED Triage Vitals [08/22/24 1711]   /65   Pulse 72   Resp 18   Temp 97.5 °F (36.4 °C)   Temp src Temporal   SpO2 95 %   O2 Device None (Room air)   HENT:      Head: Normocephalic and atraumatic.   Eyes:      Extraocular Movements: Extraocular movements intact.   Cardiovascular:      Rate and Rhythm: Normal rate and regular rhythm.      Heart sounds: Normal heart sounds.   Pulmonary:      Breath sounds: Normal breath sounds. No wheezing.      Comments: +conversational dyspnea  Abdominal:      General: There is no distension.      Palpations: Abdomen is soft.      Tenderness: There is no abdominal tenderness.   Musculoskeletal:         General: Normal range of motion.      Cervical back: Normal range of motion.      Right lower leg: Edema present.      Left lower leg: Edema present.   Skin:     General: Skin is warm.   Neurological:      Mental Status: She is alert.      Comments: No focal deficits   Labs Reviewed   BASIC METABOLIC PANEL (8) - Abnormal; Notable for the following components:       Result Value    Glucose 141 (*)     CO2 33.0 (*)     Creatinine 1.25 (*)     Calcium, Total 10.8 (*)      Calculated Osmolality 296 (*)     eGFR-Cr 44 (*)     All other components within normal limits   CBC WITH DIFFERENTIAL WITH PLATELET - Abnormal; Notable for the following components:    MCHC 30.2 (*)     RDW-SD 49.3 (*)     All other components within normal limits   TROPONIN I HIGH SENSITIVITY - Abnormal; Notable for the following components:    Troponin I (High Sensitivity) 60 (*)     All other components within normal limits   PRO BETA NATRIURETIC PEPTIDE - Abnormal; Notable for the following components:    Pro-Beta Natriuretic Peptide 453 (*)     All other components within normal limits   D-DIMER - Abnormal; Notable for the following components:    D-Dimer 0.83 (*)     All other components within normal limits   LIPID PANEL - Abnormal; Notable for the following components:    Cholesterol, Total 275 (*)     Triglycerides 416 (*)     LDL Cholesterol 152 (*)     VLDL 83 (*)     Non HDL Chol 230 (*)       CT CHEST PE AORTA (IV ONLY) (CPT=71260)  Result Date: 8/22/2024  CONCLUSION:   1. No acute pulmonary embolism through the segmental pulmonary arteries. 2. Dilated main pulmonary artery, which can be seen in pulmonary artery hypertension. 3. No pneumonia, pleural effusion, or pneumothorax. 4. Mild multifocal atelectasis. 5. Diffuse small airways disease with opacification of a few right lower lobe segmental bronchi, which may reflect mucous plugging/aspiration. 6. Redemonstrated mild left atrial predominant cardiomegaly with triple-vessel coronary artery calcifications status post CABG, moderate to severe mitral annular calcifications, and mild aortic valve leaflets/annular calcifications. 7. Lesser incidental findings described above.     Dictated by (CST): Renan Angeles MD on 8/22/2024 at 8:18 PM     Finalized by (CST): Renan Angeles MD on 8/22/2024 at 8:24 PM           Disposition and Plan     Clinical Impression:  1. Diastolic heart failure, unspecified HF chronicity (HCC)         HISTORY AND PHYSICAL  EXAMINATION     CHIEF COMPLAINT:  Shortness of breath, dyspnea on exertion, fluid overload status.     HISTORY OF PRESENT ILLNESS:  The patient is a 79-year-old [place] female who was taken recently off diuretics for rise in creatinine level by her nephrologist.  She had a family visiting from out of town, and she has been going to a lot of open buffets and then last week she has been having progressive leg edema, dyspnea on exertion, and orthopnea.  Today came into the emergency department for evaluation.  EKG showed normal sinus rhythm.  CBC was unremarkable.  Chemistry, liver function test, troponin and BMP are still pending.  Started on IV Lasix, and she will be admitted to the hospital for further management.     PAST MEDICAL HISTORY:  Severe chronic obstructive pulmonary disease, uses oxygen at home; left ventricular diastolic dysfunction and mild mitral stenosis; gout; coronary artery disease; carotid atherosclerosis; depression; gastroesophageal reflux disease; chronic kidney disease stage 3; hyperlipidemia; hypertension; generalized osteoarthritis; diabetes mellitus type 2.     PAST SURGICAL HISTORY:  Left mastectomy for breast cancer and right breast lumpectomy plus radiation therapy for breast cancer, coronary artery bypass graft, right carotid endarterectomy, transsphenoidal pituitary macroadenoma resection, hernia repair, and cataract procedure.     MEDICATIONS:  Please see medication reconciliation list.      ALLERGIES:  She is allergic to allopurinol.  Side effects to heparin.  Allergic to adhesive tape.  Side effects to Singulair and statins, Xanax, amoxicillin, and sulfa.  She developed cough from ACE inhibitors.  Allergic to aspirin.     FAMILY HISTORY:  Mother had thyroid disease.  Father had hypertension, heart disease, and asthma.     SOCIAL HISTORY:  No tobacco, alcohol, or drug use.  Lives with her family.  Uses oxygen at home.  Independent for basic activities of daily living.      REVIEW OF  SYSTEMS:  Progressive dyspnea on exertion, orthopnea, leg edema.  No fever or chills.  No new cough.  No chest pain.  Other 12-point review of systems is negative.       PHYSICAL EXAMINATION:    GENERAL:  Alert and oriented to time, place, and person.  Moderate distress.  VITAL SIGNS:  Temperature 97.5, pulse 72, respiratory rate 20, blood pressure 163/64, pulse ox 96% on 2L nasal cannula oxygen.  HEENT:  Atraumatic.  Oropharynx clear.   NECK:  Supple.  Positive jugular venous distention.  LUNGS:  Diminished breathing sounds both lung fields.  No wheezing.  HEART:  Regular rate, rhythm.  S1 and S2 auscultated.   ABDOMEN:  Soft, nondistended.  No tenderness.  Positive bowel sounds.    EXTREMITIES:  Edema +1 to 2 both legs.  No clubbing or cyanosis.  NEUROLOGIC:  Motor and sensory intact.  Cranial nerves II through XII are intact.      ASSESSMENT:    1.       Fluid overload status, possible acute diastolic heart failure with indiscretion with salt intake.  2.       Essential hypertension.  3.       Chronic kidney disease stage 3.  4.       Diabetes mellitus type 2.     PLAN:  Patient will be admitted to telemetry floor.  Start her on IV Lasix.  Obtain 2D echocardiogram with Doppler.  Monitor Accu-Cheks.  Monitor her electrolytes and kidney function.  Cardiology consult was notified.  D-dimer was ordered in the emergency room.         8/23/24  Abd: NABS soft NT ND  Neuro: CN 2-12 grossly intact  Ext: 1-2+ edema in bilateral LE        Diagnostic Data:    Labs:       Recent Labs   Lab 08/22/24  1737 08/23/24  0743   WBC 6.9 7.6   HGB 13.0 12.7   MCV 97.7 95.3   .0 171.0              Recent Labs   Lab 08/22/24  1737 08/23/24  0743   * 117*   BUN 22 17   CREATSERUM 1.25* 1.04*   CA 10.8* 10.4   ALB 4.3  --     142   K 4.4 4.3    104   CO2 33.0* 35.0*   ALKPHO 98  --    AST 29  --    ALT 26  --    BILT 0.3  --    TP 7.5  --          Estimated Creatinine Clearance: 38.5 mL/min (A) (based on SCr of  1.04 mg/dL (H)).     No results for input(s): \"PTP\", \"INR\" in the last 168 hours.           Imaging: Imaging data reviewed in Epic.     Medications:    budesonide  0.5 mg Nebulization BID    fluticasone-salmeterol  1 puff Inhalation BID    amLODIPine  2.5 mg Oral BID    metoprolol succinate ER  25 mg Oral Daily    furosemide  40 mg Intravenous BID (Diuretic)    insulin aspart  1-7 Units Subcutaneous TID CC    aspirin  324 mg Oral Once         Assessment & Plan:      Acute CHF exacerbation  Imaging reviewed  Previous echo with EF 60-65% in 03/2024  Started on IV lasix BID  Repeat echo pending  Cardiology on consult - appreciate recs  Strict Is and Os  Daily weights  HTN  BP better controlled  Resumed home amlodipine and metoprolol  Monitor vitals  CKD 3  Monitor renal function  Avoid nephrotoxic agents  T2DM  SSI and frequent accuchecks        CARDIOLOGY  79-year-old female presents for shortness of breath has been ongoing for 2 weeks, slowly progressive without any evidence of chest pain however she was having lower extremity edema.  Mild orthopnea.  No PND.  She wears home oxygen due to severe COPD.        ED work up  Troponin 60, EKG sinus tachycardia without acute ST-T changes  proBNP 400, chest x-ray unremarkable  Creatinine 1 which is below her baseline  D-dimer elevated, CT PE without pulmonary embolism, pneumonia or pulmonary edema        Assessment:    Shortness of breath: Likely more due to COPD however has responded to diuretics.  HFpEF: EF historically normal, no significant valve disease.  Repeat echocardiogram pending.  Severe COPD: On home oxygen  CAD: Mildly elevated troponin, likely type II NSTEMI.  Perfusion normal 2022.  Bypass surgery 2019 LIMA to LAD, SVG to RCA, SVG to OM.        Plan:  Transition to oral diuretics tomorrow  Follow-up echocardiogram     MEDICATIONS ADMINISTERED IN LAST 1 DAY:  amLODIPine (Norvasc) tab 2.5 mg       Date Action Dose Route User    8/23/2024 1214 Given 2.5 mg Oral  Melquiades Jessica          budesonide (Pulmicort) 0.5 MG/2ML nebulizer suspension 0.5 mg       Date Action Dose Route User    8/23/2024 0926 Given 0.5 mg Nebulization Callada, Celestina, RCP          fluticasone-salmeterol (Advair Diskus) 500-50 MCG/ACT inhaler 1 puff       Date Action Dose Route User    8/23/2024 0934 Given 1 puff Inhalation Callada, Celestina, RCP       metoprolol succinate ER (Toprol XL) 24 hr tab 25 mg       Date Action Dose Route User    8/23/2024 1214 Given 25 mg Oral Melquiades, Jessica            Vitals (last day)       Date/Time Temp Pulse Resp BP SpO2 Weight O2 Device O2 Flow Rate (L/min) [facility]    08/23/24 1325 97.8 °F (36.6 °C) 67 20 147/55 94 % -- Nasal cannula 2 L/min CS    08/23/24 1213 -- 80 -- 172/65 -- -- -- -- CS    08/23/24 1006 97.9 °F (36.6 °C) 73 22 154/63 96 % -- Nasal cannula 2 L/min CS    08/23/24 0624 97.9 °F (36.6 °C) 74 22 162/70 97 % 177 lb (80.3 kg) Nasal cannula 2 L/min TB    08/23/24 0111 -- -- -- 159/55 -- -- -- -- TB    08/23/24 0013 -- -- -- 183/78 -- -- -- -- TB    08/22/24 2351 98.1 °F (36.7 °C) 71 26 199/96 98 % 177 lb 11.2 oz (80.6 kg) Nasal cannula 2 L/min TB    08/22/24 2130 -- 73 29 170/74 99 % -- -- --     08/22/24 2000 -- 75 21 164/70 100 % -- -- --     08/22/24 1915 -- 71 13 177/71 100 % -- -- --     08/22/24 1800 -- 72 20 163/64 96 % -- Nasal cannula 2 L/min CT    08/22/24 1711 97.5 °F (36.4 °C) 72 18 147/65 95 % 173 lb (78.5 kg) None (Room air) -- MS

## 2024-08-23 NOTE — IMAGING NOTE
Pharmacologic Nuclear Stress Test 12/27/2022       Stress EKG is normal.   No arrhythmias  No chest pain       This is a normal perfusion study, no perfusion defects noted.   The left ventricular cavity is noted to be normal on the stress study. The left ventricular ejection fraction was calculated to be 60% and left ventricular global function is normal.   The study quality is good.

## 2024-08-23 NOTE — CONSULTS
Cardiology Consult Note    Ariana Osorio Patient Status:  Inpatient    1945 MRN [id number]   Location [facility] 3W/SW Attending Christopher Ortiz MD   Hosp Day # 1 PCP Enrique Braun MD     79-year-old female presents for shortness of breath has been ongoing for 2 weeks, slowly progressive without any evidence of chest pain however she was having lower extremity edema.  Mild orthopnea.  No PND.  She wears home oxygen due to severe COPD.      ED work up  Troponin 60, EKG sinus tachycardia without acute ST-T changes  proBNP 400, chest x-ray unremarkable  Creatinine 1 which is below her baseline  D-dimer elevated, CT PE without pulmonary embolism, pneumonia or pulmonary edema      Assessment:    Shortness of breath: Likely more due to COPD however has responded to diuretics.  HFpEF: EF historically normal, no significant valve disease.  Repeat echocardiogram pending.  Severe COPD: On home oxygen  CAD: Mildly elevated troponin, likely type II NSTEMI.  Perfusion normal .  Bypass surgery  LIMA to LAD, SVG to RCA, SVG to OM.      Plan:  Transition to oral diuretics tomorrow  Follow-up echocardiogram  Depending on oxygen requirement and echo from a cardiac standpoint tomorrow stable for discharge home       Level of care: C5     Sarbjit Nunn MD  Interventional Cardiology  [facility]    --------------------------------------------------------------------------------------------------------------------------------  ROS 10 systems reviewed, pertinent findings above.  ROS    History:  Past Medical History:    Age-related cataract of left eye    Age-related cataract of right eye    Anxiety    Asthma (HCC)    Atherosclerosis of coronary artery    Breast CA (HCC)    lt mastectomy    Breast CA (HCC)    lumpectomy, right    Cancer (HCC)    breast cancer    Carotid artery disease (HCC)    Carotid stenosis    Closed fracture of multiple ribs of left side with routine healing,  Medication:   Requested Prescriptions     Pending Prescriptions Disp Refills    tamsulosin (FLOMAX) 0.4 MG capsule [Pharmacy Med Name: TAMSULOSIN HCL CAPS 0.4MG] 90 capsule 3     Sig: TAKE 1 CAPSULE DAILY        Last Filled:  6/24/2022    Patient Phone Number: [phone] (home) [phone] (work)    Last appt: 12/28/2022   Next appt: Visit date not found    Last OARRS: No flowsheet data found. subsequent encounter    Congestive heart disease (HCC)    COPD (chronic obstructive pulmonary disease) (HCC)    on O2 at 2 liters    Coronary atherosclerosis    Depression    Diabetes (HCC)    takes insulin when hospitalized, takes oral meds at home    Diastolic dysfunction without heart failure    Esophageal reflux    Essential hypertension    Generalized anxiety disorder    Gout    Gout    High blood pressure    High cholesterol    History of pituitary adenoma    Hyperlipidemia    Major depressive disorder, single episode, moderate (HCC)    Obesity     Past Surgical History:   Procedure Laterality Date    Cabg      Carotid endarterectomy Bilateral     Cataract      Colonoscopy      2013    Colonoscopy  2013    Colonoscopy N/A 02/21/2023    Procedure: COLONOSCOPY;  Surgeon: Abdiaziz Melendez MD;  Location: [facility] ENDOSCOPY    Hernia surgery      Lumpectomy right  2014    Mastectomy left Left 1999    Radiation right  2014     Family History   Problem Relation Age of Onset    Asthma Father     Hypertension Father     Heart Disorder Father     Other (Other) Mother         thyroid surgery    Asthma Sister     Asthma Brother     Other (Other) Brother         gout    Breast Cancer Self 43        rt breast 69      reports that she has never smoked. She has never been exposed to tobacco smoke. She has never used smokeless tobacco. She reports that she does not drink alcohol and does not use drugs.    Objective:   Temp: 97.8 °F (36.6 °C)  Pulse: 67  Resp: 20  BP: 147/55    Intake/Output:     Intake/Output Summary (Last 24 hours) at 8/23/2024 1635  Last data filed at 8/23/2024 1300  Gross per 24 hour   Intake 600 ml   Output 1151 ml   Net -551 ml       Physical Exam:     General: NAD  HEENT: Normocephalic, anicteric sclera, neck supple.  Neck: No JVD, carotids 2+, no bruits.  Cardiac: Regular rate and rhythm. S1, S2 normal. No murmur, pericardial rub, S3.  Lungs: Clear without wheezes, rales, rhonchi or dullness.  Normal  excursions and effort.  Abdomen: Soft, non-tender. BS-present.  Extremities: Without clubbing, cyanosis or edema.  Peripheral pulses are 2+.  Neurologic: Non-focal  Skin: Warm and dry.       Sarbjit Nunn MD  8/23/2024  4:35 PM

## 2024-08-23 NOTE — PROGRESS NOTES
[facility]     Ariana Osorio Patient Status:  Inpatient    1945 MRN [id number]   Location [facility] 3W/SW Attending Christopher Ortiz MD   Hosp Day # 1 PCP Enrique Braun MD     Subjective:     Patient resting comfortably in bed and in NAD. Denied any active complaints.   All questions and concerns addressed.       Objective:    Review of Systems:   ROS completed; pertinent positive and negatives stated in subjective.      Vital signs:  Temp:  [97.5 °F (36.4 °C)-98.1 °F (36.7 °C)] 97.9 °F (36.6 °C)  Pulse:  [71-75] 73  Resp:  [13-29] 22  BP: (147-199)/(55-96) 154/63  SpO2:  [95 %-100 %] 96 %      Physical Exam:    Gen: NAD AO x3  Chest: good air entry CTABL  CVS: normal s1 and s2 RR  Abd: NABS soft NT ND  Neuro: CN 2-12 grossly intact  Ext: 1-2+ edema in bilateral LE      Diagnostic Data:    Labs:  Recent Labs   Lab 24  1737 24  0743   WBC 6.9 7.6   HGB 13.0 12.7   MCV 97.7 95.3   .0 171.0       Recent Labs   Lab 24  1737 24  0743   * 117*   BUN 22 17   CREATSERUM 1.25* 1.04*   CA 10.8* 10.4   ALB 4.3  --     142   K 4.4 4.3    104   CO2 33.0* 35.0*   ALKPHO 98  --    AST 29  --    ALT 26  --    BILT 0.3  --    TP 7.5  --        Estimated Creatinine Clearance: 38.5 mL/min (A) (based on SCr of 1.04 mg/dL (H)).    No results for input(s): \"PTP\", \"INR\" in the last 168 hours.           Imaging: Imaging data reviewed in Epic.    Medications:    budesonide  0.5 mg Nebulization BID    fluticasone-salmeterol  1 puff Inhalation BID    amLODIPine  2.5 mg Oral BID    metoprolol succinate ER  25 mg Oral Daily    furosemide  40 mg Intravenous BID (Diuretic)    insulin aspart  1-7 Units Subcutaneous TID CC    aspirin  324 mg Oral Once       Assessment & Plan:     Acute CHF exacerbation  Imaging reviewed  Previous echo with EF 60-65% in 2024  Started on IV lasix BID  Repeat echo  pending  Cardiology on consult - appreciate recs  Strict Is and Os  Daily weights  HTN  BP better controlled  Resumed home amlodipine and metoprolol  Monitor vitals  CKD 3  Monitor renal function  Avoid nephrotoxic agents  T2DM  SSI and frequent accuchecks      Plan of care discussed with patient or family at bedside.      Supplementary Documentation:     Quality:  DVT Prophylaxis: SCDs - heparin allergy  CODE status: Full      Estimated date of discharge: TBD  Discharge is dependent on: clinical stability  At this point Ms. Osorio is expected to be discharge to: home             **Certification      PHYSICIAN Certification of Need for Inpatient Hospitalization - Initial Certification    Patient will require inpatient services that will reasonably be expected to span two midnight's based on the clinical documentation in H+P.   Based on patients current state of illness, I anticipate that, after discharge, patient will require TBD.      Christopher Ortiz MD  Hospitalist    MDM: High, I personally reviewed the available laboratories, imaging including CT, XR. I discussed the case with Rn. I ordered laboratories, studies including AM labs. I adjusted medications including home meds.   Medical decision making high, risk is high.       The 21st Century Cures Act makes medical notes like these available to patients in the interest of transparency. Please be advised this is a medical document. Medical documents are intended to carry relevant information, facts as evident, and the clinical opinion of the practitioner. The medical note is intended as peer to peer communication and may appear blunt or direct. It is written in medical language and may contain abbreviations or verbiage that are unfamiliar.

## 2024-08-23 NOTE — ED QUICK NOTES
Orders for admission, patient is aware of plan and ready to go upstairs. Any questions, please call ED RN luz at extension [phone].     Patient Covid vaccination status: Fully vaccinated     COVID Test Ordered in ED: None    COVID Suspicion at Admission: N/A    Running Infusions:  None    Mental Status/LOC at time of transport: alert    Other pertinent information:   CIWA score: N/A   NIH score:  N/A

## 2024-08-23 NOTE — H&P
[facility]    PATIENT'S NAME: TYRONE RUSSELL   ATTENDING PHYSICIAN: Tyra Campbell MD   PATIENT ACCOUNT#:   [id number]    LOCATION:  [place] [address]  MEDICAL RECORD #:   [id number]       YOB: 1945  ADMISSION DATE:       08/22/2024    HISTORY AND PHYSICAL EXAMINATION    CHIEF COMPLAINT:  Shortness of breath, dyspnea on exertion, fluid overload status.    HISTORY OF PRESENT ILLNESS:  The patient is a 79-year-old Filipina female who was taken recently off diuretics for rise in creatinine level by her nephrologist.  She had a family visiting from out of town, and she has been going to a lot of open buffets and then last week she has been having progressive leg edema, dyspnea on exertion, and orthopnea.  Today came into the emergency department for evaluation.  EKG showed normal sinus rhythm.  CBC was unremarkable.  Chemistry, liver function test, troponin and BMP are still pending.  Started on IV Lasix, and she will be admitted to the hospital for further management.    PAST MEDICAL HISTORY:  Severe chronic obstructive pulmonary disease, uses oxygen at home; left ventricular diastolic dysfunction and mild mitral stenosis; gout; coronary artery disease; carotid atherosclerosis; depression; gastroesophageal reflux disease; chronic kidney disease stage 3; hyperlipidemia; hypertension; generalized osteoarthritis; diabetes mellitus type 2.    PAST SURGICAL HISTORY:  Left mastectomy for breast cancer and right breast lumpectomy plus radiation therapy for breast cancer, coronary artery bypass graft, right carotid endarterectomy, transsphenoidal pituitary macroadenoma resection, hernia repair, and cataract procedure.    MEDICATIONS:  Please see medication reconciliation list.     ALLERGIES:  She is allergic to allopurinol.  Side effects to heparin.  Allergic to adhesive tape.  Side effects to Singulair and statins, Xanax, amoxicillin, and sulfa.  She developed cough from ACE inhibitors.  Allergic  to aspirin.    FAMILY HISTORY:  Mother had thyroid disease.  Father had hypertension, heart disease, and asthma.    SOCIAL HISTORY:  No tobacco, alcohol, or drug use.  Lives with her family.  Uses oxygen at home.  Independent for basic activities of daily living.     REVIEW OF SYSTEMS:  Progressive dyspnea on exertion, orthopnea, leg edema.  No fever or chills.  No new cough.  No chest pain.  Other 12-point review of systems is negative.      PHYSICAL EXAMINATION:    GENERAL:  Alert and oriented to time, place, and person.  Moderate distress.  VITAL SIGNS:  Temperature 97.5, pulse 72, respiratory rate 20, blood pressure 163/64, pulse ox 96% on 2L nasal cannula oxygen.  HEENT:  Atraumatic.  Oropharynx clear.   NECK:  Supple.  Positive jugular venous distention.  LUNGS:  Diminished breathing sounds both lung fields.  No wheezing.  HEART:  Regular rate, rhythm.  S1 and S2 auscultated.   ABDOMEN:  Soft, nondistended.  No tenderness.  Positive bowel sounds.    EXTREMITIES:  Edema +1 to 2 both legs.  No clubbing or cyanosis.  NEUROLOGIC:  Motor and sensory intact.  Cranial nerves II through XII are intact.     ASSESSMENT:    1.   Fluid overload status, possible acute diastolic heart failure with indiscretion with salt intake.  2.   Essential hypertension.  3.   Chronic kidney disease stage 3.  4.   Diabetes mellitus type 2.    PLAN:  Patient will be admitted to telemetry floor.  Start her on IV Lasix.  Obtain 2D echocardiogram with Doppler.  Monitor Accu-Cheks.  Monitor her electrolytes and kidney function.  Cardiology consult was notified.  D-dimer was ordered in the emergency room.  Further recommendations to follow.     Dictated By Ranjit Lynch MD  d: 08/22/2024 18:28:48  t: 08/22/2024 19:10:13  Job [id number]  /

## 2024-08-23 NOTE — SPIRITUAL CARE NOTE
Spiritual Care Visit Note    Patient Name: Ariana Osorio Date of Spiritual Care Visit: 24   : 1945 Primary Dx: Diastolic heart failure, unspecified HF chronicity (HCC)       Referred By: Referral From: Nurse    Spiritual Care Taxonomy:    Intended Effects: Aligning care plan with patient's values    Methods: Offer spiritual/Bahai support    Interventions: Communicate patient's needs/concerns to others    Visit Type/Summary:     - Spiritual Care: Responded to a request via the on call phone Request from [facility] for [facility] Communion and  visit.  remains available as needed for follow up.    Spiritual Care support can be requested via an [facility] consult. For urgent/immediate needs, please contact the On Call  at: [place]: ext [phone]    Rev Brianne Hernandez MDiv

## 2024-08-23 NOTE — DISCHARGE INSTRUCTIONS
Going Home    In this section you will find the tools which will guide you through the first few days after you leave the hospital. Continued use of these tools will help you develop the skills necessary to keep your heart failure under control.     Heart Failure Guidelines - place this worksheet on your refrigerator or somewhere you can refer to it daily to help you decide if your symptoms are under control, and what to do if they are not.     Home Care Instructions Following Heart Failure - the most important things to do every day include:     Weigh yourself  Take your medicines as prescribed  Limit your sodium (salt) and fluid intake  Know when to call your cardiologist, primary doctor, or nurse  Know when to seek emergency care    There is also a handy weight chart on which to record your weight every day, information on measuring fluids and limiting fluid intake, and a section for recording your thoughts or questions.     Things for You to Remember:   1. An appointment has been made for you to see your doctor or healthcare provider within 7 days of hospital discharge. It is important that you attend this appointment to make sure your symptoms are under control.     2. Your recommended sodium intake is 2636-1340 mg daily    3. Limit your fluid intake to no more than 2 liters or 64 ounces per day    4. Some exercise and activity is important to help keep your heart functioning and strong. Unless instructed not to exercise, you may walk at a slow to moderate pace for 10-15 minutes 2-3 days per week to start. Pace your activity to prevent shortness of breath or fatigue. Stop exercise if you develop chest pain, lightheadedness, or significant shortness of breath.       Call Your Cardiologist If:   You gain 2 pounds overnight or 3-4 pounds in 3-5 days  You have more difficulty breathing  You are getting more tired with normal activity  You are more short of breath lying down, or awaken at night short of breath  You  have swelling of your feet or legs  You urinate less often during the day and more often at night  You have cramps in your legs  You have blurred vision or see yellowish-green halos around objects of lights    Go to the Emergency Room If:   You have pain or tightness in your chest  You are extremely short of breath  You are coughing up pink-frothy mucus  You are traveling and develop symptoms of worsening heart failure    Additional Resources:   If you have questions or concerns about heart failure management, call the [facility] Heart Failure Unit at [phone]

## 2024-08-23 NOTE — HISTORICAL OFFICE NOTE
[facility]  Outside Information  Continuity of Care Document  4/7/2024  Ariana Osorio - 79 y.o. Female; born [birth year]Summary of episode note, generated on Apr. 17, 2024April 17, 2024   CHIEF COMPLAINT    CHIEF COMPLAINT  Reason for Visit/Chief Complaint   follow up   79-year-old female with extensive cardiac medical history severe COPD, CKD stage III hypertension hyperlipidemia. Also history of CAD three-vessel bypass 2017. Prior history of carotid stenosis status post endarterectomy on both sides left side 8 years ago, right side 3 years ago here for follow-up visit. Has dyspnea on exertion after 1 block which she attributes to her COPD. Here to follow-up after carotid ultrasound results. No chest pain     PROBLEMS  Reconcile with Patient's ChartPROBLEMS  Problem Effective Dates Date resolved Problem Status   History of breast cancer, [SNOMED-CT: 530464852] 4/17/2022 - Active   Medicare annual wellness visit, subsequent, [SNOMED-CT: 693528990] 4/17/2022 - Active   Chronic tophaceous gout, [SNOMED-CT: 49343851] 7/25/2018 - Active   COPD, severe, [SNOMED-CT: 990113932] 2/28/2021 - Active   Coronary artery disease, [SNOMED-CT: 17220851] 1/26/2021 - Active   S/P CABG (coronary artery bypass graft), [SNOMED-CT: 181276917] 1/26/2021 - Active   At risk for falls, [SNOMED-CT: 691763658] 5/27/2019 - Active   Stage 3 chronic kidney disease, [SNOMED-CT: 026082806] 5/27/2019 - Active   Controlled type 2 diabetes mellitus with stage 3 chronic kidney disease, without long-term current use of insulin, [SNOMED-CT: 67860477] 5/27/2019 - Active   Hypertension associated with type 2 diabetes mellitus, [SNOMED-CT: 72671193] 7/21/2020 - Active   Hyperlipidemia associated with type 2 diabetes mellitus, [SNOMED-CT: 81295826] 1/26/2021 - Active   S/P carotid endarterectomy, [SNOMED-CT: 710075633] 7/24/2019 - Active   Chronic diastolic congestive heart failure, [SNOMED-CT: 561757731]  1/26/2021 - Active   Cervical stenosis of spinal canal, [SNOMED-CT: 59271150] 7/20/2020 - Active   Bilateral hearing loss, [SNOMED-CT: 02185266] 7/21/2020 - Active   Hyperparathyroidism, [SNOMED-CT: 27482026] 2/26/2022 - Active   Coronary artery disease involving native coronary artery of native heart without angina pectoris, [SNOMED-CT: 257891194] 8/2/2022 - Active     ENCOUNTER DIAGNOSIS    ENCOUNTER DIAGNOSIS  Problem Effective Dates Date resolved Problem Status   Claudication, [SNOMED-CT: 55016875] 11/28/2022 - Active   SOB (shortness of breath), [SNOMED-CT: 574789976] 9/15/2022 - Active   COPD, severe, [SNOMED-CT: 031246299] 2/28/2021 - Active   Coronary artery disease, [SNOMED-CT: 13197681] 1/26/2021 - Active   S/P CABG (coronary artery bypass graft), [SNOMED-CT: 117980302] 1/26/2021 - Active   Stage 3 chronic kidney disease, [SNOMED-CT: 687896837] 5/27/2019 - Active   Controlled type 2 diabetes mellitus with stage 3 chronic kidney disease, without long-term current use of insulin, [SNOMED-CT: 62335829] 5/27/2019 - Active   S/P carotid endarterectomy, [SNOMED-CT: 594973668] 7/24/2019 - Active     VITAL SIGNS    VITAL SIGNS  Date / Time: 4/8/2024   BP Systolic 122 mmHg   BP Diastolic 58 mmHg   Height 64 inches   Weight 164 lbs   Pulse Rate 86 bpm   BSA (Body Surface Area) 1.8 cc/m2   BMI (Body Mass Index) 28.1 cc/m2   Blood Pressure 122 / 58 mmHg     PHYSICAL EXAMINATION    PHYSICAL EXAMINATION  Header Details   Constitutional 92o2%   Vitals Left Arm Sitting  / 58 mmHg, Pulse rate 86 bpm, Regular, Height in 5' 4\", BMI: 28.1, Weight in 163.14 lbs (or) 74 kgs, BSA : 1.85 cc/m²   General Appearance No Acute Distress, Obese   Head/Eyes/Ears/Nose/Mouth/Throat Sclera Clear, EOMI, PERRLA, No pallor   Neck Normal carotid pulsations, No carotid bruits, No JVD   Respiratory Lungs clear with normal breath sounds, Equal bilaterally   Cardiovascular Regular rhythm.   Gastrointestinal Abdomen soft, Non-tender,  Normoactive bowel sounds, No masses   Gait Normal gait   Strength and tone Normal muscle strength   Lower Extremities Pulses 2+ and equal bilaterally, No edema     ALLERGIES, ADVERSE REACTIONS, ALERTS    ALLERGIES, ADVERSE REACTIONS, ALERTS  Type Substance Reaction Severity Status   Allergies Allopurinol, [RxNorm: 63614]   Mild Active   Allergies Alprazolam, [RxNorm: 596]   Mild Active   Allergies Dog Epithelium, [RxNorm: 1135572]   Mild Active   Allergies Statins, [RxNorm: 607590]   Mild Active     MEDICATIONS ADMINISTERED DURING VISIT    No data available    MEDICATIONS    MEDICATIONS  Medication Start Date Route/Frequency Status   amLODIPine 2.5 mg tablet, [RxNorm: 398377] 4/8/2024 Take 3 tablets orally 3 times a day. Active   Cholecalciferol (VITAMIN D) 2000 units Oral Cap, [RxNorm: 723635] 8/2/2022 Take 1 capsule by mouth daily. Active   colchicine 0.6 MG Oral Tab, [RxNorm: 012904] 8/2/2022 TAKE ONE TABLET BY MOUTH DAILY Active   febuxostat 40 MG Oral Tab, [RxNorm: 887675] 8/2/2022 Take 1 tablet (40 mg total) by mouth daily. Active   glipiZIDE 5 MG Oral Tab, [RxNorm: 700440] 8/2/2022 Take 1 tablet (5 mg total) by mouth before breakfast. Active   metoprolol succinate ER 25 MG Oral Tablet 24 Hr, [RxNorm: 397529] 8/2/2022 Take 1 tablet (25 mg total) by mouth daily. Active   Multiple Vitamin (MULTI-VITAMIN DAILY) Oral Tab, [RxNorm: 0] 8/2/2022 Take 1 tablet by mouth daily. Active   omega-3 fatty acids 1000 MG Oral Cap, [RxNorm: 375988] 8/2/2022 Take 1,000 mg by mouth daily. Active   Spiriva Respimat 2.5 mcg/actuation solution for inhalation, [RxNorm: 5396935] 4/8/2024 (inhalation) once a day. Active     ASSESSMENT    1. Hypertension  Well-controlled on amlodipine 5 mg daily, hydralazine 12.5 mg twice a day, metoprolol 25 mg daily continue  2. History of CAD with shortness of breath appears to be secondary to COPD follows with Dr. Rod for this  Normal nuclear stress test patient is reassured. 2022.  3.  Hyperlipidemia  Managed by her primary care physician  4. Diabetes mellitus  On glipizide  5. History of carotid stenosis with bilateral endarterectomies  No disease on the right side moderate disease on the left side.  6. Shortness of breath  Management as per pulmonary. No EF assessment since 2022 check 2D echocardiogram with Doppler  7. Edema  Lasix 20 mg with potassium supplements as needed edema.    Plan  2D echocardiogram with Doppler and follow-up in 6 months.     FAMILY HISTORY    No data available    GENERAL STATUS    No data available    PAST MEDICAL HISTORY    PAST MEDICAL HISTORY  Problem Date diagonsed Date resolved Status   History of breast cancer, [SNOMED-CT: 856040803] 4/17/2022 - Active   Medicare annual wellness visit, subsequent, [SNOMED-CT: 232955998] 4/17/2022 - Active   Chronic tophaceous gout, [SNOMED-CT: 93131956] 7/25/2018 - Active   COPD, severe, [SNOMED-CT: 123824158] 2/28/2021 - Active   Coronary artery disease, [SNOMED-CT: 96397917] 1/26/2021 - Active   S/P CABG (coronary artery bypass graft), [SNOMED-CT: 278911865] 1/26/2021 - Active   At risk for falls, [SNOMED-CT: 919328480] 5/27/2019 - Active   Stage 3 chronic kidney disease, [SNOMED-CT: 528112592] 5/27/2019 - Active   Controlled type 2 diabetes mellitus with stage 3 chronic kidney disease, without long-term current use of insulin, [SNOMED-CT: 66467238] 5/27/2019 - Active   Hypertension associated with type 2 diabetes mellitus, [SNOMED-CT: 75450848] 7/21/2020 - Active   Hyperlipidemia associated with type 2 diabetes mellitus, [SNOMED-CT: 51489138] 1/26/2021 - Active   S/P carotid endarterectomy, [SNOMED-CT: 494980184] 7/24/2019 - Active   Chronic diastolic congestive heart failure, [SNOMED-CT: 714185811] 1/26/2021 - Active   Cervical stenosis of spinal canal, [SNOMED-CT: 21064660] 7/20/2020 - Active   Bilateral hearing loss, [SNOMED-CT: 79633548] 7/21/2020 - Active   Hyperparathyroidism, [SNOMED-CT: 13281603] 2/26/2022 - Active    Coronary artery disease involving native coronary artery of native heart without angina pectoris, [SNOMED-CT: 748426731] 8/2/2022 - Active     HISTORY OF PRESENT ILLNESS    79-year-old female with extensive cardiac medical history severe COPD, CKD stage III hypertension hyperlipidemia. Also history of CAD three-vessel bypass 2017. Prior history of carotid stenosis status post endarterectomy on both sides left side 8 years ago, right side 3 years ago here for follow-up visit. Has dyspnea on exertion after 1 block which she attributes to her COPD. Here to follow-up after carotid ultrasound results. No chest pain     IMMUNIZATIONS    No data available    PLAN OF CARE    PLAN OF CARE  Planned Care Date   Echocardiography - Complete 1/1/1900   Referral Visit - Enrique Braun ([email or web address]) : 1/1/1900   Follow up visit - Clay Mtz MD 1/1/1900     PROCEDURES    No data available    RESULTS    RESULTS  Name Result Date Location - Ordered By   Myocardial Perfusion Imaging 1.Stress EKG is normal. 2.No arrhythmias3.No chest pain1.This is a normal perfusion study, no perfusion defects noted. 2.The left ventricular cavity is noted to be normal on the stress study. The left ventricular ejection fraction was calculated to be 60% and left ventricular global function is normal. 3.The study quality is good. 12/27/2022 12:30:00 PM Clay Mtz MD   Carotid Ultrasound 1.The study quality is below average. 2.Plaque is noted in the CCA's bilaterally.3.There is bifurcation atherosclerosis on the left side.4.There is evidence of prior endarterectomies bilaterally. 5.Patent right-sided endarterectomy without evidence of significant restenosis.6.There appears ot be restenosis. 60-79% stenosis in the proximal left internal carotid artery based on Bluth Criteria. However, the ICA/CCA ratio is 1.1 which is consistent with 40-59% stenosis. 7.Antegrade right vertebral artery flow. 8.Antegrade left vertebral  artery flow. 2/13/2024 10:45:00 AM Clay Mtz MD   Trans Thoracic Echocardiogram 1.The study quality is average. 2.Global left ventricular systolic function is normal. The left ventricle is normal in size, wall thickness and wall motion. Global left ventricular systolic function is normal. The left ventricular ejection fraction is 67%. The left ventricle diastolic function is impaired (Grade I) with normal left atrial pressure.3.The left atrial diameter is moderately increased.4.Mild calcification of the aortic valve is noted with adequate cuspal excursion. 5.Mild calcification of the mitral valve is noted. Mitral stenosis is mild. 6.Mild tricuspid regurgitation.7.Mild pulmonary hypertension is noted. 11/16/2022 11:00:00 AM Clay Mtz MD     REVIEW OF SYSTEMS    REVIEW OF SYSTEMS  Header Details   Constitutional No history of Weight Loss   Eyes No history of Blurry vision, Visual changes, Double vision   ENT No history of Gingival bleeding   Gastrointestinal No history of Abdominal pain   Cardiovascular MARTINEZ, Edema  No history of Chest pain, Palpitations, Syncope, PND, Orthopnea, Claudication   Genitourinary No history of Dysuria   Musculoskeletal Myalgias, Arthritis   Respiratory SOB  No history of Wheezing, Sputum     SOCIAL HISTORY    SOCIAL HISTORY  Social History Element Description Effective Dates   Smoking status Never smoked -     FUNCTIONAL STATUS    No data available    MEDICAL EQUIPMENT    No data available    Goals Sections    No data available    REASON FOR REFERRAL                 Health Concerns Section    No data available    COGNITIVE/MENTAL STATUS    No data available    Patient Demographics    Patient Demographics  Patient Address Patient Name Communication   [address] [facility], [address] [place] [id number] Ariana Osorio [phone] (Mobile)     Patient Demographics  Language Race / Ethnicity Marital Status   English - Spoken (Preferred)  / Not  or        Document Information    Primary Care Provider Other Service Providers Document Coverage Dates   Clay Mtz  NPI: [id number]  [phone] (Work)  [address] [facility] [address], [address] [place] [address]  [place] [address]  Interpreting Physicians  [facility]  [phone] (Work)  [address] [place] [address] Ifserene PEREZSUDHEER Giles  NPI: [id number]  [phone] (Work)  [address] [facility], [address] [place] [address]  [place] [address]  Nurses Apr. 08, 2024April 08, 2024      Organization   [facility]  [phone] (Work)  [address] [place] [address], [address] [place], [place] [id number] [place] [address]     Encounter Providers Encounter Date    Apr. 08, 2024April 08, 2024     Legal Authenticator    Clay Mtz  NPI: [id number]  [phone] (Work)  [address] [place] [address], [address] [place] [address]  [place] [address]

## 2024-08-23 NOTE — PLAN OF CARE
Received pt from ED in no acute distress, ambulatory w/ SBA. 2L O2 baseline. BP elevated, will need home meds reordered. SOB w/ exertion. Plan: 2D echo, IV lasix BID    Call light within reach, safety precautions in place  Problem: Patient Centered Care  Goal: Patient preferences are identified and integrated in the patient's plan of care  Description: Interventions:  - What would you like us to know as we care for you? From home alone at senior living facility  - Provide timely, complete, and accurate information to patient/family  - Incorporate patient and family knowledge, values, beliefs, and cultural backgrounds into the planning and delivery of care  - Encourage patient/family to participate in care and decision-making at the level they choose  - Honor patient and family perspectives and choices  Outcome: Progressing     Problem: CARDIOVASCULAR - ADULT  Goal: Maintains optimal cardiac output and hemodynamic stability  Description: INTERVENTIONS:  - Monitor vital signs, rhythm, and trends  - Monitor for bleeding, hypotension and signs of decreased cardiac output  - Evaluate effectiveness of vasoactive medications to optimize hemodynamic stability  - Monitor arterial and/or venous puncture sites for bleeding and/or hematoma  - Assess quality of pulses, skin color and temperature  - Assess for signs of decreased coronary artery perfusion - ex. Angina  - Evaluate fluid balance, assess for edema, trend weights  Outcome: Progressing  Goal: Absence of cardiac arrhythmias or at baseline  Description: INTERVENTIONS:  - Continuous cardiac monitoring, monitor vital signs, obtain 12 lead EKG if indicated  - Evaluate effectiveness of antiarrhythmic and heart rate control medications as ordered  - Initiate emergency measures for life threatening arrhythmias  - Monitor electrolytes and administer replacement therapy as ordered  Outcome: Progressing     Problem: RESPIRATORY - ADULT  Goal: Achieves optimal ventilation and  oxygenation  Description: INTERVENTIONS:  - Assess for changes in respiratory status  - Assess for changes in mentation and behavior  - Position to facilitate oxygenation and minimize respiratory effort  - Oxygen supplementation based on oxygen saturation or ABGs  - Provide Smoking Cessation handout, if applicable  - Encourage broncho-pulmonary hygiene including cough, deep breathe, Incentive Spirometry  - Assess the need for suctioning and perform as needed  - Assess and instruct to report SOB or any respiratory difficulty  - Respiratory Therapy support as indicated  - Manage/alleviate anxiety  - Monitor for signs/symptoms of CO2 retention  Outcome: Progressing     Problem: METABOLIC/FLUID AND ELECTROLYTES - ADULT  Goal: Glucose maintained within prescribed range  Description: INTERVENTIONS:  - Monitor Blood Glucose as ordered  - Assess for signs and symptoms of hyperglycemia and hypoglycemia  - Administer ordered medications to maintain glucose within target range  - Assess barriers to adequate nutritional intake and initiate nutrition consult as needed  - Instruct patient on self management of diabetes  Outcome: Progressing  Goal: Electrolytes maintained within normal limits  Description: INTERVENTIONS:  - Monitor labs and rhythm and assess patient for signs and symptoms of electrolyte imbalances  - Administer electrolyte replacement as ordered  - Monitor response to electrolyte replacements, including rhythm and repeat lab results as appropriate  - Fluid restriction as ordered  - Instruct patient on fluid and nutrition restrictions as appropriate  Outcome: Progressing     Problem: SAFETY ADULT - FALL  Goal: Free from fall injury  Description: INTERVENTIONS:  - Assess pt frequently for physical needs  - Identify cognitive and physical deficits and behaviors that affect risk of falls.  - Stephens fall precautions as indicated by assessment.  - Educate pt/family on patient safety including physical limitations  -  Instruct pt to call for assistance with activity based on assessment  - Modify environment to reduce risk of injury  - Provide assistive devices as appropriate  - Consider OT/PT consult to assist with strengthening/mobility  - Encourage toileting schedule  Outcome: Progressing     Problem: DISCHARGE PLANNING  Goal: Discharge to home or other facility with appropriate resources  Description: INTERVENTIONS:  - Identify barriers to discharge w/pt and caregiver  - Include patient/family/discharge partner in discharge planning  - Arrange for needed discharge resources and transportation as appropriate  - Identify discharge learning needs (meds, wound care, etc)  - Arrange for interpreters to assist at discharge as needed  - Consider post-discharge preferences of patient/family/discharge partner  - Complete POLST form as appropriate  - Assess patient's ability to be responsible for managing their own health  - Refer to Case Management Department for coordinating discharge planning if the patient needs post-hospital services based on physician/LIP order or complex needs related to functional status, cognitive ability or social support system  Outcome: Progressing

## 2024-08-23 NOTE — CDS QUERY
How to answer this Query:    1.) DON'T CLICK COSIGN BUTTON FIRST  2.) Click \"3 dots...\" to the right of cosign button and click EDIT on the toolbar.  2.) Type an \"X\" in the bracket for the diagnosis that applies. (You may also add additional clinical details as you feel necessary to substantiate your response).   3.) Finally click \"Sign\" to complete response.  Thank You  CLINICAL DOCUMENTATION CLARIFICATION  Dear:DEMETRIO    (X) Chronic Respiratory Failure W/ (X) HYPOXIC ( ) HYPERCAPNIA  ( ) Other (please specify):    RISK FACTORS:COPD HOME O2    CLINICAL INDICATORS:95% ON RA, O2 2L NC 96%    TREATMENT:REMAINS ON O2 2L NC, HOME NEBS, INHALERS    If you have any questions, please contact Clinical :  Jeanette QUIÑONES RN at [phone]     Thank You!    THIS FORM IS A PERMANENT PART OF THE MEDICAL RECORD

## 2024-08-23 NOTE — ED PROVIDER NOTES
Patient Seen in: [facility] Emergency Department      History     Chief Complaint   Patient presents with    Difficulty Breathing     Stated Complaint: swollen legs; weakness    Subjective:   HPI  79-year-old female with CAD status post CABG, COPD on 2 L nasal cannula oxygen, breast cancer status post left mastectomy, GERD, hypertension, dyslipidemia, who presents for evaluation of difficulty breathing.  Difficulty breathing has gradually worsened over the past 2 weeks.  Now she can only walk a few steps at a time before becoming winded.  Yesterday she had a brief episode of chest pain.  She feels that her legs are also swollen.  No fever, cough or congestion.  She reports that her nephrologist discontinued her furosemide about a week ago.  She also admits to frequent dining at buffets and was drinking about 2 L of water daily.      Objective:   Past Medical History:    Age-related cataract of left eye    Age-related cataract of right eye    Anxiety    Asthma (HCC)    Atherosclerosis of coronary artery    Breast CA (HCC)    lt mastectomy    Breast CA (HCC)    lumpectomy, right    Cancer (HCC)    breast cancer    Carotid artery disease (HCC)    Carotid stenosis    Closed fracture of multiple ribs of left side with routine healing, subsequent encounter    Congestive heart disease (HCC)    COPD (chronic obstructive pulmonary disease) (HCC)    on O2 at 2 liters    Coronary atherosclerosis    Depression    Diabetes (HCC)    takes insulin when hospitalized, takes oral meds at home    Diastolic dysfunction without heart failure    Esophageal reflux    Essential hypertension    Generalized anxiety disorder    Gout    Gout    High blood pressure    High cholesterol    History of pituitary adenoma    Hyperlipidemia    Major depressive disorder, single episode, moderate (HCC)    Obesity              Past Surgical History:   Procedure Laterality Date    Cabg      Carotid endarterectomy Bilateral     Cataract       Colonoscopy      2013    Colonoscopy  2013    Colonoscopy N/A 02/21/2023    Procedure: COLONOSCOPY;  Surgeon: Abdiaziz Melendez MD;  Location: [facility] ENDOSCOPY    Hernia surgery      Lumpectomy right  2014    Mastectomy left Left 1999    Radiation right  2014                Social History     Socioeconomic History    Marital status:    Tobacco Use    Smoking status: Never     Passive exposure: Never    Smokeless tobacco: Never   Vaping Use    Vaping status: Never Used   Substance and Sexual Activity    Alcohol use: No     Comment: rarely at weddings    Drug use: No   Other Topics Concern    Reaction to local anesthetic No    Pt has a pacemaker No    Pt has a defibrillator No     Social Determinants of Health     Financial Resource Strain: Low Risk  (3/13/2024)    Financial Resource Strain     Difficulty of Paying Living Expenses: Not very hard     Med Affordability: No   Food Insecurity: No Food Insecurity (3/5/2024)    Food Insecurity     Food Insecurity: Never true   Recent Concern: Food Insecurity - Food Insecurity Present (2/25/2024)    Food Insecurity     Food Insecurity: Sometimes true   Transportation Needs: No Transportation Needs (3/13/2024)    Transportation Needs     Lack of Transportation: No   Physical Activity: Insufficiently Active (7/13/2022)    Exercise Vital Sign     Days of Exercise per Week: 1 day     Minutes of Exercise per Session: 10 min   Stress: No Stress Concern Present (10/6/2023)    Stress     Feeling of Stress : No    Social Connections   Housing Stability: Low Risk  (3/5/2024)    Housing Stability     Housing Instability: No              Review of Systems    Positive for stated Chief Complaint: Difficulty Breathing    Other systems are as noted in HPI.  Constitutional and vital signs reviewed.      All other systems reviewed and negative except as noted above.    Physical Exam     ED Triage Vitals [08/22/24 1711]   /65   Pulse 72   Resp 18   Temp 97.5 °F (36.4 °C)   Temp  src Temporal   SpO2 95 %   O2 Device None (Room air)       Current Vitals:   Vital Signs  BP: (!) 164/70  Pulse: 75  Resp: 21  Temp: 97.5 °F (36.4 °C)  Temp src: Temporal  MAP (mmHg): 94    Oxygen Therapy  SpO2: 100 %  O2 Device: Nasal cannula  O2 Flow Rate (L/min): 2 L/min            Physical Exam  Vitals and nursing note reviewed.   Constitutional:       Appearance: She is well-developed.   HENT:      Head: Normocephalic and atraumatic.   Eyes:      Extraocular Movements: Extraocular movements intact.   Cardiovascular:      Rate and Rhythm: Normal rate and regular rhythm.      Heart sounds: Normal heart sounds.   Pulmonary:      Breath sounds: Normal breath sounds. No wheezing.      Comments: +conversational dyspnea  Abdominal:      General: There is no distension.      Palpations: Abdomen is soft.      Tenderness: There is no abdominal tenderness.   Musculoskeletal:         General: Normal range of motion.      Cervical back: Normal range of motion.      Right lower leg: Edema present.      Left lower leg: Edema present.   Skin:     General: Skin is warm.   Neurological:      Mental Status: She is alert.      Comments: No focal deficits       Differential diagnosis includes but is not limited to heart failure exacerbation, PE, COPD exacerbation, pneumonia, pulmonary hypertension, viral syndrome        ED Course     Labs Reviewed   BASIC METABOLIC PANEL (8) - Abnormal; Notable for the following components:       Result Value    Glucose 141 (*)     CO2 33.0 (*)     Creatinine 1.25 (*)     Calcium, Total 10.8 (*)     Calculated Osmolality 296 (*)     eGFR-Cr 44 (*)     All other components within normal limits   CBC WITH DIFFERENTIAL WITH PLATELET - Abnormal; Notable for the following components:    MCHC 30.2 (*)     RDW-SD 49.3 (*)     All other components within normal limits   TROPONIN I HIGH SENSITIVITY - Abnormal; Notable for the following components:    Troponin I (High Sensitivity) 60 (*)     All other  components within normal limits   PRO BETA NATRIURETIC PEPTIDE - Abnormal; Notable for the following components:    Pro-Beta Natriuretic Peptide 453 (*)     All other components within normal limits   D-DIMER - Abnormal; Notable for the following components:    D-Dimer 0.83 (*)     All other components within normal limits   LIPID PANEL - Abnormal; Notable for the following components:    Cholesterol, Total 275 (*)     Triglycerides 416 (*)     LDL Cholesterol 152 (*)     VLDL 83 (*)     Non HDL Chol 230 (*)     All other components within normal limits   HEPATIC FUNCTION PANEL (7) - Normal   TROPONIN I HIGH SENSITIVITY           CT CHEST PE AORTA (IV ONLY) (CPT=71260)    Result Date: 8/22/2024  CONCLUSION:   1. No acute pulmonary embolism through the segmental pulmonary arteries. 2. Dilated main pulmonary artery, which can be seen in pulmonary artery hypertension. 3. No pneumonia, pleural effusion, or pneumothorax. 4. Mild multifocal atelectasis. 5. Diffuse small airways disease with opacification of a few right lower lobe segmental bronchi, which may reflect mucous plugging/aspiration. 6. Redemonstrated mild left atrial predominant cardiomegaly with triple-vessel coronary artery calcifications status post CABG, moderate to severe mitral annular calcifications, and mild aortic valve leaflets/annular calcifications. 7. Lesser incidental findings described above.     Dictated by (CST): Renan Angeles MD on 8/22/2024 at 8:18 PM     Finalized by (CST): Renan Angeles MD on 8/22/2024 at 8:24 PM             EKG at 2104: Sinus rhythm, rate 74 bpm, no STEMI, no ectopy, axes and intervals as documented on EKG report       MDM          I spent a total of 35 minutes of critical care time in obtaining history, performing a physical exam, bedside monitoring of interventions, collecting and interpreting tests and discussion with consultants but not including time spent performing procedures.                                Medical Decision Making  Vitals stable on her home dose of 2 L nasal cannula oxygen.  CBC and BMP unremarkable.  LFTs normal.  There is slight elevation in high-sensitivity troponin.  Additionally proBNP with minor elevation.  Per my independent interpretation of CT chest, no clear PE.  She will be admitted to the hospital for further evaluation.  Discussed with JONH HILL for consultation and hospitalist for admission.    Problems Addressed:  Diastolic heart failure, unspecified HF chronicity (HCC): complicated acute illness or injury with systemic symptoms    Amount and/or Complexity of Data Reviewed  External Data Reviewed: radiology.     Details: Reviewed echo report from 3/6/2024 which showed EF 60 to 65% and grade 1 diastolic dysfunction  Labs: ordered. Decision-making details documented in ED Course.  Radiology: ordered and independent interpretation performed. Decision-making details documented in ED Course.  ECG/medicine tests: ordered and independent interpretation performed. Decision-making details documented in ED Course.  Discussion of management or test interpretation with external provider(s): As above    Risk  Decision regarding hospitalization.        Disposition and Plan     Clinical Impression:  1. Diastolic heart failure, unspecified HF chronicity (HCC)         Disposition:  There is no disposition on file for this visit.  There is no disposition time on file for this visit.    Follow-up:  No follow-up provider specified.  We recommend that you schedule follow up care with a primary care provider within the next three months to obtain basic health screening including reassessment of your blood pressure.      Medications Prescribed:  Current Discharge Medication List

## 2024-08-23 NOTE — SPIRITUAL CARE NOTE
Spiritual Care Visit Note    Patient Name: Ariana Osorio Date of Spiritual Care Visit: 24   : 1945 Primary Dx: Diastolic heart failure, unspecified HF chronicity (HCC)       Referred By: Referral From:     Spiritual Care Taxonomy:    Intended Effects: Build relationship of care and support    Methods: Offer spiritual/Protestant support    Interventions: Acknowledge response to difficult experience;Ask guided questions about joan;Discuss concerns;Discuss plan of care    Visit Type/Summary:     received referral for visit. Writer visited with patient, they shared about their joan experience in their community, Writer offered prayer.      - Spiritual Care: Responded to a request for spiritual care and assessed the patient for spiritual care needs. Offered empathic listening and emotional support. Provided support for Patient's spiritual/Protestant requests. Offered prayer.  remains available for follow up.    Spiritual Care support can be requested via an Epic consult. For urgent/immediate needs, please contact the On Call  at: [place]: ext [phone]    Chaplain Fatoumata.

## 2024-08-23 NOTE — PLAN OF CARE
Ariana JOSE A Jo Patient Status:  Inpatient    1945 MRN [id number]   Location [facility] 3W/SW Attending Ranjit Lynch MD   Hosp Day # 1 PCP Enrique Braun MD       Cardiology Nocturnal APN Note    Page Received: Dr. Campbell, ED Physician    HPI:     Patient is a 79 year old female with PMH of CAD s/p CABG, HTN, HLD, bilateral carotid artery stenosis s/p bilateral carotid endarterectomy, HFpEF, asthma, COPD on chronic home O2, CKD stage III, PAD, breast cancer s/p mastectomy who presented to the ED with c/o dyspnea. Pt reported dyspnea has been progressively worsening over the past couple of weeks. Pt also reported BLE edema.She reported furosemide was recently discontinued by her nephrologist. Pt reported being noncompliant with fluid and salt restrictions. Work up in ED suggestive of fluid overload. [facility] consulted for acute on chronic CHF exacerbation. Echocardiogram completed 3/2024 showed normal left ventricular cavity size and systolic function. EF 60-65%. Grade I diastolic dysfunction. Stress test from  was negative for perfusion defects. HPI obtained from chart review and information provided by ED physician.      ED Clinical Course    EKG: Normal sinus rhythm. No acute ischemic changes    Labs: Cr 1.25, Troponin 60, , D-dimer 0.83    Imaging: CXR with no acute findings.  CTA chest negative for PE, pulmonary artery hypertension, no pneumonia, pleural effusion, or pneumothorax    Medications: N/A      Assessment/Plan:    - Diuresis  - Monitor I/O  - Continue to monitor overnight  - Formal cardiology consult to follow in AM.         LIZ Coker  [facility]  2024  2:37 AM

## 2024-08-23 NOTE — PLAN OF CARE
Patient on IV lasix BID. Remains on 2L O2 throughout shift. Heart failure education provided and reinforced throughout shift. Denies pain. POC updated to patient and son at bedside.    Problem: CARDIOVASCULAR - ADULT  Goal: Maintains optimal cardiac output and hemodynamic stability  Description: INTERVENTIONS:  - Monitor vital signs, rhythm, and trends  - Monitor for bleeding, hypotension and signs of decreased cardiac output  - Evaluate effectiveness of vasoactive medications to optimize hemodynamic stability  - Monitor arterial and/or venous puncture sites for bleeding and/or hematoma  - Assess quality of pulses, skin color and temperature  - Assess for signs of decreased coronary artery perfusion - ex. Angina  - Evaluate fluid balance, assess for edema, trend weights  Outcome: Progressing  Goal: Absence of cardiac arrhythmias or at baseline  Description: INTERVENTIONS:  - Continuous cardiac monitoring, monitor vital signs, obtain 12 lead EKG if indicated  - Evaluate effectiveness of antiarrhythmic and heart rate control medications as ordered  - Initiate emergency measures for life threatening arrhythmias  - Monitor electrolytes and administer replacement therapy as ordered  Outcome: Progressing     Problem: RESPIRATORY - ADULT  Goal: Achieves optimal ventilation and oxygenation  Description: INTERVENTIONS:  - Assess for changes in respiratory status  - Assess for changes in mentation and behavior  - Position to facilitate oxygenation and minimize respiratory effort  - Oxygen supplementation based on oxygen saturation or ABGs  - Provide Smoking Cessation handout, if applicable  - Encourage broncho-pulmonary hygiene including cough, deep breathe, Incentive Spirometry  - Assess the need for suctioning and perform as needed  - Assess and instruct to report SOB or any respiratory difficulty  - Respiratory Therapy support as indicated  - Manage/alleviate anxiety  - Monitor for signs/symptoms of CO2 retention  Outcome:  Progressing     Problem: METABOLIC/FLUID AND ELECTROLYTES - ADULT  Goal: Glucose maintained within prescribed range  Description: INTERVENTIONS:  - Monitor Blood Glucose as ordered  - Assess for signs and symptoms of hyperglycemia and hypoglycemia  - Administer ordered medications to maintain glucose within target range  - Assess barriers to adequate nutritional intake and initiate nutrition consult as needed  - Instruct patient on self management of diabetes  Outcome: Progressing  Goal: Electrolytes maintained within normal limits  Description: INTERVENTIONS:  - Monitor labs and rhythm and assess patient for signs and symptoms of electrolyte imbalances  - Administer electrolyte replacement as ordered  - Monitor response to electrolyte replacements, including rhythm and repeat lab results as appropriate  - Fluid restriction as ordered  - Instruct patient on fluid and nutrition restrictions as appropriate  Outcome: Progressing     Problem: SAFETY ADULT - FALL  Goal: Free from fall injury  Description: INTERVENTIONS:  - Assess pt frequently for physical needs  - Identify cognitive and physical deficits and behaviors that affect risk of falls.  - [place] fall precautions as indicated by assessment.  - Educate pt/family on patient safety including physical limitations  - Instruct pt to call for assistance with activity based on assessment  - Modify environment to reduce risk of injury  - Provide assistive devices as appropriate  - Consider OT/PT consult to assist with strengthening/mobility  - Encourage toileting schedule  Outcome: Progressing

## 2024-08-24 LAB
ANION GAP SERPL CALC-SCNC: 3 MMOL/L (ref 0–18)
BASOPHILS # BLD AUTO: 0.02 X10(3) UL (ref 0–0.2)
BASOPHILS NFR BLD AUTO: 0.3 %
BUN BLD-MCNC: 21 MG/DL (ref 9–23)
BUN/CREAT SERPL: 18.3 (ref 10–20)
CALCIUM BLD-MCNC: 10.7 MG/DL (ref 8.7–10.4)
CHLORIDE SERPL-SCNC: 100 MMOL/L (ref 98–112)
CO2 SERPL-SCNC: 38 MMOL/L (ref 21–32)
CREAT BLD-MCNC: 1.15 MG/DL
DEPRECATED RDW RBC AUTO: 45.7 FL (ref 35.1–46.3)
EGFRCR SERPLBLD CKD-EPI 2021: 49 ML/MIN/1.73M2 (ref 60–?)
EOSINOPHIL # BLD AUTO: 0.28 X10(3) UL (ref 0–0.7)
EOSINOPHIL NFR BLD AUTO: 3.9 %
ERYTHROCYTE [DISTWIDTH] IN BLOOD BY AUTOMATED COUNT: 13.4 % (ref 11–15)
EST. AVERAGE GLUCOSE BLD GHB EST-MCNC: 120 MG/DL (ref 68–126)
GLUCOSE BLD-MCNC: 103 MG/DL (ref 70–99)
GLUCOSE BLDC GLUCOMTR-MCNC: 119 MG/DL (ref 70–99)
GLUCOSE BLDC GLUCOMTR-MCNC: 120 MG/DL (ref 70–99)
GLUCOSE BLDC GLUCOMTR-MCNC: 132 MG/DL (ref 70–99)
GLUCOSE BLDC GLUCOMTR-MCNC: 95 MG/DL (ref 70–99)
HBA1C MFR BLD: 5.8 % (ref ?–5.7)
HCT VFR BLD AUTO: 41.9 %
HGB BLD-MCNC: 13.2 G/DL
IMM GRANULOCYTES # BLD AUTO: 0.04 X10(3) UL (ref 0–1)
IMM GRANULOCYTES NFR BLD: 0.6 %
LYMPHOCYTES # BLD AUTO: 1.3 X10(3) UL (ref 1–4)
LYMPHOCYTES NFR BLD AUTO: 18 %
MAGNESIUM SERPL-MCNC: 2 MG/DL (ref 1.6–2.6)
MCH RBC QN AUTO: 29.5 PG (ref 26–34)
MCHC RBC AUTO-ENTMCNC: 31.5 G/DL (ref 31–37)
MCV RBC AUTO: 93.7 FL
MONOCYTES # BLD AUTO: 1.1 X10(3) UL (ref 0.1–1)
MONOCYTES NFR BLD AUTO: 15.2 %
NEUTROPHILS # BLD AUTO: 4.5 X10 (3) UL (ref 1.5–7.7)
NEUTROPHILS # BLD AUTO: 4.5 X10(3) UL (ref 1.5–7.7)
NEUTROPHILS NFR BLD AUTO: 62 %
OSMOLALITY SERPL CALC.SUM OF ELEC: 295 MOSM/KG (ref 275–295)
PLATELET # BLD AUTO: 194 10(3)UL (ref 150–450)
POTASSIUM SERPL-SCNC: 4.3 MMOL/L (ref 3.5–5.1)
RBC # BLD AUTO: 4.47 X10(6)UL
SODIUM SERPL-SCNC: 141 MMOL/L (ref 136–145)
WBC # BLD AUTO: 7.2 X10(3) UL (ref 4–11)

## 2024-08-24 PROCEDURE — 99233 SBSQ HOSP IP/OBS HIGH 50: CPT | Performed by: INTERNAL MEDICINE

## 2024-08-24 RX ORDER — FUROSEMIDE 40 MG
40 TABLET ORAL
Status: DISCONTINUED | OUTPATIENT
Start: 2024-08-24 | End: 2024-08-25

## 2024-08-24 NOTE — PLAN OF CARE
No complaints overnight. Will need O2 walk today    Call light within reach, safety precautions in place  Problem: Patient Centered Care  Goal: Patient preferences are identified and integrated in the patient's plan of care  Description: Interventions:  - What would you like us to know as we care for you? From home alone at senior living facility  - Provide timely, complete, and accurate information to patient/family  - Incorporate patient and family knowledge, values, beliefs, and cultural backgrounds into the planning and delivery of care  - Encourage patient/family to participate in care and decision-making at the level they choose  - Honor patient and family perspectives and choices  Outcome: Progressing     Problem: CARDIOVASCULAR - ADULT  Goal: Maintains optimal cardiac output and hemodynamic stability  Description: INTERVENTIONS:  - Monitor vital signs, rhythm, and trends  - Monitor for bleeding, hypotension and signs of decreased cardiac output  - Evaluate effectiveness of vasoactive medications to optimize hemodynamic stability  - Monitor arterial and/or venous puncture sites for bleeding and/or hematoma  - Assess quality of pulses, skin color and temperature  - Assess for signs of decreased coronary artery perfusion - ex. Angina  - Evaluate fluid balance, assess for edema, trend weights  Outcome: Progressing  Goal: Absence of cardiac arrhythmias or at baseline  Description: INTERVENTIONS:  - Continuous cardiac monitoring, monitor vital signs, obtain 12 lead EKG if indicated  - Evaluate effectiveness of antiarrhythmic and heart rate control medications as ordered  - Initiate emergency measures for life threatening arrhythmias  - Monitor electrolytes and administer replacement therapy as ordered  Outcome: Progressing     Problem: RESPIRATORY - ADULT  Goal: Achieves optimal ventilation and oxygenation  Description: INTERVENTIONS:  - Assess for changes in respiratory status  - Assess for changes in mentation  and behavior  - Position to facilitate oxygenation and minimize respiratory effort  - Oxygen supplementation based on oxygen saturation or ABGs  - Provide Smoking Cessation handout, if applicable  - Encourage broncho-pulmonary hygiene including cough, deep breathe, Incentive Spirometry  - Assess the need for suctioning and perform as needed  - Assess and instruct to report SOB or any respiratory difficulty  - Respiratory Therapy support as indicated  - Manage/alleviate anxiety  - Monitor for signs/symptoms of CO2 retention  Outcome: Progressing     Problem: METABOLIC/FLUID AND ELECTROLYTES - ADULT  Goal: Glucose maintained within prescribed range  Description: INTERVENTIONS:  - Monitor Blood Glucose as ordered  - Assess for signs and symptoms of hyperglycemia and hypoglycemia  - Administer ordered medications to maintain glucose within target range  - Assess barriers to adequate nutritional intake and initiate nutrition consult as needed  - Instruct patient on self management of diabetes  Outcome: Progressing  Goal: Electrolytes maintained within normal limits  Description: INTERVENTIONS:  - Monitor labs and rhythm and assess patient for signs and symptoms of electrolyte imbalances  - Administer electrolyte replacement as ordered  - Monitor response to electrolyte replacements, including rhythm and repeat lab results as appropriate  - Fluid restriction as ordered  - Instruct patient on fluid and nutrition restrictions as appropriate  Outcome: Progressing     Problem: SAFETY ADULT - FALL  Goal: Free from fall injury  Description: INTERVENTIONS:  - Assess pt frequently for physical needs  - Identify cognitive and physical deficits and behaviors that affect risk of falls.  - [place] fall precautions as indicated by assessment.  - Educate pt/family on patient safety including physical limitations  - Instruct pt to call for assistance with activity based on assessment  - Modify environment to reduce risk of injury  -  Provide assistive devices as appropriate  - Consider OT/PT consult to assist with strengthening/mobility  - Encourage toileting schedule  Outcome: Progressing     Problem: DISCHARGE PLANNING  Goal: Discharge to home or other facility with appropriate resources  Description: INTERVENTIONS:  - Identify barriers to discharge w/pt and caregiver  - Include patient/family/discharge partner in discharge planning  - Arrange for needed discharge resources and transportation as appropriate  - Identify discharge learning needs (meds, wound care, etc)  - Arrange for interpreters to assist at discharge as needed  - Consider post-discharge preferences of patient/family/discharge partner  - Complete POLST form as appropriate  - Assess patient's ability to be responsible for managing their own health  - Refer to Case Management Department for coordinating discharge planning if the patient needs post-hospital services based on physician/LIP order or complex needs related to functional status, cognitive ability or social support system  Outcome: Progressing

## 2024-08-24 NOTE — PLAN OF CARE
Patient alert and oriented x 4. Vitals stable on 2L O2. Patient denies pain. Patient switched to PO diuretics. Plan to continue to monitor and discharge once medically cleared. Call light within reach.         Problem: Patient Centered Care  Goal: Patient preferences are identified and integrated in the patient's plan of care  Description: Interventions:  - What would you like us to know as we care for you? From home alone at senior living facility  - Provide timely, complete, and accurate information to patient/family  - Incorporate patient and family knowledge, values, beliefs, and cultural backgrounds into the planning and delivery of care  - Encourage patient/family to participate in care and decision-making at the level they choose  - Honor patient and family perspectives and choices  Outcome: Progressing     Problem: CARDIOVASCULAR - ADULT  Goal: Maintains optimal cardiac output and hemodynamic stability  Description: INTERVENTIONS:  - Monitor vital signs, rhythm, and trends  - Monitor for bleeding, hypotension and signs of decreased cardiac output  - Evaluate effectiveness of vasoactive medications to optimize hemodynamic stability  - Monitor arterial and/or venous puncture sites for bleeding and/or hematoma  - Assess quality of pulses, skin color and temperature  - Assess for signs of decreased coronary artery perfusion - ex. Angina  - Evaluate fluid balance, assess for edema, trend weights  Outcome: Progressing  Goal: Absence of cardiac arrhythmias or at baseline  Description: INTERVENTIONS:  - Continuous cardiac monitoring, monitor vital signs, obtain 12 lead EKG if indicated  - Evaluate effectiveness of antiarrhythmic and heart rate control medications as ordered  - Initiate emergency measures for life threatening arrhythmias  - Monitor electrolytes and administer replacement therapy as ordered  Outcome: Progressing     Problem: RESPIRATORY - ADULT  Goal: Achieves optimal ventilation and  oxygenation  Description: INTERVENTIONS:  - Assess for changes in respiratory status  - Assess for changes in mentation and behavior  - Position to facilitate oxygenation and minimize respiratory effort  - Oxygen supplementation based on oxygen saturation or ABGs  - Provide Smoking Cessation handout, if applicable  - Encourage broncho-pulmonary hygiene including cough, deep breathe, Incentive Spirometry  - Assess the need for suctioning and perform as needed  - Assess and instruct to report SOB or any respiratory difficulty  - Respiratory Therapy support as indicated  - Manage/alleviate anxiety  - Monitor for signs/symptoms of CO2 retention  Outcome: Progressing     Problem: METABOLIC/FLUID AND ELECTROLYTES - ADULT  Goal: Glucose maintained within prescribed range  Description: INTERVENTIONS:  - Monitor Blood Glucose as ordered  - Assess for signs and symptoms of hyperglycemia and hypoglycemia  - Administer ordered medications to maintain glucose within target range  - Assess barriers to adequate nutritional intake and initiate nutrition consult as needed  - Instruct patient on self management of diabetes  Outcome: Progressing  Goal: Electrolytes maintained within normal limits  Description: INTERVENTIONS:  - Monitor labs and rhythm and assess patient for signs and symptoms of electrolyte imbalances  - Administer electrolyte replacement as ordered  - Monitor response to electrolyte replacements, including rhythm and repeat lab results as appropriate  - Fluid restriction as ordered  - Instruct patient on fluid and nutrition restrictions as appropriate  Outcome: Progressing     Problem: SAFETY ADULT - FALL  Goal: Free from fall injury  Description: INTERVENTIONS:  - Assess pt frequently for physical needs  - Identify cognitive and physical deficits and behaviors that affect risk of falls.  - [place] fall precautions as indicated by assessment.  - Educate pt/family on patient safety including physical limitations  -  Instruct pt to call for assistance with activity based on assessment  - Modify environment to reduce risk of injury  - Provide assistive devices as appropriate  - Consider OT/PT consult to assist with strengthening/mobility  - Encourage toileting schedule  Outcome: Progressing     Problem: DISCHARGE PLANNING  Goal: Discharge to home or other facility with appropriate resources  Description: INTERVENTIONS:  - Identify barriers to discharge w/pt and caregiver  - Include patient/family/discharge partner in discharge planning  - Arrange for needed discharge resources and transportation as appropriate  - Identify discharge learning needs (meds, wound care, etc)  - Arrange for interpreters to assist at discharge as needed  - Consider post-discharge preferences of patient/family/discharge partner  - Complete POLST form as appropriate  - Assess patient's ability to be responsible for managing their own health  - Refer to Case Management Department for coordinating discharge planning if the patient needs post-hospital services based on physician/LIP order or complex needs related to functional status, cognitive ability or social support system  Outcome: Progressing

## 2024-08-24 NOTE — PROGRESS NOTES
Progress Note  Ariana Q Markado Patient Status:  Inpatient    1945 MRN [id number]   Location [facility] 3W/SW Attending Christopher Ortiz MD   Hosp Day # 2 PCP Enrique Braun MD     Subjective:  Pt resting comfortably in bed watching TV. Denies any chest pain or SOB.     Objective:  /61 (BP Location: Right arm)   Pulse 67   Temp 98.6 °F (37 °C) (Oral)   Resp 18   Ht 5' 4\" (1.626 m)   Wt 172 lb 9.6 oz (78.3 kg)   SpO2 92%   BMI 29.63 kg/m²     Telemetry: NSR HR 75      Intake/Output:    Intake/Output Summary (Last 24 hours) at 2024 0535  Last data filed at 2024 0433  Gross per 24 hour   Intake 720 ml   Output 3101 ml   Net -2381 ml       Last 3 Weights   24 0433 172 lb 9.6 oz (78.3 kg)   24 0624 177 lb (80.3 kg)   24 2351 177 lb 11.2 oz (80.6 kg)   24 1711 173 lb (78.5 kg)   08/15/24 1435 177 lb 6.4 oz (80.5 kg)   24 1358 177 lb (80.3 kg)       Labs:  Recent Labs   Lab 24  0743   * 117*   BUN 22 17   CREATSERUM 1.25* 1.04*   EGFRCR 44* 55*   CA 10.8* 10.4    142   K 4.4 4.3    104   CO2 33.0* 35.0*     Recent Labs   Lab 247 24  0743   RBC 4.40 4.28   HGB 13.0 12.7   HCT 43.0 40.8   MCV 97.7 95.3   MCH 29.5 29.7   MCHC 30.2* 31.1   RDW 13.8 13.6   NEPRELIM 4.52 5.18   WBC 6.9 7.6   .0 171.0         Recent Labs   Lab 24  2108   TROPHS 60* 63*     Lab Results   Component Value Date    INR 1.02 2019    INR 1.12 2019    INR 2.1 (H) 2019       Diagnostics:   TTE 24    Conclusions:     1. Left ventricle: The cavity size was normal. Wall thickness was mildly      increased. Systolic function was normal. The estimated ejection fraction      was 60-65%, by visual assessment. Although no diagnostic regional wall      motion abnormality was identified, this possibility cannot be completely      excluded on the basis of this study. Features are  consistent with a      pseudonormal left ventricular filling pattern, with concomitant abnormal      relaxation and increased filling pressure - grade 2 diastolic      dysfunction.   2. Right atrium: The atrium was dilated.   3. Mitral valve: The annulus was mildly calcified. The leaflets were mildly      to moderately calcified. Leaflet separation was reduced. Transvalvular      velocity was increased. The findings were consistent with mild stenosis.      The mean diastolic gradient was 6mm Hg.   4. Pulmonary arteries: Systolic pressure was within the normal range,      estimated to be 32mm Hg.   Impressions:  This study is compared with previous dated 03/06/2024: No   significant change since prior study.   *     Review of Systems   Respiratory: Negative.     Cardiovascular: Negative.        Physical Exam:    Physical Exam  Vitals reviewed.   Constitutional:       General: She is not in acute distress.  Neck:      Vascular: No JVD.   Cardiovascular:      Rate and Rhythm: Normal rate and regular rhythm.      Pulses: Normal pulses.      Heart sounds: No murmur heard.     No friction rub. No gallop.   Pulmonary:      Effort: Pulmonary effort is normal.      Comments: Fine crackles on bases   Abdominal:      General: Abdomen is flat.      Palpations: Abdomen is soft.   Musculoskeletal:      Cervical back: Normal range of motion.      Right lower leg: No edema.      Left lower leg: No edema.   Skin:     General: Skin is warm and dry.   Neurological:      Mental Status: She is alert and oriented to person, place, and time.         Medications:   budesonide  0.5 mg Nebulization BID    fluticasone-salmeterol  1 puff Inhalation BID    amLODIPine  2.5 mg Oral BID    metoprolol succinate ER  25 mg Oral Daily    furosemide  40 mg Intravenous BID (Diuretic)    insulin aspart  1-7 Units Subcutaneous TID CC    aspirin  324 mg Oral Once         Assessment/Plan:    Presented with SOB for 2 weeks that was progressing  HFpEF  -  proBNP 453  - chest xray Stable heart, no edema  - CT chest negative for PE  - echo with LVEF 60-65%, no RWMA, G2DD, mild  Mitral valve stenosis, PASP 32mmHG  - on IV lasix, metoprolol    Severe COPD  - on home O2, chronically on 2L  - on baseline O2 requirement  -  net negative for 2.6L   - wt down 5lb    CAD  -s/p CABG 2019 LIMA to LAD, SVG to RCA, SVG to OM    CKD 3  - pt follows up with Dr Yanes as OP  - creatinine trending down     HLD  - not on statin with allergies   -   - may need to consider alternative as OP    Plan;  - good urine output with IV lasix  - pt on baseline O2 requirement  - ambulate pt today with O2  - will transition to Oral lasix today  - labs pending this am    ResLIZ Villafana  [facility]  8/24/2024  5:35 AM    Cardiology attending    Note reviewed and patient examined independently.  Good diuresis.  Breathing is improved.    Blood pressure improved but not optimal.    Bump in creatinine noted.    Agree with plan as above: Transition to oral Lasix today.    Favor starting Jardiance at discharge.    Consider outpatient sleep study.

## 2024-08-24 NOTE — PROGRESS NOTES
[facility]  [facility]     Ariana Osorio Patient Status:  Inpatient    1945 MRN [id number]   Location [facility] 3W/SW Attending Christopher Ortiz MD   Hosp Day # 2 PCP Enrique Braun MD     Subjective:     Patient resting comfortably in bed and in NAD. Denied any active complaints.   No overnight events reported by the nursing staff.   All questions and concerns addressed.       Objective:    Review of Systems:   ROS completed; pertinent positive and negatives stated in subjective.      Vital signs:  Temp:  [97.8 °F (36.6 °C)-98.6 °F (37 °C)] 98.6 °F (37 °C)  Pulse:  [67-80] 67  Resp:  [18-22] 18  BP: (147-172)/(51-65) 160/61  SpO2:  [92 %-96 %] 92 %      Physical Exam:    Gen: NAD AO x3  Chest: good air entry CTABL  CVS: normal s1 and s2 RR  Abd: NABS soft NT ND  Neuro: CN 2-12 grossly intact  Ext: 1-2+ edema in bilateral LE      Diagnostic Data:    Labs:  Recent Labs   Lab 24  1737 24  0743 24  0817   WBC 6.9 7.6 7.2   HGB 13.0 12.7 13.2   MCV 97.7 95.3 93.7   .0 171.0 194.0       Recent Labs   Lab 24  1737 24  0743 24  0817   * 117* 103*   BUN 22 17 21   CREATSERUM 1.25* 1.04* 1.15*   CA 10.8* 10.4 10.7*   ALB 4.3  --   --     142 141   K 4.4 4.3 4.3    104 100   CO2 33.0* 35.0* 38.0*   ALKPHO 98  --   --    AST 29  --   --    ALT 26  --   --    BILT 0.3  --   --    TP 7.5  --   --        Estimated Creatinine Clearance: 34.8 mL/min (A) (based on SCr of 1.15 mg/dL (H)).    No results for input(s): \"PTP\", \"INR\" in the last 168 hours.           Imaging: Imaging data reviewed in Epic.    Medications:    furosemide  40 mg Oral BID (Diuretic)    budesonide  0.5 mg Nebulization BID    fluticasone-salmeterol  1 puff Inhalation BID    amLODIPine  2.5 mg Oral BID    metoprolol succinate ER  25 mg Oral Daily    insulin aspart  1-7 Units Subcutaneous TID CC    aspirin  324 mg Oral  Once       Assessment & Plan:     Acute exacerbation of HFpEF  Severe COPD  Imaging reviewed  Previous echo with EF 60-65% in 03/2024  Started on IV lasix BID  Repeat echo showed EF 60-65%, no RWMA  Cardiology on consult   Change lasix from IV to PO  Ambulate   Strict Is and Os - Net IO Since Admission: -2,871 mL [08/24/24 1005]  Daily weights  Possible discharge in a.m.  HTN  BP better controlled  Resumed home amlodipine and metoprolol  Monitor vitals  CKD 3  Monitor renal function  Avoid nephrotoxic agents  T2DM  SSI and frequent accuchecks      Plan of care discussed with patient or family at bedside.      Supplementary Documentation:     Quality:  DVT Prophylaxis: SCDs - heparin allergy  CODE status: Full      Estimated date of discharge: TBD  Discharge is dependent on: clinical stability  At this point Ms. Osorio is expected to be discharge to: home             **Certification      PHYSICIAN Certification of Need for Inpatient Hospitalization - Initial Certification    Patient will require inpatient services that will reasonably be expected to span two midnight's based on the clinical documentation in H+P.   Based on patients current state of illness, I anticipate that, after discharge, patient will require TBD.      Christopher Ortiz MD  Hospitalist    MDM: High, I personally reviewed the available laboratories, imaging including CT, XR. I discussed the case with Rn. I ordered laboratories, studies including AM labs. I adjusted medications including home meds.   Medical decision making high, risk is high.       The 21st Century Cures Act makes medical notes like these available to patients in the interest of transparency. Please be advised this is a medical document. Medical documents are intended to carry relevant information, facts as evident, and the clinical opinion of the practitioner. The medical note is intended as peer to peer communication and may appear blunt or direct. It is written in medical language  and may contain abbreviations or verbiage that are unfamiliar.

## 2024-08-25 LAB
ALBUMIN SERPL-MCNC: 4.2 G/DL (ref 3.2–4.8)
ALBUMIN/GLOB SERPL: 1.2 {RATIO} (ref 1–2)
ALP LIVER SERPL-CCNC: 77 U/L
ALT SERPL-CCNC: 18 U/L
ANION GAP SERPL CALC-SCNC: 6 MMOL/L (ref 0–18)
ANION GAP SERPL CALC-SCNC: 6 MMOL/L (ref 0–18)
AST SERPL-CCNC: 19 U/L (ref ?–34)
BASOPHILS # BLD AUTO: 0.03 X10(3) UL (ref 0–0.2)
BASOPHILS NFR BLD AUTO: 0.4 %
BILIRUB SERPL-MCNC: 0.5 MG/DL (ref 0.2–1.1)
BUN BLD-MCNC: 33 MG/DL (ref 9–23)
BUN BLD-MCNC: 33 MG/DL (ref 9–23)
BUN/CREAT SERPL: 22.6 (ref 10–20)
BUN/CREAT SERPL: 22.6 (ref 10–20)
CALCIUM BLD-MCNC: 10.8 MG/DL (ref 8.7–10.4)
CALCIUM BLD-MCNC: 10.8 MG/DL (ref 8.7–10.4)
CHLORIDE SERPL-SCNC: 96 MMOL/L (ref 98–112)
CHLORIDE SERPL-SCNC: 96 MMOL/L (ref 98–112)
CO2 SERPL-SCNC: 37 MMOL/L (ref 21–32)
CO2 SERPL-SCNC: 37 MMOL/L (ref 21–32)
CREAT BLD-MCNC: 1.46 MG/DL
CREAT BLD-MCNC: 1.46 MG/DL
DEPRECATED RDW RBC AUTO: 45.6 FL (ref 35.1–46.3)
EGFRCR SERPLBLD CKD-EPI 2021: 37 ML/MIN/1.73M2 (ref 60–?)
EGFRCR SERPLBLD CKD-EPI 2021: 37 ML/MIN/1.73M2 (ref 60–?)
EOSINOPHIL # BLD AUTO: 0.29 X10(3) UL (ref 0–0.7)
EOSINOPHIL NFR BLD AUTO: 4.1 %
ERYTHROCYTE [DISTWIDTH] IN BLOOD BY AUTOMATED COUNT: 13.3 % (ref 11–15)
GLOBULIN PLAS-MCNC: 3.4 G/DL (ref 2–3.5)
GLUCOSE BLD-MCNC: 138 MG/DL (ref 70–99)
GLUCOSE BLD-MCNC: 138 MG/DL (ref 70–99)
GLUCOSE BLDC GLUCOMTR-MCNC: 103 MG/DL (ref 70–99)
GLUCOSE BLDC GLUCOMTR-MCNC: 138 MG/DL (ref 70–99)
GLUCOSE BLDC GLUCOMTR-MCNC: 140 MG/DL (ref 70–99)
GLUCOSE BLDC GLUCOMTR-MCNC: 98 MG/DL (ref 70–99)
HCT VFR BLD AUTO: 45.2 %
HGB BLD-MCNC: 14.3 G/DL
IMM GRANULOCYTES # BLD AUTO: 0.04 X10(3) UL (ref 0–1)
IMM GRANULOCYTES NFR BLD: 0.6 %
LYMPHOCYTES # BLD AUTO: 1.31 X10(3) UL (ref 1–4)
LYMPHOCYTES NFR BLD AUTO: 18.3 %
MAGNESIUM SERPL-MCNC: 1.8 MG/DL (ref 1.6–2.6)
MCH RBC QN AUTO: 29.2 PG (ref 26–34)
MCHC RBC AUTO-ENTMCNC: 31.6 G/DL (ref 31–37)
MCV RBC AUTO: 92.2 FL
MONOCYTES # BLD AUTO: 0.89 X10(3) UL (ref 0.1–1)
MONOCYTES NFR BLD AUTO: 12.4 %
NEUTROPHILS # BLD AUTO: 4.6 X10 (3) UL (ref 1.5–7.7)
NEUTROPHILS # BLD AUTO: 4.6 X10(3) UL (ref 1.5–7.7)
NEUTROPHILS NFR BLD AUTO: 64.2 %
OSMOLALITY SERPL CALC.SUM OF ELEC: 297 MOSM/KG (ref 275–295)
OSMOLALITY SERPL CALC.SUM OF ELEC: 297 MOSM/KG (ref 275–295)
PLATELET # BLD AUTO: 213 10(3)UL (ref 150–450)
POTASSIUM SERPL-SCNC: 4 MMOL/L (ref 3.5–5.1)
POTASSIUM SERPL-SCNC: 4 MMOL/L (ref 3.5–5.1)
PROT SERPL-MCNC: 7.6 G/DL (ref 5.7–8.2)
RBC # BLD AUTO: 4.9 X10(6)UL
SODIUM SERPL-SCNC: 139 MMOL/L (ref 136–145)
SODIUM SERPL-SCNC: 139 MMOL/L (ref 136–145)
WBC # BLD AUTO: 7.2 X10(3) UL (ref 4–11)

## 2024-08-25 PROCEDURE — 99233 SBSQ HOSP IP/OBS HIGH 50: CPT | Performed by: INTERNAL MEDICINE

## 2024-08-25 RX ORDER — FUROSEMIDE 20 MG
20 TABLET ORAL
Status: DISCONTINUED | OUTPATIENT
Start: 2024-08-25 | End: 2024-08-27

## 2024-08-25 RX ORDER — MAGNESIUM OXIDE 400 MG/1
400 TABLET ORAL ONCE
Status: COMPLETED | OUTPATIENT
Start: 2024-08-25 | End: 2024-08-25

## 2024-08-25 NOTE — PLAN OF CARE
Problem: Patient Centered Care  Goal: Patient preferences are identified and integrated in the patient's plan of care  Description: Interventions:  - What would you like us to know as we care for you? From home alone at senior living facility  - Provide timely, complete, and accurate information to patient/family  - Incorporate patient and family knowledge, values, beliefs, and cultural backgrounds into the planning and delivery of care  - Encourage patient/family to participate in care and decision-making at the level they choose  - Honor patient and family perspectives and choices  8/25/2024 0030 by Edelmira Allison RN  Outcome: Progressing  8/25/2024 0030 by Edelmira Allison RN  Outcome: Progressing     Problem: CARDIOVASCULAR - ADULT  Goal: Maintains optimal cardiac output and hemodynamic stability  Description: INTERVENTIONS:  - Monitor vital signs, rhythm, and trends  - Monitor for bleeding, hypotension and signs of decreased cardiac output  - Evaluate effectiveness of vasoactive medications to optimize hemodynamic stability  - Monitor arterial and/or venous puncture sites for bleeding and/or hematoma  - Assess quality of pulses, skin color and temperature  - Assess for signs of decreased coronary artery perfusion - ex. Angina  - Evaluate fluid balance, assess for edema, trend weights  8/25/2024 0030 by Edelmira Allison RN  Outcome: Progressing  8/25/2024 0030 by Edelmira Allison RN  Outcome: Progressing  Goal: Absence of cardiac arrhythmias or at baseline  Description: INTERVENTIONS:  - Continuous cardiac monitoring, monitor vital signs, obtain 12 lead EKG if indicated  - Evaluate effectiveness of antiarrhythmic and heart rate control medications as ordered  - Initiate emergency measures for life threatening arrhythmias  - Monitor electrolytes and administer replacement therapy as ordered  8/25/2024 0030 by Edelmira Allison RN  Outcome: Progressing  8/25/2024 0030 by Edelmira Allison RN  Outcome: Progressing      Problem: RESPIRATORY - ADULT  Goal: Achieves optimal ventilation and oxygenation  Description: INTERVENTIONS:  - Assess for changes in respiratory status  - Assess for changes in mentation and behavior  - Position to facilitate oxygenation and minimize respiratory effort  - Oxygen supplementation based on oxygen saturation or ABGs  - Provide Smoking Cessation handout, if applicable  - Encourage broncho-pulmonary hygiene including cough, deep breathe, Incentive Spirometry  - Assess the need for suctioning and perform as needed  - Assess and instruct to report SOB or any respiratory difficulty  - Respiratory Therapy support as indicated  - Manage/alleviate anxiety  - Monitor for signs/symptoms of CO2 retention  8/25/2024 0030 by Edelmira Allison RN  Outcome: Progressing  8/25/2024 0030 by Edelmira Allison RN  Outcome: Progressing     Problem: METABOLIC/FLUID AND ELECTROLYTES - ADULT  Goal: Glucose maintained within prescribed range  Description: INTERVENTIONS:  - Monitor Blood Glucose as ordered  - Assess for signs and symptoms of hyperglycemia and hypoglycemia  - Administer ordered medications to maintain glucose within target range  - Assess barriers to adequate nutritional intake and initiate nutrition consult as needed  - Instruct patient on self management of diabetes  8/25/2024 0030 by Edelmira Allison RN  Outcome: Progressing  8/25/2024 0030 by Edelmira Allison, RN  Outcome: Progressing  Goal: Electrolytes maintained within normal limits  Description: INTERVENTIONS:  - Monitor labs and rhythm and assess patient for signs and symptoms of electrolyte imbalances  - Administer electrolyte replacement as ordered  - Monitor response to electrolyte replacements, including rhythm and repeat lab results as appropriate  - Fluid restriction as ordered  - Instruct patient on fluid and nutrition restrictions as appropriate  8/25/2024 0030 by Edelmira Allison, RN  Outcome: Progressing  8/25/2024 0030 by Edelmira Allison, SHOSHANA  Outcome:  Progressing     Problem: SAFETY ADULT - FALL  Goal: Free from fall injury  Description: INTERVENTIONS:  - Assess pt frequently for physical needs  - Identify cognitive and physical deficits and behaviors that affect risk of falls.  - [place] fall precautions as indicated by assessment.  - Educate pt/family on patient safety including physical limitations  - Instruct pt to call for assistance with activity based on assessment  - Modify environment to reduce risk of injury  - Provide assistive devices as appropriate  - Consider OT/PT consult to assist with strengthening/mobility  - Encourage toileting schedule  8/25/2024 0030 by Edelmira Allison RN  Outcome: Progressing  8/25/2024 0030 by Edelmira Allison, RN  Outcome: Progressing     Problem: DISCHARGE PLANNING  Goal: Discharge to home or other facility with appropriate resources  Description: INTERVENTIONS:  - Identify barriers to discharge w/pt and caregiver  - Include patient/family/discharge partner in discharge planning  - Arrange for needed discharge resources and transportation as appropriate  - Identify discharge learning needs (meds, wound care, etc)  - Arrange for interpreters to assist at discharge as needed  - Consider post-discharge preferences of patient/family/discharge partner  - Complete POLST form as appropriate  - Assess patient's ability to be responsible for managing their own health  - Refer to Case Management Department for coordinating discharge planning if the patient needs post-hospital services based on physician/LIP order or complex needs related to functional status, cognitive ability or social support system  8/25/2024 0030 by Edelmira Allison, RN  Outcome: Progressing  8/25/2024 0030 by Edelmira Allison, RN  Outcome: Progressing

## 2024-08-25 NOTE — PLAN OF CARE
Problem: Patient Centered Care  Goal: Patient preferences are identified and integrated in the patient's plan of care  Description: Interventions:  - What would you like us to know as we care for you? From home alone at senior living facility  - Provide timely, complete, and accurate information to patient/family  - Incorporate patient and family knowledge, values, beliefs, and cultural backgrounds into the planning and delivery of care  - Encourage patient/family to participate in care and decision-making at the level they choose  - Honor patient and family perspectives and choices  Outcome: Progressing     Problem: CARDIOVASCULAR - ADULT  Goal: Maintains optimal cardiac output and hemodynamic stability  Description: INTERVENTIONS:  - Monitor vital signs, rhythm, and trends  - Monitor for bleeding, hypotension and signs of decreased cardiac output  - Evaluate effectiveness of vasoactive medications to optimize hemodynamic stability  - Monitor arterial and/or venous puncture sites for bleeding and/or hematoma  - Assess quality of pulses, skin color and temperature  - Assess for signs of decreased coronary artery perfusion - ex. Angina  - Evaluate fluid balance, assess for edema, trend weights  Outcome: Progressing  Goal: Absence of cardiac arrhythmias or at baseline  Description: INTERVENTIONS:  - Continuous cardiac monitoring, monitor vital signs, obtain 12 lead EKG if indicated  - Evaluate effectiveness of antiarrhythmic and heart rate control medications as ordered  - Initiate emergency measures for life threatening arrhythmias  - Monitor electrolytes and administer replacement therapy as ordered  Outcome: Progressing     Problem: RESPIRATORY - ADULT  Goal: Achieves optimal ventilation and oxygenation  Description: INTERVENTIONS:  - Assess for changes in respiratory status  - Assess for changes in mentation and behavior  - Position to facilitate oxygenation and minimize respiratory effort  - Oxygen  supplementation based on oxygen saturation or ABGs  - Provide Smoking Cessation handout, if applicable  - Encourage broncho-pulmonary hygiene including cough, deep breathe, Incentive Spirometry  - Assess the need for suctioning and perform as needed  - Assess and instruct to report SOB or any respiratory difficulty  - Respiratory Therapy support as indicated  - Manage/alleviate anxiety  - Monitor for signs/symptoms of CO2 retention  Outcome: Progressing     Problem: METABOLIC/FLUID AND ELECTROLYTES - ADULT  Goal: Glucose maintained within prescribed range  Description: INTERVENTIONS:  - Monitor Blood Glucose as ordered  - Assess for signs and symptoms of hyperglycemia and hypoglycemia  - Administer ordered medications to maintain glucose within target range  - Assess barriers to adequate nutritional intake and initiate nutrition consult as needed  - Instruct patient on self management of diabetes  Outcome: Progressing  Goal: Electrolytes maintained within normal limits  Description: INTERVENTIONS:  - Monitor labs and rhythm and assess patient for signs and symptoms of electrolyte imbalances  - Administer electrolyte replacement as ordered  - Monitor response to electrolyte replacements, including rhythm and repeat lab results as appropriate  - Fluid restriction as ordered  - Instruct patient on fluid and nutrition restrictions as appropriate  Outcome: Progressing     Problem: SAFETY ADULT - FALL  Goal: Free from fall injury  Description: INTERVENTIONS:  - Assess pt frequently for physical needs  - Identify cognitive and physical deficits and behaviors that affect risk of falls.  - [place] fall precautions as indicated by assessment.  - Educate pt/family on patient safety including physical limitations  - Instruct pt to call for assistance with activity based on assessment  - Modify environment to reduce risk of injury  - Provide assistive devices as appropriate  - Consider OT/PT consult to assist with  strengthening/mobility  - Encourage toileting schedule  Outcome: Progressing     Problem: DISCHARGE PLANNING  Goal: Discharge to home or other facility with appropriate resources  Description: INTERVENTIONS:  - Identify barriers to discharge w/pt and caregiver  - Include patient/family/discharge partner in discharge planning  - Arrange for needed discharge resources and transportation as appropriate  - Identify discharge learning needs (meds, wound care, etc)  - Arrange for interpreters to assist at discharge as needed  - Consider post-discharge preferences of patient/family/discharge partner  - Complete POLST form as appropriate  - Assess patient's ability to be responsible for managing their own health  - Refer to Case Management Department for coordinating discharge planning if the patient needs post-hospital services based on physician/LIP order or complex needs related to functional status, cognitive ability or social support system  Outcome: Progressing     Pt alert and oriented X 4. Pt on 1.5 L nasal cannula. No complaints throughout the day. Safety precautions in place, call light within reach. Plan is to monitor kidney function.

## 2024-08-25 NOTE — PROGRESS NOTES
[facility]  part of [facility] Hospitalist Progress Note     Ariana Osorio Patient Status:  Inpatient    1945 MRN [id number]   Location [facility] 3W/SW Attending Christopher Ortiz MD   Hosp Day # 3 PCP Enrique Braun MD     Subjective:     Patient resting comfortably in bed and in NAD. Denied any active complaints.   No overnight events reported by the nursing staff.   All questions and concerns addressed.   Looking forward to possibly discharging in a.m.      Objective:    Review of Systems:   ROS completed; pertinent positive and negatives stated in subjective.      Vital signs:  Temp:  [98.1 °F (36.7 °C)-98.7 °F (37.1 °C)] 98.1 °F (36.7 °C)  Pulse:  [71-81] 81  Resp:  [16-18] 18  BP: (122-140)/(47-56) 122/47  SpO2:  [93 %-94 %] 94 %      Physical Exam:    Gen: NAD AO x3  Chest: good air entry CTABL  CVS: normal s1 and s2 RR  Abd: NABS soft NT ND  Neuro: CN 2-12 grossly intact  Ext: 1-2+ edema in bilateral LE      Diagnostic Data:    Labs:  Recent Labs   Lab 24  1737 24  0743 24  0817 24  0717   WBC 6.9 7.6 7.2 7.2   HGB 13.0 12.7 13.2 14.3   MCV 97.7 95.3 93.7 92.2   .0 171.0 194.0 213.0       Recent Labs   Lab 24  1737 24  0743 24  0817 24  0717   * 117* 103* 138*  138*   BUN 22 17 21 33*  33*   CREATSERUM 1.25* 1.04* 1.15* 1.46*  1.46*   CA 10.8* 10.4 10.7* 10.8*  10.8*   ALB 4.3  --   --  4.2    142 141 139  139   K 4.4 4.3 4.3 4.0  4.0    104 100 96*  96*   CO2 33.0* 35.0* 38.0* 37.0*  37.0*   ALKPHO 98  --   --  77   AST 29  --   --  19   ALT 26  --   --  18   BILT 0.3  --   --  0.5   TP 7.5  --   --  7.6       Estimated Creatinine Clearance: 27.4 mL/min (A) (based on SCr of 1.46 mg/dL (H)).    No results for input(s): \"PTP\", \"INR\" in the last 168 hours.           Imaging: Imaging data reviewed in Epic.    Medications:    furosemide  20 mg Oral BID (Diuretic)    budesonide  0.5  mg Nebulization BID    fluticasone-salmeterol  1 puff Inhalation BID    amLODIPine  2.5 mg Oral BID    metoprolol succinate ER  25 mg Oral Daily    insulin aspart  1-7 Units Subcutaneous TID CC    aspirin  324 mg Oral Once       Assessment & Plan:     Acute exacerbation of HFpEF  Severe COPD  Imaging reviewed  Previous echo with EF 60-65% in 03/2024  Started on IV lasix BID  Repeat echo showed EF 60-65%, no RWMA  Cardiology on consult   Change lasix from IV to PO  Taper diuresis given developing PIA  Ambulate   Strict Is and Os - Net IO Since Admission: -2,871 mL [08/24/24 1005]  Daily weights  Possible discharge in a.m.  HTN  BP better controlled  Resumed home amlodipine and metoprolol  Monitor vitals  PIA on CKD 3  Diuretics decreased by cardiology  Monitor renal function  Avoid nephrotoxic agents  T2DM  SSI and frequent accuchecks      Plan of care discussed with patient or family at bedside.      Supplementary Documentation:     Quality:  DVT Prophylaxis: SCDs - heparin allergy  CODE status: Full      Estimated date of discharge: TBD  Discharge is dependent on: clinical stability  At this point Ms. Osorio is expected to be discharge to: home             **Certification      PHYSICIAN Certification of Need for Inpatient Hospitalization - Initial Certification    Patient will require inpatient services that will reasonably be expected to span two midnight's based on the clinical documentation in H+P.   Based on patients current state of illness, I anticipate that, after discharge, patient will require TBD.      Christopher Ortiz MD  Hospitalist    MDM: High, I personally reviewed the available laboratories, imaging including CT, XR. I discussed the case with Rn. I ordered laboratories, studies including AM labs. I adjusted medications including home meds.   Medical decision making high, risk is high.       The 21st Century Cures Act makes medical notes like these available to patients in the interest of transparency.  Please be advised this is a medical document. Medical documents are intended to carry relevant information, facts as evident, and the clinical opinion of the practitioner. The medical note is intended as peer to peer communication and may appear blunt or direct. It is written in medical language and may contain abbreviations or verbiage that are unfamiliar.

## 2024-08-26 ENCOUNTER — APPOINTMENT (OUTPATIENT)
Dept: CARDIAC REHAB | Facility: HOSPITAL | Age: 79
End: 2024-08-26
Attending: INTERNAL MEDICINE
Payer: MEDICARE

## 2024-08-26 LAB
ANION GAP SERPL CALC-SCNC: 4 MMOL/L (ref 0–18)
BASOPHILS # BLD AUTO: 0.02 X10(3) UL (ref 0–0.2)
BASOPHILS NFR BLD AUTO: 0.3 %
BUN BLD-MCNC: 42 MG/DL (ref 9–23)
BUN/CREAT SERPL: 28.4 (ref 10–20)
CALCIUM BLD-MCNC: 10.5 MG/DL (ref 8.7–10.4)
CHLORIDE SERPL-SCNC: 98 MMOL/L (ref 98–112)
CO2 SERPL-SCNC: 38 MMOL/L (ref 21–32)
CREAT BLD-MCNC: 1.48 MG/DL
DEPRECATED RDW RBC AUTO: 45.7 FL (ref 35.1–46.3)
EGFRCR SERPLBLD CKD-EPI 2021: 36 ML/MIN/1.73M2 (ref 60–?)
EOSINOPHIL # BLD AUTO: 0.29 X10(3) UL (ref 0–0.7)
EOSINOPHIL NFR BLD AUTO: 4.3 %
ERYTHROCYTE [DISTWIDTH] IN BLOOD BY AUTOMATED COUNT: 13.4 % (ref 11–15)
GLUCOSE BLD-MCNC: 122 MG/DL (ref 70–99)
GLUCOSE BLDC GLUCOMTR-MCNC: 108 MG/DL (ref 70–99)
GLUCOSE BLDC GLUCOMTR-MCNC: 113 MG/DL (ref 70–99)
GLUCOSE BLDC GLUCOMTR-MCNC: 124 MG/DL (ref 70–99)
GLUCOSE BLDC GLUCOMTR-MCNC: 147 MG/DL (ref 70–99)
HCT VFR BLD AUTO: 43.9 %
HGB BLD-MCNC: 14.1 G/DL
IMM GRANULOCYTES # BLD AUTO: 0.06 X10(3) UL (ref 0–1)
IMM GRANULOCYTES NFR BLD: 0.9 %
LYMPHOCYTES # BLD AUTO: 1.27 X10(3) UL (ref 1–4)
LYMPHOCYTES NFR BLD AUTO: 19 %
MAGNESIUM SERPL-MCNC: 2.2 MG/DL (ref 1.6–2.6)
MCH RBC QN AUTO: 29.9 PG (ref 26–34)
MCHC RBC AUTO-ENTMCNC: 32.1 G/DL (ref 31–37)
MCV RBC AUTO: 93 FL
MONOCYTES # BLD AUTO: 0.93 X10(3) UL (ref 0.1–1)
MONOCYTES NFR BLD AUTO: 13.9 %
NEUTROPHILS # BLD AUTO: 4.12 X10 (3) UL (ref 1.5–7.7)
NEUTROPHILS # BLD AUTO: 4.12 X10(3) UL (ref 1.5–7.7)
NEUTROPHILS NFR BLD AUTO: 61.6 %
OSMOLALITY SERPL CALC.SUM OF ELEC: 302 MOSM/KG (ref 275–295)
PLATELET # BLD AUTO: 195 10(3)UL (ref 150–450)
POTASSIUM SERPL-SCNC: 4.1 MMOL/L (ref 3.5–5.1)
RBC # BLD AUTO: 4.72 X10(6)UL
SODIUM SERPL-SCNC: 140 MMOL/L (ref 136–145)
WBC # BLD AUTO: 6.7 X10(3) UL (ref 4–11)

## 2024-08-26 PROCEDURE — 99233 SBSQ HOSP IP/OBS HIGH 50: CPT | Performed by: INTERNAL MEDICINE

## 2024-08-26 NOTE — PROGRESS NOTES
Progress Note  Ariana Osorio Patient Status:  Inpatient    1945 MRN [id number]   Location [facility] 3W/SW Attending Christopher Ortiz MD   Hosp Day # 4 PCP Enrique Braun MD     Subjective:  She is doing ok this morning. Feels her breathing is back to baseline on her baseline 2 L NC. Swelling has improved. Denies chest pain    Objective:  /61 (BP Location: Right arm)   Pulse 80   Temp 97.8 °F (36.6 °C) (Oral)   Resp 18   Ht 5' 4\" (1.626 m)   Wt 169 lb 6.4 oz (76.8 kg)   SpO2 93%   BMI 29.08 kg/m²     Intake/Output:    Intake/Output Summary (Last 24 hours) at 2024 0835  Last data filed at 2024 0830  Gross per 24 hour   Intake 1132 ml   Output 1401 ml   Net -269 ml       Last 3 Weights   24 0500 169 lb 6.4 oz (76.8 kg)   24 0645 169 lb (76.7 kg)   24 0433 172 lb 9.6 oz (78.3 kg)   24 0624 177 lb (80.3 kg)   24 2351 177 lb 11.2 oz (80.6 kg)   24 1711 173 lb (78.5 kg)   08/15/24 1435 177 lb 6.4 oz (80.5 kg)   24 1358 177 lb (80.3 kg)       Labs:  Recent Labs   Lab 24  0817 24  0717 24  0709   * 138*  138* 122*   BUN 21 33*  33* 42*   CREATSERUM 1.15* 1.46*  1.46* 1.48*   EGFRCR 49* 37*  37* 36*   CA 10.7* 10.8*  10.8* 10.5*    139  139 140   K 4.3 4.0  4.0 4.1    96*  96* 98   CO2 38.0* 37.0*  37.0* 38.0*     Recent Labs   Lab 24  0817 24  0717 24  0709   RBC 4.47 4.90 4.72   HGB 13.2 14.3 14.1   HCT 41.9 45.2 43.9   MCV 93.7 92.2 93.0   MCH 29.5 29.2 29.9   MCHC 31.5 31.6 32.1   RDW 13.4 13.3 13.4   NEPRELIM 4.50 4.60 4.12   WBC 7.2 7.2 6.7   .0 213.0 195.0         Recent Labs   Lab 24  1737 24  2108   TROPHS 60* 63*       Diagnostics:   Telemetry: NSR    TTE 24  Conclusions:   1. Left ventricle: The cavity size was normal. Wall thickness was mildly      increased. Systolic function was normal. The estimated ejection fraction      was 60-65%,  by visual assessment. Although no diagnostic regional wall      motion abnormality was identified, this possibility cannot be completely      excluded on the basis of this study. Features are consistent with a      pseudonormal left ventricular filling pattern, with concomitant abnormal      relaxation and increased filling pressure - grade 2 diastolic      dysfunction.   2. Right atrium: The atrium was dilated.   3. Mitral valve: The annulus was mildly calcified. The leaflets were mildly      to moderately calcified. Leaflet separation was reduced. Transvalvular      velocity was increased. The findings were consistent with mild stenosis.      The mean diastolic gradient was 6mm Hg.   4. Pulmonary arteries: Systolic pressure was within the normal range,      estimated to be 32mm Hg.   Impressions:  This study is compared with previous dated 03/06/2024: No   significant change since prior study.     Review of Systems   Cardiovascular:  Negative for chest pain and leg swelling.   Respiratory:  Negative for shortness of breath.        Physical Exam:  General: Alert and oriented in no apparent distress.  HEENT: Pupils equal. Mucous membranes moist.   Neck: No JVD  Cardiac:  Normal S1 S2, Regular. No murmur  Lungs: Clear without wheezes or crackles. 2 L NC  Abdomen: Soft, non-tender, ND  Extremities: Without clubbing, cyanosis or edema.    Neurologic: No focal deficits. Normal affect.  Skin: Warm and dry,    Medications:   furosemide  20 mg Oral BID (Diuretic)    budesonide  0.5 mg Nebulization BID    fluticasone-salmeterol  1 puff Inhalation BID    amLODIPine  2.5 mg Oral BID    metoprolol succinate ER  25 mg Oral Daily    insulin aspart  1-7 Units Subcutaneous TID CC    aspirin  324 mg Oral Once         Assessment:    Acute HFpEF  [place] to be decompensated on arrival  TTE with LVEF 60-65%, normal RV function, normal PASP  Diuresed with IV lasix until prerenal PIA on 8/25 suggesting patient is intravascularly dry.  Creatinine is stable.   Severe COPD, chronic hypoxic respiratory failure  Baseline 2 L NC  CAD s/p CABG 2019 (LIMA-LAD, SVG-RCA, SVG-OM)  Denies angina  BB. Statin intolerance. She does not take ASA due to history of UGIB/ulcer? which has resolved, recommended she discuss this further outpatient with her primary cardiologist.  PIA on CKD III - creatinine stable today, BUN rising  HLD - history of statin intolerance. Consider outpatient PCSK9 or zetia    Plan:    She is intravascularly dry. PIA on 8/25. Creatinine is stable with rising BUN. Hold oral lasix 20 mg twice daily dosing this morning. Encouraged oral hydration.   Possible discharge tomorrow if creatinine trends downward or remains stable. Ideally will discharge on PO lasix 20 mg once daily as she was not on scheduled loop diuretic prior to arrival.     Plan of care discussed with patient, RN.    DAVINA Ivory  8/26/2024  8:35 AM

## 2024-08-26 NOTE — CARDIAC REHAB
CARDIAC REHAB HEART FAILURE EDUCATION      Activity: Chair for all meals: yes       Ambulation: 800 feet       Tolerated Activity: stable, standby with rolling walker with O2 at 2L NC.          Disease Process: Disease process reviewed.    Reviewed the following:       HOME EXERCISE ACTIVITY: encouraged daily ambulation.      OUTPATIENT CARDIAC REHAB: Referred to Cardiac Rehabilitation: pt is currently enrolled in pulmonary rehab Phase 2. Awaiting to begin after post discharge followup appt with MD.

## 2024-08-26 NOTE — PLAN OF CARE
Problem: Patient Centered Care  Goal: Patient preferences are identified and integrated in the patient's plan of care  Description: Interventions:  - What would you like us to know as we care for you? From home alone at senior living facility  - Provide timely, complete, and accurate information to patient/family  - Incorporate patient and family knowledge, values, beliefs, and cultural backgrounds into the planning and delivery of care  - Encourage patient/family to participate in care and decision-making at the level they choose  - Honor patient and family perspectives and choices  Outcome: Progressing     Problem: CARDIOVASCULAR - ADULT  Goal: Maintains optimal cardiac output and hemodynamic stability  Description: INTERVENTIONS:  - Monitor vital signs, rhythm, and trends  - Monitor for bleeding, hypotension and signs of decreased cardiac output  - Evaluate effectiveness of vasoactive medications to optimize hemodynamic stability  - Monitor arterial and/or venous puncture sites for bleeding and/or hematoma  - Assess quality of pulses, skin color and temperature  - Assess for signs of decreased coronary artery perfusion - ex. Angina  - Evaluate fluid balance, assess for edema, trend weights  Outcome: Progressing  Goal: Absence of cardiac arrhythmias or at baseline  Description: INTERVENTIONS:  - Continuous cardiac monitoring, monitor vital signs, obtain 12 lead EKG if indicated  - Evaluate effectiveness of antiarrhythmic and heart rate control medications as ordered  - Initiate emergency measures for life threatening arrhythmias  - Monitor electrolytes and administer replacement therapy as ordered  Outcome: Progressing     Problem: RESPIRATORY - ADULT  Goal: Achieves optimal ventilation and oxygenation  Description: INTERVENTIONS:  - Assess for changes in respiratory status  - Assess for changes in mentation and behavior  - Position to facilitate oxygenation and minimize respiratory effort  - Oxygen  supplementation based on oxygen saturation or ABGs  - Provide Smoking Cessation handout, if applicable  - Encourage broncho-pulmonary hygiene including cough, deep breathe, Incentive Spirometry  - Assess the need for suctioning and perform as needed  - Assess and instruct to report SOB or any respiratory difficulty  - Respiratory Therapy support as indicated  - Manage/alleviate anxiety  - Monitor for signs/symptoms of CO2 retention  Outcome: Progressing     Problem: METABOLIC/FLUID AND ELECTROLYTES - ADULT  Goal: Glucose maintained within prescribed range  Description: INTERVENTIONS:  - Monitor Blood Glucose as ordered  - Assess for signs and symptoms of hyperglycemia and hypoglycemia  - Administer ordered medications to maintain glucose within target range  - Assess barriers to adequate nutritional intake and initiate nutrition consult as needed  - Instruct patient on self management of diabetes  Outcome: Progressing  Goal: Electrolytes maintained within normal limits  Description: INTERVENTIONS:  - Monitor labs and rhythm and assess patient for signs and symptoms of electrolyte imbalances  - Administer electrolyte replacement as ordered  - Monitor response to electrolyte replacements, including rhythm and repeat lab results as appropriate  - Fluid restriction as ordered  - Instruct patient on fluid and nutrition restrictions as appropriate  Outcome: Progressing     Problem: SAFETY ADULT - FALL  Goal: Free from fall injury  Description: INTERVENTIONS:  - Assess pt frequently for physical needs  - Identify cognitive and physical deficits and behaviors that affect risk of falls.  - [place] fall precautions as indicated by assessment.  - Educate pt/family on patient safety including physical limitations  - Instruct pt to call for assistance with activity based on assessment  - Modify environment to reduce risk of injury  - Provide assistive devices as appropriate  - Consider OT/PT consult to assist with  strengthening/mobility  - Encourage toileting schedule  Outcome: Progressing     Problem: DISCHARGE PLANNING  Goal: Discharge to home or other facility with appropriate resources  Description: INTERVENTIONS:  - Identify barriers to discharge w/pt and caregiver  - Include patient/family/discharge partner in discharge planning  - Arrange for needed discharge resources and transportation as appropriate  - Identify discharge learning needs (meds, wound care, etc)  - Arrange for interpreters to assist at discharge as needed  - Consider post-discharge preferences of patient/family/discharge partner  - Complete POLST form as appropriate  - Assess patient's ability to be responsible for managing their own health  - Refer to Case Management Department for coordinating discharge planning if the patient needs post-hospital services based on physician/LIP order or complex needs related to functional status, cognitive ability or social support system  Outcome: Progressing     Pt alert and oriented X 4. Pt on 1 L nasal cannula. No complaints throughout the day. Safety precautions in place, call light within reach. Plan is to monitor kidney function.

## 2024-08-26 NOTE — PROGRESS NOTES
[facility]  [facility]     Ariana Osorio Patient Status:  Inpatient    1945 MRN [id number]   Location [facility] 3W/SW Attending Christopher Ortiz MD   Hosp Day # 4 PCP Enrique Braun MD     Subjective:     Patient resting comfortably in bed and in NAD. Denied any active complaints.   Discussed plan of care in detail with the patient. All questions and concerns addressed.     Objective:    Review of Systems:   ROS completed; pertinent positive and negatives stated in subjective.      Vital signs:  Temp:  [97.8 °F (36.6 °C)-98.3 °F (36.8 °C)] 98 °F (36.7 °C)  Pulse:  [72-82] 82  Resp:  [18-20] 18  BP: (115-144)/(50-61) 131/57  SpO2:  [74 %-94 %] 91 %      Physical Exam:    Gen: NAD AO x3  Chest: good air entry CTABL  CVS: normal s1 and s2 RR  Abd: NABS soft NT ND  Neuro: CN 2-12 grossly intact  Ext: 1-2+ edema in bilateral LE      Diagnostic Data:    Labs:  Recent Labs   Lab 24  1737 24  0743 24  0817 24  0717 24  0709   WBC 6.9 7.6 7.2 7.2 6.7   HGB 13.0 12.7 13.2 14.3 14.1   MCV 97.7 95.3 93.7 92.2 93.0   .0 171.0 194.0 213.0 195.0       Recent Labs   Lab 24  1737 24  0743 24  0817 24  0717 24  0709   *   < > 103* 138*  138* 122*   BUN 22   < > 21 33*  33* 42*   CREATSERUM 1.25*   < > 1.15* 1.46*  1.46* 1.48*   CA 10.8*   < > 10.7* 10.8*  10.8* 10.5*   ALB 4.3  --   --  4.2  --       < > 141 139  139 140   K 4.4   < > 4.3 4.0  4.0 4.1      < > 100 96*  96* 98   CO2 33.0*   < > 38.0* 37.0*  37.0* 38.0*   ALKPHO 98  --   --  77  --    AST 29  --   --  19  --    ALT 26  --   --  18  --    BILT 0.3  --   --  0.5  --    TP 7.5  --   --  7.6  --     < > = values in this interval not displayed.       Estimated Creatinine Clearance: 27.1 mL/min (A) (based on SCr of 1.48 mg/dL (H)).    No results for input(s): \"PTP\", \"INR\" in the last 168 hours.            Imaging: Imaging data reviewed in Epic.    Medications:    [Held by provider] furosemide  20 mg Oral BID (Diuretic)    budesonide  0.5 mg Nebulization BID    fluticasone-salmeterol  1 puff Inhalation BID    amLODIPine  2.5 mg Oral BID    metoprolol succinate ER  25 mg Oral Daily    insulin aspart  1-7 Units Subcutaneous TID CC       Assessment & Plan:     Acute exacerbation of HFpEF  Severe COPD  Imaging reviewed  Previous echo with EF 60-65% in 03/2024  Started on IV lasix BID  Repeat echo showed EF 60-65%, no RWMA  Cardiology on consult   Change lasix from IV to PO  Hold diuretics at this time, encourage PO hydration  Ambulate, monitor renal function. Possibly discharge on Lasix 20 mg PO daily  Net IO Since Admission: -4,605 mL [08/26/24 1106]  Daily weights  HTN  BP better controlled  Resumed home amlodipine and metoprolol  Monitor vitals  PIA on CKD 3  Diuretics decreased by cardiology  Monitor renal function  Avoid nephrotoxic agents  T2DM  SSI and frequent accuchecks      Plan of care discussed with patient or family at bedside.      Supplementary Documentation:     Quality:  DVT Prophylaxis: SCDs - heparin allergy  CODE status: Full      Estimated date of discharge: TBD  Discharge is dependent on: clinical stability  At this point Ms. Osorio is expected to be discharge to: home             **Certification      PHYSICIAN Certification of Need for Inpatient Hospitalization - Initial Certification    Patient will require inpatient services that will reasonably be expected to span two midnight's based on the clinical documentation in H+P.   Based on patients current state of illness, I anticipate that, after discharge, patient will require TBD.      Christopher Ortiz MD  Hospitalist    MDM: High, I personally reviewed the available laboratories, imaging including CT, XR. I discussed the case with Rn. I ordered laboratories, studies including AM labs. I adjusted medications including home meds.   Medical  decision making high, risk is high.       The 21st Century Cures Act makes medical notes like these available to patients in the interest of transparency. Please be advised this is a medical document. Medical documents are intended to carry relevant information, facts as evident, and the clinical opinion of the practitioner. The medical note is intended as peer to peer communication and may appear blunt or direct. It is written in medical language and may contain abbreviations or verbiage that are unfamiliar.

## 2024-08-26 NOTE — PAYOR COMM NOTE
--------------  CONTINUED STAY REVIEW    Payor: [employer]  Subscriber #:  [id number]  Authorization Number: [id number]    Admit date: 8/22/24  Admit time: 11:43 PM      PLEASE REVIEW ADDITIONAL CLINICAL INFORMATION. THANK YOU    8/24/24  /61 (BP Location: Right arm)   Pulse 67   Temp 98.6 °F (37 °C) (Oral)   Resp 18   Ht 5' 4\" (1.626 m)   Wt 172 lb 9.6 oz (78.3 kg)   SpO2 92%   BMI 29.63 kg/m²      TTE 8/23/24    Conclusions:     1. Left ventricle: The cavity size was normal. Wall thickness was mildly      increased. Systolic function was normal. The estimated ejection fraction      was 60-65%, by visual assessment. Although no diagnostic regional wall      motion abnormality was identified, this possibility cannot be completely      excluded on the basis of this study. Features are consistent with a      pseudonormal left ventricular filling pattern, with concomitant abnormal      relaxation and increased filling pressure - grade 2 diastolic      dysfunction.   2. Right atrium: The atrium was dilated.   3. Mitral valve: The annulus was mildly calcified. The leaflets were mildly      to moderately calcified. Leaflet separation was reduced. Transvalvular      velocity was increased. The findings were consistent with mild stenosis.      The mean diastolic gradient was 6mm Hg.   4. Pulmonary arteries: Systolic pressure was within the normal range,      estimated to be 32mm Hg.   Impressions:  This study is compared with previous dated 03/06/2024: No   significant change since prior study.     Cardiovascular:      Rate and Rhythm: Normal rate and regular rhythm.      Pulses: Normal pulses.      Heart sounds: No murmur heard.     No friction rub. No gallop.   Pulmonary:      Effort: Pulmonary effort is normal.      Comments: Fine crackles on bases   Abdominal:      General: Abdomen is flat.      Palpations: Abdomen is soft.   Musculoskeletal:      Cervical back: Normal range of motion.      Right lower  leg: No edema.      Left lower leg: No edema.   Skin:     General: Skin is warm and dry.   Neurological:      Mental Status: She is alert and oriented to person, place, and time.            Medications:   budesonide  0.5 mg Nebulization BID    fluticasone-salmeterol  1 puff Inhalation BID    amLODIPine  2.5 mg Oral BID    metoprolol succinate ER  25 mg Oral Daily    furosemide  40 mg Intravenous BID (Diuretic)    insulin aspart  1-7 Units Subcutaneous TID CC    aspirin  324 mg Oral Once            Assessment/Plan:     Presented with SOB for 2 weeks that was progressing  HFpEF  - proBNP 453  - chest xray Stable heart, no edema  - CT chest negative for PE  - echo with LVEF 60-65%, no RWMA, G2DD, mild  Mitral valve stenosis, PASP 32mmHG  - on IV lasix, metoprolol     Severe COPD  - on home O2, chronically on 2L  - on baseline O2 requirement  -  net negative for 2.6L   - wt down 5lb     CAD  -s/p CABG 2019 LIMA to LAD, SVG to RCA, SVG to OM     CKD 3  - pt follows up with Dr Yanes as OP  - creatinine trending down      HLD  - not on statin with allergies   -   - may need to consider alternative as OP     Plan;  - good urine output with IV lasix  - pt on baseline O2 requirement  - ambulate pt today with O2      HOSPITALIST  Assessment & Plan:      Acute exacerbation of HFpEF  Severe COPD  Imaging reviewed  Previous echo with EF 60-65% in 03/2024  Started on IV lasix BID  Repeat echo showed EF 60-65%, no RWMA  Cardiology on consult   Change lasix from IV to PO  Ambulate   Strict Is and Os - Net IO Since Admission: -2,871 mL [08/24/24 1005]  Daily weights  Possible discharge in a.m.  HTN  BP better controlled  Resumed home amlodipine and metoprolol  Monitor vitals  CKD 3  Monitor renal function  Avoid nephrotoxic agents  T2DM  SSI and frequent accuchecks          8/25/24  Lab 08/22/24  1737 08/23/24  0743 08/24/24  0817 08/25/24  0717   WBC 6.9 7.6 7.2 7.2   HGB 13.0 12.7 13.2 14.3   MCV 97.7 95.3 93.7 92.2   PLT  200.0 171.0 194.0 213.0      Lab 08/22/24  1737 08/23/24  0743 08/24/24  0817 08/25/24  0717   * 117* 103* 138*  138*   BUN 22 17 21 33*  33*   CREATSERUM 1.25* 1.04* 1.15* 1.46*  1.46*   CA 10.8* 10.4 10.7* 10.8*  10.8*   ALB 4.3  --   --  4.2    142 141 139  139   K 4.4 4.3 4.3 4.0  4.0    104 100 96*  96*   CO2 33.0* 35.0* 38.0* 37.0*  37.0*   ALKPHO 98  --   --  77   AST 29  --   --  19   ALT 26  --   --  18   BILT 0.3  --   --  0.5   TP 7.5  --   --  7.6       Assessment & Plan:      Acute exacerbation of HFpEF  Severe COPD  Imaging reviewed  Previous echo with EF 60-65% in 03/2024  Started on IV lasix BID  Repeat echo showed EF 60-65%, no RWMA  Cardiology on consult   Change lasix from IV to PO  Taper diuresis given developing PIA  Ambulate   Strict Is and Os - Net IO Since Admission: -2,871 mL [08/24/24 1005]  Daily weights  Possible discharge in a.m.  HTN  BP better controlled  Resumed home amlodipine and metoprolol  Monitor vitals  PIA on CKD 3  Diuretics decreased by cardiology  Monitor renal function  Avoid nephrotoxic agents  T2DM  SSI and frequent accuchecks         CARDIOLOGY  Assessment/Plan:     Presented with SOB for 2 weeks that was progressing  HFpEF  - proBNP 453  - chest xray Stable heart, no edema  - CT chest negative for PE  - echo with LVEF 60-65%, no RWMA, G2DD, mild  Mitral valve stenosis, PASP 32mmHG  - on lasix, metoprolol  - lasix switched to oral yesterday   -  net negative for 4.1 L   - wt check pending this am     Severe COPD  - on home O2, chronically on 2L  - on baseline O2 requirement     CAD  -s/p CABG 2019 LIMA to LAD, SVG to RCA, SVG to OM     CKD 3  - pt follows up with Dr Yanes as OP  - creatinine trending down      HLD  - not on statin with allergies   -   - may need to consider alternative as OP      plan:  - continue oral lasix   - labs this morning pending   - continue amlodipine, metoprolol        8/26/24  Lab 08/22/24  0426  08/23/24  0743 08/24/24  0817 08/25/24  0717 08/26/24  0709   WBC 6.9 7.6 7.2 7.2 6.7   HGB 13.0 12.7 13.2 14.3 14.1   MCV 97.7 95.3 93.7 92.2 93.0   .0 171.0 194.0 213.0 195.0      Lab 08/22/24  1737 08/24/24  0817 08/25/24  0717 08/26/24  0709   * 103* 138*  138* 122*   BUN 22 21 33*  33* 42*   CREATSERUM 1.25* 1.15* 1.46*  1.46* 1.48*   CA 10.8* 10.7* 10.8*  10.8* 10.5*   ALB 4.3  --  4.2  --     141 139  139 140   K 4.4 4.3 4.0  4.0 4.1    100 96*  96* 98   CO2 33.0* 38.0* 37.0*  37.0* 38.0*   ALKPHO 98  --  77  --    AST 29  --  19  --    ALT 26  --  18  --    BILT 0.3  --  0.5  --    TP 7.5  --  7.6  --    Assessment & Plan:      Acute exacerbation of HFpEF  Severe COPD  Imaging reviewed  Previous echo with EF 60-65% in 03/2024  Started on IV lasix BID  Repeat echo showed EF 60-65%, no RWMA  Cardiology on consult   Change lasix from IV to PO  Hold diuretics at this time, encourage PO hydration  Ambulate, monitor renal function. Possibly discharge on Lasix 20 mg PO daily  Net IO Since Admission: -4,605 mL [08/26/24 1106]  Daily weights  HTN  BP better controlled  Resumed home amlodipine and metoprolol  Monitor vitals  PIA on CKD 3  Diuretics decreased by cardiology  Monitor renal function  Avoid nephrotoxic agents  T2DM  SSI and frequent accuchecks        CARDIOLOGY  Assessment:     Acute HFpEF  [place] to be decompensated on arrival  TTE with LVEF 60-65%, normal RV function, normal PASP  Diuresed with IV lasix until prerenal PIA on 8/25 suggesting patient is intravascularly dry. Creatinine is stable.   Severe COPD, chronic hypoxic respiratory failure  Baseline 2 L NC  CAD s/p CABG 2019 (LIMA-LAD, SVG-RCA, SVG-OM)  Denies angina  BB. Statin intolerance. She does not take ASA due to history of UGIB/ulcer? which has resolved, recommended she discuss this further outpatient with her primary cardiologist.  PIA on CKD III - creatinine stable today, BUN rising  HLD - history of statin  intolerance. Consider outpatient PCSK9 or zetia     Plan:     She is intravascularly dry. PIA on 8/25. Creatinine is stable with rising BUN. Hold oral lasix 20 mg twice daily dosing this morning. Encouraged oral hydration.   Possible discharge tomorrow if creatinine trends downward or remains stable. Ideally will discharge on PO lasix 20 mg once daily as she was not on scheduled loop diuretic prior to arrival.        MEDICATIONS ADMINISTERED IN LAST 1 DAY:  amLODIPine (Norvasc) tab 2.5 mg       Date Action Dose Route User    8/26/2024 0854 Given 2.5 mg Oral Chelsea Blake RN    8/25/2024 2105 Given 2.5 mg Oral Amada Sandoval RN          budesonide (Pulmicort) 0.5 MG/2ML nebulizer suspension 0.5 mg       Date Action Dose Route User    8/26/2024 1029 Given 0.5 mg Nebulization Luigi Marquez RCP    8/25/2024 1935 Given 0.5 mg Nebulization Briana Prajapati HUSSAIN          fluticasone-salmeterol (Advair Diskus) 500-50 MCG/ACT inhaler 1 puff       Date Action Dose Route User    8/26/2024 0855 Given 1 puff Inhalation Chelsea Blake RN    8/25/2024 2104 Given 1 puff Inhalation Amada Sandoval RN          furosemide (Lasix) tab 20 mg       Date Action Dose Route User    8/25/2024 1807 Given 20 mg Oral Chelsea Blake RN          metoprolol succinate ER (Toprol XL) 24 hr tab 25 mg       Date Action Dose Route User    8/26/2024 0854 Given 25 mg Oral Chelsea Blake RN            Vitals (last day)       Date/Time Temp Pulse Resp BP SpO2 Weight O2 Device O2 Flow Rate (L/min) Who    08/26/24 1403 97.6 °F (36.4 °C) 85 18 117/48 93 % -- Nasal cannula 1 L/min BP    08/26/24 1029 -- -- -- -- -- -- Nasal cannula 1 L/min VN    08/26/24 0844 98 °F (36.7 °C) 82 18 131/57 91 % -- Nasal cannula 2 L/min BP    08/26/24 0500 -- -- -- -- -- 169 lb 6.4 oz (76.8 kg) -- -- AV    08/26/24 0300 97.8 °F (36.6 °C) 80 18 144/61 93 % -- Nasal cannula 1 L/min AM    08/25/24 2102 98.3 °F (36.8 °C) 72 19 126/52 94 % -- Nasal cannula  1 L/min AM    08/25/24 1920 -- -- -- -- 91 % -- Nasal cannula 1 L/min AM    08/25/24 1915 -- 79 -- -- 74 % -- None (Room air) -- AM    08/25/24 1806 -- 80 18 143/58 -- -- -- -- BP    08/25/24 1412 98 °F (36.7 °C) 82 20 115/50 93 % -- Nasal cannula 1.5 L/min BP    08/25/24 0811 98.1 °F (36.7 °C) 81 18 122/47 94 % -- Nasal cannula 2 L/min BP    08/25/24 0645 -- -- -- -- -- 169 lb (76.7 kg) -- -- BK    08/25/24 0527 98.6 °F (37 °C) 76 16 131/54 94 % -- Nasal cannula 2 L/min AS

## 2024-08-26 NOTE — PLAN OF CARE
Continued on po Lasix. Fluid restriction discussed. Kept on 1 LPM/NC and saturating >91%. Desats at room air. On fall risk precaution. Had a few ounces increased in weight in 24 hours per standing scale.   Problem: Patient Centered Care  Goal: Patient preferences are identified and integrated in the patient's plan of care  Description: Interventions:  - What would you like us to know as we care for you? From home alone at senior living facility  - Provide timely, complete, and accurate information to patient/family  - Incorporate patient and family knowledge, values, beliefs, and cultural backgrounds into the planning and delivery of care  - Encourage patient/family to participate in care and decision-making at the level they choose  - Honor patient and family perspectives and choices  Outcome: Progressing     Problem: CARDIOVASCULAR - ADULT  Goal: Maintains optimal cardiac output and hemodynamic stability  Description: INTERVENTIONS:  - Monitor vital signs, rhythm, and trends  - Monitor for bleeding, hypotension and signs of decreased cardiac output  - Evaluate effectiveness of vasoactive medications to optimize hemodynamic stability  - Monitor arterial and/or venous puncture sites for bleeding and/or hematoma  - Assess quality of pulses, skin color and temperature  - Assess for signs of decreased coronary artery perfusion - ex. Angina  - Evaluate fluid balance, assess for edema, trend weights  Outcome: Progressing  Goal: Absence of cardiac arrhythmias or at baseline  Description: INTERVENTIONS:  - Continuous cardiac monitoring, monitor vital signs, obtain 12 lead EKG if indicated  - Evaluate effectiveness of antiarrhythmic and heart rate control medications as ordered  - Initiate emergency measures for life threatening arrhythmias  - Monitor electrolytes and administer replacement therapy as ordered  Outcome: Progressing     Problem: RESPIRATORY - ADULT  Goal: Achieves optimal ventilation and  oxygenation  Description: INTERVENTIONS:  - Assess for changes in respiratory status  - Assess for changes in mentation and behavior  - Position to facilitate oxygenation and minimize respiratory effort  - Oxygen supplementation based on oxygen saturation or ABGs  - Provide Smoking Cessation handout, if applicable  - Encourage broncho-pulmonary hygiene including cough, deep breathe, Incentive Spirometry  - Assess the need for suctioning and perform as needed  - Assess and instruct to report SOB or any respiratory difficulty  - Respiratory Therapy support as indicated  - Manage/alleviate anxiety  - Monitor for signs/symptoms of CO2 retention  Outcome: Progressing     Problem: METABOLIC/FLUID AND ELECTROLYTES - ADULT  Goal: Glucose maintained within prescribed range  Description: INTERVENTIONS:  - Monitor Blood Glucose as ordered  - Assess for signs and symptoms of hyperglycemia and hypoglycemia  - Administer ordered medications to maintain glucose within target range  - Assess barriers to adequate nutritional intake and initiate nutrition consult as needed  - Instruct patient on self management of diabetes  Outcome: Progressing  Goal: Electrolytes maintained within normal limits  Description: INTERVENTIONS:  - Monitor labs and rhythm and assess patient for signs and symptoms of electrolyte imbalances  - Administer electrolyte replacement as ordered  - Monitor response to electrolyte replacements, including rhythm and repeat lab results as appropriate  - Fluid restriction as ordered  - Instruct patient on fluid and nutrition restrictions as appropriate  Outcome: Progressing     Problem: SAFETY ADULT - FALL  Goal: Free from fall injury  Description: INTERVENTIONS:  - Assess pt frequently for physical needs  - Identify cognitive and physical deficits and behaviors that affect risk of falls.  - [place] fall precautions as indicated by assessment.  - Educate pt/family on patient safety including physical limitations  -  Instruct pt to call for assistance with activity based on assessment  - Modify environment to reduce risk of injury  - Provide assistive devices as appropriate  - Consider OT/PT consult to assist with strengthening/mobility  - Encourage toileting schedule  Outcome: Progressing     Problem: DISCHARGE PLANNING  Goal: Discharge to home or other facility with appropriate resources  Description: INTERVENTIONS:  - Identify barriers to discharge w/pt and caregiver  - Include patient/family/discharge partner in discharge planning  - Arrange for needed discharge resources and transportation as appropriate  - Identify discharge learning needs (meds, wound care, etc)  - Arrange for interpreters to assist at discharge as needed  - Consider post-discharge preferences of patient/family/discharge partner  - Complete POLST form as appropriate  - Assess patient's ability to be responsible for managing their own health  - Refer to Case Management Department for coordinating discharge planning if the patient needs post-hospital services based on physician/LIP order or complex needs related to functional status, cognitive ability or social support system  Outcome: Progressing     Problem: Patient Centered Care  Goal: Patient preferences are identified and integrated in the patient's plan of care  Description: Interventions:  - What would you like us to know as we care for you? From home alone at senior living facility  - Provide timely, complete, and accurate information to patient/family  - Incorporate patient and family knowledge, values, beliefs, and cultural backgrounds into the planning and delivery of care  - Encourage patient/family to participate in care and decision-making at the level they choose  - Honor patient and family perspectives and choices  Outcome: Progressing     Problem: CARDIOVASCULAR - ADULT  Goal: Maintains optimal cardiac output and hemodynamic stability  Description: INTERVENTIONS:  - Monitor vital signs,  rhythm, and trends  - Monitor for bleeding, hypotension and signs of decreased cardiac output  - Evaluate effectiveness of vasoactive medications to optimize hemodynamic stability  - Monitor arterial and/or venous puncture sites for bleeding and/or hematoma  - Assess quality of pulses, skin color and temperature  - Assess for signs of decreased coronary artery perfusion - ex. Angina  - Evaluate fluid balance, assess for edema, trend weights  Outcome: Progressing  Goal: Absence of cardiac arrhythmias or at baseline  Description: INTERVENTIONS:  - Continuous cardiac monitoring, monitor vital signs, obtain 12 lead EKG if indicated  - Evaluate effectiveness of antiarrhythmic and heart rate control medications as ordered  - Initiate emergency measures for life threatening arrhythmias  - Monitor electrolytes and administer replacement therapy as ordered  Outcome: Progressing     Problem: RESPIRATORY - ADULT  Goal: Achieves optimal ventilation and oxygenation  Description: INTERVENTIONS:  - Assess for changes in respiratory status  - Assess for changes in mentation and behavior  - Position to facilitate oxygenation and minimize respiratory effort  - Oxygen supplementation based on oxygen saturation or ABGs  - Provide Smoking Cessation handout, if applicable  - Encourage broncho-pulmonary hygiene including cough, deep breathe, Incentive Spirometry  - Assess the need for suctioning and perform as needed  - Assess and instruct to report SOB or any respiratory difficulty  - Respiratory Therapy support as indicated  - Manage/alleviate anxiety  - Monitor for signs/symptoms of CO2 retention  Outcome: Progressing     Problem: METABOLIC/FLUID AND ELECTROLYTES - ADULT  Goal: Glucose maintained within prescribed range  Description: INTERVENTIONS:  - Monitor Blood Glucose as ordered  - Assess for signs and symptoms of hyperglycemia and hypoglycemia  - Administer ordered medications to maintain glucose within target range  - Assess  barriers to adequate nutritional intake and initiate nutrition consult as needed  - Instruct patient on self management of diabetes  Outcome: Progressing  Goal: Electrolytes maintained within normal limits  Description: INTERVENTIONS:  - Monitor labs and rhythm and assess patient for signs and symptoms of electrolyte imbalances  - Administer electrolyte replacement as ordered  - Monitor response to electrolyte replacements, including rhythm and repeat lab results as appropriate  - Fluid restriction as ordered  - Instruct patient on fluid and nutrition restrictions as appropriate  Outcome: Progressing     Problem: SAFETY ADULT - FALL  Goal: Free from fall injury  Description: INTERVENTIONS:  - Assess pt frequently for physical needs  - Identify cognitive and physical deficits and behaviors that affect risk of falls.  - [place] fall precautions as indicated by assessment.  - Educate pt/family on patient safety including physical limitations  - Instruct pt to call for assistance with activity based on assessment  - Modify environment to reduce risk of injury  - Provide assistive devices as appropriate  - Consider OT/PT consult to assist with strengthening/mobility  - Encourage toileting schedule  Outcome: Progressing     Problem: DISCHARGE PLANNING  Goal: Discharge to home or other facility with appropriate resources  Description: INTERVENTIONS:  - Identify barriers to discharge w/pt and caregiver  - Include patient/family/discharge partner in discharge planning  - Arrange for needed discharge resources and transportation as appropriate  - Identify discharge learning needs (meds, wound care, etc)  - Arrange for interpreters to assist at discharge as needed  - Consider post-discharge preferences of patient/family/discharge partner  - Complete POLST form as appropriate  - Assess patient's ability to be responsible for managing their own health  - Refer to Case Management Department for coordinating discharge planning  if the patient needs post-hospital services based on physician/LIP order or complex needs related to functional status, cognitive ability or social support system  Outcome: Progressing

## 2024-08-27 ENCOUNTER — APPOINTMENT (OUTPATIENT)
Dept: CARDIAC REHAB | Facility: HOSPITAL | Age: 79
End: 2024-08-27
Attending: INTERNAL MEDICINE
Payer: MEDICARE

## 2024-08-27 VITALS
HEIGHT: 64 IN | WEIGHT: 171 LBS | BODY MASS INDEX: 29.19 KG/M2 | OXYGEN SATURATION: 96 % | DIASTOLIC BLOOD PRESSURE: 50 MMHG | TEMPERATURE: 98 F | SYSTOLIC BLOOD PRESSURE: 127 MMHG | HEART RATE: 82 BPM | RESPIRATION RATE: 18 BRPM

## 2024-08-27 LAB
ALBUMIN SERPL-MCNC: 3.8 G/DL (ref 3.2–4.8)
ALBUMIN/GLOB SERPL: 1.2 {RATIO} (ref 1–2)
ALP LIVER SERPL-CCNC: 67 U/L
ALT SERPL-CCNC: 13 U/L
ANION GAP SERPL CALC-SCNC: 3 MMOL/L (ref 0–18)
AST SERPL-CCNC: 17 U/L (ref ?–34)
BASOPHILS # BLD AUTO: 0.04 X10(3) UL (ref 0–0.2)
BASOPHILS NFR BLD AUTO: 0.6 %
BILIRUB SERPL-MCNC: 0.4 MG/DL (ref 0.2–1.1)
BUN BLD-MCNC: 40 MG/DL (ref 9–23)
BUN/CREAT SERPL: 29.6 (ref 10–20)
CALCIUM BLD-MCNC: 10.7 MG/DL (ref 8.7–10.4)
CHLORIDE SERPL-SCNC: 99 MMOL/L (ref 98–112)
CO2 SERPL-SCNC: 36 MMOL/L (ref 21–32)
CREAT BLD-MCNC: 1.35 MG/DL
DEPRECATED RDW RBC AUTO: 46.7 FL (ref 35.1–46.3)
EGFRCR SERPLBLD CKD-EPI 2021: 40 ML/MIN/1.73M2 (ref 60–?)
EOSINOPHIL # BLD AUTO: 0.32 X10(3) UL (ref 0–0.7)
EOSINOPHIL NFR BLD AUTO: 4.7 %
ERYTHROCYTE [DISTWIDTH] IN BLOOD BY AUTOMATED COUNT: 13.7 % (ref 11–15)
GLOBULIN PLAS-MCNC: 3.1 G/DL (ref 2–3.5)
GLUCOSE BLD-MCNC: 116 MG/DL (ref 70–99)
GLUCOSE BLDC GLUCOMTR-MCNC: 144 MG/DL (ref 70–99)
GLUCOSE BLDC GLUCOMTR-MCNC: 155 MG/DL (ref 70–99)
HCT VFR BLD AUTO: 41.9 %
HGB BLD-MCNC: 13.2 G/DL
IMM GRANULOCYTES # BLD AUTO: 0.03 X10(3) UL (ref 0–1)
IMM GRANULOCYTES NFR BLD: 0.4 %
LYMPHOCYTES # BLD AUTO: 1.54 X10(3) UL (ref 1–4)
LYMPHOCYTES NFR BLD AUTO: 22.4 %
MCH RBC QN AUTO: 29.5 PG (ref 26–34)
MCHC RBC AUTO-ENTMCNC: 31.5 G/DL (ref 31–37)
MCV RBC AUTO: 93.7 FL
MONOCYTES # BLD AUTO: 1.01 X10(3) UL (ref 0.1–1)
MONOCYTES NFR BLD AUTO: 14.7 %
NEUTROPHILS # BLD AUTO: 3.93 X10 (3) UL (ref 1.5–7.7)
NEUTROPHILS # BLD AUTO: 3.93 X10(3) UL (ref 1.5–7.7)
NEUTROPHILS NFR BLD AUTO: 57.2 %
OSMOLALITY SERPL CALC.SUM OF ELEC: 297 MOSM/KG (ref 275–295)
PLATELET # BLD AUTO: 210 10(3)UL (ref 150–450)
POTASSIUM SERPL-SCNC: 4.2 MMOL/L (ref 3.5–5.1)
PROT SERPL-MCNC: 6.9 G/DL (ref 5.7–8.2)
RBC # BLD AUTO: 4.47 X10(6)UL
SODIUM SERPL-SCNC: 138 MMOL/L (ref 136–145)
WBC # BLD AUTO: 6.9 X10(3) UL (ref 4–11)

## 2024-08-27 PROCEDURE — 99239 HOSP IP/OBS DSCHRG MGMT >30: CPT | Performed by: INTERNAL MEDICINE

## 2024-08-27 RX ORDER — POTASSIUM CHLORIDE 750 MG/1
10 TABLET, EXTENDED RELEASE ORAL DAILY
Qty: 30 TABLET | Refills: 2 | Status: SHIPPED | OUTPATIENT
Start: 2024-08-27

## 2024-08-27 RX ORDER — FUROSEMIDE 20 MG
20 TABLET ORAL
Qty: 30 TABLET | Refills: 2 | Status: SHIPPED | OUTPATIENT
Start: 2024-08-27 | End: 2024-08-27

## 2024-08-27 RX ORDER — FUROSEMIDE 20 MG
20 TABLET ORAL DAILY
Qty: 30 TABLET | Refills: 2 | Status: SHIPPED | OUTPATIENT
Start: 2024-08-27

## 2024-08-27 RX ORDER — POTASSIUM CHLORIDE 750 MG/1
10 TABLET, EXTENDED RELEASE ORAL DAILY
Qty: 30 TABLET | Refills: 2 | Status: SHIPPED | OUTPATIENT
Start: 2024-08-27 | End: 2024-08-27

## 2024-08-27 NOTE — DISCHARGE SUMMARY
[facility]  part of [facility]    DISCHARGE SUMMARY     Ariana Osorio Patient Status:  Inpatient    1945 MRN [id number]   Location [facility] 3W/SW Attending Christopher Ortiz MD   Hosp Day # 5 PCP Enrique Braun MD     Date of Admission: 2024  Date of Discharge:  2024    Discharge Disposition: ED Dismiss - Never Arrived    Discharge Diagnosis:     Acute exacerbation of HFpEF  Severe COPD  HTN  PIA on CKD 3  T2DM    History of Present Illness:     The patient is a 79-year-old [place] female who was taken recently off diuretics for rise in creatinine level by her nephrologist. She had a family visiting from out of town, and she has been going to a lot of open buffets and then last week she has been having progressive leg edema, dyspnea on exertion, and orthopnea. Today came into the emergency department for evaluation. EKG showed normal sinus rhythm. CBC was unremarkable. Chemistry, liver function test, troponin and BMP are still pending. Started on IV Lasix, and she will be admitted to the hospital for further management.     Brief Synopsis:     Acute exacerbation of HFpEF  Severe COPD  Imaging reviewed  Previous echo with EF 60-65% in 2024  Started on IV lasix BID  Repeat echo showed EF 60-65%, no RWMA  Cardiology on consult   Discharge on Lasix 20 mg PO daily and Kcl daily. Follow up BMP in 1 week and close opt follow up  HTN  BP better controlled  Maintain BP diary and follow up with PCP and Cardiology as opt  PIA on CKD 3  Diuretics decreased by cardiology  Follow up as opt with a CMP in 1 week  Patient has done well with treatment and is cleared for discharge. She will follow up with PCP and Cardiology as opt for further care. Discharge meds ordered by the cardiology team.   Counseled extensively on dietary and medication compliance.   Patient is to remain compliant with all discharge medications and instructions and to follow up as advised.   Patient  encouraged to make healthy lifestyle and dietary changes.    Lace+ Score: 81  59-90 High Risk  29-58 Medium Risk  0-28   Low Risk       TCM Follow-Up Recommendation:  LACE > 58: High Risk of readmission after discharge from the hospital.      Procedures during hospitalization:   None    Incidental or significant findings and recommendations (brief descriptions):  None    Lab/Test results pending at Discharge:   None    Consultants:  Cardiology    Discharge Medication List:     Discharge Medications        START taking these medications        Instructions Prescription details   potassium chloride 10 MEQ Tbcr  Commonly known as: Klor-Con M10      Take 1 tablet (10 mEq total) by mouth daily.   Quantity: 30 tablet  Refills: 2            CHANGE how you take these medications        Instructions Prescription details   furosemide 20 MG Tabs  Commonly known as: Lasix  What changed:   when to take this  reasons to take this      Take 1 tablet (20 mg total) by mouth daily.   Quantity: 30 tablet  Refills: 2            CONTINUE taking these medications        Instructions Prescription details   amLODIPine 2.5 MG Tabs  Commonly known as: Norvasc      Take 1 tablet (2.5 mg total) by mouth in the morning and 1 tablet (2.5 mg total) before bedtime.   Quantity: 180 tablet  Refills: 1     budesonide 0.5 MG/2ML Susp  Commonly known as: Pulmicort      Take 2 mL (0.5 mg total) by nebulization 2 (two) times daily.   Quantity: 180 each  Refills: 0     DropSafe Alcohol Prep 70 % Pads      Take 1 Bottle by mouth As Directed.   Quantity: 400 each  Refills: 3     febuxostat 40 MG Tabs  Commonly known as: Uloric      Take 1 tablet (40 mg total) by mouth daily.   Quantity: 90 tablet  Refills: 1     glipiZIDE 5 MG Tabs  Commonly known as: Glucotrol      Take 1 tablet (5 mg total) by mouth before breakfast.   Quantity: 90 tablet  Refills: 1     metoprolol succinate ER 25 MG Tb24  Commonly known as: Toprol XL      Take 1 tablet (25 mg total) by  mouth daily.   Quantity: 90 tablet  Refills: 1     Multi-Vitamin Daily Tabs      Take 1 tablet by mouth daily.   Refills: 0     omega-3 fatty acids 1000 MG Caps  Commonly known as: Fish Oil      Take 1,000 mg by mouth daily.   Refills: 0     pantoprazole 20 MG Tbec  Commonly known as: Protonix      Take 1 tablet (20 mg total) by mouth every morning before breakfast.   Quantity: 90 tablet  Refills: 3     Vitamin D 50 MCG (2000 UT) Caps      Take 1 capsule (2,000 Units total) by mouth every other day.   Refills: 0     Wixela Inhub 500-50 MCG/ACT Aepb  Generic drug: fluticasone-salmeterol      Inhale 1 puff into the lungs 2 (two) times daily.   Refills: 0            STOP taking these medications      arformoterol 15 MCG/2ML Nebu  Commonly known as: Brovana        Potassium Chloride ER 20 MEQ Tbcr                  Where to Get Your Medications        These medications were sent to [facility]/pharmacy #[id number] - [place], [place] - [address] [phone], [phone]  [address] [address]      Phone: [phone]   furosemide 20 MG Tabs  potassium chloride 10 MEQ Tbcr         [place]    Follow-up appointment:   Enrique Braun MD  [address]  [address]  [place] [address]  [phone]    Follow up in 1 week(s)      Clay Mtz MD  [address]  [facility] [id number] [phone]    Follow up in 1 week(s)  Our office will call to schedule appointment    Appointments for Next 30 Days 8/27/2024 - 9/26/2024        Date Arrival Time Visit Type Length Department Provider     8/30/2024  2:00 PM  EXAM- ESTABLISHED AUDIO [2470] 10 min [facility], [facility] Heri Miller MD    Patient Instructions:         Location Instructions:     Your appointment is located at [address] in [place], [place].&nbsp; Please park in the Yellow lot and enter through the [facility] entrance.&nbsp; Then proceed to [address].  Masks are optional for  all patients and visitors, unless otherwise indicated.               8/30/2024  2:20 PM  CLINIC HEARING TEST [2370] 20 min [facility], [facility] AUDIO    Patient Instructions:         Location Instructions:     Your appointment is located at [address] in [place], [place].&nbsp; Please park in the Yellow lot and enter through the [facility] entrance.&nbsp; Then proceed to [address].  Masks are optional for all patients and visitors, unless otherwise indicated.               9/6/2024 11:30 AM  EXAM - ESTABLISHED [2844] 10 min [facility], Caridad Marroquin DPM    Patient Instructions:         Location Instructions:     Masks are optional for all patients and visitors, unless otherwise indicated.                      Vital signs:  Temp:  [97.3 °F (36.3 °C)-98.2 °F (36.8 °C)] 97.3 °F (36.3 °C)  Pulse:  [72-85] 72  Resp:  [18-19] 19  BP: (117-148)/(48-61) 145/61  SpO2:  [92 %-95 %] 95 %    Physical Exam:    Gen: NAD AO x3  Chest: good air entry CTABL  CVS: normal s1 and s2 RR  Abd: NABS soft NT ND  Neuro: CN 2-12 grossly intact  Ext: no edema in bilateral LE    -----------------------------------------------------------------------------------------------  PATIENT DISCHARGE INSTRUCTIONS: See electronic chart    Christopher Ortiz MD  Hospitalist    Time spent:  > 30 minutes    The 21st Century Cures Act makes medical notes like these available to patients in the interest of transparency. Please be advised this is a medical document. Medical documents are intended to carry relevant information, facts as evident, and the clinical opinion of the practitioner. The medical note is intended as peer to peer communication and may appear blunt or direct. It is written in medical language and may contain abbreviations or verbiage that are unfamiliar.

## 2024-08-27 NOTE — DISCHARGE PLANNING
Patient was provided with discharge instructions, education, and follow up information. Prescriptions were already sent electronically to patient's pharmacy. Patient verbalizes understanding of follow up information, specifically following up, medication changes, when to take the next dose of each medication, COPD exacerbation and heart failure discharge instructions including daily weights, low salt diet, limiting fluids, signs and symptoms of when to call MD or EMS. Patient has no questions after reviewing all instructions and will be going home with her son.     Renata ANNA, Discharge Leader [id number]

## 2024-08-27 NOTE — PLAN OF CARE
Pt on continuous O2/NC at 1 LPM. Desaturates at low 80's at room air. No reports of CP or SOB. Fall risk precaution in place.     Problem: Patient Centered Care  Goal: Patient preferences are identified and integrated in the patient's plan of care  Description: Interventions:  - What would you like us to know as we care for you? From home alone at senior living facility  - Provide timely, complete, and accurate information to patient/family  - Incorporate patient and family knowledge, values, beliefs, and cultural backgrounds into the planning and delivery of care  - Encourage patient/family to participate in care and decision-making at the level they choose  - Honor patient and family perspectives and choices  Outcome: Progressing     Problem: CARDIOVASCULAR - ADULT  Goal: Maintains optimal cardiac output and hemodynamic stability  Description: INTERVENTIONS:  - Monitor vital signs, rhythm, and trends  - Monitor for bleeding, hypotension and signs of decreased cardiac output  - Evaluate effectiveness of vasoactive medications to optimize hemodynamic stability  - Monitor arterial and/or venous puncture sites for bleeding and/or hematoma  - Assess quality of pulses, skin color and temperature  - Assess for signs of decreased coronary artery perfusion - ex. Angina  - Evaluate fluid balance, assess for edema, trend weights  Outcome: Progressing  Goal: Absence of cardiac arrhythmias or at baseline  Description: INTERVENTIONS:  - Continuous cardiac monitoring, monitor vital signs, obtain 12 lead EKG if indicated  - Evaluate effectiveness of antiarrhythmic and heart rate control medications as ordered  - Initiate emergency measures for life threatening arrhythmias  - Monitor electrolytes and administer replacement therapy as ordered  Outcome: Progressing     Problem: RESPIRATORY - ADULT  Goal: Achieves optimal ventilation and oxygenation  Description: INTERVENTIONS:  - Assess for changes in respiratory status  - Assess  for changes in mentation and behavior  - Position to facilitate oxygenation and minimize respiratory effort  - Oxygen supplementation based on oxygen saturation or ABGs  - Provide Smoking Cessation handout, if applicable  - Encourage broncho-pulmonary hygiene including cough, deep breathe, Incentive Spirometry  - Assess the need for suctioning and perform as needed  - Assess and instruct to report SOB or any respiratory difficulty  - Respiratory Therapy support as indicated  - Manage/alleviate anxiety  - Monitor for signs/symptoms of CO2 retention  Outcome: Progressing     Problem: METABOLIC/FLUID AND ELECTROLYTES - ADULT  Goal: Glucose maintained within prescribed range  Description: INTERVENTIONS:  - Monitor Blood Glucose as ordered  - Assess for signs and symptoms of hyperglycemia and hypoglycemia  - Administer ordered medications to maintain glucose within target range  - Assess barriers to adequate nutritional intake and initiate nutrition consult as needed  - Instruct patient on self management of diabetes  Outcome: Progressing  Goal: Electrolytes maintained within normal limits  Description: INTERVENTIONS:  - Monitor labs and rhythm and assess patient for signs and symptoms of electrolyte imbalances  - Administer electrolyte replacement as ordered  - Monitor response to electrolyte replacements, including rhythm and repeat lab results as appropriate  - Fluid restriction as ordered  - Instruct patient on fluid and nutrition restrictions as appropriate  Outcome: Progressing     Problem: SAFETY ADULT - FALL  Goal: Free from fall injury  Description: INTERVENTIONS:  - Assess pt frequently for physical needs  - Identify cognitive and physical deficits and behaviors that affect risk of falls.  - [place] fall precautions as indicated by assessment.  - Educate pt/family on patient safety including physical limitations  - Instruct pt to call for assistance with activity based on assessment  - Modify environment to  reduce risk of injury  - Provide assistive devices as appropriate  - Consider OT/PT consult to assist with strengthening/mobility  - Encourage toileting schedule  Outcome: Progressing     Problem: DISCHARGE PLANNING  Goal: Discharge to home or other facility with appropriate resources  Description: INTERVENTIONS:  - Identify barriers to discharge w/pt and caregiver  - Include patient/family/discharge partner in discharge planning  - Arrange for needed discharge resources and transportation as appropriate  - Identify discharge learning needs (meds, wound care, etc)  - Arrange for interpreters to assist at discharge as needed  - Consider post-discharge preferences of patient/family/discharge partner  - Complete POLST form as appropriate  - Assess patient's ability to be responsible for managing their own health  - Refer to Case Management Department for coordinating discharge planning if the patient needs post-hospital services based on physician/LIP order or complex needs related to functional status, cognitive ability or social support system  Outcome: Progressing

## 2024-08-27 NOTE — PROGRESS NOTES
Progress Note  Ariana Osorio Patient Status:  Inpatient    1945 MRN [id number]   Location [facility] 3W/SW Attending Christopher Ortiz MD   Hosp Day # 5 PCP Enrique Braun MD     Subjective:  Denies cardiac concerns from overnight. Feels her breathing is back to baseline on her baseline 2 L NC. Swelling has improved. Her creatinine is improving.    Objective:  /51 (BP Location: Right arm)   Pulse 72   Temp 97.7 °F (36.5 °C) (Oral)   Resp 19   Ht 5' 4\" (1.626 m)   Wt 171 lb (77.6 kg)   SpO2 94%   BMI 29.35 kg/m²     Intake/Output:    Intake/Output Summary (Last 24 hours) at 2024 0828  Last data filed at 2024 0407  Gross per 24 hour   Intake 677 ml   Output 1400 ml   Net -723 ml       Last 3 Weights   24 0430 171 lb (77.6 kg)   24 0500 169 lb 6.4 oz (76.8 kg)   24 0645 169 lb (76.7 kg)   24 0433 172 lb 9.6 oz (78.3 kg)   24 0624 177 lb (80.3 kg)   24 2351 177 lb 11.2 oz (80.6 kg)   24 1711 173 lb (78.5 kg)   08/15/24 1435 177 lb 6.4 oz (80.5 kg)   24 1358 177 lb (80.3 kg)       Labs:  Recent Labs   Lab 24  0717 24  0709 24  0449   *  138* 122* 116*   BUN 33*  33* 42* 40*   CREATSERUM 1.46*  1.46* 1.48* 1.35*   EGFRCR 37*  37* 36* 40*   CA 10.8*  10.8* 10.5* 10.7*     139 140 138   K 4.0  4.0 4.1 4.2   CL 96*  96* 98 99   CO2 37.0*  37.0* 38.0* 36.0*     Recent Labs   Lab 24  0717 24  0709 24  0449   RBC 4.90 4.72 4.47   HGB 14.3 14.1 13.2   HCT 45.2 43.9 41.9   MCV 92.2 93.0 93.7   MCH 29.2 29.9 29.5   MCHC 31.6 32.1 31.5   RDW 13.3 13.4 13.7   NEPRELIM 4.60 4.12 3.93   WBC 7.2 6.7 6.9   .0 195.0 210.0         Recent Labs   Lab 24  1737 24  2108   TROPHS 60* 63*       Diagnostics:   Telemetry: NSR    TTE 24  Conclusions:   1. Left ventricle: The cavity size was normal. Wall thickness was mildly      increased. Systolic function was normal. The  estimated ejection fraction      was 60-65%, by visual assessment. Although no diagnostic regional wall      motion abnormality was identified, this possibility cannot be completely      excluded on the basis of this study. Features are consistent with a      pseudonormal left ventricular filling pattern, with concomitant abnormal      relaxation and increased filling pressure - grade 2 diastolic      dysfunction.   2. Right atrium: The atrium was dilated.   3. Mitral valve: The annulus was mildly calcified. The leaflets were mildly      to moderately calcified. Leaflet separation was reduced. Transvalvular      velocity was increased. The findings were consistent with mild stenosis.      The mean diastolic gradient was 6mm Hg.   4. Pulmonary arteries: Systolic pressure was within the normal range,      estimated to be 32mm Hg.   Impressions:  This study is compared with previous dated 03/06/2024: No   significant change since prior study.     Review of Systems   Cardiovascular:  Negative for chest pain and leg swelling.   Respiratory:  Negative for shortness of breath.        Physical Exam:  General: Alert and oriented in no apparent distress.  HEENT: Pupils equal. Mucous membranes moist.   Neck: No JVD  Cardiac:  Normal S1 S2, Regular. No murmur  Lungs: Clear without wheezes or crackles. 2 L NC  Abdomen: Soft, non-tender, ND  Extremities: Without clubbing, cyanosis or edema.    Neurologic: No focal deficits. Normal affect.  Skin: Warm and dry,    Medications:   [Held by provider] furosemide  20 mg Oral BID (Diuretic)    budesonide  0.5 mg Nebulization BID    fluticasone-salmeterol  1 puff Inhalation BID    amLODIPine  2.5 mg Oral BID    metoprolol succinate ER  25 mg Oral Daily    insulin aspart  1-7 Units Subcutaneous TID CC         Assessment:    Acute HFpEF  Decompensated on arrival. Down net -5.8 L this admission  TTE with LVEF 60-65%, normal RV function, normal PASP  Diuresed with IV lasix until prerenal PIA  on 8/25 suggesting patient is intravascularly dry. Creatinine now improving with oral hydration and holding diuretic.  No SGLT2, MRA, ARNI with PIA. Consider SGLT2 at follow-up appointment with CKD.  Hold lasix today, plan to start lasix 20 mg daily tomorrow  Severe COPD, chronic hypoxic respiratory failure  Baseline 2 L NC  CAD s/p CABG 2019 (LIMA-LAD, SVG-RCA, SVG-OM)  Denies angina  BB. Statin intolerance. She does not take ASA due to history of UGIB/ulcer? which has resolved, recommended she discuss this further outpatient with her primary cardiologist.  PIA on CKD III - creatinine stable today, BUN rising  HLD - history of statin intolerance. Consider outpatient PCSK9 or zetia    Plan:    Prerenal PIA on 8/25 after IV diuresis. Creatinine now improving and down-trending with oral hydration and holding diuretics. She is still down net -5.8 L overall this admission.  She was not on scheduled loop diuretic prior to arrival. We will start on lasix 20 mg daily and Kcl 10 meq daily on 8/28. She will get BMP in 1 week. Consider SGLT2 at office follow-up  Stable for discharge today from cardiac standpoint    Plan of care discussed with patient, RN.    DAVINA Ivory  8/27/2024  10:28 AM

## 2024-08-27 NOTE — PLAN OF CARE
Patient is from an Independent Living facility. Lives alone. A&Ox4. 1L via nasal cannula - 2L used at home (baseline). Patient is a standby assist. Patient refused 1 unit of insulin with lunch tray. Bed in lowest position and locked. Call light in hand. Personal belongings w/in reach. Patient is ready for discharge. IV removed. Tele box removed and returned. Nurse  cleared.    Problem: Patient Centered Care  Goal: Patient preferences are identified and integrated in the patient's plan of care  Description: Interventions:  - What would you like us to know as we care for you? From home alone at [facility] living facility  - Provide timely, complete, and accurate information to patient/family  - Incorporate patient and family knowledge, values, beliefs, and cultural backgrounds into the planning and delivery of care  - Encourage patient/family to participate in care and decision-making at the level they choose  - Honor patient and family perspectives and choices  Outcome: Progressing     Problem: CARDIOVASCULAR - ADULT  Goal: Maintains optimal cardiac output and hemodynamic stability  Description: INTERVENTIONS:  - Monitor vital signs, rhythm, and trends  - Monitor for bleeding, hypotension and signs of decreased cardiac output  - Evaluate effectiveness of vasoactive medications to optimize hemodynamic stability  - Monitor arterial and/or venous puncture sites for bleeding and/or hematoma  - Assess quality of pulses, skin color and temperature  - Assess for signs of decreased coronary artery perfusion - ex. Angina  - Evaluate fluid balance, assess for edema, trend weights  Outcome: Progressing  Goal: Absence of cardiac arrhythmias or at baseline  Description: INTERVENTIONS:  - Continuous cardiac monitoring, monitor vital signs, obtain 12 lead EKG if indicated  - Evaluate effectiveness of antiarrhythmic and heart rate control medications as ordered  - Initiate emergency measures for life threatening arrhythmias  -  Monitor electrolytes and administer replacement therapy as ordered  Outcome: Progressing     Problem: RESPIRATORY - ADULT  Goal: Achieves optimal ventilation and oxygenation  Description: INTERVENTIONS:  - Assess for changes in respiratory status  - Assess for changes in mentation and behavior  - Position to facilitate oxygenation and minimize respiratory effort  - Oxygen supplementation based on oxygen saturation or ABGs  - Provide Smoking Cessation handout, if applicable  - Encourage broncho-pulmonary hygiene including cough, deep breathe, Incentive Spirometry  - Assess the need for suctioning and perform as needed  - Assess and instruct to report SOB or any respiratory difficulty  - Respiratory Therapy support as indicated  - Manage/alleviate anxiety  - Monitor for signs/symptoms of CO2 retention  Outcome: Progressing     Problem: METABOLIC/FLUID AND ELECTROLYTES - ADULT  Goal: Glucose maintained within prescribed range  Description: INTERVENTIONS:  - Monitor Blood Glucose as ordered  - Assess for signs and symptoms of hyperglycemia and hypoglycemia  - Administer ordered medications to maintain glucose within target range  - Assess barriers to adequate nutritional intake and initiate nutrition consult as needed  - Instruct patient on self management of diabetes  Outcome: Progressing  Goal: Electrolytes maintained within normal limits  Description: INTERVENTIONS:  - Monitor labs and rhythm and assess patient for signs and symptoms of electrolyte imbalances  - Administer electrolyte replacement as ordered  - Monitor response to electrolyte replacements, including rhythm and repeat lab results as appropriate  - Fluid restriction as ordered  - Instruct patient on fluid and nutrition restrictions as appropriate  Outcome: Progressing     Problem: SAFETY ADULT - FALL  Goal: Free from fall injury  Description: INTERVENTIONS:  - Assess pt frequently for physical needs  - Identify cognitive and physical deficits and  behaviors that affect risk of falls.  - [place] fall precautions as indicated by assessment.  - Educate pt/family on patient safety including physical limitations  - Instruct pt to call for assistance with activity based on assessment  - Modify environment to reduce risk of injury  - Provide assistive devices as appropriate  - Consider OT/PT consult to assist with strengthening/mobility  - Encourage toileting schedule  Outcome: Progressing     Problem: DISCHARGE PLANNING  Goal: Discharge to home or other facility with appropriate resources  Description: INTERVENTIONS:  - Identify barriers to discharge w/pt and caregiver  - Include patient/family/discharge partner in discharge planning  - Arrange for needed discharge resources and transportation as appropriate  - Identify discharge learning needs (meds, wound care, etc)  - Arrange for interpreters to assist at discharge as needed  - Consider post-discharge preferences of patient/family/discharge partner  - Complete POLST form as appropriate  - Assess patient's ability to be responsible for managing their own health  - Refer to Case Management Department for coordinating discharge planning if the patient needs post-hospital services based on physician/LIP order or complex needs related to functional status, cognitive ability or social support system  Outcome: Progressing

## 2024-08-28 ENCOUNTER — APPOINTMENT (OUTPATIENT)
Dept: CARDIAC REHAB | Facility: HOSPITAL | Age: 79
End: 2024-08-28
Attending: INTERNAL MEDICINE
Payer: MEDICARE

## 2024-08-28 ENCOUNTER — PATIENT OUTREACH (OUTPATIENT)
Dept: CASE MANAGEMENT | Age: 79
End: 2024-08-28

## 2024-08-28 ENCOUNTER — TELEPHONE (OUTPATIENT)
Dept: INTERNAL MEDICINE CLINIC | Facility: CLINIC | Age: 79
End: 2024-08-28

## 2024-08-28 DIAGNOSIS — I50.33 ACUTE ON CHRONIC DIASTOLIC (CONGESTIVE) HEART FAILURE (HCC): Primary | ICD-10-CM

## 2024-08-28 PROCEDURE — 1111F DSCHRG MED/CURRENT MED MERGE: CPT

## 2024-08-28 NOTE — TELEPHONE ENCOUNTER
Spoke to patient for Transitions of Care call today. Patient now has cardiology appt 9/04/24 with NADIR Nair at 10:20 a.m.--she will repeat BMP, prior to that appt.    Offered available TCM/DIEGO appt 9/05/24 with PCP--pt declines, as she will not have caregiver, that day.    Pt requesting afternoon TCM appt with Dr. Braun on Tuesday, 9/03/24, as she can ask her sister to drive her--this [facility] unable to book, as requested, d/t schedule restrictions.       TCM/DIEGO appointment needed by 9/03/24.  Please advise.    BOOK BY DATE: 9/10/24    Please discuss with PCP and follow-up with patient, accordingly.  Thank you!         Follow-up Appointments:  Basic Metabolic Panel (8)  Complete by: 09/03/24 (Approximate)  Follow-up Information    Follow up With Specialties Details Why Contact Info   Enrique Braun MD Internal Medicine Follow up in 1 week(s)  [address] [place] [address] [address]  [facility] [address]  [phone]   Clay Mtz MD CARDIOLOGY Follow up in 1 week(s) Our office will call to schedule appointment [address]  [address]  [facility] [address]  [phone]     Future Appointments   Date Time Provider Department Center   8/30/2024  2:00 PM Heri Miller MD ECCFHENT [facility]   8/30/2024  2:20 PM EC AUDIO ECCFHAUD [facility]   9/6/2024 11:30 AM Caridad Flores DPM ECADOPOD [facility]   11/11/2024  3:00 PM Riki Tran MD [id number] EC [facility]

## 2024-08-28 NOTE — TELEPHONE ENCOUNTER
Spoke with patient, verified , TCM appointment scheduled on 9/3 at 11:30am, patient verbalized understanding.

## 2024-08-28 NOTE — PROGRESS NOTES
Transitions of Care Navigation  Discharge Date: 24  Contact Date: 2024    Discharge Diagnosis:   Acute exacerbation of HFpEF  Severe COPD  HTN  PIA on CKD 3  T2DM    Transitions of Care Assessment:  DIEGO Initial Assessment    General:  Assessment completed with: Patient  Patient Subjective: Pt feeling better, since hospital discharge--chronic O2 2L NC--98% O 2 sat during call. Pt weighing daily--has not yet checked weight today--following low sodium, heart healthy diet with fluid restrictions--speaking in full, clear sentences. Pt denies fever, chills, headache, vision changes, dizziness, nausea, vomiting, diarrhea, abdominal distension, LE swelling, chest pain or shortness of breath at this time,ambulating with walker at home.  Chief Complaint: Discharge Diagnosis:      1. Acute exacerbation of HFpEF  2. Severe COPD  3. HTN  4. PIA on CKD 3  5. T2DM  Verify patient name and  with patient/ caregiver: Yes    Hospital Stay/Discharge:  Tell me what you understand of why you were in the hospital or emergency department: Pt to ER c/o worsening difficulty breathing with exertion x approximately 2 weeks. Pt on 2L by NC at home.  Prior to leaving the hospital were your Discharge Instructions reviewed with you?: Yes  Did you receive a copy of your written Discharge Instructions?: Yes  What questions do you have about your Discharge Instructions?: none  Do you feel better or worse since you left the hospital or emergency department?: Better    Follow - Up Appointment:  Do you have a follow-up appointment?: No  Are there any barriers to getting to your follow-up appointment?: No    Home Health/DME:  Prior to leaving the hospital was Home Health (HH) arranged for you?: N/A  Are HH needs identified by staff during the assessment?: No     Prior to leaving the hospital or emergency department was Durable Medical Equipment (DME), medical supplies, or infusions arranged for you?: N/A  Are DME/medical supply/infusions  needs identified by staff during this assessment?: No     Medications/Diet:  Did any of your medications change, during or after your hospital stay or ED visit?: Yes  Do you have your new or updated medications?: No (picking up today)  Do you understand what your medications are for and possible side effects?: Yes  Are there any reasons that keep you from taking your medication as prescribed?: No  Any concerns about medication refills?: No    Were you given a different diet per your Discharge Instructions?: Yes  Diet Type: low sodium with fluid restrictions  Reason: heart failure  Are there any barriers to following that diet?: No     Questions/Concerns:  Do you have any questions or concerns that have not been discussed?: No     DIEGO Follow-up Assessment    General:  Assessment completed with: Patient  Patient Subjective: Pt feeling better, since hospital discharge--chronic O2 2L NC--98% O 2 sat during call. Pt weighing daily--has not yet checked weight today--following low sodium, heart healthy diet with fluid restrictions--speaking in full, clear sentences. Pt denies fever, chills, headache, vision changes, dizziness, nausea, vomiting, diarrhea, abdominal distension, LE swelling, chest pain or shortness of breath at this time,ambulating with walker at home.  Chief Complaint: Discharge Diagnosis:      1. Acute exacerbation of HFpEF  2. Severe COPD  3. HTN  4. PIA on CKD 3  5. T2DM  Community Resources: Other (none)    Progress/Care Plan:  Is the patient progressing as planned?: Yes     Nursing Interventions: Discussed diet, activity, medications and need for f/u visits. Spoke with Fabien at [facility] with pt on call--patient now has cardiology appt 9/04/24 with NADIR aNir at 10:20 a.m.--she will repeat BMP, prior to that appt. Offered available TCM/DIEGO appt 9/05/24 with PCP--pt declines, as she will not have caregiver, that day. Pt requesting afternoon TCM appt with Dr. Kade Dias, 9/03/24, as she can ask her  sister to drive her--this [facility] unable to book, as requested, d/t schedule restrictions. Sent TE to office staff as FYI/DIEGO protocol.  Patient aware when to contact PCP/specialists and when to seek emergency care. No further questions/concerns at this time.    Medications:  Current Outpatient Medications   Medication Sig Dispense Refill    furosemide 20 MG Oral Tab Take 1 tablet (20 mg total) by mouth daily. 30 tablet 2    potassium chloride 10 MEQ Oral Tab CR Take 1 tablet (10 mEq total) by mouth daily. 30 tablet 2    fluticasone-salmeterol (WIXELA INHUB) 500-50 MCG/ACT Inhalation Aerosol Powder, Breath Activated Inhale 1 puff into the lungs 2 (two) times daily.      glipiZIDE 5 MG Oral Tab Take 1 tablet (5 mg total) by mouth before breakfast. 90 tablet 1    pantoprazole 20 MG Oral Tab EC Take 1 tablet (20 mg total) by mouth every morning before breakfast. 90 tablet 3    metoprolol succinate ER 25 MG Oral Tablet 24 Hr Take 1 tablet (25 mg total) by mouth daily. 90 tablet 1    amLODIPine 2.5 MG Oral Tab Take 1 tablet (2.5 mg total) by mouth in the morning and 1 tablet (2.5 mg total) before bedtime. 180 tablet 1    budesonide 0.5 MG/2ML Inhalation Suspension Take 2 mL (0.5 mg total) by nebulization 2 (two) times daily. 180 each 0    Alcohol Swabs (DROPSAFE ALCOHOL PREP) 70 % Does not apply Pads Take 1 Bottle by mouth As Directed. 400 each 3    febuxostat 40 MG Oral Tab Take 1 tablet (40 mg total) by mouth daily. 90 tablet 1    omega-3 fatty acids 1000 MG Oral Cap Take 1,000 mg by mouth daily.      Multiple Vitamin (MULTI-VITAMIN DAILY) Oral Tab Take 1 tablet by mouth daily.      Cholecalciferol (VITAMIN D) 2000 units Oral Cap Take 1 capsule (2,000 Units total) by mouth every other day.     START taking:  potassium chloride (Klor-Con M10)  CHANGE how you take:  furosemide (Lasix)  STOP taking:  arformoterol 15 MCG/2ML Nebu (Brovana)  Potassium Chloride ER 20 MEQ Tbcr    Diagnosis specifics:  CHF: With your CHF diagnosis  weighing yourself is very important.  How often are you weighing yourself? daily  Is there any reason you are unable to weigh yourself daily? No   What was your weight yesterday? 171 lb in hospital   Today? Has not weighed yet  Were you told about any fluid restrictions? Yes  Have you noticed any shortness of breath or waking up short of breath? no  Since discharge do you feel you are urinating more or less? More   Are you urinating more at night? yes  Do you notice any pain or swelling in your abdomen? no   Ankles or legs? no  Questions/Concerns: none  2. Your recommended sodium intake is 7113-5205 mg daily  3. Limit your fluid intake to no more than 2 liters or 64 ounces per day  COPD: Have you participated in a pulmonary rehab program? yes   Would you like more information? no--pt will re-schedule CP rehab, after f/u appt  We reviewed your medications/inhalers, how often are you using your inhalers? As prescribed  Are you currently on oxygen? yes   How many liters? 2L NC   Any questions about oxygen use? no  Are you familiar with the signs and symptoms or worsening COPD? Yes  Who do you call with worsening COPD symptoms? Dr. Heath  Do you know when to call with COPD symptoms? Yes  Do you have any of the following potential risk factors for COPD in your home environment?   Primary or secondary tobacco smoke? no   Occupational dusts and chemicals organic or inorganic? no   Indoor air pollution from heating and cooking with poor ventilation? no   Mold? no   Pets? no   Extreme heat? no   Other: none    Follow-up Appointments:  Basic Metabolic Panel (8)  Complete by: 09/03/24 (Approximate)  Follow-up Information    Follow up With Specialties Details Why Contact Info   Enrique Braun MD Internal Medicine Follow up in 1 week(s)  [address]  [facility] [address]  [phone]   Clay Mtz MD CARDIOLOGY Follow up in 1 week(s) Our office will call to schedule appointment [address]  [facility] [address]  [phone]     Your appointments       Date & Time Appointment Department ([place])    Aug 30, 2024 2:00 PM CDT Exam Established Audio  with Heri Miller MD [facility] ([facility])        Aug 30, 2024 2:20 PM CDT Clinic Hearing Test with EC AUDIO [facility] ([facility])        Sep 06, 2024 11:30 AM CDT Exam - Established with Caridad Flores DPM [facility] ([facility])        Nov 11, 2024 3:00 PM CST Follow Up Visit with Riki Tran MD [facility] ([facility])              [facility]  [address]  [facility] [address]  [phone] [facility]  [address]  [facility] [address]  [phone] [facility]  [address]  [facility] [address]  [phone]    [facility]  [address]  [facility] [id number] [phone]            Transitional Care Clinic  Was TCC Ordered: No    Primary Care Provider (If no TCC appointment)  Does patient already have a PCP appointment scheduled? No  Nurse Care Manager Attempted to schedule PCP office TCM/DIEGO appointment with patient   -If no appointment scheduled: Explain: TE sent to office for appt assist    Specialist  Does the patient have any other follow-up appointment(s) need to be scheduled? Yes   -If yes: Nurse Care Manager reviewed upcoming specialist appointments with patient: Yes   -Does the patient need assistance scheduling appointment(s): No--this NCM called [facility] with pt    [x]   Patient verbally agrees to additional follow-up calls from Nurse Care Manager    Book By Date: 9/03/2024

## 2024-08-29 ENCOUNTER — APPOINTMENT (OUTPATIENT)
Dept: CARDIAC REHAB | Facility: HOSPITAL | Age: 79
End: 2024-08-29
Attending: INTERNAL MEDICINE
Payer: MEDICARE

## 2024-08-29 NOTE — TELEPHONE ENCOUNTER
Make sure she is taking her wixela inhaler.   If she is out of benzonatate capsules for coughing, we can refill it for 30 caps  one cap po TID.   Did she finished prednisone she was prescribed by hospitalist when she was discharge from [facility] ? We may need to refill it  prednisone. Let me know.      [FreeTextEntry5] : see hpi  [FreeTextEntry6] : see hpi

## 2024-08-30 ENCOUNTER — OFFICE VISIT (OUTPATIENT)
Dept: OTOLARYNGOLOGY | Facility: CLINIC | Age: 79
End: 2024-08-30

## 2024-08-30 DIAGNOSIS — H61.23 BILATERAL IMPACTED CERUMEN: Primary | ICD-10-CM

## 2024-08-30 NOTE — PROGRESS NOTES
Ariana Osorio is a 79 year old female.    Chief Complaint   Patient presents with    Ear Problem     Ear cleaning     Hearing Loss     Left hearing loss         HISTORY OF PRESENT ILLNESS    Patient presents for cerumen removal. No other complaints or concerns at this time    Social History     Socioeconomic History    Marital status:    Tobacco Use    Smoking status: Never     Passive exposure: Never    Smokeless tobacco: Never   Vaping Use    Vaping status: Never Used   Substance and Sexual Activity    Alcohol use: No     Comment: rarely at weddings    Drug use: No   Other Topics Concern    Reaction to local anesthetic No    Pt has a pacemaker No    Pt has a defibrillator No       Family History   Problem Relation Age of Onset    Asthma Father     Hypertension Father     Heart Disorder Father     Other (Other) Mother         thyroid surgery    Asthma Sister     Asthma Brother     Other (Other) Brother         gout    Breast Cancer Self 43        rt breast 69       Past Medical History:    Age-related cataract of left eye    Age-related cataract of right eye    Anxiety    Asthma (HCC)    Atherosclerosis of coronary artery    Breast CA (HCC)    lt mastectomy    Breast CA (HCC)    lumpectomy, right    Cancer (HCC)    breast cancer    Carotid artery disease (HCC)    Carotid stenosis    Closed fracture of multiple ribs of left side with routine healing, subsequent encounter    Congestive heart disease (HCC)    COPD (chronic obstructive pulmonary disease) (HCC)    on O2 at 2 liters    Coronary atherosclerosis    Depression    Diabetes (HCC)    takes insulin when hospitalized, takes oral meds at home    Diastolic dysfunction without heart failure    Esophageal reflux    Essential hypertension    Generalized anxiety disorder    Gout    Gout    High blood pressure    High cholesterol    History of pituitary adenoma    Hyperlipidemia    Major depressive disorder, single episode, moderate (HCC)    Obesity        Past Surgical History:   Procedure Laterality Date    Cabg      Carotid endarterectomy Bilateral     Cataract      Colonoscopy      2013    Colonoscopy  2013    Colonoscopy N/A 02/21/2023    Procedure: COLONOSCOPY;  Surgeon: Abdiaziz Melendez MD;  Location: [facility] ENDOSCOPY    Hernia surgery      Lumpectomy right  2014    Mastectomy left Left 1999    Radiation right  2014       REVIEW OF SYSTEMS    System Neg/Pos Details   Constitutional Negative Fatigue, fever and weight loss.   ENMT Negative Drooling.   Eyes Negative Blurred vision and vision changes.   Respiratory Negative Dyspnea and wheezing.   Cardio Negative Chest pain, irregular heartbeat/palpitations and syncope.   GI Negative Abdominal pain and diarrhea.   Endocrine Negative Cold intolerance and heat intolerance.   Neuro Negative Tremors.   Psych Negative Anxiety and depression.   Integumentary Negative Frequent skin infections, pigment change and rash.   Hema/Lymph Negative Easy bleeding and easy bruising.           PHYSICAL EXAM    There were no vitals taken for this visit.       Constitutional Normal Overall appearance - Normal.        Neck Exam Normal Inspection - Normal. Palpation - Normal. Parotid gland - Normal. Thyroid gland - Normal.             Head/Face Normal Facial features - Normal. Eyebrows - Normal. Skull - Normal.             Ears Normal Inspection - Right: Normal, Left: Normal. Canal - Right: Normal, Left: Normal. TM - Right: Normal, Left: Normal.   Skin Normal Inspection - Normal.                              Canals:  Right: Canal reveals cerumen impaction,   Left: Canal reveals cerumen impaction,     Tympanic Membranes:  Right: Normal tympanic membrane.   Left: Normal tympanic membrane.     TM Visualized Method:   Right TM examined via otomicroscopy.    Left TM examined via otomicroscopy.      PROCEDURE:    Removal of cerumen impaction   The cerumen impaction was completely removed using microscopy.   Removal was completed by  using acurette and/or suction.   Comments: Return to clinic as needed.  Avoid q-tips, water precautions and use over the counter wax remedies as needed.      Current Outpatient Medications:     furosemide 20 MG Oral Tab, Take 1 tablet (20 mg total) by mouth daily., Disp: 30 tablet, Rfl: 2    potassium chloride 10 MEQ Oral Tab CR, Take 1 tablet (10 mEq total) by mouth daily., Disp: 30 tablet, Rfl: 2    fluticasone-salmeterol (WIXELA INHUB) 500-50 MCG/ACT Inhalation Aerosol Powder, Breath Activated, Inhale 1 puff into the lungs 2 (two) times daily., Disp: , Rfl:     glipiZIDE 5 MG Oral Tab, Take 1 tablet (5 mg total) by mouth before breakfast., Disp: 90 tablet, Rfl: 1    pantoprazole 20 MG Oral Tab EC, Take 1 tablet (20 mg total) by mouth every morning before breakfast., Disp: 90 tablet, Rfl: 3    metoprolol succinate ER 25 MG Oral Tablet 24 Hr, Take 1 tablet (25 mg total) by mouth daily., Disp: 90 tablet, Rfl: 1    amLODIPine 2.5 MG Oral Tab, Take 1 tablet (2.5 mg total) by mouth in the morning and 1 tablet (2.5 mg total) before bedtime., Disp: 180 tablet, Rfl: 1    budesonide 0.5 MG/2ML Inhalation Suspension, Take 2 mL (0.5 mg total) by nebulization 2 (two) times daily., Disp: 180 each, Rfl: 0    Alcohol Swabs (DROPSAFE ALCOHOL PREP) 70 % Does not apply Pads, Take 1 Bottle by mouth As Directed., Disp: 400 each, Rfl: 3    febuxostat 40 MG Oral Tab, Take 1 tablet (40 mg total) by mouth daily., Disp: 90 tablet, Rfl: 1    omega-3 fatty acids 1000 MG Oral Cap, Take 1,000 mg by mouth daily., Disp: , Rfl:     Multiple Vitamin (MULTI-VITAMIN DAILY) Oral Tab, Take 1 tablet by mouth daily., Disp: , Rfl:     Cholecalciferol (VITAMIN D) 2000 units Oral Cap, Take 1 capsule (2,000 Units total) by mouth every other day., Disp: , Rfl:   ASSESSMENT AND PLAN    1. Bilateral impacted cerumen    - REMOVAL IMPACTED CERUMEN REQUIRING INSTRUMENTATION, UNILATERAL      All cerumen was removed using microscopy. I have asked the patient to  return to see me as needed for repeat cerumen removal in the future.      Heri Miller MD    8/30/2024    2:55 PM

## 2024-09-02 ENCOUNTER — APPOINTMENT (OUTPATIENT)
Dept: CARDIAC REHAB | Facility: HOSPITAL | Age: 79
End: 2024-09-02
Attending: INTERNAL MEDICINE
Payer: MEDICARE

## 2024-09-03 ENCOUNTER — APPOINTMENT (OUTPATIENT)
Dept: CARDIAC REHAB | Facility: HOSPITAL | Age: 79
End: 2024-09-03
Attending: INTERNAL MEDICINE
Payer: MEDICARE

## 2024-09-03 ENCOUNTER — TELEPHONE (OUTPATIENT)
Dept: INTERNAL MEDICINE CLINIC | Facility: CLINIC | Age: 79
End: 2024-09-03

## 2024-09-03 ENCOUNTER — OFFICE VISIT (OUTPATIENT)
Dept: INTERNAL MEDICINE CLINIC | Facility: CLINIC | Age: 79
End: 2024-09-03

## 2024-09-03 ENCOUNTER — LAB ENCOUNTER (OUTPATIENT)
Dept: LAB | Age: 79
End: 2024-09-03
Attending: INTERNAL MEDICINE
Payer: MEDICARE

## 2024-09-03 VITALS
SYSTOLIC BLOOD PRESSURE: 120 MMHG | BODY MASS INDEX: 29.53 KG/M2 | DIASTOLIC BLOOD PRESSURE: 70 MMHG | HEART RATE: 74 BPM | OXYGEN SATURATION: 94 % | HEIGHT: 64 IN | WEIGHT: 173 LBS

## 2024-09-03 DIAGNOSIS — E11.22 CONTROLLED TYPE 2 DIABETES MELLITUS WITH STAGE 3 CHRONIC KIDNEY DISEASE, WITHOUT LONG-TERM CURRENT USE OF INSULIN (HCC): ICD-10-CM

## 2024-09-03 DIAGNOSIS — J44.9 COPD, SEVERE (HCC): Primary | ICD-10-CM

## 2024-09-03 DIAGNOSIS — E78.5 HYPERLIPIDEMIA ASSOCIATED WITH TYPE 2 DIABETES MELLITUS (HCC): ICD-10-CM

## 2024-09-03 DIAGNOSIS — J44.89 ASTHMA-COPD OVERLAP SYNDROME (HCC): ICD-10-CM

## 2024-09-03 DIAGNOSIS — I15.2 HYPERTENSION ASSOCIATED WITH TYPE 2 DIABETES MELLITUS (HCC): ICD-10-CM

## 2024-09-03 DIAGNOSIS — I50.33 ACUTE ON CHRONIC HEART FAILURE WITH PRESERVED EJECTION FRACTION (HCC): Primary | ICD-10-CM

## 2024-09-03 DIAGNOSIS — N18.30 CONTROLLED TYPE 2 DIABETES MELLITUS WITH STAGE 3 CHRONIC KIDNEY DISEASE, WITHOUT LONG-TERM CURRENT USE OF INSULIN (HCC): ICD-10-CM

## 2024-09-03 DIAGNOSIS — J96.11 CHRONIC RESPIRATORY FAILURE WITH HYPOXIA (HCC): ICD-10-CM

## 2024-09-03 DIAGNOSIS — E21.3 HYPERPARATHYROIDISM (HCC): ICD-10-CM

## 2024-09-03 DIAGNOSIS — N18.32 STAGE 3B CHRONIC KIDNEY DISEASE (HCC): ICD-10-CM

## 2024-09-03 DIAGNOSIS — M1A.9XX1 CHRONIC TOPHACEOUS GOUT: ICD-10-CM

## 2024-09-03 DIAGNOSIS — I50.33 ACUTE ON CHRONIC DIASTOLIC (CONGESTIVE) HEART FAILURE (HCC): ICD-10-CM

## 2024-09-03 DIAGNOSIS — I25.10 CORONARY ARTERY DISEASE INVOLVING NATIVE CORONARY ARTERY OF NATIVE HEART WITHOUT ANGINA PECTORIS: ICD-10-CM

## 2024-09-03 DIAGNOSIS — E11.59 HYPERTENSION ASSOCIATED WITH TYPE 2 DIABETES MELLITUS (HCC): ICD-10-CM

## 2024-09-03 DIAGNOSIS — E11.69 HYPERLIPIDEMIA ASSOCIATED WITH TYPE 2 DIABETES MELLITUS (HCC): ICD-10-CM

## 2024-09-03 LAB
ANION GAP SERPL CALC-SCNC: 8 MMOL/L (ref 0–18)
BUN BLD-MCNC: 23 MG/DL (ref 9–23)
BUN/CREAT SERPL: 18.7 (ref 10–20)
CALCIUM BLD-MCNC: 10.6 MG/DL (ref 8.7–10.4)
CHLORIDE SERPL-SCNC: 105 MMOL/L (ref 98–112)
CO2 SERPL-SCNC: 28 MMOL/L (ref 21–32)
CREAT BLD-MCNC: 1.23 MG/DL
EGFRCR SERPLBLD CKD-EPI 2021: 45 ML/MIN/1.73M2 (ref 60–?)
GLUCOSE BLD-MCNC: 97 MG/DL (ref 70–99)
OSMOLALITY SERPL CALC.SUM OF ELEC: 296 MOSM/KG (ref 275–295)
POTASSIUM SERPL-SCNC: 4.5 MMOL/L (ref 3.5–5.1)
SODIUM SERPL-SCNC: 141 MMOL/L (ref 136–145)

## 2024-09-03 PROCEDURE — 80048 BASIC METABOLIC PNL TOTAL CA: CPT

## 2024-09-03 PROCEDURE — 3008F BODY MASS INDEX DOCD: CPT | Performed by: INTERNAL MEDICINE

## 2024-09-03 PROCEDURE — 3078F DIAST BP <80 MM HG: CPT | Performed by: INTERNAL MEDICINE

## 2024-09-03 PROCEDURE — 99495 TRANSJ CARE MGMT MOD F2F 14D: CPT | Performed by: INTERNAL MEDICINE

## 2024-09-03 PROCEDURE — 1160F RVW MEDS BY RX/DR IN RCRD: CPT | Performed by: INTERNAL MEDICINE

## 2024-09-03 PROCEDURE — 36415 COLL VENOUS BLD VENIPUNCTURE: CPT

## 2024-09-03 PROCEDURE — 3074F SYST BP LT 130 MM HG: CPT | Performed by: INTERNAL MEDICINE

## 2024-09-03 PROCEDURE — 1159F MED LIST DOCD IN RCRD: CPT | Performed by: INTERNAL MEDICINE

## 2024-09-03 PROCEDURE — 1111F DSCHRG MED/CURRENT MED MERGE: CPT | Performed by: INTERNAL MEDICINE

## 2024-09-03 RX ORDER — BUDESONIDE 0.5 MG/2ML
0.5 INHALANT ORAL 2 TIMES DAILY
Qty: 180 EACH | Refills: 1 | Status: SHIPPED | OUTPATIENT
Start: 2024-09-03

## 2024-09-03 NOTE — PROGRESS NOTES
Subjective:   Ariana Osorio is a 79 year old female who presents for hospital follow up.   She was discharged from [facility] to Home or Self Care  Admission Date: 8/22/24   Discharge Date: 8/27/24  Hospital Discharge Diagnosis: acute on chronic heart failure with preserved ejection fraction,  copd/asthma, DM2, hypertension, hyperlipidemia    Interactive contact within 2 business days post discharge first initiated on Date: 08/28/24    During the visit, the following was completed:  Obtained and reviewed discharge summary, continuity of care documents, Hospitalist notes, and Cardiology notes  Reviewed Labs (CBC, CMP), Labs (Cardiac markers), CT radiology results, X-Ray radiology results, EKG, and Echocardiogram    HPI: Patient admitted to [facility] with increasing shortness of breath and bilateral leg edema diagnosed to have acute on chronic diastolic heart failure.  She does also have underlying severe COPD.  The patient was seen by cardiologist and was started to be diuresed with IV Lasix.  The patient was about 5 pounds with diuresis and improved her dyspnea as well as leg edema.  She was switched over to p.o. Lasix 20 mg daily and was discharged on this medication.  Since going home, she had kept the weight off according to the patient.    History/Other:   Current Medications:  Medication Reconciliation:  I am aware of an inpatient discharge within the last 30 days.  The discharge medication list has been reconciled with the patient's current medication list and reviewed by me.  See medication list for additions of new medication, and changes to current doses of medications and discontinued medications.  Outpatient Medications Marked as Taking for the 9/3/24 encounter (Office Visit) with Enrique Braun MD   Medication Sig    budesonide 0.5 MG/2ML Inhalation Suspension Take 2 mL (0.5 mg total) by nebulization 2 (two) times daily.    furosemide 20 MG Oral Tab Take 1 tablet (20 mg total) by  mouth daily.    potassium chloride 10 MEQ Oral Tab CR Take 1 tablet (10 mEq total) by mouth daily.    fluticasone-salmeterol (WIXELA INHUB) 500-50 MCG/ACT Inhalation Aerosol Powder, Breath Activated Inhale 1 puff into the lungs 2 (two) times daily.    glipiZIDE 5 MG Oral Tab Take 1 tablet (5 mg total) by mouth before breakfast.    pantoprazole 20 MG Oral Tab EC Take 1 tablet (20 mg total) by mouth every morning before breakfast.    metoprolol succinate ER 25 MG Oral Tablet 24 Hr Take 1 tablet (25 mg total) by mouth daily.    amLODIPine 2.5 MG Oral Tab Take 1 tablet (2.5 mg total) by mouth in the morning and 1 tablet (2.5 mg total) before bedtime.    febuxostat 40 MG Oral Tab Take 1 tablet (40 mg total) by mouth daily.    omega-3 fatty acids 1000 MG Oral Cap Take 1,000 mg by mouth daily.    Multiple Vitamin (MULTI-VITAMIN DAILY) Oral Tab Take 1 tablet by mouth daily.    Cholecalciferol (VITAMIN D) 2000 units Oral Cap Take 1 capsule (2,000 Units total) by mouth every other day.       Review of Systems:  GENERAL: weight stable, energy stable, no sweating  SKIN: denies any unusual skin lesions  EYES: denies blurred vision or double vision  HEENT: denies nasal congestion, sinus pain or ST  LUNGS: denies shortness of breath with exertion  CARDIOVASCULAR: denies chest pain on exertion or palpitations  GI: denies abdominal pain, denies heartburn, denies diarrhea  MUSCULOSKELETAL: denies pain, normal range of motion of extremities  NEURO: denies headaches, denies dizziness, denies weakness  PSYCHE: denies depression or anxiety  HEMATOLOGIC: denies hx of anemia, or bruising, denies bleeding  ENDOCRINE: denies thyroid history  ALL/ASTHMA: denies hx of allergy or asthma    Objective:   No LMP recorded. Patient is postmenopausal.  Estimated body mass index is 29.7 kg/m² as calculated from the following:    Height as of this encounter: 5' 4\" (1.626 m).    Weight as of this encounter: 173 lb (78.5 kg).   /70   Pulse 74    Ht 5' 4\" (1.626 m)   Wt 173 lb (78.5 kg)   SpO2 94%   BMI 29.70 kg/m²    GENERAL: well developed, well nourished, in no apparent distress  SKIN: no rashes, no suspicious lesions  HEENT: atraumatic, normocephalic, ears and throat are clear  EYES: PERRLA, EOMI, conjunctiva are clear  NECK: supple, no adenopathy, no bruits  CHEST: no chest tenderness  LUNGS: clear to auscultation  CARDIO: RRR with murmur  GI: good BS's, no masses, HSM or tenderness  MUSCULOSKELETAL: back is not tender, FROM of the extremities  EXTREMITIES: no cyanosis, clubbing or edema  NEURO: Oriented times three, cranial nerves are intact, motor and sensory are grossly intact    Assessment & Plan:   (I50.33) Acute on chronic heart failure with preserved ejection fraction (HCC)  (primary encounter diagnosis)  Plan: She now appears to be compensated.  Her bilateral leg edema had resolved and her breathing is much improved.  She will have a follow-up with cardiology tomorrow.  She will continue her Lasix 20 mg daily for now.    (J44.89) Asthma-COPD overlap syndrome (HCC)  Plan: She had just seen her pulmonologist and we ordered pulmonary rehab.    (E11.22,  N18.30) Controlled type 2 diabetes mellitus with stage 3 chronic kidney disease, without long-term current use of insulin (HCC)  Plan: Fasting sugars have been well-controlled.  She is only on glipizide.    (N18.32) Stage 3b chronic kidney disease (HCC)  Plan: There was some slight increase in her creatinine with IV diuresis while in the hospital.  Cardiology did order a BMP that she did today and she will have a follow-up visit with cardiology tomorrow.    (E11.59,  I15.2) Hypertension associated with type 2 diabetes mellitus ([facility])  Plan: Blood pressure controlled with current blood pressure meds.  CPM.    (E11.69,  E78.5) Hyperlipidemia associated with type 2 diabetes mellitus ([facility])  Plan: continue with low chol diet, intolerant with statin and zetia and declines PCSK9 inhibitor.    (I25.10)  Coronary artery disease involving native coronary artery of native heart without angina pectoris  Plan: remote hx of CABG before, on antiplatelet tx; pt had been intolerant of statin as well as zetia; she also had refused PCSK9 inh shots before.     (E21.3) Hyperparathyroidism (HCC)  Plan: she had been ff and managed by endo, her hypercalcemia had been mild and stable.     (M1A.9XX1) Chronic tophaceous gout  Plan: The patient to continue her Uloric is being prescribed by her rheumatologist.         No follow-ups on file.

## 2024-09-03 NOTE — TELEPHONE ENCOUNTER
Sabi from pulmonary rehab faxed request to Dr Braun for pulmonary rehab for patient per Dr Heath. Need referral for pulmonary rehab CPT code     Clinical staff: please see if fax received.    Referral pended.

## 2024-09-04 ENCOUNTER — MED REC SCAN ONLY (OUTPATIENT)
Dept: INTERNAL MEDICINE CLINIC | Facility: CLINIC | Age: 79
End: 2024-09-04

## 2024-09-04 ENCOUNTER — APPOINTMENT (OUTPATIENT)
Dept: CARDIAC REHAB | Facility: HOSPITAL | Age: 79
End: 2024-09-04
Attending: INTERNAL MEDICINE
Payer: MEDICARE

## 2024-09-04 NOTE — TELEPHONE ENCOUNTER
Attempted to contact Sabi with pulmonary rehab to obtain fax number where referral can be faxed, no answer, unable to leave a message.

## 2024-09-05 ENCOUNTER — APPOINTMENT (OUTPATIENT)
Dept: CARDIAC REHAB | Facility: HOSPITAL | Age: 79
End: 2024-09-05
Attending: INTERNAL MEDICINE
Payer: MEDICARE

## 2024-09-06 ENCOUNTER — OFFICE VISIT (OUTPATIENT)
Dept: PODIATRY CLINIC | Facility: CLINIC | Age: 79
End: 2024-09-06

## 2024-09-06 DIAGNOSIS — E11.9 DIET-CONTROLLED DIABETES MELLITUS (HCC): Primary | ICD-10-CM

## 2024-09-06 DIAGNOSIS — L60.3 NAIL DYSTROPHY: ICD-10-CM

## 2024-09-06 PROCEDURE — 1159F MED LIST DOCD IN RCRD: CPT | Performed by: STUDENT IN AN ORGANIZED HEALTH CARE EDUCATION/TRAINING PROGRAM

## 2024-09-06 PROCEDURE — 99213 OFFICE O/P EST LOW 20 MIN: CPT | Performed by: STUDENT IN AN ORGANIZED HEALTH CARE EDUCATION/TRAINING PROGRAM

## 2024-09-06 PROCEDURE — 1126F AMNT PAIN NOTED NONE PRSNT: CPT | Performed by: STUDENT IN AN ORGANIZED HEALTH CARE EDUCATION/TRAINING PROGRAM

## 2024-09-06 PROCEDURE — 1111F DSCHRG MED/CURRENT MED MERGE: CPT | Performed by: STUDENT IN AN ORGANIZED HEALTH CARE EDUCATION/TRAINING PROGRAM

## 2024-09-06 NOTE — PROGRESS NOTES
[facility]  Progress Note      Ariana Osorio is a 79 year old female.   Chief Complaint   Patient presents with    Diabetic Foot Care     9 weeks f/u - last WpQ3I=4.8 from 8/24/24 and LOV with PCP Dr Braun was 9/3/24 - has some discoloration on one of her toenail - no pain - this AM her AO=839         HPI:       Patient is a pleasant 79-year-old diabetic female with clinic for bilateral foot evaluation.  Denies any pedal injuries or trauma.    Allergies: Allopurinol, Dog epithelium, Dust, Heparin, Smoke, Adhesive tape (rosins), Montelukast, Statins, Xanax [alprazolam], Adhesive tape, Amoxicillin, Other, Sulfa antibiotics, Ace inhibitors, and Aspirin    Current Outpatient Medications   Medication Sig Dispense Refill    budesonide 0.5 MG/2ML Inhalation Suspension Take 2 mL (0.5 mg total) by nebulization 2 (two) times daily. 180 each 1    furosemide 20 MG Oral Tab Take 1 tablet (20 mg total) by mouth daily. 30 tablet 2    potassium chloride 10 MEQ Oral Tab CR Take 1 tablet (10 mEq total) by mouth daily. 30 tablet 2    fluticasone-salmeterol (WIXELA INHUB) 500-50 MCG/ACT Inhalation Aerosol Powder, Breath Activated Inhale 1 puff into the lungs 2 (two) times daily.      glipiZIDE 5 MG Oral Tab Take 1 tablet (5 mg total) by mouth before breakfast. 90 tablet 1    pantoprazole 20 MG Oral Tab EC Take 1 tablet (20 mg total) by mouth every morning before breakfast. 90 tablet 3    metoprolol succinate ER 25 MG Oral Tablet 24 Hr Take 1 tablet (25 mg total) by mouth daily. 90 tablet 1    amLODIPine 2.5 MG Oral Tab Take 1 tablet (2.5 mg total) by mouth in the morning and 1 tablet (2.5 mg total) before bedtime. 180 tablet 1    Alcohol Swabs (DROPSAFE ALCOHOL PREP) 70 % Does not apply Pads Take 1 Bottle by mouth As Directed. (Patient not taking: Reported on 9/3/2024) 400 each 3    febuxostat 40 MG Oral Tab Take 1 tablet (40 mg total) by mouth daily. 90 tablet 1    omega-3 fatty acids 1000 MG Oral Cap Take  1,000 mg by mouth daily.      Multiple Vitamin (MULTI-VITAMIN DAILY) Oral Tab Take 1 tablet by mouth daily.      Cholecalciferol (VITAMIN D) 2000 units Oral Cap Take 1 capsule (2,000 Units total) by mouth every other day.        Past Medical History:    Age-related cataract of left eye    Age-related cataract of right eye    Anxiety    Asthma (HCC)    Atherosclerosis of coronary artery    Breast CA (HCC)    lt mastectomy    Breast CA (HCC)    lumpectomy, right    Cancer (HCC)    breast cancer    Carotid artery disease (HCC)    Carotid stenosis    Closed fracture of multiple ribs of left side with routine healing, subsequent encounter    Congestive heart disease (HCC)    COPD (chronic obstructive pulmonary disease) (HCC)    on O2 at 2 liters    Coronary atherosclerosis    Depression    Diabetes (HCC)    takes insulin when hospitalized, takes oral meds at home    Diastolic dysfunction without heart failure    Esophageal reflux    Essential hypertension    Generalized anxiety disorder    Gout    Gout    High blood pressure    High cholesterol    History of pituitary adenoma    Hyperlipidemia    Major depressive disorder, single episode, moderate (HCC)    Obesity      Past Surgical History:   Procedure Laterality Date    Cabg      Carotid endarterectomy Bilateral     Cataract      Colonoscopy      2013    Colonoscopy  2013    Colonoscopy N/A 02/21/2023    Procedure: COLONOSCOPY;  Surgeon: Abdiaziz Melendez MD;  Location: [facility] ENDOSCOPY    Hernia surgery      Lumpectomy right  2014    Mastectomy left Left 1999    Radiation right  2014      Family History   Problem Relation Age of Onset    Asthma Father     Hypertension Father     Heart Disorder Father     Other (Other) Mother         thyroid surgery    Asthma Sister     Asthma Brother     Other (Other) Brother         gout    Breast Cancer Self 43        rt breast 69      Social History     Socioeconomic History    Marital status:    Tobacco Use    Smoking  status: Never     Passive exposure: Never    Smokeless tobacco: Never   Vaping Use    Vaping status: Never Used   Substance and Sexual Activity    Alcohol use: No     Comment: rarely at weddings    Drug use: No   Other Topics Concern    Reaction to local anesthetic No    Pt has a pacemaker No    Pt has a defibrillator No           REVIEW OF SYSTEMS:     Denies nause, fever, chills  No calf pain  Denies chest pain or SOB      EXAM:   There were no vitals taken for this visit.  GENERAL: well developed, well nourished, in no apparent distress  EXTREMITIES:   1. Integument: Normal skin temperature and turgor. Toenails x10 are elongated, thickened and discolored with subungal derbi.     2. Vascular: Dorsalis pedis two out of four bilateral and posterior tibial pulses two out of   four bilateral, capillary refill normal.   3. Musculoskeletal: All muscle groups are graded 5 out of 5 in the foot and ankle.   4. Neurological: Normal sharp dull sensation; reflexes normal.             ASSESSMENT AND PLAN:   Diagnoses and all orders for this visit:    Diet-controlled diabetes mellitus (HCC)    Nail dystrophy        Plan:         -Patient examined, chart history reviewed.  -Discussed importance of proper pedal hygiene, regular foot checks, and tight glucose control.  -Sharply debrided nails x10 with a sterile nail nipper achieving a 20% reduction in thickness and length, without incident.   -Ambulate with supportive shoes and inserts and avoid walking barefoot.  -Educated patient on acute signs of infection advised patient to seek immediate medical attention if symptoms arise.      The patient indicates understanding of these issues and agrees to the plan.        Caridad Flores DPM

## 2024-09-09 ENCOUNTER — CARDPULM VISIT (OUTPATIENT)
Dept: CARDIAC REHAB | Facility: HOSPITAL | Age: 79
End: 2024-09-09
Attending: INTERNAL MEDICINE
Payer: MEDICARE

## 2024-09-09 PROCEDURE — 94626 PHY/QHP OP PULM RHB W/MNTR: CPT

## 2024-09-10 ENCOUNTER — APPOINTMENT (OUTPATIENT)
Dept: CARDIAC REHAB | Facility: HOSPITAL | Age: 79
End: 2024-09-10
Attending: INTERNAL MEDICINE
Payer: MEDICARE

## 2024-09-11 ENCOUNTER — APPOINTMENT (OUTPATIENT)
Dept: CARDIAC REHAB | Facility: HOSPITAL | Age: 79
End: 2024-09-11
Attending: INTERNAL MEDICINE
Payer: MEDICARE

## 2024-09-12 ENCOUNTER — APPOINTMENT (OUTPATIENT)
Dept: CARDIAC REHAB | Facility: HOSPITAL | Age: 79
End: 2024-09-12
Attending: INTERNAL MEDICINE
Payer: MEDICARE

## 2024-09-12 PROCEDURE — 94625 PHY/QHP OP PULM RHB W/O MNTR: CPT

## 2024-09-16 ENCOUNTER — CARDPULM VISIT (OUTPATIENT)
Dept: CARDIAC REHAB | Facility: HOSPITAL | Age: 79
End: 2024-09-16
Attending: INTERNAL MEDICINE
Payer: MEDICARE

## 2024-09-16 PROCEDURE — 94625 PHY/QHP OP PULM RHB W/O MNTR: CPT

## 2024-09-16 RX ORDER — CALCIUM CITRATE/VITAMIN D3 200MG-6.25
TABLET ORAL
Refills: 0 | Status: CANCELLED | OUTPATIENT
Start: 2024-09-16

## 2024-09-17 ENCOUNTER — APPOINTMENT (OUTPATIENT)
Dept: CARDIAC REHAB | Facility: HOSPITAL | Age: 79
End: 2024-09-17
Attending: INTERNAL MEDICINE
Payer: MEDICARE

## 2024-09-18 ENCOUNTER — APPOINTMENT (OUTPATIENT)
Dept: CARDIAC REHAB | Facility: HOSPITAL | Age: 79
End: 2024-09-18
Attending: INTERNAL MEDICINE
Payer: MEDICARE

## 2024-09-18 ENCOUNTER — PATIENT OUTREACH (OUTPATIENT)
Dept: CASE MANAGEMENT | Age: 79
End: 2024-09-18

## 2024-09-18 NOTE — PROGRESS NOTES
[facility] Follow-up Assessment    General:  Assessment completed with: Patient    Progress/Care Plan:  Is the patient progressing as planned?: Yes  Care Plan Update: Pt saw PCP 9/03/24, Dr. Mtz 9/04/24 and has started CP rehab  New Care Plan: Pt continues with CP rehab, as scheduled  Frequency/Follow Up Plan: program completion, next week     Notes:  Navigator Notes: Pt continues to improve, since hospital discharge--chronic O2 2L NC--97% O2 sat during call. Pt weighing daily--171.2 lb today, FBS today 103--following low sodium, heart healthy diet with fluid restrictions--speaking in full, clear sentences. Pt denies fever, chills, headache, vision changes, dizziness, nausea, vomiting, diarrhea, abdominal distension, LE swelling, chest pain or shortness of breath at this time,ambulating with walker at home.  Patient aware when to contact PCP/specialists and when to seek emergency care. No further questions/concerns at this time.         Future Appointments   Date Time Provider Department Center   9/19/2024  1:15 PM [facility] PULM PHASE 2 [facility] CP REHAB [facility]   9/24/2024  1:15 PM [facility] PULM PHASE 2 [facility] CP REHAB [facility]   9/26/2024  1:15 PM [facility] PULM PHASE 2 [facility] CP REHAB [facility]   10/1/2024  1:15 PM [facility] PULM PHASE 2 [facility] CP REHAB [facility]   10/3/2024  1:15 PM [facility] PULM PHASE 2 [facility] CP REHAB [facility]   10/8/2024  1:15 PM [facility] PULM PHASE 2 [facility] CP REHAB [facility]   10/10/2024  1:15 PM [facility] PULM PHASE 2 [facility] CP REHAB [facility]   10/15/2024  1:15 PM [facility] PULM PHASE 2 [facility] REHAB [facility]   10/17/2024  1:15 PM [facility] PULM PHASE 2 [facility] CP REHAB [facility]   10/22/2024  1:15 PM [facility] PULM PHASE 2 [facility] CP REHAB [facility]   10/24/2024  1:15 PM [facility] PULM PHASE 2 [facility] CP REHAB [facility]   10/29/2024  1:15 PM [facility] PULM PHASE 2 [facility] CP REHAB [facility]   10/31/2024  1:15 PM [facility] PULM PHASE 2 [facility] REHAB [facility]   11/5/2024  1:15 PM [facility] PULM PHASE 2 [facility] CP REHAB [facility]   11/7/2024  1:15 PM [facility] PULM PHASE 2 [facility] CP REHAB [facility]   11/11/2024  3:00 PM Riki Tran MD [id number] [facility]  [facility]   11/12/2024  1:15 PM [facility] PULM PHASE 2 [facility] CP REHAB [facility]   11/14/2024  1:15 PM [facility] PULM PHASE 2 [facility] CP REHAB [facility]   11/19/2024  1:15 PM [facility] PULM PHASE 2 [facility] CP REHAB [facility]   11/21/2024  1:15 PM [facility] PULM PHASE 2 [facility] CP REHAB [facility]   11/22/2024  2:00 PM Heri Miller MD [id number] [facility]   11/26/2024  1:15 PM [facility] PULM PHASE 2 [facility] CP REHAB [facility]   12/3/2024  1:15 PM [facility] PULM PHASE 2 [facility] CP REHAB [facility]   12/5/2024  1:15 PM [facility] PULM PHASE 2 [facility] CP REHAB [facility]   12/10/2024  1:15 PM [facility] PULM PHASE 2 [facility] CP REHAB [facility]   12/12/2024  1:15 PM [facility] PULM PHASE 2 [facility] CP REHAB [facility]   12/17/2024  1:15 PM [facility] PULM PHASE 2 [facility] CP REHAB [facility]   12/19/2024  1:15 PM [facility] PULM PHASE 2 [facility] CP REHAB [facility]   12/24/2024  1:15 PM [facility] PULM PHASE 2 [facility] CP REHAB [facility]   12/26/2024  1:15 PM [facility] PULM PHASE 2 [facility] CP REHAB [facility]   12/31/2024  1:15 PM [facility] PULM PHASE 2 [facility] CP REHAB [facility]   1/2/2025  1:15 PM [facility] PULM PHASE 2 [facility] CP REHAB [facility]   1/6/2025  1:00 PM Caridad Flores, DPM [facility]   1/7/2025  1:15 PM [facility] PULM PHASE 2 [facility] CP REHAB [facility]   1/9/2025  1:15 PM [facility] PULM PHASE 2 [facility] CP REHAB [facility]   1/14/2025  1:15 PM [facility] PULM PHASE 2 [facility] CP REHAB [facility]   1/16/2025  1:15 PM [facility] PULM PHASE 2 [facility] CP REHAB [facility]   1/21/2025  1:15 PM [facility] PULM PHASE 2 [facility] CP REHAB [facility]

## 2024-09-19 ENCOUNTER — CARDPULM VISIT (OUTPATIENT)
Dept: CARDIAC REHAB | Facility: HOSPITAL | Age: 79
End: 2024-09-19
Attending: INTERNAL MEDICINE
Payer: MEDICARE

## 2024-09-19 PROCEDURE — 94625 PHY/QHP OP PULM RHB W/O MNTR: CPT

## 2024-09-20 RX ORDER — GLUCOSAM/CHON-MSM1/C/MANG/BOSW 500-416.6
1 TABLET ORAL 4 TIMES DAILY
Qty: 400 EACH | Refills: 3 | Status: SHIPPED | OUTPATIENT
Start: 2024-09-20

## 2024-09-20 RX ORDER — BLOOD-GLUCOSE CONTROL, LOW
1 EACH MISCELLANEOUS AS NEEDED
Qty: 1 EACH | Refills: 3 | Status: SHIPPED | OUTPATIENT
Start: 2024-09-20

## 2024-09-20 RX ORDER — ISOPROPYL ALCOHOL 70 ML/100ML
1 SWAB TOPICAL 4 TIMES DAILY
Qty: 400 EACH | Refills: 3 | Status: SHIPPED | OUTPATIENT
Start: 2024-09-20

## 2024-09-20 RX ORDER — CALCIUM CITRATE/VITAMIN D3 200MG-6.25
TABLET ORAL
Qty: 200 STRIP | Refills: 3 | Status: SHIPPED | OUTPATIENT
Start: 2024-09-20

## 2024-09-20 NOTE — TELEPHONE ENCOUNTER
Please review;:    Patient in need of new diabetic testing supplies. Patient has the TrueMetric Air Glucose Meter.     New prescriptions for test strips, lancets, control/calibration solu, and alcohol swabs pended for your review and approval if appropriate.    No future appointments with family medicine/internal medicine.  Last office visit: 9/3/2024    Requested Prescriptions   Pending Prescriptions Disp Refills    Glucose Blood (TRUE METRIX BLOOD GLUCOSE TEST) In Vitro Strip 400 strip 3     Sig: Use 1 (one) new strip four daily for blood glucose monitoring       Diabetic Supplies Protocol Passed - 9/20/2024  1:50 PM        Passed - In person appointment or virtual visit in the past 12 mos or appointment in next 3 mos     Recent Outpatient Visits              2 weeks ago Diet-controlled diabetes mellitus (HCC)    [facility], Caridad Marroquin DPM    Office Visit    2 weeks ago Acute on chronic heart failure with preserved ejection fraction (HCC)    [facility], Enrique Mann MD    Office Visit    3 weeks ago Bilateral impacted cerumen    [facility], Heri Ruiz MD    Office Visit    1 month ago Primary osteoarthritis of both knees    [facility]    Office Visit    1 month ago Stage 3b chronic kidney disease (HCC)    [facility], [place] Riki Tran MD    Office Visit          Future Appointments         Provider Department Appt Notes    In 4 days [facility] PUL PHASE 2 [facility] [place] COPD    In 6 days [facility] PUL PHASE 2 [facility] [place] COPD    In 1 week [facility] PUL PHASE 2 [facility] [place] COPD    In 1 week [facility] PUL PHASE 2 [facility]  Cardiopulmonary Rehab [place] COPD    In 2 weeks [facility] PULM PHASE 2 [facility] [place] COPD    In 2 weeks [facility] PULM PHASE 2 [facility] [place] COPD    In 3 weeks [facility] PULM PHASE 2 [facility] [place] COPD    In 3 weeks [facility] PULM PHASE 2 [facility] [place] COPD    In 1 month [facility] PULM PHASE 2 [facility] [place] COPD    In 1 month [facility] PUL PHASE 2 [facility] Card[facility] Re[facility] [place] COPD    In 1 month [facility] PUL PHASE 2 [facility] Card[facility] Re[facility] [place] COPD    In 1 month [facility] PUL PHASE 2 [facility] Card[facility] Re[facility] [place] COPD    In 1 month [facility] PUL PHASE 2 [facility] Card[facility] Re[facility] [place] COPD    In 1 month [facility] PUL PHASE 2 [facility] Cardiop[facility] Re[facility] [place] COPD    In 1 month [facility]an [facility] 3 months    In 1 month [facility] PUL PHASE 2 [facility] Cardiopulmonary Rehab [place] COPD    In 1 month [facility] PUL PHASE 2 [facility] Cardiopulmonary Rehab [place] COPD    In 2 months [facility] PUL PHASE 2 [facility] Card[facility] Re[facility] [place] COPD    In 2 months [facility] PUL PHASE 2 [facility] Cardiopulmonary Rehab [place] COPD    In 2 months Heri Miller MD [facility] 3 MTHS    In 2 months [facility] PUL PHASE 2 [facility] Card[facility] Re[facility] [place] COPD    In 2 months [facility] PUL PHASE 2 [facility] [place] COPD    In 2 months [facility] PUL PHASE 2 [facility] [place] COPD    In 2 months [facility] PUL PHASE 2 [facility] Card[facility] [place] COPD     In 2 months [facility]M [facility] [place] [facility]    In 2 months [facility] PUL PHASE 2 [facility] [place] COPD    In 3 months [facility] PUL PHASE 2 [facility] [place] COPD    In 3 months [facility] PUL PHASE 2 [facility] [place] COPD    In 3 months CF PUL PHASE 2 [facility] [place] COPD    In 3 months CF PUL PHASE 2 [facility] [place] COPD    In 3 months [facility] PUL PHASE 2 [facility] [place] COPD    In 3 months Caridad Flores, [facility]     In 3 months CF PUL PHASE 2 [facility] [place] COPD    In 3 months [facility] PUL PHASE 2 [facility] [place] COPD    In 3 months [facility] PUL PHASE 2 [facility] [place] COPD    In 3 months [facility] PUL PHASE 2 [facility] [place] COPD    In 4 months [facility] PUL PHASE 2 [facility] [place] COPD                      Alcohol Swabs (DROPSAFE ALCOHOL PREP) 70 % Does not apply Pads 400 each 3     Sig: Apply 1 Pad topically 4 (four) times daily. Before each finger stick.       There is no refill protocol information for this order       Blood Glucose Calibration (TRUE METRIX LEVEL 2) Normal In Vitro Solution 1 each 3     Si drop as needed (To use monthly, with each new vial of test strips, when you change your batteries, or if you suspect the meter is malfunctioning.).       Diabetic Supplies Protocol Passed - 2024  1:50 PM        Passed - In person appointment or virtual visit in the past 12 mos or appointment in next 3 mos     Recent Outpatient Visits              2 weeks ago  Diet-controlled diabetes mellitus (HCC)    [facility], Caridad Marroquin DPM    Office Visit    2 weeks ago Acute on chronic heart failure with preserved ejection fraction ([facility])    [facility] [address], Enrique Mann MD    Office Visit    3 weeks ago Bilateral impacted cerumen    [facility] [place] Heri Miller MD    Office Visit    1 month ago Primary osteoarthritis of both knees    [facility], Enrique Mann MD    Office Visit    1 month ago Stage 3b chronic kidney disease ([facility])    [facility] [place] Riki Tran MD    Office Visit          Future Appointments         Provider Department Appt Notes    In 4 days [facility] PUL PHASE 2 [facility] [place] COPD    In 6 days [facility] PUL PHASE 2 [facility] [place] COPD    In 1 week [facility] PUL PHASE 2 [facility] [place] COPD    In 1 week [facility] PUL PHASE 2 [facility] Re[facility] [place] COPD    In 2 weeks [facility] PUL PHASE 2 [facility] Card[facility] Re[facility] [place] COPD    In 2 weeks [facility] PULM PHASE 2 [facility] Card[facility] Re[facility] [place] COPD    In 3 weeks [facility] PULM PHASE 2 [facility] Re[facility] [place] COPD    In 3 weeks [facility] PUL PHASE 2 [facility] Re[facility] [place] COPD    In 1 month [facility] PULM PHASE 2 [facility] Card[facility] Re[facility] [place] COPD    In 1 month CF PULM PHASE 2 [facility] Re[facility] [place] COPD    In 1 month CF PULM PHASE 2 [facility] Card[facility] Re[facility] [place] COPD    In 1 month [facility] PUL PHASE 2 [facility] Cardiopulmonary Re[facility] [place]  COPD    In 1 month [facility] PUL PHASE 2 [facility] [place] COPD    In 1 month [facility] PUL PHASE 2 [facility] [place] COPD    In 1 month Riki Tran MD [facility] 3 months    In 1 month CF PUL PHASE [facility] [place] COPD    In 1 month [facility] PUL PHASE 2 [facility] [place] COPD    In 2 months [facility] PUL PHASE 2 [facility] [place] COPD    In 2 months [facility] PUL PHASE 2 [facility] [place] COPD    In 2 months Heri Miller MD [facility] 3 MTHS    In 2 months [facility] PUL PHASE 2 [facility] [place] COPD    In 2 months [facility] PUL PHASE 2 [facility] [place] COPD    In 2 months [facility] PUL PHASE 2 [facility] [place] COPD    In 2 months [facility] PUL PHASE 2 [facility] [place] COPD    In 2 months [facility] PUL PHASE 2 [facility] [place] COPD    In 2 months [facility] PUL PHASE 2 [facility] [place] COPD    In 3 months [facility] PUL PHASE 2 [facility] [place] COPD    In 3 months [facility] PUL PHASE 2 [facility] [place] COPD    In 3 months [facility] PUL PHASE 2 [facility] Card[facility] [place] COPD    In 3 months [facility] PUL PHASE 2 [facility] Card[facility] [place] COPD    In 3 months [facility] PUL PHASE 2 [facility] Card[facility] [place] COPD    In 3 months Caridad Flores DPM [facility]     In 3 months [facility] PUL  PHASE 2 [facility] Card[facility] Re[facility] [place] COPD    In 3 months CF PULM PHASE 2 [facility] Card[facility] [place] COPD    In 3 months CF PULM PHASE 2 [facility] [place] COPD    In 3 months CF PULM PHASE 2 [facility] Card[facility] [place] COPD    In 4 months CF PUL PHASE 2 [facility] Card[facility] [place] COPD                      TRUEplus Lancets 33G Does not apply Misc 400 each 3     Si Lancet by Finger stick route 4 (four) times daily.       Diabetic Supplies Protocol Passed - 2024  1:50 PM        Passed - In person appointment or virtual visit in the past 12 mos or appointment in next 3 mos     Recent Outpatient Visits              2 weeks ago Diet-controlled diabetes mellitus (HCC)    [facility] [place] Caridad Flores DPM    Office Visit    2 weeks ago Acute on chronic heart failure with preserved ejection fraction (HCC)    [facility]    [facility]    3 weeks ago Bilateral impacted cerumen    [facility] [place]    Office Visit    1 month ago Primary osteoarthritis of both knees    [facility] Enrique Braun MD    Office Visit    1 month ago Stage 3b chronic kidney disease (HCC)    [facility] [place] Riki Tran MD    Office Visit          Future Appointments         Provider Department Appt Notes    In 4 days [facility] PUL PHASE 2 [facility] Card[facility] Re[facility] [place] COPD    In 6 days [facility] PUL PHASE 2 [facility] Card[facility] [place] COPD    In 1 week [facility] PUL PHASE 2 [facility] Card[facility] Re[facility] [place] COPD    In 1 week [facility] PUL PHASE 2 [place]  [facility] Card[facility] Re[facility] [place] COPD    In 2 weeks [facility] PULM PHASE 2 [facility] Card[facility] [place] COPD    In 2 weeks [facility] PUL PHASE 2 [facility] Card[facility] [place] COPD    In 3 weeks [facility] PULM PHASE 2 [facility] Card[facility] [place] COPD    In 3 weeks [facility] PULM PHASE 2 [facility] Card[facility] [place] COPD    In 1 month [facility] PULM PHASE 2 [facility] Card[facility] [place] COPD    In 1 month [facility] PUL PHASE 2 [facility] Card[facility] [place] COPD    In 1 month [facility] PUL PHASE 2 [facility] Card[facility] [place] COPD    In 1 month [facility] PUL PHASE 2 [facility] Card[facility] Re[facility] [place] COPD    In 1 month [facility] PUL PHASE 2 [facility] Card[facility] Re[facility] [place] COPD    In 1 month [facility] PUL PHASE 2 [facility] [place] [facility] Re[facility] [place] COPD    In 1 month [facility]    In 1 month [facility] PUL PHASE 2 [facility] [place] [place] Card[facility] Rehab [place] COPD    In 1 month [facility] PUL PHASE 2 [facility] [place] [place] Card[facility] Rehab [place] COPD    In 2 months [facility] PUL PHASE 2 [facility] [place] [place] [facility] Rehab [place] COPD    In 2 months [facility] PUL PHASE 2 [facility] [place] [place] Card[facility] Rehab [place] COPD    In 2 months Heri Miller MD [facility] 3 MTHS    In 2 months [facility] PUL PHASE 2 [facility] Card[facility] Re[facility] [place] COPD    In 2 months [facility] PUL PHASE 2 [facility] Card[facility] [place] COPD    In 2 months [facility] PUL PHASE 2 [facility] Card[facility] [place] COPD    In 2 months [facility] PUL PHASE 2 [facility] [place] [place] Cardiopulmonary Rehab  [place] COPD    In 2 months [facility] PULM PHASE 2 [facility] Cardiopulmonary Rehab [place] COPD    In 2 months [facility] PULM PHASE 2 [facility] Cardiopulmonary Rehab [place] COPD    In 3 months [facility] PULM PHASE 2 [facility] Cardiop[facility] Rehab [place] COPD    In 3 months [facility] PULM PHASE 2 [facility] Cardiop[facility] Rehab [place] COPD    In 3 months [facility] PULM PHASE 2 [facility] Cardiopulmonary Rehab [place] COPD    In 3 months [facility] PULM PHASE 2 [facility] Cardiopulmonary Rehab [place] COPD    In 3 months [facility] PULM PHASE 2 [facility] Cardiopulmonary Rehab [place] COPD    In 3 months Caridad Flores, [facility]     In 3 months [facility] PULM PHASE 2 [facility] Cardiopulmonary Rehab [place] COPD    In 3 months [facility] PULM PHASE 2 [facility] Cardiopulmonary Rehab [place] COPD    In 3 months [facility] PULM PHASE 2 [facility] Cardiopulmonary Rehab [place] COPD    In 3 months [facility] PUL PHASE 2 [facility] Cardiopulmonary Rehab [place] COPD    In 4 months [facility] PUL PHASE 2 [facility] Cardiopulmonary Rehab [place] COPD                         Future Appointments         Provider Department Appt Notes    In 4 days [facility] PULM PHASE 2 [facility] Cardiopulmonary Rehab [place] COPD    In 6 days [facility] PULM PHASE 2 [facility] Cardiopulmonary Rehab [place] COPD    In 1 week [facility] PULM PHASE 2 [facility] Cardiopulmonary Rehab [place] COPD    In 1 week [facility] PULM PHASE 2 [facility] Cardiopulmonary Rehab [place] COPD    In 2 weeks [facility] PULM PHASE 2 [facility] Cardiopulmonary Rehab [place] COPD    In 2 weeks [facility] PULM PHASE 2 [facility] Cardiopulmonary Rehab [place] COPD    In 3 weeks [facility] PUL PHASE 2  [facility] [place] COPD    In 3 weeks [facility] PULM PHASE 2 [facility] [place] COPD    In 1 month [facility] PULM PHASE 2 [facility] [place] COPD    In 1 month [facility] PULM PHASE 2 [facility] [place] COPD    In 1 month [facility] PULM PHASE 2 [facility] [place] COPD    In 1 month [facility] PULM PHASE 2 [facility] [place] COPD    In 1 month [facility] PULM PHASE 2 [facility] Card[facility] Re[facility] [place] COPD    In 1 month [facility] PUL PHASE 2 [facility] Card[facility] [place] COPD    In 1 month Riki Tran MD [facility] 3 months    In 1 month [facility] PUL PHASE 2 [facility] [place] COPD    In 1 month [facility] PUL PHASE 2 [facility] Card[facility] [place] COPD    In 2 months [facility] [place] [facility]    In 2 months [facility] PUL PHASE 2 [facility] Cardiopulmonary Rehab [place] COPD    In 2 months Heri Miller MD [facility] 3 MTHS    In 2 months [facility] PUL PHASE 2 [facility] [place] COPD    In 2 months [facility] PULM PHASE 2 [facility] [place] COPD    In 2 months [facility] PUL PHASE 2 [facility] Card[facility] [place] COPD    In 2 months [facility] PUL PHASE 2 [facility] Card[facility] [place] COPD    In 2 months [facility] PUL PHASE 2 [facility] [place] COPD    In 2 months [facility] PUL PHASE 2 [facility] [place] COPD    In 3 months [facility] PUL PHASE 2 [facility]  Cardiopulmonary Rehab [place] COPD    In 3 months [facility] PULM PHASE 2 [facility] Card[facility] Re[facility] [place] COPD    In 3 months CF PUL PHASE 2 [facility] Card[facility] Re[facility] [place] COPD    In 3 months CF PUL PHASE 2 [facility] Card[facility] Re[facility] [place] COPD    In 3 months CF PUL PHASE 2 [facility] Card[facility] Re[facility] [place] COPD    In 3 months Caridad Flores DPM [facility]     In 3 months CF PULM PHASE 2 [facility] Cardiopulmonary Rehab [place] COPD    In 3 months CF PUL PHASE 2 [facility] Card[facility]ulmonary Rehab [place] COPD    In 3 months CF PUL PHASE 2 [facility] Card[facility] Re[facility] [place] COPD    In 3 months CF PUL PHASE 2 [facility] Card[facility] Re[facility] [place] COPD    In 4 months [facility] PUL PHASE 2 [facility] Card[facility] Rehab [place] COPD          Recent Outpatient Visits              2 weeks ago Diet-controlled diabetes mellitus (HCC)    [facility] Caridad Flores DPM    Office Visit    2 weeks ago Acute on chronic heart failure with preserved ejection fraction ([facility])    [facility] Enrique Braun MD    Office Visit    3 weeks ago Bilateral impacted cerumen    [facility]    Office Visit    1 month ago Primary osteoarthritis of both knees    [facility] Enrique Braun MD    Office Visit    1 month ago Stage 3b chronic kidney disease (HCC)    [facility]    Office Visit

## 2024-09-23 ENCOUNTER — APPOINTMENT (OUTPATIENT)
Dept: CARDIAC REHAB | Facility: HOSPITAL | Age: 79
End: 2024-09-23
Attending: INTERNAL MEDICINE
Payer: MEDICARE

## 2024-09-24 ENCOUNTER — APPOINTMENT (OUTPATIENT)
Dept: CARDIAC REHAB | Facility: HOSPITAL | Age: 79
End: 2024-09-24
Attending: INTERNAL MEDICINE
Payer: MEDICARE

## 2024-09-24 ENCOUNTER — PATIENT OUTREACH (OUTPATIENT)
Dept: CASE MANAGEMENT | Age: 79
End: 2024-09-24

## 2024-09-24 DIAGNOSIS — N18.30 CONTROLLED TYPE 2 DIABETES MELLITUS WITH STAGE 3 CHRONIC KIDNEY DISEASE, WITHOUT LONG-TERM CURRENT USE OF INSULIN (HCC): Primary | ICD-10-CM

## 2024-09-24 DIAGNOSIS — I15.2 HYPERTENSION ASSOCIATED WITH TYPE 2 DIABETES MELLITUS (HCC): ICD-10-CM

## 2024-09-24 DIAGNOSIS — H40.9 GLAUCOMA, UNSPECIFIED GLAUCOMA TYPE, UNSPECIFIED LATERALITY: ICD-10-CM

## 2024-09-24 DIAGNOSIS — E11.22 CONTROLLED TYPE 2 DIABETES MELLITUS WITH STAGE 3 CHRONIC KIDNEY DISEASE, WITHOUT LONG-TERM CURRENT USE OF INSULIN (HCC): Primary | ICD-10-CM

## 2024-09-24 DIAGNOSIS — N18.32 STAGE 3B CHRONIC KIDNEY DISEASE (HCC): ICD-10-CM

## 2024-09-24 DIAGNOSIS — E11.69 HYPERLIPIDEMIA ASSOCIATED WITH TYPE 2 DIABETES MELLITUS (HCC): ICD-10-CM

## 2024-09-24 DIAGNOSIS — E78.5 HYPERLIPIDEMIA ASSOCIATED WITH TYPE 2 DIABETES MELLITUS (HCC): ICD-10-CM

## 2024-09-24 DIAGNOSIS — J44.1 COPD EXACERBATION (HCC): ICD-10-CM

## 2024-09-24 DIAGNOSIS — I73.9 PAD (PERIPHERAL ARTERY DISEASE) (HCC): ICD-10-CM

## 2024-09-24 DIAGNOSIS — E55.9 VITAMIN D DEFICIENCY: ICD-10-CM

## 2024-09-24 DIAGNOSIS — E11.59 HYPERTENSION ASSOCIATED WITH TYPE 2 DIABETES MELLITUS (HCC): ICD-10-CM

## 2024-09-24 DIAGNOSIS — J44.9 COPD, SEVERE (HCC): ICD-10-CM

## 2024-09-24 NOTE — PROGRESS NOTES
Spoke to Ariana for Chronic Care Management.      Updates to patient care team/comments: UTD  Patient reported changes in medications: UTD  Med Adherence  Comment: pt taking medications as prescribed     Health Maintenance:   Health Maintenance   Topic Date Due    Zoster Vaccines (1 of 2) Never done    COVID-19 Vaccine (4 - 2023-24 season) 09/01/2024    Diabetes Care Dilated Eye Exam  11/30/2024 (Originally 10/13/2021)    Diabetes Care Foot Exam  02/19/2025 (Originally 7/23/2020)    Influenza Vaccine (1) 10/01/2024    Diabetes Care A1C  02/24/2025    Diabetes Care: Microalb/Creat Ratio  05/17/2025    Diabetes Care: GFR  09/03/2025    DEXA Scan  Completed    MA Annual Health Assessment  Completed    Annual Depression Screening  Completed    Fall Risk Screening (Annual)  Completed    Pneumococcal Vaccine: 65+ Years  Completed    Mammogram  Discontinued    Colonoscopy  Discontinued       Patient updates/concerns:    Spoke to pt for monthly outreac   Pt requested [facility] assistance in forwarding referral for cardiology to the office. Pt states that she received a bill for a visit because she has an hmo and states that the letter indicated that proper referral was not submitted. [facility] confirmed that pt has referral place in February and the referral is valid for one year.  [facility] faxed referral to [facility] in [place].   Pt is attending [place] pulmonary rehab two times weekly and has visits scheduled through January. Pt fealt almost immediate improvement when starting this program. She is getting a variety of exercise on several different types of machines. Pt has oxygen provided for her at the rehab and they are taking her o2 and blood pressure regularly. Pt states both have been within normal range and stable when taking.    Pt is no longer dealing with any shortness of breath. She is educated on tracking her swelling and she is in the routine of weighing herself every daty. She understands that she is to call the  nurse if overnight she has gained 2-3 lbs over night. Pt states she has no trouble complying with 1.5 liter fluid restriction.    Pt can still drive herself. She limits trips if conditions are not safe and has had to cancel appointments when the it is raining.    Pt discussed her gout management through diet. She avoids red meat unless its a small portion. She will have chicken pork and fish. She is educated on what to avoid and has not had any flares in some time. Pt has visit with gout doc this week and dr hearn and routinely visits every 6 months.   Pt also avoids salt and sugar as much as possible.    Pt is checking her feet daily. She tests her sugars every morning and consistent between 105-120.   Pt tracks bp and records it every day typically around 125/65-75  Goals/Action Plan:    Active goal from previous outreach: managing healthcare    Patient reported progress towards goals: pt continues to see providers and specialists as needed and takes meds as prescribed               - What: breathing issues           - Where/When/How: pt uses oxygen and has started pulmonary rehab. Pt states she feels sustained improvement in her breathing. Pt will continue 2 times  a week until end of January at least  Patient Reported Barriers and Concerns: n/a                   - Plan for overcoming barriers: none    Care Managers Interventions: fax referral to [place] [facility]    Future Appointments:   Future Appointments   Date Time Provider Department Center   10/1/2024  1:15 PM [facility] PULM PHASE 2 [facility] CP REHAB EM [facility]   10/3/2024  1:15 PM [facility] PULM PHASE 2 [facility] CP REHAB EM [facility]   10/8/2024  1:15 PM [facility] PULM PHASE 2 [facility] CP REHAB EM [facility]   10/10/2024  1:15 PM [facility] PULM PHASE 2 [facility] CP REHAB [facility]   10/15/2024  1:15 PM [facility] PULM PHASE 2 [facility] CP REHAB EM [facility]   10/17/2024  1:15 PM [facility] PULM PHASE 2 [facility] CP REHAB EM [facility]   10/22/2024  1:15 PM [facility] PULM PHASE 2 [facility] CP REHAB EM [facility]   10/24/2024  1:15 PM [facility] PULM PHASE 2 [facility] CP REHAB EM [facility]    10/29/2024  1:15 PM [facility] PULM PHASE 2 [facility]   10/31/2024  1:15 PM [facility] PULM PHASE 2 [facility]   11/5/2024  1:15 PM [facility] PULM PHASE 2 [facility]   11/7/2024  1:15 PM [facility] PULM PHASE 2 [facility]   11/11/2024  3:00 PM Riki Tran MD [id number] [facility]   11/12/2024  1:15 PM [facility] PULM PHASE 2 [facility]   11/14/2024  1:15 PM [facility] PULM PHASE 2 [facility]   11/19/2024  1:15 PM [facility] PULM PHASE 2 [facility]   11/21/2024  1:15 PM [facility] PULM PHASE 2 [facility]   11/22/2024  2:00 PM Heri Miller MD ECCFHENT EC [facility]   11/26/2024  1:15 PM [facility] PULM PHASE 2 [facility]AB [facility]   12/3/2024  1:15 PM [facility] PULM PHASE 2 [facility]   12/5/2024  1:15 PM [facility] PULM PHASE 2 [facility]   12/10/2024  1:15 PM [facility] PULM PHASE 2 [facility]   12/12/2024  1:15 PM [facility] PULM PHASE 2 [facility]   12/17/2024  1:15 PM [facility] PULM PHASE 2 [facility]   12/19/2024  1:15 PM [facility] PULM PHASE 2 [facility]   12/24/2024  1:15 PM [facility] PULM PHASE 2 [facility]   12/26/2024  1:15 PM [facility] PULM PHASE 2 [facility]   12/31/2024  1:15 PM [facility] PULM PHASE 2 [facility]   1/2/2025  1:15 PM [facility] PULM PHASE 2 [facility]   1/6/2025  1:00 PM Caridad Flores, KAILA ECADOPOD EC ADO   1/7/2025  1:15 PM [facility] PULM PHASE 2 [facility] CP REHAB EM [facility]   1/9/2025  1:15 PM [facility] PULM PHASE 2 [facility] CP REHAB EM [facility]   1/14/2025  1:15 PM [facility] PULM PHASE 2 [facility] CP REHAB EM [facility]   1/16/2025  1:15 PM [facility] PULM PHASE 2 [facility] CP REHAB EM [facility]   1/21/2025  1:15 PM [facility] PULM PHASE 2 [facility] CP REHAB EM [facility]         Next Care Manager Follow Up Date: one month    Reason For Follow Up: review progress and or barriers towards patient's goals.     Time Spent This Encounter Total: 39 min medical record review, telephone communication, care plan updates where needed, education, goals, and action plan recreation/update. Provided acknowledgment and validation to  patient's concerns.   Monthly Minute Total including today:39  Physical assessment, complete health history, and need for CCM established by Enrique Braun MD.

## 2024-09-25 ENCOUNTER — APPOINTMENT (OUTPATIENT)
Dept: CARDIAC REHAB | Facility: HOSPITAL | Age: 79
End: 2024-09-25
Attending: INTERNAL MEDICINE
Payer: MEDICARE

## 2024-09-26 ENCOUNTER — APPOINTMENT (OUTPATIENT)
Dept: CARDIAC REHAB | Facility: HOSPITAL | Age: 79
End: 2024-09-26
Attending: INTERNAL MEDICINE
Payer: MEDICARE

## 2024-09-30 ENCOUNTER — APPOINTMENT (OUTPATIENT)
Dept: CARDIAC REHAB | Facility: HOSPITAL | Age: 79
End: 2024-09-30
Attending: INTERNAL MEDICINE
Payer: MEDICARE

## 2024-10-01 ENCOUNTER — APPOINTMENT (OUTPATIENT)
Dept: CARDIAC REHAB | Facility: HOSPITAL | Age: 79
End: 2024-10-01
Attending: INTERNAL MEDICINE
Payer: MEDICARE

## 2024-10-01 PROCEDURE — 94625 PHY/QHP OP PULM RHB W/O MNTR: CPT

## 2024-10-02 ENCOUNTER — APPOINTMENT (OUTPATIENT)
Dept: CARDIAC REHAB | Facility: HOSPITAL | Age: 79
End: 2024-10-02
Attending: INTERNAL MEDICINE
Payer: MEDICARE

## 2024-10-03 ENCOUNTER — APPOINTMENT (OUTPATIENT)
Dept: CARDIAC REHAB | Facility: HOSPITAL | Age: 79
End: 2024-10-03
Attending: INTERNAL MEDICINE
Payer: MEDICARE

## 2024-10-03 PROCEDURE — 94625 PHY/QHP OP PULM RHB W/O MNTR: CPT

## 2024-10-07 ENCOUNTER — APPOINTMENT (OUTPATIENT)
Dept: CARDIAC REHAB | Facility: HOSPITAL | Age: 79
End: 2024-10-07
Attending: INTERNAL MEDICINE
Payer: MEDICARE

## 2024-10-08 ENCOUNTER — APPOINTMENT (OUTPATIENT)
Dept: CARDIAC REHAB | Facility: HOSPITAL | Age: 79
End: 2024-10-08
Attending: INTERNAL MEDICINE
Payer: MEDICARE

## 2024-10-08 PROCEDURE — 94625 PHY/QHP OP PULM RHB W/O MNTR: CPT

## 2024-10-09 ENCOUNTER — APPOINTMENT (OUTPATIENT)
Dept: CARDIAC REHAB | Facility: HOSPITAL | Age: 79
End: 2024-10-09
Attending: INTERNAL MEDICINE
Payer: MEDICARE

## 2024-10-10 ENCOUNTER — APPOINTMENT (OUTPATIENT)
Dept: CARDIAC REHAB | Facility: HOSPITAL | Age: 79
End: 2024-10-10
Attending: INTERNAL MEDICINE
Payer: MEDICARE

## 2024-10-10 PROCEDURE — 94625 PHY/QHP OP PULM RHB W/O MNTR: CPT

## 2024-10-14 ENCOUNTER — APPOINTMENT (OUTPATIENT)
Dept: CARDIAC REHAB | Facility: HOSPITAL | Age: 79
End: 2024-10-14
Attending: INTERNAL MEDICINE
Payer: MEDICARE

## 2024-10-15 ENCOUNTER — APPOINTMENT (OUTPATIENT)
Dept: CARDIAC REHAB | Facility: HOSPITAL | Age: 79
End: 2024-10-15
Attending: INTERNAL MEDICINE
Payer: MEDICARE

## 2024-10-15 PROCEDURE — 94625 PHY/QHP OP PULM RHB W/O MNTR: CPT

## 2024-10-16 ENCOUNTER — APPOINTMENT (OUTPATIENT)
Dept: CARDIAC REHAB | Facility: HOSPITAL | Age: 79
End: 2024-10-16
Attending: INTERNAL MEDICINE
Payer: MEDICARE

## 2024-10-17 ENCOUNTER — CARDPULM VISIT (OUTPATIENT)
Dept: CARDIAC REHAB | Facility: HOSPITAL | Age: 79
End: 2024-10-17
Attending: INTERNAL MEDICINE
Payer: MEDICARE

## 2024-10-17 PROCEDURE — 94625 PHY/QHP OP PULM RHB W/O MNTR: CPT

## 2024-10-21 ENCOUNTER — APPOINTMENT (OUTPATIENT)
Dept: CARDIAC REHAB | Facility: HOSPITAL | Age: 79
End: 2024-10-21
Attending: INTERNAL MEDICINE
Payer: MEDICARE

## 2024-10-22 ENCOUNTER — APPOINTMENT (OUTPATIENT)
Dept: CARDIAC REHAB | Facility: HOSPITAL | Age: 79
End: 2024-10-22
Attending: INTERNAL MEDICINE
Payer: MEDICARE

## 2024-10-22 PROCEDURE — 94625 PHY/QHP OP PULM RHB W/O MNTR: CPT

## 2024-10-23 ENCOUNTER — APPOINTMENT (OUTPATIENT)
Dept: CARDIAC REHAB | Facility: HOSPITAL | Age: 79
End: 2024-10-23
Attending: INTERNAL MEDICINE
Payer: MEDICARE

## 2024-10-24 ENCOUNTER — CARDPULM VISIT (OUTPATIENT)
Dept: CARDIAC REHAB | Facility: HOSPITAL | Age: 79
End: 2024-10-24
Attending: INTERNAL MEDICINE
Payer: MEDICARE

## 2024-10-24 PROCEDURE — 94625 PHY/QHP OP PULM RHB W/O MNTR: CPT

## 2024-10-25 ENCOUNTER — PATIENT OUTREACH (OUTPATIENT)
Dept: CASE MANAGEMENT | Age: 79
End: 2024-10-25

## 2024-10-25 DIAGNOSIS — J44.9 COPD, SEVERE (HCC): ICD-10-CM

## 2024-10-25 DIAGNOSIS — E78.5 HYPERLIPIDEMIA ASSOCIATED WITH TYPE 2 DIABETES MELLITUS (HCC): ICD-10-CM

## 2024-10-25 DIAGNOSIS — J44.1 COPD EXACERBATION (HCC): ICD-10-CM

## 2024-10-25 DIAGNOSIS — I15.2 HYPERTENSION ASSOCIATED WITH TYPE 2 DIABETES MELLITUS (HCC): ICD-10-CM

## 2024-10-25 DIAGNOSIS — I73.9 PAD (PERIPHERAL ARTERY DISEASE) (HCC): ICD-10-CM

## 2024-10-25 DIAGNOSIS — N18.32 STAGE 3B CHRONIC KIDNEY DISEASE (HCC): ICD-10-CM

## 2024-10-25 DIAGNOSIS — E11.69 HYPERLIPIDEMIA ASSOCIATED WITH TYPE 2 DIABETES MELLITUS (HCC): ICD-10-CM

## 2024-10-25 DIAGNOSIS — E55.9 VITAMIN D DEFICIENCY: ICD-10-CM

## 2024-10-25 DIAGNOSIS — E11.22 CONTROLLED TYPE 2 DIABETES MELLITUS WITH STAGE 3 CHRONIC KIDNEY DISEASE, WITHOUT LONG-TERM CURRENT USE OF INSULIN (HCC): Primary | ICD-10-CM

## 2024-10-25 DIAGNOSIS — I50.30 DIASTOLIC HEART FAILURE, UNSPECIFIED HF CHRONICITY (HCC): ICD-10-CM

## 2024-10-25 DIAGNOSIS — H40.9 GLAUCOMA, UNSPECIFIED GLAUCOMA TYPE, UNSPECIFIED LATERALITY: ICD-10-CM

## 2024-10-25 DIAGNOSIS — N18.30 CONTROLLED TYPE 2 DIABETES MELLITUS WITH STAGE 3 CHRONIC KIDNEY DISEASE, WITHOUT LONG-TERM CURRENT USE OF INSULIN (HCC): Primary | ICD-10-CM

## 2024-10-25 DIAGNOSIS — E11.59 HYPERTENSION ASSOCIATED WITH TYPE 2 DIABETES MELLITUS (HCC): ICD-10-CM

## 2024-10-25 DIAGNOSIS — Z85.3 HISTORY OF BREAST CANCER: ICD-10-CM

## 2024-10-25 RX ORDER — EMPAGLIFLOZIN 10 MG/1
TABLET, FILM COATED ORAL
COMMUNITY
Start: 2024-10-07

## 2024-10-25 NOTE — PROGRESS NOTES
Spoke to Ariana for Chronic Care Management.      Updates to patient care team/comments: UTD  Patient reported changes in medications: UTD  Med Adherence  Comment: pt taking medications as prescribed     Health Maintenance:   Health Maintenance   Topic Date Due    Zoster Vaccines (1 of 2) Never done    COVID-19 Vaccine (4 - 2023-24 season) 09/01/2024    Influenza Vaccine (1) 10/01/2024    Diabetes Care Dilated Eye Exam  11/30/2024 (Originally 10/13/2021)    Diabetes Care Foot Exam  02/19/2025 (Originally 7/23/2020)    Diabetes Care A1C  02/24/2025    Diabetes Care: Microalb/Creat Ratio  05/17/2025    Diabetes Care: GFR  09/03/2025    DEXA Scan  Completed    MA Annual Health Assessment  Completed    Annual Depression Screening  Completed    Fall Risk Screening (Annual)  Completed    Pneumococcal Vaccine: 65+ Years  Completed    Mammogram  Discontinued    Colonoscopy  Discontinued       Patient updates/concerns:    Spoke to pt for monthly outreach   Pt still has caregiver assistance three times a week for four hours a day. She assists patient with cooking breakfast and some help around the house.  She can provide transportation in a limited proximity and pt usually relies on her with trips to the grocery store.    Pt continues with pulmonary rehab. She feels that she is improving her endurance and tolerating more strenuous activity but has noticed that she is using her oxygen more often at times that she did not used to like during the day when at home. She checks her o2 frequently and has noticed. Pt was going to call cardiology and discuss this with a nurse. Pt did not require ccm assistance in coordinating call.    Pt started to take jardiance and soon noticed pain in legs at night. Pt plans to discontinue and discuss with nurses.    Pt is checking her legs for swelling and says that although she still has swelling it is very minimal.   Pt checks her sugars and levels have been within normal limits. Today they  were 111.   Pt checks her bp at home and it is within normal limits.    Pt does not plan on getting new Covid   vaccine.    Pt uses walker when out, but can manage without while at home to maintain leg strength.     Goals/Action Plan:    Active goal from previous outreach: managing healthcare    Patient reported progress towards goals: pt continues to take meds as prescribed and sees providers as needed.                - What: breathing issues           - Where/When/How: pt continues with pulmonary rehab until January. She feels that her endurance has improved but she has noticed having to use oxygen more during the day  Patient Reported Barriers and Concerns: n/a                   - Plan for overcoming barriers: none    Care Managers Interventions: continue to provide encouragement and education for healthy coping and dx    Future Appointments:   Future Appointments   Date Time Provider Department Center   10/29/2024  1:15 PM [facility] PULM PHASE 2 [facility] CP REHAB EM [facility]   10/31/2024  1:15 PM [facility] PULM PHASE 2 [facility] CP REHAB [facility]   11/5/2024  1:15 PM [facility] PULM PHASE 2 [facility] CP REHAB [facility]   11/7/2024  1:15 PM [facility] PULM PHASE 2 [facility] CP REHAB [facility]   11/11/2024  3:00 PM Riki Tran MD [id number] [facility]   11/12/2024  1:15 PM [facility] PULM PHASE 2 [facility] CP REHAB EM [facility]   11/14/2024  1:15 PM [facility] PULM PHASE 2 [facility] CP REHAB [facility]   11/19/2024  1:15 PM [facility] PULM PHASE 2 [facility] CP REHAB [facility]   11/21/2024  1:15 PM [facility] PULM PHASE 2 [facility] CP REHAB [facility]   11/22/2024  2:00 PM Heri Miller MD ECCENT [facility]   11/26/2024  1:15 PM [facility] PULM PHASE 2 [facility] CP REHAB EM [facility]   12/3/2024  1:15 PM [facility] PULM PHASE 2 [facility] CP REHAB EM [facility]   12/5/2024  1:15 PM [facility] PULM PHASE 2 [facility] CP REHAB [facility]   12/10/2024  1:15 PM [facility] PULM PHASE 2 [facility] CP REHAB [facility]   12/12/2024  1:15 PM [facility] PULM PHASE 2 [facility]   12/17/2024  1:15 PM [facility] PULM PHASE 2 [facility]   12/19/2024  1:15 PM [facility] PULM PHASE 2 [facility]   12/24/2024  1:15 PM [facility]  PULM PHASE 2 [facility]   12/26/2024  1:15 PM [facility] PULM PHASE 2 [facility]   12/31/2024  1:15 PM [facility] PULM PHASE 2 [facility]   1/2/2025  1:15 PM [facility] PULM PHASE 2 [facility]   1/6/2025  1:00 PM Caridad Flores, DPM ECADOPOD [facility] ADO   1/7/2025  1:15 PM [facility] PULM PHASE 2 [facility]   1/9/2025  1:15 PM [facility] PULM PHASE 2 [facility]   1/14/2025  1:15 PM [facility] PULM PHASE 2 [facility]   1/16/2025  1:15 PM [facility] PULM PHASE 2 [facility]   1/21/2025  1:15 PM [facility] PULM PHASE 2 [facility]         Next Care Manager Follow Up Date: one month    Reason For Follow Up: review progress and or barriers towards patient's goals.     Time Spent This Encounter Total: 20 min medical record review, telephone communication, care plan updates where needed, education, goals, and action plan recreation/update. Provided acknowledgment and validation to patient's concerns.   Monthly Minute Total including today: 20  Physical assessment, complete health history, and need for CCM established by Enrique Braun MD.    Friendly reminder - 2025 Benefits Open Enrollment is here!   We encourage you to review your current Medicare coverage and explore your options during this period. We have developed a Medicare Information Page on our website to serve as a resource for guidance and answers to any questions you might have.   You can access it by visiting, [email or web address]

## 2024-10-28 ENCOUNTER — APPOINTMENT (OUTPATIENT)
Dept: CARDIAC REHAB | Facility: HOSPITAL | Age: 79
End: 2024-10-28
Attending: INTERNAL MEDICINE
Payer: MEDICARE

## 2024-10-29 ENCOUNTER — PATIENT OUTREACH (OUTPATIENT)
Dept: CASE MANAGEMENT | Age: 79
End: 2024-10-29

## 2024-10-29 ENCOUNTER — APPOINTMENT (OUTPATIENT)
Dept: CARDIAC REHAB | Facility: HOSPITAL | Age: 79
End: 2024-10-29
Attending: INTERNAL MEDICINE
Payer: MEDICARE

## 2024-10-29 NOTE — PROGRESS NOTES
DIEGO Graduation Assessment    General:  Assessment completed with: Patient    Care Plan/Instructions:   Care Plan Summary (Recap of navigation period including # of ED & Hospital Admission, and if goals met or unmet): Pt saw [facility] appt 9/25/24 and 10/07/24--was given Jardiance at 10/07/24, but has since stopped it, as it caused muscle cramps--she will call [facility] for further recommendations--declines specialist call assist.  Patient Graduation Instructions (Ongoing barriers to care identified, Areas of Need, Areas of Progress): Pt continues to improve, since hospital discharge--chronic O2 2L NC--95% O2 sat during call. Pt weighing daily--171 lb today, FBS today 111--following low sodium, heart healthy diet with fluid restrictions--speaking in full, clear sentences. Pt denies fever, chills, headache, vision changes, dizziness, nausea, vomiting, diarrhea, abdominal distension, LE swelling, chest pain or shortness of breath at this time,ambulating with walker at home.  Patient aware when to contact PCP/specialists and when to seek emergency care. No further questions/concerns at this time.       Future Appointments   Date Time Provider Department Center   10/31/2024  1:15 PM [facility] PULM PHASE 2 [facility] CP REHAB EM [facility]   11/5/2024  1:15 PM [facility] PULM PHASE 2 [facility] CP REHAB EM [facility]   11/7/2024  1:15 PM [facility] PULM PHASE 2 [facility] CP REHAB EM [facility]   11/11/2024  3:00 PM Riki Tran MD [id number] [facility]   11/12/2024  1:15 PM [facility] PULM PHASE 2 [facility] CP REHAB EM [facility]   11/14/2024  1:15 PM [facility] PULM PHASE 2 [facility] CP REHAB EM [facility]   11/19/2024  1:15 PM [facility] PULM PHASE 2 [facility] CP REHAB EM [facility]   11/21/2024  1:15 PM [facility] PULM PHASE 2 [facility] CP REHAB EM [facility]   11/22/2024  2:00 PM [facility]   11/26/2024  1:15 PM [facility] PULM PHASE 2 [facility] CP REHAB EM [facility]   12/3/2024  1:15 PM [facility] PULM PHASE 2 [facility]   12/5/2024  1:15 PM [facility] PULM PHASE 2 [facility]   12/10/2024  1:15 PM [facility] PULM PHASE 2 [facility]   12/12/2024   1:15 PM [facility] PULM PHASE 2 [facility]   12/17/2024  1:15 PM [facility] PULM PHASE 2 [facility]   12/19/2024  1:15 PM [facility] PULM PHASE 2 [facility]   12/24/2024  1:15 PM [facility] PULM PHASE 2 [facility]   12/26/2024  1:15 PM [facility] PULM PHASE 2 [facility]   12/31/2024  1:15 PM [facility] PULM PHASE 2 [facility]   1/2/2025  1:15 PM [facility] PULM PHASE 2 [facility]   1/6/2025  1:00 PM Caridad Flroes, DPM ECADOPOD [facility] ADO   1/7/2025  1:15 PM [facility] PULM PHASE 2 [facility]   1/9/2025  1:15 PM [facility] PULM PHASE 2 [facility]   1/14/2025  1:15 PM [facility] PULM PHASE 2 [facility]   1/16/2025  1:15 PM [facility] PULM PHASE 2 [facility]   1/21/2025  1:15 PM [facility] PULM PHASE 2 [facility] CP RE[facility]AB [facility]

## 2024-10-29 NOTE — PROGRESS NOTES
Left message on mailbox for patient to call nurse care manager back for transitions of care call.  Nurse care  information [phone] included in message.        Future Appointments   Date Time Provider Department Center   10/29/2024  1:15 PM [facility] PULM PHASE 2 [facility] CP REHAB EM [facility]   10/31/2024  1:15 PM [facility] PULM PHASE 2 [facility] CP REHAB EM [facility]   11/5/2024  1:15 PM [facility] PULM PHASE 2 [facility] CP REHAB EM [facility]   11/7/2024  1:15 PM [facility] PULM PHASE 2 [facility] CP REHAB EM [facility]   11/11/2024  3:00 PM Riki Tran MD [id number] EC [facility]   11/12/2024  1:15 PM [facility] PULM PHASE 2 [facility] CP REHAB EM [facility]   11/14/2024  1:15 PM [facility] PULM PHASE 2 [facility] CP REHAB EM [facility]   11/19/2024  1:15 PM [facility] PULM PHASE 2 [facility] CP REHAB EM [facility]   11/21/2024  1:15 PM [facility] PULM PHASE 2 [facility] CP REHAB EM [facility]   11/22/2024  2:00 PM Heri Miller MD ECCFHENT EC [facility]   11/26/2024  1:15 PM [facility] PULM PHASE 2 [facility] CP REHAB EM [facility]   12/3/2024  1:15 PM [facility] PULM PHASE 2 [facility] CP REHAB EM [facility]   12/5/2024  1:15 PM [facility] PULM PHASE 2 [facility] CP REHAB EM [facility]   12/10/2024  1:15 PM [facility] PULM PHASE 2 [facility] CP REHAB EM [facility]   12/12/2024  1:15 PM [facility] PULM PHASE 2 [facility] CP REHAB EM [facility]   12/17/2024  1:15 PM [facility] PULM PHASE 2 [facility] CP REHAB EM [facility]   12/19/2024  1:15 PM [facility] PULM PHASE 2 [facility] CP REHAB EM [facility]   12/24/2024  1:15 PM [facility] PULM PHASE 2 [facility] CP REHAB EM [facility]   12/26/2024  1:15 PM [facility] PULM PHASE 2 [facility] CP REHAB EM [facility]   12/31/2024  1:15 PM [facility] PULM PHASE 2 [facility]   1/2/2025  1:15 PM [facility] PULM PHASE 2 [facility]   1/6/2025  1:00 PM Caridad Flores, DPM ECADOPOD [facility] ADO   1/7/2025  1:15 PM [facility] PULM PHASE 2 [facility]AB [facility]   1/9/2025  1:15 PM [facility] PULM PHASE 2 [facility]   1/14/2025  1:15 PM [facility] PULM PHASE 2 [facility]   1/16/2025  1:15 PM [facility] PULM PHASE 2 [facility]   1/21/2025  1:15 PM [facility] PULM PHASE 2 [facility] CP REHAB [facility]

## 2024-10-30 ENCOUNTER — APPOINTMENT (OUTPATIENT)
Dept: CARDIAC REHAB | Facility: HOSPITAL | Age: 79
End: 2024-10-30
Attending: INTERNAL MEDICINE
Payer: MEDICARE

## 2024-10-31 ENCOUNTER — APPOINTMENT (OUTPATIENT)
Dept: CARDIAC REHAB | Facility: HOSPITAL | Age: 79
End: 2024-10-31
Attending: INTERNAL MEDICINE
Payer: MEDICARE

## 2024-10-31 PROCEDURE — 94625 PHY/QHP OP PULM RHB W/O MNTR: CPT

## 2024-11-04 ENCOUNTER — APPOINTMENT (OUTPATIENT)
Dept: CARDIAC REHAB | Facility: HOSPITAL | Age: 79
End: 2024-11-04
Attending: INTERNAL MEDICINE
Payer: MEDICARE

## 2024-11-05 ENCOUNTER — APPOINTMENT (OUTPATIENT)
Dept: CARDIAC REHAB | Facility: HOSPITAL | Age: 79
End: 2024-11-05
Attending: INTERNAL MEDICINE
Payer: MEDICARE

## 2024-11-06 ENCOUNTER — TELEPHONE (OUTPATIENT)
Dept: CASE MANAGEMENT | Age: 79
End: 2024-11-06

## 2024-11-06 ENCOUNTER — APPOINTMENT (OUTPATIENT)
Dept: CARDIAC REHAB | Facility: HOSPITAL | Age: 79
End: 2024-11-06
Attending: INTERNAL MEDICINE
Payer: MEDICARE

## 2024-11-06 DIAGNOSIS — H40.9 GLAUCOMA, UNSPECIFIED GLAUCOMA TYPE, UNSPECIFIED LATERALITY: Primary | ICD-10-CM

## 2024-11-06 NOTE — TELEPHONE ENCOUNTER
Dr. Braun,     Patient called requesting referral to Dr. Siddiqui for follow up glaucoma appointment.     Pended referral please review diagnosis and sign off if you agree.    Thank you.  Fina Garcia  [facility]

## 2024-11-07 ENCOUNTER — APPOINTMENT (OUTPATIENT)
Dept: CARDIAC REHAB | Facility: HOSPITAL | Age: 79
End: 2024-11-07
Attending: INTERNAL MEDICINE
Payer: MEDICARE

## 2024-11-07 PROCEDURE — 94625 PHY/QHP OP PULM RHB W/O MNTR: CPT

## 2024-11-11 ENCOUNTER — APPOINTMENT (OUTPATIENT)
Dept: CARDIAC REHAB | Facility: HOSPITAL | Age: 79
End: 2024-11-11
Attending: INTERNAL MEDICINE
Payer: MEDICARE

## 2024-11-11 ENCOUNTER — OFFICE VISIT (OUTPATIENT)
Dept: NEPHROLOGY | Facility: CLINIC | Age: 79
End: 2024-11-11

## 2024-11-11 VITALS
BODY MASS INDEX: 29 KG/M2 | WEIGHT: 170 LBS | SYSTOLIC BLOOD PRESSURE: 130 MMHG | DIASTOLIC BLOOD PRESSURE: 63 MMHG | HEART RATE: 74 BPM

## 2024-11-11 DIAGNOSIS — N18.32 STAGE 3B CHRONIC KIDNEY DISEASE (HCC): Primary | ICD-10-CM

## 2024-11-11 PROCEDURE — 1126F AMNT PAIN NOTED NONE PRSNT: CPT | Performed by: INTERNAL MEDICINE

## 2024-11-11 PROCEDURE — 1159F MED LIST DOCD IN RCRD: CPT | Performed by: INTERNAL MEDICINE

## 2024-11-11 PROCEDURE — 3078F DIAST BP <80 MM HG: CPT | Performed by: INTERNAL MEDICINE

## 2024-11-11 PROCEDURE — 3075F SYST BP GE 130 - 139MM HG: CPT | Performed by: INTERNAL MEDICINE

## 2024-11-11 PROCEDURE — 99214 OFFICE O/P EST MOD 30 MIN: CPT | Performed by: INTERNAL MEDICINE

## 2024-11-12 ENCOUNTER — CARDPULM VISIT (OUTPATIENT)
Dept: CARDIAC REHAB | Facility: HOSPITAL | Age: 79
End: 2024-11-12
Attending: INTERNAL MEDICINE
Payer: MEDICARE

## 2024-11-12 PROCEDURE — 94625 PHY/QHP OP PULM RHB W/O MNTR: CPT

## 2024-11-12 NOTE — PROGRESS NOTES
11/11/24        Patient: Ariana Osorio   YOB: 1945   Date of Visit: 11/11/2024       Dear  Dr. Kade MD,      Thank you for referring Ariana Osorio to my practice.  Please find my assessment and plan below.      As you know she is a 79-year-old female with a history of hypertension, adult onset diabetes mellitus, GERD, primary hyperparathyroidism, coronary artery disease, status post CABG, gout, asthma/COPD on home oxygen, who I now had the pleasure of seeing for follow-up of chronic kidney disease stage III.  Since I last saw her she was recently hospitalized from August 22 through August 27, 2024 for increased shortness of breath.  This was thought to be acute exacerbation of COPD.  No overt pulmonary edema was identified.  Was sent home on furosemide 20 mg daily which she seems to be just taking as needed.  Saw her pulmonary doctor and she is scheduled to start pulmonary rehab.    On physical exam her blood pressure is 130/63 with a pulse of 74 and she weighed under 70 pounds.  Her neck was supple without JVD.  Lungs were clear.  Heart revealed a regular rate and rhythm and S4 but no gallops, murmurs or rubs.  Abdomen was soft, flat, nontender without organomegaly, masses or bruits.  Extremities reveal trace edema bilaterally.    I reviewed her laboratory studies during her recent hospitalization and August.  Also had follow-up labs done on September 3, 2024.  Creatinine overall stable at 1.23 with an estimated GFR 45 cc/min.  Calcium mildly elevated 10.6.  Otherwise electrolytes were good.  Hemoglobin 13.2.    I therefore reassured the patient that overall renal function is stable.  Creatinine though does seem to fluctuate up and down probably depending upon dose of diuretics.  Jardiance was also just started by her cardiologist.  Reinforced low-salt diet.  Avoid nonsteroidals.  Repeat labs in 1 month to ensure stability.  If stable will continue quarterly.  I will see her again  in 6 months for follow-up or sooner if clinically indicated.    Thank you again for allowing me to participate in the care of your patient.  If you have any questions please were free to call.           Sincerely,   Riki Tran MD   [facility], [facility], [place]  [address]  [place] [place] [id number]    Document electronically generated by:  Riki Tran MD

## 2024-11-13 ENCOUNTER — APPOINTMENT (OUTPATIENT)
Dept: CARDIAC REHAB | Facility: HOSPITAL | Age: 79
End: 2024-11-13
Attending: INTERNAL MEDICINE
Payer: MEDICARE

## 2024-11-14 ENCOUNTER — APPOINTMENT (OUTPATIENT)
Dept: CARDIAC REHAB | Facility: HOSPITAL | Age: 79
End: 2024-11-14
Attending: INTERNAL MEDICINE
Payer: MEDICARE

## 2024-11-18 ENCOUNTER — APPOINTMENT (OUTPATIENT)
Dept: CARDIAC REHAB | Facility: HOSPITAL | Age: 79
End: 2024-11-18
Attending: INTERNAL MEDICINE
Payer: MEDICARE

## 2024-11-19 ENCOUNTER — APPOINTMENT (OUTPATIENT)
Dept: CARDIAC REHAB | Facility: HOSPITAL | Age: 79
End: 2024-11-19
Attending: INTERNAL MEDICINE
Payer: MEDICARE

## 2024-11-20 ENCOUNTER — APPOINTMENT (OUTPATIENT)
Dept: CARDIAC REHAB | Facility: HOSPITAL | Age: 79
End: 2024-11-20
Attending: INTERNAL MEDICINE
Payer: MEDICARE

## 2024-11-20 NOTE — TELEPHONE ENCOUNTER
Refill Request:    Current Outpatient Medications   Medication Sig Dispense Refill    amLODIPine 2.5 MG Oral Tab Take 1 tablet (2.5 mg total) by mouth in the morning and 1 tablet (2.5 mg total) before bedtime. 180 tablet 1     Please advise

## 2024-11-21 ENCOUNTER — APPOINTMENT (OUTPATIENT)
Dept: CARDIAC REHAB | Facility: HOSPITAL | Age: 79
End: 2024-11-21
Attending: INTERNAL MEDICINE
Payer: MEDICARE

## 2024-11-25 ENCOUNTER — APPOINTMENT (OUTPATIENT)
Dept: CARDIAC REHAB | Facility: HOSPITAL | Age: 79
End: 2024-11-25
Attending: INTERNAL MEDICINE
Payer: MEDICARE

## 2024-11-25 ENCOUNTER — TELEPHONE (OUTPATIENT)
Dept: INTERNAL MEDICINE CLINIC | Facility: CLINIC | Age: 79
End: 2024-11-25

## 2024-11-25 ENCOUNTER — PATIENT OUTREACH (OUTPATIENT)
Dept: CASE MANAGEMENT | Age: 79
End: 2024-11-25

## 2024-11-25 DIAGNOSIS — I50.32 CHRONIC DIASTOLIC CONGESTIVE HEART FAILURE (HCC): ICD-10-CM

## 2024-11-25 DIAGNOSIS — H91.93 BILATERAL HEARING LOSS, UNSPECIFIED HEARING LOSS TYPE: Primary | ICD-10-CM

## 2024-11-25 DIAGNOSIS — I50.30 DIASTOLIC HEART FAILURE, UNSPECIFIED HF CHRONICITY (HCC): ICD-10-CM

## 2024-11-25 DIAGNOSIS — I15.2 HYPERTENSION ASSOCIATED WITH TYPE 2 DIABETES MELLITUS (HCC): ICD-10-CM

## 2024-11-25 DIAGNOSIS — E11.22 CONTROLLED TYPE 2 DIABETES MELLITUS WITH STAGE 3 CHRONIC KIDNEY DISEASE, WITHOUT LONG-TERM CURRENT USE OF INSULIN (HCC): ICD-10-CM

## 2024-11-25 DIAGNOSIS — E78.5 HYPERLIPIDEMIA ASSOCIATED WITH TYPE 2 DIABETES MELLITUS (HCC): ICD-10-CM

## 2024-11-25 DIAGNOSIS — Z85.3 HISTORY OF BREAST CANCER: ICD-10-CM

## 2024-11-25 DIAGNOSIS — H40.9 GLAUCOMA, UNSPECIFIED GLAUCOMA TYPE, UNSPECIFIED LATERALITY: ICD-10-CM

## 2024-11-25 DIAGNOSIS — I73.9 PAD (PERIPHERAL ARTERY DISEASE) (HCC): ICD-10-CM

## 2024-11-25 DIAGNOSIS — E11.59 HYPERTENSION ASSOCIATED WITH TYPE 2 DIABETES MELLITUS (HCC): ICD-10-CM

## 2024-11-25 DIAGNOSIS — M1A.9XX1 CHRONIC TOPHACEOUS GOUT: ICD-10-CM

## 2024-11-25 DIAGNOSIS — J44.1 COPD EXACERBATION (HCC): ICD-10-CM

## 2024-11-25 DIAGNOSIS — E11.69 HYPERLIPIDEMIA ASSOCIATED WITH TYPE 2 DIABETES MELLITUS (HCC): ICD-10-CM

## 2024-11-25 DIAGNOSIS — N18.30 CONTROLLED TYPE 2 DIABETES MELLITUS WITH STAGE 3 CHRONIC KIDNEY DISEASE, WITHOUT LONG-TERM CURRENT USE OF INSULIN (HCC): ICD-10-CM

## 2024-11-25 DIAGNOSIS — J44.9 COPD, SEVERE (HCC): ICD-10-CM

## 2024-11-25 DIAGNOSIS — E21.3 HYPERPARATHYROIDISM (HCC): ICD-10-CM

## 2024-11-25 DIAGNOSIS — I25.10 CORONARY ARTERY DISEASE INVOLVING NATIVE CORONARY ARTERY OF NATIVE HEART WITHOUT ANGINA PECTORIS: ICD-10-CM

## 2024-11-25 DIAGNOSIS — K21.9 GASTROESOPHAGEAL REFLUX DISEASE, UNSPECIFIED WHETHER ESOPHAGITIS PRESENT: Primary | ICD-10-CM

## 2024-11-25 NOTE — TELEPHONE ENCOUNTER
Spoke to patient. She wanted to update Dr. Braun that she saw Rita Russell in Pulmonology. She said that her O2 Sats have been dropping because she takes her O2 off when she goes to the bathroom. She is frustrated that she can't see both Dr. Heath and Dr. Mares in Pulmonology.     Per patient, Rita wants her to see GI to check if her acid reflux is contributing to her breathing because her testing came back ok. Rn does not see this recommendation in Rita Russell's office note from 11/21/24.    Dr. Braun, please advise on referral. Pended.

## 2024-11-25 NOTE — PROGRESS NOTES
Spoke to Ariana for Chronic Care Management.      Updates to patient care team/comments: UTD  Patient reported changes in medications: UTD  Med Adherence  Comment: pt taking medications as prescribed     Health Maintenance:   Health Maintenance   Topic Date Due    Zoster Vaccines (1 of 2) Never done    COVID-19 Vaccine (4 - 2024-25 season) 09/01/2024    Influenza Vaccine (1) 10/01/2024    Diabetes Care Dilated Eye Exam  11/30/2024 (Originally 10/13/2021)    Diabetes Care Foot Exam  02/19/2025 (Originally 7/23/2020)    Diabetes Care A1C  02/24/2025    Diabetes Care: Microalb/Creat Ratio  05/17/2025    Diabetes Care: GFR  09/03/2025    DEXA Scan  Completed    MA Annual Health Assessment  Completed    Annual Depression Screening  Completed    Fall Risk Screening (Annual)  Completed    Pneumococcal Vaccine: 65+ Years  Completed    Mammogram  Discontinued    Colonoscopy  Discontinued       Patient updates/concerns:    Spoke to pt for monthly outreach   Pt was at eye [facility] and was informed that there was an issue with her co pay and medicaid coverage. Pt was provided the number to [facility]  and encouraged to follow up with questions about coverage.    Pt is very committed to her pulmonary rehab. Pt states that she had some testing done and reviewed with pulmonology. She states that she was encouraged that pulmonary function has improved. She feels an improvement in her endurance and can walk further than a block without having to stop. She states that she feels that she has improved capacity for activity and tries to move around as much as she can during the day. She feels that she is also getting stronger and feels her ability to stand up and sit down is improving. She does not feel she has been using oxygen any more than usual.    Pt is consistently taking her blood pressure every day. She is typically in 120-130/60-70 range.    Pt is also checking her sugars every day. Pt states those have  remained stable and within normal limits.    Pt had started jardiance and was immediately bothered by stiff legs muscle soreness. She states that it got worse over the four days she was taking it. The muscle pain started to affect her mood in the mornings and she decided to stop taking it. She states that the muscle pain improved immediately and she has not had any additional side effects. Pt has not yet discussed this with cardiolgoy. She wants to f/u with pcp first. She has left message with pcp.   Pt was suggested a follow up to discuss breathing concerns with GI. Since pt pulmonary function has improved and no indication on cause was determined during pulmonary testing, the assumption is that there may be a gastro component to her symptoms because of reflux. [facility] reviewed with pt her diet and she is very rigid about managing her sweets, she limits salt and sugar. Pt reviewed diet and determined that she consumes  a fair amount of coffee and vinegar, both of which could stimulate reflux. Pt has access to a dietician at the pulmonary rehab and she will review her meal planning more carefully with them. Pt will schedule with GI after she follows up with PCP  Goals/Action Plan:    Active goal from previous outreach:   Managing healthcare  Patient reported progress towards goals: pt is continuing to see providers as needed and takes her medications as prescribed               - What: breathing issues           - Where/When/How: pt is progressing at pulmonary rehab and will be discussing connection between breathing issues and reflux with GI doc  Patient Reported Barriers and Concerns: n/a                   - Plan for overcoming barriers: none    Care Managers Interventions: continue to provide encouragement and education for healthy coping and dx    Future Appointments:   Future Appointments   Date Time Provider Department Center   11/26/2024  1:15 PM [facility] PUL PHASE 2 [facility] REHAB [facility]   12/3/2024  1:15 PM [facility] PUL  PHASE 2 [facility] CP REHAB [facility]   12/5/2024  1:15 PM [facility] PULM PHASE 2 [facility]   12/10/2024  1:15 PM [facility] PULM PHASE 2 [facility]   12/12/2024  1:15 PM [facility] PULM PHASE 2 [facility]AB [facility]   12/17/2024  1:15 PM [facility] PULM PHASE 2 [facility]   12/19/2024  1:15 PM [facility] PULM PHASE 2 [facility]   12/24/2024  1:15 PM [facility] PULM PHASE [facility]   12/26/2024  1:15 PM [facility] PULM PHASE 2 [facility]   12/31/2024  1:15 PM [facility] PULM PHASE 2 [facility] REHAB [facility]   1/2/2025  1:15 PM [facility] PULM PHASE 2 [facility] REHAB [facility]   1/6/2025  1:00 PM Caridad Flores, DPM [facility]   1/7/2025  1:15 PM [facility] PULM PHASE 2 [facility] REHAB [facility]   1/9/2025  1:15 PM [facility] PULM PHASE 2 [facility]HAB [facility]   1/14/2025  1:15 PM [facility] PULM PHASE 2 [facility]HAB [facility]   1/16/2025  1:15 PM [facility] PULM PHASE 2 [facility] CP REHAB [facility]   1/21/2025  1:15 PM [facility] PULM PHASE 2 [facility] CP REHAB [facility]         Next Care Manager Follow Up Date: one month    Reason For Follow Up: review progress and or barriers towards patient's goals.     Time Spent This Encounter Total: 23 min medical record review, telephone communication, care plan updates where needed, education, goals, and action plan recreation/update. Provided acknowledgment and validation to patient's concerns.   Monthly Minute Total including today: 23  Physical assessment, complete health history, and need for CCM established by Enrique Braun MD.    Friendly reminder - 2025 Benefits Open Enrollment is here!   We encourage you to review your current Medicare coverage and explore your options during this period. We have developed a Medicare Information Page on our website to serve as a resource for guidance and answers to any questions you might have.   You can access it by visiting, [email or web address]

## 2024-11-25 NOTE — TELEPHONE ENCOUNTER
Patient wants to speak to Dr. Braun or nurse. Says her pulmonary doctor told her to speak to Dr. Almanzar. Patient did not want to disclose why or what questions.

## 2024-11-26 ENCOUNTER — TELEPHONE (OUTPATIENT)
Facility: CLINIC | Age: 79
End: 2024-11-26

## 2024-11-26 ENCOUNTER — APPOINTMENT (OUTPATIENT)
Dept: CARDIAC REHAB | Facility: HOSPITAL | Age: 79
End: 2024-11-26
Attending: INTERNAL MEDICINE
Payer: MEDICARE

## 2024-11-26 RX ORDER — AMLODIPINE BESYLATE 2.5 MG/1
2.5 TABLET ORAL 2 TIMES DAILY
Qty: 180 TABLET | Refills: 1 | Status: SHIPPED | OUTPATIENT
Start: 2024-11-26

## 2024-11-26 NOTE — TELEPHONE ENCOUNTER
Patient called to follow up.  She has 1 week of medication left.  Sent high priority as it is mail order.

## 2024-11-26 NOTE — TELEPHONE ENCOUNTER
Dr Freed    Called and spoke to the patient, date of birth and name verified.    She reported she has been experiencing shortness of breath on exertion for 2 weeks. Current O2 saturation is 91-92% on O2  L via NC.    She feels she is burping more than the usual, she is not sure if she is having the reflux causing the shortness of breath.    As per the patient, she was seen by her cardiologist and pulmonologist without acute findings. CXR and PFT were negative on 11/21/2024.    She is taking pantoprazole daily but not on an empty stomach. She would spicy foods and drink soda when out in a restaurant.    Advised to take PPI 30 mins before breakfast, avoid spicy foods, greasy foods and carbonated drinks. Advised to go to the ED if O2 saturation is less than 90% with O2.  She agreed and verbalized understanding.    She is requesting to schedule for a sooner appointment next week, declined appointment with Gianna.    Thank you

## 2024-11-26 NOTE — TELEPHONE ENCOUNTER
Called and spoke to the patient, date of birth and name verified.    Instructed the patient to take pantoprazole twice a day 30 mins before breakfast and supper as per Dr Freed.    The patient agreed and verbalized understanding.    Your Appointments        Wednesday December 04, 2024 2:00 PM  Follow Up Visit with Abdiaziz Melendez MD  [facility] ([place] [address] [id number]  [phone]     DANYEL Chin

## 2024-11-26 NOTE — TELEPHONE ENCOUNTER
Patient could not talk was in a restaurant. Could not hear well. Does not check MyChart.  Stated to call her back tomorrow.       _______________________________________________________  Referred to Provider Information:  Provider Address Phone   Abdiaziz Melendez MD [address] [address] [phone]

## 2024-11-26 NOTE — TELEPHONE ENCOUNTER
Please review. Protocol Failed; No Protocol    Requested Prescriptions   Pending Prescriptions Disp Refills    amLODIPine 2.5 MG Oral Tab 180 tablet 1     Sig: Take 1 tablet (2.5 mg total) by mouth in the morning and 1 tablet (2.5 mg total) before bedtime.       Hypertension Medications Protocol Failed - 11/26/2024 10:54 AM        Failed - EGFRCR or GFRNAA > 50     GFR Evaluation  EGFRCR: 45 , resulted on 9/3/2024          Passed - CMP or BMP in past 12 months        Passed - Last BP reading less than 140/90     BP Readings from Last 1 Encounters:   11/11/24 130/63               Passed - In person appointment or virtual visit in the past 12 mos or appointment in next 3 mos     Recent Outpatient Visits              2 weeks ago Stage 3b chronic kidney disease (HCC)    [facility] [place] Riki Tran MD    Office Visit    2 months ago Diet-controlled diabetes mellitus ([facility])    [facility], Caridad Marroquin DPM    Office Visit    2 months ago Acute on chronic heart failure with preserved ejection fraction (HCC)    [facility], Enrique Mann MD    Office Visit    2 months ago Bilateral impacted cerumen    [facility], Heri Ruiz MD    Office Visit    3 months ago Primary osteoarthritis of both knees    [facility], Enrique Mann MD    Office Visit          Future Appointments         Provider Department Appt Notes    Today [facility] PUL PHASE 2 [facility] [place] [facility]    In 1 week [facility] PUL PHASE 2 [facility] [place] [facility]    In 1 week [facility] PUL PHASE 2 [facility] [place] [facility]    In 2 weeks [facility] PUL PHASE 2 [facility] [place] [facility]    In 2 weeks [facility]  PULM PHASE 2 [facility] [place] [facility]    In 3 weeks [facility] PULM PHASE 2 [facility] [place] [facility]    In 3 weeks [facility] PULM PHASE 2 [facility] [place] [facility]    In 4 weeks [facility] PULM PHASE 2 [facility] [place] [facility]    In 1 month [facility] PULM PHASE 2 [facility] [place] [facility]    In 1 month [facility] PULM PHASE 2 [facility] [place] [facility]    In 1 month [facility] PULM PHASE 2 [facility] [place] [facility]    In 1 month Caridad Flores DPM [facility]     In 1 month [facility] PULM PHASE 2 [facility] [place] [facility]    In 1 month [facility] PULM PHASE 2 [facility] [place] [facility]    In 1 month [facility] PULM PHASE 2 [facility] [place] [facility]    In 1 month [facility] PUL PHASE 2 [facility] [place] [facility]    In 1 month [facility] PULM PHASE 2 [facility] [place] [facility]                           Future Appointments         Provider Department Appt Notes    Today [facility] PULM PHASE 2 [facility] [place] [facility]    In 1 week [facility] PULM PHASE 2 [facility] [place] [facility]    In 1 week [facility] PULM PHASE 2 [facility] [place] [facility]    In 2 weeks [facility] PULM PHASE 2 [facility] [place] [facility]    In 2 weeks [facility] PULM PHASE 2 [facility] [place] [facility]    In 3 weeks [facility] PULM PHASE 2 [facility] [place] [facility]    In 3 weeks [facility] PULM PHASE 2 [facility]  [place] [facility]    In 4 weeks [facility] PULM PHASE 2 [facility] [place] [facility]    In 1 month [facility] PULM PHASE 2 [facility] [place] [facility]    In 1 month [facility] PULM PHASE 2 [facility] [place] [facility]    In 1 month [facility] PULM PHASE 2 [facility] [place] [facility]    In 1 month Caridad Flores DPM [facility]     In 1 month [facility] PULM PHASE 2 [facility] [place] [facility]    In 1 month [facility] PULM PHASE 2 [facility] [place] [facility]    In 1 month [facility] PULM PHASE 2 [facility] [place] [facility]    In 1 month [facility] PULM PHASE 2 [facility] [place] [facility]    In 1 month [facility] PUL PHASE 2 [facility] [place] [facility]          Recent Outpatient Visits              2 weeks ago Stage 3b chronic kidney disease ([facility])    [facility] Riki Tran MD    Office Visit    2 months ago Diet-controlled diabetes mellitus ([facility])    [facility] Caridad Flores DPM    Office Visit    2 months ago Acute on chronic heart failure with preserved ejection fraction ([facility])    [facility] Enrique Braun MD    Office Visit    2 months ago Bilateral impacted cerumen    [facility]    Office Visit    3 months ago Primary osteoarthritis of both knees    [facility] Enrique Braun MD    Office Visit

## 2024-11-26 NOTE — TELEPHONE ENCOUNTER
Left message to call back, transfer to triage (can't leave detailed message as nothing is checked off on Release) Called her son Romulo (on release) and advised him of the referral and provided the name and phone number as well as placing info to her my chart. He stated he checks her my chart so I also explained that if he ever want's to communicate with us on his moms my chart he needs so be proxy. He stated, understanding and will advise his mom.

## 2024-11-26 NOTE — TELEPHONE ENCOUNTER
Patient calling regards sooner appointment due to trouble breathing due to acid reflux. Please call. Declined to schedule.

## 2024-11-26 NOTE — TELEPHONE ENCOUNTER
Called and spoke to the patient.    She requested a call back in a few minutes since she is in the bathroom.

## 2024-11-27 ENCOUNTER — APPOINTMENT (OUTPATIENT)
Dept: CARDIAC REHAB | Facility: HOSPITAL | Age: 79
End: 2024-11-27
Attending: INTERNAL MEDICINE
Payer: MEDICARE

## 2024-11-28 ENCOUNTER — APPOINTMENT (OUTPATIENT)
Dept: CARDIAC REHAB | Facility: HOSPITAL | Age: 79
End: 2024-11-28
Attending: INTERNAL MEDICINE
Payer: MEDICARE

## 2024-12-02 ENCOUNTER — APPOINTMENT (OUTPATIENT)
Dept: CARDIAC REHAB | Facility: HOSPITAL | Age: 79
End: 2024-12-02
Attending: INTERNAL MEDICINE
Payer: MEDICARE

## 2024-12-02 ENCOUNTER — TELEPHONE (OUTPATIENT)
Dept: INTERNAL MEDICINE CLINIC | Facility: CLINIC | Age: 79
End: 2024-12-02

## 2024-12-02 RX ORDER — PREDNISONE 10 MG/1
TABLET ORAL
Qty: 20 TABLET | Refills: 0 | Status: SHIPPED | OUTPATIENT
Start: 2024-12-02

## 2024-12-02 NOTE — TELEPHONE ENCOUNTER
Dr. Braun: patient asking to speak with you.     Patient feels she needs prednisone.   For past 2 weeks, when walks to bathroom, feels short of breathe. Pulse 0xygen 62% without oxygen.   With oxygen is between 70s -80s%. At rest, pulse oxygen 88%.     Recent visit 11/21/24 with pulmonologist. Refer to notes in EPIC.  She has also seen nephrologist, cardiologist.

## 2024-12-03 ENCOUNTER — APPOINTMENT (OUTPATIENT)
Dept: CARDIAC REHAB | Facility: HOSPITAL | Age: 79
End: 2024-12-03
Attending: INTERNAL MEDICINE
Payer: MEDICARE

## 2024-12-03 NOTE — TELEPHONE ENCOUNTER
Called pt with mentioned complaints; usually responds to prednisone when she has her flareup of copd/asthma; had taken 20mg prednisone x2d and felt some improvement; I told her will refill her prednsone ; if symptoms worsens/persist then told her to go to ER.

## 2024-12-04 ENCOUNTER — APPOINTMENT (OUTPATIENT)
Dept: CARDIAC REHAB | Facility: HOSPITAL | Age: 79
End: 2024-12-04
Attending: INTERNAL MEDICINE
Payer: MEDICARE

## 2024-12-04 ENCOUNTER — OFFICE VISIT (OUTPATIENT)
Facility: CLINIC | Age: 79
End: 2024-12-04

## 2024-12-04 VITALS
WEIGHT: 172 LBS | BODY MASS INDEX: 29.37 KG/M2 | HEART RATE: 74 BPM | SYSTOLIC BLOOD PRESSURE: 142 MMHG | HEIGHT: 64 IN | DIASTOLIC BLOOD PRESSURE: 71 MMHG

## 2024-12-04 DIAGNOSIS — K21.00 GASTROESOPHAGEAL REFLUX DISEASE WITH ESOPHAGITIS WITHOUT HEMORRHAGE: Primary | ICD-10-CM

## 2024-12-04 PROCEDURE — 3078F DIAST BP <80 MM HG: CPT | Performed by: INTERNAL MEDICINE

## 2024-12-04 PROCEDURE — 3077F SYST BP >= 140 MM HG: CPT | Performed by: INTERNAL MEDICINE

## 2024-12-04 PROCEDURE — 99214 OFFICE O/P EST MOD 30 MIN: CPT | Performed by: INTERNAL MEDICINE

## 2024-12-04 PROCEDURE — 3008F BODY MASS INDEX DOCD: CPT | Performed by: INTERNAL MEDICINE

## 2024-12-04 PROCEDURE — 1126F AMNT PAIN NOTED NONE PRSNT: CPT | Performed by: INTERNAL MEDICINE

## 2024-12-04 PROCEDURE — 1159F MED LIST DOCD IN RCRD: CPT | Performed by: INTERNAL MEDICINE

## 2024-12-04 NOTE — PATIENT INSTRUCTIONS
1.  Take pantoprazole 15-30 minutes before breakfast.  2.  Eat/drink more slowly to lessen burping.  3.  Please contact me with any changes.

## 2024-12-05 ENCOUNTER — APPOINTMENT (OUTPATIENT)
Dept: CARDIAC REHAB | Facility: HOSPITAL | Age: 79
End: 2024-12-05
Attending: INTERNAL MEDICINE
Payer: MEDICARE

## 2024-12-05 PROCEDURE — 94625 PHY/QHP OP PULM RHB W/O MNTR: CPT

## 2024-12-06 NOTE — PROGRESS NOTES
Subjective:   Patient ID: Ariana Osorio is a 79 year old female.    HPI  The patient returns in follow-up with her sister Johana.  She was last seen in July 2024.     As per previous notes the patient was admitted to the hospital in February 2022 with coffee-ground emesis and black stools on full dose aspirin therapy prescribed for a history of cardiovascular disease (CABG and left carotid endarterectomy).  Upper endoscopy revealed LA grade B esophagitis and H. pylori negative gastroduodenitis with antral erosions.  No dominant ulceration was seen.  Baseline hemoglobin was 12.8 which decreased to a fiordaliza value of 7.6 on 2/18/2022.  The patient was discharged on pantoprazole.      The patient was advised to continue pantoprazole maintenance (once daily).     The patient underwent a screening colonoscopy in February 2023.  #6 subcentimeter polyps were removed of which #3 were traditional or serrated adenomas.  Uncomplicated diverticulosis was present.  A surveillance colonoscopy in 3 years was advised.     Earlier this year the patient contracted influenza and required a few ED visits/brief hospital admissions for exacerbation of COPD.  At the time of her last visit she was wheezing, advised to follow up with pulmonary and continue pantoprazole at least three times weekly.    Current history:  The patient states that when her COPD is under control she utilizes oxygen only when needed.  Her oxygen saturation may drop to 88% after a bowel movement or when standing while cooking for a prolonged period of time.  She does not require oxygen at night.    Over the past 2 weeks her breathing has worsened.  Recently following a trip to the bathroom to urinate her oxygen saturation dropped into the 60s.    The patient endorses frequent belching.  She denies regurgitation or frequent heartburn.    The patient has been taking pantoprazole but previously was taking the medication immediately before eating.  At our  request she has been taking the pantoprazole 30 minutes before breakfast over the past 3 days and has found it more effective.    She is following up with pulmonology and had a recent prednisone burst.  She has no difficulty walking and is comfortable at rest today without supplemental oxygen.        History/Other:   Review of Systems  See above    Wt Readings from Last 7 Encounters:   12/04/24 172 lb (78 kg)   11/11/24 170 lb (77.1 kg)   09/03/24 173 lb (78.5 kg)   08/27/24 171 lb (77.6 kg)   08/15/24 177 lb 6.4 oz (80.5 kg)   07/22/24 177 lb (80.3 kg)   05/17/24 166 lb 3.2 oz (75.4 kg)     Body mass index is 29.52 kg/m².      Current Outpatient Medications   Medication Sig Dispense Refill    predniSONE 10 MG Oral Tab Take 4 tabs po x2d, 3 tabs x2d, 2 tabs x2d, 1 tab x2d 20 tablet 0    amLODIPine 2.5 MG Oral Tab Take 1 tablet (2.5 mg total) by mouth in the morning and 1 tablet (2.5 mg total) before bedtime. 180 tablet 1    Glucose Blood (TRUE METRIX BLOOD GLUCOSE TEST) In Vitro Strip Use 1 (one) new strip 2 times daily for blood glucose monitoring 200 strip 3    Alcohol Swabs (DROPSAFE ALCOHOL PREP) 70 % Does not apply Pads Apply 1 Pad topically 4 (four) times daily. Before each finger stick. 400 each 3    Blood Glucose Calibration (TRUE METRIX LEVEL 2) Normal In Vitro Solution 1 drop as needed (To use monthly, with each new vial of test strips, when you change your batteries, or if you suspect the meter is malfunctioning.). 1 each 3    budesonide 0.5 MG/2ML Inhalation Suspension Take 2 mL (0.5 mg total) by nebulization 2 (two) times daily. 180 each 1    fluticasone-salmeterol (WIXELA INHUB) 500-50 MCG/ACT Inhalation Aerosol Powder, Breath Activated Inhale 1 puff into the lungs 2 (two) times daily.      glipiZIDE 5 MG Oral Tab Take 1 tablet (5 mg total) by mouth before breakfast. 90 tablet 1    pantoprazole 20 MG Oral Tab EC Take 1 tablet (20 mg total) by mouth every morning before breakfast. 90 tablet 3     metoprolol succinate ER 25 MG Oral Tablet 24 Hr Take 1 tablet (25 mg total) by mouth daily. 90 tablet 1    febuxostat 40 MG Oral Tab Take 1 tablet (40 mg total) by mouth daily. 90 tablet 1    omega-3 fatty acids 1000 MG Oral Cap Take 1,000 mg by mouth daily.      Multiple Vitamin (MULTI-VITAMIN DAILY) Oral Tab Take 1 tablet by mouth daily.      Cholecalciferol (VITAMIN D) 2000 units Oral Cap Take 1 capsule (2,000 Units total) by mouth every other day.      JARDIANCE 10 MG Oral Tab Jardiance 10 mg tablet, [RxNorm: 8605010] (Patient not taking: Reported on 12/4/2024)      Tiotropium Bromide Monohydrate 2.5 MCG/ACT Inhalation Aero Soln  (Patient not taking: Reported on 12/4/2024)      TRUEplus Lancets 33G Does not apply Misc 1 Lancet by Finger stick route 4 (four) times daily. (Patient not taking: Reported on 12/4/2024) 400 each 3    furosemide 20 MG Oral Tab Take 1 tablet (20 mg total) by mouth daily. (Patient not taking: Reported on 12/4/2024) 30 tablet 2    potassium chloride 10 MEQ Oral Tab CR Take 1 tablet (10 mEq total) by mouth daily. (Patient not taking: Reported on 12/4/2024) 30 tablet 2     Allergies:Allergies[1]    Objective:   Physical Exam  Vitals and nursing note reviewed.   Constitutional:       General: She is not in acute distress.     Appearance: She is well-developed. She is not ill-appearing, toxic-appearing or diaphoretic.      Comments: Breathing comfortably at rest   HENT:      Head: Normocephalic and atraumatic.      Mouth/Throat:      Pharynx: No oropharyngeal exudate.   Eyes:      General: No scleral icterus.     Conjunctiva/sclera: Conjunctivae normal.   Neck:      Thyroid: No thyromegaly.   Cardiovascular:      Rate and Rhythm: Normal rate and regular rhythm.      Heart sounds: Normal heart sounds.   Pulmonary:      Effort: Pulmonary effort is normal. No respiratory distress.      Breath sounds: No wheezing or rales.      Comments: Distant breath sounds  No wheezing  Abdominal:      General:  Bowel sounds are normal. There is no distension.      Palpations: Abdomen is soft. There is no mass.      Tenderness: There is no abdominal tenderness. There is no guarding or rebound.   Musculoskeletal:      Cervical back: Neck supple.   Lymphadenopathy:      Cervical: No cervical adenopathy.   Neurological:      Mental Status: She is alert and oriented to person, place, and time.   Psychiatric:         Behavior: Behavior normal.         Assessment & Plan:   1. Gastroesophageal reflux disease with esophagitis without hemorrhage    The patient presents with the aforementioned symptoms which are likely multifactorial and due to gastroesophageal reflux, excessive aerophagia and baseline COPD.  I am recommending that the patient follow-up with her pulmonologist regarding the latter.  She will take the pantoprazole 15-30 minutes before breakfast which she has already found more effective.  We discussed dietary and lifestyle modification to avoid excessive aerophagia.  She will continue to update me regarding her symptoms.        Meds This Visit:  Requested Prescriptions      No prescriptions requested or ordered in this encounter       Imaging & Referrals:  None         [1]   Allergies  Allergen Reactions    Allopurinol HIVES, SWELLING and SHORTNESS OF BREATH    Dog Epithelium SHORTNESS OF BREATH     Hair and saliva    Dust SHORTNESS OF BREATH    Heparin SWELLING    Smoke SHORTNESS OF BREATH    Adhesive Tape (Rosins) RASH    Montelukast HALLUCINATION    Statins MYALGIA     Pt had developed myalgia and myopathy    Xanax [Alprazolam] RESTLESSNESS    Adhesive Tape OTHER (SEE COMMENTS)    Amoxicillin OTHER (SEE COMMENTS)    Other OTHER (SEE COMMENTS)    Sulfa Antibiotics OTHER (SEE COMMENTS)    Ace Inhibitors Coughing    Aspirin RASH

## 2024-12-09 ENCOUNTER — APPOINTMENT (OUTPATIENT)
Dept: CARDIAC REHAB | Facility: HOSPITAL | Age: 79
End: 2024-12-09
Attending: INTERNAL MEDICINE
Payer: MEDICARE

## 2024-12-10 ENCOUNTER — APPOINTMENT (OUTPATIENT)
Dept: CARDIAC REHAB | Facility: HOSPITAL | Age: 79
End: 2024-12-10
Attending: INTERNAL MEDICINE
Payer: MEDICARE

## 2024-12-12 ENCOUNTER — CARDPULM VISIT (OUTPATIENT)
Dept: CARDIAC REHAB | Facility: HOSPITAL | Age: 79
End: 2024-12-12
Attending: INTERNAL MEDICINE
Payer: MEDICARE

## 2024-12-12 PROCEDURE — 94625 PHY/QHP OP PULM RHB W/O MNTR: CPT

## 2024-12-16 ENCOUNTER — APPOINTMENT (OUTPATIENT)
Dept: CARDIAC REHAB | Facility: HOSPITAL | Age: 79
End: 2024-12-16
Attending: INTERNAL MEDICINE
Payer: MEDICARE

## 2024-12-17 ENCOUNTER — APPOINTMENT (OUTPATIENT)
Dept: CARDIAC REHAB | Facility: HOSPITAL | Age: 79
End: 2024-12-17
Attending: INTERNAL MEDICINE
Payer: MEDICARE

## 2024-12-19 ENCOUNTER — CARDPULM VISIT (OUTPATIENT)
Dept: CARDIAC REHAB | Facility: HOSPITAL | Age: 79
End: 2024-12-19
Attending: INTERNAL MEDICINE
Payer: MEDICARE

## 2024-12-19 PROCEDURE — 94625 PHY/QHP OP PULM RHB W/O MNTR: CPT

## 2024-12-23 ENCOUNTER — TELEPHONE (OUTPATIENT)
Dept: INTERNAL MEDICINE CLINIC | Facility: CLINIC | Age: 79
End: 2024-12-23

## 2024-12-23 ENCOUNTER — APPOINTMENT (OUTPATIENT)
Dept: CARDIAC REHAB | Facility: HOSPITAL | Age: 79
End: 2024-12-23
Attending: INTERNAL MEDICINE
Payer: MEDICARE

## 2024-12-23 NOTE — TELEPHONE ENCOUNTER
Spoke to patient, full name and date of birth verified.  Patient calling for help with her appointments - she could not remember who she is supposed to see in January.     1/6/25 1:00 PM with podiatry/[facility]  1/8/25 10:40 AM with pulmonary (Dr. Suzan Cantrell)   1/10/25 11:00 AM with ENT/Dr. Miller     Patient very grateful for the assistance. RN provided phone number for Dr. Mares for patient to call to confirm which office she needs to go to.     Patient stated she has no other needs at this time.

## 2024-12-24 ENCOUNTER — APPOINTMENT (OUTPATIENT)
Dept: CARDIAC REHAB | Facility: HOSPITAL | Age: 79
End: 2024-12-24
Attending: INTERNAL MEDICINE
Payer: MEDICARE

## 2024-12-26 ENCOUNTER — PATIENT OUTREACH (OUTPATIENT)
Dept: CASE MANAGEMENT | Age: 79
End: 2024-12-26

## 2024-12-26 ENCOUNTER — APPOINTMENT (OUTPATIENT)
Dept: CARDIAC REHAB | Facility: HOSPITAL | Age: 79
End: 2024-12-26
Attending: INTERNAL MEDICINE
Payer: MEDICARE

## 2024-12-26 DIAGNOSIS — N18.32 STAGE 3B CHRONIC KIDNEY DISEASE (HCC): Primary | ICD-10-CM

## 2024-12-26 DIAGNOSIS — E21.3 HYPERPARATHYROIDISM (HCC): ICD-10-CM

## 2024-12-26 DIAGNOSIS — E78.5 HYPERLIPIDEMIA ASSOCIATED WITH TYPE 2 DIABETES MELLITUS (HCC): ICD-10-CM

## 2024-12-26 DIAGNOSIS — J44.9 COPD, SEVERE (HCC): ICD-10-CM

## 2024-12-26 DIAGNOSIS — I25.10 CORONARY ARTERY DISEASE INVOLVING NATIVE CORONARY ARTERY OF NATIVE HEART WITHOUT ANGINA PECTORIS: ICD-10-CM

## 2024-12-26 DIAGNOSIS — H40.9 GLAUCOMA, UNSPECIFIED GLAUCOMA TYPE, UNSPECIFIED LATERALITY: ICD-10-CM

## 2024-12-26 DIAGNOSIS — J44.1 COPD EXACERBATION (HCC): ICD-10-CM

## 2024-12-26 DIAGNOSIS — E11.59 HYPERTENSION ASSOCIATED WITH TYPE 2 DIABETES MELLITUS (HCC): ICD-10-CM

## 2024-12-26 DIAGNOSIS — E11.69 HYPERLIPIDEMIA ASSOCIATED WITH TYPE 2 DIABETES MELLITUS (HCC): ICD-10-CM

## 2024-12-26 DIAGNOSIS — I15.2 HYPERTENSION ASSOCIATED WITH TYPE 2 DIABETES MELLITUS (HCC): ICD-10-CM

## 2024-12-26 DIAGNOSIS — I50.32 CHRONIC DIASTOLIC CONGESTIVE HEART FAILURE (HCC): ICD-10-CM

## 2024-12-26 DIAGNOSIS — E55.9 VITAMIN D DEFICIENCY: ICD-10-CM

## 2024-12-26 DIAGNOSIS — Z85.3 HISTORY OF BREAST CANCER: ICD-10-CM

## 2024-12-26 NOTE — PROGRESS NOTES
Spoke to Ariana for Chronic Care Management.      Updates to patient care team/comments: UTD  Patient reported changes in medications: UTD  Med Adherence  Comment: pt taking medications as prescribed     Health Maintenance:   Health Maintenance   Topic Date Due    Zoster Vaccines (1 of 2) Never done    Diabetes Care Dilated Eye Exam  10/13/2021    COVID-19 Vaccine (4 - 2024-25 season) 09/01/2024    Influenza Vaccine (1) 10/01/2024    HTN: BP Follow-Up  01/04/2025    Diabetes Care Foot Exam  02/19/2025 (Originally 7/23/2020)    Diabetes Care A1C  02/24/2025    Diabetes Care: Microalb/Creat Ratio  05/17/2025    Diabetes Care: GFR  09/03/2025    DEXA Scan  Completed    MA Annual Health Assessment  Completed    Annual Depression Screening  Completed    Fall Risk Screening (Annual)  Completed    Pneumococcal Vaccine: 65+ Years  Completed    Mammogram  Discontinued    Colonoscopy  Discontinued       Patient updates/concerns:    Pt is regularly checking her O2 and it has been dipping more frequently leading to increased use of oxygen. She has been in contact with dme company and has recently had the [employer] serviced about a month ago. Pt did not need any assistance from company to coordinate another service call, pt does not think that her oxygen levels are caused by faulty tank.    Reviewed appts with pt and she states taht today she has to cancel her pulmonary rehab. Pt was unable to coordinate transportation. Pt will call and cancel. Pt discussed how much she is benefiting from rehab and wants to complete it appropriately.    Pt has visit scheduled with dr boone upcoming. Pt has talked to a nurse at that office who recommended pt see a cardiologist. Pt verbalized understanding and has not yet made appt. [facility] needs [facility] with occasional phone calls.    Pt reviewed her hydration habits and how increased use of oxygen promotes increased hydration. Pt is also using the  inhaler more. She tries to space out use longer than 3 hours.    Pt is back to felling weak again. She was given a round of steroids and that improved her condition for a short time. Once the steroid was complete her fatigue returned.    Pt states that her insurance issues have been resolved   Goals/Action Plan:    Active goal from previous outreach: managing healthcare    Patient reported progress towards goals: pt continues to see providers as needed and takes meds as prescribed               - What: breathing issues           - Where/When/How: pt is progressing nicely at [facility] rehab and continues to regularly use inhaler  Patient Reported Barriers and Concerns: n/a                   - Plan for overcoming barriers: none    Care Managers Interventions: continue to provide encouragement and education for healthy coping and dx    Future Appointments:   Future Appointments   Date Time Provider Department Center   12/26/2024  1:15 PM [facility] PULM PHASE 2 [facility] REHAB [facility]   12/31/2024  1:15 PM [facility] PULM PHASE 2 [facility] CP REHAB EM [facility]   1/2/2025  1:15 PM [facility] PULM PHASE 2 [facility] CP REHAB [facility]   1/6/2025  1:00 PM Caridad Flores DPM [facility]   1/7/2025  1:15 PM [facility] PULM PHASE 2 CF CP REHAB EM [facility]   1/9/2025  1:15 PM [facility] PULM PHASE 2 CF CP REHAB EM [facility]   1/10/2025 11:00 AM Heri Miller MD [facility]   1/14/2025  1:15 PM [facility] PULM PHASE 2 CF CP REHAB EM [facility]   1/16/2025  1:15 PM [facility] PULM PHASE 2 CF CP REHAB EM [facility]   1/21/2025  1:15 PM [facility] PULM PHASE 2 CF CP REHAB EM [facility]         Next Care Manager Follow Up Date: one month    Reason For Follow Up: review progress and or barriers towards patient's goals.     Time Spent This Encounter Total: 21 min medical record review, telephone communication, care plan updates where needed, education, goals, and action plan recreation/update. Provided acknowledgment and validation to patient's concerns.   Monthly Minute Total including today: 21  Physical assessment,  complete health history, and need for CCM established by Enrique Braun MD.

## 2024-12-30 ENCOUNTER — APPOINTMENT (OUTPATIENT)
Dept: CARDIAC REHAB | Facility: HOSPITAL | Age: 79
End: 2024-12-30
Attending: INTERNAL MEDICINE
Payer: MEDICARE

## 2025-01-02 ENCOUNTER — APPOINTMENT (OUTPATIENT)
Dept: CARDIAC REHAB | Facility: HOSPITAL | Age: 80
End: 2025-01-02
Attending: INTERNAL MEDICINE
Payer: MEDICARE

## 2025-01-04 ENCOUNTER — LAB ENCOUNTER (OUTPATIENT)
Dept: LAB | Age: 80
End: 2025-01-04
Attending: PHYSICIAN ASSISTANT
Payer: MEDICARE

## 2025-01-04 DIAGNOSIS — R06.02 SHORTNESS OF BREATH: ICD-10-CM

## 2025-01-04 DIAGNOSIS — I50.32 CHRONIC DIASTOLIC HEART FAILURE (HCC): Primary | ICD-10-CM

## 2025-01-04 DIAGNOSIS — N18.30 CHRONIC KIDNEY DISEASE, STAGE III (MODERATE) (HCC): ICD-10-CM

## 2025-01-04 LAB
ANION GAP SERPL CALC-SCNC: 5 MMOL/L (ref 0–18)
BUN BLD-MCNC: 22 MG/DL (ref 9–23)
BUN/CREAT SERPL: 16.8 (ref 10–20)
CALCIUM BLD-MCNC: 10.7 MG/DL (ref 8.7–10.4)
CHLORIDE SERPL-SCNC: 99 MMOL/L (ref 98–112)
CO2 SERPL-SCNC: 38 MMOL/L (ref 21–32)
CREAT BLD-MCNC: 1.31 MG/DL
EGFRCR SERPLBLD CKD-EPI 2021: 42 ML/MIN/1.73M2 (ref 60–?)
FASTING STATUS PATIENT QL REPORTED: YES
GLUCOSE BLD-MCNC: 116 MG/DL (ref 70–99)
NT-PROBNP SERPL-MCNC: 151 PG/ML (ref ?–450)
OSMOLALITY SERPL CALC.SUM OF ELEC: 298 MOSM/KG (ref 275–295)
POTASSIUM SERPL-SCNC: 4.6 MMOL/L (ref 3.5–5.1)
SODIUM SERPL-SCNC: 142 MMOL/L (ref 136–145)

## 2025-01-04 PROCEDURE — 36415 COLL VENOUS BLD VENIPUNCTURE: CPT

## 2025-01-04 PROCEDURE — 80048 BASIC METABOLIC PNL TOTAL CA: CPT

## 2025-01-04 PROCEDURE — 83880 ASSAY OF NATRIURETIC PEPTIDE: CPT

## 2025-01-06 ENCOUNTER — OFFICE VISIT (OUTPATIENT)
Dept: PODIATRY CLINIC | Facility: CLINIC | Age: 80
End: 2025-01-06

## 2025-01-06 ENCOUNTER — APPOINTMENT (OUTPATIENT)
Dept: CARDIAC REHAB | Facility: HOSPITAL | Age: 80
End: 2025-01-06
Attending: INTERNAL MEDICINE
Payer: MEDICARE

## 2025-01-06 DIAGNOSIS — L60.3 NAIL DYSTROPHY: ICD-10-CM

## 2025-01-06 DIAGNOSIS — E11.9 DIET-CONTROLLED DIABETES MELLITUS (HCC): Primary | ICD-10-CM

## 2025-01-06 PROCEDURE — 1159F MED LIST DOCD IN RCRD: CPT | Performed by: STUDENT IN AN ORGANIZED HEALTH CARE EDUCATION/TRAINING PROGRAM

## 2025-01-06 PROCEDURE — 99213 OFFICE O/P EST LOW 20 MIN: CPT | Performed by: STUDENT IN AN ORGANIZED HEALTH CARE EDUCATION/TRAINING PROGRAM

## 2025-01-06 NOTE — PROGRESS NOTES
[facility] Podiatry  Progress Note      Ariana Osorio is a 79 year old female.   Chief Complaint   Patient presents with    Diabetic Foot Care     F/u Diabetic Foot Care.  Has muscle cramp since she is taking Furosemide and is a d/e. FBS 75 this am, on 8/24/24 A1C= 5.8 On 09/03/2024 LOV with PCP Enrique Sorensen MD            HPI:       Patient is a pleasant 79-year-old diabetic female with clinic for bilateral foot evaluation.  Denies any pedal injuries or trauma.    Allergies: Allopurinol, Dog epithelium, Dust, Heparin, Smoke, Adhesive tape (rosins), Montelukast, Statins, Xanax [alprazolam], Adhesive tape, Amoxicillin, Other, Sulfa antibiotics, Ace inhibitors, and Aspirin    Current Outpatient Medications   Medication Sig Dispense Refill    predniSONE 10 MG Oral Tab Take 4 tabs po x2d, 3 tabs x2d, 2 tabs x2d, 1 tab x2d 20 tablet 0    amLODIPine 2.5 MG Oral Tab Take 1 tablet (2.5 mg total) by mouth in the morning and 1 tablet (2.5 mg total) before bedtime. 180 tablet 1    JARDIANCE 10 MG Oral Tab       Tiotropium Bromide Monohydrate 2.5 MCG/ACT Inhalation Aero Soln       Glucose Blood (TRUE METRIX BLOOD GLUCOSE TEST) In Vitro Strip Use 1 (one) new strip 2 times daily for blood glucose monitoring 200 strip 3    Alcohol Swabs (DROPSAFE ALCOHOL PREP) 70 % Does not apply Pads Apply 1 Pad topically 4 (four) times daily. Before each finger stick. 400 each 3    Blood Glucose Calibration (TRUE METRIX LEVEL 2) Normal In Vitro Solution 1 drop as needed (To use monthly, with each new vial of test strips, when you change your batteries, or if you suspect the meter is malfunctioning.). 1 each 3    TRUEplus Lancets 33G Does not apply Misc 1 Lancet by Finger stick route 4 (four) times daily. 400 each 3    budesonide 0.5 MG/2ML Inhalation Suspension Take 2 mL (0.5 mg total) by nebulization 2 (two) times daily. 180 each 1    furosemide 20 MG Oral Tab Take 1 tablet (20 mg total) by mouth daily. 30 tablet 2     potassium chloride 10 MEQ Oral Tab CR Take 1 tablet (10 mEq total) by mouth daily. 30 tablet 2    fluticasone-salmeterol (WIXELA INHUB) 500-50 MCG/ACT Inhalation Aerosol Powder, Breath Activated Inhale 1 puff into the lungs 2 (two) times daily.      glipiZIDE 5 MG Oral Tab Take 1 tablet (5 mg total) by mouth before breakfast. 90 tablet 1    pantoprazole 20 MG Oral Tab EC Take 1 tablet (20 mg total) by mouth every morning before breakfast. 90 tablet 3    metoprolol succinate ER 25 MG Oral Tablet 24 Hr Take 1 tablet (25 mg total) by mouth daily. 90 tablet 1    omega-3 fatty acids 1000 MG Oral Cap Take 1,000 mg by mouth daily.      Multiple Vitamin (MULTI-VITAMIN DAILY) Oral Tab Take 1 tablet by mouth daily.      Cholecalciferol (VITAMIN D) 2000 units Oral Cap Take 1 capsule (2,000 Units total) by mouth every other day.      febuxostat 40 MG Oral Tab Take 1 tablet (40 mg total) by mouth daily. (Patient not taking: Reported on 1/6/2025) 90 tablet 1      Past Medical History:    Age-related cataract of left eye    Age-related cataract of right eye    Anxiety    Asthma (HCC)    Atherosclerosis of coronary artery    Breast CA (HCC)    lt mastectomy    Breast CA (HCC)    lumpectomy, right    Cancer (HCC)    breast cancer    Carotid artery disease (HCC)    Carotid stenosis    Closed fracture of multiple ribs of left side with routine healing, subsequent encounter    Congestive heart disease (HCC)    COPD (chronic obstructive pulmonary disease) (HCC)    on O2 at 2 liters    Coronary atherosclerosis    Depression    Diabetes (HCC)    takes insulin when hospitalized, takes oral meds at home    Diabetes mellitus (HCC)    Diastolic dysfunction without heart failure    Esophageal reflux    Essential hypertension    Generalized anxiety disorder    Gout    Gout    High blood pressure    High cholesterol    History of pituitary adenoma    Hyperlipidemia    Major depressive disorder, single episode, moderate (HCC)    Obesity      Past  Surgical History:   Procedure Laterality Date    Cabg      Carotid endarterectomy Bilateral     Cataract      Colonoscopy      2013    Colonoscopy  2013    Colonoscopy N/A 02/21/2023    Procedure: COLONOSCOPY;  Surgeon: Abdiaziz Mleendez MD;  Location: [facility] ENDOSCOPY    Hernia surgery      Lumpectomy right  2014    Mastectomy left Left 1999    Radiation right  2014      Family History   Problem Relation Age of Onset    Asthma Father     Hypertension Father     Heart Disorder Father     Other (Other) Mother         thyroid surgery    Asthma Sister     Asthma Brother     Other (Other) Brother         gout    Breast Cancer Self 43        rt breast 69      Social History     Socioeconomic History    Marital status:    Tobacco Use    Smoking status: Never     Passive exposure: Never    Smokeless tobacco: Never   Vaping Use    Vaping status: Never Used   Substance and Sexual Activity    Alcohol use: No     Comment: rarely at weddings    Drug use: No   Other Topics Concern    Reaction to local anesthetic No    Pt has a pacemaker No    Pt has a defibrillator No           REVIEW OF SYSTEMS:     Denies nause, fever, chills  No calf pain  Denies chest pain or SOB      EXAM:   There were no vitals taken for this visit.  GENERAL: well developed, well nourished, in no apparent distress  EXTREMITIES:   1. Integument: Normal skin temperature and turgor. Toenails x10 are elongated, thickened and discolored with subungal derbi.     2. Vascular: Dorsalis pedis two out of four bilateral and posterior tibial pulses two out of   four bilateral, capillary refill normal.   3. Musculoskeletal: All muscle groups are graded 5 out of 5 in the foot and ankle.   4. Neurological: Normal sharp dull sensation; reflexes normal.             ASSESSMENT AND PLAN:   Diagnoses and all orders for this visit:    Diet-controlled diabetes mellitus (HCC)    Nail dystrophy          Plan:         -Patient examined, chart history reviewed.  -Discussed  importance of proper pedal hygiene, regular foot checks, and tight glucose control.  -Sharply debrided nails x10 with a sterile nail nipper achieving a 20% reduction in thickness and length, without incident.   -Ambulate with supportive shoes and inserts and avoid walking barefoot.  -Educated patient on acute signs of infection advised patient to seek immediate medical attention if symptoms arise.    RTC in 9 weeks.     The patient indicates understanding of these issues and agrees to the plan.        Caridad Flores DPM

## 2025-01-07 ENCOUNTER — CARDPULM VISIT (OUTPATIENT)
Dept: CARDIAC REHAB | Facility: HOSPITAL | Age: 80
End: 2025-01-07
Attending: INTERNAL MEDICINE
Payer: MEDICARE

## 2025-01-07 PROCEDURE — 94625 PHY/QHP OP PULM RHB W/O MNTR: CPT

## 2025-01-09 ENCOUNTER — CARDPULM VISIT (OUTPATIENT)
Dept: CARDIAC REHAB | Facility: HOSPITAL | Age: 80
End: 2025-01-09
Attending: INTERNAL MEDICINE
Payer: MEDICARE

## 2025-01-09 PROCEDURE — 94625 PHY/QHP OP PULM RHB W/O MNTR: CPT

## 2025-01-13 ENCOUNTER — APPOINTMENT (OUTPATIENT)
Dept: CARDIAC REHAB | Facility: HOSPITAL | Age: 80
End: 2025-01-13
Attending: INTERNAL MEDICINE
Payer: MEDICARE

## 2025-01-15 ENCOUNTER — TELEPHONE (OUTPATIENT)
Dept: INTERNAL MEDICINE CLINIC | Facility: CLINIC | Age: 80
End: 2025-01-15

## 2025-01-15 DIAGNOSIS — H61.23 BILATERAL IMPACTED CERUMEN: Primary | ICD-10-CM

## 2025-01-15 RX ORDER — DOXYCYCLINE 100 MG/1
100 TABLET ORAL 2 TIMES DAILY
Qty: 14 TABLET | Refills: 0 | Status: SHIPPED | OUTPATIENT
Start: 2025-01-15

## 2025-01-15 NOTE — TELEPHONE ENCOUNTER
Condition update:  Patient called, verified name/.  States just got out of [facility] after 4 day admission.  (Seen for: New onset atrial fibrillation  Chronic obstructive pulmonary disease, unspecified COPD  Acute on chronic congestive heart failure, unspecified heart failure type     She states she is \"coughing up green phlegm and the hospital did not do anything about it.\"  She is asking Dr Braun for antibiotic.  Denies fever.  Dr Braun, please advise?            Discharge notes on Care Everywhere:  START taking these medications     apixaban 5 mg Tablet  Commonly known as: ELIQUIS  Take 1 tablet by mouth 2 (two) times daily. Indications: treatment to prevent blood clots in chronic atrial fibrillation    bumetanide 1 mg tablet  Commonly known as: BUMEX  Take 1 tablet by mouth Every other day. Indications: visible water retention    predniSONE 20 mg tablet  Commonly known as: DELTASONE  Take 2 tablets by mouth daily with breakfast for 2 days. Indications: worsening chronic obstructive pulmonary disease  Start taking on: January 15, 2025          CONTINUE taking these medications     amLODIPine 2.5 mg tablet  Commonly known as: NORVASC    glipiZIDE 5 mg tablet  Commonly known as: GLUCOTROL    metoprolol succinate 25 mg XL tablet  Commonly known as: TOPROL XL    omega-3-dha-epa-fish oil 1,000 (120-180) mg Capsule    VITAMIN D3 50 mcg (2,000 units) Tablet  Generic drug: cholecalciferol (vitamin D3)    WIXELA INHUB 500-50 mcg/dose inhaler  Generic drug: fluticasone propion-salmeteroL            Where to Get Your Medications

## 2025-01-15 NOTE — TELEPHONE ENCOUNTER
Advised patient of Dr. Braun's note. Patient verbalized understanding and has number to ENT.  Appointment made for 1/20/2025 at 1:30pm with Dr Braun in [place].

## 2025-01-15 NOTE — TELEPHONE ENCOUNTER
Patient calling to ask for referral for ENT Dr. Miller, she had appt scheduled but went to hospital and now needs new referral for care. Ears - wax build up and runny nose \"all the time\".     Also, asking for visit to follow up ER appointment.   She cannot come Friday in the morning (open visit time). Offered next Thursday but prefers sooner.     She is preferring Monday afternoon because her caregiver can bring her that day in the afternoon.  Are you able to accommodate?

## 2025-01-15 NOTE — TELEPHONE ENCOUNTER
Ok to start abx as requested; start  doxycyline for copd exacerbation as seen on discharge summary from [facility];  erx sent to [facility] pharmacy

## 2025-01-15 NOTE — TELEPHONE ENCOUNTER
Referral signed;  Ok to add her in on Monday as requested at 1:30pm.   Also her request for antibiotics earlier was sent to her [facility] pharmacy

## 2025-01-16 ENCOUNTER — APPOINTMENT (OUTPATIENT)
Dept: CARDIAC REHAB | Facility: HOSPITAL | Age: 80
End: 2025-01-16
Attending: INTERNAL MEDICINE
Payer: MEDICARE

## 2025-01-20 ENCOUNTER — APPOINTMENT (OUTPATIENT)
Dept: CARDIAC REHAB | Facility: HOSPITAL | Age: 80
End: 2025-01-20
Attending: INTERNAL MEDICINE
Payer: MEDICARE

## 2025-01-21 ENCOUNTER — OFFICE VISIT (OUTPATIENT)
Dept: INTERNAL MEDICINE CLINIC | Facility: CLINIC | Age: 80
End: 2025-01-21

## 2025-01-21 VITALS
BODY MASS INDEX: 29 KG/M2 | HEART RATE: 79 BPM | DIASTOLIC BLOOD PRESSURE: 73 MMHG | SYSTOLIC BLOOD PRESSURE: 129 MMHG | WEIGHT: 171.06 LBS

## 2025-01-21 DIAGNOSIS — E11.22 CONTROLLED TYPE 2 DIABETES MELLITUS WITH STAGE 3 CHRONIC KIDNEY DISEASE, WITHOUT LONG-TERM CURRENT USE OF INSULIN (HCC): ICD-10-CM

## 2025-01-21 DIAGNOSIS — J96.21 ACUTE ON CHRONIC RESPIRATORY FAILURE WITH HYPOXIA (HCC): ICD-10-CM

## 2025-01-21 DIAGNOSIS — E78.2 MIXED HYPERLIPIDEMIA: ICD-10-CM

## 2025-01-21 DIAGNOSIS — I48.91 NEW ONSET A-FIB (HCC): Primary | ICD-10-CM

## 2025-01-21 DIAGNOSIS — N18.30 CONTROLLED TYPE 2 DIABETES MELLITUS WITH STAGE 3 CHRONIC KIDNEY DISEASE, WITHOUT LONG-TERM CURRENT USE OF INSULIN (HCC): ICD-10-CM

## 2025-01-21 DIAGNOSIS — Z95.1 S/P CABG (CORONARY ARTERY BYPASS GRAFT): ICD-10-CM

## 2025-01-21 DIAGNOSIS — I25.10 CORONARY ARTERY DISEASE INVOLVING NATIVE CORONARY ARTERY OF NATIVE HEART WITHOUT ANGINA PECTORIS: ICD-10-CM

## 2025-01-21 DIAGNOSIS — I50.33 ACUTE ON CHRONIC HEART FAILURE WITH PRESERVED EJECTION FRACTION (HCC): ICD-10-CM

## 2025-01-21 DIAGNOSIS — J44.89 ASTHMA-COPD OVERLAP SYNDROME (HCC): ICD-10-CM

## 2025-01-21 DIAGNOSIS — I10 PRIMARY HYPERTENSION: ICD-10-CM

## 2025-01-21 PROCEDURE — 99214 OFFICE O/P EST MOD 30 MIN: CPT | Performed by: INTERNAL MEDICINE

## 2025-01-21 PROCEDURE — 3074F SYST BP LT 130 MM HG: CPT | Performed by: INTERNAL MEDICINE

## 2025-01-21 PROCEDURE — 3078F DIAST BP <80 MM HG: CPT | Performed by: INTERNAL MEDICINE

## 2025-01-21 PROCEDURE — 1159F MED LIST DOCD IN RCRD: CPT | Performed by: INTERNAL MEDICINE

## 2025-01-21 PROCEDURE — 1160F RVW MEDS BY RX/DR IN RCRD: CPT | Performed by: INTERNAL MEDICINE

## 2025-01-21 RX ORDER — APIXABAN 5 MG/1
5 TABLET, FILM COATED ORAL DAILY
COMMUNITY
Start: 2025-01-14

## 2025-01-21 RX ORDER — BUMETANIDE 1 MG/1
1 TABLET ORAL EVERY OTHER DAY
COMMUNITY
Start: 2025-01-14

## 2025-01-21 RX ORDER — AZITHROMYCIN 250 MG
250 CAPSULE ORAL DAILY
COMMUNITY
Start: 2025-01-20

## 2025-01-21 RX ORDER — REVEFENACIN 175 UG/3ML
175 SOLUTION RESPIRATORY (INHALATION) DAILY
COMMUNITY
Start: 2025-01-08

## 2025-01-23 ENCOUNTER — APPOINTMENT (OUTPATIENT)
Dept: CARDIAC REHAB | Facility: HOSPITAL | Age: 80
End: 2025-01-23
Attending: INTERNAL MEDICINE
Payer: MEDICARE

## 2025-01-25 NOTE — PROGRESS NOTES
Subjective:     Patient ID: Ariana Osorio is a 79 year old female.    Patient presents today for posthospital follow-up.  She was admitted at [facility] after the patient presented with increasing shortness of breath.  She was diagnosed to have new onset atrial fibrillation rapid ventricular response that led to acute on chronic diastolic heart failure as well as exacerbation of asthma/COPD causing her acute on chronic hypoxemic respiratory failure.  Hospital, she was given anticoagulation for stroke prevention.  2D echo done showed grade 2 diastolic dysfunction and pulmonary hypertension.  Systolic function was intact.  She was seen by pulmonologist as well as the cardiology service while in the hospital.  She had a mildly elevated troponin was felt to be demand ischemia by cardiology.  She was released, and given steroids for her exacerbation of asthma/COPD.    History/Other:   Review of Systems   Constitutional: Negative.    HENT: Negative.     Respiratory:  Positive for cough, shortness of breath and wheezing.    Cardiovascular:  Negative for chest pain, palpitations and leg swelling.   Gastrointestinal: Negative.    Genitourinary: Negative.    Current Outpatient Medications   Medication Sig Dispense Refill   • ELIQUIS 5 MG Oral Tab Take 1 tablet (5 mg total) by mouth daily.     • ZITHROMAX Z- MG Oral Tab Take 1 tablet (250 mg total) by mouth daily.     • bumetanide 1 MG Oral Tab Take 1 tablet (1 mg total) by mouth every other day.     • YUPELRI 175 MCG/3ML Inhalation Solution nebulizer solution Take 175 mcg by nebulization daily.     • Doxycycline Monohydrate 100 MG Oral Tab Take 100 mg by mouth 2 (two) times daily. 14 tablet 0   • amLODIPine 2.5 MG Oral Tab Take 1 tablet (2.5 mg total) by mouth in the morning and 1 tablet (2.5 mg total) before bedtime. 180 tablet 1   • Glucose Blood (TRUE METRIX BLOOD GLUCOSE TEST) In Vitro Strip Use 1 (one) new strip 2 times daily for blood glucose monitoring  200 strip 3   • Alcohol Swabs (DROPSAFE ALCOHOL PREP) 70 % Does not apply Pads Apply 1 Pad topically 4 (four) times daily. Before each finger stick. 400 each 3   • Blood Glucose Calibration (TRUE METRIX LEVEL 2) Normal In Vitro Solution 1 drop as needed (To use monthly, with each new vial of test strips, when you change your batteries, or if you suspect the meter is malfunctioning.). 1 each 3   • TRUEplus Lancets 33G Does not apply Misc 1 Lancet by Finger stick route 4 (four) times daily. 400 each 3   • budesonide 0.5 MG/2ML Inhalation Suspension Take 2 mL (0.5 mg total) by nebulization 2 (two) times daily. 180 each 1   • glipiZIDE 5 MG Oral Tab Take 1 tablet (5 mg total) by mouth before breakfast. 90 tablet 1   • pantoprazole 20 MG Oral Tab EC Take 1 tablet (20 mg total) by mouth every morning before breakfast. 90 tablet 3   • omega-3 fatty acids 1000 MG Oral Cap Take 1,000 mg by mouth daily.     • Cholecalciferol (VITAMIN D) 2000 units Oral Cap Take 1 capsule (2,000 Units total) by mouth every other day.     • fluticasone-salmeterol (WIXELA INHUB) 500-50 MCG/ACT Inhalation Aerosol Powder, Breath Activated Inhale 1 puff into the lungs 2 (two) times daily. (Patient not taking: Reported on 1/21/2025)     • metoprolol succinate ER 25 MG Oral Tablet 24 Hr Take 1 tablet (25 mg total) by mouth daily. (Patient not taking: Reported on 1/21/2025) 90 tablet 1   • febuxostat 40 MG Oral Tab Take 1 tablet (40 mg total) by mouth daily. (Patient not taking: Reported on 1/21/2025) 90 tablet 1     Allergies:Allergies[1]    Past Medical History:   • Age-related cataract of left eye   • Age-related cataract of right eye   • Anxiety   • Asthma (HCC)   • Atherosclerosis of coronary artery   • Breast CA (HCC)    lt mastectomy   • Breast CA (HCC)    lumpectomy, right   • Cancer (HCC)    breast cancer   • Carotid artery disease (HCC)   • Carotid stenosis   • Closed fracture of multiple ribs of left side with routine healing, subsequent  encounter   • Congestive heart disease (HCC)   • COPD (chronic obstructive pulmonary disease) (HCC)    on O2 at 2 liters   • Coronary atherosclerosis   • Depression   • Diabetes (HCC)    takes insulin when hospitalized, takes oral meds at home   • Diabetes mellitus (HCC)   • Diastolic dysfunction without heart failure   • Esophageal reflux   • Essential hypertension   • Generalized anxiety disorder   • Gout   • Gout   • High blood pressure   • High cholesterol   • History of pituitary adenoma   • Hyperlipidemia   • Major depressive disorder, single episode, moderate (HCC)   • Obesity      Past Surgical History:   Procedure Laterality Date   • Cabg     • Carotid endarterectomy Bilateral    • Cataract     • Colonoscopy      2013   • Colonoscopy  2013   • Colonoscopy N/A 02/21/2023    Procedure: COLONOSCOPY;  Surgeon: Abdiaziz Melendez MD;  Location: [facility] ENDOSCOPY   • Hernia surgery     • Lumpectomy right  2014   • Mastectomy left Left 1999   • Radiation right  2014      Family History   Problem Relation Age of Onset   • Asthma Father    • Hypertension Father    • Heart Disorder Father    • Other (Other) Mother         thyroid surgery   • Asthma Sister    • Asthma Brother    • Other (Other) Brother         gout   • Breast Cancer Self 43        rt breast 69      Social History:   Social History     Socioeconomic History   • Marital status:    Tobacco Use   • Smoking status: Never     Passive exposure: Never   • Smokeless tobacco: Never   Vaping Use   • Vaping status: Never Used   Substance and Sexual Activity   • Alcohol use: No     Comment: rarely at weddings   • Drug use: No   Other Topics Concern   • Reaction to local anesthetic No   • Pt has a pacemaker No   • Pt has a defibrillator No     Social Drivers of Health     Financial Resource Strain: Low Risk  (8/28/2024)    Financial Resource Strain    • Difficulty of Paying Living Expenses: Not very hard    • Med Affordability: No   Food Insecurity: No Food  Insecurity (8/23/2024)    Food Insecurity    • Food Insecurity: Never true   Transportation Needs: No Transportation Needs (8/28/2024)    Transportation Needs    • Lack of Transportation: No   Physical Activity: Insufficiently Active (7/13/2022)    Exercise Vital Sign    • Days of Exercise per Week: 1 day    • Minutes of Exercise per Session: 10 min   Stress: No Stress Concern Present (10/6/2023)    Stress    • Feeling of Stress : No    Social Connections   Housing Stability: Low Risk  (8/23/2024)    Housing Stability    • Housing Instability: No        Objective:   Physical Exam  Constitutional:       General: She is not in acute distress.     Appearance: She is not ill-appearing, toxic-appearing or diaphoretic.      Comments: Pt on wheel chair   HENT:      Right Ear: External ear normal.      Left Ear: External ear normal.   Eyes:      General: No scleral icterus.        Right eye: No discharge.         Left eye: No discharge.   Cardiovascular:      Rate and Rhythm: Normal rate. Rhythm irregular.      Heart sounds: Murmur heard.   Pulmonary:      Effort: Pulmonary effort is normal. No respiratory distress.      Breath sounds: No stridor. No wheezing or rales.   Abdominal:      General: Bowel sounds are normal. There is no distension.      Palpations: Abdomen is soft.      Tenderness: There is no abdominal tenderness. There is no guarding.   Musculoskeletal:      Cervical back: Normal range of motion and neck supple. No rigidity or tenderness.      Right lower leg: No edema.      Left lower leg: No edema.   Lymphadenopathy:      Cervical: No cervical adenopathy.   Skin:     Coloration: Skin is not jaundiced or pale.   Neurological:      Mental Status: She is alert.       Assessment & Plan:   (I48.91) New onset a-fib (HCC)  (primary encounter diagnosis)  Plan: Patient had been started on Eliquis for stroke prevention.  Her heart rate remains controlled.  She had already had follow-up with cardiology   [place].    (I50.33) Acute on chronic heart failure with preserved ejection fraction (HCC)  Plan: Proved since her discharge from the hospital.  She remains on diuretic.  She already had a follow-up with her cardiologist Dr. Hawthorne.    (J96.21) Acute on chronic respiratory failure with hypoxia (HCC)  Plan: The combination of exacerbation of her asthma COPD as well as diastolic heart failure.  This is improving with the patient's continued use of inhalers as well as O2 supplements and diuretics.    (J44.89) Asthma-COPD overlap syndrome (HCC)  Plan: She continues to follow-up with her pulmonologist already seen them after her hospitalization.  She has been on inhalers as well as a nebulizer and O2 supplement.    (E11.22,  N18.30) Controlled type 2 diabetes mellitus with stage 3 chronic kidney disease, without long-term current use of insulin (HCC)  Plan: Her A1c was 5.9 while she was in the hospital so she was advised by the hospitalist to discontinue her glipizide which is only taking low-dose of 2.5 mg daily before.    (I10) Primary hypertension  Plan: Controlled with current blood pressure meds.  CPM.    (E78.2) Mixed hyperlipidemia  Plan: Low-fat low-cholesterol diet.  The patient has been intolerant of statin, Zetia and declined to be on PCSK9 inhibitor.    (I25.10) Coronary artery disease involving native coronary artery of native heart without angina pectoris  Plan: Previous history of CABG.  She had a mildly elevated troponin during this admission but was felt to be demand ischemia per cardiology notes.  She is on her antiplatelet therapy the patient had been intolerant of statins, Zetia and find to be on PC SK 9 inhibitors.     (Z95.1) S/P CABG (coronary artery bypass graft)  Plan: See above.       No orders of the defined types were placed in this encounter.      Meds This Visit:  Requested Prescriptions      No prescriptions requested or ordered in this encounter       Imaging & Referrals:  None             [1]   Allergies  Allergen Reactions   • Allopurinol HIVES, SWELLING and SHORTNESS OF BREATH   • Dog Epithelium SHORTNESS OF BREATH     Hair and saliva   • Dust SHORTNESS OF BREATH   • Heparin SWELLING   • Smoke SHORTNESS OF BREATH   • Adhesive Tape (Rosins) RASH   • Montelukast HALLUCINATION   • Statins MYALGIA     Pt had developed myalgia and myopathy   • Xanax [Alprazolam] RESTLESSNESS   • Adhesive Tape OTHER (SEE COMMENTS)   • Amoxicillin OTHER (SEE COMMENTS)   • Other OTHER (SEE COMMENTS)   • Sulfa Antibiotics OTHER (SEE COMMENTS)   • Ace Inhibitors Coughing   • Aspirin RASH

## 2025-01-27 ENCOUNTER — PATIENT OUTREACH (OUTPATIENT)
Dept: CASE MANAGEMENT | Age: 80
End: 2025-01-27

## 2025-02-07 ENCOUNTER — PATIENT OUTREACH (OUTPATIENT)
Dept: CASE MANAGEMENT | Age: 80
End: 2025-02-07

## 2025-02-11 ENCOUNTER — TELEPHONE (OUTPATIENT)
Dept: OTOLARYNGOLOGY | Facility: CLINIC | Age: 80
End: 2025-02-11

## 2025-02-11 ENCOUNTER — OFFICE VISIT (OUTPATIENT)
Dept: OTOLARYNGOLOGY | Facility: CLINIC | Age: 80
End: 2025-02-11

## 2025-02-11 DIAGNOSIS — H61.23 BILATERAL IMPACTED CERUMEN: Primary | ICD-10-CM

## 2025-02-11 PROCEDURE — 1159F MED LIST DOCD IN RCRD: CPT | Performed by: OTOLARYNGOLOGY

## 2025-02-11 PROCEDURE — 1160F RVW MEDS BY RX/DR IN RCRD: CPT | Performed by: OTOLARYNGOLOGY

## 2025-02-11 PROCEDURE — 69210 REMOVE IMPACTED EAR WAX UNI: CPT | Performed by: OTOLARYNGOLOGY

## 2025-02-11 NOTE — PROGRESS NOTES
Ariana Osorio is a 79 year old female.    Chief Complaint   Patient presents with    Ear Problem     Ear cleaning     Sinus Problem     PND        HISTORY OF PRESENT ILLNESS    Patient presents for cerumen removal. No other complaints or concerns at this time    Social History     Socioeconomic History    Marital status:    Tobacco Use    Smoking status: Never     Passive exposure: Never    Smokeless tobacco: Never   Vaping Use    Vaping status: Never Used   Substance and Sexual Activity    Alcohol use: No     Comment: rarely at weddings    Drug use: No   Other Topics Concern    Reaction to local anesthetic No    Pt has a pacemaker No    Pt has a defibrillator No       Family History   Problem Relation Age of Onset    Asthma Father     Hypertension Father     Heart Disorder Father     Other (Other) Mother         thyroid surgery    Asthma Sister     Asthma Brother     Other (Other) Brother         gout    Breast Cancer Self 43        rt breast 69       Past Medical History:    Age-related cataract of left eye    Age-related cataract of right eye    Anxiety    Asthma (HCC)    Atherosclerosis of coronary artery    Breast CA (HCC)    lt mastectomy    Breast CA (HCC)    lumpectomy, right    Cancer (HCC)    breast cancer    Carotid artery disease    Carotid stenosis    Closed fracture of multiple ribs of left side with routine healing, subsequent encounter    Congestive heart disease (HCC)    COPD (chronic obstructive pulmonary disease) (HCC)    on O2 at 2 liters    Coronary atherosclerosis    Depression    Diabetes (HCC)    takes insulin when hospitalized, takes oral meds at home    Diabetes mellitus (HCC)    Diastolic dysfunction without heart failure    Esophageal reflux    Essential hypertension    Generalized anxiety disorder    Gout    Gout    High blood pressure    High cholesterol    History of pituitary adenoma    Hyperlipidemia    Major depressive disorder, single episode, moderate (HCC)    Obesity        Past Surgical History:   Procedure Laterality Date    Cabg      Carotid endarterectomy Bilateral     Cataract      Colonoscopy      2013    Colonoscopy  2013    Colonoscopy N/A 02/21/2023    Procedure: COLONOSCOPY;  Surgeon: Abdiaziz Melendez MD;  Location: [facility] ENDOSCOPY    Hernia surgery      Lumpectomy right  2014    Mastectomy left Left 1999    Radiation right  2014       REVIEW OF SYSTEMS    System Neg/Pos Details   Constitutional Negative Fatigue, fever and weight loss.   ENMT Negative Drooling.   Eyes Negative Blurred vision and vision changes.   Respiratory Negative Dyspnea and wheezing.   Cardio Negative Chest pain, irregular heartbeat/palpitations and syncope.   GI Negative Abdominal pain and diarrhea.   Endocrine Negative Cold intolerance and heat intolerance.   Neuro Negative Tremors.   Psych Negative Anxiety and depression.   Integumentary Negative Frequent skin infections, pigment change and rash.   Hema/Lymph Negative Easy bleeding and easy bruising.           PHYSICAL EXAM    There were no vitals taken for this visit.       Constitutional Normal Overall appearance - Normal.        Neck Exam Normal Inspection - Normal. Palpation - Normal. Parotid gland - Normal. Thyroid gland - Normal.             Head/Face Normal Facial features - Normal. Eyebrows - Normal. Skull - Normal.             Ears Normal Inspection - Right: Normal, Left: Normal. Canal - Right: Normal, Left: Normal. TM - Right: Normal, Left: Normal.   Skin Normal Inspection - Normal.                              Canals:  Right: Canal reveals cerumen impaction,   Left: Canal reveals cerumen impaction,     Tympanic Membranes:  Right: Normal tympanic membrane.   Left: Normal tympanic membrane.     TM Visualized Method:   Right TM examined via otomicroscopy.    Left TM examined via otomicroscopy.      PROCEDURE:    Removal of cerumen impaction   The cerumen impaction was completely removed using microscopy.   Removal was completed by  using acurette and/or suction.   Comments: Return to clinic as needed.  Avoid q-tips, water precautions and use over the counter wax remedies as needed.      Current Outpatient Medications:     ELIQUIS 5 MG Oral Tab, Take 1 tablet (5 mg total) by mouth daily., Disp: , Rfl:     ZITHROMAX Z- MG Oral Tab, Take 1 tablet (250 mg total) by mouth daily., Disp: , Rfl:     bumetanide 1 MG Oral Tab, Take 1 tablet (1 mg total) by mouth every other day., Disp: , Rfl:     YUPELRI 175 MCG/3ML Inhalation Solution nebulizer solution, Take 175 mcg by nebulization daily., Disp: , Rfl:     Doxycycline Monohydrate 100 MG Oral Tab, Take 100 mg by mouth 2 (two) times daily., Disp: 14 tablet, Rfl: 0    amLODIPine 2.5 MG Oral Tab, Take 1 tablet (2.5 mg total) by mouth in the morning and 1 tablet (2.5 mg total) before bedtime., Disp: 180 tablet, Rfl: 1    Glucose Blood (TRUE METRIX BLOOD GLUCOSE TEST) In Vitro Strip, Use 1 (one) new strip 2 times daily for blood glucose monitoring, Disp: 200 strip, Rfl: 3    Alcohol Swabs (DROPSAFE ALCOHOL PREP) 70 % Does not apply Pads, Apply 1 Pad topically 4 (four) times daily. Before each finger stick., Disp: 400 each, Rfl: 3    Blood Glucose Calibration (TRUE METRIX LEVEL 2) Normal In Vitro Solution, 1 drop as needed (To use monthly, with each new vial of test strips, when you change your batteries, or if you suspect the meter is malfunctioning.)., Disp: 1 each, Rfl: 3    TRUEplus Lancets 33G Does not apply Misc, 1 Lancet by Finger stick route 4 (four) times daily., Disp: 400 each, Rfl: 3    budesonide 0.5 MG/2ML Inhalation Suspension, Take 2 mL (0.5 mg total) by nebulization 2 (two) times daily., Disp: 180 each, Rfl: 1    glipiZIDE 5 MG Oral Tab, Take 1 tablet (5 mg total) by mouth before breakfast., Disp: 90 tablet, Rfl: 1    pantoprazole 20 MG Oral Tab EC, Take 1 tablet (20 mg total) by mouth every morning before breakfast., Disp: 90 tablet, Rfl: 3    omega-3 fatty acids 1000 MG Oral Cap,  Take 1,000 mg by mouth daily., Disp: , Rfl:     Cholecalciferol (VITAMIN D) 2000 units Oral Cap, Take 1 capsule (2,000 Units total) by mouth every other day., Disp: , Rfl:     fluticasone-salmeterol (WIXELA INHUB) 500-50 MCG/ACT Inhalation Aerosol Powder, Breath Activated, Inhale 1 puff into the lungs 2 (two) times daily. (Patient not taking: Reported on 2/11/2025), Disp: , Rfl:     metoprolol succinate ER 25 MG Oral Tablet 24 Hr, Take 1 tablet (25 mg total) by mouth daily. (Patient not taking: Reported on 2/11/2025), Disp: 90 tablet, Rfl: 1    febuxostat 40 MG Oral Tab, Take 1 tablet (40 mg total) by mouth daily. (Patient not taking: Reported on 2/11/2025), Disp: 90 tablet, Rfl: 1  ASSESSMENT AND PLAN    1. Bilateral impacted cerumen    - REMOVAL IMPACTED CERUMEN REQUIRING INSTRUMENTATION, UNILATERAL      All cerumen was removed using microscopy. I have asked the patient to return to see me as needed for repeat cerumen removal in the future.      Heri Miller MD    2/11/2025    5:15 PM

## 2025-02-11 NOTE — TELEPHONE ENCOUNTER
Patient arrived to office after leaving her Oxygen in 's car downstairs.  Has history of COPD, O2 saturation is 95% sitting, 89% when first arrived following walking up to office, states uses oxygen intermittently at home with exertion .  Now in room awaiting MD, O2 saturation is 95% sitting and MA aware of situation, has wheelchair in office if patient needs to transport downstairs and patient is aware, states  will bring O2 to her once downstairs.  Waiting to see md for exam/visit.

## 2025-02-20 ENCOUNTER — TELEPHONE (OUTPATIENT)
Dept: INTERNAL MEDICINE CLINIC | Facility: CLINIC | Age: 80
End: 2025-02-20

## 2025-02-20 NOTE — TELEPHONE ENCOUNTER
Patient calling ( name and date of birth of patient verified ) in regards to message below  from on-call       She was finally able to sleep at 6am HR= 108, 90  When she woke up 11:30am  HR= 88  12pm = 88  Checking  HR during this call   HR= 79  Unable to check her B/p now    Patient is calm,   Able to speak in full sentences during this call   Denies any chest pain , no palpitations   Advised patient to check her Blood sugar, eat a meal, and continue to monitor her heart rate    Patient verbalizes understanding and agrees with plan.        Dr. Braun---Please advise on  any further action and thank you

## 2025-02-20 NOTE — TELEPHONE ENCOUNTER
DANYEL Santos patient call us back and she stated that she is not going to go to the Immediate care. Patient stated that she called the pharmacy and she was inform that arformoterol nebulizer is the one causing her side effects. Causing her the cramping of her feet and elevated her heart rate. She will not take this medication any more. Patient want to let you know.

## 2025-02-20 NOTE — TELEPHONE ENCOUNTER
She needs to be seen and evaluated today and get EKG done, she's had history of brief episode of afib before on heart monitor done by his cardiologist Dr Mtz. (Mentioned on last note of Dr Mtz in care everywhere) .  I dont have anymore openings today; she can see any other provider or go to IC.

## 2025-02-20 NOTE — TELEPHONE ENCOUNTER
Relayed Dr short  Stated she's eating lunch, she has calm down, do not plan to go anywhere today, she was able to make up her bed, wash her feet, hr-95  Stated I love my Dr he is so very protective of me

## 2025-02-24 PROBLEM — D35.2 PITUITARY ADENOMA (HCC): Status: RESOLVED | Noted: 2025-02-24 | Resolved: 2025-02-24

## 2025-02-25 ENCOUNTER — CARDPULM VISIT (OUTPATIENT)
Dept: CARDIAC REHAB | Facility: HOSPITAL | Age: 80
End: 2025-02-25
Attending: INTERNAL MEDICINE
Payer: MEDICARE

## 2025-02-25 PROCEDURE — 94625 PHY/QHP OP PULM RHB W/O MNTR: CPT

## 2025-03-03 ENCOUNTER — OFFICE VISIT (OUTPATIENT)
Dept: INTERNAL MEDICINE CLINIC | Facility: CLINIC | Age: 80
End: 2025-03-03

## 2025-03-03 VITALS
DIASTOLIC BLOOD PRESSURE: 70 MMHG | BODY MASS INDEX: 30.23 KG/M2 | HEIGHT: 64 IN | SYSTOLIC BLOOD PRESSURE: 130 MMHG | OXYGEN SATURATION: 97 % | HEART RATE: 78 BPM | WEIGHT: 177.06 LBS

## 2025-03-03 DIAGNOSIS — Z95.1 S/P CABG (CORONARY ARTERY BYPASS GRAFT): ICD-10-CM

## 2025-03-03 DIAGNOSIS — J44.9 COPD, SEVERE (HCC): ICD-10-CM

## 2025-03-03 DIAGNOSIS — K21.9 GASTROESOPHAGEAL REFLUX DISEASE, UNSPECIFIED WHETHER ESOPHAGITIS PRESENT: ICD-10-CM

## 2025-03-03 DIAGNOSIS — M85.89 OSTEOPENIA OF MULTIPLE SITES: ICD-10-CM

## 2025-03-03 DIAGNOSIS — N18.32 STAGE 3B CHRONIC KIDNEY DISEASE (HCC): ICD-10-CM

## 2025-03-03 DIAGNOSIS — I10 PRIMARY HYPERTENSION: ICD-10-CM

## 2025-03-03 DIAGNOSIS — I25.10 CORONARY ARTERY DISEASE INVOLVING NATIVE CORONARY ARTERY OF NATIVE HEART WITHOUT ANGINA PECTORIS: ICD-10-CM

## 2025-03-03 DIAGNOSIS — I50.32 CHRONIC DIASTOLIC CONGESTIVE HEART FAILURE (HCC): ICD-10-CM

## 2025-03-03 DIAGNOSIS — H91.93 BILATERAL HEARING LOSS, UNSPECIFIED HEARING LOSS TYPE: ICD-10-CM

## 2025-03-03 DIAGNOSIS — I73.9 PAD (PERIPHERAL ARTERY DISEASE): ICD-10-CM

## 2025-03-03 DIAGNOSIS — I70.0 THORACIC AORTA ATHEROSCLEROSIS: ICD-10-CM

## 2025-03-03 DIAGNOSIS — Z00.00 MEDICARE ANNUAL WELLNESS VISIT, SUBSEQUENT: Primary | ICD-10-CM

## 2025-03-03 DIAGNOSIS — Z91.81 AT RISK FOR FALLS: ICD-10-CM

## 2025-03-03 DIAGNOSIS — N18.30 CONTROLLED TYPE 2 DIABETES MELLITUS WITH STAGE 3 CHRONIC KIDNEY DISEASE, WITHOUT LONG-TERM CURRENT USE OF INSULIN (HCC): ICD-10-CM

## 2025-03-03 DIAGNOSIS — M1A.9XX1 CHRONIC TOPHACEOUS GOUT: ICD-10-CM

## 2025-03-03 DIAGNOSIS — Z98.890 S/P CAROTID ENDARTERECTOMY: ICD-10-CM

## 2025-03-03 DIAGNOSIS — E11.22 CONTROLLED TYPE 2 DIABETES MELLITUS WITH STAGE 3 CHRONIC KIDNEY DISEASE, WITHOUT LONG-TERM CURRENT USE OF INSULIN (HCC): ICD-10-CM

## 2025-03-03 DIAGNOSIS — H40.9 GLAUCOMA, UNSPECIFIED GLAUCOMA TYPE, UNSPECIFIED LATERALITY: ICD-10-CM

## 2025-03-03 DIAGNOSIS — I65.23 CAROTID STENOSIS, NON-SYMPTOMATIC, BILATERAL: ICD-10-CM

## 2025-03-03 DIAGNOSIS — E78.2 MIXED HYPERLIPIDEMIA: ICD-10-CM

## 2025-03-03 DIAGNOSIS — E21.3 HYPERPARATHYROIDISM (HCC): ICD-10-CM

## 2025-03-03 PROCEDURE — 99499 UNLISTED E&M SERVICE: CPT | Performed by: INTERNAL MEDICINE

## 2025-03-03 PROCEDURE — 1159F MED LIST DOCD IN RCRD: CPT | Performed by: INTERNAL MEDICINE

## 2025-03-03 PROCEDURE — 1160F RVW MEDS BY RX/DR IN RCRD: CPT | Performed by: INTERNAL MEDICINE

## 2025-03-03 PROCEDURE — G0439 PPPS, SUBSEQ VISIT: HCPCS | Performed by: INTERNAL MEDICINE

## 2025-03-03 PROCEDURE — 1125F AMNT PAIN NOTED PAIN PRSNT: CPT | Performed by: INTERNAL MEDICINE

## 2025-03-03 PROCEDURE — 3075F SYST BP GE 130 - 139MM HG: CPT | Performed by: INTERNAL MEDICINE

## 2025-03-03 PROCEDURE — 1170F FXNL STATUS ASSESSED: CPT | Performed by: INTERNAL MEDICINE

## 2025-03-03 PROCEDURE — 3078F DIAST BP <80 MM HG: CPT | Performed by: INTERNAL MEDICINE

## 2025-03-03 PROCEDURE — 3008F BODY MASS INDEX DOCD: CPT | Performed by: INTERNAL MEDICINE

## 2025-03-03 PROCEDURE — 96160 PT-FOCUSED HLTH RISK ASSMT: CPT | Performed by: INTERNAL MEDICINE

## 2025-03-03 RX ORDER — EZETIMIBE 10 MG/1
10 TABLET ORAL NIGHTLY
Qty: 90 TABLET | Refills: 1 | Status: SHIPPED | OUTPATIENT
Start: 2025-03-03

## 2025-03-03 NOTE — PROGRESS NOTES
Subjective:   Ariana Osorio is a 80 year old female who presents for a MA AHA (Medicare Advantage Annual Health Assessment) and Subsequent Annual Wellness visit (Pt already had Initial Annual Wellness) and scheduled follow up of multiple significant but stable problems.       History/Other:   Fall Risk Assessment:   She has been screened for Falls and is High Risk. Fall Prevention information provided to patient in After Visit Summary.    Do you feel unsteady when standing or walking?: No  Do you worry about falling?: Yes  Have you fallen in the past year?: No     Cognitive Assessment:   She had a completely normal cognitive assessment - see flowsheet entries     Functional Ability/Status:   Ariana Osorio has some abnormal functions as listed below:  She has Dressing and/or Bathing issues based on screening of functional status.  Difficulty dressing or bathing?: Yes  Bathing or Showering: Need some help  Dressing: Need some help  She has Driving difficulties based on screening of functional status. She has difficulties Shopping for Groceries based on screening of functional status. She has Hearing problems based on screening of functional status.She has Walking problems based on screening of functional status. She has problems with Daily Activities based on screening of functional status.       Depression Screening (PHQ):  PHQ-2 SCORE: 0  , done 3/3/2025            Advanced Directives:   She does NOT have a Living Will. [Do you have a living will?: No]  She has a Power of  for Health Care on file in [facility].  Discussed Advance Care Planning with patient (and family/surrogate if present). Standard forms made available to patient in After Visit Summary.      Patient Active Problem List   Diagnosis    History of breast cancer    Medicare annual wellness visit, subsequent    Chronic tophaceous gout    Impacted cerumen, bilateral    COPD, severe (HCC)    Coronary artery disease    S/P CABG (coronary  artery bypass graft)    At risk for falls    Stage 3b chronic kidney disease (HCC)    Controlled type 2 diabetes mellitus with stage 3 chronic kidney disease, without long-term current use of insulin (HCC)    Hypertension associated with type 2 diabetes mellitus (HCC)    Hyperlipidemia associated with type 2 diabetes mellitus (HCC)    S/P carotid endarterectomy    Chronic diastolic congestive heart failure (HCC)    Bilateral hearing loss    Hyperparathyroidism (HCC)    Glaucoma    Vitamin D deficiency    Carotid stenosis, non-symptomatic, bilateral    PAD (peripheral artery disease)    Thoracic aorta atherosclerosis    Osteopenia of multiple sites    Right knee pain    COPD exacerbation (HCC)    Influenza    Acute on chronic congestive heart failure, unspecified heart failure type (HCC)    Diastolic heart failure, unspecified HF chronicity (HCC)    Acute on chronic diastolic (congestive) heart failure (HCC)     Allergies:  She is allergic to allopurinol, dog epithelium, dust, heparin, smoke, adhesive tape (rosins), montelukast, statins, xanax [alprazolam], adhesive tape, amoxicillin, other, sulfa antibiotics, ace inhibitors, and aspirin.    Current Medications:  Outpatient Medications Marked as Taking for the 3/3/25 encounter (Office Visit) with Enrique Braun MD   Medication Sig    ezetimibe 10 MG Oral Tab Take 1 tablet (10 mg total) by mouth nightly.    amLODIPine 2.5 MG Oral Tab Take 1 tablet (2.5 mg total) by mouth in the morning and 1 tablet (2.5 mg total) before bedtime.    Glucose Blood (TRUE METRIX BLOOD GLUCOSE TEST) In Vitro Strip Use 1 (one) new strip 2 times daily for blood glucose monitoring    Alcohol Swabs (DROPSAFE ALCOHOL PREP) 70 % Does not apply Pads Apply 1 Pad topically 4 (four) times daily. Before each finger stick.    Blood Glucose Calibration (TRUE METRIX LEVEL 2) Normal In Vitro Solution 1 drop as needed (To use monthly, with each new vial of test strips, when you change your  batteries, or if you suspect the meter is malfunctioning.).    TRUEplus Lancets 33G Does not apply Misc 1 Lancet by Finger stick route 4 (four) times daily.    budesonide 0.5 MG/2ML Inhalation Suspension Take 2 mL (0.5 mg total) by nebulization 2 (two) times daily.    fluticasone-salmeterol (WIXELA INHUB) 500-50 MCG/ACT Inhalation Aerosol Powder, Breath Activated Inhale 1 puff into the lungs 2 (two) times daily.    glipiZIDE 5 MG Oral Tab Take 1 tablet (5 mg total) by mouth before breakfast.    pantoprazole 20 MG Oral Tab EC Take 1 tablet (20 mg total) by mouth every morning before breakfast.    omega-3 fatty acids 1000 MG Oral Cap Take 1,000 mg by mouth daily.    Cholecalciferol (VITAMIN D) 2000 units Oral Cap Take 1 capsule (2,000 Units total) by mouth every other day.       Medical History:  She  has a past medical history of Age-related cataract of left eye (05/10/2018), Age-related cataract of right eye (07/28/2022), Anxiety, Asthma ([facility]), Atherosclerosis of coronary artery, Breast CA ([facility]) (1999), Breast CA ([facility]) (2014), Cancer ([facility]), Carotid artery disease (12/05/2016), Carotid stenosis, Closed fracture of multiple ribs of left side with routine healing, subsequent encounter (10/19/2018), Congestive heart disease ([facility]), COPD (chronic obstructive pulmonary disease) ([facility]), Coronary atherosclerosis, Depression, Diabetes ([facility]) (07/28/2022), Diabetes mellitus ([facility]), Diastolic dysfunction without heart failure, Esophageal reflux, Essential hypertension, Generalized anxiety disorder, Gout, Gout, High blood pressure, High cholesterol, History of pituitary adenoma (05/10/2018), Hyperlipidemia, Major depressive disorder, single episode, moderate ([facility]), and Obesity.  Surgical History:  She  has a past surgical history that includes Carotid endarterectomy (Bilateral); colonoscopy; hernia surgery; cataract; radiation right (2014); lumpectomy right (2014); cabg; colonoscopy (2013); mastectomy left (Left, 1999); and  colonoscopy (N/A, 02/21/2023).   Family History:  Her family history includes Asthma in her brother, father, and sister; Breast Cancer (age of onset: 42) in her self; Heart Disorder in her father; Hypertension in her father; Other in her brother and mother.  Social History:  She  reports that she has never smoked. She has never been exposed to tobacco smoke. She has never used smokeless tobacco. She reports that she does not drink alcohol and does not use drugs.    Tobacco:  She has never smoked tobacco.    CAGE Alcohol Screen:   CAGE screening score of 0 on 3/3/2025, showing low risk of alcohol abuse.      Patient Care Team:  Enrique Braun MD as PCP - General (Internal Medicine)  Ryan Costa MD (NEUROLOGY)  Bhanu Mccormack Jr., PT (Physical Therapy)  Leatha Torres (Physical Therapy)  Frances Steen MD (PULMONARY DISEASES)  Sarbjit Nunn MD (Interventional, Cardiology)  Mitch Kerns MD (RHEUMATOLOGY)  Aaliyah Leone MD (ENDOCRINOLOGY)  Teri Valdez CMA as [facility] Care Manager  Tati Owen MD (RHEUMATOLOGY)    Review of Systems  GENERAL: feels well otherwise  SKIN: denies any unusual skin lesions  EYES: denies blurred vision or double vision  HEENT: denies nasal congestion, sinus pain or ST  LUNGS: denies shortness of breath with exertion  CARDIOVASCULAR: denies chest pain on exertion  GI: denies abdominal pain, denies heartburn  : denies dysuria, vaginal discharge or itching, no complaint of urinary incontinence   MUSCULOSKELETAL: denies back pain  NEURO: denies headaches  PSYCHE: denies depression or anxiety  HEMATOLOGIC: denies hx of anemia  ENDOCRINE: denies thyroid history  ALL/ASTHMA: denies hx of allergy or asthma    Objective:   Physical Exam  General Appearance:  Alert, cooperative, no distress, appears stated age' pt on wheel chair   Head:  Normocephalic, without obvious abnormality, atraumatic   Eyes:  PERRL, conjunctiva/corneas clear, EOM's intact both eyes    Ears:  Normal TM's and external ear canals, both ears   Nose: Nares normal, septum midline,mucosa normal, no drainage or sinus tenderness   Throat: Lips, mucosa, and tongue normal; teeth and gums normal   Neck: Supple, symmetrical, trachea midline, no adenopathy;  thyroid: not enlarged, symmetric, no tenderness/mass/nodules; no carotid bruit or JVD   Back:   Symmetric, no curvature, ROM normal, no CVA tenderness   Lungs:   Clear to auscultation bilaterally, respirations unlabored   Heart:  Regular rate and rhythm, S1 and S2 normal, no murmur, rub, or gallop   Abdomen:   Soft, non-tender, bowel sounds active all four quadrants,  no masses, no organomegaly   Pelvic: Deferred   Extremities: Extremities normal, atraumatic, no cyanosis or edema   Pulses: 2+ and symmetric; monofilament testng normal   Skin: Skin color, texture, turgor normal, no rashes or lesions   Lymph nodes: Cervical, supraclavicular  nodes normal   Neurologic: Awake, alert and oriented x 3, she is able to move all extremities.  Patient however is in a wheelchair because of her exertional dyspnea.       /70   Pulse 78   Ht 5' 4\" (1.626 m)   Wt 177 lb 1 oz (80.3 kg)   SpO2 97%   BMI 30.39 kg/m²  Estimated body mass index is 30.39 kg/m² as calculated from the following:    Height as of this encounter: 5' 4\" (1.626 m).    Weight as of this encounter: 177 lb 1 oz (80.3 kg).    Medicare Hearing Assessment:   Hearing Screening    Screening Method: Finger Rub  Finger Rub Result: Pass         Visual Acuity:   Right Eye Visual Acuity: Corrected Right Eye Chart Acuity: 20/20   Left Eye Visual Acuity: Corrected Left Eye Chart Acuity: 20/15   Both Eyes Visual Acuity: Corrected Both Eyes Chart Acuity: 20/13   Able To Tolerate Visual Acuity: Yes        Assessment & Plan:   Ariana Osorio is a 80 year old female who presents for a Medicare Assessment.     (Z00.00) Medicare annual wellness visit, subsequent  (primary encounter diagnosis)  Plan:  Patient did her flu shot last fall already,.  She is also received RSV vaccine as well as pneumococcal vaccination.  She did declined further boosters of COVID vaccine.    (E11.22,  N18.30) Controlled type 2 diabetes mellitus with stage 3 chronic kidney disease, without long-term current use of insulin (HCC)  Plan: Recent A1c 5.9 so her diabetes remains controlled.  She is only taking glipizide only as needed if her glucose levels goes up.  She does have a chronic kidney disease and had been advised again to avoid any NSAIDs.  Continue control of blood pressure also had been advised and recommended.    (I10) Primary hypertension  Plan: Blood pressure controlled with current blood pressure meds.  CPM.    (E78.2) Mixed hyperlipidemia  Plan: Lipid Panel        Will check lipid panel.  Patient wants to retry Zetia.  Intolerant of statins before.    (I25.10) Coronary artery disease involving native coronary artery of native heart without angina pectoris  Plan: History of CABG before.  She has been asymptomatic with no chest pains.  She is on antiplatelet therapy but again intolerant of statin therapy.  She wants to retry Zetia.  She declined to use PCSK9 inhibitors before.    (Z95.1) S/P CABG (coronary artery bypass graft)  Plan: See above.    (K21.9) Gastroesophageal reflux disease, unspecified whether esophagitis present  Plan: She has been stable on PPI therapy.  CPM.    (N18.32) Stage 3b chronic kidney disease (HCC)  Plan: Continue to avoid NSAIDs.  Maintain good blood pressure control.    (J44.9) COPD, severe (HCC)  Plan: Does have a known history of COPD has been severe.  She is on oxygen supplement at home, she is using nebulizer as well as current inhalers prescribed by her pulmonologist.    (E21.3) Hyperparathyroidism (HCC)  Plan: Patient with history of hyperparathyroidism with mild hypercalcemia.  She is seen endocrinologist before and told to continue to follow-up.    (M1A.9XX1) Chronic tophaceous gout  Plan:  I been stable on use of Uloric given by her rheumatologist.    (I50.32) Chronic diastolic congestive heart failure (HCC)  Plan: Patient appears to be compensated and euvolemic currently.  She is on diuretic medication and has been followed by cardiology.    (H91.93) Bilateral hearing loss, unspecified hearing loss type  Plan: Patient does not feel that she needs to use hearing aids at current time.    (I73.9) PAD (peripheral artery disease)  Plan: This has been asymptomatic.  The patient again intolerant of statins.  He is on antiplatelet therapy.  Will retry Zetia as per patient request.    (Z91.81) At risk for falls  Plan: Fall precautions.    (I65.23) Carotid stenosis, non-symptomatic, bilateral  Plan: Patient has history of carotid enterectomy.  She continues to be on antiplatelet therapy however intolerant of statin therapy and did not want to take PCSK9 inhibitors before.  She wants to retry Zetia which prescription was sent to her pharmacy.    (Z98.890) S/P carotid endarterectomy  Plan: See above.        (I70.0) Thoracic aorta atherosclerosis  Plan: Patient been intolerant of statin therapy and did not want to use PCSK9 inhibitors.  She also did not tolerate Zetia in the past however she did talk to me and wanted to try it again so we will send a prescription to her pharmacy.    (H40.9) Glaucoma, unspecified glaucoma type, unspecified laterality  Plan: Continue to be followed by her ophthalmologist.    (M85.89) Osteopenia of multiple sites  Plan: XR DEXA BONE DENSITOMETRY (CPT=77080)        Patient due for DEXA scan.  This was ordered.  Continue with calcium vitamin D supplement.     The patient indicates understanding of these issues and agrees to the plan.  Reinforced healthy diet, lifestyle, and exercise.      No follow-ups on file.     Enrique Braun MD, 3/3/2025     Supplementary Documentation:   General Health:  In the past six months, have you lost more than 10 pounds without trying?: 2 - No  Has  your appetite been poor?: No  Type of Diet: Balanced  How does the patient maintain a good energy level?: Appropriate Exercise  How would you describe your daily physical activity?: Moderate  How would you describe your current health state?: Fair  How do you maintain positive mental well-being?:  (God/Max)  On a scale of 0 to 10, with 0 being no pain and 10 being severe pain, what is your pain level?: 1 - (Mild)  In the past six months, have you experienced urine leakage?: 1-Yes  At any time do you feel concerned for the safety/well-being of yourself and/or your children, in your home or elsewhere?: No  Have you had any immunizations at another office such as Influenza, Hepatitis B, Tetanus, or Pneumococcal?: No    Health Maintenance   Topic Date Due    Zoster Vaccines (1 of 2) Never done    Diabetes Care Dilated Eye Exam  10/13/2021    COVID-19 Vaccine (4 - 2024-25 season) 09/01/2024    Annual Well Visit  01/01/2025    Diabetes Care: Foot Exam (Annual)  01/01/2025    Diabetes Care: Microalb/Creat Ratio (Annual)  01/01/2025    HTN: BP Follow-Up  04/03/2025    Diabetes Care A1C  07/11/2025    Diabetes Care: GFR  01/04/2026    Influenza Vaccine  Completed    DEXA Scan  Completed    Annual Depression Screening  Completed    Fall Risk Screening (Annual)  Completed    Pneumococcal Vaccine: 50+ Years  Completed    Meningococcal B Vaccine  Aged Out    Mammogram  Discontinued    Colonoscopy  Discontinued

## 2025-03-04 ENCOUNTER — CARDPULM VISIT (OUTPATIENT)
Dept: CARDIAC REHAB | Facility: HOSPITAL | Age: 80
End: 2025-03-04
Attending: INTERNAL MEDICINE
Payer: MEDICARE

## 2025-03-04 PROCEDURE — 94625 PHY/QHP OP PULM RHB W/O MNTR: CPT

## 2025-03-05 PROBLEM — M85.89 OSTEOPENIA OF MULTIPLE SITES: Status: ACTIVE | Noted: 2023-02-22

## 2025-03-06 ENCOUNTER — CARDPULM VISIT (OUTPATIENT)
Dept: CARDIAC REHAB | Facility: HOSPITAL | Age: 80
End: 2025-03-06
Attending: INTERNAL MEDICINE
Payer: MEDICARE

## 2025-03-06 ENCOUNTER — PATIENT OUTREACH (OUTPATIENT)
Dept: CASE MANAGEMENT | Age: 80
End: 2025-03-06

## 2025-03-06 PROCEDURE — 94625 PHY/QHP OP PULM RHB W/O MNTR: CPT

## 2025-03-11 ENCOUNTER — CARDPULM VISIT (OUTPATIENT)
Dept: CARDIAC REHAB | Facility: HOSPITAL | Age: 80
End: 2025-03-11
Attending: INTERNAL MEDICINE
Payer: MEDICARE

## 2025-03-11 PROCEDURE — 94625 PHY/QHP OP PULM RHB W/O MNTR: CPT

## 2025-03-13 ENCOUNTER — CARDPULM VISIT (OUTPATIENT)
Dept: CARDIAC REHAB | Facility: HOSPITAL | Age: 80
End: 2025-03-13
Attending: INTERNAL MEDICINE
Payer: MEDICARE

## 2025-03-13 PROCEDURE — 94625 PHY/QHP OP PULM RHB W/O MNTR: CPT

## 2025-03-18 ENCOUNTER — CARDPULM VISIT (OUTPATIENT)
Dept: CARDIAC REHAB | Facility: HOSPITAL | Age: 80
End: 2025-03-18
Attending: INTERNAL MEDICINE
Payer: MEDICARE

## 2025-03-18 PROCEDURE — 94625 PHY/QHP OP PULM RHB W/O MNTR: CPT

## 2025-03-20 ENCOUNTER — CARDPULM VISIT (OUTPATIENT)
Dept: CARDIAC REHAB | Facility: HOSPITAL | Age: 80
End: 2025-03-20
Attending: INTERNAL MEDICINE
Payer: MEDICARE

## 2025-03-20 PROCEDURE — 94625 PHY/QHP OP PULM RHB W/O MNTR: CPT

## 2025-03-25 ENCOUNTER — CARDPULM VISIT (OUTPATIENT)
Dept: CARDIAC REHAB | Facility: HOSPITAL | Age: 80
End: 2025-03-25
Attending: INTERNAL MEDICINE
Payer: MEDICARE

## 2025-03-25 PROCEDURE — 94625 PHY/QHP OP PULM RHB W/O MNTR: CPT

## 2025-03-27 ENCOUNTER — CARDPULM VISIT (OUTPATIENT)
Dept: CARDIAC REHAB | Facility: HOSPITAL | Age: 80
End: 2025-03-27
Attending: INTERNAL MEDICINE
Payer: MEDICARE

## 2025-03-27 PROCEDURE — 94625 PHY/QHP OP PULM RHB W/O MNTR: CPT

## 2025-03-29 NOTE — PAYOR COMM NOTE
--------------  DISCHARGE REVIEW    Allegra Castellon MA [facility] [place]  Subscriber #:  [id number]  Authorization Number: [id number]    Admit date: 2/17/19  Admit time:  2030  Discharge Date: 3/4/2019  7:00 PM     Admitting Physician: Arzella Saint, MD  Attending Lorii 29-Mar-2025 Microcalcifications of the breast     Impacted cerumen, right ear     Asthma with COPD (chronic obstructive pulmonary disease) (HCC)     Allergic drug reaction     Pleural effusion     Shortness of breath     Closed fracture of multiple ribs of left constanza Extremities: extremities normal, atraumatic, no cyanosis or edema  Psychiatric: calm        History of Present Illness:   Radha Richardson is a(n) 69year old female.   She has a past medical history of asthma breast cancer carotid stenosis gout she pres Take 1 tablet (81 mg total) by mouth daily. Clopidogrel Bisulfate 75 MG Tabs  Commonly known as:  PLAVIX  Take 1 tablet (75 mg total) by mouth daily. docusate sodium 100 MG Caps  Commonly known as:  COLACE  Take 100 mg by mouth 2 (two) times daily. * This list has 4 medication(s) that are the same as other medications prescribed for you. Read the directions carefully, and ask your doctor or other care provider to review them with you.             CONTINUE taking these medications    ADVAIR DISKUS 250 31-Mar-2025

## 2025-04-01 ENCOUNTER — PATIENT OUTREACH (OUTPATIENT)
Dept: CASE MANAGEMENT | Age: 80
End: 2025-04-01

## 2025-04-01 DIAGNOSIS — E78.5 HYPERLIPIDEMIA ASSOCIATED WITH TYPE 2 DIABETES MELLITUS (HCC): ICD-10-CM

## 2025-04-01 DIAGNOSIS — E11.22 CONTROLLED TYPE 2 DIABETES MELLITUS WITH STAGE 3 CHRONIC KIDNEY DISEASE, WITHOUT LONG-TERM CURRENT USE OF INSULIN (HCC): Primary | ICD-10-CM

## 2025-04-01 DIAGNOSIS — E11.69 HYPERLIPIDEMIA ASSOCIATED WITH TYPE 2 DIABETES MELLITUS (HCC): ICD-10-CM

## 2025-04-01 DIAGNOSIS — J44.9 COPD, SEVERE (HCC): ICD-10-CM

## 2025-04-01 DIAGNOSIS — H40.9 GLAUCOMA, UNSPECIFIED GLAUCOMA TYPE, UNSPECIFIED LATERALITY: ICD-10-CM

## 2025-04-01 DIAGNOSIS — J44.1 COPD EXACERBATION (HCC): ICD-10-CM

## 2025-04-01 DIAGNOSIS — I50.30 DIASTOLIC HEART FAILURE, UNSPECIFIED HF CHRONICITY (HCC): ICD-10-CM

## 2025-04-01 DIAGNOSIS — E11.59 HYPERTENSION ASSOCIATED WITH TYPE 2 DIABETES MELLITUS (HCC): ICD-10-CM

## 2025-04-01 DIAGNOSIS — N18.30 CONTROLLED TYPE 2 DIABETES MELLITUS WITH STAGE 3 CHRONIC KIDNEY DISEASE, WITHOUT LONG-TERM CURRENT USE OF INSULIN (HCC): Primary | ICD-10-CM

## 2025-04-01 DIAGNOSIS — I50.32 CHRONIC DIASTOLIC CONGESTIVE HEART FAILURE (HCC): ICD-10-CM

## 2025-04-01 DIAGNOSIS — I25.10 CORONARY ARTERY DISEASE INVOLVING NATIVE CORONARY ARTERY OF NATIVE HEART WITHOUT ANGINA PECTORIS: ICD-10-CM

## 2025-04-01 DIAGNOSIS — I15.2 HYPERTENSION ASSOCIATED WITH TYPE 2 DIABETES MELLITUS (HCC): ICD-10-CM

## 2025-04-01 DIAGNOSIS — I73.9 PAD (PERIPHERAL ARTERY DISEASE): ICD-10-CM

## 2025-04-01 DIAGNOSIS — E55.9 VITAMIN D DEFICIENCY: ICD-10-CM

## 2025-04-01 NOTE — PROGRESS NOTES
Spoke to Ariana for Chronic Care Management.      Updates to patient care team/comments: UTD  Patient reported changes in medications: UTD  Med Adherence  Comment: pt taking medications as prescribed     Health Maintenance:   Health Maintenance   Topic Date Due    Zoster Vaccines (1 of 2) Never done    Diabetes Care Dilated Eye Exam  10/13/2021    Diabetes Care: Microalb/Creat Ratio (Annual)  01/01/2025    Diabetes Care: Foot Exam (Annual)  03/05/2026 (Originally 1/1/2025)    COVID-19 Vaccine (4 - 2024-25 season) 04/05/2026 (Originally 9/1/2024)    Diabetes Care A1C  07/11/2025    Diabetes Care: GFR  01/04/2026    Influenza Vaccine  Completed    DEXA Scan  Completed    Annual Well Visit  Completed    Annual Depression Screening  Completed    Fall Risk Screening (Annual)  Completed    Pneumococcal Vaccine: 50+ Years  Completed    Meningococcal B Vaccine  Aged Out    Mammogram  Discontinued    Colonoscopy  Discontinued       Patient updates/concerns:    Spoke to pt for monthly outreach   Pt states her condition has greatly improved since completing pulmonary rehab. Pt feels she is more alert has more energy and improved breathing. Pt has also discussed with specialist about removing metorprolol and has done so with approval. Pt has not taken in 3 weeks and this she feels has made a difference as well.  Pt has been tracing blood pressure at home and the result is always stable and within normal limits.    Pt has also discussed with rheumotology about not taking uloric. Pt has not taken in a year and has not had any issues with gout.   Pt goal is to consistently review with providers whether or not a medication is absolutely necessary. Pt feels she is taking too much and would like to continue pairing down meds where appropriate.    Pt will be reaching out to dr boone office to extend pulmonary rehab. Pt feels this has greatly benefited her and provided more energy. Pt does not perform any routines at home. She  does try to take a walk every day. She will allow her energy level to dictate and can sometimes walk for longer times than others. Pt makes sure to take a break to catch her breath if needed.    Reviewed with pt her sdoh. Pt has qualified for assistance from [employer] and also [employer].    Pt feels she is falling asleep staying asleep and waking rested. Pt feels her quality of sleep has improved since starting pulm rehab.       Pt did not have any new questions or concerns for pcp or clinical staff.     Goals/Action Plan:    Active goal from previous outreach: managing healthcare    Patient reported progress towards goals: pt continues to see providers as needed and takes medications as prescribed               - What: breathing issues           - Where/When/How: pt feels her condition is greatly improved after completing pulm rehab. Pt is coordinating an extension if possible with dr boone office  Patient Reported Barriers and Concerns: n/a                   - Plan for overcoming barriers: none    Care Managers Interventions: continue to provide encouragement and education for healthy coping and dx    Future Appointments:   Future Appointments   Date Time Provider Department Center   4/7/2025  2:00 PM Caridad Flores DPM [facility]         Next Care Manager Follow Up Date: one month    Reason For Follow Up: review progress and or barriers towards patient's goals.     Time Spent This Encounter Total: 22 min medical record review, telephone communication, care plan updates where needed, education, goals, and action plan recreation/update. Provided acknowledgment and validation to patient's concerns.   Monthly Minute Total including today: 22  Physical assessment, complete health history, and need for CCM established by Enrique Braun MD.

## 2025-04-04 ENCOUNTER — TELEPHONE (OUTPATIENT)
Dept: INTERNAL MEDICINE CLINIC | Facility: CLINIC | Age: 80
End: 2025-04-04

## 2025-04-04 NOTE — TELEPHONE ENCOUNTER
Patient called and is asking if  can order her a new commode because the one she has now is broken. Patient said she gets through [employer]

## 2025-04-07 ENCOUNTER — LAB ENCOUNTER (OUTPATIENT)
Dept: LAB | Age: 80
End: 2025-04-07
Attending: INTERNAL MEDICINE
Payer: MEDICARE

## 2025-04-07 ENCOUNTER — OFFICE VISIT (OUTPATIENT)
Dept: PODIATRY CLINIC | Facility: CLINIC | Age: 80
End: 2025-04-07

## 2025-04-07 VITALS — BODY MASS INDEX: 30.22 KG/M2 | HEIGHT: 64 IN | WEIGHT: 177 LBS

## 2025-04-07 DIAGNOSIS — E78.2 MIXED HYPERLIPIDEMIA: ICD-10-CM

## 2025-04-07 DIAGNOSIS — N18.32 STAGE 3B CHRONIC KIDNEY DISEASE (HCC): ICD-10-CM

## 2025-04-07 DIAGNOSIS — E11.9 DIET-CONTROLLED DIABETES MELLITUS (HCC): Primary | ICD-10-CM

## 2025-04-07 DIAGNOSIS — N18.30 CONTROLLED TYPE 2 DIABETES MELLITUS WITH STAGE 3 CHRONIC KIDNEY DISEASE, WITHOUT LONG-TERM CURRENT USE OF INSULIN (HCC): ICD-10-CM

## 2025-04-07 DIAGNOSIS — L60.3 NAIL DYSTROPHY: ICD-10-CM

## 2025-04-07 DIAGNOSIS — E11.22 CONTROLLED TYPE 2 DIABETES MELLITUS WITH STAGE 3 CHRONIC KIDNEY DISEASE, WITHOUT LONG-TERM CURRENT USE OF INSULIN (HCC): ICD-10-CM

## 2025-04-07 LAB
CHOLEST SERPL-MCNC: 276 MG/DL (ref ?–200)
CREAT UR-SCNC: 135.3 MG/DL
FASTING PATIENT LIPID ANSWER: YES
HDLC SERPL-MCNC: 44 MG/DL (ref 40–59)
LDLC SERPL CALC-MCNC: 186 MG/DL (ref ?–100)
MICROALBUMIN UR-MCNC: 0.6 MG/DL
MICROALBUMIN/CREAT 24H UR-RTO: 4.4 UG/MG (ref ?–30)
NONHDLC SERPL-MCNC: 232 MG/DL (ref ?–130)
TRIGL SERPL-MCNC: 242 MG/DL (ref 30–149)
VLDLC SERPL CALC-MCNC: 51 MG/DL (ref 0–30)

## 2025-04-07 PROCEDURE — 3008F BODY MASS INDEX DOCD: CPT | Performed by: STUDENT IN AN ORGANIZED HEALTH CARE EDUCATION/TRAINING PROGRAM

## 2025-04-07 PROCEDURE — 80061 LIPID PANEL: CPT

## 2025-04-07 PROCEDURE — 36415 COLL VENOUS BLD VENIPUNCTURE: CPT

## 2025-04-07 PROCEDURE — 99213 OFFICE O/P EST LOW 20 MIN: CPT | Performed by: STUDENT IN AN ORGANIZED HEALTH CARE EDUCATION/TRAINING PROGRAM

## 2025-04-07 PROCEDURE — 1159F MED LIST DOCD IN RCRD: CPT | Performed by: STUDENT IN AN ORGANIZED HEALTH CARE EDUCATION/TRAINING PROGRAM

## 2025-04-07 PROCEDURE — 1126F AMNT PAIN NOTED NONE PRSNT: CPT | Performed by: STUDENT IN AN ORGANIZED HEALTH CARE EDUCATION/TRAINING PROGRAM

## 2025-04-07 PROCEDURE — 82043 UR ALBUMIN QUANTITATIVE: CPT

## 2025-04-07 PROCEDURE — 82570 ASSAY OF URINE CREATININE: CPT

## 2025-04-07 NOTE — PROGRESS NOTES
[facility] Podiat[facility]  Progress Note      Ariana Osorio is a 80 year old female.   Chief Complaint   Patient presents with    Diabetic Foot Care     Nail care and foot check - no c/o - denies any pain - states her feet are always numb - FBS =          HPI:       Patient is a pleasant 79-year-old diabetic female with clinic for bilateral foot evaluation.  Denies any pedal injuries or trauma.    Allergies: Allopurinol, Dog epithelium, Dust, Heparin, Smoke, Adhesive tape (rosins), Montelukast, Statins, Xanax [alprazolam], Adhesive tape, Amoxicillin, Other, Sulfa antibiotics, Ace inhibitors, and Aspirin    Current Outpatient Medications   Medication Sig Dispense Refill    ezetimibe 10 MG Oral Tab Take 1 tablet (10 mg total) by mouth nightly. 90 tablet 1    ELIQUIS 5 MG Oral Tab Take 1 tablet (5 mg total) by mouth daily.      bumetanide 1 MG Oral Tab Take 1 tablet (1 mg total) by mouth every other day.      amLODIPine 2.5 MG Oral Tab Take 1 tablet (2.5 mg total) by mouth in the morning and 1 tablet (2.5 mg total) before bedtime. 180 tablet 1    Glucose Blood (TRUE METRIX BLOOD GLUCOSE TEST) In Vitro Strip Use 1 (one) new strip 2 times daily for blood glucose monitoring 200 strip 3    Alcohol Swabs (DROPSAFE ALCOHOL PREP) 70 % Does not apply Pads Apply 1 Pad topically 4 (four) times daily. Before each finger stick. 400 each 3    Blood Glucose Calibration (TRUE METRIX LEVEL 2) Normal In Vitro Solution 1 drop as needed (To use monthly, with each new vial of test strips, when you change your batteries, or if you suspect the meter is malfunctioning.). 1 each 3    TRUEplus Lancets 33G Does not apply Misc 1 Lancet by Finger stick route 4 (four) times daily. 400 each 3    budesonide 0.5 MG/2ML Inhalation Suspension Take 2 mL (0.5 mg total) by nebulization 2 (two) times daily. 180 each 1    fluticasone-salmeterol (WIXELA INHUB) 500-50 MCG/ACT Inhalation Aerosol Powder, Breath Activated Inhale 1 puff into the lungs 2  (two) times daily.      glipiZIDE 5 MG Oral Tab Take 1 tablet (5 mg total) by mouth before breakfast. 90 tablet 1    pantoprazole 20 MG Oral Tab EC Take 1 tablet (20 mg total) by mouth every morning before breakfast. 90 tablet 3    omega-3 fatty acids 1000 MG Oral Cap Take 1,000 mg by mouth daily.      Cholecalciferol (VITAMIN D) 2000 units Oral Cap Take 1 capsule (2,000 Units total) by mouth every other day.        Past Medical History:    Age-related cataract of left eye    Age-related cataract of right eye    Anxiety    Asthma (HCC)    Atherosclerosis of coronary artery    Breast CA (HCC)    lt mastectomy    Breast CA (HCC)    lumpectomy, right    Cancer (HCC)    breast cancer    Carotid artery disease    Carotid stenosis    Closed fracture of multiple ribs of left side with routine healing, subsequent encounter    Congestive heart disease (HCC)    COPD (chronic obstructive pulmonary disease) (HCC)    on O2 at 2 liters    Coronary atherosclerosis    Depression    Diabetes (HCC)    takes insulin when hospitalized, takes oral meds at home    Diabetes mellitus (HCC)    Diastolic dysfunction without heart failure    Esophageal reflux    Essential hypertension    Generalized anxiety disorder    Gout    Gout    High blood pressure    High cholesterol    History of pituitary adenoma    Hyperlipidemia    Major depressive disorder, single episode, moderate (HCC)    Obesity      Past Surgical History:   Procedure Laterality Date    Cabg      Carotid endarterectomy Bilateral     Cataract      Colonoscopy      2013    Colonoscopy  2013    Colonoscopy N/A 02/21/2023    Procedure: COLONOSCOPY;  Surgeon: Abdiaziz Melendez MD;  Location: [facility] ENDOSCOPY    Hernia surgery      Lumpectomy right  2014    Mastectomy left Left 1999    Radiation right  2014      Family History   Problem Relation Age of Onset    Asthma Father     Hypertension Father     Heart Disorder Father     Other (Other) Mother         thyroid surgery    Asthma  Sister     Asthma Brother     Other (Other) Brother         gout    Breast Cancer Self 43        rt breast 69      Social History     Socioeconomic History    Marital status:    Tobacco Use    Smoking status: Never     Passive exposure: Never    Smokeless tobacco: Never   Vaping Use    Vaping status: Never Used   Substance and Sexual Activity    Alcohol use: No     Comment: rarely at weddings    Drug use: No   Other Topics Concern    Reaction to local anesthetic No    Pt has a pacemaker No    Pt has a defibrillator No           REVIEW OF SYSTEMS:     Denies nause, fever, chills  No calf pain  Denies chest pain or SOB      EXAM:   Ht 5' 4\" (1.626 m)   Wt 177 lb (80.3 kg)   BMI 30.38 kg/m²   GENERAL: well developed, well nourished, in no apparent distress  EXTREMITIES:   1. Integument: Normal skin temperature and turgor. Toenails x10 are elongated, thickened and discolored with subungal derbi.     2. Vascular: Dorsalis pedis two out of four bilateral and posterior tibial pulses two out of   four bilateral, capillary refill normal.   3. Musculoskeletal: All muscle groups are graded 5 out of 5 in the foot and ankle.   4. Neurological: Normal sharp dull sensation; reflexes normal.             ASSESSMENT AND PLAN:   Diagnoses and all orders for this visit:    Diet-controlled diabetes mellitus (HCC)    Nail dystrophy          Plan:         -Patient examined, chart history reviewed.  -Discussed importance of proper pedal hygiene, regular foot checks, and tight glucose control.  -Sharply debrided nails x10 with a sterile nail nipper achieving a 20% reduction in thickness and length, without incident.   -Ambulate with supportive shoes and inserts and avoid walking barefoot.  -Educated patient on acute signs of infection advised patient to seek immediate medical attention if symptoms arise.    RTC in 9 weeks.     The patient indicates understanding of these issues and agrees to the plan.        Caridad Flores,  KAILA

## 2025-04-09 NOTE — TELEPHONE ENCOUNTER
Spoke with patient, verified , patient states that her caregiver was able to fix the commode and she will hold off for now on ordering a new one.

## 2025-04-09 NOTE — TELEPHONE ENCOUNTER
Pls call pt; will need ov so we can order the new commode for her ;this is requirement from medicare

## 2025-05-06 ENCOUNTER — PATIENT OUTREACH (OUTPATIENT)
Dept: CASE MANAGEMENT | Age: 80
End: 2025-05-06

## 2025-05-10 NOTE — TELEPHONE ENCOUNTER
Dr. Braun - patient is requesting prednisone for shortness of breath - states her oxygen level is fine, needs prednisone at different times of year.    Call transferred by call center, patient reported shortness of breath.    Patient states she is on oxygen, her SPO2 is 98%.   Patient stated she needs a prednisone burst about 2 times a year and Dr. Braun knows this.     There are no open appointments for today, RN recommended being evaluated at Immediate Care or Emergency Room for shortness of breath.     Patient stated she knows when she needs to go to the Immediate Care or Emergency Room.     Patient is just calling for prednisone prescription, wants message sent to Dr. Braun.     RN informed patient that we cannot guarantee a prescription will be sent on the weekend, without an appointment.     Patient verbalized understanding.    Patient requests prednisone to be sent to [facility] in [place].

## 2025-05-13 RX ORDER — PREDNISONE 10 MG/1
TABLET ORAL
Qty: 20 TABLET | Refills: 0 | Status: SHIPPED | OUTPATIENT
Start: 2025-05-13 | End: 2025-05-19

## 2025-05-13 NOTE — TELEPHONE ENCOUNTER
Spoke to patient, full name and date of birth verified.  Informed patient that prednisone prescription was sent today by Dr. Braun.  RN stressed that if the prednisone is not working, Dr. Braun wants her to go into the emergency room.     Patient verbalized understanding.     Patient wants an appointment for evaluation.     Earliest appointment with Dr. Braun is not until 6/12/25.     Patient agreeable to appointment on 5/19/25 with Dr. Fuentes.     Appointment scheduled.

## 2025-05-19 ENCOUNTER — OFFICE VISIT (OUTPATIENT)
Dept: INTERNAL MEDICINE CLINIC | Facility: CLINIC | Age: 80
End: 2025-05-19

## 2025-05-19 VITALS
HEART RATE: 69 BPM | WEIGHT: 174 LBS | BODY MASS INDEX: 30 KG/M2 | DIASTOLIC BLOOD PRESSURE: 69 MMHG | OXYGEN SATURATION: 98 % | SYSTOLIC BLOOD PRESSURE: 145 MMHG

## 2025-05-19 DIAGNOSIS — J44.9 COPD, SEVERE (HCC): Primary | ICD-10-CM

## 2025-05-19 DIAGNOSIS — J44.1 COPD EXACERBATION (HCC): ICD-10-CM

## 2025-05-19 PROCEDURE — 1159F MED LIST DOCD IN RCRD: CPT | Performed by: STUDENT IN AN ORGANIZED HEALTH CARE EDUCATION/TRAINING PROGRAM

## 2025-05-19 PROCEDURE — 3077F SYST BP >= 140 MM HG: CPT | Performed by: STUDENT IN AN ORGANIZED HEALTH CARE EDUCATION/TRAINING PROGRAM

## 2025-05-19 PROCEDURE — 99215 OFFICE O/P EST HI 40 MIN: CPT | Performed by: STUDENT IN AN ORGANIZED HEALTH CARE EDUCATION/TRAINING PROGRAM

## 2025-05-19 PROCEDURE — 1160F RVW MEDS BY RX/DR IN RCRD: CPT | Performed by: STUDENT IN AN ORGANIZED HEALTH CARE EDUCATION/TRAINING PROGRAM

## 2025-05-19 PROCEDURE — 3078F DIAST BP <80 MM HG: CPT | Performed by: STUDENT IN AN ORGANIZED HEALTH CARE EDUCATION/TRAINING PROGRAM

## 2025-05-19 RX ORDER — PREDNISONE 10 MG/1
TABLET ORAL
Qty: 20 TABLET | Refills: 0 | Status: SHIPPED | OUTPATIENT
Start: 2025-05-19

## 2025-05-19 RX ORDER — IPRATROPIUM BROMIDE AND ALBUTEROL SULFATE 2.5; .5 MG/3ML; MG/3ML
3 SOLUTION RESPIRATORY (INHALATION) EVERY 8 HOURS PRN
Qty: 100 ML | Refills: 0 | Status: SHIPPED | OUTPATIENT
Start: 2025-05-19

## 2025-05-19 RX ORDER — AZITHROMYCIN 250 MG/1
TABLET, FILM COATED ORAL
Qty: 6 TABLET | Refills: 0 | Status: SHIPPED | OUTPATIENT
Start: 2025-05-19 | End: 2025-05-24

## 2025-05-19 NOTE — PROGRESS NOTES
OFFICE NOTE       The following individual(s) verbally consented to be recorded using ambient AI listening technology and understand that they can each withdraw their consent to this listening technology at any point by asking the clinician to turn off or pause the recording:    Patient name: Ariana Osorio  Additional names:            Patient ID: Ariana Osorio is a 80 year old female.  Today's Date: 05/19/25  Chief Complaint: Follow - Up    HPI  History of Present Illness  Ariana Osorio is a 80 year old female with severe COPD who presents with ongoing dyspnea and shortness of breath.    She experiences persistent dyspnea and shortness of breath despite current treatments. She was seen by a pulmonary specialist on April 21st and is currently in home rehab. She wants to return to pulmonary rehab as it previously helped her feel stronger.    She has been using a nebulizer primarily in the evening, but her supply is insufficient for more frequent use. She has been using the nebulizer for about five months. The nebulizer helps when her breathing is very tight, but she finds it strong and it initially caused shaking.    She was prescribed prednisone about a week ago, which she states 'works magic' for her breathing and swelling. She notes improvement in her breathing and a reduction in foot swelling after starting prednisone. Her blood sugar levels have been stable while on prednisone, with readings around 100 to 115.    She has a history of foot swelling for three weeks, which resolved after starting prednisone. She has not been taking Bumex, a diuretic, as she believes the prednisone has alleviated the swelling.    No current cough or mucus production, but she uses multiple pillows to sleep elevated due to breathing difficulties. No dizziness and can walk normally without oxygen, although she uses it when walking as advised by her son.    She has a history of exposure to secondhand smoke  from working in a dealership in the 70s and 80s, which she attributes to her COPD. She has never smoked herself and is against smoking.       Vitals:    05/19/25 1813   BP: 145/69   Pulse: 69   SpO2: 98%   Weight: 174 lb (78.9 kg)     body mass index is 29.87 kg/m².  BP Readings from Last 3 Encounters:   05/19/25 145/69   03/03/25 130/70   01/21/25 129/73     The ASCVD Risk score (Caitlin BUCK, et al., 2019) failed to calculate for the following reasons:    Risk score cannot be calculated because patient has a medical history suggesting prior/existing ASCVD  Results  LABS  Blood Glucose: 100-115 (05/2025)    DIAGNOSTIC  EKG: QTc 419 ms (08/2024)       Medications reviewed:  Current Medications[1]      Assessment & Plan    1. COPD, severe (HCC) (Primary)  -     Ipratropium-Albuterol; Take 3 mL by nebulization every 8 (eight) hours as needed.  Dispense: 100 mL; Refill: 0  -     predniSONE; Take 4 tabs po x2d, 3 tabs x2d, 2 tabs x2d, 1 tab x2d  Dispense: 20 tablet; Refill: 0  -     Azithromycin; Take 2 tablets (500 mg total) by mouth daily for 1 day, THEN 1 tablet (250 mg total) daily for 4 days.  Dispense: 6 tablet; Refill: 0  2. COPD exacerbation (HCC)  -     Ipratropium-Albuterol; Take 3 mL by nebulization every 8 (eight) hours as needed.  Dispense: 100 mL; Refill: 0  -     predniSONE; Take 4 tabs po x2d, 3 tabs x2d, 2 tabs x2d, 1 tab x2d  Dispense: 20 tablet; Refill: 0  -     Azithromycin; Take 2 tablets (500 mg total) by mouth daily for 1 day, THEN 1 tablet (250 mg total) daily for 4 days.  Dispense: 6 tablet; Refill: 0    Assessment & Plan  Severe COPD  Severe COPD with ongoing dyspnea. DuoNebs and prednisone alleviate symptoms. Prednisone unsuitable for long-term use due to side effects. Azithromycin considered for anti-inflammatory effects. Aggressive management with nebulizers to prevent hospitalizations.  - Prescribe DuoNebs (ipratropium and albuterol) nebulizers every six hours as needed.  - Prescribe  prednisone with a tapering dose pack.  - Prescribe azithromycin for anti-inflammatory effects.  - Ensure adequate supply of nebulizers.  - Coordinate with pulmonologist for cardiopulmonary rehab referral.    Type 2 diabetes mellitus  Blood glucose levels well-managed on prednisone, readings around 100-115 mg/dL.    Hypertension  Aware of potential impact of prednisone on blood pressure. Engages in exercise to manage blood pressure and overall health.    Goals of Care  Prefers to avoid hospitalizations and manage COPD symptoms aggressively with outpatient treatments. Values maintaining independence and quality of life, seeking treatments that allow her to stay active and avoid emergency visits.       Follow Up: As needed/if symptoms worsen or No follow-ups on file..     I spent 43 minutes obtaining pertitent medical history, reviewing pertinent imaging/labs and specialists notes, evaluating patient, discussing differential diagnosis' and various treatment options, reinforcing importance of compliance with treatment plan, and completing documentation.     Encounter Times  PreChartin minutes    Reviewing/Obtainin minutes      Medical Exam: 4 minutes    Plan: 5 minutes      Notes: 4 minutes    Counseling/Education: 5 minutes      Referring/Communicating:   minutes    Ind Interpretation:   minutes      Care Coordination:   minutes       My total time spent caring for the patient on the day of the encounter: 43 minutes.          Objective/ Results:   Physical Exam  Constitutional:       Appearance: She is well-developed.   Cardiovascular:      Rate and Rhythm: Normal rate and regular rhythm.      Heart sounds: Normal heart sounds.   Pulmonary:      Effort: Pulmonary effort is normal.      Breath sounds: Decreased air movement present. Wheezing present.   Abdominal:      General: Bowel sounds are normal.      Palpations: Abdomen is soft.   Skin:     General: Skin is warm and dry.   Neurological:      Mental  Status: She is alert and oriented to person, place, and time.      Deep Tendon Reflexes: Reflexes are normal and symmetric.        Physical Exam  CHEST: Clear to auscultation bilaterally, no wheezes or crackles.     Reviewed:    Patient Active Problem List    Diagnosis    Diastolic heart failure, unspecified HF chronicity (HCC)    Acute on chronic diastolic (congestive) heart failure (HCC)    Acute on chronic congestive heart failure, unspecified heart failure type (HCC)    Influenza    COPD exacerbation (HCC)    Right knee pain    Glaucoma    Vitamin D deficiency    Carotid stenosis, non-symptomatic, bilateral    PAD (peripheral artery disease)    Thoracic aorta atherosclerosis    Osteopenia of multiple sites    Hyperparathyroidism (HCC)    Bilateral hearing loss    Chronic diastolic congestive heart failure (HCC)    S/P carotid endarterectomy    At risk for falls    Stage 3b chronic kidney disease (HCC)    Controlled type 2 diabetes mellitus with stage 3 chronic kidney disease, without long-term current use of insulin (HCC)    Hypertension associated with type 2 diabetes mellitus (HCC)    Hyperlipidemia associated with type 2 diabetes mellitus (HCC)    S/P CABG (coronary artery bypass graft)    Coronary artery disease    COPD, severe (HCC)    Impacted cerumen, bilateral    Chronic tophaceous gout    Medicare annual wellness visit, subsequent    History of breast cancer      Allergies[2]   Short Social Hx on File[3]   Review of Systems   Constitutional: Negative.    Respiratory:  Positive for cough, chest tightness, shortness of breath and wheezing.    Cardiovascular: Negative.    Gastrointestinal: Negative.    Skin: Negative.    Neurological: Negative.        All other systems negative unless otherwise stated in ROS or HPI above.       Reji Fuentes MD  Internal Medicine       Call office with any questions or seek emergency care if necessary.   Patient understands and agrees to follow directions and  advice.      ----------------------------------------- PATIENT INSTRUCTIONS-----------------------------------------     There are no Patient Instructions on file for this visit.        The 21st Century Cures Act makes medical notes available to patients in the interest of transparency.  However, please be advised that this is a medical document.  It is intended as a peer to peer communication.  It is written in medical language and may contain abbreviations or verbiage that are technical and unfamiliar.  It may appear blunt or direct.  Medical documents are intended to carry relevant information, facts as evident, and the clinical opinion of the practitioner.          [1]   Current Outpatient Medications   Medication Sig Dispense Refill    ipratropium-albuterol 0.5-2.5 (3) MG/3ML Inhalation Solution Take 3 mL by nebulization every 8 (eight) hours as needed. 100 mL 0    predniSONE 10 MG Oral Tab Take 4 tabs po x2d, 3 tabs x2d, 2 tabs x2d, 1 tab x2d 20 tablet 0    azithromycin 250 MG Oral Tab Take 2 tablets (500 mg total) by mouth daily for 1 day, THEN 1 tablet (250 mg total) daily for 4 days. 6 tablet 0    bumetanide 1 MG Oral Tab Take 1 tablet (1 mg total) by mouth every other day.      amLODIPine 2.5 MG Oral Tab Take 1 tablet (2.5 mg total) by mouth in the morning and 1 tablet (2.5 mg total) before bedtime. 180 tablet 1    Glucose Blood (TRUE METRIX BLOOD GLUCOSE TEST) In Vitro Strip Use 1 (one) new strip 2 times daily for blood glucose monitoring 200 strip 3    budesonide 0.5 MG/2ML Inhalation Suspension Take 2 mL (0.5 mg total) by nebulization 2 (two) times daily. 180 each 1    fluticasone-salmeterol (WIXELA INHUB) 500-50 MCG/ACT Inhalation Aerosol Powder, Breath Activated Inhale 1 puff into the lungs 2 (two) times daily.      glipiZIDE 5 MG Oral Tab Take 1 tablet (5 mg total) by mouth before breakfast. (Patient taking differently: Take 1 tablet (5 mg total) by mouth before breakfast. Pt states she is taking as  needed with blood sugar high) 90 tablet 1    pantoprazole 20 MG Oral Tab EC Take 1 tablet (20 mg total) by mouth every morning before breakfast. 90 tablet 3    omega-3 fatty acids 1000 MG Oral Cap Take 1,000 mg by mouth daily.      Cholecalciferol (VITAMIN D) 2000 units Oral Cap Take 1 capsule (2,000 Units total) by mouth every other day.      ezetimibe 10 MG Oral Tab Take 1 tablet (10 mg total) by mouth nightly. (Patient not taking: Reported on 5/19/2025) 90 tablet 1    ELIQUIS 5 MG Oral Tab Take 1 tablet (5 mg total) by mouth daily. (Patient not taking: Reported on 5/19/2025)      Alcohol Swabs (DROPSAFE ALCOHOL PREP) 70 % Does not apply Pads Apply 1 Pad topically 4 (four) times daily. Before each finger stick. 400 each 3    Blood Glucose Calibration (TRUE METRIX LEVEL 2) Normal In Vitro Solution 1 drop as needed (To use monthly, with each new vial of test strips, when you change your batteries, or if you suspect the meter is malfunctioning.). 1 each 3    TRUEplus Lancets 33G Does not apply Misc 1 Lancet by Finger stick route 4 (four) times daily. 400 each 3   [2]   Allergies  Allergen Reactions    Allopurinol HIVES, SWELLING and SHORTNESS OF BREATH    Dog Epithelium SHORTNESS OF BREATH     Hair and saliva    Dust SHORTNESS OF BREATH    Heparin SWELLING    Smoke SHORTNESS OF BREATH    Adhesive Tape (Rosins) RASH    Montelukast HALLUCINATION    Statins MYALGIA     Pt had developed myalgia and myopathy    Xanax [Alprazolam] RESTLESSNESS    Adhesive Tape OTHER (SEE COMMENTS)    Amoxicillin OTHER (SEE COMMENTS)    Other OTHER (SEE COMMENTS)    Sulfa Antibiotics OTHER (SEE COMMENTS)    Ace Inhibitors Coughing    Aspirin RASH   [3]   Social History  Socioeconomic History    Marital status:    Tobacco Use    Smoking status: Never     Passive exposure: Never    Smokeless tobacco: Never   Vaping Use    Vaping status: Never Used   Substance and Sexual Activity    Alcohol use: No     Comment: rarely at weddings/new  year    Drug use: No   Other Topics Concern    Reaction to local anesthetic No    Pt has a pacemaker No    Pt has a defibrillator No     Social Drivers of Health     Food Insecurity: No Food Insecurity (8/23/2024)    Food Insecurity     Food Insecurity: Never true   Transportation Needs: No Transportation Needs (8/28/2024)    Transportation Needs     Lack of Transportation: No   Stress: No Stress Concern Present (10/6/2023)    Stress     Feeling of Stress : No   Housing Stability: Low Risk  (8/23/2024)    Housing Stability     Housing Instability: No

## 2025-05-28 ENCOUNTER — PATIENT OUTREACH (OUTPATIENT)
Dept: CASE MANAGEMENT | Age: 80
End: 2025-05-28

## 2025-05-28 NOTE — PROGRESS NOTES
[email or web address] message sent. Pt graduated from chronic care management.    (2) Patient Placed in Bed

## 2025-06-05 ENCOUNTER — TELEPHONE (OUTPATIENT)
Dept: INTERNAL MEDICINE CLINIC | Facility: CLINIC | Age: 80
End: 2025-06-05

## 2025-06-05 NOTE — TELEPHONE ENCOUNTER
Per patient she needs a new Commode due to her commode is totally broken. Per patient please send it to [facility].

## 2025-06-05 NOTE — TELEPHONE ENCOUNTER
Please advise if an addendum with criteria can be made for commode     COMMODE CRITERIA       It is physically impracticable for patient to use a regular toilet.  Patient is confined to a single room or one level of home that has no toilet.  Patient is confined to the home and does not have a toilet in the home.

## 2025-06-05 NOTE — TELEPHONE ENCOUNTER
Is there any other questions asked before we have a back and forth.   Patient is confined to a single room or one level of home that has no toilet.

## 2025-06-09 NOTE — TELEPHONE ENCOUNTER
[employer] message not read, called patient and confirmed she is not aware of any vendor preference.   Commode order submitted via [employer]. Patient notified via [facility].     Commode  3 in 1 Commode -   Quantity  1  Supplier  [facility] [employer]

## 2025-06-13 NOTE — TELEPHONE ENCOUNTER
Please review.  Protocol failed / Has no protocol.     Requested Prescriptions   Pending Prescriptions Disp Refills    glipiZIDE 5 MG Oral Tab [Pharmacy Med Name: glipiZIDE Oral Tablet 5 MG] 90 tablet 3     Sig: Take 1 tablet (5 mg total) by mouth before breakfast.       Diabetes Medication Protocol Failed - 6/13/2025  5:49 PM        PASSED - Last A1C < 7.5 and within past 6 months     Lab Results         Component  Ref Range & Units (hover) 1/11/25 12:04 AM   External HGBA1C 5.9 High    Resulting Agency [facility]              Failed - EGFRCR or GFRNAA > 50     GFR Evaluation   EGFRCR: 42 , resulted on 1/4/2025        Passed - In person appointment or virtual visit in the past 6 mos or appointment in next 3 mos        Passed - Microalbumin procedure in past 12 months or taking ACE/ARB        Passed - GFR in the past 12 months        Passed - Medication is active on med list          amLODIPine 2.5 MG Oral Tab [Pharmacy Med Name: amLODIPine Besylate Oral Tablet 2.5 MG] 180 tablet 3     Sig: Take 1 tablet (2.5 mg total) by mouth in the morning and 1 tablet (2.5 mg total) before bedtime.       Hypertension Medications Protocol Failed - 6/13/2025  5:49 PM        Failed - Last BP reading less than 140/90     BP Readings from Last 1 Encounters:   05/19/25 145/69           Failed - EGFRCR or GFRNAA > 50     GFR Evaluation    EGFRCR: 42 , resulted on 1/4/2025        Passed - CMP or BMP in past 12 months        Passed - In person appointment or virtual visit in the past 12 mos or appointment in next 3 mos        Passed - Medication is active on med list

## 2025-06-14 RX ORDER — AMLODIPINE BESYLATE 2.5 MG/1
2.5 TABLET ORAL 2 TIMES DAILY
Qty: 180 TABLET | Refills: 1 | Status: SHIPPED | OUTPATIENT
Start: 2025-06-14

## 2025-06-14 RX ORDER — GLIPIZIDE 5 MG/1
5 TABLET ORAL
Qty: 90 TABLET | Refills: 1 | Status: SHIPPED | OUTPATIENT
Start: 2025-06-14

## 2025-06-16 ENCOUNTER — NURSE TRIAGE (OUTPATIENT)
Dept: INTERNAL MEDICINE CLINIC | Facility: CLINIC | Age: 80
End: 2025-06-16

## 2025-06-16 NOTE — TELEPHONE ENCOUNTER
Action Requested: Summary for Provider     []  Critical Lab, Recommendations Needed  [x] Need Additional Advice  []   FYI    []   Need Orders  [] Need Medications Sent to Pharmacy  []  Other     SUMMARY: Per protocol, patient should be seen in the office today or tomorrow. Requesting to see PCP only.    Dr. Braun please advise if you are able to accommodate the patient.    Reason for call: Headache (/)  Onset: 4-5 Days Ago    Patient reports of pain in the left side of the head for the past 4-5 days. States she only feels it whenever she lies down on the left side, it goes away when she turns the right side. She has noticed this before but has been ignoring it. States pain feels like \"I've been lying on my side for a long time.\" It is not dull nor sharp.    Denies any vision changes, no nausea or vomiting, numbness, tingling, dizziness or any other symptoms. States she does not take any pain pill, only drinks water after and after 30 minutes she feels better. Care advice given per protocol. Patient verbalized understanding and agreed with plan of care.     Reason for Disposition   New headache and age > 50    Protocols used: Headache-A-OH

## 2025-06-17 NOTE — TELEPHONE ENCOUNTER
I have fully booked for tomorrow 6/17/ 25 but she needs to be seen/evaluated; can she see any available provider so she can be evaluated sooner

## 2025-06-18 ENCOUNTER — LAB ENCOUNTER (OUTPATIENT)
Dept: LAB | Age: 80
End: 2025-06-18
Attending: INTERNAL MEDICINE
Payer: MEDICARE

## 2025-06-18 ENCOUNTER — OFFICE VISIT (OUTPATIENT)
Dept: INTERNAL MEDICINE CLINIC | Facility: CLINIC | Age: 80
End: 2025-06-18

## 2025-06-18 VITALS
TEMPERATURE: 100 F | BODY MASS INDEX: 29.73 KG/M2 | OXYGEN SATURATION: 92 % | HEIGHT: 64 IN | HEART RATE: 86 BPM | DIASTOLIC BLOOD PRESSURE: 70 MMHG | WEIGHT: 174.13 LBS | SYSTOLIC BLOOD PRESSURE: 118 MMHG

## 2025-06-18 DIAGNOSIS — R51.9 PERSISTENT HEADACHES: ICD-10-CM

## 2025-06-18 DIAGNOSIS — M1A.9XX1 CHRONIC TOPHACEOUS GOUT: ICD-10-CM

## 2025-06-18 DIAGNOSIS — E11.22 CONTROLLED TYPE 2 DIABETES MELLITUS WITH STAGE 3 CHRONIC KIDNEY DISEASE, WITHOUT LONG-TERM CURRENT USE OF INSULIN (HCC): Primary | ICD-10-CM

## 2025-06-18 DIAGNOSIS — N18.30 CONTROLLED TYPE 2 DIABETES MELLITUS WITH STAGE 3 CHRONIC KIDNEY DISEASE, WITHOUT LONG-TERM CURRENT USE OF INSULIN (HCC): Primary | ICD-10-CM

## 2025-06-18 LAB
ALBUMIN SERPL-MCNC: 4.5 G/DL (ref 3.2–4.8)
ANION GAP SERPL CALC-SCNC: 8 MMOL/L (ref 0–18)
BASOPHILS # BLD AUTO: 0.03 X10(3) UL (ref 0–0.2)
BASOPHILS NFR BLD AUTO: 0.4 %
BUN BLD-MCNC: 24 MG/DL (ref 9–23)
BUN/CREAT SERPL: 16.4 (ref 10–20)
CALCIUM BLD-MCNC: 10.9 MG/DL (ref 8.7–10.4)
CHLORIDE SERPL-SCNC: 98 MMOL/L (ref 98–112)
CO2 SERPL-SCNC: 33 MMOL/L (ref 21–32)
CREAT BLD-MCNC: 1.46 MG/DL (ref 0.55–1.02)
CRP SERPL-MCNC: 2 MG/DL (ref ?–1)
DEPRECATED RDW RBC AUTO: 46.9 FL (ref 35.1–46.3)
EGFRCR SERPLBLD CKD-EPI 2021: 36 ML/MIN/1.73M2 (ref 60–?)
EOSINOPHIL # BLD AUTO: 0.22 X10(3) UL (ref 0–0.7)
EOSINOPHIL NFR BLD AUTO: 3 %
ERYTHROCYTE [DISTWIDTH] IN BLOOD BY AUTOMATED COUNT: 13.1 % (ref 11–15)
ERYTHROCYTE [SEDIMENTATION RATE] IN BLOOD: 24 MM/HR (ref 0–30)
GLUCOSE BLD-MCNC: 139 MG/DL (ref 70–99)
HCT VFR BLD AUTO: 42.7 % (ref 35–48)
HEMOGLOBIN A1C: 6 % (ref 4.3–5.6)
HGB BLD-MCNC: 13.1 G/DL (ref 12–16)
IMM GRANULOCYTES # BLD AUTO: 0.08 X10(3) UL (ref 0–1)
IMM GRANULOCYTES NFR BLD: 1.1 %
LYMPHOCYTES # BLD AUTO: 1.12 X10(3) UL (ref 1–4)
LYMPHOCYTES NFR BLD AUTO: 15.1 %
MCH RBC QN AUTO: 29.6 PG (ref 26–34)
MCHC RBC AUTO-ENTMCNC: 30.7 G/DL (ref 31–37)
MCV RBC AUTO: 96.6 FL (ref 80–100)
MONOCYTES # BLD AUTO: 0.9 X10(3) UL (ref 0.1–1)
MONOCYTES NFR BLD AUTO: 12.1 %
NEUTROPHILS # BLD AUTO: 5.08 X10 (3) UL (ref 1.5–7.7)
NEUTROPHILS # BLD AUTO: 5.08 X10(3) UL (ref 1.5–7.7)
NEUTROPHILS NFR BLD AUTO: 68.3 %
OSMOLALITY SERPL CALC.SUM OF ELEC: 294 MOSM/KG (ref 275–295)
PHOSPHATE SERPL-MCNC: 2.8 MG/DL (ref 2.4–5.1)
PLATELET # BLD AUTO: 208 10(3)UL (ref 150–450)
POTASSIUM SERPL-SCNC: 4.4 MMOL/L (ref 3.5–5.1)
PTH-INTACT SERPL-MCNC: 121.7 PG/ML (ref 18.5–88)
RBC # BLD AUTO: 4.42 X10(6)UL (ref 3.8–5.3)
SODIUM SERPL-SCNC: 139 MMOL/L (ref 136–145)
URATE SERPL-MCNC: 6.9 MG/DL (ref 3.1–7.8)
VIT D+METAB SERPL-MCNC: 42 NG/ML (ref 30–100)
WBC # BLD AUTO: 7.4 X10(3) UL (ref 4–11)

## 2025-06-18 PROCEDURE — 99214 OFFICE O/P EST MOD 30 MIN: CPT | Performed by: INTERNAL MEDICINE

## 2025-06-18 PROCEDURE — 1159F MED LIST DOCD IN RCRD: CPT | Performed by: INTERNAL MEDICINE

## 2025-06-18 PROCEDURE — 3008F BODY MASS INDEX DOCD: CPT | Performed by: INTERNAL MEDICINE

## 2025-06-18 PROCEDURE — 86140 C-REACTIVE PROTEIN: CPT

## 2025-06-18 PROCEDURE — 1160F RVW MEDS BY RX/DR IN RCRD: CPT | Performed by: INTERNAL MEDICINE

## 2025-06-18 PROCEDURE — 3078F DIAST BP <80 MM HG: CPT | Performed by: INTERNAL MEDICINE

## 2025-06-18 PROCEDURE — 84550 ASSAY OF BLOOD/URIC ACID: CPT

## 2025-06-18 PROCEDURE — 83036 HEMOGLOBIN GLYCOSYLATED A1C: CPT | Performed by: INTERNAL MEDICINE

## 2025-06-18 PROCEDURE — 3074F SYST BP LT 130 MM HG: CPT | Performed by: INTERNAL MEDICINE

## 2025-06-18 PROCEDURE — 85652 RBC SED RATE AUTOMATED: CPT

## 2025-06-18 RX ORDER — FUROSEMIDE 20 MG/1
TABLET ORAL
COMMUNITY

## 2025-06-18 RX ORDER — LOSARTAN POTASSIUM 50 MG/1
50 TABLET ORAL DAILY
COMMUNITY

## 2025-06-18 RX ORDER — FLUTICASONE PROPIONATE AND SALMETEROL 500; 50 UG/1; UG/1
1 POWDER RESPIRATORY (INHALATION) 2 TIMES DAILY
Qty: 180 EACH | Refills: 1 | Status: SHIPPED | OUTPATIENT
Start: 2025-06-18 | End: 2025-06-19

## 2025-06-18 RX ORDER — ARFORMOTEROL TARTRATE 15 UG/2ML
SOLUTION RESPIRATORY (INHALATION)
COMMUNITY
Start: 2025-04-22

## 2025-06-18 NOTE — TELEPHONE ENCOUNTER
Dr Braun, please advise    FYI-we left a message for patient yesterday-no call back from patient.    Before this RN reaches out to the patient today-FYABIODUN there are no IM appts open today (6/18) at any location.     Can you add the patient on today before calling the patient back or should we advise urgent care?

## 2025-06-18 NOTE — TELEPHONE ENCOUNTER
Left message to call back.  Attempted x2  Mychart message was sent.    Called patient's son, Romulo and informed him to have the patient call us to set up an appt today.

## 2025-06-18 NOTE — PROGRESS NOTES
Subjective:     Patient ID: Ariana Osorio is a 80 year old female.    Headache   This is a new problem. The current episode started in the past 7 days (5 days). The problem occurs intermittently (noted at night). The problem has been unchanged. The pain is located in the Left unilateral and parietal region. The pain does not radiate. The pain quality is similar to prior headaches. The quality of the pain is described as aching. The pain is at a severity of 6/10. The pain is moderate. Pertinent negatives include no nausea, seizures, vomiting or weakness. Associated symptoms comments: none. The symptoms are aggravated by alcohol. She has tried nothing for the symptoms. The treatment provided no relief. Her past medical history is significant for hypertension. There is no history of cancer, immunosuppression, migraine headaches, obesity or recent head traumas.       History/Other:   Review of Systems   Constitutional: Negative.    HENT:  Positive for congestion.    Eyes: Negative.    Respiratory: Negative.     Cardiovascular: Negative.    Gastrointestinal: Negative.  Negative for nausea and vomiting.   Genitourinary: Negative.    Neurological:  Positive for headaches. Negative for tremors, seizures, syncope, facial asymmetry, speech difficulty, weakness and light-headedness.     Current Medications[1]  Allergies:Allergies[2]    Past Medical History[3]   Past Surgical History[4]   Family History[5]   Social History: Short Social Hx on File[6]     Objective:   Physical Exam  Constitutional:       General: She is not in acute distress.     Appearance: She is well-developed. She is obese. She is not ill-appearing, toxic-appearing or diaphoretic.   HENT:      Head: Normocephalic and atraumatic.      Right Ear: Tympanic membrane, ear canal and external ear normal.      Left Ear: Tympanic membrane, ear canal and external ear normal.      Nose: Nose normal. No congestion.      Mouth/Throat:      Pharynx: No oropharyngeal  exudate.   Eyes:      General: No visual field deficit or scleral icterus.        Right eye: No discharge.         Left eye: No discharge.      Extraocular Movements: Extraocular movements intact.      Conjunctiva/sclera: Conjunctivae normal.      Pupils: Pupils are equal, round, and reactive to light.   Neck:      Vascular: Carotid bruit present. No JVD.   Cardiovascular:      Rate and Rhythm: Normal rate and regular rhythm.      Pulses: Normal pulses.      Heart sounds: Murmur heard.   Pulmonary:      Effort: Pulmonary effort is normal. No respiratory distress.      Breath sounds: Normal breath sounds. No wheezing or rales.   Abdominal:      General: Bowel sounds are normal. There is no distension.      Palpations: Abdomen is soft. There is no mass.      Tenderness: There is no abdominal tenderness. There is no guarding or rebound.   Musculoskeletal:         General: No tenderness. Normal range of motion.      Cervical back: Normal range of motion and neck supple. No rigidity or tenderness.   Lymphadenopathy:      Cervical: No cervical adenopathy.   Skin:     General: Skin is warm and dry.      Capillary Refill: Capillary refill takes less than 2 seconds.      Coloration: Skin is not jaundiced or pale.      Findings: No rash.   Neurological:      General: No focal deficit present.      Mental Status: She is alert. She is disoriented.      GCS: GCS verbal subscore is 5. GCS motor subscore is 5.      Cranial Nerves: No cranial nerve deficit or facial asymmetry.      Sensory: No sensory deficit.      Motor: No weakness.      Coordination: Coordination normal.      Gait: Gait normal.      Deep Tendon Reflexes: Reflexes normal.      Reflex Scores:       Tricep reflexes are 2+ on the right side and 2+ on the left side.       Bicep reflexes are 2+ on the right side and 2+ on the left side.       Brachioradialis reflexes are 2+ on the right side and 2+ on the left side.       Patellar reflexes are 2+ on the right side and  2+ on the left side.       Achilles reflexes are 2+ on the right side and 2+ on the left side.        Assessment & Plan:   (E11.22,  N18.30) Controlled type 2 diabetes mellitus with stage 3 chronic kidney disease, without long-term current use of insulin (HCC)  (primary encounter diagnosis)  Plan: POC Glycohemoglobin [07208        Pt dexa scan showed some debileitating and love allowing satan trick us.     (R51.9) Persistent headaches  Plan: Sed Rate, Westergren (Automated), C-Reactive         Protein, CT BRAIN OR HEAD (CPT=70450)        Patient with new onset headache though only happening when she lies on her left side.  She is currently not taking her Eliquis.  I told her we will check her ESR and CRP.  Will also do a CAT scan of the brain.      (M1A.9XX1) Chronic tophaceous gout  Plan: Uric Acid        Check uric uric acid level.  The patient has not been taking her pupils extent the.       Orders Placed This Encounter   Procedures    POC Glycohemoglobin [62417]       Meds This Visit:  Requested Prescriptions      No prescriptions requested or ordered in this encounter       Imaging & Referrals:  None            [1]   Current Outpatient Medications   Medication Sig Dispense Refill    furosemide 20 MG Oral Tab 1 tablet Orally Take every other day for 30      arformoterol 15 MCG/2ML Inhalation Nebu Soln       glipiZIDE 5 MG Oral Tab Take 1 tablet (5 mg total) by mouth before breakfast. 90 tablet 1    amLODIPine 2.5 MG Oral Tab Take 1 tablet (2.5 mg total) by mouth in the morning and 1 tablet (2.5 mg total) before bedtime. 180 tablet 1    ipratropium-albuterol 0.5-2.5 (3) MG/3ML Inhalation Solution Take 3 mL by nebulization every 8 (eight) hours as needed. 100 mL 0    Glucose Blood (TRUE METRIX BLOOD GLUCOSE TEST) In Vitro Strip Use 1 (one) new strip 2 times daily for blood glucose monitoring 200 strip 3    Alcohol Swabs (DROPSAFE ALCOHOL PREP) 70 % Does not apply Pads Apply 1 Pad topically 4 (four) times daily.  Before each finger stick. 400 each 3    Blood Glucose Calibration (TRUE METRIX LEVEL 2) Normal In Vitro Solution 1 drop as needed (To use monthly, with each new vial of test strips, when you change your batteries, or if you suspect the meter is malfunctioning.). 1 each 3    TRUEplus Lancets 33G Does not apply Misc 1 Lancet by Finger stick route 4 (four) times daily. 400 each 3    budesonide 0.5 MG/2ML Inhalation Suspension Take 2 mL (0.5 mg total) by nebulization 2 (two) times daily. 180 each 1    fluticasone-salmeterol (WIXELA INHUB) 500-50 MCG/ACT Inhalation Aerosol Powder, Breath Activated Inhale 1 puff into the lungs 2 (two) times daily.      pantoprazole 20 MG Oral Tab EC Take 1 tablet (20 mg total) by mouth every morning before breakfast. 90 tablet 3    omega-3 fatty acids 1000 MG Oral Cap Take 1,000 mg by mouth daily.      Cholecalciferol (VITAMIN D) 2000 units Oral Cap Take 1 capsule (2,000 Units total) by mouth every other day.      losartan 50 MG Oral Tab Take 1 tablet (50 mg total) by mouth daily. (Patient not taking: Reported on 6/18/2025)      metFORMIN 500 MG Oral Tab 1 tablet with meals Orally daily for 90 days (Patient not taking: Reported on 6/18/2025)      predniSONE 10 MG Oral Tab Take 4 tabs po x2d, 3 tabs x2d, 2 tabs x2d, 1 tab x2d (Patient not taking: Reported on 6/18/2025) 20 tablet 0    ezetimibe 10 MG Oral Tab Take 1 tablet (10 mg total) by mouth nightly. (Patient not taking: Reported on 6/18/2025) 90 tablet 1    ELIQUIS 5 MG Oral Tab Take 1 tablet (5 mg total) by mouth daily. (Patient not taking: Reported on 6/18/2025)      bumetanide 1 MG Oral Tab Take 1 tablet (1 mg total) by mouth every other day. (Patient not taking: Reported on 6/18/2025)     [2]   Allergies  Allergen Reactions    Allopurinol HIVES, SWELLING and SHORTNESS OF BREATH    Azithromycin HIVES    Dog Epithelium SHORTNESS OF BREATH     Hair and saliva    Dust SHORTNESS OF BREATH    Heparin SWELLING    Smoke SHORTNESS OF BREATH     Adhesive Tape (Rosins) RASH    Montelukast HALLUCINATION    Statins MYALGIA     Pt had developed myalgia and myopathy    Xanax [Alprazolam] RESTLESSNESS    Adhesive Tape OTHER (SEE COMMENTS)    Amoxicillin OTHER (SEE COMMENTS)    Bumetanide OTHER (SEE COMMENTS)    Other OTHER (SEE COMMENTS)    Sulfa Antibiotics OTHER (SEE COMMENTS)    Ace Inhibitors Coughing    Aspirin RASH    Spironolactone RASH   [3]   Past Medical History:   Age-related cataract of left eye    Age-related cataract of right eye    Anxiety    Asthma (HCC)    Atherosclerosis of coronary artery    Breast CA (HCC)    lt mastectomy    Breast CA (HCC)    lumpectomy, right    Cancer (HCC)    breast cancer    Carotid artery disease    Carotid stenosis    Closed fracture of multiple ribs of left side with routine healing, subsequent encounter    Congestive heart disease (HCC)    COPD (chronic obstructive pulmonary disease) (HCC)    on O2 at 2 liters    Coronary atherosclerosis    Depression    Diabetes (HCC)    takes insulin when hospitalized, takes oral meds at home    Diabetes mellitus (HCC)    Diastolic dysfunction without heart failure    Esophageal reflux    Essential hypertension    Generalized anxiety disorder    Gout    Gout    High blood pressure    High cholesterol    History of pituitary adenoma    Hyperlipidemia    Major depressive disorder, single episode, moderate (HCC)    Obesity   [4]   Past Surgical History:  Procedure Laterality Date    Cabg      Carotid endarterectomy Bilateral     Cataract      Colonoscopy      2013    Colonoscopy  2013    Colonoscopy N/A 02/21/2023    Procedure: COLONOSCOPY;  Surgeon: Abdiaziz Melendez MD;  Location: [facility] ENDOSCOPY    Hernia surgery      Lumpectomy right  2014    Mastectomy left Left 1999    Radiation right  2014   [5]   Family History  Problem Relation Age of Onset    Asthma Father     Hypertension Father     Heart Disorder Father     Other (Other) Mother         thyroid surgery    Asthma Sister      Asthma Brother     Other (Other) Brother         gout    Breast Cancer Self 43        rt breast 69   [6]   Social History  Socioeconomic History    Marital status:    Tobacco Use    Smoking status: Never     Passive exposure: Never    Smokeless tobacco: Never   Vaping Use    Vaping status: Never Used   Substance and Sexual Activity    Alcohol use: No     Comment: rarely at weddings/new year    Drug use: No   Other Topics Concern    Reaction to local anesthetic No    Pt has a pacemaker No    Pt has a defibrillator No     Social Drivers of Health     Food Insecurity: No Food Insecurity (8/23/2024)    Food Insecurity     Food Insecurity: Never true   Transportation Needs: No Transportation Needs (8/28/2024)    Transportation Needs     Lack of Transportation: No   Stress: No Stress Concern Present (10/6/2023)    Stress     Feeling of Stress : No   Housing Stability: Low Risk  (8/23/2024)    Housing Stability     Housing Instability: No

## 2025-06-18 NOTE — TELEPHONE ENCOUNTER
Patient's son returned call.  Scheduled appointment  Added to 345pm slot, son aware appointment is at 4pm.  Future Appointments   Date Time Provider Department Center   6/18/2025  3:45 PM Enrique Braun MD [facility]

## 2025-06-19 ENCOUNTER — TELEPHONE (OUTPATIENT)
Dept: INTERNAL MEDICINE CLINIC | Facility: CLINIC | Age: 80
End: 2025-06-19

## 2025-06-19 ENCOUNTER — TELEPHONE (OUTPATIENT)
Dept: NEPHROLOGY | Facility: CLINIC | Age: 80
End: 2025-06-19

## 2025-06-19 RX ORDER — FLUTICASONE PROPIONATE AND SALMETEROL 500; 50 UG/1; UG/1
1 POWDER RESPIRATORY (INHALATION) 2 TIMES DAILY
Qty: 180 EACH | Refills: 1 | Status: SHIPPED | OUTPATIENT
Start: 2025-06-19

## 2025-06-19 NOTE — TELEPHONE ENCOUNTER
Provider Information    Authorizing Provider Encounter Provider   Enrique Braun MD Linchangco, Emmanuel, MD     Medication Detail    Medication Quantity Refills Start End   fluticasone-salmeterol (WIXELA INHUB) 500-50 MCG/ACT Inhalation Aerosol Powder, Breath Activated 180 each 1 6/18/2025 --   Sig:   Inhale 1 puff into the lungs 2 (two) times daily.     Route:   Inhalation     Order #:   [id number]       Additional Order Information    Multidose Package Dispensed: Yes Cost ID: -- Admin Qty: 1 puff    NDC #: -- NDC Qty: --    Calculation: --     Pharmacy Actions and Admin    [facility] Pharmacy Actions     MAR Comment Waste Waste Unit          Outpatient Medication Detail     Disp Refills Start End    fluticasone-salmeterol (WIXELA INHUB) 500-50 MCG/ACT Inhalation Aerosol Powder, Breath Activated 180 each 1 6/18/2025 --    Sig - Route: Inhale 1 puff into the lungs 2 (two) times daily. - Inhalation    Sent to pharmacy as: Fluticasone-Salmeterol 500-50 MCG/ACT Inhalation Aerosol Powder Breath Activated (Wixela Inhub)    E-Prescribing Status: Receipt confirmed by pharmacy (6/18/2025  4:47 PM CDT)      Pharmacy    [facility] - [facility] [address] [facility] [phone], [phone]

## 2025-06-19 NOTE — TELEPHONE ENCOUNTER
Left message for patient to call back to inform that a prior authorization has been obtained for the CT brain that was ordered by Dr Braun. The authorization is valid until 8/18/25. Patient may contact central scheduling at [phone] to schedule this exam.

## 2025-06-19 NOTE — TELEPHONE ENCOUNTER
Patient called regarding the following prescription:    fluticasone-salmeterol (WIXELA INHUB) 500-50 MCG/ACT Inhalation Aerosol Powder, Breath Activated     Per patient this needs to be sent to her local pharmacy. Please send it to:    [facility]     [address]

## 2025-06-19 NOTE — TELEPHONE ENCOUNTER
Kidney function is getting a little worse.  See me soon for follow-up.  Her calcium levels also remain elevated.  She was seen Dr. Leone for this.  Make sure she has a follow-up appointment with her to review.

## 2025-06-19 NOTE — TELEPHONE ENCOUNTER
Called; related test result message.  Appt 7/17 for follow up.  LOV: 3/18/24 with Dr. Leone, to f/u 1 yr, agreed to follow up

## 2025-06-23 NOTE — DIETARY NOTE
NUTRITION EDUCATION NOTE     Received consult for heart failure diet education. Verbally reviewed basic low sodium diet restrictions. Provided with Low Sodium Nutrition Therapy NCM handout to reinforce. Receptive to instruction. Pt typically follows low sodium at home, she does not cook with salt or use it on any of her food. Her family was visiting recently and they were eating out quite a bit. Expect fair-good compliance.        Jackelyn Lara RD, JESIN  Clinical Dietitian  P: [phone]        
FAMILY HISTORY:  Father  Still living? Unknown  Family history of brain tumor, Age at diagnosis: Age Unknown

## 2025-06-30 ENCOUNTER — OFFICE VISIT (OUTPATIENT)
Facility: CLINIC | Age: 80
End: 2025-06-30

## 2025-06-30 VITALS
HEIGHT: 64 IN | HEART RATE: 80 BPM | BODY MASS INDEX: 30 KG/M2 | DIASTOLIC BLOOD PRESSURE: 69 MMHG | SYSTOLIC BLOOD PRESSURE: 126 MMHG

## 2025-06-30 DIAGNOSIS — R13.19 ESOPHAGEAL DYSPHAGIA: Primary | ICD-10-CM

## 2025-06-30 DIAGNOSIS — R14.2 BELCHING: ICD-10-CM

## 2025-06-30 PROCEDURE — 3008F BODY MASS INDEX DOCD: CPT | Performed by: INTERNAL MEDICINE

## 2025-06-30 PROCEDURE — 3074F SYST BP LT 130 MM HG: CPT | Performed by: INTERNAL MEDICINE

## 2025-06-30 PROCEDURE — 1159F MED LIST DOCD IN RCRD: CPT | Performed by: INTERNAL MEDICINE

## 2025-06-30 PROCEDURE — 3078F DIAST BP <80 MM HG: CPT | Performed by: INTERNAL MEDICINE

## 2025-06-30 PROCEDURE — 99214 OFFICE O/P EST MOD 30 MIN: CPT | Performed by: INTERNAL MEDICINE

## 2025-06-30 NOTE — PATIENT INSTRUCTIONS
1.  Continue pantoprazole at current dose for now.  2.  Schedule esophagus x-ray.  3.  Further recommendations pending the above result.

## 2025-06-30 NOTE — PROGRESS NOTES
Subjective:   Patient ID: Ariana Osorio is a 80 year old female.    HPI  The patient returns in follow-up.  She was last seen in December 2024.     As per previous notes the patient was admitted to the hospital in February 2022 with coffee-ground emesis and black stools on full dose aspirin therapy prescribed for a history of cardiovascular disease (CABG and left carotid endarterectomy).  Upper endoscopy revealed LA grade B esophagitis and H. pylori negative gastroduodenitis with antral erosions.  No dominant ulceration was seen.  Baseline hemoglobin was 12.8 which decreased to a fiordaliza value of 7.6 on 2/18/2022.  The patient was discharged on pantoprazole.      The patient was advised to continue pantoprazole maintenance (once daily).     The patient underwent a screening colonoscopy in February 2023.  #6 subcentimeter polyps were removed of which #3 were traditional or serrated adenomas.  Uncomplicated diverticulosis was present.  A surveillance colonoscopy in 3 years was advised.     The patient has a history of COPD and requires supplemental ambulatory oxygen when exerting herself.  She does not require oxygen at rest.    At the time of the patient's last visit she endorsed frequent belching, however, was taking her pantoprazole immediately before eating.  We had discussed taking the pantoprazole 30 minutes before meal.  I recommended dietary and lifestyle modification to avoid aerophagia.  The patient was asked to contact us with an update.    Current history:  The patient is a difficult historian.  She endorses at least a 3-month history of ongoing belching especially after drinking water or eating food.  She feels food/liquid sitting in her chest.  This makes it difficult to breathe.  Inducing a belch will resolve the symptoms.  The symptoms can occur immediately or up to 30 minutes after eating.    It is difficult to ascertain whether the patient is truly describing dysphagia.  She has no difficulty  swallowing rice.  Meat (which she typically avoids due to gout) has to be small and soft in order to be swallowed.  She has issues with ill fitting dentition.  She has absolutely no heartburn or regurgitation of food or saliva.  She has no nocturnal symptoms and is able to sleep throughout the night.  She uses intermittent home oxygen therapy by nasal cannula and nebulizer therapy, however, does not use CPAP or BiPAP.    The patient can also have difficulty breathing if she \"overeats\" or \"eats heavy\".  She will experience postprandial fullness relieved with a belch.  She denies abdominal pain.  She can have difficulty evacuating stool.         History/Other:   Review of Systems  See above    Wt Readings from Last 8 Encounters:   06/18/25 174 lb 1.6 oz (79 kg)   05/19/25 174 lb (78.9 kg)   04/07/25 177 lb (80.3 kg)   03/03/25 177 lb 1 oz (80.3 kg)   01/21/25 171 lb 1 oz (77.6 kg)   12/04/24 172 lb (78 kg)   11/11/24 170 lb (77.1 kg)   09/03/24 173 lb (78.5 kg)       Current Medications[1]  Allergies:Allergies[2]    Objective:   Physical Exam  Vitals and nursing note reviewed.   Constitutional:       General: She is not in acute distress.     Appearance: She is well-developed. She is not ill-appearing, toxic-appearing or diaphoretic.      Comments: Examined in her chair   HENT:      Head: Normocephalic and atraumatic.      Mouth/Throat:      Pharynx: No oropharyngeal exudate.   Eyes:      General: No scleral icterus.     Conjunctiva/sclera: Conjunctivae normal.   Neck:      Thyroid: No thyromegaly.   Cardiovascular:      Rate and Rhythm: Normal rate and regular rhythm.      Heart sounds: Normal heart sounds.   Pulmonary:      Effort: Pulmonary effort is normal. No respiratory distress.      Breath sounds: No wheezing or rales.      Comments: Intermittent use of oxygen by nasal cannula  No wheezing.  Fair air entry.  Abdominal:      General: Bowel sounds are normal. There is no distension.      Palpations: Abdomen is  soft. There is no mass.      Tenderness: There is no abdominal tenderness. There is no guarding or rebound.   Musculoskeletal:      Cervical back: Neck supple.   Lymphadenopathy:      Cervical: No cervical adenopathy.   Neurological:      Mental Status: She is alert and oriented to person, place, and time.   Psychiatric:         Behavior: Behavior normal.         Assessment & Plan:   1. Esophageal dysphagia    2. Belching    The patient presents with ongoing belching.  She also notes symptoms after eating which affect her breathing and require belching for relief.  There is, however, no heartburn or regurgitation.  It is difficult to ascertain whether the patient is describing dysphagia.  I am recommending that she continue the pantoprazole at current dose for now.  I am recommending visualization of the esophagus.  In light of her COPD and oxygen use I would recommend that we proceed with an esophagram first to evaluate for obvious strictures, mucosal irregularity and to assess motility.  Endoscopy would be advised if the esophagram is abnormal or if the symptoms persist and remain unexplained.    3.  Personal history of adenomatous colon polyps  Other than outlet delay constipation, the patient is asymptomatic from a lower gastrointestinal tract standpoint.  The last colonoscopy in February 2023 revealed #3 subcentimeter adenomatous polyps.  Although the patient would be due for a surveillance colonoscopy in February 2026, observation would not be unreasonable in light of age and COPD.      Meds This Visit:  Requested Prescriptions      No prescriptions requested or ordered in this encounter       Imaging & Referrals:  XR ESOPHAGUS SINGLE CONTRAST (CPT=74220)         [1]   Current Outpatient Medications   Medication Sig Dispense Refill    fluticasone-salmeterol (WIXELA INHUB) 500-50 MCG/ACT Inhalation Aerosol Powder, Breath Activated Inhale 1 puff into the lungs 2 (two) times daily. 180 each 1    arformoterol 15  MCG/2ML Inhalation Nebu Soln       glipiZIDE 5 MG Oral Tab Take 1 tablet (5 mg total) by mouth before breakfast. 90 tablet 1    amLODIPine 2.5 MG Oral Tab Take 1 tablet (2.5 mg total) by mouth in the morning and 1 tablet (2.5 mg total) before bedtime. 180 tablet 1    ipratropium-albuterol 0.5-2.5 (3) MG/3ML Inhalation Solution Take 3 mL by nebulization every 8 (eight) hours as needed. 100 mL 0    Glucose Blood (TRUE METRIX BLOOD GLUCOSE TEST) In Vitro Strip Use 1 (one) new strip 2 times daily for blood glucose monitoring 200 strip 3    Alcohol Swabs (DROPSAFE ALCOHOL PREP) 70 % Does not apply Pads Apply 1 Pad topically 4 (four) times daily. Before each finger stick. 400 each 3    Blood Glucose Calibration (TRUE METRIX LEVEL 2) Normal In Vitro Solution 1 drop as needed (To use monthly, with each new vial of test strips, when you change your batteries, or if you suspect the meter is malfunctioning.). 1 each 3    TRUEplus Lancets 33G Does not apply Misc 1 Lancet by Finger stick route 4 (four) times daily. 400 each 3    budesonide 0.5 MG/2ML Inhalation Suspension Take 2 mL (0.5 mg total) by nebulization 2 (two) times daily. 180 each 1    pantoprazole 20 MG Oral Tab EC Take 1 tablet (20 mg total) by mouth every morning before breakfast. 90 tablet 3    omega-3 fatty acids 1000 MG Oral Cap Take 1,000 mg by mouth daily.      Cholecalciferol (VITAMIN D) 2000 units Oral Cap Take 1 capsule (2,000 Units total) by mouth every other day.      furosemide 20 MG Oral Tab 1 tablet Orally Take every other day for 30 (Patient not taking: Reported on 6/30/2025)      losartan 50 MG Oral Tab Take 1 tablet (50 mg total) by mouth daily. (Patient not taking: Reported on 6/18/2025)      metFORMIN 500 MG Oral Tab 1 tablet with meals Orally daily for 90 days (Patient not taking: Reported on 6/18/2025)      predniSONE 10 MG Oral Tab Take 4 tabs po x2d, 3 tabs x2d, 2 tabs x2d, 1 tab x2d (Patient not taking: Reported on 6/30/2025) 20 tablet 0     ezetimibe 10 MG Oral Tab Take 1 tablet (10 mg total) by mouth nightly. (Patient not taking: Reported on 6/30/2025) 90 tablet 1    ELIQUIS 5 MG Oral Tab Take 1 tablet (5 mg total) by mouth daily. (Patient not taking: Reported on 6/30/2025)      bumetanide 1 MG Oral Tab Take 1 tablet (1 mg total) by mouth every other day. (Patient not taking: Reported on 6/30/2025)     [2]   Allergies  Allergen Reactions    Allopurinol HIVES, SWELLING and SHORTNESS OF BREATH    Azithromycin HIVES    Dog Epithelium SHORTNESS OF BREATH     Hair and saliva    Dust SHORTNESS OF BREATH    Heparin SWELLING    Smoke SHORTNESS OF BREATH    Adhesive Tape (Rosins) RASH    Montelukast HALLUCINATION    Statins MYALGIA     Pt had developed myalgia and myopathy    Xanax [Alprazolam] RESTLESSNESS    Adhesive Tape OTHER (SEE COMMENTS)    Amoxicillin OTHER (SEE COMMENTS)    Bumetanide OTHER (SEE COMMENTS)    Other OTHER (SEE COMMENTS)    Sulfa Antibiotics OTHER (SEE COMMENTS)    Ace Inhibitors Coughing    Aspirin RASH    Spironolactone RASH

## 2025-07-07 ENCOUNTER — OFFICE VISIT (OUTPATIENT)
Dept: PODIATRY CLINIC | Facility: CLINIC | Age: 80
End: 2025-07-07

## 2025-07-07 DIAGNOSIS — E11.9 DIET-CONTROLLED DIABETES MELLITUS (HCC): Primary | ICD-10-CM

## 2025-07-07 DIAGNOSIS — L60.3 NAIL DYSTROPHY: ICD-10-CM

## 2025-07-07 PROCEDURE — 99213 OFFICE O/P EST LOW 20 MIN: CPT | Performed by: STUDENT IN AN ORGANIZED HEALTH CARE EDUCATION/TRAINING PROGRAM

## 2025-07-07 PROCEDURE — 1126F AMNT PAIN NOTED NONE PRSNT: CPT | Performed by: STUDENT IN AN ORGANIZED HEALTH CARE EDUCATION/TRAINING PROGRAM

## 2025-07-07 PROCEDURE — 1159F MED LIST DOCD IN RCRD: CPT | Performed by: STUDENT IN AN ORGANIZED HEALTH CARE EDUCATION/TRAINING PROGRAM

## 2025-07-07 NOTE — PROGRESS NOTES
[facility] Podiatry  Progress Note      Ariana Osorio is a 80 year old female.   Chief Complaint   Patient presents with    Diabetic Foot Care     LOV 6/30/25 with Dr. Melendez -  - Declines pain         HPI:       Patient is a pleasant 80-year-old diabetic female with clinic for bilateral foot evaluation.  Denies any pedal injuries or trauma.    Allergies: Allopurinol, Azithromycin, Dog epithelium, Dust, Heparin, Smoke, Adhesive tape (rosins), Montelukast, Statins, Xanax [alprazolam], Adhesive tape, Amoxicillin, Bumetanide, Other, Sulfa antibiotics, Ace inhibitors, Aspirin, and Spironolactone    Current Outpatient Medications   Medication Sig Dispense Refill    fluticasone-salmeterol (WIXELA INHUB) 500-50 MCG/ACT Inhalation Aerosol Powder, Breath Activated Inhale 1 puff into the lungs 2 (two) times daily. 180 each 1    furosemide 20 MG Oral Tab 1 tablet Orally Take every other day for 30      losartan 50 MG Oral Tab Take 1 tablet (50 mg total) by mouth daily.      arformoterol 15 MCG/2ML Inhalation Nebu Soln       metFORMIN 500 MG Oral Tab 1 tablet with meals Orally daily for 90 days      glipiZIDE 5 MG Oral Tab Take 1 tablet (5 mg total) by mouth before breakfast. 90 tablet 1    amLODIPine 2.5 MG Oral Tab Take 1 tablet (2.5 mg total) by mouth in the morning and 1 tablet (2.5 mg total) before bedtime. 180 tablet 1    ipratropium-albuterol 0.5-2.5 (3) MG/3ML Inhalation Solution Take 3 mL by nebulization every 8 (eight) hours as needed. 100 mL 0    predniSONE 10 MG Oral Tab Take 4 tabs po x2d, 3 tabs x2d, 2 tabs x2d, 1 tab x2d 20 tablet 0    ezetimibe 10 MG Oral Tab Take 1 tablet (10 mg total) by mouth nightly. 90 tablet 1    ELIQUIS 5 MG Oral Tab Take 1 tablet (5 mg total) by mouth daily.      bumetanide 1 MG Oral Tab Take 1 tablet (1 mg total) by mouth every other day.      Glucose Blood (TRUE METRIX BLOOD GLUCOSE TEST) In Vitro Strip Use 1 (one) new strip 2 times daily for blood glucose  monitoring 200 strip 3    Alcohol Swabs (DROPSAFE ALCOHOL PREP) 70 % Does not apply Pads Apply 1 Pad topically 4 (four) times daily. Before each finger stick. 400 each 3    Blood Glucose Calibration (TRUE METRIX LEVEL 2) Normal In Vitro Solution 1 drop as needed (To use monthly, with each new vial of test strips, when you change your batteries, or if you suspect the meter is malfunctioning.). 1 each 3    TRUEplus Lancets 33G Does not apply Misc 1 Lancet by Finger stick route 4 (four) times daily. 400 each 3    budesonide 0.5 MG/2ML Inhalation Suspension Take 2 mL (0.5 mg total) by nebulization 2 (two) times daily. 180 each 1    pantoprazole 20 MG Oral Tab EC Take 1 tablet (20 mg total) by mouth every morning before breakfast. 90 tablet 3    omega-3 fatty acids 1000 MG Oral Cap Take 1,000 mg by mouth daily.      Cholecalciferol (VITAMIN D) 2000 units Oral Cap Take 1 capsule (2,000 Units total) by mouth every other day.        Past Medical History:    Age-related cataract of left eye    Age-related cataract of right eye    Anxiety    Asthma (HCC)    Atherosclerosis of coronary artery    Breast CA (HCC)    lt mastectomy    Breast CA (HCC)    lumpectomy, right    Cancer (HCC)    breast cancer    Carotid artery disease    Carotid stenosis    Closed fracture of multiple ribs of left side with routine healing, subsequent encounter    Congestive heart disease (HCC)    COPD (chronic obstructive pulmonary disease) (HCC)    on O2 at 2 liters    Coronary atherosclerosis    Depression    Diabetes (HCC)    takes insulin when hospitalized, takes oral meds at home    Diabetes mellitus (HCC)    Diastolic dysfunction without heart failure    Esophageal reflux    Essential hypertension    Generalized anxiety disorder    Gout    Gout    High blood pressure    High cholesterol    History of pituitary adenoma    Hyperlipidemia    Major depressive disorder, single episode, moderate (HCC)    Obesity      Past Surgical History:   Procedure  Laterality Date    Cabg      Carotid endarterectomy Bilateral     Cataract      Colonoscopy      2013    Colonoscopy  2013    Colonoscopy N/A 02/21/2023    Procedure: COLONOSCOPY;  Surgeon: Abdiaziz Melendez MD;  Location: [facility] ENDOSCOPY    Hernia surgery      Lumpectomy right  2014    Mastectomy left Left 1999    Radiation right  2014      Family History   Problem Relation Age of Onset    Asthma Father     Hypertension Father     Heart Disorder Father     Other (Other) Mother         thyroid surgery    Asthma Sister     Asthma Brother     Other (Other) Brother         gout    Breast Cancer Self 43        rt breast 69      Social History     Socioeconomic History    Marital status:    Tobacco Use    Smoking status: Never     Passive exposure: Never    Smokeless tobacco: Never   Vaping Use    Vaping status: Never Used   Substance and Sexual Activity    Alcohol use: No     Comment: rarely at weddings/new year    Drug use: No   Other Topics Concern    Reaction to local anesthetic No    Pt has a pacemaker No    Pt has a defibrillator No           REVIEW OF SYSTEMS:     Denies nause, fever, chills  No calf pain  Denies chest pain or SOB      EXAM:   There were no vitals taken for this visit.  GENERAL: well developed, well nourished, in no apparent distress  EXTREMITIES:   1. Integument: Normal skin temperature and turgor. Toenails x10 are elongated, thickened and discolored with subungal derbi.     2. Vascular: Dorsalis pedis two out of four bilateral and posterior tibial pulses two out of   four bilateral, capillary refill normal.   3. Musculoskeletal: All muscle groups are graded 5 out of 5 in the foot and ankle.   4. Neurological: Normal sharp dull sensation; reflexes normal.             ASSESSMENT AND PLAN:   Diagnoses and all orders for this visit:    Diet-controlled diabetes mellitus (HCC)    Nail dystrophy            Plan:         -Patient examined, chart history reviewed.  -Discussed importance of  proper pedal hygiene, regular foot checks, and tight glucose control.  -Sharply debrided nails x10 with a sterile nail nipper achieving a 20% reduction in thickness and length, without incident.   -Ambulate with supportive shoes and inserts and avoid walking barefoot.  -Educated patient on acute signs of infection advised patient to seek immediate medical attention if symptoms arise.    RTC in 9 weeks.     The patient indicates understanding of these issues and agrees to the plan.        Caridad Flores DPM

## 2025-07-10 NOTE — TELEPHONE ENCOUNTER
Final attempt, no answer, left message for patient to call back. No phone response letter sent to patient via [employer].

## 2025-07-17 ENCOUNTER — TELEPHONE (OUTPATIENT)
Dept: NEPHROLOGY | Facility: CLINIC | Age: 80
End: 2025-07-17

## 2025-07-17 ENCOUNTER — OFFICE VISIT (OUTPATIENT)
Dept: NEPHROLOGY | Facility: CLINIC | Age: 80
End: 2025-07-17

## 2025-07-17 VITALS
WEIGHT: 176 LBS | HEART RATE: 76 BPM | SYSTOLIC BLOOD PRESSURE: 140 MMHG | DIASTOLIC BLOOD PRESSURE: 64 MMHG | BODY MASS INDEX: 30 KG/M2

## 2025-07-17 DIAGNOSIS — N18.32 STAGE 3B CHRONIC KIDNEY DISEASE (HCC): Primary | ICD-10-CM

## 2025-07-17 PROCEDURE — 99214 OFFICE O/P EST MOD 30 MIN: CPT | Performed by: INTERNAL MEDICINE

## 2025-07-17 NOTE — TELEPHONE ENCOUNTER
Pt is scheduled today with Dr Riki Tran (Nephrology) needs referral entered with additional visits.

## 2025-07-19 NOTE — PATIENT INSTRUCTIONS
You should see endocrinology for follow-up regarding your elevated calcium.  Repeat your kidney blood test in 2 months.  Orders are in the computer.  Follow a low salt diet carefully.  Elevate the legs.  Let me know if the swelling in your legs should worsen.

## 2025-07-19 NOTE — PROGRESS NOTES
07/19/25        Patient: Ariana Osorio   YOB: 1945   Date of Visit: 7/17/2025       Dear  Dr. Kade MD,      Thank you for referring Ariana Osorio to my practice.  Please find my assessment and plan below.      As you know he is a 80-year-old female with a history of hypertension, adult-onset diabetes mellitus, GERD, primary hyperparathyroidism, coronary artery disease, status post CABG, gout, asthma/COPD on home oxygen who I now had the pleasure of seeing for follow-up of chronic kidney disease stage III.  Overall the patient states she has been doing well without any chest pain, shortness of breath, GI or urinary tract symptoms.  Nebulizer treatments helps her breathing and she rarely uses her home oxygen anymore.  Ambulates with a walker.  Does have some lower extremity edema but seems much better in the morning.  Blood pressures at home are usually in the 1/21/1935 systolic range with diastolics in the 65-75 range.    On physical exam her blood pressure 140/64 with a pulse of 76 and she weighed 176 pounds.  Her neck was supple without JVD.  Lungs were clear.  Heart revealed a regular rate and rhythm with an S4 but no gallops, murmurs or rubs.  Abdomen was soft, flat, nontender without organomegaly, masses or bruits.  Extremities revealed trace to 1+ edema bilaterally.    I reviewed her most recent labs done on June 18, 2025.  Creatinine stable at 1.46 with an estimated GFR 36 cc/min.  Calcium though was borderline high at 10.9.  Vitamin D level was 42 and PTH was 122.    I therefore reassured the patient overall renal function is stable.  She does have some lower extremity edema.  She takes Lasix just as needed.  Reinforced importance of maintaining adequate hydration.  Avoid nonsteroidals.  Low-salt diet and elevation of the legs.  Consider support hose.  She has seen endocrine in the past for her primary hyperparathyroidism but has not seen them since March 2024.  Encouraged  her to follow-up with them.  Otherwise she will continue to do CBC and renal panels quarterly.  I will see her again in 6 months for follow-up or sooner if clinically indicated.    Thank you again for allowing me to participate in the care of your patient.  If you have any questions please were free to call.           Sincerely,   Riki Tran MD   [facility], [facility] [place]  [address]  [facility] [address]    Document electronically generated by:  Riki Tran MD

## 2025-07-23 RX ORDER — PANTOPRAZOLE SODIUM 20 MG/1
20 TABLET, DELAYED RELEASE ORAL
Qty: 90 TABLET | Refills: 1 | Status: SHIPPED | OUTPATIENT
Start: 2025-07-23

## 2025-07-23 NOTE — TELEPHONE ENCOUNTER
Requested Prescriptions     Pending Prescriptions Disp Refills    PANTOPRAZOLE 20 MG Oral Tab EC [Pharmacy Med Name: PANTOPRAZOLE SODIUM 20 MG Oral Tablet Delayed Release] 90 tablet 3     Sig: TAKE 1 TABLET EVERY MORNING BEFORE BREAKFAST     LOV: 6/30/2025    LR:7/22/2024

## 2025-08-08 ENCOUNTER — OFFICE VISIT (OUTPATIENT)
Dept: OTOLARYNGOLOGY | Facility: CLINIC | Age: 80
End: 2025-08-08

## 2025-08-08 DIAGNOSIS — H61.23 BILATERAL IMPACTED CERUMEN: Primary | ICD-10-CM

## 2025-08-08 PROCEDURE — 69210 REMOVE IMPACTED EAR WAX UNI: CPT | Performed by: OTOLARYNGOLOGY

## 2025-08-08 PROCEDURE — 1159F MED LIST DOCD IN RCRD: CPT | Performed by: OTOLARYNGOLOGY

## 2025-08-08 PROCEDURE — 1160F RVW MEDS BY RX/DR IN RCRD: CPT | Performed by: OTOLARYNGOLOGY

## 2025-08-14 ENCOUNTER — HOSPITAL ENCOUNTER (OUTPATIENT)
Dept: CT IMAGING | Facility: HOSPITAL | Age: 80
Discharge: HOME | End: 2025-08-14
Attending: INTERNAL MEDICINE

## 2025-08-14 ENCOUNTER — HOSPITAL ENCOUNTER (OUTPATIENT)
Dept: GENERAL RADIOLOGY | Facility: HOSPITAL | Age: 80
Discharge: HOME | End: 2025-08-14
Attending: INTERNAL MEDICINE

## 2025-08-14 DIAGNOSIS — R14.2 BELCHING: ICD-10-CM

## 2025-08-14 DIAGNOSIS — R51.9 PERSISTENT HEADACHES: ICD-10-CM

## 2025-08-14 DIAGNOSIS — R13.19 ESOPHAGEAL DYSPHAGIA: ICD-10-CM

## 2025-08-14 PROCEDURE — 70450 CT HEAD/BRAIN W/O DYE: CPT | Performed by: INTERNAL MEDICINE

## 2025-08-14 PROCEDURE — 74220 X-RAY XM ESOPHAGUS 1CNTRST: CPT | Performed by: INTERNAL MEDICINE

## 2025-08-18 ENCOUNTER — TELEPHONE (OUTPATIENT)
Dept: NEPHROLOGY | Facility: CLINIC | Age: 80
End: 2025-08-18

## 2025-08-20 ENCOUNTER — TELEPHONE (OUTPATIENT)
Dept: INTERNAL MEDICINE CLINIC | Facility: CLINIC | Age: 80
End: 2025-08-20

## (undated) DIAGNOSIS — Z98.890 S/P CAROTID ENDARTERECTOMY: ICD-10-CM

## (undated) DIAGNOSIS — K92.2 UGIB (UPPER GASTROINTESTINAL BLEED): Primary | ICD-10-CM

## (undated) DIAGNOSIS — Z12.11 COLON CANCER SCREENING: Primary | ICD-10-CM

## (undated) DIAGNOSIS — K92.2 UPPER GI BLEEDING: ICD-10-CM

## (undated) DIAGNOSIS — M1A.9XX1 CHRONIC TOPHACEOUS GOUT: Primary | ICD-10-CM

## (undated) DIAGNOSIS — Z02.9 ENCOUNTERS FOR UNSPECIFIED ADMINISTRATIVE PURPOSE: ICD-10-CM

## (undated) DIAGNOSIS — M10.9 GOUTY ARTHRITIS OF BOTH ANKLES: ICD-10-CM

## (undated) DIAGNOSIS — H25.9 SENILE CATARACT OF LEFT EYE, UNSPECIFIED AGE-RELATED CATARACT TYPE: Primary | ICD-10-CM

## (undated) DIAGNOSIS — E11.22 CONTROLLED TYPE 2 DIABETES MELLITUS WITH STAGE 3 CHRONIC KIDNEY DISEASE, WITHOUT LONG-TERM CURRENT USE OF INSULIN (HCC): ICD-10-CM

## (undated) DIAGNOSIS — Z95.1 S/P CABG (CORONARY ARTERY BYPASS GRAFT): ICD-10-CM

## (undated) DIAGNOSIS — R79.89 ELEVATED SERUM CREATININE: Primary | ICD-10-CM

## (undated) DIAGNOSIS — N18.30 CONTROLLED TYPE 2 DIABETES MELLITUS WITH STAGE 3 CHRONIC KIDNEY DISEASE, WITHOUT LONG-TERM CURRENT USE OF INSULIN (HCC): ICD-10-CM

## (undated) DEVICE — KIT ENDO ORCAPOD 160/180/190

## (undated) DEVICE — REM POLYHESIVE ADULT PATIENT RETURN ELECTRODE: Brand: VALLEYLAB

## (undated) DEVICE — STANDARD HYPODERMIC NEEDLE,POLYPROPYLENE HUB: Brand: MONOJECT

## (undated) DEVICE — 3M™ TEGADERM™ TRANSPARENT FILM DRESSING, 1626W, 4 IN X 4-3/4 IN (10 CM X 12 CM), 50 EACH/CARTON, 4 CARTON/CASE: Brand: 3M™ TEGADERM™

## (undated) DEVICE — LEAD BIPOLAR TEMP 6495XF53

## (undated) DEVICE — DRAPE SHEET LG

## (undated) DEVICE — SUTURE SURGICAL STEEL #7

## (undated) DEVICE — Device: Brand: DUAL NARE NASAL CANNULAE FEMALE LUER CON 7FT O2 TUBE

## (undated) DEVICE — SUTURE WIRE DOUBLE STERNOTOMY

## (undated) DEVICE — ALARM PT PTCH LVL

## (undated) DEVICE — SUCTION CANISTER, 3000CC,SAFELINER: Brand: DEROYAL

## (undated) DEVICE — STABILIZER SRG UNV AST CABG

## (undated) DEVICE — SNARE OPTMZ PLPCTM TRP

## (undated) DEVICE — DEVICE BLWR/MSTR ACCUMIST ATCH

## (undated) DEVICE — SUTURE PROLENE 6-0 C-1

## (undated) DEVICE — DERMABOND LIQUID ADHESIVE

## (undated) DEVICE — SUTURE PDS II 2-0 CT-2

## (undated) DEVICE — PACK CUSTOM TUBING

## (undated) DEVICE — NEEDLE HPO 18GA 1.5IN ECLPS

## (undated) DEVICE — SUTURE PDS II 2-0 CT-1

## (undated) DEVICE — SUTURE PROLENE 6-0 BV-1

## (undated) DEVICE — SUTURE SILK 2-0 SA65H

## (undated) DEVICE — HEMOCLIP HORIZON SM MULTI

## (undated) DEVICE — SUTURE PROLENE 7-0 8701H

## (undated) DEVICE — CANNULA PRFSN 2.63IN 3MM 2MM

## (undated) DEVICE — STERILE TETRA-FLEX CF, ELASTIC BANDAGE LATEX FREE 6IN X5.5 YD: Brand: TETRA-FLEX™CF

## (undated) DEVICE — CV: Brand: MEDLINE INDUSTRIES, INC.

## (undated) DEVICE — SUTURE PROLENE 4-0 BB

## (undated) DEVICE — HEART DRAPE & SUPPLY PACK: Brand: MEDLINE INDUSTRIES, INC.

## (undated) DEVICE — BATTERY

## (undated) DEVICE — INTENDED TO BE USED TO OCCLUDE, RETRACT AND IDENTIFY ARTERIES, VEINS, TENDONS AND NERVES IN SURGICAL PROCEDURES: Brand: STERION®  VESSEL LOOP

## (undated) DEVICE — DECANTER VIAL FLOW DISPENSER

## (undated) DEVICE — DRAIN RELIAVAC 100CC

## (undated) DEVICE — PLEDGETT SOFT 1/4 X 12

## (undated) DEVICE — DRAPE C-ARM UNIVERSAL

## (undated) DEVICE — INSERT SUTURE OPEN HEART

## (undated) DEVICE — SUTURE PROLENE 7-0 BV-1

## (undated) DEVICE — DRAIN SILICONE FLAT 10MM

## (undated) DEVICE — 60 ML SYRINGE LUER-LOCK TIP: Brand: MONOJECT

## (undated) DEVICE — SNARE CAPTIFLEX MICRO-OVL OLY

## (undated) DEVICE — SUTURE ETHIBOND 0 CT-1

## (undated) DEVICE — FORCEP RADIAL JAW 4

## (undated) DEVICE — STERILE TETRA-FLEX CF, ELASTIC BANDAGE, 4" X 5.5YD: Brand: TETRA-FLEX™CF

## (undated) DEVICE — ENCORE® LATEX ACCLAIM SIZE 7.5, STERILE LATEX POWDER-FREE SURGICAL GLOVE: Brand: ENCORE

## (undated) DEVICE — 35 ML SYRINGE REGULAR TIP: Brand: MONOJECT

## (undated) DEVICE — 6 ML SYRINGE LUER-LOCK TIP: Brand: MONOJECT

## (undated) DEVICE — SUTURE CHROMIC 2-0 801H

## (undated) DEVICE — SUTURE SILK 0 FSL

## (undated) DEVICE — 12 ML SYRINGE LUER-LOCK TIP: Brand: MONOJECT

## (undated) DEVICE — STERILE (10.2 X 147CM) TELESCOPICALLY-FOLDED COVER: Brand: CIV-FLEX™ TRANSDUCER COVER

## (undated) DEVICE — MEDI-VAC NON-CONDUCTIVE SUCTION TUBING 6MM X 1.8M (6FT.) L: Brand: CARDINAL HEALTH

## (undated) DEVICE — SUTURE PROLENE 5-0 C-1

## (undated) DEVICE — 3M™ IOBAN™ 2 ANTIMICROBIAL INCISE DRAPE 6650EZ: Brand: IOBAN™ 2

## (undated) DEVICE — SOL  .9 1000ML BAG

## (undated) DEVICE — PAD PLMM SLVR 12.5X4IN SLVR

## (undated) DEVICE — DRESSING FM 4.25X4.25IN PLMM

## (undated) DEVICE — PETROLATUM GAUZE CISION DRESSING: Brand: VASELINE

## (undated) DEVICE — ADAPTER CRDPLG ANTGRD RTRGD 3W

## (undated) DEVICE — KIT CLEAN ENDOKIT 1.1OZ GOWNX2

## (undated) DEVICE — SUTURE MONOCRYL 3-0 Y936H

## (undated) DEVICE — CHLORAPREP 26ML APPLICATOR

## (undated) DEVICE — KIT DRN VAC BTL PRTNL CATH

## (undated) DEVICE — COVER SGL STRL LGHT HNDL BLU

## (undated) DEVICE — SUTURE PROLENE 7-0 BV175-6

## (undated) DEVICE — Device: Brand: JELCO

## (undated) DEVICE — TRAY ENDOVEIN KTV16 HARVESTING

## (undated) DEVICE — SUTURE SILK 2-0 SA85H

## (undated) DEVICE — FLOSEAL HEMOSTATIC MATRIX, 5ML: Brand: FLOSEAL HEMOSTATIC MATRIX

## (undated) DEVICE — 12 FOOT DISPOSABLE EXTENSION CABLE WITH SAFE CONNECT / SCREW-DOWN

## (undated) DEVICE — PACK ASSRY CUSTOM TUBING

## (undated) DEVICE — SOL  .9 1000ML BTL

## (undated) DEVICE — SUTURE PROLENE 3-0 SH

## (undated) DEVICE — SOLUTION SURG DURA PREP HAZMAT

## (undated) DEVICE — X-RAY DETECTABLE SPONGES,16 PLY: Brand: VISTEC

## (undated) DEVICE — ABSORBABLE HEMOSTAT (OXIDIZED REGENERATED CELLULOSE, U.S.P.): Brand: SURGICEL

## (undated) DEVICE — THORACIC CATHETER, STRAIGHT, SILICONE, WITH CLOTSTOP®: Brand: AXIOM® ATRAUM™ WITH CLOTSTOP®

## (undated) DEVICE — CONMED SCOPE SAVER BITE BLOCK, 20X27 MM: Brand: SCOPE SAVER

## (undated) DEVICE — SUTURE SILK 4-0 SA63H

## (undated) DEVICE — MINOR GENERAL: Brand: MEDLINE INDUSTRIES, INC.

## (undated) DEVICE — ENCORE® LATEX ACCLAIM SIZE 8, STERILE LATEX POWDER-FREE SURGICAL GLOVE: Brand: ENCORE

## (undated) DEVICE — EZ GLIDE AORTIC CANNULA: Brand: EDWARDS LIFESCIENCES EZ GLIDE AORTIC CANNULA

## (undated) DEVICE — CLIP SM W INTNL HRZN TI TPE LF

## (undated) DEVICE — RETROGRADE CARDIOPLEGIA CATHETER: Brand: EDWARDS LIFESCIENCES RETROGRADE CARDIOPLEGIA CATHETER

## (undated) DEVICE — THORACIC CATHETER, RIGHT ANGLE, SILICONE, WITH CLOTSTOP®: Brand: AXIOM® ATRAUM™ WITH CLOTSTOP®

## (undated) DEVICE — CS5/5+ FASTPACK, 225ML 150U RES: Brand: HAEMONETICS CELL SAVER 5/5+ SYSTEMS

## (undated) DEVICE — FORESIGHT LARGE SENSOR: Brand: FORESIGHT

## (undated) DEVICE — 60 ML SYRINGE REGULAR TIP: Brand: MONOJECT

## (undated) DEVICE — PUNCH AORTIC 4.0 LONG HANDLE

## (undated) DEVICE — PLEURX PLEURAL CATH KIT

## (undated) DEVICE — DRAPE SHEET TRANSVERSE LAP

## (undated) DEVICE — [HIGH FLOW INSUFFLATOR,  DO NOT USE IF PACKAGE IS DAMAGED,  KEEP DRY,  KEEP AWAY FROM SUNLIGHT,  PROTECT FROM HEAT AND RADIOACTIVE SOURCES.]: Brand: PNEUMOSURE

## (undated) DEVICE — SUTURE PDS II 1 CTX

## (undated) DEVICE — ENCORE® LATEX MICRO SIZE 8.5, STERILE LATEX POWDER-FREE SURGICAL GLOVE: Brand: ENCORE

## (undated) DEVICE — SUTURE VICRYL 1-0 J977H

## (undated) DEVICE — CARTRIDGE HC HMS+ CRTDG SYR

## (undated) DEVICE — LINE MNTR ADLT SET O2 INTMD

## (undated) DEVICE — GAMMEX® PI HYBRID SIZE 8, STERILE POWDER-FREE SURGICAL GLOVE, POLYISOPRENE AND NEOPRENE BLEND: Brand: GAMMEX

## (undated) DEVICE — SPONGE LAP 18X18 XRAY STRL

## (undated) DEVICE — OUTPATIENT: Brand: MEDLINE INDUSTRIES, INC.

## (undated) DEVICE — PREVENA PEEL & PLACE SYSTEM KIT- 13 CM: Brand: PREVENA™ PEEL & PLACE™

## (undated) DEVICE — CONNECTOR PRFSN QCK PRM .25IN

## (undated) DEVICE — SUTURE SILK 1-0 SA87G

## (undated) DEVICE — HEART A: Brand: MEDLINE INDUSTRIES, INC.

## (undated) DEVICE — FLOSEAL SEALENT STERILE 10ML

## (undated) DEVICE — GAMMEX® PI HYBRID SIZE 7.5, STERILE POWDER-FREE SURGICAL GLOVE, POLYISOPRENE AND NEOPRENE BLEND: Brand: GAMMEX

## (undated) DEVICE — CARD SMRT MEDISTEM VERIQ TRNST

## (undated) DEVICE — SUTURE SILK 2-0 SH

## (undated) DEVICE — SUTURE SILK 2-0 FS

## (undated) DEVICE — DRAIN INCS 7MM 20CMX7MM SIL

## (undated) DEVICE — SUTURE MONOCRYL 4-0 Y845G

## (undated) DEVICE — DRAPE SLUSH/WARMER W/DISC

## (undated) DEVICE — CANNULA PRFSN 15IN 32/40FR .5

## (undated) NOTE — Clinical Note
Patient Graduated - Patient completed the [facility]IE[facility] Navigation Program---no readmission in over 30 days. Patient has met goals, no further outreach needed. Thank you!

## (undated) NOTE — LETTER
[address]  [facility] [place], [place], [place]  [address]  INFORMED CONSENT FOR TRANSFUSION OF BLOOD OR BLOOD PRODUCTS  My physician has informed me of the nature, purpose, benefits and risks of transfusion for blood and blood components that ______________________________________________  (Signature of Patient)                                                            (Responsible party in case of Minor,

## (undated) NOTE — LETTER
Enrique Braun Md  [address] [place] [address]  [address]  [place] [address]       11/11/24        Patient: Ariana Osorio   YOB: 1945   Date of Visit: 11/11/2024       Dear  Dr. Kade MD,      Thank you for referring Ariana Osorio to my practice.  Please find my assessment and plan below.      As you know she is a 79-year-old female with a history of hypertension, adult onset diabetes mellitus, GERD, primary hyperparathyroidism, coronary artery disease, status post CABG, gout, asthma/COPD on home oxygen, who I now had the pleasure of seeing for follow-up of chronic kidney disease stage III.  Since I last saw her she was recently hospitalized from August 22 through August 27, 2024 for increased shortness of breath.  This was thought to be acute exacerbation of COPD.  No overt pulmonary edema was identified.  Was sent home on furosemide 20 mg daily which she seems to be just taking as needed.  Saw her pulmonary doctor and she is scheduled to start pulmonary rehab.    On physical exam her blood pressure is 130/63 with a pulse of 74 and she weighed under 70 pounds.  Her neck was supple without JVD.  Lungs were clear.  Heart revealed a regular rate and rhythm and S4 but no gallops, murmurs or rubs.  Abdomen was soft, flat, nontender without organomegaly, masses or bruits.  Extremities reveal trace edema bilaterally.    I reviewed her laboratory studies during her recent hospitalization and August.  Also had follow-up labs done on September 3, 2024.  Creatinine overall stable at 1.23 with an estimated GFR 45 cc/min.  Calcium mildly elevated 10.6.  Otherwise electrolytes were good.  Hemoglobin 13.2.    I therefore reassured the patient that overall renal function is stable.  Creatinine though does seem to fluctuate up and down probably depending upon dose of diuretics.  Jardiance was also just started by her cardiologist.  Reinforced low-salt diet.  Avoid nonsteroidals.  Repeat labs in 1  month to ensure stability.  If stable will continue quarterly.  I will see her again in 6 months for follow-up or sooner if clinically indicated.    Thank you again for allowing me to participate in the care of your patient.  If you have any questions please were free to call.           Sincerely,   Riki Tran MD   [facility]  [address]  [facility] [address]    Document electronically generated by:  Riki Tran MD

## (undated) NOTE — LETTER
[place] [address]  Authorization for Invasive Procedures  Date: ***           Time: ***    {[facility] ivs consent:20404}

## (undated) NOTE — Clinical Note
TCM call completed. Pt has HFU appt on 7/20/23. Pt reports she is better. Med list reviewed. Thank you. Pt plans to see ortho only if needed.

## (undated) NOTE — ED AVS SNAPSHOT
Migel Sandoval   MRN: [id number]    Department:  [facility] Emergency Department   Date of Visit:  10/11/2018           Disclosure     Insurance plans vary and the physician(s) referred by the ER may not be covered by your plan.  Please con within the next three months to obtain basic health screening including reassessment of your blood pressure.     IF THERE IS ANY CHANGE OR WORSENING OF YOUR CONDITION, CALL YOUR PRIMARY CARE PHYSICIAN AT ONCE OR RETURN IMMEDIATELY TO THE EMERGENCY DEPARTMEN

## (undated) NOTE — IP AVS SNAPSHOT
[facility]            (For Outpatient Use Only) Initial Admit Date: 2022   Inpt/Obs Admit Date: Inpt: 22 / Obs: N/A   Discharge Date:    Lauren Younger:  [de-identified]   MRN: [de-identified]   CSN: [id number]   CEID: [id number]        ENCOUNTER  Patient Class: Inpatient Admitting Provider: Pelon Ashby MD Unit: [address]/SE   [facility] Service: Medical Attending Provider: Milan Andrade MD   Bed: 555-A   Visit Type:   Referring Physician: No ref. provider found Billing Flag:    Admit Diagnosis: COPD exacerbation [facility]) [J44.1]      PATIENT  Legal Name:   Yakov lAcantar   Legal Sex: Female  Gender ID:              Pref Name:    PCP:  Bolivar Greene MD Home: [phone]   Address:  [place] [address]  : 1945 (77 yrs) Mobile: [phone]         City/State/Zip: [place] [address] Marital:  Language: Adeline jc: Irineo SSN4: [id number] Orthodoxy: [address] Not Koko Winston*     Race:  Ethnicity: Non  Or [address]   Name Relationship Legal Guardian? Home Phone Work Phone Mobile Phone   1. Romulo Osorio  2.  Michael Bonner Son  Sister    [phone]     GUARANTOR  Guarantor: Yakov Alcantar : 1945 Home Phone: [phone]   Address: [address] [place] [address] [address]  Sex: Female Work Phone:    City/State/Zip: [place]   Rel. to Patient: Self Guarantor ID: [id number]   [address]   Employer:  Status: RETIRED     COVERAGE  PRIMARY INSURANCE   PayorJuan Miguel Stuart MA O   Group Number: [id number] Insurance Type: [address] Name: Alessia Rogers : 1945   Subscriber ID: [id number] Pt Rel to Subscriber: Self   SECONDARY INSURANCE   Payor:  Plan:    Group Number:  Insurance Type:    Subscriber Name:  Subscriber :    Subscriber ID:  Pt Rel to Subscriber:    TERTIARY INSURANCE   Payor:  Plan:    Group Number: Insurance Type:    Subscriber Name:  Subscriber :    Subscriber ID:  Pt Rel to Subscriber:    Hospital Account Financial Class: Medicare Advantage    August 3, 2022

## (undated) NOTE — LETTER
No referring provider defined for this encounter.       07/19/25        Patient: Ariana Osorio   YOB: 1945   Date of Visit: 7/17/2025       Dear  Dr. Kade MD,      Thank you for referring Ariana Osorio to my practice.  Please find my assessment and plan below.      As you know he is a 80-year-old female with a history of hypertension, adult-onset diabetes mellitus, GERD, primary hyperparathyroidism, coronary artery disease, status post CABG, gout, asthma/COPD on home oxygen who I now had the pleasure of seeing for follow-up of chronic kidney disease stage III.  Overall the patient states she has been doing well without any chest pain, shortness of breath, GI or urinary tract symptoms.  Nebulizer treatments helps her breathing and she rarely uses her home oxygen anymore.  Ambulates with a walker.  Does have some lower extremity edema but seems much better in the morning.  Blood pressures at home are usually in the 1/21/1935 systolic range with diastolics in the 65-75 range.    On physical exam her blood pressure 140/64 with a pulse of 76 and she weighed 176 pounds.  Her neck was supple without JVD.  Lungs were clear.  Heart revealed a regular rate and rhythm with an S4 but no gallops, murmurs or rubs.  Abdomen was soft, flat, nontender without organomegaly, masses or bruits.  Extremities revealed trace to 1+ edema bilaterally.    I reviewed her most recent labs done on June 18, 2025.  Creatinine stable at 1.46 with an estimated GFR 36 cc/min.  Calcium though was borderline high at 10.9.  Vitamin D level was 42 and PTH was 122.    I therefore reassured the patient overall renal function is stable.  She does have some lower extremity edema.  She takes Lasix just as needed.  Reinforced importance of maintaining adequate hydration.  Avoid nonsteroidals.  Low-salt diet and elevation of the legs.  Consider support hose.  She has seen endocrine in the past for her primary  hyperparathyroidism but has not seen them since March 2024.  Encouraged her to follow-up with them.  Otherwise she will continue to do CBC and renal panels quarterly.  I will see her again in 6 months for follow-up or sooner if clinically indicated.    Thank you again for allowing me to participate in the care of your patient.  If you have any questions please were free to call.           Sincerely,   Riki Tran MD   [facility], [facility], [place]  [address]  [facility] [address]    Document electronically generated by:  Riki Tran MD

## (undated) NOTE — LETTER
10/18/19        Danny Harkins 1946 [address]       Dear Rolanda Moore records indicate that you have outstanding lab work and or testing that was ordered for you and has not yet been completed:  [address]

## (undated) NOTE — LETTER
Enrique Braun Md  [address] [place] [address] [address]  [place] [address]       02/06/24        Patient: Ariana Osorio   YOB: 1945   Date of Visit: 2/6/2024       Dear  Dr. Kade MD,      Thank you for referring Ariana Osorio to my practice.  Please find my assessment and plan below.      As you know she is a 78-year-old female with a history hypertension, adult onset diabetes mellitus, primary hyperparathyroidism, coronary artery disease, status post CABG, gout, asthma/COPD who I now had the pleasure of seeing for follow-up of chronic kidney disease stage III.  The patient was recently hospitalized in December 2023 for acute exacerbation of COPD.  She did improve and was discharged home.  Unfortunately then she developed COVID she was treated with Paxlovid and again symptoms have improved.  Now overall is feeling well without any chest pain, shortness of breath, GI or urinary tract symptoms.  Has home oxygen but is uses it as needed.    On physical exam her blood pressure 1/24/1964 with a pulse 85 and she weighed 173 pounds.  Her neck was supple without JVD.  Lungs were clear.  Heart revealed a regular rate and rhythm with an S4 but no gallops, murmurs or rubs.  Abdomen soft, flat, nontender without again a megaly, masses or bruits.  Extremities revealed no clubbing, cyanosis or edema.    I reviewed recent laboratory studies.  Back on December 29, 2023 when she was in the hospital creatinine was 1.14 with an estimated GFR of 50 cc/min.  I repeated her laboratory studies today on February 6, 2024.  Creatinine overall stable at 1.21 with an estimated GFR 46 cc/min.  Calcium was mildly elevated at 10.5.  Vitamin D was acceptable but her PTH was just borderline elevated at 88.4.    Therefore reassured her that her renal function overall is stable.  Maintain adequate hydration.  Avoid nonsteroidals.  Blood pressures are under control.  She will continue to do CBC and renal panels  quarterly.  I will see her again in 6 months for follow-up or sooner if clinically indicated.    The patient had seen Dr. Leone in the past for primary hyperparathyroidism.  She was started on Sensipar.  The patient though discontinued this medication on her own a number of months ago secondary to GI side effects.  As her numbers are just borderline elevated I recommended that she see Dr. Leone for follow-up to review these findings.    Thank you again for allowing me to participate in the care of your patient.  If you have any questions please feel free to call.           Sincerely,   Riki Tran MD   [facility], [facility], [place]  [address]  [facility] [address]    Document electronically generated by:  Riki Tran MD

## (undated) NOTE — LETTER
[facility]  Administration of Anesthesia  1.  I, Mary Ann Breaker Jo, or _________________________________ acting on her behalf, (Patient) (Dependent/Representative) request to receive anesthesia for my pending procedure/operation/treatment infections, high spinal block, spinal bleeding, seizure, cardiac arrest and death. 7. AWARENESS: I understand that it is possible (but unlikely) to have explicit memory of events from the operating room while under general anesthesia.   8. ELECTROCONVULSIV unconscious pt /Relationship    My signature below affirms that prior to the time of the procedure, I have explained to the patient and/or his/her guardian, the risks and benefits of undergoing anesthesia, as well as any reasonable alternatives.     _______

## (undated) NOTE — Clinical Note
Hi!! TCM call completed. Pt is doing better- just wanted to send an FYI she is home now!! I s/w her Friday but I forgot to send to you as well so sorry!

## (undated) NOTE — LETTER
[address] [facility], [facility] [address]     I agree to have a Peripherally Inserted Central Catheter (PICC) placed in my arm.      1. The PICC insertion procedure, care, maintenance, risks, benefits, and complications Statement of Physician: My signature below affirms that prior to the time of the PICC line insertion, I have explained to the patient and/or his/her legal representative, the risks and benefits involved in the proposed treatment and any reasonable alternat

## (undated) NOTE — LETTER
Xavier Dey, [address] [address]  [place],  [address]       04/18/23        Patient: Darin Collins   YOB: 1945   Date of Visit: 4/17/2023       Dear  Dr. Kory Ohara MD,      Thank you for referring Darin Collins to my practice. Please find my assessment and plan below. As you know she is a 31-year-old female with a history of hypertension, adult onset diabetes mellitus, coronary artery disease, status post CABG, gout, asthma/COPD who is now here for follow-up of chronic kidney disease stage III. She states she has been having more problems with her COPD. Had been using home oxygen as needed but more recently has been using it on a daily basis. On 2 L. Just saw Dr. Shirley Bates from pulmonary who did a number of test to evaluate her COPD. Results are pending. Otherwise she states she been doing well without any chest pain, GI or urinary tract symptoms. Has whitecoat syndrome. States blood pressures at home are in the 1 teens over 70. On physical exam initial blood pressure 155/71. Repeat was 138/70 with a pulse of 76 and she weighed 172 pounds. Her neck was supple without JVD. Lungs were clear without wheezing or rales. Heart revealed a regular rate and rhythm and S4 but no gallops, murmurs or rubs. Abdomen was soft, flat, nontender without organomegaly, masses or bruits. Extremities revealed no edema. I reviewed her most recent labs done on April 12, 2023. Creatinine stable at 1.27 with an estimated GFR of 44 cc/min. Potassium 5.0. I therefore reassured the patient that overall her renal function is stable. Modest potassium restricted diet was recommended. Hopefully pulmonary can help her with her sense of dyspnea. Follow-up with cardiology as advised. Maintain adequate hydration. Avoid nonsteroidals. Continue to monitor blood pressures at home. She will continue to do CBC and renal panels quarterly.   I will see her again in 6 months for follow-up or sooner if clinically indicated. Thank you again for allowing me to participate in the care of your patient. If you have any questions please feel free to call.            Sincerely,   Renetta Contreras MD   [facility], [address] [place] [address]  [address] [facility] [address]  [address] [facility] [address]    Document electronically generated by:  Renetta Contreras MD

## (undated) NOTE — LETTER
Enrique Braun Md  [address] [facility]  [address]  [place] [address]       08/09/24        Patient: Ariana Osorio   YOB: 1945   Date of Visit: 8/9/2024       Dear  Dr. Kade MD,      Thank you for referring Ariana Osorio to my practice.  Please find my assessment and plan below.      As you know she is a 79-year-old female with a history of hypertension, adult-onset diabetes mellitus, GERD, primary hyperparathyroidism, coronary artery disease, status post CABG, gout, asthma/COPD who I now had the pleasure of seeing for follow-up of chronic kidney disease stage III.  Overall the patient states she is doing well without any chest pain, GI or urinary tract symptoms.  She thinks her breathing is a bit worse and she will be seeing her pulmonary doctor shortly.  No recent URI symptoms.    On physical exam her blood pressure was 146/65.  Repeat was 132/70 with a pulse of 73.  Her neck was supple without JVD.  Lungs are clear.  Heart revealed a regular rate and rhythm with an S4 but no gallops, murmurs or rubs.  Abdomen was soft, flat, nontender without organomegaly, masses or bruits.  Extremities revealed trace edema bilaterally.    I reviewed her most recent laboratory studies done on August 9, 2024.  Creatinine overall stable 1.26 with an estimated GFR of 44 cc/min.  Electrolytes were good except calcium was higher at 10.8.  She states she is not on any calcium supplements but will hold vitamin D.  Blood pressure seem to be under adequate control.  Otherwise she knows to maintain adequate hydration.  Avoid nonsteroidals.  Will continue to do CBC and renal panels quarterly.  I will see her again in 6 months for follow-up or sooner if clinically indicated.    Thank you again for allowing me to participate in the care of your patient.  If you have any questions please feel free to call.               Sincerely,   Riki Tran MD   [facility] MEDICAL [facility] [place],  [facility] [place]  [address] [facility] [address]  [facility] [address]    Document electronically generated by:  Riki Tran MD

## (undated) NOTE — IP AVS SNAPSHOT
[facility]            (For Outpatient Use Only) Initial Admit Date: 3/7/2019   Inpt/Obs Admit Date: Inpt: 3/7/19 / Obs: N/A   Discharge Date:    Ana Paula Robison:  [de-identified]   MRN: [de-identified]   CSN: [id number]        ENCOUNTER  Patient Class: Subscriber ID:  Pt Rel to Subscriber:    Hospital Account Financial Class: Medicare Advantage    March 11, 2019

## (undated) NOTE — LETTER
09/28/21        Mary Ann Breaker Jo  [address]      Dear Ric Leal records indicate that you have outstanding lab work and or testing that was ordered for you and has not yet been completed:  [address]

## (undated) NOTE — ED AVS SNAPSHOT
Stefany Bustillo   MRN: [id number]    Department:  [facility] Emergency Department   Date of Visit:  7/28/2018           Disclosure     Insurance plans vary and the physician(s) referred by the ER may not be covered by your plan.  Please cont within the next three months to obtain basic health screening including reassessment of your blood pressure.     IF THERE IS ANY CHANGE OR WORSENING OF YOUR CONDITION, CALL YOUR PRIMARY CARE PHYSICIAN AT ONCE OR RETURN IMMEDIATELY TO THE EMERGENCY DEPARTMEN

## (undated) NOTE — LETTER
12/30/20        Lenny Saliva Comprado  [address]      Dear Magan Late records indicate that you have outstanding lab work and or testing that was ordered for you and has not yet been completed:  [address]

## (undated) NOTE — LETTER
Devi Bullock, [address] [address]  [place],  [address]       09/12/22        Patient: Paul Ramirez   YOB: 1945   Date of Visit: 9/12/2022       Dear  Dr. Sam Teresa MD,      Thank you for referring Paul Ramirez to my practice. Please find my assessment and plan below. As you know she is a 76-year-old female with a history of hypertension, adult onset diabetes mellitus, coronary artery disease, status post CABG, gout, asthma/COPD who I now had the pleasure of seeing for follow-up of chronic kidney disease stage III. The patient also underwent a recent renal evaluation. Work-up was somewhat complicated by the fact that she was hospitalized from July 27 through August 3, 2022 for acute exacerbation of COPD. Had just returned from a cruise in [place]. Was debilitated enough that she was subsequently transferred to a nursing home. Now back home. Overall the patient now states she is doing well without any chest pain, shortness of breath, GI or urinary tract symptoms. Energy level improved. On physical exam her blood pressure is 131/73 with a pulse of 80 and she weighed 177 pounds. Her neck was supple without JVD. Lungs were clear. Heart revealed a regular rate and rhythm with an S4 but no gallops, murmurs or rubs. Abdomen was soft, flat, nontender without organomegaly, masses or bruits. Extremities revealed no edema. I reviewed her recent laboratory studies done prior to this recent hospitalization and during this hospitalization. Sed rate, connective tissue profile, random urine for Bence-Bah protein and a urinalysis were unremarkable. A urine microalbumin to creatinine ratio was normal.  Finally a renal ultrasound showed renal cortical scarring but otherwise was unremarkable. Her creatinines during this recent hospitalization ranged from as low as 1.03-1. 33.     I therefore informed the patient that she does have chronic kidney disease stage III. This appears to be primary on the basis of hypertension as she has no significant proteinuria. Reinforced importance of both blood pressure and blood sugar control. Maintain adequate hydration. Avoid nonsteroidals. Repeat CBC and renal panel in 3 months. Will continue quarterly. I will see her again in 6 months for follow-up or sooner if clinically indicated. Thank you again for allowing me to participate in the care of your patient. If you have any questions please feel free to call.            Sincerely,   Joan Gagnon MD   [address]  [address]  [facility] [address]    Document electronically generated by:  Joan Gagnon MD

## (undated) NOTE — LETTER
Kamsameer Raymundo, [address] [address]  [place],  [address]       06/06/22        Patient: Koby Mcnulty   YOB: 1945   Date of Visit: 6/6/2022       Dear  Dr. Sterling Esparza MD,      Thank you for referring Koby Mcnulty to my practice. Please find my assessment and plan below. As you know she is a 52-year-old female with a history of hypertension, adult onset diabetes mellitus, coronary artery disease, status post CABG, gout, asthma/COPD who I now had the pleasure of seeing for evaluation of chronic kidney disease stage III. Laboratory studies have been reviewed in epic. Her creatinines have been mildly elevated dating back to 2019. They  fluctuated in the 1.1-1.5 range. More recently she was hospitalized in February 2022 for an upper GI bleed. Her creatinine at time of admission was 1.25 but actually improved to 0.83 at time of discharge in February 18, 2022. Unclear whether she was receiving IV fluids. However more recently on April 6, 2022 her creatinine is 1.31 and finally on May 13, 2022 was 1.26 with an estimated GFR 41 cc/min and renal consultation has now been advised. Her past medical history is significant for hypertension but she states she is only had it for a couple of years. She had coronary artery bypass graft surgery 2 years ago. Her left ventricular ejection fraction though was normal.  She states she has had intermittent courses of steroids for both her gout and for her asthma. As result she feels this is what caused her diabetes. Followed by rheumatology for her gout. She is also status post left carotid endarterectomy. Medications are as listed and denies any significant use of nonsteroidals. Social history she is a non-smoker. Family history is negative for renal pathology. Review of system the patient still has some sense of dyspnea on exertion but no chest pain, GI or urinary tract symptoms.   10 point review of systems is otherwise unremarkable. On physical exam her blood pressure is 139/76 with a pulse of 73 and she weighed 175 pounds. Her neck was supple without JVD. Lungs were clear. Heart revealed a regular rate and rhythm with an S4 but no gallops, murmurs or rubs. Abdomen was soft, flat, nontender without organomegaly, masses or bruits. Extremities revealed no edema. I therefore informed the patient that she does appear to have chronic kidney disease stage III. This may be on the basis of hypertension but other causes need to be excluded. For completion sake a repeat CBC, renal panel, urinalysis, urine for microalbumin, urine for Bence-Bah protein, sed rate, connective tissue profile and a renal ultrasound have been ordered. Further impressions and recommendations will be forthcoming after reviewing the above. Maintain adequate hydration. Avoid nonsteroidals. Thank you very much for allowing me to participate in the care of your patient. If you have any questions please feel free to call.            Sincerely,   Lexie Villatoro MD   [address]  [address] [address]  [place] [address]    Document electronically generated by:  Lexie Villatoro MD

## (undated) NOTE — Clinical Note
Pt has an appt scheduled on 4/22/19. Pt was offered a sooner appt for HFU but pt declined. TE sent to FU on a sooner appt. Pt stated she is doing better since d/c. Her breathing is better but she is still feeling weak. Pt is walking as often as she can.  Me

## (undated) NOTE — LETTER
[address]  Authorization for Invasive Procedure                                                                                           1. I hereby authorize Michelle Crump MD, my physician and his/her assistants (if applicable), which may include medical students, residents, and/or fellows, to perform the following surgical operation/ procedure and administer such anesthesia as may be determined necessary by my physician: Operation/Procedure name (s) COLONOSCOPY on Ariana Q Comprado   2. I recognize that during the surgical operation/procedure, unforeseen conditions may necessitate additional or different procedures than those listed above. I, therefore, further authorize and request that the above-named surgeon, assistants, or designees perform such procedures as are, in their judgment, necessary and desirable. 3.   My surgeon/physician has discussed prior to my surgery the potential benefits, risks and side effects of this procedure; the likelihood of achieving goals; and potential problems that might occur during recuperation. They also discussed reasonable alternatives to the procedure, including risks, benefits, and side effects related to the alternatives and risks related to not receiving this procedure. I have had all my questions answered and I acknowledge that no guarantee has been made as to the result that may be obtained. 4.   Should the need arise during my operation/procedure, which includes change of level of care prior to discharge, I also consent to the administration of blood and/or blood products. Further, I understand that despite careful testing and screening of blood or blood products by collecting agencies, I may still be subject to ill effects as a result of receiving a blood transfusion and/or blood products.   The following are some, but not all, of the potential risks that can occur: fever and allergic reactions, hemolytic reactions, transmission of diseases such as Hepatitis, AIDS and Cytomegalovirus (CMV) and fluid overload. In the event that I wish to have an autologous transfusion of my own blood, or a directed donor transfusion, I will discuss this with my physician. Check only if Refusing Blood or Blood Products  I understand refusal of blood or blood products as deemed necessary by my physician may have serious consequences to my condition to include possible death. I hereby assume responsibility for my refusal and release the hospital, its personnel, and my physicians from any responsibility for the consequences of my refusal.    o  Refuse   5. I authorize the use of any specimen, organs, tissues, body parts or foreign objects that may be removed from my body during the operation/procedure for diagnosis, research or teaching purposes and their subsequent disposal by hospital authorities. I also authorize the release of specimen test results and/or written reports to my treating physician on the hospital medical staff or other referring or consulting physicians involved in my care, at the discretion of the Pathologist or my treating physician. 6.   I consent to the photographing or videotaping of the operations or procedures to be performed, including appropriate portions of my body for medical, scientific, or educational purposes, provided my identity is not revealed by the pictures or by descriptive texts accompanying them. If the procedure has been photographed/videotaped, the surgeon will obtain the original picture, image, videotape or CD. The hospital will not be responsible for storage, release or maintenance of the picture, image, tape or CD.    7.   I consent to the presence of a  or observers in the operating room as deemed necessary by my physician or their designees.     8.   I recognize that in the event my procedure results in extended X-Ray/fluoroscopy time, I may develop a skin reaction. 9. If I have a Do Not Attempt Resuscitation (DNAR) order in place, that status will be suspended while in the operating room, procedural suite, and during the recovery period unless otherwise explicitly stated by me (or a person authorized to consent on my behalf). The surgeon or my attending physician will determine when the applicable recovery period ends for purposes of reinstating the DNAR order. 10. Patients having a sterilization procedure: I understand that if the procedure is successful the results will be permanent and it will therefore be impossible for me to inseminate, conceive, or bear children. I also understand that the procedure is intended to result in sterility, although the result has not been guaranteed. 11. I acknowledge that my physician has explained sedation/analgesia administration to me including the risk and benefits I consent to the administration of sedation/analgesia as may be necessary or desirable in the judgment of my physician. I CERTIFY THAT I HAVE READ AND FULLY UNDERSTAND THE ABOVE CONSENT TO OPERATION and/or OTHER PROCEDURE.     _________________________________________ _________________________________     ___________________________________  Signature of Patient     Signature of Responsible Person                   Printed Name of Responsible Person                              _________________________________________ ______________________________        ___________________________________  Signature of Witness         Date  Time         Relationship to Patient    STATEMENT OF PHYSICIAN My signature below affirms that prior to the time of the procedure; I have explained to the patient and/or his/her legal representative, the risks and benefits involved in the proposed treatment and any reasonable alternative to the proposed treatment.  I have also explained the risks and benefits involved in refusal of the proposed treatment and alternatives to the proposed treatment and have answered the patient's questions.  If I have a significant financial interest in a co-management agreement or a significant financial interest in any product or implant, or other significant relationship used in this procedure/surgery, I have disclosed this and had a discussion with my patient.     _______________________________________________________________ _____________________________  Chicho Jimenez)                                                                                         (Date)                                   (Time)  Patient Name: Paul Ramirez    : 1945   Printed: 2023      Medical Record #: [id number]                                              Page 1 of 1

## (undated) NOTE — LETTER
6/10/2021              Ariana Gibbons         Dear Verona Espinoza,    This letter is to inform you that our office has made several attempts to reach you by phone without success.   We were at

## (undated) NOTE — LETTER
[address] [address]   Date:   7/20/2020     Name:   Stefany Bustillo    YOB: 1945   MRN:   [id number]       WHERE IS YOUR PAIN NOW?   Neil the areas on your body where you feel the described sensation

## (undated) NOTE — LETTER
[address], [place], [place]     AUTHORIZATION FOR SURGICAL OPERATION OR PROCEDURE    I hereby authorize Dr. Dhruv Dennison MD, my Physician(s) and whomever may be designated as the doctor's Assistant, to perform the 4. I consent to the photographing of procedure(s) to be performed for the purposes of advancing medicine, science and/or education, provided my identity is not revealed.  If the procedure has been videotaped, the physician/surgeon will obtain the original v (Witness signature)                                                                                                  (Date)                                (Time)  STATEMENT OF PHYSICIAN My signature below affirms that prior to the time of the procedure;  I

## (undated) NOTE — LETTER
03/25/21        Bo Harkins 1946 [address]       Dear Maria Del Rosario Doyle,    [id number] [facility] records indicate that you have outstanding lab work and or testing that was ordered for you and has not yet been completed:  [address]

## (undated) NOTE — LETTER
03/30/21        Denise Harkins 1946 [address]       Dear Megan Haq records indicate that you have outstanding lab work and or testing that was ordered for you and has not yet been completed:  [address]

## (undated) NOTE — Clinical Note
Spoke with pt today for TCM. Sent condition update TE to RN triage for PCP recommendations--thank you.

## (undated) NOTE — LETTER
[address], [place]  Authorization for Invasive Procedures  1. I hereby authorize An Kelly , my physician and whomever may be designated as the [address] [place] [address], to perform the following operation and/or procedure: Esophagogastroduodenoscopy on Ariana Osorio at [facility].    2. My physician has explained to me the nature and purpose of the operation or other procedure, possible alternative methods of treatment, the risks involved and the possibility of complications to me. I understand the probable consequences of declining the recommended procedure and the alternative methods of treatment. I acknowledge that no guarantee has been made as to the result that may be obtained. 3. I recognize that during the course of this operation or other procedure, unforeseen conditions may necessitate additional or different procedures than those listed above. I, therefore, further authorize and request that the above-named physician, his/her physician assistants, or designees perform such procedures as are, in his/her professional opinion, necessary and desirable. If I have a Do Not Attempt Resuscitation (DNAR) order in place, that status will be suspended while in the operating room, procedural suite, and during the recovery period unless otherwise explicitly stated by me (or a person authorized to consent on my behalf). The surgeon or my attending physician will determine when the applicable recovery period ends for purposes of reinstating the DNAR order. 4. Should the need arise during my operation or immediate post-operative period; I also consent to the administration of blood and/or blood products.  Further, I understand that despite careful testing and screening of blood and blood products, I may still be subject to ill effects as a result of recieving a blood transfusion an/or blood producst. The following are some, but not all, of the potential risks that can occur: fever and allergic reactions, hemolytic reactions, transmission of disease such as hepatitis, AIDS, cytomegalovirus (CMV), and flluid overload. In the event that I wish to have autologous transfusions of my own blood, or a directed donor transfusion, I will discuss this with my physician. 5. I consent to the photographing of the operations or procedures to be performed for the purposes of advancing medicine, science, and/or education, provided my identity is not revealed. If the procedure has been videotaped, the physician/surgeon will obtain the original videotape. The [facility] will not be responsible for storage or maintenance of this tape. 6. I consent to the presence of a  or observer as deemed necessary by my physician or his designee. 7. Any tissues or organs removed in the operation or other procedure may be disposed of by and at the discretion of [facility].    8. I understand that the physician and his/her physician assistants may not be employees or agents of [facility], [facility], nor [facility], but are independent medical practitioners who have been permitted to use its facilities for the care and treatment of their patients. 9. Patients having a sterilization procedure: I understand that if the procedure is successful the results will be permanent and it will therefore be impossible for me to inseminate, conceive or bear children. I also understand that the procedure is intended to result in sterility, although the result has not been guaranteed. 10. I CERTIFY THAT I HAVE READ AND FULLY UNDERSTAND THE ABOVE CONSENT TO OPERATION and/or OTHER PROCEDURE. 11. I acknowledge that my physician has explained sedation/analgesia administration to me including the risks and benefits.  I consent to the administration of sedation/analgesia as may be necessary or desirable in the judgment of my physician. Signature of Patient:  ________________________________________________ Date: _________Time: _________    Responsible person in case of minor or unconscious: _____________________________Relationship: ____________     Witness Signature: ____________________________________________ Date: __________ Time: ___________    Statement of Physician  My signature below affirms that prior to the time of the procedure, I have explained to the patient and/or her legal representative, the risks and benefits involved in the proposed treatment and any reasonable alternative to the proposed treatment. I have also explained the risks and benefits involved in the refusal of the proposed treatment and have answered the patient's questions. If I have a significant financial interest in this procedure/surgery, I have disclosed this and had a discussion with my patient.     Signature of Physician:   ________________________________________Date: _________Time:_______ Patient Name: Sophia Meyer  : 1945   Printed: 2022    Medical Record #: [id number]

## (undated) NOTE — LETTER
Alysha Bustillo, [address] [address]  [place], [address]       12/14/18        Patient: Portillo Haley   YOB: 1945   Date of Visit: 12/14/2018       Dear  Dr. Stephanie Walton MD,      Thank you for referring Ary Schroeder

## (undated) NOTE — LETTER
[address] [place] [address]  Authorization for Invasive Procedures  1.  I hereby authorize  Dr. Erica Leal , my physician and whomever may be designated as the doctor's assistant, to perform the following operation and/or procedure Monique Martin performed for the purposes of advancing medicine, science, and/or education, provided my identity is not revealed. If the procedure has been videotaped, the physician/surgeon will obtain the original videotape.  The hospital will not be responsible for stor My signature below affirms that prior to the time of the procedure, I have explained to the patient and/or her legal representative, the risks and benefits involved in the proposed treatment and any reasonable alternative to the proposed treatment.  I have

## (undated) NOTE — Clinical Note
Initial assessment completed with patient. Message sent to office to assist in scheduling.  Patient agrees to additional follow-up calls from nurse care manager.  Thank you!  Future Appointments 8/30/2024  2:00 PM    Heri Miller MD         [id number]            [facility] [place] 8/30/2024  2:20 PM    [facility]                   E[facility]UD            [facility] 9/6/2024   11:30 AM   Caridad Flores, * ECADOPOD            [facility] 11/11/2024 3:00 PM    Riki Tran MD        [id number]        [facility]

## (undated) NOTE — LETTER
1/31/2024              Ariana Q Jo        [address]         [facility] [address]         To Whom It May Concern,      Ariana Jo is currently under my care. I have prescribed her Multi Vitamin and Vitamin D with the starting date January 1,2023 - December 31,2023.         Sincerely,    Enrique Braun MD  [address] [place] [address] [address]  [facility] [id number] [phone]

## (undated) NOTE — LETTER
06/28/21        Antonia Harkins 1946 [address]      Dear Sabra Gonzalez,    [facility] records indicate that you have outstanding lab work and or testing that was ordered for you and has not yet been completed:  [address]